# Patient Record
Sex: FEMALE | Race: BLACK OR AFRICAN AMERICAN | NOT HISPANIC OR LATINO | Employment: OTHER | URBAN - METROPOLITAN AREA
[De-identification: names, ages, dates, MRNs, and addresses within clinical notes are randomized per-mention and may not be internally consistent; named-entity substitution may affect disease eponyms.]

---

## 2017-01-06 ENCOUNTER — PATIENT MESSAGE (OUTPATIENT)
Dept: INTERNAL MEDICINE | Facility: CLINIC | Age: 67
End: 2017-01-06

## 2017-01-09 ENCOUNTER — TELEPHONE (OUTPATIENT)
Dept: INTERNAL MEDICINE | Facility: CLINIC | Age: 67
End: 2017-01-09

## 2017-01-10 ENCOUNTER — CLINICAL SUPPORT (OUTPATIENT)
Dept: AUDIOLOGY | Facility: CLINIC | Age: 67
End: 2017-01-10
Payer: MEDICARE

## 2017-01-10 ENCOUNTER — OFFICE VISIT (OUTPATIENT)
Dept: OTOLARYNGOLOGY | Facility: CLINIC | Age: 67
End: 2017-01-10
Payer: MEDICARE

## 2017-01-10 VITALS
TEMPERATURE: 98 F | BODY MASS INDEX: 27.67 KG/M2 | HEIGHT: 66 IN | DIASTOLIC BLOOD PRESSURE: 79 MMHG | WEIGHT: 172.19 LBS | HEART RATE: 61 BPM | SYSTOLIC BLOOD PRESSURE: 137 MMHG

## 2017-01-10 DIAGNOSIS — M26.4 MALOCCLUSION: ICD-10-CM

## 2017-01-10 DIAGNOSIS — H92.02 OTALGIA, LEFT: Primary | ICD-10-CM

## 2017-01-10 DIAGNOSIS — L29.9 ITCHING OF EAR: ICD-10-CM

## 2017-01-10 DIAGNOSIS — Z01.10 ENCOUNTER FOR HEARING EXAMINATION WITHOUT ABNORMAL FINDINGS: Primary | ICD-10-CM

## 2017-01-10 PROCEDURE — 99213 OFFICE O/P EST LOW 20 MIN: CPT | Mod: PBBFAC,27 | Performed by: OTOLARYNGOLOGY

## 2017-01-10 PROCEDURE — 99999 PR PBB SHADOW E&M-EST. PATIENT-LVL III: CPT | Mod: PBBFAC,,, | Performed by: OTOLARYNGOLOGY

## 2017-01-10 PROCEDURE — 99999 PR PBB SHADOW E&M-EST. PATIENT-LVL I: CPT | Mod: PBBFAC,,,

## 2017-01-10 PROCEDURE — 99203 OFFICE O/P NEW LOW 30 MIN: CPT | Mod: S$PBB,,, | Performed by: OTOLARYNGOLOGY

## 2017-01-10 NOTE — MR AVS SNAPSHOT
[facility] - Otorhinolaryngology  [address]  [facility] [address]  Phone: [phone]  Fax: [phone]                  Gely Green   1/10/2017 10:30 AM   Office Visit    Description:  Female : 1950   Provider:  Kendall Bolden III, MD   Department:  [facility] - Otorhinolaryngology           Diagnoses this Visit        Comments    Otalgia, left    -  Primary a little discomfort in left ear, behind/inferior to pinna    Itching of ear         Malocclusion                To Do List           Goals (5 Years of Data)     None      [facility]     [facility] On Call Nurse Care Line -  Assistance  Registered nurses in the [facility] On Call Center provide clinical advisement, health education, appointment booking, and other advisory services.  Call for this free service at [phone].             Medications           Message regarding Medications     Verify the changes and/or additions to your medication regime listed below are the same as discussed with your clinician today.  If any of these changes or additions are incorrect, please notify your healthcare provider.        STOP taking these medications     dextromethorphan 15 mg/5 mL syrup Take 10 mLs by mouth 4 (four) times daily as needed for Cough.    flavoxate (URISPAS) 100 mg Tab Take 1 tablet (100 mg total) by mouth 3 (three) times daily as needed (IC flare).           Verify that the below list of medications is an accurate representation of the medications you are currently taking.  If none reported, the list may be blank. If incorrect, please contact your healthcare provider. Carry this list with you in case of emergency.           Current Medications     ACCU-CHEK FASTCLIX Misc TEST twice a day    ACCU-CHEK SMARTVIEW TEST STRIP Strp ACCU-CHEK SMARTVIEW TEST STRIP Strp,    betamethasone dipropionate (DIPROLENE) 0.05 % cream Apply topically 2 (two) times daily.    blood sugar diagnostic Strp 1 strip by Misc.(Non-Drug;  "Combo Route) route 3 (three) times daily as needed. Lifescan test strips    camphor-eucalyptus oil-menthol (VICKS VAPORUB) 4.7-1.2-2.6 % Oint Apply topically as directed.    CRESTOR 10 mg tablet     diabetic supplies, RoboCV. Kit 1 application by Misc.(Non-Drug; Combo Route) route once daily. Lifescan glucose meter    gabapentin (NEURONTIN) 100 MG capsule Take 1 capsule (100 mg total) by mouth 3 (three) times daily.    ibuprofen (ADVIL,MOTRIN) 200 MG tablet Take 200 mg by mouth every 6 (six) hours as needed for Pain.    insulin detemir (LEVEMIR FLEXPEN) 100 unit/mL (3 mL) SubQ InPn pen Inject 33 Units into the skin every evening.    losartan (COZAAR) 50 MG tablet     meclizine (ANTIVERT) 12.5 mg tablet Take 1 tablet (12.5 mg total) by mouth 3 (three) times daily as needed for Dizziness.    metformin (GLUCOPHAGE-XR) 500 MG 24 hr tablet Take 1 tablet (500 mg total) by mouth 2 (two) times daily.    pen needle, diabetic (NOVOFINE 32) 32 gauge x 1/4" Ndle Inject 1 application into the skin every evening.    pen needle, diabetic (NOVOFINE PLUS) 32 gauge x 1/6" Ndle Inject 1 application into the skin every evening.    phenol (CHLORASEPTIC) 1.4 % SprA by Mucous Membrane route every 2 (two) hours.    phenylephrine-DM-acetaminophen (THERAFLU EXPRESSMAX COLD DAY) 5- mg/15 mL Liqd Take 30 mLs by mouth as directed.           Clinical Reference Information           Vital Signs - Last Recorded  Most recent update: 1/10/2017 11:02 AM by Jesus Maldonado MA    BP Pulse Temp    137/79 (BP Location: Left arm, Patient Position: Sitting, BP Method: Automatic) 61 97.7 °F (36.5 °C) (Tympanic)    Ht Wt BMI    5' 6" (1.676 m) 78.1 kg (172 lb 2.9 oz) 27.79 kg/m2      Blood Pressure          Most Recent Value    BP  137/79      Allergies as of 1/10/2017     No Known Allergies      Immunizations Administered on Date of Encounter - 1/10/2017     None      Instructions    Audiometry reviewed: all parameters WNL  Rx for Valisone " lotion; 3 drops to itchy ear canal BD prn  Discontinue other cream use ( Bacitracin?) for now   Re-establishment of occlusion with lower denture may help  TMJ otalgia literature provided  Consider cervical evalution re: radiculopathy pending course

## 2017-01-10 NOTE — PROGRESS NOTES
Subjective:       Patient ID: Gely Green is a 66 y.o. female.    Chief Complaint: No chief complaint on file.    HPI: Ms. Green is a 66-year-old bespectacled -American female with diabetes who complains of a problem with her left ear specifically.    She describes a little discomfort and pain there which is annoying.  She describes a sharp stabbing pain grade 2/10 in her ear but she also describes some discomfort behind the left ear rating down inferiorly into the neck.  She was given some cream to apply to the radha bowl area skin by another ENT physician.  She does not mention any dizziness symptoms to me today.    She was examined by Dr. Anderson 16 for dizziness as well as rhinorrhea and URI-head cold  symptoms.  She was prescribed meclizine 12.5 mg for lightheadedness and orthostatic hypotension.  Hydration was encouraged.    PMH: High blood pressure, high cholesterol, diabetes, fibromyalgia, mitral valve prolapse, osteopenia  PSH: 2  section procedures  Family history: Heart disease, high blood pressure, high cholesterol, TIA, alcoholism, glaucoma, diabetes, liver disease, brain tumor  Occupation: Retired  Review of Systems   Ears: Positive for ear pressure and dizziness.    Nose:  Positive for postnasal drip.    Constitutional: Positive for fever, chills and night sweats.    Gastrointestinal:  Positive for acid reflux.   Other:  Positive for kidney problem, bladder problem, arthritis, depression and anxiety. Negative for rash.         The patient has completed an audiometric study performed by the [facility] audiology service.  The studies duplicated below and the results reviewed with the patient.  Objective:           blood pressure 137/79 pulse 61 temperature 97.7 height 5 feet 6 inches weight 172 pounds  Physical Exam   Constitutional: She is oriented to person, place, and time. She appears well-developed and well-nourished.   HENT:   Head: Normocephalic.        Right Ear: Tympanic membrane and external ear normal. No drainage. No foreign bodies. No mastoid tenderness. Tympanic membrane is not perforated. No decreased hearing is noted.   Left Ear: Tympanic membrane and external ear normal. No drainage. No foreign bodies. No mastoid tenderness. Tympanic membrane is not perforated. No decreased hearing is noted.   Ears:    Nose: Nose normal. No nasal deformity, septal deviation or nasal septal hematoma. No epistaxis. Right sinus exhibits no maxillary sinus tenderness and no frontal sinus tenderness. Left sinus exhibits no maxillary sinus tenderness and no frontal sinus tenderness.   Mouth/Throat: Uvula is midline, oropharynx is clear and moist and mucous membranes are normal. No oral lesions. No trismus in the jaw. No uvula swelling. No oropharyngeal exudate or tonsillar abscesses.       Neck: Neck supple. No tracheal deviation present. No thyromegaly present.       Pulmonary/Chest: Effort normal. No stridor.   Lymphadenopathy:     She has no cervical adenopathy.   Neurological: She is alert and oriented to person, place, and time.   Skin: No rash noted.       Assessment:       1. Otalgia, left    2. Itching of ear    3. Malocclusion        Plan:     Audiometry reviewed: all parameters WNL  Rx for Valisone lotion; 3 drops to itchy ear canal BD prn  Discontinue other cream use ( Bacitracin?) for now   Re-establishment of occlusion with lower denture may help  TMJ otalgia literature provided  Consider cervical evalution re: radiculopathy pending course

## 2017-01-10 NOTE — PATIENT INSTRUCTIONS
Audiometry reviewed: all parameters WNL  Rx for Valisone lotion; 3 drops to itchy ear canal BD prn  Discontinue other cream use ( Bacitracin?) for now   Re-establishment of occlusion with lower denture may help  TMJ otalgia literature provided  Consider cervical evalution re: radiculopathy pending course

## 2017-01-12 RX ORDER — INSULIN GLARGINE 300 [IU]/ML
33 INJECTION, SOLUTION SUBCUTANEOUS NIGHTLY
Qty: 4.5 ML | Refills: 3 | Status: SHIPPED | OUTPATIENT
Start: 2017-01-12 | End: 2017-06-02 | Stop reason: SDUPTHER

## 2017-01-12 RX ORDER — METFORMIN HYDROCHLORIDE 500 MG/1
500 TABLET, EXTENDED RELEASE ORAL 2 TIMES DAILY
Qty: 180 TABLET | Refills: 3 | Status: SHIPPED | OUTPATIENT
Start: 2017-01-12 | End: 2017-06-02

## 2017-01-12 NOTE — TELEPHONE ENCOUNTER
----- Message from Kely Nascimento sent at 1/12/2017 10:51 AM CST -----  _  1st Request  _  2nd Request  _  3rd Request        Who: Gely Green     Why: please call pt concerning her insulin meds, she needs it filled today, she has been out since last night.     What Number to Call Back:[phone]    When to Expect a call back: (Before the end of the day)   -- if call after 3:00 call back will be tomorrow.

## 2017-01-23 ENCOUNTER — PATIENT MESSAGE (OUTPATIENT)
Dept: UROGYNECOLOGY | Facility: CLINIC | Age: 67
End: 2017-01-23

## 2017-04-17 ENCOUNTER — OFFICE VISIT (OUTPATIENT)
Dept: INTERNAL MEDICINE | Facility: CLINIC | Age: 67
End: 2017-04-17
Payer: MEDICARE

## 2017-04-17 VITALS
WEIGHT: 168.19 LBS | HEIGHT: 66 IN | SYSTOLIC BLOOD PRESSURE: 130 MMHG | OXYGEN SATURATION: 98 % | HEART RATE: 64 BPM | RESPIRATION RATE: 16 BRPM | BODY MASS INDEX: 27.03 KG/M2 | DIASTOLIC BLOOD PRESSURE: 78 MMHG

## 2017-04-17 DIAGNOSIS — S96.912A LEFT ANKLE STRAIN, INITIAL ENCOUNTER: Primary | ICD-10-CM

## 2017-04-17 PROCEDURE — 99999 PR PBB SHADOW E&M-EST. PATIENT-LVL IV: CPT | Mod: PBBFAC,,, | Performed by: NURSE PRACTITIONER

## 2017-04-17 PROCEDURE — 99213 OFFICE O/P EST LOW 20 MIN: CPT | Mod: S$PBB,,, | Performed by: NURSE PRACTITIONER

## 2017-04-17 PROCEDURE — 99214 OFFICE O/P EST MOD 30 MIN: CPT | Mod: PBBFAC | Performed by: NURSE PRACTITIONER

## 2017-04-17 RX ORDER — SULINDAC 150 MG/1
150 TABLET ORAL 2 TIMES DAILY
Qty: 14 TABLET | Refills: 0 | Status: SHIPPED | OUTPATIENT
Start: 2017-04-17 | End: 2017-06-02 | Stop reason: ALTCHOICE

## 2017-04-17 NOTE — MR AVS SNAPSHOT
[facility] - Internal Medicine  [address]  [facility] [address]  Phone: [phone]  Fax: [phone]                  Gely Green   2017 6:00 PM   Office Visit    Description:  Female : 1950   Provider:  Ene Foreman NP   Department:  [facility] - Internal Medicine           Reason for Visit     Ankle Pain           Diagnoses this Visit        Comments    Left ankle strain, initial encounter    -  Primary            To Do List           Goals (5 Years of Data)     None       These Medications        Disp Refills Start End    sulindac (CLINORIL) 150 MG tablet 14 tablet 0 2017     Take 1 tablet (150 mg total) by mouth 2 (two) times daily. - Oral    [facility] [address] [facility] [facility] [address] #: [phone]         [facility] On Call     [facility] On Call Nurse Care Line -  Assistance  Unless otherwise directed by your provider, please contact [facility], our nurse care line that is available for  assistance.     Registered nurses in the [facility] Center provide: appointment scheduling, clinical advisement, health education, and other advisory services.  Call: [phone] (toll free)               Medications           Message regarding Medications     Verify the changes and/or additions to your medication regime listed below are the same as discussed with your clinician today.  If any of these changes or additions are incorrect, please notify your healthcare provider.        START taking these NEW medications        Refills    sulindac (CLINORIL) 150 MG tablet 0    Sig: Take 1 tablet (150 mg total) by mouth 2 (two) times daily.    Class: Normal    Route: Oral           Verify that the below list of medications is an accurate representation of the medications you are currently taking.  If none reported, the list may be blank. If incorrect, please contact your healthcare provider. Carry this list with you in case of  "emergency.           Current Medications     ACCU-CHEK FASTCLIX Misc TEST twice a day    ACCU-CHEK SMARTVIEW TEST STRIP Strp ACCU-CHEK SMARTVIEW TEST STRIP Strp,    betamethasone dipropionate (DIPROLENE) 0.05 % cream Apply topically 2 (two) times daily.    blood sugar diagnostic Strp 1 strip by Misc.(Non-Drug; Combo Route) route 3 (three) times daily as needed. Lifescan test strips    CRESTOR 10 mg tablet     diabetic supplies, C3DNAcellan. Kit 1 application by Misc.(Non-Drug; Combo Route) route once daily. Lifescan glucose meter    gabapentin (NEURONTIN) 100 MG capsule Take 1 capsule (100 mg total) by mouth 3 (three) times daily.    ibuprofen (ADVIL,MOTRIN) 200 MG tablet Take 200 mg by mouth every 6 (six) hours as needed for Pain.    insulin glargine, TOUJEO, (TOUJEO SOLOSTAR) 300 unit/mL (1.5 mL) InPn pen Inject 33 Units into the skin every evening.    losartan (COZAAR) 50 MG tablet     meclizine (ANTIVERT) 12.5 mg tablet Take 1 tablet (12.5 mg total) by mouth 3 (three) times daily as needed for Dizziness.    metformin (GLUCOPHAGE-XR) 500 MG 24 hr tablet Take 1 tablet (500 mg total) by mouth 2 (two) times daily.    pen needle, diabetic (NOVOFINE 32) 32 gauge x 1/4" Ndle Inject 1 application into the skin every evening.    pen needle, diabetic (NOVOFINE PLUS) 32 gauge x 1/6" Ndle Inject 1 application into the skin every evening.    camphor-eucalyptus oil-menthol (VICKS VAPORUB) 4.7-1.2-2.6 % Oint Apply topically as directed.    phenol (CHLORASEPTIC) 1.4 % SprA by Mucous Membrane route every 2 (two) hours.    phenylephrine-DM-acetaminophen (THERAFLU EXPRESSMAX COLD DAY) 5- mg/15 mL Liqd Take 30 mLs by mouth as directed.    sulindac (CLINORIL) 150 MG tablet Take 1 tablet (150 mg total) by mouth 2 (two) times daily.           Clinical Reference Information           Your Vitals Were     BP Pulse Resp Height Weight SpO2    130/78 (BP Location: Left arm, Patient Position: Sitting) 64 16 5' 6" (1.676 m) 76.3 kg (168 " lb 3.4 oz) 98%    BMI                27.15 kg/m2          Blood Pressure          Most Recent Value    BP  130/78      Allergies as of 4/17/2017     No Known Allergies      Immunizations Administered on Date of Encounter - 4/17/2017     None      Language Assistance Services     ATTENTION: Language assistance services are available, free of charge. Please call [phone].      ATENCIÓN: Si habla español, tiene a florez disposición servicios gratuitos de asistencia lingüística. Llame al [phone].     CHÚ Ý: N?u b?n nói Ti?ng Vi?t, có các d?ch v? h? tr? ngôn ng? mi?n phí dành cho b?n. G?i s? [phone].         [facility] complies with applicable Federal civil rights laws and does not discriminate on the basis of race, color, national origin, age, disability, or sex.

## 2017-04-18 NOTE — PROGRESS NOTES
Subjective:       Patient ID: Gely Green is a 66 y.o. female.    Chief Complaint: Ankle Pain    Ankle Pain    The injury mechanism is unknown. The pain is present in the left ankle. The quality of the pain is described as aching. The pain is at a severity of 5/10. The pain is moderate. The pain has been fluctuating since onset. Pertinent negatives include no inability to bear weight, loss of motion, loss of sensation, muscle weakness, numbness or tingling. She reports no foreign bodies present. The symptoms are aggravated by weight bearing. She has tried nothing for the symptoms.     Review of Systems   Constitutional: Negative for fever.   HENT: Negative for facial swelling.    Eyes: Negative for visual disturbance.   Respiratory: Negative for shortness of breath.    Cardiovascular: Negative for chest pain.   Gastrointestinal: Negative for diarrhea and nausea.   Genitourinary: Negative for dysuria.   Musculoskeletal: Positive for gait problem and joint swelling.   Skin: Negative for rash.   Neurological: Negative for tingling and numbness.   Psychiatric/Behavioral: Negative for confusion.       Objective:      Physical Exam   Constitutional: She appears well-developed and well-nourished. No distress.   HENT:   Head: Atraumatic.   Eyes: No scleral icterus.   Neck: Normal range of motion. Neck supple.   Cardiovascular: Normal rate and regular rhythm.    Pulmonary/Chest: Effort normal. No respiratory distress. She has wheezes.   Musculoskeletal:        Left ankle: She exhibits decreased range of motion and swelling. Tenderness. CF ligament tenderness found.   Skin: She is not diaphoretic.   Nursing note and vitals reviewed.      Assessment:       1. Left ankle strain, initial encounter        Plan:   1. Left ankle strain, initial encounter  - sulindac (CLINORIL) 150 MG tablet; Take 1 tablet (150 mg total) by mouth 2 (two) times daily.  Dispense: 14 tablet; Refill: 0      Pt has been given instructions populated  from cloudswave database and has verbalized understanding of the after visit summary and information contained wherein.    Follow up with a primary care provider. May go to ER for acute shortness of breath, lightheadedness, fever, or any other emergent complaints or changes in condition.

## 2017-04-24 RX ORDER — ROSUVASTATIN CALCIUM 10 MG/1
TABLET, FILM COATED ORAL
Qty: 90 TABLET | Refills: 1 | Status: SHIPPED | OUTPATIENT
Start: 2017-04-24 | End: 2017-11-21 | Stop reason: ALTCHOICE

## 2017-05-03 ENCOUNTER — OFFICE VISIT (OUTPATIENT)
Dept: PSYCHIATRY | Facility: CLINIC | Age: 67
End: 2017-05-03
Payer: MEDICARE

## 2017-05-03 DIAGNOSIS — F43.23 ADJUSTMENT DISORDER WITH MIXED ANXIETY AND DEPRESSED MOOD: Primary | ICD-10-CM

## 2017-05-03 DIAGNOSIS — F43.21 GRIEF: ICD-10-CM

## 2017-05-03 PROCEDURE — 90791 PSYCH DIAGNOSTIC EVALUATION: CPT | Mod: S$PBB,,, | Performed by: SOCIAL WORKER

## 2017-05-03 PROCEDURE — 90791 PSYCH DIAGNOSTIC EVALUATION: CPT | Mod: PBBFAC | Performed by: SOCIAL WORKER

## 2017-05-03 NOTE — PROGRESS NOTES
"Psychiatry Initial Visit (PhD/LCSW)  Diagnostic Interview - CPT 22968    Date: 5/3/2017    Site: [facility]    Referral source: self referral     Clinical status of patient: Outpatient    Gely Green, a 66 y.o. female, for initial evaluation visit.  Met with patient.    Chief complaint/reason for encounter: adjustment, depression, and grief     History of present illness: Patient says she presents today due to multiple issues.  She begins by noting son's death in .  He  at the age of 33 years old after having surgery to remove brain tumor.  Tumor was not cancerous, but was impacting his vision and hearing.  Patient explains she also has marital issues and may be struggling with "empty nest."  She is retired now and states, "I don't know what to do with myself."  Patient does try to stay busy - has been involved in her Yarsanism, participates in grief support groups, and exercises.  She elaborates son was living with her and  - "It was a horrific process and we were his caregivers.  I can't get passed any of it."  Patient cites guilt issues - son was sick "all his life" - had first tumor at age 3 years old,  and sometimes she feels she "could have done more or provided better care."  Son had surgery in .  After that surgery he had a tracheostomy and had to be tube fed.  He ended up in LTAC, and later in hospice.  Prior to surgery he was fully functional - had worked as a  at [employer] for 10 years.  Patient made decision to put him hospice after he became nonresponsive - "Doctor tried to get me to do it much earlier than we did, but I fought it."  She explains son had been in and out of the hospital for multiple issues after the surgery, and was not improving - even after attempts at rehab.  She does not know what caused him to not have a successful outcome after the surgery.  Patient denies excessive worry and denies hx panic attacks.  Patient recalls first having depression in her 40s " "due to situational stressors - was mother's primary caregiver when mother struggled with Alzheimer's and patient was also dealing with 's infidelity at that time.  Patient says, "I don't like him."  She has thought about divorce, but is comfortable staying in the marriage and living as roommates.  They have never done marriage counseling - patient acknowledges there are underlying resentments.  Patient has interest to do activities - wants to eventually travel and cultivate new friendships.  She feels cousneling will be helpful to process grief and help her adjust to changes.  Provided patient with information about [facility] group during today's session.        Pain: 3    Symptoms:   · Mood: depressed mood, insomnia, fatigue, worthlessness/guilt, poor concentration and tearfulness; denies isolation and says she has interest in doing things   · Anxiety: denied  · Substance abuse: denied  · Cognitive functioning: denied  · Health behaviors: hypertension; hyperlipidemia; diabetes; fibromyalgia ; interstitial cystitis     Psychiatric history: denies hx individual counseling - participates currently in grief support group; has hx being on Paxil when son was sick in ; she denies current psych medications; denies hx psych hospitalizations; denies hx SA; acknowledges after son's death she thought about suicide, but denies having intent or plan - "It would be selfish.  I don't want to hurt my family;" inherited father's guns, but does not use them - she mentions the only reason she would ever use them would be for protection       Medical history: hypertension; hyperlipidemia; diabetes; fibromyalgia; interstitial cystitis       Family history of psychiatric illness: mother with Alzheimer's     Social history (marriage, employment, etc.): Patient lives  of 30 years. Patient has a 40 year old daughter, who lives locally.  Patient has one grandson from daughter.  Patient had a son, who  in  - " "son was 33 years old when he .  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  Patient is involved in Hinduism - she attends non-[employer] services.      Substance use:   Alcohol: denied   Drugs: denied   Tobacco: none   Caffeine: occasional soft drinks during the week - does not drink them daily     Current medications and drug reactions (include OTC, herbal): see medication list; denies psych medications currently and says she does not want to be on psych medications at this time     Strengths and liabilities: Strength: Patient accepts guidance/feedback, Strength: Patient is expressive/articulate., Strength: Patient is intelligent., Strength: Patient is motivated for change., Strength: Patient has positive support network., Liability: Patient lacks coping skills.    Current Evaluation:     Mental Status Exam:  General Appearance:  age appropriate, well nourished, casually dressed   Speech: normal tone, normal rate, normal pitch, normal volume      Level of Cooperation: cooperative      Thought Processes: normal and logical   Mood: sad      Thought Content: normal, no suicidality, no homicidality, delusions, or paranoia   Affect: congruent and appropriate   Orientation: Oriented x3   Memory: recent >  intact, remote >  intact   Attention Span & Concentration: intact   Fund of General Knowledge: intact and appropriate to age and level of education   Abstract Reasoning: not assessed   Judgment & Insight: fair     Language  intact     Diagnostic Impression - Plan:       ICD-10-CM ICD-9-CM   1. Adjustment disorder with mixed anxiety and depressed mood F43.23 309.28   2. Grief F43.20 309.0       Plan:individual psychotherapy    Return to Clinic: as scheduled    Length of Service (minutes): 45  "

## 2017-05-04 PROBLEM — F43.21 GRIEF: Status: ACTIVE | Noted: 2017-05-04

## 2017-05-04 PROBLEM — F43.23 ADJUSTMENT DISORDER WITH MIXED ANXIETY AND DEPRESSED MOOD: Status: ACTIVE | Noted: 2017-05-04

## 2017-06-02 ENCOUNTER — OFFICE VISIT (OUTPATIENT)
Dept: INTERNAL MEDICINE | Facility: CLINIC | Age: 67
End: 2017-06-02
Attending: FAMILY MEDICINE
Payer: MEDICARE

## 2017-06-02 ENCOUNTER — PATIENT OUTREACH (OUTPATIENT)
Dept: ADMINISTRATIVE | Facility: HOSPITAL | Age: 67
End: 2017-06-02
Payer: MEDICARE

## 2017-06-02 VITALS
HEIGHT: 66 IN | OXYGEN SATURATION: 98 % | WEIGHT: 170 LBS | HEART RATE: 56 BPM | BODY MASS INDEX: 27.32 KG/M2 | SYSTOLIC BLOOD PRESSURE: 118 MMHG | DIASTOLIC BLOOD PRESSURE: 74 MMHG

## 2017-06-02 DIAGNOSIS — K59.00 CONSTIPATION, UNSPECIFIED CONSTIPATION TYPE: ICD-10-CM

## 2017-06-02 DIAGNOSIS — E04.2 MULTIPLE THYROID NODULES: ICD-10-CM

## 2017-06-02 DIAGNOSIS — Z12.11 SCREENING FOR COLON CANCER: ICD-10-CM

## 2017-06-02 DIAGNOSIS — E78.5 HYPERLIPIDEMIA, UNSPECIFIED HYPERLIPIDEMIA TYPE: ICD-10-CM

## 2017-06-02 DIAGNOSIS — R20.2 TINGLING OF SKIN: ICD-10-CM

## 2017-06-02 LAB
CREAT UR-MCNC: 172.6 MG/DL
MICROALBUMIN UR DL<=1MG/L-MCNC: 6 UG/ML
MICROALBUMIN/CREATININE RATIO: 3.5 UG/MG

## 2017-06-02 PROCEDURE — 99999 PR PBB SHADOW E&M-EST. PATIENT-LVL IV: CPT | Mod: PBBFAC,,, | Performed by: FAMILY MEDICINE

## 2017-06-02 PROCEDURE — 82570 ASSAY OF URINE CREATININE: CPT

## 2017-06-02 PROCEDURE — 82043 UR ALBUMIN QUANTITATIVE: CPT

## 2017-06-02 PROCEDURE — 99214 OFFICE O/P EST MOD 30 MIN: CPT | Mod: S$PBB,,, | Performed by: FAMILY MEDICINE

## 2017-06-02 PROCEDURE — 99214 OFFICE O/P EST MOD 30 MIN: CPT | Mod: PBBFAC | Performed by: FAMILY MEDICINE

## 2017-06-02 RX ORDER — GABAPENTIN 100 MG/1
100 CAPSULE ORAL 3 TIMES DAILY
Qty: 90 CAPSULE | Refills: 1 | Status: SHIPPED | OUTPATIENT
Start: 2017-06-02 | End: 2020-08-04

## 2017-06-02 RX ORDER — METFORMIN HYDROCHLORIDE EXTENDED-RELEASE TABLETS 1000 MG/1
2000 TABLET, FILM COATED, EXTENDED RELEASE ORAL
Qty: 60 TABLET | Refills: 1 | Status: SHIPPED | OUTPATIENT
Start: 2017-06-02 | End: 2017-11-21

## 2017-06-02 RX ORDER — INSULIN GLARGINE 300 [IU]/ML
36 INJECTION, SOLUTION SUBCUTANEOUS NIGHTLY
Qty: 4.5 ML | Refills: 3
Start: 2017-06-02 | End: 2017-06-16 | Stop reason: SDUPTHER

## 2017-06-02 RX ORDER — FLAVOXATE HYDROCHLORIDE 100 MG/1
100 TABLET ORAL 3 TIMES DAILY PRN
COMMUNITY
End: 2017-09-07 | Stop reason: SDUPTHER

## 2017-06-02 NOTE — PATIENT INSTRUCTIONS
80 fl oz water daily     Take fiber supplements such as Metamucil, Citrucel, Konsyl, etc. (Benefiber is less effective so avoid)  The goal is 1-2 nonstraining nonloose bowel movements per day.  In general we recommend about 25-30 grams of fiber daily or reaching the above bowel movement goal.

## 2017-06-02 NOTE — PROGRESS NOTES
"Subjective:      Patient ID: Gely Green is a 66 y.o. female.    Chief Complaint: Annual Exam    She is here for DM follow up. She snacks at night time and she eats chips/ice cream/candy. Her sugars have been in the 150-180s. She reports her food intake is secondary to bad habits. She denies stress or anxiety lending itself to it. She has bowel movements once every 2-3 weeks. She gets tingling in her face once a day, lasts for a second, for the last 6 weeks. No headaches, loss of vision or hearing with it. No facial droops or slurred speech with it.       Review of Systems   Constitutional: Negative.    HENT: Negative.    Respiratory: Negative.    Cardiovascular: Negative.    Gastrointestinal: Positive for constipation. Negative for abdominal distention, abdominal pain, anal bleeding, blood in stool, diarrhea, nausea, rectal pain and vomiting.   Genitourinary: Negative.    Neurological: Negative.      I personally reviewed Past Medical History, Past Surgical history,  Past Social History and Family History    Objective:   /74   Pulse (!) 56   Ht 5' 6" (1.676 m)   Wt 77.1 kg (169 lb 15.6 oz)   SpO2 98%   BMI 27.43 kg/m²     Physical Exam   Constitutional: She is oriented to person, place, and time. She appears well-developed and well-nourished. No distress.   HENT:   Head: Normocephalic.   Right Ear: External ear normal.   Left Ear: External ear normal.   Mouth/Throat: Oropharynx is clear and moist.   Eyes: Conjunctivae and EOM are normal. Pupils are equal, round, and reactive to light. Right eye exhibits no discharge. Left eye exhibits no discharge. No scleral icterus.   Neck: Normal range of motion. No tracheal deviation present. No thyromegaly present.   Cardiovascular: Normal rate, regular rhythm, normal heart sounds and intact distal pulses.  Exam reveals no gallop.    No murmur heard.  Pulses:       Dorsalis pedis pulses are 2+ on the right side, and 2+ on the left side.        Posterior tibial " pulses are 2+ on the right side, and 2+ on the left side.   Pulmonary/Chest: Effort normal and breath sounds normal. No respiratory distress. She has no wheezes. She has no rales. She exhibits no tenderness.   Abdominal: Soft. Bowel sounds are normal. She exhibits no distension and no mass. There is no tenderness. There is no rebound and no guarding.   Musculoskeletal: Normal range of motion.        Right foot: There is normal range of motion and no deformity.        Left foot: There is normal range of motion and no deformity.   Feet:   Right Foot:   Protective Sensation: 6 sites tested. 6 sites sensed.   Skin Integrity: Negative for ulcer or blister.   Left Foot:   Protective Sensation: 6 sites tested. 6 sites sensed.   Skin Integrity: Negative for ulcer or blister.   Neurological: She is alert and oriented to person, place, and time.   Skin: Skin is warm and dry.   Psychiatric: She has a normal mood and affect. Her behavior is normal. Judgment and thought content normal.   Vitals reviewed.      Gely was seen today for annual exam.    Diagnoses and all orders for this visit:    Diabetes mellitus due to underlying condition, uncontrolled, with diabetic polyneuropathy, with long-term current use of insulin  -cont toujeo and metformin, will increase dose to 36 units and metformin to 2000mg daily   -return in 2 weeks with list of sugars  -     Amb Referral to [facility]  -     DIABETIC SHOES FOR HOME USE    Constipation, unspecified constipation type  Screening for colon cancer  -discussed water and fiber intake   -patient to follow up with  GI    Hyperlipidemia, unspecified hyperlipidemia type  -cont crestor   -     Lipid panel; Future    Tingling of skin  -  Patient to follow up with neurology     Multiple thyroid nodules  -     US Soft Tissue Head Neck Thyroid; Future    Other orders  -     insulin glargine, TOUJEO, (TOUJEO SOLOSTAR) 300 unit/mL (1.5 mL) InPn pen; Inject 36 Units into the skin every  evening.  -     Cancel: Amb Referral to [facility]  -     gabapentin (NEURONTIN) 100 MG capsule; Take 1 capsule (100 mg total) by mouth 3 (three) times daily.  -     metformin (FORTAMET) 1,000 mg 24 hr tablet; Take 2 tablets (2,000 mg total) by mouth daily with breakfast.

## 2017-06-06 ENCOUNTER — TELEPHONE (OUTPATIENT)
Dept: INTERNAL MEDICINE | Facility: CLINIC | Age: 67
End: 2017-06-06

## 2017-06-06 ENCOUNTER — PATIENT MESSAGE (OUTPATIENT)
Dept: INTERNAL MEDICINE | Facility: CLINIC | Age: 67
End: 2017-06-06

## 2017-06-06 DIAGNOSIS — Z12.31 VISIT FOR SCREENING MAMMOGRAM: Primary | ICD-10-CM

## 2017-06-06 DIAGNOSIS — Z79.4 TYPE 2 DIABETES MELLITUS WITH DIABETIC POLYNEUROPATHY, WITH LONG-TERM CURRENT USE OF INSULIN: ICD-10-CM

## 2017-06-06 DIAGNOSIS — Z12.39 BREAST CANCER SCREENING: Primary | ICD-10-CM

## 2017-06-06 DIAGNOSIS — E11.42 TYPE 2 DIABETES MELLITUS WITH DIABETIC POLYNEUROPATHY, WITH LONG-TERM CURRENT USE OF INSULIN: ICD-10-CM

## 2017-06-06 NOTE — TELEPHONE ENCOUNTER
Attempted to order annual mammo (external), but epic stated that I am unable to sign this order. Pt requesting her yearly mammo to have done a touro. Please advise and authorize.

## 2017-06-08 ENCOUNTER — OFFICE VISIT (OUTPATIENT)
Dept: GASTROENTEROLOGY | Facility: CLINIC | Age: 67
End: 2017-06-08
Payer: MEDICARE

## 2017-06-08 VITALS — BODY MASS INDEX: 26.89 KG/M2 | WEIGHT: 167.31 LBS | HEIGHT: 66 IN

## 2017-06-08 DIAGNOSIS — K59.04 CHRONIC IDIOPATHIC CONSTIPATION: Primary | ICD-10-CM

## 2017-06-08 PROCEDURE — 99999 PR PBB SHADOW E&M-EST. PATIENT-LVL IV: CPT | Mod: PBBFAC,,, | Performed by: PHYSICIAN ASSISTANT

## 2017-06-08 PROCEDURE — 99214 OFFICE O/P EST MOD 30 MIN: CPT | Mod: PBBFAC | Performed by: PHYSICIAN ASSISTANT

## 2017-06-08 PROCEDURE — 99204 OFFICE O/P NEW MOD 45 MIN: CPT | Mod: S$PBB,,, | Performed by: PHYSICIAN ASSISTANT

## 2017-06-08 RX ORDER — LUBIPROSTONE 24 UG/1
24 CAPSULE ORAL 2 TIMES DAILY WITH MEALS
Qty: 60 CAPSULE | Refills: 3 | Status: SHIPPED | OUTPATIENT
Start: 2017-06-08 | End: 2017-06-12

## 2017-06-08 NOTE — LETTER
June 8, 2017      Althea Anderson MD  [address]  [facility] [address]           [facility]astroenterology  [address]  [facility] [address]  Phone: [phone]  Fax: [phone]          Patient: Gely Green   MR Number: [id number]   YOB: 1950   Date of Visit: 6/8/2017       Dear Dr. Althea Anderson:    Thank you for referring Gely Green to me for evaluation. Attached you will find relevant portions of my assessment and plan of care.    If you have questions, please do not hesitate to call me. I look forward to following Gely Green along with you.    Sincerely,    Marcos Rosales PA-C    Enclosure  CC:  No Recipients    If you would like to receive this communication electronically, please contact [email or web address] or [phone] to request more information on [employer] Link access.    For providers and/or their staff who would like to refer a patient to [facility], please contact us through our one-stop-shop provider referral line, [facility], at [phone].    If you feel you have received this communication in error or would no longer like to receive these types of communications, please e-mail [email or web address]

## 2017-06-08 NOTE — PROGRESS NOTES
[facility] Consultation Note    Reason for Consult:  The encounter diagnosis was Chronic idiopathic constipation.    PCP: Althea Anderson   [address] / [place] [address]    HPI:  This is a 66 y.o. female here for evaluation of constipation.    Duration - since childhood  Bowel movement frequency - every 2-3 weeks  Stool consistency - bistol type 1  Blood in stools - no  Laxatives used - stimulant laxative, no relief with miralax, can't remember if senna worked. Milk of magnesia and MOM effective  Fiber - minimal relief with fiber  Water intake- 36oz  Bran cereal  S/p , hysterectomy  BS - some reading up to 190s  A1c - 7.3    Colonoscopy 2 yrs ago - no polyps    ROS:  Constitutional: No fevers, chills, No weight loss  ENT: No allergies  CV: No chest pain  Pulm: No cough, No shortness of breath  Ophtho: No vision changes  GI: see HPI  Derm: No rash  Heme: No lymphadenopathy, No bruising  MSK: No arthritis  : No dysuria, No hematuria  Endo: No hot or cold intolerance  Neuro: No syncope, No seizure  Psych: No anxiety, No depression    Medical History:  has a past medical history of Chronic constipation; Diabetes mellitus; Diabetes mellitus; History of thyroid cyst; Hypertension; and Osteopenia.    Surgical History:  has a past surgical history that includes  section; Hysterectomy; and Colonoscopy.    Family History: family history is not on file..     Social History:  reports that she has quit smoking. She has never used smokeless tobacco. She reports that she does not drink alcohol or use drugs.    Review of patient's allergies indicates:  No Known Allergies    Current Outpatient Prescriptions   Medication Sig    ACCU-CHEK FASTCLIX Misc TEST twice a day    ACCU-CHEK SMARTVIEW TEST STRIP Strp ACCU-CHEK SMARTVIEW TEST STRIP Strp,    betamethasone dipropionate (DIPROLENE) 0.05 % cream Apply topically 2 (two) times daily.    blood sugar diagnostic Strp 1 strip  "by Misc.(Non-Drug; Combo Route) route 3 (three) times daily as needed. Lifescan test strips    CRESTOR 10 mg tablet take 1 tablet by mouth once daily    diabetic supplies, miscellan. Kit 1 application by Misc.(Non-Drug; Combo Route) route once daily. Lifescan glucose meter    flavoxate (URISPAS) 100 mg Tab Take 100 mg by mouth 3 (three) times daily as needed.    gabapentin (NEURONTIN) 100 MG capsule Take 1 capsule (100 mg total) by mouth 3 (three) times daily.    ibuprofen (ADVIL,MOTRIN) 200 MG tablet Take 200 mg by mouth every 6 (six) hours as needed for Pain.    insulin glargine, TOUJEO, (TOUJEO SOLOSTAR) 300 unit/mL (1.5 mL) InPn pen Inject 36 Units into the skin every evening.    losartan (COZAAR) 50 MG tablet     metformin (FORTAMET) 1,000 mg 24 hr tablet Take 2 tablets (2,000 mg total) by mouth daily with breakfast.    pen needle, diabetic (NOVOFINE 32) 32 gauge x 1/4" Ndle Inject 1 application into the skin every evening.    pen needle, diabetic (NOVOFINE PLUS) 32 gauge x 1/6" Ndle Inject 1 application into the skin every evening.    phenol (CHLORASEPTIC) 1.4 % SprA by Mucous Membrane route every 2 (two) hours.    lubiprostone (AMITIZA) 24 MCG Cap Take 1 capsule (24 mcg total) by mouth 2 (two) times daily with meals.     No current facility-administered medications for this visit.          Objective Findings:    Vital Signs:  Ht 5' 6" (1.676 m)   Wt 75.9 kg (167 lb 5.3 oz)   BMI 27.01 kg/m²   Body mass index is 27.01 kg/m².    Physical Exam:  General Appearance: Well appearing in no acute distress  Head:   Normocephalic, without obvious abnormality  Eyes:    No scleral icterus  ENT: Neck supple, Lips, mucosa, and tongue normal  Lungs: CTA bilaterally in anterior and posterior fields, no wheezes, no crackles.  Heart:  Regular rate and rhythm, S1, S2 normal, no murmurs heard  Abdomen: Soft, non tender, non distended with positive bowel sounds in all four quadrants.   Extremities: no edema  Skin: " No rash  Neurologic: AAO x 3      Labs:  Lab Results   Component Value Date    WBC 4.75 06/01/2016    HGB 11.8 (L) 06/01/2016    HCT 36.5 (L) 06/01/2016     06/01/2016    CHOL 139 06/01/2016    TRIG 68 06/01/2016    HDL 48 06/01/2016    ALT 20 06/01/2016    AST 23 06/01/2016     06/01/2016    K 4.0 06/01/2016     06/01/2016    CREATININE 0.8 06/01/2016    BUN 13 06/01/2016    CO2 22 (L) 06/01/2016    TSH 1.047 06/01/2016    HGBA1C 7.3 (H) 11/30/2016       Imaging:    Endoscopy:    Colonoscopy 2 yrs ago - [facility]- no polyps per pt  Colonoscopy - 2010 per records  12/2012 Flex Sig - prominent vessel with blood / erosion in ileo-secal valve    Assessment:  1. Chronic idiopathic constipation      65yo F with chronic constipation since childhood. Requiring laxative to have a BM    Recommendations:  1. Trial of amitiza 24mcg BID     2. Request colonoscopy from [facility]     Return in about 3 months (around 9/8/2017).      Order summary:  Orders Placed This Encounter    lubiprostone (AMITIZA) 24 MCG Cap         Addendum Records Review: Added to note above. Scanned to media  Thank you so much for allowing me to participate in the care of Gely Rosales PA-C

## 2017-06-08 NOTE — PATIENT INSTRUCTIONS
Drink 64 oz of water    Start taking a probiotic pill daily - consider culturelle    Give your self and enema thenDrink a bottle of magnesium citrate. start the amitiza prescription the following day    Give yourself a tap water enema if no BM in 3 days    HIGH FIBER KEY POINTS:  1. Goal of 20-25 grams of fiber each day for women, 30-35 grams each day for men. Slowly build up to this goal as you may experience gas and bloating at first.  2. Take a fiber supplement to help reach this goal: Metamucil, Citrucel, Fibercon, Konsyl, or Psyllium  3. For Constipation: Finely ground psyllium husk (with or without flavoring or                                              Additives)   - To start: 1 teaspoon once a day in the morning with 8 oz of liquid    followed by an additional 8 ounce glass of water   - After a week: add a second teaspoon in the middle of the day   - After two weeks: add a third teaspoon at bedtime   - Follow each dose with another glass of water. Can add a splash of juice   or lemonade for taste.  3. Drink 6-8 glasses of water a day.  4. Try to do plant fiber (fruits and vegetables) over processed fibers (cereals and breads)     HIGH FIBER DIET  Fiber is present in all fruits, vegetables, cereals and grains. Fiber passes through the body undigested. A high fiber diet helps food move through the intestinal tract. The added bulk is helpful in preventing constipation. In persons with diverticulosis it serves to clean out the pouches along the colon wall while preventing new ones from forming. A high fiber diet may reduce the risk of colon cancer, decreases blood cholesterol and prevents high blood sugar in diabetics.    The foods listed below are high in fiber and should be included in your diet. If you are not used to high fiber foods, start with 1 or 2 foods from this list. Every 3-4 days add a new one to your diet until you are eating 4 high fiber foods per day. This should give you 20-35 Gm of fiber/day.  It is also important to drink a lot of water when you are on this diet (6-8 glasses a day). Water causes the fiber to swell and increases the benefit.  Add Fiber to Your Diet   Adding fiber to your diet can help relieve constipation by making stools softer and easier to pass. To increase your fiber intake, your doctor may recommend a bulking agent, such as psyllium. This is a high-fiber supplement available at most grocery and drugstores. Eating more fiber-rich foods will also help. There are two types of fiber:   Insoluble fiber is the main ingredient in bulking agents. Its also found in foods such as wheat bran, whole-grain breads, fresh fruits, and vegetables.   Soluble fiber is found in foods such as oat bran. Although soluble fiber is good for you, it may not ease constipation as much as foods high in insoluble fiber.    FOODS HIGH IN DIETARY FIBER:  BREADS: Made with 100% whole wheat flour; Leo, wheat or rye crackers; tortillas, bran muffins. Whole grains, such as wheat bran, corn bran, and brown rice.  CEREALS: Whole grain cereal with bran (Chex, Raisin Bran, Corn Bran), oatmeal, rolled oats, granola, wheat flakes, brown rice  NUTS: Any nuts  FRUITS: All fresh fruits along with edible skins, (bananas, citrus fruit, mangoes, pears, prunes, raisins, apples, pineapple, apricot, melon, jams and marmalades), fruit juices (especially prune juice)  VEGETABLES: All types, preferably raw or lightly cooked: especially, celery, eggplant, potatoes,spinach, broccoli, brussel sprouts, winter squash, carrots, cauliflower, soybeans, lentils, fresh and dried beans of all kinds  OTHER: Popcorn, any spices.   Nuts and legumes, especially peanuts, lentils, and kidney beans.  Easy Ways to Add Fiber   The tips below offer some simple ways to add more high-fiber foods to your meals.   Start your day with a high-fiber breakfast. Eat a wheat bran cereal along with a sliced banana. Or, try peanut butter on whole-wheat toast.   Eat  carrot sticks for snacks. Theyre easy to prepare, taste great, and are low in calories.   Use whole-grain breads instead of white bread for sandwiches.   Eat fruits for treats. Try an apple and some raisins instead of a candy bar.  Vegetables  Artichoke, cooked 10.3  Asparagus - 2.8  Beans - 2  Broccoli, boiled 1 cup - 5.1  [place] sprouts, cooked 1 cup - 4.1  Carrots - boiled 2.5, raw 4  Celery - 1  Corn - 4  Corn on the Cob - 5.9  Lettuce - 1  Peas (canned) - 4  Peas (dried) - 7.9  Green peas (cooked) - 8.8  Potato, with skin, baked - 3.0  Spinach - 4  Sweet potato - 3.4  Turnip greens, boiled 1 cup - 5.0  Sweet corn, cooked  1 cup  4.0  Tomato paste -1/4 cup -2.7  Yam - 2.7  Legumes, Nuts, and Seeds  Split peas, cooked  1 cup  16.3  Lentils, cooked 1 cup 15.6  Black beans, cooked 1 cup 15.0  Black eyed peas (1/2 cup) 4.2  Chick peas (1/2 cup) 5  Lima beans, cooked 1 cup  13.2  Baked beans, vegetarian, canned, cooked 1 cup 10.4  Sunflower seed kernels 1/4 cup 3.9  Almonds 1 ounce (23 nuts)  3.5  Pistachio nuts 1 ounce (49 nuts) 2.9  Pecans 1 ounce (19 halves) 2.7  Beans (lima,kidney,baked) - 10 (1/2 cup)  Refried beans -12 (1cup)  Lentils - 8  Soybeans (1/2 cup) 5  Fruit  Raspberries  1 cup  8.0  Pear, with skin - 5.5  Apple, with skin 4.4 (Juice = 0)  Banana - 3.1  Orange - 3.1  Strawberries (halves) 1 cup - 3.0  Figs, dried 2 medium - 1.6  Raisins 1 ounce (60 raisins) -1.0  Grapefruit - 1.4  Peach - 2  Kiwi - 5  Frantz - 3.7  Pineapple - 2  Cereal, Grains, and Pasta  Spaghetti, whole-wheat, cooked 1 cup 6.3  Barley, pearled, cooked 1 cup 6.0  Bran flakes 3/4 cup 5.3  Oat bran muffin 1 medium 5.2  Oatmeal, instant, cooked 1 cup 4.0  Popcorn, air-popped 3 cups 3.5  Brown rice, cooked  1 cup  3.5  Bread, rye 1 slice 1.9, pumpernickel - 2.1  Bagel - 1.6  Bread, whole-wheat or multigrain  1 slice 1.9  Fiber One - 14  100% Bran - 13  Raisin Bran - 3.5  All Bran Extra Fiber - 13

## 2017-06-12 ENCOUNTER — PATIENT MESSAGE (OUTPATIENT)
Dept: GASTROENTEROLOGY | Facility: CLINIC | Age: 67
End: 2017-06-12

## 2017-06-12 DIAGNOSIS — K59.04 CHRONIC IDIOPATHIC CONSTIPATION: Primary | ICD-10-CM

## 2017-06-13 ENCOUNTER — HOSPITAL ENCOUNTER (OUTPATIENT)
Dept: RADIOLOGY | Facility: HOSPITAL | Age: 67
Discharge: HOME | End: 2017-06-13
Attending: FAMILY MEDICINE
Payer: MEDICARE

## 2017-06-13 ENCOUNTER — OFFICE VISIT (OUTPATIENT)
Dept: PSYCHIATRY | Facility: CLINIC | Age: 67
End: 2017-06-13
Payer: MEDICARE

## 2017-06-13 DIAGNOSIS — Z12.11 SCREENING FOR COLON CANCER: ICD-10-CM

## 2017-06-13 DIAGNOSIS — E04.2 MULTIPLE THYROID NODULES: ICD-10-CM

## 2017-06-13 DIAGNOSIS — R20.2 TINGLING OF SKIN: ICD-10-CM

## 2017-06-13 DIAGNOSIS — F43.21 GRIEF: ICD-10-CM

## 2017-06-13 DIAGNOSIS — K59.00 CONSTIPATION, UNSPECIFIED CONSTIPATION TYPE: ICD-10-CM

## 2017-06-13 DIAGNOSIS — E78.5 HYPERLIPIDEMIA, UNSPECIFIED HYPERLIPIDEMIA TYPE: ICD-10-CM

## 2017-06-13 DIAGNOSIS — F43.23 ADJUSTMENT DISORDER WITH MIXED ANXIETY AND DEPRESSED MOOD: Primary | ICD-10-CM

## 2017-06-13 PROCEDURE — 76536 US EXAM OF HEAD AND NECK: CPT | Mod: TC

## 2017-06-13 PROCEDURE — 90834 PSYTX W PT 45 MINUTES: CPT | Mod: PBBFAC | Performed by: SOCIAL WORKER

## 2017-06-13 PROCEDURE — 76536 US EXAM OF HEAD AND NECK: CPT | Mod: 26,GC,, | Performed by: RADIOLOGY

## 2017-06-13 PROCEDURE — 90834 PSYTX W PT 45 MINUTES: CPT | Mod: S$PBB,,, | Performed by: SOCIAL WORKER

## 2017-06-14 ENCOUNTER — HOSPITAL ENCOUNTER (OUTPATIENT)
Dept: RADIOLOGY | Facility: HOSPITAL | Age: 67
Discharge: HOME | End: 2017-06-14
Attending: FAMILY MEDICINE
Payer: MEDICARE

## 2017-06-14 VITALS — BODY MASS INDEX: 26.84 KG/M2 | HEIGHT: 66 IN | WEIGHT: 167 LBS

## 2017-06-14 DIAGNOSIS — Z12.31 VISIT FOR SCREENING MAMMOGRAM: ICD-10-CM

## 2017-06-14 PROCEDURE — 77067 SCR MAMMO BI INCL CAD: CPT | Mod: 26,,, | Performed by: RADIOLOGY

## 2017-06-14 PROCEDURE — 77067 SCR MAMMO BI INCL CAD: CPT | Mod: TC

## 2017-06-14 PROCEDURE — 77063 BREAST TOMOSYNTHESIS BI: CPT | Mod: 26,,, | Performed by: RADIOLOGY

## 2017-06-16 ENCOUNTER — OFFICE VISIT (OUTPATIENT)
Dept: INTERNAL MEDICINE | Facility: CLINIC | Age: 67
End: 2017-06-16
Attending: FAMILY MEDICINE
Payer: MEDICARE

## 2017-06-16 VITALS
SYSTOLIC BLOOD PRESSURE: 132 MMHG | DIASTOLIC BLOOD PRESSURE: 82 MMHG | HEIGHT: 66 IN | HEART RATE: 50 BPM | WEIGHT: 169.56 LBS | OXYGEN SATURATION: 98 % | BODY MASS INDEX: 27.25 KG/M2

## 2017-06-16 DIAGNOSIS — E11.8 TYPE 2 DIABETES MELLITUS WITH COMPLICATION, UNSPECIFIED LONG TERM INSULIN USE STATUS: Primary | ICD-10-CM

## 2017-06-16 PROCEDURE — 4010F ACE/ARB THERAPY RXD/TAKEN: CPT | Mod: ,,, | Performed by: FAMILY MEDICINE

## 2017-06-16 PROCEDURE — 99213 OFFICE O/P EST LOW 20 MIN: CPT | Mod: S$PBB,,, | Performed by: FAMILY MEDICINE

## 2017-06-16 PROCEDURE — 3045F PR MOST RECENT HEMOGLOBIN A1C LEVEL 7.0-9.0%: CPT | Mod: ,,, | Performed by: FAMILY MEDICINE

## 2017-06-16 PROCEDURE — 99999 PR PBB SHADOW E&M-EST. PATIENT-LVL III: CPT | Mod: PBBFAC,,, | Performed by: FAMILY MEDICINE

## 2017-06-16 PROCEDURE — 99213 OFFICE O/P EST LOW 20 MIN: CPT | Mod: PBBFAC | Performed by: FAMILY MEDICINE

## 2017-06-16 RX ORDER — INSULIN GLARGINE 300 [IU]/ML
38 INJECTION, SOLUTION SUBCUTANEOUS NIGHTLY
Qty: 4.5 ML | Refills: 3
Start: 2017-06-16 | End: 2017-09-21

## 2017-06-16 NOTE — PROGRESS NOTES
"Subjective:      Patient ID: Gely Green is a 66 y.o. female.    Chief Complaint: Diabetes (f/u)    She is here for diabetes follow up. Her sugars are ranging 104-208. Her higher sugars she is eating more carbs.  She did start the probiotic and she is having a bowel movement for the first time this week which is once.       Review of Systems   Constitutional: Negative.    Respiratory: Negative.    Cardiovascular: Negative.    Gastrointestinal: Negative.    Genitourinary: Negative.      I personally reviewed Past Medical History, Past Surgical history,  Past Social History and Family History    Objective:   /82   Pulse (!) 50   Ht 5' 6" (1.676 m)   Wt 76.9 kg (169 lb 8.5 oz)   SpO2 98%   BMI 27.36 kg/m²     Physical Exam   Constitutional: She is oriented to person, place, and time. She appears well-developed and well-nourished. No distress.   HENT:   Head: Normocephalic and atraumatic.   Right Ear: External ear normal.   Left Ear: External ear normal.   Mouth/Throat: Oropharynx is clear and moist.   Eyes: Conjunctivae and EOM are normal. Pupils are equal, round, and reactive to light. Right eye exhibits no discharge. Left eye exhibits no discharge. No scleral icterus.   Neck: Normal range of motion. Neck supple.   Cardiovascular: Normal rate, regular rhythm, normal heart sounds and intact distal pulses.  Exam reveals no gallop.    No murmur heard.  Pulses:       Dorsalis pedis pulses are 2+ on the right side, and 2+ on the left side.        Posterior tibial pulses are 2+ on the right side, and 2+ on the left side.   Pulmonary/Chest: Effort normal and breath sounds normal. No respiratory distress. She has no wheezes. She has no rales. She exhibits no tenderness.   Abdominal: Soft. Bowel sounds are normal. She exhibits no distension and no mass. There is no tenderness. There is no rebound and no guarding.   Musculoskeletal: Normal range of motion.        Right foot: There is normal range of motion and " no deformity.        Left foot: There is normal range of motion and no deformity.   Feet:   Right Foot:   Protective Sensation: 6 sites tested. 6 sites sensed.   Skin Integrity: Negative for ulcer or blister.   Left Foot:   Protective Sensation: 6 sites tested. 6 sites sensed.   Skin Integrity: Negative for ulcer or blister.   Neurological: She is alert and oriented to person, place, and time.   Skin: Skin is warm and dry.   Psychiatric: She has a normal mood and affect. Her behavior is normal. Judgment and thought content normal.   Vitals reviewed.      Gely was seen today for diabetes.    Diagnoses and all orders for this visit:    Type 2 diabetes mellitus with complication, unspecified long term insulin use status  -patient to increase glargine to 38 units nightly, she will call next week with list of sugars     Other orders  -     Cancel: Lipid panel; Future  -     Cancel: Hemoglobin A1c; Future  -     insulin glargine, TOUJEO, (TOUJEO SOLOSTAR) 300 unit/mL (1.5 mL) InPn pen; Inject 38 Units into the skin every evening.

## 2017-06-19 ENCOUNTER — HOSPITAL ENCOUNTER (OUTPATIENT)
Dept: RADIOLOGY | Facility: OTHER | Age: 67
Discharge: HOME | End: 2017-06-19
Attending: PSYCHIATRY & NEUROLOGY
Payer: MEDICARE

## 2017-06-19 ENCOUNTER — OFFICE VISIT (OUTPATIENT)
Dept: NEUROLOGY | Facility: CLINIC | Age: 67
End: 2017-06-19
Attending: FAMILY MEDICINE
Payer: MEDICARE

## 2017-06-19 VITALS
HEIGHT: 66 IN | HEART RATE: 62 BPM | SYSTOLIC BLOOD PRESSURE: 129 MMHG | BODY MASS INDEX: 27.07 KG/M2 | DIASTOLIC BLOOD PRESSURE: 74 MMHG | WEIGHT: 168.44 LBS

## 2017-06-19 DIAGNOSIS — E11.42 DIABETIC PERIPHERAL NEUROPATHY: ICD-10-CM

## 2017-06-19 DIAGNOSIS — G50.8 TRIGEMINAL NEURITIS: ICD-10-CM

## 2017-06-19 PROCEDURE — 99204 OFFICE O/P NEW MOD 45 MIN: CPT | Mod: S$PBB,,, | Performed by: PSYCHIATRY & NEUROLOGY

## 2017-06-19 PROCEDURE — 3045F PR MOST RECENT HEMOGLOBIN A1C LEVEL 7.0-9.0%: CPT | Mod: ,,, | Performed by: PSYCHIATRY & NEUROLOGY

## 2017-06-19 PROCEDURE — 70450 CT HEAD/BRAIN W/O DYE: CPT | Mod: TC

## 2017-06-19 PROCEDURE — 70450 CT HEAD/BRAIN W/O DYE: CPT | Mod: 26,,, | Performed by: RADIOLOGY

## 2017-06-19 PROCEDURE — 99999 PR PBB SHADOW E&M-EST. PATIENT-LVL III: CPT | Mod: PBBFAC,,, | Performed by: PSYCHIATRY & NEUROLOGY

## 2017-06-19 PROCEDURE — 4010F ACE/ARB THERAPY RXD/TAKEN: CPT | Mod: ,,, | Performed by: PSYCHIATRY & NEUROLOGY

## 2017-06-19 NOTE — PROGRESS NOTES
Subjective:       Patient ID: Gely Green is a 66 y.o. female.    Chief Complaint:  Facial Pain      History of Present Illness  HPI   This is a 66-year-old -American female who was referred for evaluation of intermittent tingling in her left face lasting seconds that have been going on for since late April 2017.  No associated headaches, visual impairment or hearing loss.  She denies any speech or swallowing difficulty.  Symptoms are mild and primarily affect the medial cheek.  They have been decreasing in frequency and intensity.  She denies any recent head trauma.  The patient also describes tingling and numbness in her hands and feet, primarily in her feet that has been going on since early this year.  She does have a history of diabetes which she reports has been poorly controlled in the past.  A recent hemoglobin A1c was 7.3.  She had recent adjustment in her diabetes medications and is also on insulin.  She has a history of diabetes since 1990s.  She has had no history of strokes or blackouts.       Review of Systems  Review of Systems   Constitutional: Negative.    HENT: Negative.  Negative for hearing loss.    Eyes: Negative.  Negative for visual disturbance.   Respiratory: Negative.  Negative for shortness of breath.    Cardiovascular: Negative.  Negative for chest pain and palpitations.   Gastrointestinal: Negative.    Genitourinary: Negative.    Musculoskeletal: Negative.  Negative for back pain, gait problem and neck pain.   Skin: Negative.    Neurological: Positive for numbness. Negative for tremors, seizures, syncope, speech difficulty, weakness and headaches.   Psychiatric/Behavioral: Negative.  Negative for confusion and decreased concentration.       Objective:      Neurologic Exam     Mental Status   Oriented to person, place, and time.   Registration: recalls 3 of 3 objects. Follows 3 step commands.   Attention: normal. Concentration: normal.   Speech: speech is normal   Level of  consciousness: alert  Knowledge: good.   Able to name object. Able to read. Able to repeat. Normal comprehension.     Cranial Nerves   Cranial nerves II through XII intact.   No sensory deficits or hypersensitivity over the face     Motor Exam   Muscle bulk: normal  Overall muscle tone: normal    Strength   Strength 5/5 throughout.     Sensory Exam   Light touch normal.   Right arm vibration: normal  Left arm vibration: normal  Right leg vibration: decreased from toes (Vibration sense less than 10 seconds at the toes)  Left leg vibration: decreased from toes  Proprioception normal.   Pinprick normal.     Gait, Coordination, and Reflexes     Gait  Gait: normal    Coordination   Romberg: negative  Finger to nose coordination: normal    Tremor   Resting tremor: absent  Intention tremor: absent  Action tremor: absent    Reflexes   Right brachioradialis: 1+  Left brachioradialis: 1+  Right biceps: 1+  Left biceps: 1+  Right triceps: 1+  Left triceps: 1+  Right patellar: 1+  Left patellar: 1+  Right achilles: 1+  Left achilles: 1+  Right plantar: normal  Left plantar: normal      Physical Exam   Constitutional: She is oriented to person, place, and time. She appears well-developed and well-nourished.   HENT:   Head: Normocephalic and atraumatic.   Neck: Normal range of motion. Neck supple. Carotid bruit is not present.   Neurological: She is oriented to person, place, and time. She has normal strength. She has a normal Finger-Nose-Finger Test and a normal Romberg Test. Gait normal.   Reflex Scores:       Tricep reflexes are 1+ on the right side and 1+ on the left side.       Bicep reflexes are 1+ on the right side and 1+ on the left side.       Brachioradialis reflexes are 1+ on the right side and 1+ on the left side.       Patellar reflexes are 1+ on the right side and 1+ on the left side.       Achilles reflexes are 1+ on the right side and 1+ on the left side.  Psychiatric: Her speech is normal.   Vitals reviewed.         Assessment:        1. Diabetic peripheral neuropathy    2. Trigeminal neuritis            Plan:         Discussed with patient regarding her symptoms.  Her facial symptoms may represent a trigeminal neuritis that may be related to diabetes however the possibility of a CNS disorder needs to be ruled out.  Will get a CT scan of the brain, noncontrast, and EMG/NCS lower extremities.  Strict control of diabetes especially with diet control advised.  She is presently on aspirin 81 mg daily.  Labs were has been ordered by her primary care physician including a B12 level.  Patient will follow-up a couple of weeks after EMG is completed.

## 2017-06-19 NOTE — PATIENT INSTRUCTIONS
Discussed with patient regarding her symptoms.  Her facial symptoms may represent a trigeminal neuritis that may be related to diabetes however the possibility of a CNS disorder needs to be ruled out.  Will get a CT scan of the brain, noncontrast, and EMG/NCS lower extremities.  Strict control of diabetes especially with diet control advised.  She is presently on aspirin 81 mg daily.  Labs were has been ordered by her primary care physician including a B12 level.  Patient will follow-up a couple of weeks after EMG is completed.

## 2017-06-21 RX ORDER — LOSARTAN POTASSIUM 50 MG/1
TABLET ORAL
Qty: 90 TABLET | Refills: 1 | Status: SHIPPED | OUTPATIENT
Start: 2017-06-21 | End: 2017-11-21

## 2017-06-23 ENCOUNTER — PATIENT MESSAGE (OUTPATIENT)
Dept: INTERNAL MEDICINE | Facility: CLINIC | Age: 67
End: 2017-06-23

## 2017-06-26 ENCOUNTER — TELEPHONE (OUTPATIENT)
Dept: INTERNAL MEDICINE | Facility: CLINIC | Age: 67
End: 2017-06-26

## 2017-07-06 ENCOUNTER — LAB VISIT (OUTPATIENT)
Dept: LAB | Facility: OTHER | Age: 67
End: 2017-07-06
Attending: FAMILY MEDICINE
Payer: MEDICARE

## 2017-07-06 ENCOUNTER — TELEPHONE (OUTPATIENT)
Dept: INTERNAL MEDICINE | Facility: CLINIC | Age: 67
End: 2017-07-06

## 2017-07-06 DIAGNOSIS — E78.5 HYPERLIPIDEMIA, UNSPECIFIED HYPERLIPIDEMIA TYPE: ICD-10-CM

## 2017-07-06 DIAGNOSIS — Z12.11 SCREENING FOR COLON CANCER: ICD-10-CM

## 2017-07-06 DIAGNOSIS — R20.2 TINGLING OF SKIN: ICD-10-CM

## 2017-07-06 DIAGNOSIS — K59.00 CONSTIPATION, UNSPECIFIED CONSTIPATION TYPE: ICD-10-CM

## 2017-07-06 LAB
ALBUMIN SERPL BCP-MCNC: 3.7 G/DL
ALP SERPL-CCNC: 72 U/L
ALT SERPL W/O P-5'-P-CCNC: 18 U/L
ANION GAP SERPL CALC-SCNC: 9 MMOL/L
AST SERPL-CCNC: 21 U/L
BASOPHILS # BLD AUTO: 0.01 K/UL
BASOPHILS NFR BLD: 0.2 %
BILIRUB SERPL-MCNC: 0.5 MG/DL
BUN SERPL-MCNC: 17 MG/DL
CALCIUM SERPL-MCNC: 9.2 MG/DL
CHLORIDE SERPL-SCNC: 108 MMOL/L
CHOLEST/HDLC SERPL: 2.9 {RATIO}
CO2 SERPL-SCNC: 25 MMOL/L
CREAT SERPL-MCNC: 0.8 MG/DL
DIFFERENTIAL METHOD: NORMAL
EOSINOPHIL # BLD AUTO: 0.1 K/UL
EOSINOPHIL NFR BLD: 1.8 %
ERYTHROCYTE [DISTWIDTH] IN BLOOD BY AUTOMATED COUNT: 14.3 %
EST. GFR  (AFRICAN AMERICAN): >60 ML/MIN/1.73 M^2
EST. GFR  (NON AFRICAN AMERICAN): >60 ML/MIN/1.73 M^2
ESTIMATED AVG GLUCOSE: 143 MG/DL
FOLATE SERPL-MCNC: 12.4 NG/ML
GLUCOSE SERPL-MCNC: 108 MG/DL
HBA1C MFR BLD HPLC: 6.6 %
HCT VFR BLD AUTO: 37.8 %
HDL/CHOLESTEROL RATIO: 34.8 %
HDLC SERPL-MCNC: 161 MG/DL
HDLC SERPL-MCNC: 56 MG/DL
HGB BLD-MCNC: 12.2 G/DL
LDLC SERPL CALC-MCNC: 94 MG/DL
LYMPHOCYTES # BLD AUTO: 2.1 K/UL
LYMPHOCYTES NFR BLD: 45.9 %
MCH RBC QN AUTO: 27.7 PG
MCHC RBC AUTO-ENTMCNC: 32.3 %
MCV RBC AUTO: 86 FL
MONOCYTES # BLD AUTO: 0.4 K/UL
MONOCYTES NFR BLD: 8.3 %
NEUTROPHILS # BLD AUTO: 2 K/UL
NEUTROPHILS NFR BLD: 43.6 %
NONHDLC SERPL-MCNC: 105 MG/DL
PLATELET # BLD AUTO: 253 K/UL
PMV BLD AUTO: 10.4 FL
POTASSIUM SERPL-SCNC: 4.1 MMOL/L
PROT SERPL-MCNC: 7 G/DL
RBC # BLD AUTO: 4.4 M/UL
SODIUM SERPL-SCNC: 142 MMOL/L
T4 FREE SERPL-MCNC: 0.81 NG/DL
TRIGL SERPL-MCNC: 55 MG/DL
TSH SERPL DL<=0.005 MIU/L-ACNC: 0.85 UIU/ML
VIT B12 SERPL-MCNC: 356 PG/ML
WBC # BLD AUTO: 4.47 K/UL

## 2017-07-06 PROCEDURE — 36415 COLL VENOUS BLD VENIPUNCTURE: CPT

## 2017-07-06 PROCEDURE — 80061 LIPID PANEL: CPT

## 2017-07-06 PROCEDURE — 80053 COMPREHEN METABOLIC PANEL: CPT

## 2017-07-06 PROCEDURE — 82607 VITAMIN B-12: CPT

## 2017-07-06 PROCEDURE — 82746 ASSAY OF FOLIC ACID SERUM: CPT

## 2017-07-06 PROCEDURE — 83036 HEMOGLOBIN GLYCOSYLATED A1C: CPT

## 2017-07-06 PROCEDURE — 84439 ASSAY OF FREE THYROXINE: CPT

## 2017-07-06 PROCEDURE — 85025 COMPLETE CBC W/AUTO DIFF WBC: CPT

## 2017-07-06 PROCEDURE — 84443 ASSAY THYROID STIM HORMONE: CPT

## 2017-07-06 NOTE — TELEPHONE ENCOUNTER
----- Message from Arianarachel Graham sent at 7/6/2017  1:00 PM CDT -----  Contact: VAIBHAV JASMINE [[id number]]  x_  1st Request  _  2nd Request  _  3rd Request        Who: VAIBHAV JASMINE [[id number]]    Why: Patient states she would like to call to confirm the correct date for her diabetic education class    What Number to Call Back: [phone]     When to Expect a call back: (Before the end of the day)   -- if call after 3:00 call back will be tomorrow.  2:10 PM  Left vm confirm patient's diabetes education appointment.

## 2017-07-09 NOTE — PROGRESS NOTES
"Individual Psychotherapy (PhD/LCSW)    6/13/2017    Site:  [facility]         Therapeutic Intervention: Met with patient.  Outpatient - Insight oriented psychotherapy 45 min - CPT code 17811 and Outpatient - Supportive psychotherapy 45 min - CPT Code 94740    Chief complaint/reason for encounter: depression, adjustment, and grief      Interval history and content of current session: Patient presents for the first time since her initial evaluation last month.  She reports she is doing "ok" today, but cites sporadic mood decline.  She states, "I still miss my son, but I try to stay busy."  Processed feelings and discussed stages of grief.  Patient does attend some exercise classes and has been involved at her local [employer].  She reflects on caring for her son before he passed away, and also caring for her mother, prior to her death.  Patient is also mourning caregiving role.  She describes feeling a lack of purpose.  Discussed fulfilling own needs.  Patient expresses she may be interested in travel.      Treatment plan:  · Target symptoms: depression, adjustment, grief  · Why chosen therapy is appropriate versus another modality: relevant to diagnosis  · Outcome monitoring methods: self-report, observation  · Therapeutic intervention type: insight oriented psychotherapy, supportive psychotherapy    Risk parameters:  Patient reports no suicidal ideation  Patient reports no homicidal ideation  Patient reports no self-injurious behavior  Patient reports no violent behavior    Verbal deficits: None    Patient's response to intervention:  The patient's response to intervention is accepting.    Progress toward goals and other mental status changes:  The patient's progress toward goals is fair .    Diagnosis:     ICD-10-CM ICD-9-CM   1. Adjustment disorder with mixed anxiety and depressed mood F43.23 309.28   2. Grief F43.20 309.0       Plan:  individual psychotherapy    Return to clinic: as scheduled - 07/25 @ 3 " PM    Length of Service (minutes): 45

## 2017-07-25 ENCOUNTER — TELEPHONE (OUTPATIENT)
Dept: NEUROLOGY | Facility: CLINIC | Age: 67
End: 2017-07-25

## 2017-07-25 ENCOUNTER — TELEPHONE (OUTPATIENT)
Dept: INTERNAL MEDICINE | Facility: CLINIC | Age: 67
End: 2017-07-25

## 2017-07-25 ENCOUNTER — OFFICE VISIT (OUTPATIENT)
Dept: PSYCHIATRY | Facility: CLINIC | Age: 67
End: 2017-07-25
Payer: MEDICARE

## 2017-07-25 DIAGNOSIS — F43.21 GRIEF: ICD-10-CM

## 2017-07-25 DIAGNOSIS — F43.23 ADJUSTMENT DISORDER WITH MIXED ANXIETY AND DEPRESSED MOOD: Primary | ICD-10-CM

## 2017-07-25 PROCEDURE — 90834 PSYTX W PT 45 MINUTES: CPT | Mod: S$PBB,,, | Performed by: SOCIAL WORKER

## 2017-07-25 PROCEDURE — 90834 PSYTX W PT 45 MINUTES: CPT | Mod: PBBFAC | Performed by: SOCIAL WORKER

## 2017-07-25 NOTE — TELEPHONE ENCOUNTER
----- Message from Ene SAMMIE Ortiz sent at 7/25/2017  1:34 PM CDT -----  Contact: PT  PT is going to need her diabetes management appointment rescheduled.     PT callback: [phone]  3:23 PM  Left vm for patient to return phone call to reschedule diabetes class.

## 2017-07-28 NOTE — PROGRESS NOTES
"Individual Psychotherapy (PhD/LCSW)    7/25/2017    Site:  [facility]         Therapeutic Intervention: Met with patient.  Outpatient - Insight oriented psychotherapy 45 min - CPT code 83042 and Outpatient - Supportive psychotherapy 45 min - CPT Code 47587    Chief complaint/reason for encounter: depression, adjustment, and grief      Interval history and content of current session: Patient reports she is doing "ok" today, but cites sporadic mood decline.  She states, "I still miss my son.  There are good and bad days."  Processed feelings and discussed stages of grief.  She is tearful, at times, during the session.  Patient mentions anger she's felt towards  has subsided, and they are getting along better.  Discussed how anger most likely was related to grief process.  Patient still attends some exercise classes and has been involved at her local [employer].  Discussed using michelle/spirtuality to cope with grief.  On a positive note, patient attended family reunion this past weekend (father's side of the family).  She notes it was "bittersweet" - it was the first time she has attended a reunion without her son.  Family was very supportive of her.          Treatment plan:  · Target symptoms: depression, adjustment, grief  · Why chosen therapy is appropriate versus another modality: relevant to diagnosis  · Outcome monitoring methods: self-report, observation  · Therapeutic intervention type: insight oriented psychotherapy, supportive psychotherapy    Risk parameters:  Patient reports no suicidal ideation  Patient reports no homicidal ideation  Patient reports no self-injurious behavior  Patient reports no violent behavior    Verbal deficits: None    Patient's response to intervention:  The patient's response to intervention is accepting.    Progress toward goals and other mental status changes:  The patient's progress toward goals is fair .    Diagnosis:     ICD-10-CM ICD-9-CM   1. Adjustment disorder with " mixed anxiety and depressed mood F43.23 309.28   2. Grief F43.20 309.0       Plan:  individual psychotherapy    Return to clinic: as scheduled     Length of Service (minutes): 45

## 2017-07-31 ENCOUNTER — HOSPITAL ENCOUNTER (OUTPATIENT)
Dept: DIABETES | Facility: OTHER | Age: 67
Discharge: HOME | End: 2017-07-31
Attending: FAMILY MEDICINE
Payer: MEDICARE

## 2017-07-31 VITALS — WEIGHT: 167.75 LBS | BODY MASS INDEX: 27.08 KG/M2

## 2017-07-31 DIAGNOSIS — E11.42 TYPE 2 DIABETES MELLITUS WITH DIABETIC POLYNEUROPATHY, WITH LONG-TERM CURRENT USE OF INSULIN: Primary | ICD-10-CM

## 2017-07-31 DIAGNOSIS — Z79.4 TYPE 2 DIABETES MELLITUS WITH DIABETIC POLYNEUROPATHY, WITH LONG-TERM CURRENT USE OF INSULIN: Primary | ICD-10-CM

## 2017-07-31 PROCEDURE — G0109 DIAB MANAGE TRN IND/GROUP: HCPCS | Performed by: DIETITIAN, REGISTERED

## 2017-07-31 NOTE — PROGRESS NOTES
"Diabetes Education  Author: Shea Vo RD  Date: 7/31/2017    Diabetes Education Visit  Diabetes Education Record Assessment/Progress: Initial (group, patient came 15 minutes late and was unable to complete full assessment )    Diabetes Type  Diabetes Type : Type II    Diabetes History  Diabetes Diagnosis: >10 years (diagnosed in 1999)    Nutrition  Meal Planning: water, skipping meals, diet drinks, snacks between meal (drinks diet sodas, skips meals during the day and over eats in the evening. Admits to excessing snacking in bed on candy(smarites) and chips while she watches TV)  Meal Plan 24 Hour Recall - Breakfast: none  Meal Plan 24 Hour Recall - Lunch: usually skips or snacks on chips and diet soda  Meal Plan 24 Hour Recall - Dinner: excessive eating at dinner and snacks in bed on sweets and chips  Meal Plan 24 Hour Recall - Snack: chips and sweets, loves smarties    Monitoring   Monitoring: Accu-check Kate Smart View, Other (Reli-on brand)  Self Monitoring : SMBG [id number]"depending on what I eat the night before"  takes it during the day randomly-150-160-verbal recall, states 200 after eating a meal  Blood Glucose Logs: No    Exercise   Exercise Type: swimming (water and danceing aerobics, walking and )  Intensity: High  Frequency: Daily  Duration: > 1 hour    Current Diabetes Treatment   Current Treatment: Oral Medication, Insulin (metformin and Toujeo)    Social History  Preferred Learning Method: Reading Materials  Primary Support: Self  Smoking Status: Ex Smoker (quit in 1999)  Alcohol Use: Never                                  Readiness to Learn   Readiness to Learn : Eager    Cultural Influences  Cultural Influences: Yes  If yes, please list::     Diabetes Education Assessment/Progress  Acute Complications (preventing, detecting, and treating acute complications): Discussion, Demonstration, Class, Instructed, Written Materials Provided, Requires Assistance (felt an 85 BG 2 weeks " ago when exercising on an empty stomach)  Chronic Complications (preventing, detecting, and treating chronic complications): Discussion, Instructed, Class, Written Materials Provided, Requires Assistance (ed on ADA standards of care reviewed A1c with target goals)  Diabetes Disease Process (diabetes disease process and treatment options): Discussion, Instructed, Class, Written Materials Provided, No Knowledge (ed on chronic natures of DM, ed on beta cell burn out and IR)  Nutrition (Incorporating nutritional management into one's lifestyle): Discussion, Instructed, Class, Written Materials Provided, Requires Assistance (skipping meals which leads to excessive eating in pm and craving sweets, ed on carb counting, lable reading & meal and snacks planning, stressed eat 3 meals per day to prevent craving and overeating, do not eat in bed)  Physical Activity (incorporating physical activity into one's lifestyle): Discussion, Instructed, Class, Requires Assistance, Written Materials Provided (very physically active, participates in water aerobics, high intensity exercise classes and walking 5-7 days per week, ed on ADA recs and precautions when exercising, stressed do not exercise on an empty stomach)  Medications (states correct name, dose, onset, peak, duration, side effects & timing of meds): Discussion, Instructed, Class, Competent (verbalizes/demonstrates), Written Materials Provided (takes medications correctly and consistently with no reported SE)  Monitoring (monitoring blood glucose/other parameters & using results): Discussion, Instructed, Class, Requires Assistance, Written Materials Provided, Demonstration (using a reli-on brand and an Accucheck meter. Is SMBG fasting and randomly during the day, reviewed targert BG ranges, stressed take BG fasting and 2 hrs PP meals, bring log to all clinic apmnts)  Goal Setting and Problem Solving (verbalizes behavior change strategies & sets realistic goals): Discussion,  Instructed, Class, Requires Assistance, Written Materials Provided  Behavior Change (developing personal strategies to health & behavior change): Discussion, Instructed, Written Materials Provided, Requires Assistance, Class  Psychosocial Issues (developing personal srategies to address psychosocial concerns): Discussion, Instructed, Class, Written Materials Provided, Requires Assostance (states was more focused and commited to eating healthy in the past, attended today to get movitvated to eat healthier. )    Goals  Healthy Eating: Set (do not skip meals, do not eat in bed)  Start Date: 07/31/17  Monitoring: Set (SMBG fasting and 2 hours PP largest meal, bring bg log to all clinic apmnts)  Start Date: 07/31/17         Diabetes Care Plan/Intervention  Education Plan/Intervention: Individual Follow-Up DSMT (will call to make an individual f/u apmnt)    Diabetes Meal Plan  Restrictions: Restricted Carbohydrate, Low Sodium, Low Fat  Calories: 1600  Carbohydrate Per Meal: 30-45g  Carbohydrate Per Snack : 15-20g  Fat: 64-71 g  Protein: 64-72g    Education Units of Time   Time Spent: 150 min      Health Maintenance Topics with due status: Not Due       Topic Last Completion Date    Colonoscopy 05/06/2014    TETANUS VACCINE 05/31/2016    DEXA SCAN 06/01/2016    Influenza Vaccine 11/14/2016    Pneumococcal (65+) 11/30/2016    Eye Exam 02/09/2017    Mammogram 06/14/2017    Foot Exam 06/16/2017    Lipid Panel 07/06/2017    Hemoglobin A1c 07/06/2017     There are no preventive care reminders to display for this patient.

## 2017-08-06 RX ORDER — INSULIN GLARGINE 300 U/ML
INJECTION, SOLUTION SUBCUTANEOUS
Qty: 4.5 SYRINGE | Refills: 0 | Status: SHIPPED | OUTPATIENT
Start: 2017-08-06 | End: 2017-09-07

## 2017-08-10 ENCOUNTER — OFFICE VISIT (OUTPATIENT)
Dept: GASTROENTEROLOGY | Facility: CLINIC | Age: 67
End: 2017-08-10
Payer: MEDICARE

## 2017-08-10 VITALS
WEIGHT: 169.75 LBS | HEIGHT: 66 IN | SYSTOLIC BLOOD PRESSURE: 141 MMHG | BODY MASS INDEX: 27.28 KG/M2 | DIASTOLIC BLOOD PRESSURE: 72 MMHG | HEART RATE: 52 BPM

## 2017-08-10 DIAGNOSIS — K64.8 INTERNAL HEMORRHOIDS: ICD-10-CM

## 2017-08-10 DIAGNOSIS — K59.04 CHRONIC IDIOPATHIC CONSTIPATION: Primary | ICD-10-CM

## 2017-08-10 PROCEDURE — 99215 OFFICE O/P EST HI 40 MIN: CPT | Mod: PBBFAC | Performed by: PHYSICIAN ASSISTANT

## 2017-08-10 PROCEDURE — 99214 OFFICE O/P EST MOD 30 MIN: CPT | Mod: S$PBB,,, | Performed by: PHYSICIAN ASSISTANT

## 2017-08-10 PROCEDURE — 99999 PR PBB SHADOW E&M-EST. PATIENT-LVL V: CPT | Mod: PBBFAC,,, | Performed by: PHYSICIAN ASSISTANT

## 2017-08-10 PROCEDURE — 3078F DIAST BP <80 MM HG: CPT | Mod: ,,, | Performed by: PHYSICIAN ASSISTANT

## 2017-08-10 PROCEDURE — 3077F SYST BP >= 140 MM HG: CPT | Mod: ,,, | Performed by: PHYSICIAN ASSISTANT

## 2017-08-10 PROCEDURE — 3008F BODY MASS INDEX DOCD: CPT | Mod: ,,, | Performed by: PHYSICIAN ASSISTANT

## 2017-08-10 RX ORDER — ASPIRIN 325 MG/1
325 TABLET, FILM COATED ORAL DAILY
COMMUNITY
End: 2018-05-01

## 2017-08-10 RX ORDER — CALCIUM CARBONATE 600 MG
600 TABLET ORAL 2 TIMES DAILY WITH MEALS
COMMUNITY

## 2017-08-10 RX ORDER — CHOLECALCIFEROL (VITAMIN D3) 25 MCG
1000 TABLET ORAL DAILY
COMMUNITY

## 2017-08-10 RX ORDER — HYDROCORTISONE ACETATE 25 MG/1
25 SUPPOSITORY RECTAL 2 TIMES DAILY
Qty: 24 SUPPOSITORY | Refills: 1 | Status: SHIPPED | OUTPATIENT
Start: 2017-08-10 | End: 2017-09-07

## 2017-08-10 NOTE — PATIENT INSTRUCTIONS
Taking two 145mcg linzess pill daily    If that does not work, then add 17g (1capful) of miralax at night around 6pm    Drink 64 oz of water

## 2017-08-10 NOTE — PROGRESS NOTES
[facility] Consultation Note    Reason for Consult:  The primary encounter diagnosis was Chronic idiopathic constipation. A diagnosis of Internal hemorrhoids was also pertinent to this visit.    PCP: Althea Anderson       HPI:  This is a 66 y.o. female here to follow up on her constipation.    Start taking the linzess 145mcg every morning, about 3 weeks aog  Now having Bm twice a week, compared to every 2-3 weeks  Taking a probiotic  Intermittent rectal beeding with passing hard stool  This has also improved taking the linzess, stools are softer    The MOM I advised at the last visit promoted so many BMs that she developed rectal bleeding    Constipation Duration - since childhood  Fiber - minimal relief with fiber  Water intake- 36oz   Bran cereal  S/p , hysterectomy  BS - some reading up to 190s  A1c - 7.3    ROS:  Constitutional: No fevers, chills, No weight loss  ENT: No allergies  CV: No chest pain  Pulm: No cough, No shortness of breath  Ophtho: No vision changes  GI: see HPI  Derm: No rash  Heme: No lymphadenopathy, No bruising  MSK: No arthritis  : No dysuria, No hematuria  Endo: No hot or cold intolerance  Neuro: No syncope, No seizure  Psych: No anxiety, No depression    Medical History:  has a past medical history of Chronic constipation; Diabetes mellitus; Diabetes mellitus; History of thyroid cyst; Hypertension; and Osteopenia.    Surgical History:  has a past surgical history that includes  section; Hysterectomy; and Colonoscopy.    Family History: family history includes Breast cancer in her cousin..     Social History:  reports that she has quit smoking. She has never used smokeless tobacco. She reports that she does not drink alcohol or use drugs.    Review of patient's allergies indicates:  No Known Allergies    Current Outpatient Prescriptions   Medication Sig    ACCU-CHEK FASTCLIX Misc TEST twice a day    ACCU-CHEK SMARTVIEW TEST STRIP Strp  "ACCU-CHEK SMARTVIEW TEST STRIP Strp,    aspirin (BUFFERIN) 325 MG Tab Take 325 mg by mouth once daily.    betamethasone dipropionate (DIPROLENE) 0.05 % cream Apply topically 2 (two) times daily.    blood sugar diagnostic Strp 1 strip by Misc.(Non-Drug; Combo Route) route 3 (three) times daily as needed. Lifescan test strips    calcium carbonate (OS-REDD) 600 mg calcium (1,500 mg) Tab Take 600 mg by mouth 2 (two) times daily with meals.    CRESTOR 10 mg tablet take 1 tablet by mouth once daily    diabetic supplies, miscellan. Kit 1 application by Misc.(Non-Drug; Combo Route) route once daily. Lifescan glucose meter    flavoxate (URISPAS) 100 mg Tab Take 100 mg by mouth 3 (three) times daily as needed.    gabapentin (NEURONTIN) 100 MG capsule Take 1 capsule (100 mg total) by mouth 3 (three) times daily.    ibuprofen (ADVIL,MOTRIN) 200 MG tablet Take 200 mg by mouth every 6 (six) hours as needed for Pain.    insulin glargine, TOUJEO, (TOUJEO SOLOSTAR) 300 unit/mL (1.5 mL) InPn pen Inject 38 Units into the skin every evening.    losartan (COZAAR) 50 MG tablet take 1 tablet by mouth once daily    metformin (FORTAMET) 1,000 mg 24 hr tablet Take 2 tablets (2,000 mg total) by mouth daily with breakfast.    pen needle, diabetic (NOVOFINE 32) 32 gauge x 1/4" Ndle Inject 1 application into the skin every evening.    pen needle, diabetic (NOVOFINE PLUS) 32 gauge x 1/6" Ndle Inject 1 application into the skin every evening.    phenol (CHLORASEPTIC) 1.4 % SprA by Mucous Membrane route every 2 (two) hours.    TOUJEO SOLOSTAR 300 unit/mL (1.5 mL) InPn pen inject 33 units subcutaneously every evening    vitamin D 1000 units Tab Take 1,000 Units by mouth once daily.    hydrocortisone (ANUSOL-HC) 25 mg suppository Place 1 suppository (25 mg total) rectally 2 (two) times daily.    linaclotide (LINZESS) 290 mcg Cap Take 1 capsule (290 mcg total) by mouth once daily.     No current facility-administered medications for " "this visit.          Objective Findings:    Vital Signs:  BP (!) 141/72   Pulse (!) 52   Ht 5' 6" (1.676 m)   Wt 77 kg (169 lb 12.1 oz)   BMI 27.40 kg/m²   Body mass index is 27.4 kg/m².    Physical Exam:  General Appearance: Well appearing in no acute distress  Head:   Normocephalic, without obvious abnormality  Eyes:    No scleral icterus  ENT: Neck supple, Lips, mucosa, and tongue normal  Lungs: CTA bilaterally in anterior and posterior fields, no wheezes, no crackles.  Heart:  Regular rate and rhythm, S1, S2 normal, no murmurs heard  Abdomen: Soft, non tender, non distended with positive bowel sounds in all four quadrants.   Extremities: no edema  Skin: No rash  Neurologic: AAO x 3      Labs:  Lab Results   Component Value Date    WBC 4.47 07/06/2017    HGB 12.2 07/06/2017    HCT 37.8 07/06/2017     07/06/2017    CHOL 161 07/06/2017    TRIG 55 07/06/2017    HDL 56 07/06/2017    ALT 18 07/06/2017    AST 21 07/06/2017     07/06/2017    K 4.1 07/06/2017     07/06/2017    CREATININE 0.8 07/06/2017    BUN 17 07/06/2017    CO2 25 07/06/2017    TSH 0.855 07/06/2017    HGBA1C 6.6 (H) 07/06/2017       Imaging:    Endoscopy:    Colonoscopy 2 yrs ago - [facility]- no polyps per pt  Colonoscopy - 2010 per records  12/2012 Flex Sig - prominent vessel with blood / erosion in ileo-secal valve    Assessment:  1. Chronic idiopathic constipation    2. Internal hemorrhoids      65yo F with chronic constipation since childhood. Improvement taking linzess 145mcg but still having only twice a week    Recommendations:  1. Increase linzess to 290mg.    2. If no relief she was advised to add miralax at night    3.  anusol supp for hemorrhoids    No Follow-up on file.      Order summary:  Orders Placed This Encounter    linaclotide (LINZESS) 290 mcg Cap    hydrocortisone (ANUSOL-HC) 25 mg suppository         Addendum Records Review: Added to note above. Scanned to media  Thank you so much for allowing me to " participate in the care of Gely Rosales PA-C

## 2017-08-15 ENCOUNTER — PROCEDURE VISIT (OUTPATIENT)
Dept: NEUROLOGY | Facility: CLINIC | Age: 67
End: 2017-08-15
Payer: MEDICARE

## 2017-08-15 DIAGNOSIS — E11.42 DIABETIC PERIPHERAL NEUROPATHY: ICD-10-CM

## 2017-08-15 PROCEDURE — 95909 NRV CNDJ TST 5-6 STUDIES: CPT | Mod: 26,S$PBB,, | Performed by: PSYCHIATRY & NEUROLOGY

## 2017-08-15 PROCEDURE — 95885 MUSC TST DONE W/NERV TST LIM: CPT | Mod: 26,S$PBB,, | Performed by: PSYCHIATRY & NEUROLOGY

## 2017-08-15 PROCEDURE — 95909 NRV CNDJ TST 5-6 STUDIES: CPT | Mod: PBBFAC | Performed by: PSYCHIATRY & NEUROLOGY

## 2017-08-15 PROCEDURE — 95885 MUSC TST DONE W/NERV TST LIM: CPT | Mod: PBBFAC | Performed by: PSYCHIATRY & NEUROLOGY

## 2017-08-28 ENCOUNTER — OFFICE VISIT (OUTPATIENT)
Dept: NEUROLOGY | Facility: CLINIC | Age: 67
End: 2017-08-28
Payer: MEDICARE

## 2017-08-28 VITALS
DIASTOLIC BLOOD PRESSURE: 71 MMHG | BODY MASS INDEX: 27.74 KG/M2 | HEIGHT: 66 IN | WEIGHT: 172.63 LBS | SYSTOLIC BLOOD PRESSURE: 144 MMHG | HEART RATE: 52 BPM

## 2017-08-28 DIAGNOSIS — Z79.4 TYPE 2 DIABETES MELLITUS WITH DIABETIC POLYNEUROPATHY, WITH LONG-TERM CURRENT USE OF INSULIN: ICD-10-CM

## 2017-08-28 DIAGNOSIS — E11.42 TYPE 2 DIABETES MELLITUS WITH DIABETIC POLYNEUROPATHY, WITH LONG-TERM CURRENT USE OF INSULIN: ICD-10-CM

## 2017-08-28 DIAGNOSIS — E11.42 DIABETIC PERIPHERAL NEUROPATHY: Primary | ICD-10-CM

## 2017-08-28 DIAGNOSIS — G50.8 TRIGEMINAL NEURITIS: ICD-10-CM

## 2017-08-28 PROCEDURE — 3044F HG A1C LEVEL LT 7.0%: CPT | Mod: ,,, | Performed by: PSYCHIATRY & NEUROLOGY

## 2017-08-28 PROCEDURE — 99213 OFFICE O/P EST LOW 20 MIN: CPT | Mod: PBBFAC | Performed by: PSYCHIATRY & NEUROLOGY

## 2017-08-28 PROCEDURE — 99999 PR PBB SHADOW E&M-EST. PATIENT-LVL III: CPT | Mod: PBBFAC,,, | Performed by: PSYCHIATRY & NEUROLOGY

## 2017-08-28 PROCEDURE — 3078F DIAST BP <80 MM HG: CPT | Mod: ,,, | Performed by: PSYCHIATRY & NEUROLOGY

## 2017-08-28 PROCEDURE — 99214 OFFICE O/P EST MOD 30 MIN: CPT | Mod: S$PBB,,, | Performed by: PSYCHIATRY & NEUROLOGY

## 2017-08-28 PROCEDURE — 4010F ACE/ARB THERAPY RXD/TAKEN: CPT | Mod: ,,, | Performed by: PSYCHIATRY & NEUROLOGY

## 2017-08-28 PROCEDURE — 3077F SYST BP >= 140 MM HG: CPT | Mod: ,,, | Performed by: PSYCHIATRY & NEUROLOGY

## 2017-08-28 NOTE — PROGRESS NOTES
Subjective:       Patient ID: Gely Green is a 66 y.o. female.    Chief Complaint:  Peripheral Neuropathy      History of Present Illness  HPI   This is a 66-year-old -American female who had been seen by me with complaints of intermittent tingling in her left face lasting seconds that have been going on for since late April 2017.  No associated headaches, visual impairment or hearing loss.  She denies any speech or swallowing difficulty.  Symptoms are mild and primarily affect the medial cheek.  They have been decreasing in frequency and intensity.  She denies any recent head trauma.  The patient also describes tingling and numbness in her hands and feet, primarily in her feet that has been going on since early this year.  She does have a history of diabetes which she reports has been poorly controlled in the past.  She is presently on insulin injections and reports significant improvement in the diabetes control.  A recent hemoglobin A1c was 6.6.  She also reports that the symptoms of tingling have improved specially in the hands that she has some tingling in the lower extremities.  A CT scan of the brain done after her last visit was unremarkable.  An EMG/NCS done earlier this month was normal.       Review of Systems  Review of Systems   Constitutional: Negative.    HENT: Negative.  Negative for hearing loss.    Eyes: Negative.  Negative for visual disturbance.   Respiratory: Negative.  Negative for shortness of breath.    Cardiovascular: Negative.  Negative for chest pain and palpitations.   Gastrointestinal: Negative.    Genitourinary: Negative.    Musculoskeletal: Negative.  Negative for back pain, gait problem and neck pain.   Skin: Negative.    Neurological: Positive for numbness. Negative for tremors, seizures, syncope, speech difficulty, weakness and headaches.   Psychiatric/Behavioral: Negative.  Negative for confusion and decreased concentration.       Objective:      Neurologic Exam      Mental Status   Oriented to person, place, and time.   Registration: recalls 3 of 3 objects. Follows 3 step commands.   Attention: normal. Concentration: normal.   Speech: speech is normal   Level of consciousness: alert  Knowledge: good.   Able to name object. Able to read. Able to repeat. Normal comprehension.     Cranial Nerves   Cranial nerves II through XII intact.   No sensory deficits or hypersensitivity over the face     Motor Exam   Muscle bulk: normal  Overall muscle tone: normal    Strength   Strength 5/5 throughout.     Sensory Exam   Light touch normal.   Right arm vibration: normal  Left arm vibration: normal  Right leg vibration: decreased from toes (Vibration sense less than 10 seconds at the toes)  Left leg vibration: decreased from toes  Proprioception normal.   Pinprick normal.     Gait, Coordination, and Reflexes     Gait  Gait: normal    Coordination   Romberg: negative  Finger to nose coordination: normal    Tremor   Resting tremor: absent  Intention tremor: absent  Action tremor: absent    Reflexes   Right brachioradialis: 1+  Left brachioradialis: 1+  Right biceps: 1+  Left biceps: 1+  Right triceps: 1+  Left triceps: 1+  Right patellar: 1+  Left patellar: 1+  Right achilles: 1+  Left achilles: 1+  Right plantar: normal  Left plantar: normal      Physical Exam   Constitutional: She is oriented to person, place, and time. She appears well-developed and well-nourished.   HENT:   Head: Normocephalic and atraumatic.   Neck: Normal range of motion. Neck supple. Carotid bruit is not present.   Neurological: She is oriented to person, place, and time. She has normal strength. She has a normal Finger-Nose-Finger Test and a normal Romberg Test. Gait normal.   Reflex Scores:       Tricep reflexes are 1+ on the right side and 1+ on the left side.       Bicep reflexes are 1+ on the right side and 1+ on the left side.       Brachioradialis reflexes are 1+ on the right side and 1+ on the left side.        Patellar reflexes are 1+ on the right side and 1+ on the left side.       Achilles reflexes are 1+ on the right side and 1+ on the left side.  Psychiatric: Her speech is normal.   Vitals reviewed.        Assessment:        1. Diabetic peripheral neuropathy    2. Type 2 diabetes mellitus with diabetic polyneuropathy, with long-term current use of insulin    3. Trigeminal neuritis            Plan:         Discussed with patient regarding her symptoms.  Her facial symptoms have significantly improved.  Her sensory symptoms may represent an early peripheral neuropathy with nerve conductions being normal.  Strict control of diabetes especially with diet control advised.  She is presently on aspirin 81 mg daily.  She is advised walking for exercise.  Vitamin B12 OTC sublingual 2000 µg daily.  Follow-up in 6 months if stable.

## 2017-08-28 NOTE — PATIENT INSTRUCTIONS
Discussed with patient regarding her symptoms.  Her facial symptoms have significantly improved.  Her sensory symptoms may represent an early peripheral neuropathy with nerve conductions being normal.  Strict control of diabetes especially with diet control advised.  She is presently on aspirin 81 mg daily.  She is advised walking for exercise.  Vitamin B12 OTC sublingual 2000 µg daily.

## 2017-09-07 ENCOUNTER — OFFICE VISIT (OUTPATIENT)
Dept: UROGYNECOLOGY | Facility: CLINIC | Age: 67
End: 2017-09-07
Payer: MEDICARE

## 2017-09-07 VITALS
DIASTOLIC BLOOD PRESSURE: 70 MMHG | SYSTOLIC BLOOD PRESSURE: 140 MMHG | WEIGHT: 171.94 LBS | HEIGHT: 66 IN | BODY MASS INDEX: 27.63 KG/M2

## 2017-09-07 DIAGNOSIS — K59.00 CONSTIPATION, UNSPECIFIED CONSTIPATION TYPE: ICD-10-CM

## 2017-09-07 DIAGNOSIS — Z01.419 WELL WOMAN EXAM: Primary | ICD-10-CM

## 2017-09-07 DIAGNOSIS — N95.2 VAGINAL ATROPHY: ICD-10-CM

## 2017-09-07 DIAGNOSIS — N30.10 INTERSTITIAL CYSTITIS: ICD-10-CM

## 2017-09-07 PROCEDURE — G0101 CA SCREEN;PELVIC/BREAST EXAM: HCPCS | Mod: PBBFAC | Performed by: NURSE PRACTITIONER

## 2017-09-07 PROCEDURE — G0101 CA SCREEN;PELVIC/BREAST EXAM: HCPCS | Mod: S$PBB,,, | Performed by: NURSE PRACTITIONER

## 2017-09-07 PROCEDURE — 99999 PR PBB SHADOW E&M-EST. PATIENT-LVL IV: CPT | Mod: PBBFAC,,, | Performed by: NURSE PRACTITIONER

## 2017-09-07 PROCEDURE — 99214 OFFICE O/P EST MOD 30 MIN: CPT | Mod: PBBFAC | Performed by: NURSE PRACTITIONER

## 2017-09-07 RX ORDER — FLAVOXATE HYDROCHLORIDE 100 MG/1
100 TABLET ORAL 3 TIMES DAILY PRN
Qty: 90 TABLET | Refills: 11 | Status: SHIPPED | OUTPATIENT
Start: 2017-09-07 | End: 2018-09-06 | Stop reason: SDUPTHER

## 2017-09-07 NOTE — PROGRESS NOTES
SUBJECTIVE:   66 y.o. female for annual exam.    Past Medical History:   Diagnosis Date    Chronic constipation     Diabetes mellitus     Diabetes mellitus     History of thyroid cyst     Hypertension     Osteopenia      Past Surgical History:   Procedure Laterality Date     SECTION      2x    COLONOSCOPY      HYSTERECTOMY      full hyst          Current Outpatient Prescriptions   Medication Sig Dispense Refill    ACCU-CHEK FASTCLIX Misc TEST twice a day  0    ACCU-CHEK SMARTVIEW TEST STRIP Strp ACCU-CHEK SMARTVIEW TEST STRIP Strp, 120 strip 11    aspirin (BUFFERIN) 325 MG Tab Take 325 mg by mouth once daily.      betamethasone dipropionate (DIPROLENE) 0.05 % cream Apply topically 2 (two) times daily.      blood sugar diagnostic Strp 1 strip by Misc.(Non-Drug; Combo Route) route 3 (three) times daily as needed. Lifescan test strips 120 each 4    calcium carbonate (OS-REDD) 600 mg calcium (1,500 mg) Tab Take 600 mg by mouth 2 (two) times daily with meals.      CRESTOR 10 mg tablet take 1 tablet by mouth once daily 90 tablet 1    diabetic supplies, miscellan. Kit 1 application by Misc.(Non-Drug; Combo Route) route once daily. Lifescan glucose meter 1 kit 0    flavoxate (URISPAS) 100 mg Tab Take 1 tablet (100 mg total) by mouth 3 (three) times daily as needed. 90 tablet 11    gabapentin (NEURONTIN) 100 MG capsule Take 1 capsule (100 mg total) by mouth 3 (three) times daily. 90 capsule 1    ibuprofen (ADVIL,MOTRIN) 200 MG tablet Take 200 mg by mouth every 6 (six) hours as needed for Pain.      insulin glargine, TOUJEO, (TOUJEO SOLOSTAR) 300 unit/mL (1.5 mL) InPn pen Inject 38 Units into the skin every evening. 4.5 mL 3    linaclotide (LINZESS) 290 mcg Cap Take 1 capsule (290 mcg total) by mouth once daily. 30 capsule 3    losartan (COZAAR) 50 MG tablet take 1 tablet by mouth once daily 90 tablet 1    metformin (FORTAMET) 1,000 mg 24 hr tablet Take 2 tablets (2,000 mg total) by mouth  "daily with breakfast. 60 tablet 1    pen needle, diabetic (NOVOFINE 32) 32 gauge x 1/4" Ndle Inject 1 application into the skin every evening. 100 each 11    pen needle, diabetic (NOVOFINE PLUS) 32 gauge x 1/6" Ndle Inject 1 application into the skin every evening. 100 each 11    vitamin D 1000 units Tab Take 1,000 Units by mouth once daily.       No current facility-administered medications for this visit.      Allergies: Review of patient's allergies indicates no known allergies.   LMP 1999- patient has had a hysterectomy- total    Well Woman:  Last pap: denies h/o abn paps- post hysterectomy- no further screening  Last mammogram:06/2017--normal  Colonoscopy: ~4-5yrs ago (normal) per patient ([facility])  DEXA: 06/2016 Osteopenia of both femoral necks.  Normal bone mineral density of the lumbar spine.        ROS:  Feeling well.   No dyspnea or chest pain on exertion.    No abdominal pain, change in bowel habits, black or bloody stools. +constipation  Rare IC flare--last one one month ago.  Will use flavoxate if troublesome  GYN ROS: no breast pain or new or enlarging lumps on self exam, no vaginal bleeding or discharge.  No neurological complaints.    Reports last 06/2017  HgbA1c 6.6 Reports fasting blood glucose 's; 2 hr pp 200's.    OBJECTIVE:   The patient appears well, alert, oriented x 3, in no distress.  BP (!) 140/70 (BP Location: Right arm, Patient Position: Sitting, BP Method: Medium (Manual))   Ht 5' 6" (1.676 m)   Wt 78 kg (171 lb 15.3 oz)   BMI 27.75 kg/m²   ENT normal.    Neck supple. No adenopathy or thyromegaly.   MILIVA.   Lungs are clear, good air entry, no wheezes, rhonchi or rales.   S1 and S2 normal, no murmurs, regular rate and rhythm.   Abdomen soft without tenderness, guarding, mass or organomegaly.   Extremities show no edema, normal peripheral pulses.   Neurological is normal, no focal findings.    BREAST EXAM: breasts appear normal, no suspicious masses, no skin or nipple changes or " axillary nodes    PELVIC EXAM:  VULVA: normal appearing vulva with no masses, tenderness or lesions,  VAGINA: normal appearing vagina with normal color and discharge, no lesions, atrophic  CERVIX: surgically absent,   UTERUS: surgically absent, vaginal cuff well healed,  ADNEXA: no masses,   RECTAL: normal rectal, no masses    1. Well woman exam     2. Interstitial cystitis  flavoxate (URISPAS) 100 mg Tab   3. Vaginal atrophy     4. Constipation, unspecified constipation type             PLAN:   1) Hx Incomplete emptying: improved   --CONTROL DIABETES-  -- Continue to do timed voidings q 2-3 hrs and take time to void, leaning forward at end of stream and voiding again.  --follow up with PCP as planned    2) Interstitial cystitis:  --avoid dietary irritants (see list)  --flares: Continue flavoxate 1 pill every 8 hours as needed; consider anticholinergic if symptoms increase  --empty bladder every 3 hours; lean forward on toilet and help last bit out.     3) Anxiety:  --under control at this time.  No longer taking paxil.  --continue grief counselling  --important to control IC symptoms    4) Vaginal atrophy (dryness) Use REPLENS OTC: 1/2 applicator full in vagina twice a week.     5) Constipation: Continue to take metamucil - may add stool softeners.    6) RTC 2 years for annual; 1 year for for IC followup

## 2017-09-07 NOTE — PATIENT INSTRUCTIONS
1) Incomplete emptying: improved today  --CONTROL DIABETES-  -- Continue to do timed voidings q 2-3 hrs and take time to void, leaning forward at end of stream and voiding again.  --follow up with PCP as planned    2) Interstitial cystitis:  --avoid dietary irritants (see list)  --flares: Continue flavoxate 1 pill every 8 hours as needed; consider anticholinergic short/long acting once bladder emptying better  --empty bladder every 3 hours; lean forward on toilet and help last bit out.     3) Anxiety:  --under control at this time.  No longer taking paxil.  --continue grief counselling  --important to control IC symptoms    4) Vaginal atrophy (dryness) Use REPLENS OTC: 1/2 applicator full in vagina twice a week.     5) Constipation: Continue to take metamucil - may add stool softeners.    6) RTC 2 years for annual; 1 year for for IC followup

## 2017-09-21 RX ORDER — INSULIN GLARGINE 300 U/ML
INJECTION, SOLUTION SUBCUTANEOUS
Qty: 4.5 SYRINGE | Refills: 0 | Status: SHIPPED | OUTPATIENT
Start: 2017-09-21 | End: 2017-11-14 | Stop reason: SDUPTHER

## 2017-09-25 ENCOUNTER — OFFICE VISIT (OUTPATIENT)
Dept: OTOLARYNGOLOGY | Facility: CLINIC | Age: 67
End: 2017-09-25
Payer: MEDICARE

## 2017-09-25 VITALS — DIASTOLIC BLOOD PRESSURE: 80 MMHG | HEART RATE: 70 BPM | TEMPERATURE: 98 F | SYSTOLIC BLOOD PRESSURE: 145 MMHG

## 2017-09-25 DIAGNOSIS — M26.4 MALOCCLUSION OF TEETH: ICD-10-CM

## 2017-09-25 DIAGNOSIS — L29.9 ITCHING OF EAR: Primary | ICD-10-CM

## 2017-09-25 DIAGNOSIS — H92.02 OTALGIA OF LEFT EAR: ICD-10-CM

## 2017-09-25 DIAGNOSIS — K08.109 TEETH MISSING, UNSPECIFIED EDENTULISM: ICD-10-CM

## 2017-09-25 PROCEDURE — 3077F SYST BP >= 140 MM HG: CPT | Mod: ,,, | Performed by: OTOLARYNGOLOGY

## 2017-09-25 PROCEDURE — 99213 OFFICE O/P EST LOW 20 MIN: CPT | Mod: S$PBB,,, | Performed by: OTOLARYNGOLOGY

## 2017-09-25 PROCEDURE — 99999 PR PBB SHADOW E&M-EST. PATIENT-LVL III: CPT | Mod: PBBFAC,,, | Performed by: OTOLARYNGOLOGY

## 2017-09-25 PROCEDURE — 3079F DIAST BP 80-89 MM HG: CPT | Mod: ,,, | Performed by: OTOLARYNGOLOGY

## 2017-09-25 PROCEDURE — 99213 OFFICE O/P EST LOW 20 MIN: CPT | Mod: PBBFAC | Performed by: OTOLARYNGOLOGY

## 2017-09-25 RX ORDER — METFORMIN HYDROCHLORIDE 500 MG/1
TABLET, EXTENDED RELEASE ORAL
Refills: 0 | COMMUNITY
Start: 2017-09-07 | End: 2018-04-30 | Stop reason: SDUPTHER

## 2017-09-25 NOTE — PATIENT INSTRUCTIONS
Head CT reviewed  Dropper supplied for instillation of baby oil or mineral oil or white vinegar for dry itchy ear canals  Refer to previously provided TMJ literature re; otalgia   Re-establishment of dental/jaw occlusion may be helpful  RTC prn

## 2017-09-25 NOTE — PROGRESS NOTES
"CC: Left ear discomfort  HPI:Ms. Green is a 66-year-old bespectacled  female who is being fitted for dentures presently.  Her chief complaint is left ear pain and discomfort i.e. the same complaints she articulated during her initial visit with me 1/10/17.  She described annoying type of left ear pain.  He was initially described as a sharp stabbing pain grade 2 out of 10 but also described his discomfort behind the left ear rating down inferiorly into the neck.  She previously given a cream to insert into her left ear canal i.e. possibly betamethasone.  I prescribed Valisone lotion for her use.  I do have indicated reestablishment of her occlusion at that visit.  She was advised to  discontinue other ear cream or ointment use.   She indicates that the dry skin in bottom of her ear canals  bothers her.  She indicates itching sensation a feeling of "something in her years".  She occasionally inserts "ari pins" into her ear canals.  Audiometry performed at that visit was within normal limits per all parameters tested including pure-tone responses, SRT scores, discrimination scores and tympanometry.  She was diagnosed with left otalgia, itching of the ear and malocclusion and possible/probable TMJ otalgia; literature was provided regarding this subject.  She is followed by the psychiatry service for an adjustment disorder with  mixed anxiety and depressed mood.  She was examined by the neurologist 8/28/17 for diabetic peripheral neuropathy.    Her medical problem list includes hypertension, diabetes mellitus, osteopenia, interstitial cystitis, bladder pain, anxiety  PE: Blood pressure 145/80 pulse 70 temperature 98.0  Gen.: Alert and oriented lady in no acute distress  Both ears were examined under the microscope in the micro-procedure room.  Right ear canal is dry and right eardrum is intact and clear as visualized.  Right middle ear space is well aerated.  The more symptomatic left ear canal  is dry and " not inflamed.  The left frontal membrane is clear and the left middle ear space is well aerated.  Left pinna is not swollen or cellulitic in appearance.  The left TMJ is palpably normal.  Left parotid gland is palpably normal.  Nasal exam is unremarkable.  Oropharyngeal exam reveals abscence of left upper and lower molar teeth and reduced height of the prenatal is on the left side inferiorly.  The oropharyngeal exam is unremarkable for inflammation infection ulceration.  The neck is palpably normal.    DIAGNOSIS:     ICD-10-CM ICD-9-CM    1. Itching of ear L29.9 698.9    2. Otalgia of left ear H92.02 388.70    3. Malocclusion of teeth M26.4 524.4    4. Teeth missing, unspecified edentulism K08.109 525.10      PLAN: Head CT reviewed  Dropper supplied for instillation of baby oil or mineral oil or white vinegar for dry itchy ear canals  Refer to previously provided TMJ literature re: otalgia   Re-establishment of dental/jaw occlusion may be helpful  RTC prn

## 2017-09-29 RX ORDER — PEN NEEDLE, DIABETIC 32 GX 1/4"
NEEDLE, DISPOSABLE MISCELLANEOUS
Qty: 100 EACH | Refills: 11 | Status: SHIPPED | OUTPATIENT
Start: 2017-09-29 | End: 2021-05-24 | Stop reason: SDUPTHER

## 2017-10-02 ENCOUNTER — TELEPHONE (OUTPATIENT)
Dept: GASTROENTEROLOGY | Facility: CLINIC | Age: 67
End: 2017-10-02

## 2017-10-02 ENCOUNTER — PATIENT MESSAGE (OUTPATIENT)
Dept: INTERNAL MEDICINE | Facility: CLINIC | Age: 67
End: 2017-10-02

## 2017-10-02 NOTE — TELEPHONE ENCOUNTER
Spoke with patient.  Ok with changing her appointment time with Alicia Avila NP on 11/14 from 11:00 to 10:30.

## 2017-10-02 NOTE — TELEPHONE ENCOUNTER
Message left for patient to return my call regarding changing the time of her appointment on 11/14 with Alicia Avila NP from 11:00 to 10:30.

## 2017-10-03 RX ORDER — AMOXICILLIN 500 MG/1
2000 TABLET, FILM COATED ORAL ONCE
Qty: 4 TABLET | Refills: 0 | Status: SHIPPED | OUTPATIENT
Start: 2017-10-03 | End: 2017-10-03

## 2017-10-11 ENCOUNTER — TELEPHONE (OUTPATIENT)
Dept: INTERNAL MEDICINE | Facility: CLINIC | Age: 67
End: 2017-10-11

## 2017-10-11 NOTE — TELEPHONE ENCOUNTER
"----- Message from [address] sent at 10/11/2017  8:37 AM CDT -----  Contact: Sumaya from [facility]  X_  1st Request  _  2nd Request  _  3rd Request        Who: Sumaya from [facility]    Why: Replaced patient's NOVOFINE 32 32 gauge x 1/4" Ndle with Kate. Please call back to follow up.    What Number to Call Back: [phone]    When to Expect a call back: (Within 24 hours)    Please return the call at earliest convenience. Thanks!                          "

## 2017-10-11 NOTE — TELEPHONE ENCOUNTER
Spoke primo Shell and she states that we sent a script for Nolvofime needles but pt prefer Kate so rx change to [email or web address]

## 2017-10-23 RX ORDER — AMOXICILLIN 500 MG/1
CAPSULE ORAL
Refills: 0 | COMMUNITY
Start: 2017-10-03 | End: 2017-10-24

## 2017-10-24 ENCOUNTER — OFFICE VISIT (OUTPATIENT)
Dept: INTERNAL MEDICINE | Facility: CLINIC | Age: 67
End: 2017-10-24
Attending: FAMILY MEDICINE
Payer: MEDICARE

## 2017-10-24 VITALS
DIASTOLIC BLOOD PRESSURE: 70 MMHG | HEART RATE: 84 BPM | SYSTOLIC BLOOD PRESSURE: 122 MMHG | BODY MASS INDEX: 27.7 KG/M2 | WEIGHT: 172.38 LBS | HEIGHT: 66 IN

## 2017-10-24 DIAGNOSIS — I10 ESSENTIAL HYPERTENSION: Primary | ICD-10-CM

## 2017-10-24 DIAGNOSIS — R20.2 NUMBNESS AND TINGLING OF BOTH UPPER EXTREMITIES WHILE SLEEPING: ICD-10-CM

## 2017-10-24 DIAGNOSIS — R20.0 NUMBNESS AND TINGLING OF BOTH UPPER EXTREMITIES WHILE SLEEPING: ICD-10-CM

## 2017-10-24 PROCEDURE — 99213 OFFICE O/P EST LOW 20 MIN: CPT | Mod: PBBFAC | Performed by: FAMILY MEDICINE

## 2017-10-24 PROCEDURE — 99999 PR PBB SHADOW E&M-EST. PATIENT-LVL III: CPT | Mod: PBBFAC,,, | Performed by: FAMILY MEDICINE

## 2017-10-24 PROCEDURE — 99214 OFFICE O/P EST MOD 30 MIN: CPT | Mod: S$PBB,,, | Performed by: FAMILY MEDICINE

## 2017-10-24 RX ORDER — LINACLOTIDE 145 UG/1
145 CAPSULE, GELATIN COATED ORAL DAILY
Refills: 0 | COMMUNITY
Start: 2017-10-18 | End: 2019-11-25

## 2017-10-24 NOTE — PROGRESS NOTES
Subjective:       Patient ID: Gely Green is a 66 y.o. female.    Chief Complaint: Hypertension (elevated in sept @ Dentist has a log)    Pt presents today with HTN that presents today stating that her BP's have been in 160's at her dentist x 1. Pt denies any symptoms at that visit exc for an elevated reading. States that it IS NOT anxiety related  When reviewing log, majority of readings are within normal ranges(120's/80 or less) but on days that pt checks more than 3-4 times in short span, her readings are in 150's. Pt is asymptomatic with all elevated readings per pt      Pt also states that she has b/l arm and hand numbness, remberto on left. long standing h/o left neck pain and along left shoulder as well. Pt denies any tingling. Notes pain is worse after she has slept and gets up      Hypertension   Associated symptoms include neck pain. Pertinent negatives include no chest pain, headaches, palpitations or shortness of breath.     Review of Systems   Constitutional: Negative.    Eyes: Negative.    Respiratory: Negative for cough, chest tightness and shortness of breath.    Cardiovascular: Negative for chest pain, palpitations and leg swelling.   Gastrointestinal: Negative.    Musculoskeletal: Positive for arthralgias and neck pain. Negative for back pain, gait problem, joint swelling, myalgias and neck stiffness.   Skin: Negative.    Neurological: Positive for numbness. Negative for dizziness, weakness, light-headedness and headaches.   All other systems reviewed and are negative.      Objective:      Physical Exam   Constitutional: She is oriented to person, place, and time. She appears well-developed and well-nourished.   HENT:   Head: Normocephalic and atraumatic.   Eyes: Conjunctivae and EOM are normal. Pupils are equal, round, and reactive to light.   Neck: Normal range of motion. Neck supple. No thyromegaly present.   Cardiovascular: Normal rate, regular rhythm, normal heart sounds and intact distal  pulses.    No murmur heard.  Pulmonary/Chest: Effort normal and breath sounds normal. No respiratory distress. She has no wheezes. She has no rales. She exhibits no tenderness.   Musculoskeletal: Normal range of motion. She exhibits no edema or tenderness.   Lymphadenopathy:     She has no cervical adenopathy.   Neurological: She is alert and oriented to person, place, and time. She displays normal reflexes. No cranial nerve deficit or sensory deficit. She exhibits normal muscle tone. Coordination normal.   Skin: Skin is warm. No erythema.   Psychiatric: She has a normal mood and affect. Her behavior is normal. Judgment and thought content normal.       Assessment:       B/l UE numbness  HTN  Plan:       HTN: suspect that over checking BP with various cuffs is leading to anxiety and erroneous readings. Explained to pt that she needs to check when symptomatic or 3-4 times weekly with just ONE cuff.  Pt also advised that today's reading is great and this is WITHOUT her med being taken. I do not feel comfortable increasing her med based on one time reading at her dental office in [place]     B/l UE numbness: suspect that cause is her neck. Advised neck exercises, NSAIDS, heat and ice. Observe and RTC prn if pain persists for referral to PT.    RTC prn PCP

## 2017-11-15 RX ORDER — INSULIN GLARGINE 300 U/ML
INJECTION, SOLUTION SUBCUTANEOUS
Qty: 4.5 SYRINGE | Refills: 0 | Status: SHIPPED | OUTPATIENT
Start: 2017-11-15 | End: 2018-01-11 | Stop reason: SDUPTHER

## 2017-11-21 ENCOUNTER — OFFICE VISIT (OUTPATIENT)
Dept: INTERNAL MEDICINE | Facility: CLINIC | Age: 67
End: 2017-11-21
Attending: FAMILY MEDICINE
Payer: MEDICARE

## 2017-11-21 ENCOUNTER — HOSPITAL ENCOUNTER (OUTPATIENT)
Dept: RADIOLOGY | Facility: OTHER | Age: 67
Discharge: HOME | End: 2017-11-21
Attending: FAMILY MEDICINE
Payer: MEDICARE

## 2017-11-21 VITALS
DIASTOLIC BLOOD PRESSURE: 72 MMHG | SYSTOLIC BLOOD PRESSURE: 114 MMHG | BODY MASS INDEX: 27.56 KG/M2 | OXYGEN SATURATION: 99 % | HEART RATE: 58 BPM | WEIGHT: 171.5 LBS | HEIGHT: 66 IN

## 2017-11-21 DIAGNOSIS — M54.2 NECK PAIN: ICD-10-CM

## 2017-11-21 DIAGNOSIS — R20.0 NUMBNESS AND TINGLING IN BOTH HANDS: Primary | ICD-10-CM

## 2017-11-21 DIAGNOSIS — R09.89 LABILE BLOOD PRESSURE: ICD-10-CM

## 2017-11-21 DIAGNOSIS — M25.662 KNEE STIFFNESS, LEFT: ICD-10-CM

## 2017-11-21 DIAGNOSIS — R20.0 NUMBNESS AND TINGLING IN BOTH HANDS: ICD-10-CM

## 2017-11-21 DIAGNOSIS — R20.2 NUMBNESS AND TINGLING IN BOTH HANDS: Primary | ICD-10-CM

## 2017-11-21 DIAGNOSIS — Z79.4 TYPE 2 DIABETES MELLITUS WITH DIABETIC POLYNEUROPATHY, WITH LONG-TERM CURRENT USE OF INSULIN: ICD-10-CM

## 2017-11-21 DIAGNOSIS — R20.2 NUMBNESS AND TINGLING IN BOTH HANDS: ICD-10-CM

## 2017-11-21 DIAGNOSIS — E11.42 TYPE 2 DIABETES MELLITUS WITH DIABETIC POLYNEUROPATHY, WITH LONG-TERM CURRENT USE OF INSULIN: ICD-10-CM

## 2017-11-21 PROCEDURE — 99999 PR PBB SHADOW E&M-EST. PATIENT-LVL V: CPT | Mod: PBBFAC,,, | Performed by: FAMILY MEDICINE

## 2017-11-21 PROCEDURE — 72040 X-RAY EXAM NECK SPINE 2-3 VW: CPT | Mod: TC

## 2017-11-21 PROCEDURE — 99214 OFFICE O/P EST MOD 30 MIN: CPT | Mod: S$PBB,,, | Performed by: FAMILY MEDICINE

## 2017-11-21 PROCEDURE — 99215 OFFICE O/P EST HI 40 MIN: CPT | Mod: PBBFAC,25 | Performed by: FAMILY MEDICINE

## 2017-11-21 PROCEDURE — G0008 ADMIN INFLUENZA VIRUS VAC: HCPCS | Mod: PBBFAC

## 2017-11-21 PROCEDURE — 72040 X-RAY EXAM NECK SPINE 2-3 VW: CPT | Mod: 26,,, | Performed by: RADIOLOGY

## 2017-11-21 RX ORDER — AMLODIPINE BESYLATE 10 MG/1
10 TABLET ORAL DAILY
Qty: 30 TABLET | Refills: 1 | Status: SHIPPED | OUTPATIENT
Start: 2017-11-21 | End: 2018-02-19 | Stop reason: SDUPTHER

## 2017-11-21 RX ORDER — ROSUVASTATIN CALCIUM 10 MG/1
10 TABLET, COATED ORAL DAILY
COMMUNITY
End: 2018-06-11 | Stop reason: SDUPTHER

## 2017-11-21 NOTE — PROGRESS NOTES
Patient given Fluzone High-dose IM in the LD. Patient tolerated well and Band-Aid was applied. Lot#[id number] Exp:05/25/18. Patient advised to wait in the lobby for 15 min to make sure no adverse reactions occur. Patient states verbal understanding and has no further questions.  Pt has been given vaccine information sheet.

## 2017-11-21 NOTE — PROGRESS NOTES
"Subjective:      Patient ID: Gely Green is a 67 y.o. female.    Chief Complaint: Hypertension    She is here for DM and HTN follow up. She reports fluctuations with her blood pressure. She reports it is high 150-160s/80s. She has never had those cuffs calibrated. She did go to the dentist last week and her pressure was 162/80. She has some tingling in her hands every day. Her morning sugars have been in the 120-130s.       Review of Systems   Constitutional: Negative.    Respiratory: Negative.    Cardiovascular: Negative.    Gastrointestinal: Positive for constipation.   Genitourinary: Negative.    Neurological: Negative.      I personally reviewed Past Medical History, Past Surgical history,  Past Social History and Family History    Objective:   /72   Pulse (!) 58   Ht 5' 6" (1.676 m)   Wt 77.8 kg (171 lb 8.3 oz)   SpO2 99%   BMI 27.68 kg/m²     Physical Exam   Constitutional: She is oriented to person, place, and time. She appears well-developed and well-nourished. No distress.   HENT:   Head: Normocephalic.   Right Ear: External ear normal.   Left Ear: External ear normal.   Mouth/Throat: Oropharynx is clear and moist.   Eyes: Conjunctivae and EOM are normal. Pupils are equal, round, and reactive to light. Right eye exhibits no discharge. Left eye exhibits no discharge. No scleral icterus.   Neck: Normal range of motion. No tracheal deviation present. No thyromegaly present.   Cardiovascular: Normal rate, regular rhythm, normal heart sounds and intact distal pulses.  Exam reveals no gallop.    No murmur heard.  Pulmonary/Chest: Effort normal and breath sounds normal. No respiratory distress. She has no wheezes. She has no rales. She exhibits no tenderness.   Abdominal: Soft. Bowel sounds are normal. She exhibits no distension and no mass. There is no tenderness. There is no rebound and no guarding.   Musculoskeletal: Normal range of motion.   Neurological: She is alert and oriented to person, " place, and time.   Skin: Skin is warm and dry.   Psychiatric: She has a normal mood and affect. Her behavior is normal. Judgment and thought content normal.   Vitals reviewed.      Gely was seen today for hypertension.    Diagnoses and all orders for this visit:    Numbness and tingling in both hands  Neck pain  -cont gabapentin, call if no improvement   Ambulatory Referral to Physical/Occupational Therapy  -     Ambulatory consult to Orthopedics    Labile blood pressure  -return for bp check with both home machines   -stop losartan, start amlodipine  -     US Renal Artery Stenosis Hyperten (xpd); Future  -     Exercise stress echo; Future    Knee stiffness, left  -     Ambulatory Referral to Physical/Occupational Therapy  -     Ambulatory consult to Orthopedics    Type 2 diabetes mellitus with diabetic polyneuropathy, with long-term current use of insulin  -    Controlled, cont toujeo    Other orders  -     Cancel: Pneumococcal Polysaccharide Vaccine (23 Valent) (SQ/IM)  -     amLODIPine (NORVASC) 10 MG tablet; Take 1 tablet (10 mg total) by mouth once daily.

## 2017-11-27 ENCOUNTER — CLINICAL SUPPORT (OUTPATIENT)
Dept: INTERNAL MEDICINE | Facility: CLINIC | Age: 67
End: 2017-11-27
Payer: MEDICARE

## 2017-11-27 ENCOUNTER — HOSPITAL ENCOUNTER (OUTPATIENT)
Dept: CARDIOLOGY | Facility: OTHER | Age: 67
Discharge: HOME | End: 2017-11-27
Attending: FAMILY MEDICINE
Payer: MEDICARE

## 2017-11-27 VITALS — DIASTOLIC BLOOD PRESSURE: 82 MMHG | OXYGEN SATURATION: 97 % | SYSTOLIC BLOOD PRESSURE: 126 MMHG | HEART RATE: 66 BPM

## 2017-11-27 DIAGNOSIS — M25.662 KNEE STIFFNESS, LEFT: ICD-10-CM

## 2017-11-27 DIAGNOSIS — Z79.4 TYPE 2 DIABETES MELLITUS WITH DIABETIC POLYNEUROPATHY, WITH LONG-TERM CURRENT USE OF INSULIN: ICD-10-CM

## 2017-11-27 DIAGNOSIS — R20.2 NUMBNESS AND TINGLING IN BOTH HANDS: ICD-10-CM

## 2017-11-27 DIAGNOSIS — R20.0 NUMBNESS AND TINGLING IN BOTH HANDS: ICD-10-CM

## 2017-11-27 DIAGNOSIS — R09.89 LABILE BLOOD PRESSURE: ICD-10-CM

## 2017-11-27 DIAGNOSIS — E11.42 TYPE 2 DIABETES MELLITUS WITH DIABETIC POLYNEUROPATHY, WITH LONG-TERM CURRENT USE OF INSULIN: ICD-10-CM

## 2017-11-27 DIAGNOSIS — M54.2 NECK PAIN: ICD-10-CM

## 2017-11-27 LAB
DIASTOLIC DYSFUNCTION: NO
RETIRED EF AND QEF - SEE NOTES: 70 (ref 55–65)

## 2017-11-27 PROCEDURE — 93351 STRESS TTE COMPLETE: CPT

## 2017-11-27 PROCEDURE — 99999 PR PBB SHADOW E&M-EST. PATIENT-LVL III: CPT | Mod: PBBFAC,,,

## 2017-11-27 PROCEDURE — 99213 OFFICE O/P EST LOW 20 MIN: CPT | Mod: PBBFAC

## 2017-11-27 NOTE — PROGRESS NOTES
Gely Green 67 y.o. female is here today for Blood Pressure check.   History of HTN yes.    Review of patient's allergies indicates:  No Known Allergies  Creatinine   Date Value Ref Range Status   07/06/2017 0.8 0.5 - 1.4 mg/dL Final     Sodium   Date Value Ref Range Status   07/06/2017 142 136 - 145 mmol/L Final     Potassium   Date Value Ref Range Status   07/06/2017 4.1 3.5 - 5.1 mmol/L Final   ]  Patient verifies taking blood pressure medications on a regular bases at the same time of the day.     Current Outpatient Prescriptions:     ACCU-CHEK FASTCLIX Misc, TEST twice a day, Disp: , Rfl: 0    ACCU-CHEK SMARTVIEW TEST STRIP Strp, ACCU-CHEK SMARTVIEW TEST STRIP Strp,, Disp: 120 strip, Rfl: 11    amLODIPine (NORVASC) 10 MG tablet, Take 1 tablet (10 mg total) by mouth once daily., Disp: 30 tablet, Rfl: 1    aspirin (BUFFERIN) 325 MG Tab, Take 325 mg by mouth once daily., Disp: , Rfl:     betamethasone dipropionate (DIPROLENE) 0.05 % cream, Apply topically 2 (two) times daily., Disp: , Rfl:     blood sugar diagnostic Strp, 1 strip by Misc.(Non-Drug; Combo Route) route 3 (three) times daily as needed. Lifescan test strips, Disp: 120 each, Rfl: 4    calcium carbonate (OS-REDD) 600 mg calcium (1,500 mg) Tab, Take 600 mg by mouth 2 (two) times daily with meals., Disp: , Rfl:     diabetic supplies, miscellan. Kit, 1 application by Misc.(Non-Drug; Combo Route) route once daily. Lifescan glucose meter, Disp: 1 kit, Rfl: 0    flavoxate (URISPAS) 100 mg Tab, Take 1 tablet (100 mg total) by mouth 3 (three) times daily as needed., Disp: 90 tablet, Rfl: 11    gabapentin (NEURONTIN) 100 MG capsule, Take 1 capsule (100 mg total) by mouth 3 (three) times daily., Disp: 90 capsule, Rfl: 1    ibuprofen (ADVIL,MOTRIN) 200 MG tablet, Take 200 mg by mouth every 6 (six) hours as needed for Pain., Disp: , Rfl:     LINZESS 145 mcg Cap capsule, , Disp: , Rfl: 0    metformin (GLUCOPHAGE-XR) 500 MG 24 hr tablet, , Disp: ,  "Rfl: 0    NOVOFINE 32 32 gauge x 1/4" Ndle, use subcutaneously every evening, Disp: 100 each, Rfl: 11    pen needle, diabetic (NOVOFINE PLUS) 32 gauge x 1/6" Ndle, Inject 1 application into the skin every evening., Disp: 100 each, Rfl: 11    rosuvastatin (CRESTOR) 10 MG tablet, Take 10 mg by mouth once daily., Disp: , Rfl:     TOUJEO SOLOSTAR 300 unit/mL (1.5 mL) InPn pen, INJECT 33 UNITS UNDER THE SKIN EVERY EVENING, Disp: 4.5 Syringe, Rfl: 0    vitamin D 1000 units Tab, Take 1,000 Units by mouth once daily., Disp: , Rfl:   Does patient have record of home blood pressure readings yes. Readings have been averaging 163/87 124/82.   Last dose of blood pressure medication was taken at 7:20am.  Patient is asymptomatic.     BP: 126/82 , Pulse: 66.      abnotified.   "

## 2017-11-28 ENCOUNTER — HOSPITAL ENCOUNTER (OUTPATIENT)
Dept: RADIOLOGY | Facility: OTHER | Age: 67
Discharge: HOME | End: 2017-11-28
Attending: FAMILY MEDICINE
Payer: MEDICARE

## 2017-11-28 DIAGNOSIS — R09.89 LABILE BLOOD PRESSURE: ICD-10-CM

## 2017-11-28 PROCEDURE — 93975 VASCULAR STUDY: CPT | Mod: 26,,, | Performed by: RADIOLOGY

## 2017-11-28 PROCEDURE — 76770 US EXAM ABDO BACK WALL COMP: CPT | Mod: 26,59,, | Performed by: RADIOLOGY

## 2017-11-28 PROCEDURE — 76770 US EXAM ABDO BACK WALL COMP: CPT | Mod: TC

## 2017-11-29 DIAGNOSIS — M54.2 NECK PAIN: Primary | ICD-10-CM

## 2017-12-04 ENCOUNTER — CLINICAL SUPPORT (OUTPATIENT)
Dept: REHABILITATION | Facility: HOSPITAL | Age: 67
End: 2017-12-04
Attending: FAMILY MEDICINE
Payer: MEDICARE

## 2017-12-04 DIAGNOSIS — M54.2 NECK PAIN: ICD-10-CM

## 2017-12-04 DIAGNOSIS — R29.898 DECREASED RANGE OF MOTION OF NECK: ICD-10-CM

## 2017-12-04 DIAGNOSIS — M25.562 CHRONIC PAIN OF LEFT KNEE: ICD-10-CM

## 2017-12-04 DIAGNOSIS — R29.898 WEAKNESS OF BOTH LOWER EXTREMITIES: ICD-10-CM

## 2017-12-04 DIAGNOSIS — G89.29 CHRONIC PAIN OF LEFT KNEE: ICD-10-CM

## 2017-12-04 PROCEDURE — G8979 MOBILITY GOAL STATUS: HCPCS | Mod: CJ,PO

## 2017-12-04 PROCEDURE — 97110 THERAPEUTIC EXERCISES: CPT | Mod: PO

## 2017-12-04 PROCEDURE — 97161 PT EVAL LOW COMPLEX 20 MIN: CPT | Mod: PO

## 2017-12-04 PROCEDURE — G8978 MOBILITY CURRENT STATUS: HCPCS | Mod: CJ,PO

## 2017-12-05 PROBLEM — M54.2 NECK PAIN: Status: ACTIVE | Noted: 2017-12-05

## 2017-12-05 PROBLEM — R29.898 WEAKNESS OF BOTH LOWER EXTREMITIES: Status: ACTIVE | Noted: 2017-12-05

## 2017-12-05 PROBLEM — M25.562 CHRONIC PAIN OF LEFT KNEE: Status: ACTIVE | Noted: 2017-12-05

## 2017-12-05 PROBLEM — R29.898 DECREASED RANGE OF MOTION OF NECK: Status: ACTIVE | Noted: 2017-12-05

## 2017-12-05 PROBLEM — G89.29 CHRONIC PAIN OF LEFT KNEE: Status: ACTIVE | Noted: 2017-12-05

## 2017-12-05 NOTE — PLAN OF CARE
"OUTPATIENT PHYSICAL THERAPY  PHYSICAL THERAPY EVALUATION    Name: Gely LOTT Peter  Clinic Number: [id number]    Evaluation Date: 12/05/2017  Visit #: 1 / 1  Authorization period Expiration: 11/21/2018  Plan of Care Expiration: 03/4/2018  Precautions: Standard     Diagnosis:   Encounter Diagnoses   Name Primary?    Neck pain     Chronic pain of left knee     Weakness of both lower extremities     Decreased range of motion of neck      Physician: Althea Anderson MD  Treatment Orders: PT Eval and Treat  Past Medical History:   Diagnosis Date    Chronic constipation     Diabetes mellitus     Diabetes mellitus     History of thyroid cyst     Hypertension     Osteopenia      Current Outpatient Prescriptions   Medication Sig    ACCU-CHEK FASTCLIX Misc TEST twice a day    ACCU-CHEK SMARTVIEW TEST STRIP Strp ACCU-CHEK SMARTVIEW TEST STRIP Strp,    amLODIPine (NORVASC) 10 MG tablet Take 1 tablet (10 mg total) by mouth once daily.    aspirin (BUFFERIN) 325 MG Tab Take 325 mg by mouth once daily.    betamethasone dipropionate (DIPROLENE) 0.05 % cream Apply topically 2 (two) times daily.    blood sugar diagnostic Strp 1 strip by Misc.(Non-Drug; Combo Route) route 3 (three) times daily as needed. Lifescan test strips    calcium carbonate (OS-REDD) 600 mg calcium (1,500 mg) Tab Take 600 mg by mouth 2 (two) times daily with meals.    diabetic supplies, miscellan. Kit 1 application by Misc.(Non-Drug; Combo Route) route once daily. Lifescan glucose meter    flavoxate (URISPAS) 100 mg Tab Take 1 tablet (100 mg total) by mouth 3 (three) times daily as needed.    gabapentin (NEURONTIN) 100 MG capsule Take 1 capsule (100 mg total) by mouth 3 (three) times daily.    ibuprofen (ADVIL,MOTRIN) 200 MG tablet Take 200 mg by mouth every 6 (six) hours as needed for Pain.    LINZESS 145 mcg Cap capsule     metformin (GLUCOPHAGE-XR) 500 MG 24 hr tablet     NOVOFINE 32 32 gauge x 1/4" Ndle use subcutaneously every evening " "   pen needle, diabetic (NOVOFINE PLUS) 32 gauge x 1/6" Ndle Inject 1 application into the skin every evening.    rosuvastatin (CRESTOR) 10 MG tablet Take 10 mg by mouth once daily.    TOUJEO SOLOSTAR 300 unit/mL (1.5 mL) InPn pen INJECT 33 UNITS UNDER THE SKIN EVERY EVENING    vitamin D 1000 units Tab Take 1,000 Units by mouth once daily.     No current facility-administered medications for this visit.      Review of patient's allergies indicates:  No Known Allergies    History   Prior Therapy: Yes, many years ago for neck   Social History: Lives with , single story home, [address]  Previous functional status: Prior to incident, pt independent in all ADLs and physical activity   Current functional status: Pt remains independent in ADLs and physical activity   Work: Not currently working     Subjective   History of Present Illness: Pt presents to [facility] with complaints of L knee pain and chronic, left sided neck pain. Pt reports knee pain began in May after apparent bee sting to posterior knee while driving. Pt with posterior knee pain at onset, but is now progressively worsening and has spread to anterior knee. Pt denied any signs of inflammation or swelling since initial episode. Pt stated she is able to perform her usual walking and jogging, and only has pain while flexing the knee to end range. Pt unable to cross legs in sitting or squat. Pt reported cervical pain began over 15 years ago and has mostly affected the L cervical spine. Pt with no limitations in driving or ADLs due to cervical impairments. Pt reported difficulty sleeping because she is unable to maintain a comfortable position for her cervical spine. Pt. denies history of minor or major trauma, motor vehicle accident, fall; past or present cancer; fever, chills, night sweats; unexplained weight change in past 3 months; recent infection; persistent night pain; saddle anesthesia, or changes in bowel/bladder function.      DOI: Knee pain " "~ 7 mo ago, neck pain ~ 15 years ago   Imaging, bone scan films: 11/21/17 "Mild disc height loss noted at C3-C4 through C5-C6.  Facet joints are maintained."  Knee Pain: current 2/10, worst 6/10, best 2/10, Aching, constant   Neck Pain: current 5/10, worst 7/10, best 2/10, Aching and Dull, intermittent  Radicular symptoms: numbness in B hands   Aggravating factors: Knee - bending knee to end range, squatting Neck - sleeping positions   Easing factors: Deal with pain   Pts goals: Reduce symptoms and return to PLOF    Objective   Mental status: alert, interactive, oriented x3  Posture/ Alignment: In standing, pt with sway back posture consisting of posterior pelvic tilt, increased thoracic kyphosis, and forward head.        GAIT DEVIATIONS: [place] amb with decreased weight bearing to LLE. .    ROM:   Cervical Range of Motion:    Degrees Pain   Flexion 50 None      Extension 42 None      Right Rotation 55 None      Left Rotation 60 Discomfort      Right Side Bending 35 Discomfort on L cervical spine    Left Side Bending 32 None         PROM Right Left Comment   Knee Extension: 2-0 degrees 4 - 0 degrees    Knee Flexion: 128 degrees 135 degrees    *pain    Strength:      Right Left Comment   Shoulder flexion: 4+/5 4+/5    Shoulder Abduction: 5/5 5/5    Elbow Flexion: 5/5 5/5    Elbow Extension: 5/5 5/5     5/5 4+/5         Right Left Comment   Hip Flexion: 4+/5 5/5    Hip ABD: 4/5 4-/5    Hip Extension: 4+/5 4+/5    Knee Ext: 4+/5 4+/5    Knee Flex: 5/5 5/5        Special Tests:  - Anterior Drawer: left negative (increased anterior translation symmetrical with RLE)  - Posterior Drawer: left negative  - Varus: left negative  - Valgus: left negative for pain (+) for laxity   - Nats: left negative  - Zuniga: negative   - Clonus: Negative       Palpation:  No TTP along L tibiofemoral joint line. Pt with increased tone in cervical paraspinals and L upper trapezius.     Joint Play:  Hypomobile C4/5 - C6/7 downglide " assessment   Hypomobile OA in flexion/extension   Normal mobility throughout B tibiofemoral and patellofemoral joints in all planes     Movement Analysis: Pt demonstrated squat with poor mechanics and required UE assistance to complete task. Pt with good functional mobility in regards to transfers, bed mobility, and gait.       Pt/family was provided educational information, including: role of PT, goals for PT, scheduling - pt verbalized understanding. Discussed insurance plan with pt.     TREATMENT   Time In: 12:54 PM  Time Out: 1: 50 PM    Discussed Plan of Care with patient: Yes    Gely received 10 minutes of therapeutic exercises including:   Sidelying Clams x 10 each   UT stretch 3 x 20 seconds   Quad Sets 10 x 5 second hold       Written Home Exercises Provided: yes   Exercises were reviewed and Gely was able to demonstrate them prior to the end of the session. Pt received a written copy of exercises to perform at home. Gely demonstrated good  understanding of the education provided.     Assessment   Gely is a 67 y.o. female referred to outpatient physical therapy with a medical diagnosis of N/T in both hands, neck pain, and knee stiffness. Pt demonstrates joint restrictions in lower segments of L cervical spine and increased muscle tone of cervical paraspinals. Pt additionally demonstrates increased mobility in L knee compared to uninvolved side, medial laxity of tibiofemoral joint structures, and weakness to both lower extremities. Gely is a good candidate for skilled physical therapy services at this time to increase stability to L tibiofemoral joint via muscular support, and restore mobility to cervical spine so she may return to PLOF and prevent future injury.  Pt prognosis is Good. Positive prognostic factors include active lifestyle, willingness to seek therapy services. Negative prognostic factors include chronicity and severity of symptoms. Pt will benefit from skilled outpatient physical  therapy to address the above stated deficits, provide pt/family education, and to maximize pt's level of independence.     History  Co-morbidities and personal factors that may impact the plan of care Examination  Body Structures and Functions, activity limitations and participation restrictions that may impact the plan of care    Clinical Presentation   Co-morbidities:   depression and diabetes         Personal Factors:   no deficits Body Regions:   neck  lower extremities    Body Systems:   ROM  strength        Participation Restrictions:   Pt unable to perform positions requiring full knee flexion.      Activity limitations:   Learning and applying knowledge  no deficits    General Tasks and Commands  no deficits    Communication  no deficits    Mobility  no deficits    Self care  no deficits    Domestic Life  no deficits    Interactions/Relationships  no deficits    Life Areas  no deficits    Community and Social Life  no deficits         stable and uncomplicated                      low   moderate  moderate Decision Making/ Complexity Score:  low       G Codes:   CMS Impairment/Limitation/Restriction for FOTO Knee Survey    Status   Limitation   Intake   66%   34%   Predicted  68%   32%     G-Code   CMS Severity Modifier  Current Status  CJ - At least 20 percent but less than 40 percent  Goal Status   CJ - At least 20 percent but less than 40 percent    Pt's spiritual, cultural and educational needs considered and pt agreeable to plan of care and goals as stated below:     Anticipated Barriers for physical therapy: None     Short Term GOALS:  In 4 weeks, pt. will:  1. Report decreased B knee pain < / = 4 /10 at worst to increase tolerance for mild-moderate physical activity  2. Pt will be able to perform 10 mini squats without increase in knee pain to increase tolerance for yard work   3. Pt will be able to perform 10 upper cervical flexion nods in supine without fatigue in order to improve strength for proper  posture  4. Pt to tolerate HEP to improve independence with ADL's      Long Term GOALS:  In 6 weeks, pt. Will:  1. Pt will be able to perform 5 deep squats without increase in B knee pain to improve ability to lift objects without straining back  2. Pt will increase hip abductor and knee extensor strength by 1 MMT to increase dynamic stability to L knee  3. Report decreased neck pain pain < / = 5 /10 at worst to increase tolerance for ADLs  4. be independent with HEP and SX management     Plan   Outpatient physical therapy 1 - 2 times weekly to include: pt ed, HEP, therapeutic exercises, therapeutic activities, neuromuscular re-education/ balance exercises, manual therapy, and modalities prn. Cont PT for 4 - 6 weeks. Pt may be seen by PTA as part of the rehabilitation team.     I certify the need for these services furnished under this plan of treatment and while under my care.    Lydia Villatoro, PT

## 2017-12-15 ENCOUNTER — CLINICAL SUPPORT (OUTPATIENT)
Dept: REHABILITATION | Facility: HOSPITAL | Age: 67
End: 2017-12-15
Attending: FAMILY MEDICINE
Payer: MEDICARE

## 2017-12-15 DIAGNOSIS — M54.2 NECK PAIN: ICD-10-CM

## 2017-12-15 DIAGNOSIS — G89.29 CHRONIC PAIN OF LEFT KNEE: ICD-10-CM

## 2017-12-15 DIAGNOSIS — R29.898 WEAKNESS OF BOTH LOWER EXTREMITIES: ICD-10-CM

## 2017-12-15 DIAGNOSIS — M25.562 CHRONIC PAIN OF LEFT KNEE: ICD-10-CM

## 2017-12-15 DIAGNOSIS — R29.898 DECREASED RANGE OF MOTION OF NECK: ICD-10-CM

## 2017-12-15 PROCEDURE — 97140 MANUAL THERAPY 1/> REGIONS: CPT | Mod: PO

## 2017-12-15 PROCEDURE — 97110 THERAPEUTIC EXERCISES: CPT | Mod: PO

## 2017-12-15 NOTE — PROGRESS NOTES
Name: Gely LOTT [facility] Number: [id number]  Date of Treatment: 12/15/2017   Diagnosis:   Encounter Diagnoses   Name Primary?    Neck pain     Chronic pain of left knee     Weakness of both lower extremities     Decreased range of motion of neck        Physician: Althea Anderson MD    Time in: 3:00 PM  Time Out: 3:50  Total Treatment Time: 50 min   Total 1:1 Time: 50 min  Last G-code: CJ  Last PN: NA  Date of eval:12/04/2017  Visit #: 2/18  Auth expiration: 11/21/2018  POC expiration: 03/04/2018    Precautions: Standard     Subjective     Gormania reports neck pain is feeling a little better, but L knee continues to be bothersome. Pt reports noncompliance with HEP.  Patient reports their pain to be 4/10 on a 0-10 scale with 0 being no pain and 10 being the worst pain imaginable.    Objective     Gely received therapeutic exercises to develop strength, endurance and posture for 35 minutes including:   Chin tucks 10 x 5 second hold   UT stretch 3 x 20 seconds  Doorway Pec Stretch 3 x 20 seconds   Quad sets 20 x 5 second hold   Bridges x 10  Hooklying Hip Adduction x 20   Sidelying Clams x 15 each LE  Standing rows YTB x20   Standing Shld Ext OTB x 20   Shuttle B squats 2 bands x 20   Shuttle Uni squats 1 band x 20     (next visit: cat/camel, towel roll)      Gely received the following manual therapy techniques: Joint mobilizations and Soft tissue Mobilization were applied to the: cervical spine for 10 minutes.   Cervical downglides Gr I - II   Manual UT stretch 3 x 30 seconds   Manual cervical distraction x 3    MHP applied to cervical spine post treatment in attempt to reduce symptoms x 5 minutes. Pt skin intact following removal of MHP.     Written Home Exercises Provided: No, pt instructed to continue with current HEP.    Pt educated on new therex and purpose, importance of compliance with HEP. Pt demo good understanding of the education provided. Gely demonstrated good return demonstration of  activities.     Assessment   Gely participated in today's treatment session without symptom provocation or adverse effects. Pt demonstrated improve facet mobility throughout L cervical spine with manual therapy. Pt continues to demonstrate increased tone in B upper trapezius. Pt required cueing for activation of lower trap and decreased shoulder hiking. 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 Pt with good quad activation and tolerance for lower extremity exercises. Pt will continue to benefit from skilled PT intervention. Medical Necessity is demonstrated by:  Pain limits function of effected part for some activities, Unable to participate fully in daily activities and Weakness.    Patient is making good progress towards established goals.    New/Revised goals: none at this time      Plan   Continue with established Plan of Care towards PT goals.     Lydia Villatoro, PT  12/15/2017

## 2017-12-19 ENCOUNTER — OFFICE VISIT (OUTPATIENT)
Dept: ORTHOPEDICS | Facility: CLINIC | Age: 67
End: 2017-12-19
Payer: MEDICARE

## 2017-12-19 ENCOUNTER — HOSPITAL ENCOUNTER (OUTPATIENT)
Dept: RADIOLOGY | Facility: HOSPITAL | Age: 67
Discharge: HOME | End: 2017-12-19
Attending: PHYSICIAN ASSISTANT
Payer: MEDICARE

## 2017-12-19 VITALS
DIASTOLIC BLOOD PRESSURE: 76 MMHG | BODY MASS INDEX: 27.32 KG/M2 | HEART RATE: 67 BPM | HEIGHT: 66 IN | HEIGHT: 66 IN | SYSTOLIC BLOOD PRESSURE: 119 MMHG | BODY MASS INDEX: 27.32 KG/M2 | WEIGHT: 170 LBS | WEIGHT: 170 LBS

## 2017-12-19 DIAGNOSIS — M17.12 PRIMARY OSTEOARTHRITIS OF LEFT KNEE: ICD-10-CM

## 2017-12-19 DIAGNOSIS — M25.562 ACUTE PAIN OF LEFT KNEE: ICD-10-CM

## 2017-12-19 DIAGNOSIS — M54.12 CERVICAL RADICULOPATHY: Primary | ICD-10-CM

## 2017-12-19 DIAGNOSIS — M25.562 ACUTE PAIN OF LEFT KNEE: Primary | ICD-10-CM

## 2017-12-19 PROCEDURE — 73560 X-RAY EXAM OF KNEE 1 OR 2: CPT | Mod: 26,59,RT, | Performed by: RADIOLOGY

## 2017-12-19 PROCEDURE — 99214 OFFICE O/P EST MOD 30 MIN: CPT | Mod: PBBFAC,25,27 | Performed by: PHYSICIAN ASSISTANT

## 2017-12-19 PROCEDURE — 99999 PR PBB SHADOW E&M-EST. PATIENT-LVL III: CPT | Mod: PBBFAC,,, | Performed by: PHYSICIAN ASSISTANT

## 2017-12-19 PROCEDURE — 99999 PR PBB SHADOW E&M-EST. PATIENT-LVL IV: CPT | Mod: PBBFAC,,, | Performed by: PHYSICIAN ASSISTANT

## 2017-12-19 PROCEDURE — 73562 X-RAY EXAM OF KNEE 3: CPT | Mod: 26,LT,, | Performed by: RADIOLOGY

## 2017-12-19 PROCEDURE — 99214 OFFICE O/P EST MOD 30 MIN: CPT | Mod: S$PBB,,, | Performed by: PHYSICIAN ASSISTANT

## 2017-12-19 PROCEDURE — 99213 OFFICE O/P EST LOW 20 MIN: CPT | Mod: S$PBB,,, | Performed by: PHYSICIAN ASSISTANT

## 2017-12-19 PROCEDURE — 73560 X-RAY EXAM OF KNEE 1 OR 2: CPT | Mod: TC,RT

## 2017-12-19 PROCEDURE — 99213 OFFICE O/P EST LOW 20 MIN: CPT | Mod: PBBFAC,25 | Performed by: PHYSICIAN ASSISTANT

## 2017-12-19 NOTE — PROGRESS NOTES
SUBJECTIVE:     Chief Complaint & History of Present Illness:  Gely Green is a New patient 67 y.o. female who is seen here today with a complaint of    Chief Complaint   Patient presents with    Left Knee - Pain    .  Patient is developed problems with soreness and pain in the left knee over the past several months.  She is very active and walks a regular basis as well as participates in exercise programs.  But has noticed some intermittent problems with minor swelling and difficulty usually after her exercise routines.  She is taken no medications or had any treatment for her knees to this point  On a scale of 1-10, with 10 being worst pain imaginable, he rates this pain as 2 on good days and 5 on bad days.  she describes the pain as sore and aching.    Review of patient's allergies indicates:  No Known Allergies      Current Outpatient Prescriptions   Medication Sig Dispense Refill    ACCU-CHEK FASTCLIX Misc TEST twice a day  0    ACCU-CHEK SMARTVIEW TEST STRIP Strp ACCU-CHEK SMARTVIEW TEST STRIP Strp, 120 strip 11    amLODIPine (NORVASC) 10 MG tablet Take 1 tablet (10 mg total) by mouth once daily. 30 tablet 1    aspirin (BUFFERIN) 325 MG Tab Take 325 mg by mouth once daily.      betamethasone dipropionate (DIPROLENE) 0.05 % cream Apply topically 2 (two) times daily.      blood sugar diagnostic Strp 1 strip by Misc.(Non-Drug; Combo Route) route 3 (three) times daily as needed. Lifescan test strips 120 each 4    calcium carbonate (OS-REDD) 600 mg calcium (1,500 mg) Tab Take 600 mg by mouth 2 (two) times daily with meals.      diabetic supplies, miscellan. Kit 1 application by Misc.(Non-Drug; Combo Route) route once daily. Lifescan glucose meter 1 kit 0    flavoxate (URISPAS) 100 mg Tab Take 1 tablet (100 mg total) by mouth 3 (three) times daily as needed. 90 tablet 11    gabapentin (NEURONTIN) 100 MG capsule Take 1 capsule (100 mg total) by mouth 3 (three) times daily. 90 capsule 1    ibuprofen  "(ADVIL,MOTRIN) 200 MG tablet Take 200 mg by mouth every 6 (six) hours as needed for Pain.      LINZESS 145 mcg Cap capsule   0    metformin (GLUCOPHAGE-XR) 500 MG 24 hr tablet   0    NOVOFINE 32 32 gauge x 1/4" Ndle use subcutaneously every evening 100 each 11    pen needle, diabetic (NOVOFINE PLUS) 32 gauge x 1/6" Ndle Inject 1 application into the skin every evening. 100 each 11    rosuvastatin (CRESTOR) 10 MG tablet Take 10 mg by mouth once daily.      TOUJEO SOLOSTAR 300 unit/mL (1.5 mL) InPn pen INJECT 33 UNITS UNDER THE SKIN EVERY EVENING 4.5 Syringe 0    vitamin D 1000 units Tab Take 1,000 Units by mouth once daily.       No current facility-administered medications for this visit.        Past Medical History:   Diagnosis Date    Chronic constipation     Diabetes mellitus     Diabetes mellitus     History of thyroid cyst     Hypertension     Osteopenia        Past Surgical History:   Procedure Laterality Date     SECTION      2x    COLONOSCOPY      HYSTERECTOMY      full hyst        Vital Signs (Most Recent)  Vitals:    17 1356   BP: 119/76   Pulse: 67           Review of Systems:  ROS:  Constitutional: no fever or chills  Eyes: no visual changes  ENT: no nasal congestion or sore throat  Respiratory: no cough or shortness of breath  Cardiovascular: no chest pain or palpitations  Gastrointestinal: no nausea or vomiting, tolerating diet  Genitourinary: no hematuria or dysuria  Integument/Breast: no rash or pruritis  Hematologic/Lymphatic: no easy bruising or lymphadenopathy  Musculoskeletal: no arthralgias or myalgias  Neurological: no seizures or tremors, Positive for peripheral neuropathy  Behavioral/Psych: no auditory or visual hallucinations, positive for anxiety adjustment disorder  Endocrine: no heat or cold intolerance, Positive for type 2 diabetes                OBJECTIVE:     PHYSICAL EXAM:  Height: 5' 6" (167.6 cm) Weight: 77.1 kg (169 lb 15.6 oz), General Appearance: " Well nourished, well developed, in no acute distress.  Neurological: Mood & affect are normal.  left  Knee Exam:  Knee Range of Motion:0-125 degrees flexion   Effusion: Minimal  Condition of skin:intact  Location of tenderness:Medial joint line   Strength:5 of 5  Stability:  Lachman: stable, LCL: stable, MCL: stable, PCL: stable and posteromedial (dial): stable  Varus /Valgus stress:  normal  Nat:   negative/negative    right  Knee Exam:  Knee Range of Motion:0-125 degrees flexion   Effusion:none  Condition of skin:intact  Location of tenderness:None   Strength:5 of 5  Stability:  stable to testing  Varus /Valgus stress:  normal  Nat:   negative/negative      Hip Examination:  normal    RADIOGRAPHS:  X-rays taken today films reviewed by medium straight no evidence of fracture dislocation in her about the knee there is minor medial joint space narrowing with early sclerotic changes noted no osteophytic spurring    ASSESSMENT/PLAN:     Plan: We discussed with the patient at length all the different treatment options available for  the knee including anti-inflammatories, acetaminophen, rest, ice, knee strengthening exercise, occasional cortisone injections for temporary relief, Viscosupplimentation injections, arthroscopic debridement osteotomy, and finally knee arthroplasty.   Patient like to continue with the conservative treatments at this point primarily activity modification if at some point she decides she would like to monitor more aggressive treatment she will contact the clinic for either meloxicam and/or Visco supplementation injection therapy

## 2017-12-19 NOTE — PROGRESS NOTES
DATE: 2017  PATIENT: Gely Green    Supervising Physician: Hosea Stevens M.D.    CHIEF COMPLAINT: neck pain    HISTORY:  Gely Green is a 67 y.o. female here for initial evaluation of neck and left arm pain (Neck - 6, Arm - 4). The pain has been present for more than 2 months. The patient describes the pain as aching. The pain is worse in the morning and improved by nothing in particular. There is associated numbness and tingling in the bilateral upper extremities, R>L. There is subjective weakness in the bilateral upper extremities, R>l. Prior treatments have included physical therapy, but no ESIs or surgery.     The patient denies myelopathic symptoms such as handwriting changes or difficulty with coins/keys.  She does report difficulty with buttons.   Denies perineal paresthesias, bowel/bladder dysfunction.    PAST MEDICAL/SURGICAL HISTORY:  Past Medical History:   Diagnosis Date    Chronic constipation     Diabetes mellitus     Diabetes mellitus     History of thyroid cyst     Hypertension     Osteopenia      Past Surgical History:   Procedure Laterality Date     SECTION      2x    COLONOSCOPY      HYSTERECTOMY      full hyst        Medications:  Current Outpatient Prescriptions on File Prior to Visit   Medication Sig Dispense Refill    ACCU-CHEK FASTCLIX Misc TEST twice a day  0    ACCU-CHEK SMARTVIEW TEST STRIP Strp ACCU-CHEK SMARTVIEW TEST STRIP Strp, 120 strip 11    amLODIPine (NORVASC) 10 MG tablet Take 1 tablet (10 mg total) by mouth once daily. 30 tablet 1    aspirin (BUFFERIN) 325 MG Tab Take 325 mg by mouth once daily.      betamethasone dipropionate (DIPROLENE) 0.05 % cream Apply topically 2 (two) times daily.      blood sugar diagnostic Strp 1 strip by Misc.(Non-Drug; Combo Route) route 3 (three) times daily as needed. Lifescan test strips 120 each 4    calcium carbonate (OS-REDD) 600 mg calcium (1,500 mg) Tab Take 600 mg by mouth 2 (two) times daily  "with meals.      diabetic supplies, miscellan. Kit 1 application by Misc.(Non-Drug; Combo Route) route once daily. Lifescan glucose meter 1 kit 0    flavoxate (URISPAS) 100 mg Tab Take 1 tablet (100 mg total) by mouth 3 (three) times daily as needed. 90 tablet 11    gabapentin (NEURONTIN) 100 MG capsule Take 1 capsule (100 mg total) by mouth 3 (three) times daily. 90 capsule 1    ibuprofen (ADVIL,MOTRIN) 200 MG tablet Take 200 mg by mouth every 6 (six) hours as needed for Pain.      LINZESS 145 mcg Cap capsule   0    metformin (GLUCOPHAGE-XR) 500 MG 24 hr tablet   0    NOVOFINE 32 32 gauge x 1/4" Ndle use subcutaneously every evening 100 each 11    pen needle, diabetic (NOVOFINE PLUS) 32 gauge x 1/6" Ndle Inject 1 application into the skin every evening. 100 each 11    rosuvastatin (CRESTOR) 10 MG tablet Take 10 mg by mouth once daily.      TOUJEO SOLOSTAR 300 unit/mL (1.5 mL) InPn pen INJECT 33 UNITS UNDER THE SKIN EVERY EVENING 4.5 Syringe 0    vitamin D 1000 units Tab Take 1,000 Units by mouth once daily.       No current facility-administered medications on file prior to visit.        Social History:   Social History     Social History    Marital status:      Spouse name: N/A    Number of children: N/A    Years of education: N/A     Occupational History    Not on file.     Social History Main Topics    Smoking status: Former Smoker    Smokeless tobacco: Never Used    Alcohol use No    Drug use: No    Sexual activity: Not Currently     Birth control/ protection: None     Other Topics Concern    Not on file     Social History Narrative    No narrative on file       REVIEW OF SYSTEMS:  Constitution: Negative. Negative for chills, fever and night sweats.   Cardiovascular: Negative for chest pain and syncope.   Respiratory: Negative for cough and shortness of breath.   Gastrointestinal: See HPI. Negative for nausea/vomiting. Negative for abdominal pain.  Genitourinary: See HPI. Negative " "for discoloration or dysuria.  Skin: Negative for dry skin, itching and rash.   Hematologic/Lymphatic: Negative for bleeding problem. Does not bruise/bleed easily.   Musculoskeletal: Negative for falls and muscle weakness.   Neurological: See HPI. No seizures.   Endocrine: Negative for polydipsia, polyphagia and polyuria.   Allergic/Immunologic: Negative for hives and persistent infections.  Psychiatric/Behavioral: Negative for depression and insomnia.         EXAM:  Ht 5' 6" (1.676 m)   Wt 77.1 kg (169 lb 15.6 oz)   BMI 27.43 kg/m²     General: The patient is a very pleasant 67 y.o. female in no apparent distress, the patient is oriented to person, place and time.  Psych: Normal mood and affect  HEENT: Vision grossly intact, hearing intact to the spoken word.  Lungs: Respirations unlabored.  Gait: Normal station and gait, no difficulty with toe or heel walk.   Skin: Cervical skin negative for rashes, lesions, hairy patches and surgical scars.  Range of motion: Cervical range of motion is acceptable. There is mild tenderness to palpation.  Spinal Balance: Global saggital and coronal spinal balance acceptable, no significant for scoliosis and kyphosis.  Musculoskeletal: No pain with the range of motion of the bilateral shoulders and elbows. Normal bulk and contour of the bilateral hands.  Vascular: Bilateral hands warm and well perfused, radial pulses 2+ bilaterally.  Neurological: Normal strength and tone in all major motor groups in the bilateral upper and lower extremities. Normal sensation to light touch in the C5-T1 and L2-S1 dermatomes bilaterally.  Deep tendon reflexes symmetric 2+ in the bilateral upper and lower extremities.  Negative Inverted Radial Reflex and Zuniga's bilaterally. Negative Babinski bilaterally.     IMAGING:   Today I personally reviewed AP, Lat and Flex/Ex  upright C-spine films that demonstrate mild degenerative changes.     ASSESSMENT/PLAN:    Diagnoses and all orders for this " visit:    Cervical radiculopathy  -     MRI Cervical Spine Without Contrast; Future        MRI cervical spine for further evaluation.  Follow up after the MRI to discuss results and further treatment.       Return if symptoms worsen or fail to improve.

## 2018-01-04 ENCOUNTER — HOSPITAL ENCOUNTER (OUTPATIENT)
Dept: RADIOLOGY | Facility: HOSPITAL | Age: 68
Discharge: HOME | End: 2018-01-04
Attending: PHYSICIAN ASSISTANT
Payer: MEDICARE

## 2018-01-04 DIAGNOSIS — M54.12 CERVICAL RADICULOPATHY: ICD-10-CM

## 2018-01-04 DIAGNOSIS — M54.2 NECK PAIN: ICD-10-CM

## 2018-01-04 PROCEDURE — 72141 MRI NECK SPINE W/O DYE: CPT | Mod: 26,GC,, | Performed by: RADIOLOGY

## 2018-01-04 PROCEDURE — 72040 X-RAY EXAM NECK SPINE 2-3 VW: CPT | Mod: 26,,, | Performed by: RADIOLOGY

## 2018-01-04 PROCEDURE — 72040 X-RAY EXAM NECK SPINE 2-3 VW: CPT | Mod: TC

## 2018-01-04 PROCEDURE — 72141 MRI NECK SPINE W/O DYE: CPT | Mod: TC

## 2018-01-09 ENCOUNTER — OFFICE VISIT (OUTPATIENT)
Dept: ORTHOPEDICS | Facility: CLINIC | Age: 68
End: 2018-01-09
Payer: MEDICARE

## 2018-01-09 DIAGNOSIS — M54.12 CERVICAL RADICULOPATHY: Primary | ICD-10-CM

## 2018-01-09 PROCEDURE — 99213 OFFICE O/P EST LOW 20 MIN: CPT | Mod: PBBFAC | Performed by: PHYSICIAN ASSISTANT

## 2018-01-09 PROCEDURE — 99213 OFFICE O/P EST LOW 20 MIN: CPT | Mod: S$PBB,,, | Performed by: PHYSICIAN ASSISTANT

## 2018-01-09 PROCEDURE — 99999 PR PBB SHADOW E&M-EST. PATIENT-LVL III: CPT | Mod: PBBFAC,,, | Performed by: PHYSICIAN ASSISTANT

## 2018-01-09 RX ORDER — MELOXICAM 15 MG/1
15 TABLET ORAL DAILY
Qty: 30 TABLET | Refills: 0 | Status: SHIPPED | OUTPATIENT
Start: 2018-01-09 | End: 2018-02-08

## 2018-01-09 NOTE — PROGRESS NOTES
DATE: 1/9/2018  PATIENT: Gely Green    Attending Physician: Hosea Stevens M.D.    HISTORY:  Gely Green is a 67 y.o. female who returns to me today for MRI results.  She was last seen by me 12/19/2017.  Today she is doing well but notes she is doing well, she does not have any pain today.    The Patient denies myelopathic symptoms such as handwriting changes or difficulty with buttons/coins/keys. Denies perineal paresthesias, bowel/bladder dysfunction.    PMH/PSH/FamHx/SocHx:  Unchanged from prior visit    ROS:  REVIEW OF SYSTEMS:  Constitution: Negative. Negative for chills, fever and night sweats.   HENT: Negative for congestion and headaches.    Eyes: Negative for blurred vision, left vision loss and right vision loss.   Cardiovascular: Negative for chest pain and syncope.   Respiratory: Negative for cough and shortness of breath.    Endocrine: Negative for polydipsia, polyphagia and polyuria.   Hematologic/Lymphatic: Negative for bleeding problem. Does not bruise/bleed easily.   Skin: Negative for dry skin, itching and rash.   Musculoskeletal: Negative for falls and muscle weakness.   Gastrointestinal: Negative for abdominal pain and bowel incontinence.   Allergic/Immunologic: Negative for hives and persistent infections.  Genitourinary: Negative for urinary retention/incontinence and nocturia.   Neurological: Negative for disturbances in coordination, no myelopathic symptoms such as handwriting changes or difficulty with buttons, coins, keys or small objects. No loss of balance and seizures.   Psychiatric/Behavioral: Negative for depression. The patient does not have insomnia.   Denies myelopathic symptoms, perineal paresthesias, bowel or bladder incontinence    EXAM:  There were no vitals taken for this visit.    My physical examination was notable for the following findings:     Normal station and gait, no difficulty with toe or heel walk.   Cervical skin negative for rashes, lesions, hairy  patches and surgical scars.   Cervical range of motion is acceptable.  There is mild tenderness to palpation.  No pain with range of motion of the bilateral shoulders and elbows.  Normal bulk and contour of the bilateral hands.    Bilateral hands warm and well perfused, radial pulses 2+ bilaterally.   Normal strength and tone in all major motor groups in the bilateral upper and lower extremities. Normal sensation to light touch in the C5-T1 and L2-S1 dermatomes bilaterally.   Deep tendon reflexes symmetric 2+ in the bilateral upper and lower extremities.  Negative Inverted Radial Reflex and Zuniga's bilaterally. Negative Babinski bilaterally.     IMAGING:  No new imaging today.    Today I personally re-reviewed AP, Lat and Flex/Ex  upright C-spine films that demonstrate mild degenerative changes.    MRI cervical spine demonstrates moderate neural foraminal narrowing at C3/4.       ASSESSMENT/PLAN:    Diagnoses and all orders for this visit:    Cervical radiculopathy  -     Ambulatory Referral to Physical/Occupational Therapy    Other orders  -     meloxicam (MOBIC) 15 MG tablet; Take 1 tablet (15 mg total) by mouth once daily.        Referral for PT placed today.  Follow up after therapy if symptoms persist.       Return if symptoms worsen or fail to improve.

## 2018-01-11 RX ORDER — INSULIN GLARGINE 300 U/ML
INJECTION, SOLUTION SUBCUTANEOUS
Qty: 4.5 ML | Refills: 0 | Status: SHIPPED | OUTPATIENT
Start: 2018-01-11 | End: 2018-03-14 | Stop reason: SDUPTHER

## 2018-01-24 ENCOUNTER — CLINICAL SUPPORT (OUTPATIENT)
Dept: REHABILITATION | Facility: HOSPITAL | Age: 68
End: 2018-01-24
Attending: FAMILY MEDICINE
Payer: MEDICARE

## 2018-01-24 DIAGNOSIS — R29.898 WEAKNESS OF BOTH LOWER EXTREMITIES: ICD-10-CM

## 2018-01-24 DIAGNOSIS — M54.2 NECK PAIN: ICD-10-CM

## 2018-01-24 DIAGNOSIS — G89.29 CHRONIC PAIN OF LEFT KNEE: ICD-10-CM

## 2018-01-24 DIAGNOSIS — R29.898 DECREASED RANGE OF MOTION OF NECK: ICD-10-CM

## 2018-01-24 DIAGNOSIS — M25.562 CHRONIC PAIN OF LEFT KNEE: ICD-10-CM

## 2018-01-24 PROCEDURE — G8979 MOBILITY GOAL STATUS: HCPCS | Mod: CJ,PO

## 2018-01-24 PROCEDURE — 97110 THERAPEUTIC EXERCISES: CPT | Mod: PO

## 2018-01-24 PROCEDURE — G8978 MOBILITY CURRENT STATUS: HCPCS | Mod: CJ,PO

## 2018-01-24 NOTE — PLAN OF CARE
"OUTPATIENT PHYSICAL THERAPY  PHYSICAL THERAPY RE-EVALUATION    Name: Gely Green  Clinic Number: [id number]    Evaluation Date: 12/05/2017  Visit #: 1 / 1  Authorization period Expiration: 11/21/2018  Plan of Care Expiration: 03/24/2018  Precautions: Standard     Diagnosis:   Encounter Diagnoses   Name Primary?    Neck pain     Chronic pain of left knee     Weakness of both lower extremities     Decreased range of motion of neck      Physician: Althea Anderson MD  Treatment Orders: PT Eval and Treat  Past Medical History:   Diagnosis Date    Chronic constipation     Diabetes mellitus     Diabetes mellitus     History of thyroid cyst     Hypertension     Osteopenia      Current Outpatient Prescriptions   Medication Sig    ACCU-CHEK FASTCLIX Misc TEST twice a day    ACCU-CHEK SMARTVIEW TEST STRIP Strp ACCU-CHEK SMARTVIEW TEST STRIP Strp,    amLODIPine (NORVASC) 10 MG tablet Take 1 tablet (10 mg total) by mouth once daily.    aspirin (BUFFERIN) 325 MG Tab Take 325 mg by mouth once daily.    betamethasone dipropionate (DIPROLENE) 0.05 % cream Apply topically 2 (two) times daily.    blood sugar diagnostic Strp 1 strip by Misc.(Non-Drug; Combo Route) route 3 (three) times daily as needed. Lifescan test strips    calcium carbonate (OS-REDD) 600 mg calcium (1,500 mg) Tab Take 600 mg by mouth 2 (two) times daily with meals.    diabetic supplies, miscellan. Kit 1 application by Misc.(Non-Drug; Combo Route) route once daily. Lifescan glucose meter    flavoxate (URISPAS) 100 mg Tab Take 1 tablet (100 mg total) by mouth 3 (three) times daily as needed.    gabapentin (NEURONTIN) 100 MG capsule Take 1 capsule (100 mg total) by mouth 3 (three) times daily.    ibuprofen (ADVIL,MOTRIN) 200 MG tablet Take 200 mg by mouth every 6 (six) hours as needed for Pain.    LINZESS 145 mcg Cap capsule     metformin (GLUCOPHAGE-XR) 500 MG 24 hr tablet     NOVOFINE 32 32 gauge x 1/4" Ndle use subcutaneously every " "evening    pen needle, diabetic (NOVOFINE PLUS) 32 gauge x 1/6" Ndle Inject 1 application into the skin every evening.    rosuvastatin (CRESTOR) 10 MG tablet Take 10 mg by mouth once daily.    TOUJEO SOLOSTAR 300 unit/mL (1.5 mL) InPn pen INJECT 33 UNITS UNDER THE SKIN EVERY EVENING    vitamin D 1000 units Tab Take 1,000 Units by mouth once daily.     No current facility-administered medications for this visit.      Review of patient's allergies indicates:  No Known Allergies    History   Prior Therapy: Yes, many years ago for neck   Social History: Lives with , single story home, [address]  Previous functional status: Prior to incident, pt independent in all ADLs and physical activity   Current functional status: Pt remains independent in ADLs and physical activity   Work: Not currently working     Subjective   History of Present Illness: Pt presents to [facility] with complaints of numbness in B hands. Pt reported cervical pain began over 15 years ago and has mostly affected the L cervical spine. Pt with no limitations in driving or ADLs due to cervical impairments. Pt reported difficulty sleeping because she is unable to maintain a comfortable position for her cervical spine. Pt stated she will periodically wake up in the middle of the night with numbness in her hands. She additionally experiences symptoms while on the phone, and while resting head/chin on elbow. Pt. denies history of minor or major trauma, motor vehicle accident, fall; past or present cancer; fever, chills, night sweats; unexplained weight change in past 3 months; recent infection; persistent night pain; saddle anesthesia, or changes in bowel/bladder function.    DOI: neck pain ~ 15 years ago   Imaging, bone scan films: 11/21/17 "Mild disc height loss noted at C3-C4 through C5-C6.  Facet joints are maintained."  Neck Pain: current 4/10, worst 10/10, best 0/10, Aching and Dull, intermittent  Radicular symptoms: numbness in B hands "   Aggravating factors: talking on phone, resting head on hands with elbows bent  Easing factors: move hands   Pts goals: Reduce symptoms and return to PLOF    Objective   Mental status: alert, interactive, oriented x3  Posture/ Alignment: In standing, pt with sway back posture consisting of posterior pelvic tilt, increased thoracic kyphosis, and forward head.      GAIT DEVIATIONS: [place] amb with no obvious abnormalities.     ROM:   Cervical Range of Motion:    Degrees Pain   Flexion 50 None      Extension 50 None      Right Rotation 55 None      Left Rotation 70 Discomfort      Right Side Bending 38 None   Left Side Bending 36 With downward rotation        Strength:      Right Left Comment   Shoulder flexion: 4+/5 4+/5    Shoulder Abduction: 5/5 5/5    Elbow Flexion: 5/5 5/5    Elbow Extension: 5/5 5/5     5/5 4+/5        Special Tests:  -Phalen's: Negative  - Reverse Phalen's: Negative   - ULTT: negative in median, ulnar, and radial nerve bias   - Pinch : negative   - Tinel's at elbow: negative B  - Dangelo's Test: Negative   - Cervical distraction: negative   - Spurling's: negative     Palpation:  Pt with increased tone in cervical paraspinals and L upper trapezius.     Joint Play  Hypomobile C4/5 - C6/7 downglide assessment   Hypomobile OA in flexion/extension   Hypomobile 1st rib in inferior direction     Movement Analysis: Pt demonstrated squat with poor mechanics and required UE assistance to complete task. Pt with good functional mobility in regards to transfers, bed mobility, and gait.       Pt/family was provided educational information, including: role of PT, goals for PT, scheduling - pt verbalized understanding. Discussed insurance plan with pt.     TREATMENT   Time In: 1:00 PM  Time Out: 1: 50 PM    Discussed Plan of Care with patient: Yes    Gely received 10 minutes of therapeutic exercises including:   Doorway Pec Stretch 3 x 20 seconds  UT stretch 3 x 20 seconds   Anterior/Middle Scalene  Stretch 3 x 20 seconds  Nerve Glides x 10       Written Home Exercises Provided: yes   Exercises were reviewed and Gely was able to demonstrate them prior to the end of the session. Pt received a written copy of exercises to perform at home. Gely demonstrated good  understanding of the education provided.     Assessment   Gely is a 67 y.o. female referred to outpatient physical therapy with a medical diagnosis of N/T in both hands and neck pain. Pt demonstrates joint restrictions in lower segments of L cervical spine and increased muscle tone of cervical paraspinals.  Gely is a good candidate for skilled physical therapy services at this time to restore mobility to cervical spine and reduce peripheral neurovascular symptoms, so she may return to PLOF and prevent future injury.  Pt prognosis is Good. Positive prognostic factors include active lifestyle, willingness to seek therapy services. Negative prognostic factors include chronicity and severity of symptoms. Pt will benefit from skilled outpatient physical therapy to address the above stated deficits, provide pt/family education, and to maximize pt's level of independence.     History  Co-morbidities and personal factors that may impact the plan of care Examination  Body Structures and Functions, activity limitations and participation restrictions that may impact the plan of care    Clinical Presentation   Co-morbidities:   depression and diabetes         Personal Factors:   no deficits Body Regions:   neck  lower extremities    Body Systems:   ROM  strength        Participation Restrictions:   Pt unable to perform positions requiring full knee flexion.      Activity limitations:   Learning and applying knowledge  no deficits    General Tasks and Commands  no deficits    Communication  no deficits    Mobility  no deficits    Self care  no deficits    Domestic Life  no deficits    Interactions/Relationships  no deficits    Life Areas  no  deficits    Community and Social Life  no deficits         stable and uncomplicated                      low   moderate  moderate Decision Making/ Complexity Score:  low       G Codes:   CMS Impairment/Limitation/Restriction for FOTO Neck Survey    Status   Limitation   Intake   65%   35%   Predicted  66%   34%     G-Code   CMS Severity Modifier  Current Status  CJ - At least 20 percent but less than 40 percent  Goal Status+   CJ - At least 20 percent but less than 40 percent    Pt's spiritual, cultural and educational needs considered and pt agreeable to plan of care and goals as stated below:     Anticipated Barriers for physical therapy: None     Short Term GOALS:  In 4 weeks, pt. will:  1. Report decreased B knee pain < / = 4 /10 at worst to increase tolerance for mild-moderate physical activity  2. Pt will be able to perform 10 mini squats without increase in knee pain to increase tolerance for yard work   3. Pt will be able to perform 10 upper cervical flexion nods in supine without fatigue in order to improve strength for proper posture  4. Pt to tolerate HEP to improve independence with ADL's      Long Term GOALS:  In 6 weeks, pt. Will:  1. Pt will be able to perform 5 deep squats without increase in B knee pain to improve ability to lift objects without straining back  2. Pt will increase hip abductor and knee extensor strength by 1 MMT to increase dynamic stability to L knee  3. Report decreased neck pain pain < / = 5 /10 at worst to increase tolerance for ADLs  4. be independent with HEP and SX management     Plan   Outpatient physical therapy 1 - 2 times weekly to include: pt ed, HEP, therapeutic exercises, therapeutic activities, neuromuscular re-education/ balance exercises, manual therapy, and modalities prn. Cont PT for 4 - 6 weeks. Pt may be seen by PTA as part of the rehabilitation team.     I certify the need for these services furnished under this plan of treatment and while under my  care.    Lydia Villatoro, PT

## 2018-02-15 ENCOUNTER — CLINICAL SUPPORT (OUTPATIENT)
Dept: REHABILITATION | Facility: HOSPITAL | Age: 68
End: 2018-02-15
Attending: FAMILY MEDICINE
Payer: MEDICARE

## 2018-02-15 DIAGNOSIS — G89.29 CHRONIC PAIN OF LEFT KNEE: ICD-10-CM

## 2018-02-15 DIAGNOSIS — R29.898 WEAKNESS OF BOTH LOWER EXTREMITIES: ICD-10-CM

## 2018-02-15 DIAGNOSIS — R29.898 DECREASED RANGE OF MOTION OF NECK: ICD-10-CM

## 2018-02-15 DIAGNOSIS — M25.562 CHRONIC PAIN OF LEFT KNEE: ICD-10-CM

## 2018-02-15 DIAGNOSIS — M54.2 NECK PAIN: ICD-10-CM

## 2018-02-15 PROCEDURE — 97140 MANUAL THERAPY 1/> REGIONS: CPT | Mod: PO

## 2018-02-15 PROCEDURE — 97110 THERAPEUTIC EXERCISES: CPT | Mod: PO

## 2018-02-15 NOTE — PROGRESS NOTES
"  Name: Gely LOTT [facility] Number: [id number]  Date of Treatment: 02/15/2018   Diagnosis:   Encounter Diagnoses   Name Primary?    Neck pain     Chronic pain of left knee     Weakness of both lower extremities     Decreased range of motion of neck        Physician: Ene Irving PA-C    Time in: 1:00 PM  Time Out: 1:50 PM  Total Treatment Time: 50 min   Last G-code: CJ  Last PN: 1/31/2018  Date of eval:12/05/2017  Visit #: 2/20  Auth expiration: 12/31/2018  POC expiration: 03/24/2018    Precautions: Standard    Subjective     Gely reports she continues to have numbness and tingling in B hands (R>L) that wakes her up at night. Pt reports neck pain that travels proximal into parietal area of head.  Patient reports their pain to be 0/10 on a 0-10 scale with 0 being no pain and 10 being the worst pain imaginable.    Objective     Gely received therapeutic exercises to develop strength, endurance and posture for 40 minutes including:   Wrist extensor stretch 3 x 20" each   pec stretch on towel roll x 2 min   Shoulder extension GTB x 20   Shoulder Rows YTB x 20   Seated UT stretch 3 x 20"   Nerve Glides x 10       Gely received the following manual therapy techniques: Joint mobilizations and Soft tissue Mobilization were applied to the: cervical spine and BUE for 10 minutes.   1st rib mobilization Gr I - II  STM to L UT in sitting   R Ulnar Distraction Gr II   Ulnar nerve glides       Written Home Exercises Provided: Yes, pt provided with handout of rows and shoulder extension to add to HEP    Pt educated on sleeping positions to avoid excessive neural traction, new therex. Pt demo good understanding of the education provided. Gely demonstrated good return demonstration of activities.     Assessment   Gely participated in today's treatment session without symptom provocation or adverse effects. Pt demonstrated excessive accessory use of upper trapezius during rows and shoulder extension exercises. " Pt required multiple verbal and tactile cues to perform appropriate muscle activation. Pt may benefit from adjustment of sleeping positions to alleviate excessive stress and traction on peripheral nerves. Pt will continue to benefit from skilled PT intervention. Medical Necessity is demonstrated by:  Unable to participate fully in daily activities and Weakness.    Patient is making good progress towards established goals.    New/Revised goals: none at this time      Plan   Continue with established Plan of Care towards PT goals.     Lydia Villatoro, PT  2/15/2018

## 2018-02-15 NOTE — PATIENT INSTRUCTIONS
Strengthening: Resisted Row    Face anchor at waist height, medium to wide stance. Thumbs up, pull arms back, squeezing shoulder blades together. Do not shrug up. Slowly return to start.  Repeat 10 times per set. Do 2 sets per session. Do 5 sessions per week.      Strengthening: Resisted Extension    Hold Yellow elastic band in both hand, elbow straight and arm in front of body. Pull arm back, elbow straight, and squeeze shoulder blades back. Do not shrug.  Repeat 10 times per set. Do 2 sets per session. Do 1 sessions per day.

## 2018-02-19 RX ORDER — AMLODIPINE BESYLATE 10 MG/1
TABLET ORAL
Qty: 90 TABLET | Refills: 1 | Status: SHIPPED | OUTPATIENT
Start: 2018-02-19 | End: 2018-05-22

## 2018-02-28 ENCOUNTER — TELEPHONE (OUTPATIENT)
Dept: NEUROLOGY | Facility: CLINIC | Age: 68
End: 2018-02-28

## 2018-02-28 NOTE — TELEPHONE ENCOUNTER
----- Message from Karey Cotto sent at 2/28/2018  2:39 PM CST -----  Contact: pt  _x  1st Request  _  2nd Request  _  3rd Request      Who:pt    Why: returning call back     What Number to Call Back: [phone]    When to Expect a call back: (Before the end of the day)   -- if call after 3:00 call back will be tomorrow.

## 2018-03-15 RX ORDER — INSULIN GLARGINE 300 U/ML
INJECTION, SOLUTION SUBCUTANEOUS
Qty: 4.5 ML | Refills: 0 | Status: SHIPPED | OUTPATIENT
Start: 2018-03-15 | End: 2018-04-30 | Stop reason: SDUPTHER

## 2018-03-16 DIAGNOSIS — E11.9 TYPE 2 DIABETES MELLITUS WITHOUT COMPLICATION: ICD-10-CM

## 2018-03-23 ENCOUNTER — DOCUMENTATION ONLY (OUTPATIENT)
Dept: REHABILITATION | Facility: HOSPITAL | Age: 68
End: 2018-03-23

## 2018-03-23 NOTE — PROGRESS NOTES
PHYSICAL THERAPY DISCHARGE SUMMARY     Name: Gely LOTT Peter  Clinic Number: [id number]    Diagnosis:        Encounter Diagnoses   Name Primary?    Neck pain      Chronic pain of left knee      Weakness of both lower extremities      Decreased range of motion of neck        Physician: Althea Anderson MD  Treatment Orders: PT Eval and Treat    Past Medical History:   Diagnosis Date    Chronic constipation     Diabetes mellitus     Diabetes mellitus     History of thyroid cyst     Hypertension     Osteopenia        Initial visit: 12/05/2017  Date of Last visit: 02/15/2018  Date of Discharge Note:  03/23/2018  Total Visits Received: 4  Missed Visits: 0  ASSESSMENT   Status Towards Goals Met:  Pt did not return to clinic for re-assessment. Pt did not attend therapy for appropriate frequency to achieve goals.     Goals Not achieved and why:  Pt has not scheduled any additional visits and has not returned to therapy.     Discharge reason : Pt has not re-scheduled further follow-up sessions    G-CODE: Discharge g-code not filed due to discharge note being documentation only. Upon eval Pt was at [facility] (20<40%) with a  g-code goal set at CJ (20 < 40%).  Short Term GOALS:    In 4 weeks, pt. will:  1. Report decreased B knee pain < / = 4 /10 at worst to increase tolerance for mild-moderate physical activity  2. Pt will be able to perform 10 mini squats without increase in knee pain to increase tolerance for yard work   3. Pt will be able to perform 10 upper cervical flexion nods in supine without fatigue in order to improve strength for proper posture  4. Pt to tolerate HEP to improve independence with ADL's        Long Term GOALS:  In 6 weeks, pt. Will:  1. Pt will be able to perform 5 deep squats without increase in B knee pain to improve ability to lift objects without straining back  2. Pt will increase hip abductor and knee extensor strength by 1 MMT to increase dynamic stability to L knee  3. Report  decreased neck pain pain < / = 5 /10 at worst to increase tolerance for ADLs  4. be independent with HEP and SX management       PLAN   This patient is discharged from Physical Therapy Services.       Lydia Villatoro, PT  3/23/2018

## 2018-04-16 RX ORDER — BLOOD SUGAR DIAGNOSTIC
STRIP MISCELLANEOUS
Qty: 100 STRIP | Refills: 4 | Status: SHIPPED | OUTPATIENT
Start: 2018-04-16 | End: 2019-03-27 | Stop reason: SDUPTHER

## 2018-04-30 DIAGNOSIS — Z13.5 DIABETIC RETINOPATHY SCREENING: ICD-10-CM

## 2018-04-30 RX ORDER — INSULIN GLARGINE 300 U/ML
INJECTION, SOLUTION SUBCUTANEOUS
Qty: 13.5 ML | Refills: 3 | Status: SHIPPED | OUTPATIENT
Start: 2018-04-30 | End: 2019-06-11 | Stop reason: SDUPTHER

## 2018-04-30 RX ORDER — METFORMIN HYDROCHLORIDE 500 MG/1
TABLET, EXTENDED RELEASE ORAL
Qty: 180 TABLET | Refills: 3 | Status: SHIPPED | OUTPATIENT
Start: 2018-04-30 | End: 2019-08-19 | Stop reason: SDUPTHER

## 2018-05-01 ENCOUNTER — OFFICE VISIT (OUTPATIENT)
Dept: INTERNAL MEDICINE | Facility: CLINIC | Age: 68
End: 2018-05-01
Attending: FAMILY MEDICINE
Payer: MEDICARE

## 2018-05-01 ENCOUNTER — PATIENT OUTREACH (OUTPATIENT)
Dept: ADMINISTRATIVE | Facility: HOSPITAL | Age: 68
End: 2018-05-01

## 2018-05-01 ENCOUNTER — LAB VISIT (OUTPATIENT)
Dept: LAB | Facility: OTHER | Age: 68
End: 2018-05-01
Attending: FAMILY MEDICINE
Payer: MEDICARE

## 2018-05-01 VITALS
BODY MASS INDEX: 27.21 KG/M2 | WEIGHT: 169.31 LBS | HEIGHT: 66 IN | HEART RATE: 62 BPM | SYSTOLIC BLOOD PRESSURE: 102 MMHG | DIASTOLIC BLOOD PRESSURE: 66 MMHG | OXYGEN SATURATION: 98 %

## 2018-05-01 DIAGNOSIS — R20.2 NUMBNESS AND TINGLING IN BOTH HANDS: Primary | ICD-10-CM

## 2018-05-01 DIAGNOSIS — Z13.5 DIABETIC RETINOPATHY SCREENING: ICD-10-CM

## 2018-05-01 DIAGNOSIS — E11.40 TYPE 2 DIABETES MELLITUS WITH DIABETIC NEUROPATHY, WITH LONG-TERM CURRENT USE OF INSULIN: ICD-10-CM

## 2018-05-01 DIAGNOSIS — I10 ESSENTIAL HYPERTENSION: ICD-10-CM

## 2018-05-01 DIAGNOSIS — R20.0 NUMBNESS AND TINGLING IN BOTH HANDS: Primary | ICD-10-CM

## 2018-05-01 DIAGNOSIS — Z79.4 TYPE 2 DIABETES MELLITUS WITH DIABETIC NEUROPATHY, WITH LONG-TERM CURRENT USE OF INSULIN: ICD-10-CM

## 2018-05-01 DIAGNOSIS — R60.9 EDEMA, UNSPECIFIED TYPE: ICD-10-CM

## 2018-05-01 DIAGNOSIS — R41.3 MEMORY CHANGE: ICD-10-CM

## 2018-05-01 DIAGNOSIS — E11.9 TYPE 2 DIABETES MELLITUS WITHOUT COMPLICATION: ICD-10-CM

## 2018-05-01 DIAGNOSIS — E11.42 DIABETIC PERIPHERAL NEUROPATHY: ICD-10-CM

## 2018-05-01 LAB
CREAT UR-MCNC: 159 MG/DL
ESTIMATED AVG GLUCOSE: 134 MG/DL
HBA1C MFR BLD HPLC: 6.3 %
MICROALBUMIN UR DL<=1MG/L-MCNC: 7 UG/ML
MICROALBUMIN/CREATININE RATIO: 4.4 UG/MG

## 2018-05-01 PROCEDURE — 83036 HEMOGLOBIN GLYCOSYLATED A1C: CPT

## 2018-05-01 PROCEDURE — 99215 OFFICE O/P EST HI 40 MIN: CPT | Mod: PBBFAC,27 | Performed by: FAMILY MEDICINE

## 2018-05-01 PROCEDURE — 99999 PR PBB SHADOW E&M-EST. PATIENT-LVL II: CPT | Mod: PBBFAC,,,

## 2018-05-01 PROCEDURE — 99999 PR PBB SHADOW E&M-EST. PATIENT-LVL V: CPT | Mod: PBBFAC,,, | Performed by: FAMILY MEDICINE

## 2018-05-01 PROCEDURE — 36415 COLL VENOUS BLD VENIPUNCTURE: CPT

## 2018-05-01 PROCEDURE — 82043 UR ALBUMIN QUANTITATIVE: CPT

## 2018-05-01 PROCEDURE — 99214 OFFICE O/P EST MOD 30 MIN: CPT | Mod: S$PBB,,, | Performed by: FAMILY MEDICINE

## 2018-05-01 PROCEDURE — 99212 OFFICE O/P EST SF 10 MIN: CPT | Mod: PBBFAC

## 2018-05-01 RX ORDER — MELOXICAM 15 MG/1
15 TABLET ORAL DAILY
COMMUNITY
End: 2019-01-17

## 2018-05-01 RX ORDER — ASPIRIN 81 MG/1
81 TABLET ORAL DAILY
COMMUNITY
End: 2021-04-15

## 2018-05-01 NOTE — PROGRESS NOTES
[facility] is committed to your overall health.  To help you get the most out of each of your visits, we will review your information to make sure you are up to date on all of your recommended tests and/or procedures.       Your PCP  Althea Anderson MD   found that you may be due for:       Health Maintenance Due   Topic Date Due    Pneumococcal (65+) (2 of 2 - PPSV23) 11/30/2017    Eye Exam  02/09/2018    Hemoglobin A1c  02/28/2018    Urine Microalbumin  06/02/2018    Foot Exam  06/16/2018     You will be scheduled for a eye cam retina scan on the same day of your scheduled visit with your Althea Anderson MD.  NO eye drop dilation will take place. You can drive after the scan.  This will take no longer than ten minutes. This will take place in the same office area as your visit. This eye scan is NOT a visual exam. This exam is being used to scan for diseases of the eye such as Diabetic Retinopathy which is the leading cause for blindness in those that have Diabetes Mellitus.     If you feel you need a vision exam please contact the office to schedule an appointment with Opthalmology.     If you see an outside eye physician please be prepared to sign a release of information so that we may request your records to update your medical records. Thank you.     Please be prepared to sign release of information so that we obtain medical records for care that you received outside of [facility]. This is done to make sure your medical record is complete and that you receive the best of care. Thank you.           If you have had any of the above done at another facility, please bring the records or information with you so that your record at [facility] will be complete.  If you would like to schedule any of these, please contact me.     If you are currently taking medication, please bring it with you to your appointment for review.     Also, if you have any type of Advanced Directives, please bring them  with you to your office visit so we may scan them into your chart.       Thank you for Choosing [facility] for your healthcare needs.        Additional Information  If you have questions, you can email [email or web address] or call [phone]  to talk to our [facility] staff. Remember, [facility] is NOT to be used for urgent needs. For medical emergencies, dial 911.

## 2018-05-01 NOTE — NURSING
Per Dr Justin MCCLENDON, [facility], pt sees an outside Optometrist, office will be faxing over records.

## 2018-05-01 NOTE — PROGRESS NOTES
"Subjective:      Patient ID: Gely Green is a 67 y.o. female.    Chief Complaint: Numbness (in both arms) and Foot Swelling (bilateral)    She has noticed 2-3 months of BL ankle swelling, that occurs at the end of the day after she has been on her feet, it does resolve after she sleeps. She denies any worsening sob, chest pain, difficulty breathing. She did notice it after starting the amlodipine. She denies any elevations in her pressure. No pain or redness in the ankles. She continues to have numbness from right elbow to hands. It does wake her up in the middle of the night. She does have move her hand to alleviate it. She has completed PT for neck and hands and no improvement. Her morning sugars have been 120-130s.       Review of Systems   HENT: Negative.    Respiratory: Negative.    Cardiovascular: Negative.    Gastrointestinal: Negative.    Genitourinary: Negative.    Neurological: Negative.      I personally reviewed Past Medical History, Past Surgical history,  Past Social History and Family History    Objective:   /66   Pulse 62   Ht 5' 6" (1.676 m)   Wt 76.8 kg (169 lb 5 oz)   SpO2 98%   BMI 27.33 kg/m²     Physical Exam   Constitutional: She is oriented to person, place, and time. She appears well-developed and well-nourished. No distress.   HENT:   Head: Normocephalic and atraumatic.   Right Ear: External ear normal.   Left Ear: External ear normal.   Mouth/Throat: Oropharynx is clear and moist.   Eyes: Conjunctivae and EOM are normal. Pupils are equal, round, and reactive to light. Right eye exhibits no discharge. Left eye exhibits no discharge. No scleral icterus.   Neck: Normal range of motion. Neck supple.   Cardiovascular: Normal rate, regular rhythm, normal heart sounds and intact distal pulses.  Exam reveals no gallop.    No murmur heard.  Pulmonary/Chest: Effort normal and breath sounds normal. No respiratory distress. She has no wheezes. She has no rales. She exhibits no " tenderness.   Abdominal: Soft. Bowel sounds are normal. She exhibits no distension and no mass. There is no tenderness. There is no rebound and no guarding.   Musculoskeletal: Normal range of motion. She exhibits no edema.        Right wrist: Normal.        Left wrist: Normal.   Neurological: She is alert and oriented to person, place, and time.   Skin: Skin is warm and dry.   Psychiatric: She has a normal mood and affect. Her behavior is normal. Judgment and thought content normal.   Vitals reviewed.      Gely was seen today for numbness and foot swelling.    Diagnoses and all orders for this visit:    Numbness and tingling in both hands  -     Ambulatory consult to Orthopedics  -     WRIST BRACE FOR HOME USE    Type 2 diabetes mellitus with diabetic neuropathy, with long-term current use of insulin  -     MICROALBUMIN / CREATININE RATIO URINE    Memory change  -     Ambulatory consult to Neurology    HTN/edema  -discussed switching amlodipine and patient is ok with continuing it and just elevating feet with the swelling

## 2018-05-01 NOTE — PROGRESS NOTES
Per Dr Justin MCCLENDON, [facility], pt sees an outside Optometrist, office will be faxing over records.

## 2018-05-18 ENCOUNTER — PATIENT MESSAGE (OUTPATIENT)
Dept: ORTHOPEDICS | Facility: CLINIC | Age: 68
End: 2018-05-18

## 2018-05-18 ENCOUNTER — PATIENT MESSAGE (OUTPATIENT)
Dept: INTERNAL MEDICINE | Facility: CLINIC | Age: 68
End: 2018-05-18

## 2018-05-21 ENCOUNTER — TELEPHONE (OUTPATIENT)
Dept: ORTHOPEDICS | Facility: CLINIC | Age: 68
End: 2018-05-21

## 2018-05-21 DIAGNOSIS — M79.641 BILATERAL HAND PAIN: Primary | ICD-10-CM

## 2018-05-21 DIAGNOSIS — M79.642 BILATERAL HAND PAIN: Primary | ICD-10-CM

## 2018-05-21 NOTE — TELEPHONE ENCOUNTER
Pt would like to change her blood pressure med due to its making her swell. Which was discuss in last clinic visit

## 2018-05-22 RX ORDER — VALSARTAN 320 MG/1
320 TABLET ORAL DAILY
Qty: 30 TABLET | Refills: 3 | Status: SHIPPED | OUTPATIENT
Start: 2018-05-22 | End: 2018-05-23 | Stop reason: SDUPTHER

## 2018-05-23 DIAGNOSIS — I10 HYPERTENSION, UNSPECIFIED TYPE: Primary | ICD-10-CM

## 2018-05-23 RX ORDER — VALSARTAN 320 MG/1
320 TABLET ORAL DAILY
Qty: 30 TABLET | Refills: 3 | Status: SHIPPED | OUTPATIENT
Start: 2018-05-23 | End: 2018-07-09 | Stop reason: SDUPTHER

## 2018-05-23 NOTE — TELEPHONE ENCOUNTER
Pt informed rx was sent to [facility]. Pt states she doesn't use [facility]. Request pharmacy info be updated. rx pending. Please advise/authorize?

## 2018-05-23 NOTE — TELEPHONE ENCOUNTER
----- Message from Clary Borja sent at 5/23/2018 12:22 PM CDT -----  Contact: pt            Name of Who is Calling: pt      What is the request in detail: pt needs her medication for her hbp. Pt checked at [facility] [address] and pharmacy said they dont have it. Pt doesn't know the name of the medication. Please call pt      Can the clinic reply by [facility]: yes      What Number to Call Back if not in [facility]: [phone]

## 2018-05-28 ENCOUNTER — TELEPHONE (OUTPATIENT)
Dept: ORTHOPEDICS | Facility: CLINIC | Age: 68
End: 2018-05-28

## 2018-05-28 NOTE — TELEPHONE ENCOUNTER
Gely Green reminded of appointment on 5/29/18 with Dr. DEAN Lunsford w/time and location. Notified of need for xray before OV w/date, time, and location of appts.  Pt verbalized understanding.

## 2018-05-29 ENCOUNTER — OFFICE VISIT (OUTPATIENT)
Dept: ORTHOPEDICS | Facility: CLINIC | Age: 68
End: 2018-05-29
Attending: FAMILY MEDICINE
Payer: MEDICARE

## 2018-05-29 ENCOUNTER — HOSPITAL ENCOUNTER (OUTPATIENT)
Dept: RADIOLOGY | Facility: OTHER | Age: 68
Discharge: HOME | End: 2018-05-29
Attending: ORTHOPAEDIC SURGERY
Payer: MEDICARE

## 2018-05-29 VITALS
WEIGHT: 169.31 LBS | BODY MASS INDEX: 27.21 KG/M2 | SYSTOLIC BLOOD PRESSURE: 143 MMHG | HEIGHT: 66 IN | HEART RATE: 58 BPM | DIASTOLIC BLOOD PRESSURE: 77 MMHG

## 2018-05-29 DIAGNOSIS — G56.03 BILATERAL CARPAL TUNNEL SYNDROME: Primary | ICD-10-CM

## 2018-05-29 DIAGNOSIS — M79.642 BILATERAL HAND PAIN: ICD-10-CM

## 2018-05-29 DIAGNOSIS — G56.21 CUBITAL TUNNEL SYNDROME ON RIGHT: ICD-10-CM

## 2018-05-29 DIAGNOSIS — M79.641 BILATERAL HAND PAIN: ICD-10-CM

## 2018-05-29 PROCEDURE — 99213 OFFICE O/P EST LOW 20 MIN: CPT | Mod: PBBFAC,25 | Performed by: PHYSICIAN ASSISTANT

## 2018-05-29 PROCEDURE — 73130 X-RAY EXAM OF HAND: CPT | Mod: 50,TC,FY

## 2018-05-29 PROCEDURE — 99213 OFFICE O/P EST LOW 20 MIN: CPT | Mod: S$PBB,,, | Performed by: PHYSICIAN ASSISTANT

## 2018-05-29 PROCEDURE — 99999 PR PBB SHADOW E&M-EST. PATIENT-LVL III: CPT | Mod: PBBFAC,,, | Performed by: PHYSICIAN ASSISTANT

## 2018-05-29 PROCEDURE — 73130 X-RAY EXAM OF HAND: CPT | Mod: 26,50,, | Performed by: RADIOLOGY

## 2018-05-29 NOTE — LETTER
May 29, 2018      Althea Anderson MD  [address]  [facility] [address]           [facility]  [address], [address]  [facility] [address]  Phone: [phone]          Patient: Gely Green   MR Number: [id number]   YOB: 1950   Date of Visit: 5/29/2018       Dear Dr. Althea Anderson:    Thank you for referring Gely Green to me for evaluation. Attached you will find relevant portions of my assessment and plan of care.    If you have questions, please do not hesitate to call me. I look forward to following Gely Green along with you.    Sincerely,    Louisa Garcia PA-C    Enclosure  CC:  No Recipients    If you would like to receive this communication electronically, please contact [email or web address] or [phone] to request more information on [employer] access.    For providers and/or their staff who would like to refer a patient to [facility], please contact us through our one-stop-shop provider referral line, [facility] , at [phone].    If you feel you have received this communication in error or would no longer like to receive these types of communications, please e-mail [email or web address]

## 2018-05-29 NOTE — PROGRESS NOTES
Subjective:      Patient ID: Gely Green is a 67 y.o. female.    Chief Complaint: Numbness of the Left Hand and Numbness of the Right Hand      HPI  Gely Green is a 67 y.o. female presenting today for bilateral hand numbness and tingling. Symptoms began several months ago. Numbness/tingling mostly in the median distribution however she does occasionally have symptoms in the right elbow which radiate proximally. Symptoms are intermittent. Pt had EMG 8/2017 which was normal. She has not tried bracing or injections. She denies pain.         Review of patient's allergies indicates:  No Known Allergies      Current Outpatient Prescriptions   Medication Sig Dispense Refill    ACCU-CHEK FASTCLIX Misc TEST twice a day  0    ACCU-CHEK SMARTVIEW TEST STRIP Strp ACCU-CHEK SMARTVIEW TEST STRIP Strp, 120 strip 11    aspirin (ECOTRIN) 81 MG EC tablet Take 81 mg by mouth once daily.      blood sugar diagnostic Strp 1 strip by Misc.(Non-Drug; Combo Route) route 3 (three) times daily as needed. Lifescan test strips 120 each 4    calcium carbonate (OS-REDD) 600 mg calcium (1,500 mg) Tab Take 600 mg by mouth 2 (two) times daily with meals.      diabetic supplies, [email or web address].COMan. Kit 1 application by Misc.(Non-Drug; Combo Route) route once daily. Lifescan glucose meter 1 kit 0    flavoxate (URISPAS) 100 mg Tab Take 1 tablet (100 mg total) by mouth 3 (three) times daily as needed. 90 tablet 11    gabapentin (NEURONTIN) 100 MG capsule Take 1 capsule (100 mg total) by mouth 3 (three) times daily. 90 capsule 1    ibuprofen (ADVIL,MOTRIN) 200 MG tablet Take 200 mg by mouth every 6 (six) hours as needed for Pain.      Lactobacillus rhamnosus GG (CULTURELLE) 10 billion cell capsule Take 1 capsule by mouth once daily.      LINZESS 145 mcg Cap capsule   0    meloxicam (MOBIC) 15 MG tablet Take 15 mg by mouth once daily.      metFORMIN (GLUCOPHAGE-XR) 500 MG 24 hr tablet take 1 tablet by mouth twice a day 180 tablet 3     "NOVOFINE 32 32 gauge x 1/4" Ndle use subcutaneously every evening 100 each 11    ONETOUCH ULTRA TEST Strp TEST three times a day 100 strip 4    pen needle, diabetic (NOVOFINE PLUS) 32 gauge x 1/6" Ndle Inject 1 application into the skin every evening. 100 each 11    rosuvastatin (CRESTOR) 10 MG tablet Take 10 mg by mouth once daily.      TOUJEO SOLOSTAR U-300 INSULIN 300 unit/mL (1.5 mL) InPn pen INJECT 33 UNITS UNDER THE SKIN EVERY EVENING 13.5 mL 3    vitamin D 1000 units Tab Take 1,000 Units by mouth once daily.      valsartan (DIOVAN) 320 MG tablet Take 1 tablet (320 mg total) by mouth once daily. 30 tablet 3     No current facility-administered medications for this visit.        Past Medical History:   Diagnosis Date    Chronic constipation     Diabetes mellitus     Diabetes mellitus     History of thyroid cyst     Hypertension     Osteopenia        Past Surgical History:   Procedure Laterality Date     SECTION      2x    COLONOSCOPY      HYSTERECTOMY      full hyst        Review of Systems:  Constitutional: Negative for chills and fever.   Respiratory: Negative for cough and shortness of breath.    Gastrointestinal: Negative for nausea and vomiting.   Skin: Negative for rash.   Neurological: Negative for dizziness and headaches.   Psychiatric/Behavioral: Negative for depression.   MSK as in HPI       OBJECTIVE:     PHYSICAL EXAM:  BP (!) 143/77 Comment: per pt waiting on new medication  Pulse (!) 58   Ht 5' 6" (1.676 m)   Wt 76.8 kg (169 lb 5 oz)   BMI 27.33 kg/m²     GEN:  NAD, well-developed, well-groomed.  NEURO: Awake, alert, and oriented. Normal attention and concentration.    PSYCH: Normal mood and affect. Behavior is normal.  HEENT: No cervical lymphadenopathy noted.  CARDIOVASCULAR: Radial pulses 2+ bilaterally. No LE edema noted.  PULMONARY: Breath sounds normal. No respiratory distress.  SKIN: Intact, no rashes.      MSK:   RUE:  Good active ROM of the wrist and " fingers. AIN/PIN/Radial/Median/Ulnar Nerves assessed in isolation without deficit. Radial & Ulnar arteries palpated 2+. Capillary Refill <3s. Positive tinels and durkans at the wrist, positive tinels at the elbow.     LUE:  Good active ROM of the wrist and fingers. AIN/PIN/Radial/Median/Ulnar Nerves assessed in isolation without deficit. Radial & Ulnar arteries palpated 2+. Capillary Refill <3s. Positive tinels and durkans at the wrist.       RADIOGRAPHS:  XRAY bl hands 5/29/18  FINDINGS:  The skeletal structures are intact with satisfactory overall alignment.  Mild osteoarthritis is present at many joints with little narrowing and spurring.  This is mostly seen at the IP joints of the digits.  No erosive change or periarticular calcification is seen.  Left wrist shows mild soft tissue swelling at the dorsal aspect with a small soft tissue calcification.    Comments: I have personally reviewed the imaging and I agree with the above radiologist's report.      PROCEDURES  EMG 8/15/18  Impression   This study is normal. There is no electrophysiologic evidence for a peripheral neuropathy.       ASSESSMENT/PLAN:       ICD-10-CM ICD-9-CM   1. Bilateral carpal tunnel syndrome G56.03 354.0   2. Cubital tunnel syndrome on right G56.21 354.2       Past EMG negative but provocative exam for carpal tunnel and right cubital tunnel.      Plan:   -reviewed EMG and symptoms with pt. She declines injections today. We will proceed with bl CTS braces and right cubital tunnel brace for nightly use.   -RTC as needed, if symptoms worsen may consider repeat EMG        The patient indicates understanding of these issues and agrees to the plan.    Louisa Garcia PA-C  [facility]   [facility]  [place], [place]

## 2018-06-11 ENCOUNTER — OFFICE VISIT (OUTPATIENT)
Dept: NEUROLOGY | Facility: CLINIC | Age: 68
End: 2018-06-11
Attending: FAMILY MEDICINE
Payer: MEDICARE

## 2018-06-11 VITALS
DIASTOLIC BLOOD PRESSURE: 74 MMHG | BODY MASS INDEX: 27.42 KG/M2 | WEIGHT: 170.63 LBS | HEIGHT: 66 IN | SYSTOLIC BLOOD PRESSURE: 129 MMHG | HEART RATE: 56 BPM

## 2018-06-11 DIAGNOSIS — F43.23 ADJUSTMENT DISORDER WITH MIXED ANXIETY AND DEPRESSED MOOD: ICD-10-CM

## 2018-06-11 DIAGNOSIS — E11.42 DIABETIC PERIPHERAL NEUROPATHY: ICD-10-CM

## 2018-06-11 DIAGNOSIS — R41.3 MEMORY LOSS: Primary | ICD-10-CM

## 2018-06-11 DIAGNOSIS — I10 ESSENTIAL HYPERTENSION: ICD-10-CM

## 2018-06-11 PROCEDURE — 99215 OFFICE O/P EST HI 40 MIN: CPT | Mod: S$PBB,,, | Performed by: PSYCHIATRY & NEUROLOGY

## 2018-06-11 PROCEDURE — 99999 PR PBB SHADOW E&M-EST. PATIENT-LVL III: CPT | Mod: PBBFAC,,, | Performed by: PSYCHIATRY & NEUROLOGY

## 2018-06-11 PROCEDURE — 99213 OFFICE O/P EST LOW 20 MIN: CPT | Mod: PBBFAC | Performed by: PSYCHIATRY & NEUROLOGY

## 2018-06-11 RX ORDER — ROSUVASTATIN CALCIUM 10 MG/1
TABLET, COATED ORAL
Qty: 90 TABLET | Refills: 1 | Status: SHIPPED | OUTPATIENT
Start: 2018-06-11 | End: 2019-07-08 | Stop reason: SDUPTHER

## 2018-06-11 NOTE — PATIENT INSTRUCTIONS
Discussed with patient regarding her symptoms.   Her sensory symptoms may represent an early peripheral neuropathy with nerve conductions being normal.  Strict control of diabetes especially with diet control advised.  She is presently on aspirin 81 mg daily.  She is advised walking for exercise.  Her memory difficulties to suggest attention span and concentration difficulty with contributing factors including underlying depression that is persistent, following the that her son about 3 years ago.  She does have a family history of dementia and that her mother developed dementia in his 70s.   Will get neuropsychological testing scheduled.  She has had blood work done including a CT scan of brain done last year.  This will not be repeated.  Vitamin B12 OTC sublingual 2000 µg daily.  Follow-up after neuropsychological testing is completed.

## 2018-06-11 NOTE — PROGRESS NOTES
Subjective:       Patient ID: Gely Green is a 67 y.o. female.    Chief Complaint:  Peripheral Neuropathy and Memory Loss      History of Present Illness  HPI  This is a 67-year-old -American female who had been seen by me with complaints of intermittent tingling in her left face lasting seconds that have been going on for since late 2017.  No associated headaches, visual impairment or hearing loss.  She denies any speech or swallowing difficulty.  Symptoms are mild and primarily affect the medial cheek.  They have significantly improved with better control of her diabetes and she has had no symptoms for at least 6 months.  The patient also describes tingling and numbness in her hands and feet, primarily in her feet that has been going on since early .  She does have a history of diabetes which is better controlled.  A recent hemoglobin A1c was 6.3.  She is presently being followed by Orthopedics for possible early carpal tunnel syndrome. An EMG/NCS done in 2017 was normal.    The patient presents today as she has also been having intermittent memory difficulties.  She was last seen by me in 2017 and it is noted that she had a CT scan of the brain done earlier that here that was unremarkable.  Her present symptoms have been going on for 6 months.  She reports that she has become forgetful and has placed things in the house and cannot remember where.  She does have occasional delayed recall.  She also has difficulty recalling on occasions he may have had an increasing difficulty with remembering passwords such that she has to write them out.  She retired about 4 years ago when the son became ill and he subsequently .  She was quite depressed at that time and was on Paxil for a couple of years until 2017.  She does report that she continues to have problems with intermittent depression and ruminates about her son.  She does describe sleep difficulties related to this.  She is  otherwise quite functional and came to the clinic alone.  She has no difficulty with driving, doing the groceries of shopping, and taking care of her day-to-day needs at home including personal hygiene.  She lives with her  was not noted any significant problems with her memory.  She continues to exercise on a regular basis and is strict with her diet for diabetes.  She continues to socialize without any problems.       Review of Systems  Review of Systems   Constitutional: Negative.    HENT: Negative.  Negative for hearing loss.    Eyes: Negative.  Negative for visual disturbance.   Respiratory: Negative.  Negative for shortness of breath.    Cardiovascular: Negative.  Negative for chest pain and palpitations.   Gastrointestinal: Negative.    Genitourinary: Negative.    Musculoskeletal: Negative.  Negative for back pain, gait problem and neck pain.   Skin: Negative.    Neurological: Positive for numbness. Negative for tremors, seizures, syncope, speech difficulty, weakness and headaches.   Psychiatric/Behavioral: Negative.  Negative for confusion and decreased concentration.       Objective:      Neurologic Exam     Mental Status   Oriented to person, place, and time.   Registration: recalls 3 of 3 objects. Follows 3 step commands.   Attention: normal. Concentration: normal.   Speech: speech is normal   Level of consciousness: alert  Knowledge: good.   Able to name object. Able to read. Able to repeat. Normal comprehension.     Cranial Nerves   Cranial nerves II through XII intact.   No sensory deficits or hypersensitivity over the face     Motor Exam   Muscle bulk: normal  Overall muscle tone: normal    Strength   Strength 5/5 throughout.     Sensory Exam   Light touch normal.   Right arm vibration: normal  Left arm vibration: normal  Right leg vibration: decreased from toes (Vibration sense less than 10 seconds at the toes)  Left leg vibration: decreased from toes  Proprioception normal.   Pinprick normal.      Gait, Coordination, and Reflexes     Gait  Gait: normal    Coordination   Romberg: negative  Finger to nose coordination: normal    Tremor   Resting tremor: absent  Intention tremor: absent  Action tremor: absent    Reflexes   Right brachioradialis: 1+  Left brachioradialis: 1+  Right biceps: 1+  Left biceps: 1+  Right triceps: 1+  Left triceps: 1+  Right patellar: 1+  Left patellar: 1+  Right achilles: 1+  Left achilles: 1+  Right plantar: normal  Left plantar: normal      Physical Exam   Constitutional: She is oriented to person, place, and time. She appears well-developed and well-nourished.   HENT:   Head: Normocephalic and atraumatic.   Neck: Normal range of motion. Neck supple. Carotid bruit is not present.   Neurological: She is oriented to person, place, and time. She has normal strength. She has a normal Finger-Nose-Finger Test and a normal Romberg Test. Gait normal.   Reflex Scores:       Tricep reflexes are 1+ on the right side and 1+ on the left side.       Bicep reflexes are 1+ on the right side and 1+ on the left side.       Brachioradialis reflexes are 1+ on the right side and 1+ on the left side.       Patellar reflexes are 1+ on the right side and 1+ on the left side.       Achilles reflexes are 1+ on the right side and 1+ on the left side.  Psychiatric: Her speech is normal.   Vitals reviewed.        Assessment:        1. Memory loss    2. Diabetic peripheral neuropathy    3. Adjustment disorder with mixed anxiety and depressed mood    4. Essential hypertension            Plan:         Discussed with patient regarding her symptoms.   Her sensory symptoms may represent an early peripheral neuropathy with nerve conductions being normal.  Strict control of diabetes especially with diet control advised.  She is presently on aspirin 81 mg daily.  She is advised walking for exercise.  Her memory difficulties to suggest attention span and concentration difficulty with contributing factors including  underlying depression that is persistent, following the that her son about 3 years ago.  She does have a family history of dementia and that her mother developed dementia in his 70s.   Will get neuropsychological testing scheduled.  She has had blood work done including a CT scan of brain done last year.  This will not be repeated.  Vitamin B12 OTC sublingual 2000 µg daily.  Follow-up after neuropsychological testing is completed.

## 2018-06-25 ENCOUNTER — HOSPITAL ENCOUNTER (OUTPATIENT)
Dept: RADIOLOGY | Facility: HOSPITAL | Age: 68
Discharge: HOME | End: 2018-06-25
Attending: INTERNAL MEDICINE
Payer: MEDICARE

## 2018-06-25 ENCOUNTER — OFFICE VISIT (OUTPATIENT)
Dept: INTERNAL MEDICINE | Facility: CLINIC | Age: 68
End: 2018-06-25
Payer: MEDICARE

## 2018-06-25 VITALS
DIASTOLIC BLOOD PRESSURE: 70 MMHG | HEIGHT: 66 IN | BODY MASS INDEX: 26.68 KG/M2 | HEART RATE: 62 BPM | TEMPERATURE: 99 F | SYSTOLIC BLOOD PRESSURE: 120 MMHG | RESPIRATION RATE: 18 BRPM | WEIGHT: 166 LBS

## 2018-06-25 DIAGNOSIS — E11.40 TYPE 2 DIABETES MELLITUS WITH DIABETIC NEUROPATHY, WITH LONG-TERM CURRENT USE OF INSULIN: ICD-10-CM

## 2018-06-25 DIAGNOSIS — I10 ESSENTIAL HYPERTENSION: ICD-10-CM

## 2018-06-25 DIAGNOSIS — Z79.4 TYPE 2 DIABETES MELLITUS WITH DIABETIC NEUROPATHY, WITH LONG-TERM CURRENT USE OF INSULIN: ICD-10-CM

## 2018-06-25 DIAGNOSIS — R00.2 POUNDING HEARTBEAT: Primary | ICD-10-CM

## 2018-06-25 DIAGNOSIS — E01.0 THYROMEGALY: ICD-10-CM

## 2018-06-25 DIAGNOSIS — E78.5 HYPERLIPIDEMIA, UNSPECIFIED HYPERLIPIDEMIA TYPE: ICD-10-CM

## 2018-06-25 DIAGNOSIS — E11.42 TYPE 2 DIABETES MELLITUS WITH DIABETIC POLYNEUROPATHY, WITH LONG-TERM CURRENT USE OF INSULIN: ICD-10-CM

## 2018-06-25 DIAGNOSIS — Z79.4 TYPE 2 DIABETES MELLITUS WITH DIABETIC POLYNEUROPATHY, WITH LONG-TERM CURRENT USE OF INSULIN: ICD-10-CM

## 2018-06-25 DIAGNOSIS — Z13.5 DIABETIC RETINOPATHY SCREENING: ICD-10-CM

## 2018-06-25 DIAGNOSIS — R00.2 POUNDING HEARTBEAT: ICD-10-CM

## 2018-06-25 PROCEDURE — 71046 X-RAY EXAM CHEST 2 VIEWS: CPT | Mod: 26,,, | Performed by: RADIOLOGY

## 2018-06-25 PROCEDURE — 99214 OFFICE O/P EST MOD 30 MIN: CPT | Mod: S$PBB,,, | Performed by: INTERNAL MEDICINE

## 2018-06-25 PROCEDURE — 93010 ELECTROCARDIOGRAM REPORT: CPT | Mod: ,,, | Performed by: INTERNAL MEDICINE

## 2018-06-25 PROCEDURE — 93005 ELECTROCARDIOGRAM TRACING: CPT | Mod: PBBFAC,PO | Performed by: INTERNAL MEDICINE

## 2018-06-25 PROCEDURE — 71046 X-RAY EXAM CHEST 2 VIEWS: CPT | Mod: TC,PO

## 2018-06-25 PROCEDURE — 99213 OFFICE O/P EST LOW 20 MIN: CPT | Mod: PBBFAC,25,PO | Performed by: INTERNAL MEDICINE

## 2018-06-25 PROCEDURE — 99999 PR PBB SHADOW E&M-EST. PATIENT-LVL III: CPT | Mod: PBBFAC,,, | Performed by: INTERNAL MEDICINE

## 2018-06-25 NOTE — PROGRESS NOTES
Subjective:       Patient ID: Gely Green is a 67 y.o. female.    Chief Complaint: Tachycardia and Arm Pain (left)  HISTORY OF PRESENT ILLNESS:  A 67-year-old black female patient whose PCP is Dr. Anderson, comes in with a history of six weeks of a rapid heartbeat that   occurs periodically and is frequently there.  She said it was present at the   time of the visit.  She can exercise for up to four hours in a program she has   the gym without any problems with either shortness of breath, chest pain, or   palpitations.  She has family history of father with coronary artery disease.    No other heart disease in the family.  She, however, has hypertension and   diabetes.  She was placed on amlodipine and developed ankle swelling, which went   away when she was taken off the amlodipine.    PHYSICAL EXAMINATION:  VITAL SIGNS:  Normal.  SKIN:  Fair and healthy.  NECK:  She has no neck vein distention.  No thyroid enlargement.  CHEST:  Clear.  HEART:  Normal.  She does not have palpitations at the time of this exam.    IMAGING:  We did an EKG on her, which shows a sinus bradycardia and is otherwise   normal.  I am getting lab and chest x-ray.  In addition, the lab is back   showing normal thyroid function.  Troponin is negative.  Glucose nonfasting 125   and her chemistries otherwise normal.  CBC also normal.  Chest x-ray showed   clear chest, normal heart size.    IMPRESSION:  1.  Hypertension, controlled.  2.  Diabetes, controlled.  3.  Pounding heart rate, etiology uncertain.  She does not feel this is anxiety,   so I am referring her to Cardiology for that problem and likely will need some   additional testing.      CARA/MARTIN  dd: 06/28/2018 00:34:33 (CDT)  td: 06/28/2018 19:55:59 (CDT)  Doc ID   #[id number]  Job ID #[id number]    CC:     Dict [id number]  HPI  Review of Systems   Constitutional: Negative.    HENT: Negative for congestion, hearing loss, sinus pressure, sneezing, sore throat and voice change.    Eyes:  Negative for visual disturbance.   Respiratory: Positive for chest tightness. Negative for cough and shortness of breath.    Cardiovascular: Positive for palpitations. Negative for chest pain and leg swelling.   Gastrointestinal: Negative.    Genitourinary: Negative for difficulty urinating, dysuria, flank pain, frequency, hematuria, menstrual problem, pelvic pain, urgency, vaginal bleeding and vaginal discharge.   Musculoskeletal: Negative.  Negative for neck pain and neck stiffness.   Skin: Negative.    Neurological: Negative.  Negative for dizziness, seizures, light-headedness, numbness and headaches.   Psychiatric/Behavioral: Negative for agitation, behavioral problems, confusion and sleep disturbance. The patient is not nervous/anxious.        Objective:      Physical Exam   Constitutional: She is oriented to person, place, and time. She appears well-developed and well-nourished.   Eyes: EOM are normal. Pupils are equal, round, and reactive to light.   Neck: Normal range of motion. Neck supple. No JVD present. Thyromegaly present.   Cardiovascular: Normal rate, regular rhythm, normal heart sounds and intact distal pulses.  Exam reveals no gallop.    No murmur heard.  Pulmonary/Chest: Breath sounds normal. She has no wheezes. She has no rales.   Abdominal: Soft. Bowel sounds are normal. She exhibits no mass. There is no tenderness.   Musculoskeletal: Normal range of motion.   Lymphadenopathy:     She has no cervical adenopathy.   Neurological: She is alert and oriented to person, place, and time. She has normal reflexes. No cranial nerve deficit.   Skin: No rash noted. No erythema.   Psychiatric: She has a normal mood and affect. Judgment normal.       Assessment:       1. Pounding heartbeat    2. Essential hypertension    3. Type 2 diabetes mellitus with diabetic neuropathy, with long-term current use of insulin    4. Diabetic retinopathy screening    5. Type 2 diabetes mellitus with diabetic polyneuropathy,  with long-term current use of insulin    6. Hyperlipidemia, unspecified hyperlipidemia type    7. Uncontrolled type 2 diabetes mellitus without complication, with long-term current use of insulin    8. Thyromegaly        Plan:

## 2018-06-26 ENCOUNTER — TELEPHONE (OUTPATIENT)
Dept: INTERNAL MEDICINE | Facility: CLINIC | Age: 68
End: 2018-06-26

## 2018-06-26 NOTE — TELEPHONE ENCOUNTER
----- Message from Oli Francis MD sent at 6/26/2018  8:02 AM CDT -----  Lab is all normal except for slight high glucose and CXR is clear

## 2018-06-28 ENCOUNTER — TELEPHONE (OUTPATIENT)
Dept: INTERNAL MEDICINE | Facility: CLINIC | Age: 68
End: 2018-06-28

## 2018-06-28 DIAGNOSIS — I10 ESSENTIAL HYPERTENSION: ICD-10-CM

## 2018-06-28 DIAGNOSIS — E11.40 TYPE 2 DIABETES MELLITUS WITH DIABETIC NEUROPATHY, WITH LONG-TERM CURRENT USE OF INSULIN: ICD-10-CM

## 2018-06-28 DIAGNOSIS — R00.2 POUNDING HEARTBEAT: Primary | ICD-10-CM

## 2018-06-28 DIAGNOSIS — Z79.4 TYPE 2 DIABETES MELLITUS WITH DIABETIC NEUROPATHY, WITH LONG-TERM CURRENT USE OF INSULIN: ICD-10-CM

## 2018-07-02 ENCOUNTER — TELEPHONE (OUTPATIENT)
Dept: NEUROLOGY | Facility: CLINIC | Age: 68
End: 2018-07-02

## 2018-07-02 DIAGNOSIS — R41.3 MEMORY LOSS: Primary | ICD-10-CM

## 2018-07-02 DIAGNOSIS — E11.42 DIABETIC PERIPHERAL NEUROPATHY: ICD-10-CM

## 2018-07-02 DIAGNOSIS — I10 ESSENTIAL HYPERTENSION: ICD-10-CM

## 2018-07-02 DIAGNOSIS — F43.23 ADJUSTMENT DISORDER WITH MIXED ANXIETY AND DEPRESSED MOOD: ICD-10-CM

## 2018-07-02 NOTE — TELEPHONE ENCOUNTER
----- Message from Ariana Jauregui sent at 7/2/2018  1:18 PM CDT -----  Contact: VAIBHAV JASMINE [[id number]]            Name of Who is Calling:VAIBHAV JASMINE [[id number]]    What is the request in detail: pt would like a call back regarding getting scheduled for memory testing. Please call     Can the clinic reply by [facility]: no      What Number to Call Back if not in [facility]: [phone]

## 2018-07-09 DIAGNOSIS — I10 HYPERTENSION, UNSPECIFIED TYPE: ICD-10-CM

## 2018-07-09 RX ORDER — VALSARTAN 320 MG/1
320 TABLET ORAL DAILY
Qty: 30 TABLET | Refills: 3 | Status: SHIPPED | OUTPATIENT
Start: 2018-07-09 | End: 2018-07-23 | Stop reason: ALTCHOICE

## 2018-07-09 NOTE — TELEPHONE ENCOUNTER
----- Message from Jayda Valera sent at 7/9/2018  4:18 PM CDT -----  Contact: VAIBHAV JASMINE [[id number]]  Can the clinic reply in [facility]: No      Please refill the medication(s) listed below. The patient can be reached at this phone number [phone] once it is called into the pharmacy.  Please send to the local pharmacy.     Medication #1  valsartan (DIOVAN) 320 MG tablet 30 tablet              [facility]   [place]

## 2018-07-13 DIAGNOSIS — E11.9 TYPE 2 DIABETES MELLITUS WITHOUT COMPLICATION: ICD-10-CM

## 2018-07-23 ENCOUNTER — OFFICE VISIT (OUTPATIENT)
Dept: CARDIOLOGY | Facility: CLINIC | Age: 68
End: 2018-07-23
Payer: MEDICARE

## 2018-07-23 VITALS
HEART RATE: 72 BPM | DIASTOLIC BLOOD PRESSURE: 90 MMHG | WEIGHT: 165.38 LBS | BODY MASS INDEX: 26.58 KG/M2 | SYSTOLIC BLOOD PRESSURE: 144 MMHG | HEIGHT: 66 IN

## 2018-07-23 DIAGNOSIS — E78.5 HYPERLIPIDEMIA, UNSPECIFIED HYPERLIPIDEMIA TYPE: ICD-10-CM

## 2018-07-23 DIAGNOSIS — E11.42 DIABETIC PERIPHERAL NEUROPATHY: ICD-10-CM

## 2018-07-23 DIAGNOSIS — R41.3 MEMORY LOSS: ICD-10-CM

## 2018-07-23 DIAGNOSIS — F43.23 ADJUSTMENT DISORDER WITH MIXED ANXIETY AND DEPRESSED MOOD: ICD-10-CM

## 2018-07-23 DIAGNOSIS — F41.9 ANXIETY: ICD-10-CM

## 2018-07-23 DIAGNOSIS — I10 ESSENTIAL HYPERTENSION: ICD-10-CM

## 2018-07-23 DIAGNOSIS — R00.2 POUNDING HEARTBEAT: Primary | ICD-10-CM

## 2018-07-23 PROBLEM — M54.2 NECK PAIN: Status: RESOLVED | Noted: 2017-12-05 | Resolved: 2018-07-23

## 2018-07-23 PROCEDURE — 99213 OFFICE O/P EST LOW 20 MIN: CPT | Mod: PBBFAC,PO | Performed by: INTERNAL MEDICINE

## 2018-07-23 PROCEDURE — 99999 PR PBB SHADOW E&M-EST. PATIENT-LVL III: CPT | Mod: PBBFAC,,, | Performed by: INTERNAL MEDICINE

## 2018-07-23 PROCEDURE — 99204 OFFICE O/P NEW MOD 45 MIN: CPT | Mod: S$PBB,,, | Performed by: INTERNAL MEDICINE

## 2018-07-23 RX ORDER — IRBESARTAN 150 MG/1
150 TABLET ORAL DAILY
Qty: 30 TABLET | Refills: 6 | Status: SHIPPED | OUTPATIENT
Start: 2018-07-23 | End: 2019-03-06 | Stop reason: SDUPTHER

## 2018-07-23 RX ORDER — IRBESARTAN 150 MG/1
150 TABLET ORAL DAILY
COMMUNITY
End: 2018-07-23 | Stop reason: SDUPTHER

## 2018-07-23 NOTE — LETTER
July 23, 2018      Oli Francis MD  [address]  [place] [address]           [employer] [place] - Cardiology  [address]  [place] [address]  Phone: [phone]          Patient: Gely Green   MR Number: [id number]   YOB: 1950   Date of Visit: 7/23/2018       Dear Dr. Oli Francis:    Thank you for referring Gely Green to me for evaluation. Attached you will find relevant portions of my assessment and plan of care.    If you have questions, please do not hesitate to call me. I look forward to following Gely Green along with you.    Sincerely,    Jessie Sanz MD    Enclosure  CC:  No Recipients    If you would like to receive this communication electronically, please contact [email or web address] or [phone] to request more information on [facility] Link access.    For providers and/or their staff who would like to refer a patient to [facility], please contact us through our one-stop-shop provider referral line, [facility], at [phone].    If you feel you have received this communication in error or would no longer like to receive these types of communications, please e-mail [email or web address]

## 2018-07-23 NOTE — PROGRESS NOTES
Subjective:   Patient ID:  Gely Green is a 67 y.o. female who presents for evaulation of Rapid Heartbeat      HPI: 2 months history of Pounding heart, not associated with any other symptoms. Patient is active and walks 3-4 miles a week, She does not keep up with proper hydration.  Today BP is slightly elevated, but patient did not take her BP pills for 2 days.  Stress echo done 6 months ago was negative for ischemia. ECG shows NSR with occasional PVC's    Past Medical History:   Diagnosis Date    Chronic constipation     Diabetes mellitus     Diabetes mellitus     History of thyroid cyst     Hypertension     Osteopenia        Past Surgical History:   Procedure Laterality Date     SECTION      2x    COLONOSCOPY      HYSTERECTOMY      full hyst        Social History     Social History    Marital status:      Spouse name: N/A    Number of children: N/A    Years of education: N/A     Social History Main Topics    Smoking status: Former Smoker    Smokeless tobacco: Never Used    Alcohol use No    Drug use: No    Sexual activity: Not Currently     Birth control/ protection: None     Other Topics Concern    None     Social History Narrative    None       Review of patient's allergies indicates:  No Known Allergies    Lab Results   Component Value Date     2018    K 4.2 2018     2018    CO2 25 2018    BUN 13 2018    CREATININE 0.8 2018     (H) 2018    HGBA1C 6.3 (H) 2018    AST 37 2018    ALT 32 2018    ALBUMIN 3.9 2018    PROT 7.5 2018    BILITOT 0.6 2018    WBC 6.41 2018    HGB 12.5 2018    HCT 40.0 2018    MCV 88 2018     2018    TSH 0.719 2018    CHOL 161 2017    HDL 56 2017    LDLCALC 94.0 2017    TRIG 55 2017       Review of Systems   Constitution: Negative.   HENT: Negative.    Eyes: Negative.    Cardiovascular:  "Positive for irregular heartbeat and palpitations. Negative for chest pain, claudication, dyspnea on exertion, leg swelling, near-syncope and syncope.   Respiratory: Negative.  Negative for cough, shortness of breath, snoring and wheezing.    Endocrine: Negative.  Negative for cold intolerance, heat intolerance, polydipsia, polyphagia and polyuria.   Skin: Negative.    Musculoskeletal: Negative.    Gastrointestinal: Positive for heartburn.   Genitourinary: Negative.    Neurological: Positive for headaches, numbness and paresthesias.   Psychiatric/Behavioral: Negative.        Objective:   Physical Exam   Constitutional: She is oriented to person, place, and time. She appears well-developed and well-nourished.   BP (!) 144/90   Pulse 72   Ht 5' 6" (1.676 m)   Wt 75 kg (165 lb 5.5 oz)   BMI 26.69 kg/m²      HENT:   Head: Normocephalic.   Eyes: Pupils are equal, round, and reactive to light.   Neck: Normal range of motion. Neck supple. Carotid bruit is not present. No thyromegaly present.   Cardiovascular: Normal rate, regular rhythm, normal heart sounds and intact distal pulses.  Exam reveals no gallop and no friction rub.    No murmur heard.  Pulses:       Carotid pulses are 2+ on the right side, and 2+ on the left side.       Radial pulses are 2+ on the right side, and 2+ on the left side.        Femoral pulses are 2+ on the right side, and 2+ on the left side.       Popliteal pulses are 2+ on the right side, and 2+ on the left side.        Dorsalis pedis pulses are 2+ on the right side, and 2+ on the left side.        Posterior tibial pulses are 2+ on the right side, and 2+ on the left side.   Pulmonary/Chest: Effort normal and breath sounds normal. No respiratory distress. She has no wheezes. She has no rales. She exhibits no tenderness.   Abdominal: Soft. Bowel sounds are normal.   Musculoskeletal: Normal range of motion. She exhibits no edema.   Neurological: She is alert and oriented to person, place, and " "time.   Skin: Skin is warm and dry.   Psychiatric: She has a normal mood and affect.   Nursing note and vitals reviewed.      Current Outpatient Prescriptions   Medication Sig    ACCU-CHEK FASTCLIX Misc TEST twice a day    ACCU-CHEK SMARTVIEW TEST STRIP Strp ACCU-CHEK SMARTVIEW TEST STRIP Strp,    aspirin (ECOTRIN) 81 MG EC tablet Take 81 mg by mouth once daily.    blood sugar diagnostic Strp 1 strip by Misc.(Non-Drug; Combo Route) route 3 (three) times daily as needed. Lifescan test strips    calcium carbonate (OS-REDD) 600 mg calcium (1,500 mg) Tab Take 600 mg by mouth 2 (two) times daily with meals.    diabetic supplies, miscellan. Kit 1 application by Misc.(Non-Drug; Combo Route) route once daily. Lifescan glucose meter    flavoxate (URISPAS) 100 mg Tab Take 1 tablet (100 mg total) by mouth 3 (three) times daily as needed.    gabapentin (NEURONTIN) 100 MG capsule Take 1 capsule (100 mg total) by mouth 3 (three) times daily. (Patient taking differently: Take 100 mg by mouth 3 (three) times daily. As needed)    ibuprofen (ADVIL,MOTRIN) 200 MG tablet Take 200 mg by mouth every 6 (six) hours as needed for Pain.    irbesartan (AVAPRO) 150 MG tablet Take 1 tablet (150 mg total) by mouth once daily.    Lactobacillus rhamnosus GG (CULTURELLE) 10 billion cell capsule Take 1 capsule by mouth once daily.    LINZESS 145 mcg Cap capsule 145 mcg once daily. As needed    meloxicam (MOBIC) 15 MG tablet Take 15 mg by mouth once daily. As needed    metFORMIN (GLUCOPHAGE-XR) 500 MG 24 hr tablet take 1 tablet by mouth twice a day    NOVOFINE 32 32 gauge x 1/4" Ndle use subcutaneously every evening    ONETOUCH ULTRA TEST Strp TEST three times a day    pen needle, diabetic (NOVOFINE PLUS) 32 gauge x 1/6" Ndle Inject 1 application into the skin every evening.    rosuvastatin (CRESTOR) 10 MG tablet take 1 tablet by mouth once daily    TOUJEO SOLOSTAR U-300 INSULIN 300 unit/mL (1.5 mL) InPn pen INJECT 33 UNITS UNDER " THE SKIN EVERY EVENING    vitamin D 1000 units Tab Take 1,000 Units by mouth once daily.     No current facility-administered medications for this visit.        Assessment and Plan:     Problem List Items Addressed This Visit        Cardiology Problems    Essential hypertension    Relevant Medications    irbesartan (AVAPRO) 150 MG tablet    Other Relevant Orders    Holter monitor - 24 hour    Hyperlipidemia    Relevant Medications    irbesartan (AVAPRO) 150 MG tablet    Other Relevant Orders    Holter monitor - 24 hour       Other    Anxiety    Adjustment disorder with mixed anxiety and depressed mood    Diabetic peripheral neuropathy    Memory loss    Pounding heartbeat - Primary    Relevant Medications    irbesartan (AVAPRO) 150 MG tablet    Other Relevant Orders    Holter monitor - 24 hour          Patient's Medications   New Prescriptions    No medications on file   Previous Medications    ACCU-CHEK FASTCLIX MISC    TEST twice a day    ACCU-CHEK SMARTVIEW TEST STRIP STRP    ACCU-CHEK SMARTVIEW TEST STRIP Strp,    ASPIRIN (ECOTRIN) 81 MG EC TABLET    Take 81 mg by mouth once daily.    BLOOD SUGAR DIAGNOSTIC STRP    1 strip by Misc.(Non-Drug; Combo Route) route 3 (three) times daily as needed. Lifescan test strips    CALCIUM CARBONATE (OS-REDD) 600 MG CALCIUM (1,500 MG) TAB    Take 600 mg by mouth 2 (two) times daily with meals.    DIABETIC SUPPLIES, MISCELLAN. KIT    1 application by Misc.(Non-Drug; Combo Route) route once daily. Lifescan glucose meter    FLAVOXATE (URISPAS) 100 MG TAB    Take 1 tablet (100 mg total) by mouth 3 (three) times daily as needed.    GABAPENTIN (NEURONTIN) 100 MG CAPSULE    Take 1 capsule (100 mg total) by mouth 3 (three) times daily.    IBUPROFEN (ADVIL,MOTRIN) 200 MG TABLET    Take 200 mg by mouth every 6 (six) hours as needed for Pain.    LACTOBACILLUS RHAMNOSUS GG (CULTURELLE) 10 BILLION CELL CAPSULE    Take 1 capsule by mouth once daily.    LINZESS 145 MCG CAP CAPSULE    145 mcg  "once daily. As needed    MELOXICAM (MOBIC) 15 MG TABLET    Take 15 mg by mouth once daily. As needed    METFORMIN (GLUCOPHAGE-XR) 500 MG 24 HR TABLET    take 1 tablet by mouth twice a day    NOVOFINE 32 32 GAUGE X 1/4" NDLE    use subcutaneously every evening    ONETOUCH ULTRA TEST STRP    TEST three times a day    PEN NEEDLE, DIABETIC (NOVOFINE PLUS) 32 GAUGE X 1/6" NDLE    Inject 1 application into the skin every evening.    ROSUVASTATIN (CRESTOR) 10 MG TABLET    take 1 tablet by mouth once daily    TOUJEO SOLOSTAR U-300 INSULIN 300 UNIT/ML (1.5 ML) INPN PEN    INJECT 33 UNITS UNDER THE SKIN EVERY EVENING    VITAMIN D 1000 UNITS TAB    Take 1,000 Units by mouth once daily.   Modified Medications    Modified Medication Previous Medication    IRBESARTAN (AVAPRO) 150 MG TABLET irbesartan (AVAPRO) 150 MG tablet       Take 1 tablet (150 mg total) by mouth once daily.    Take 150 mg by mouth once daily.   Discontinued Medications    VALSARTAN (DIOVAN) 320 MG TABLET    Take 1 tablet (320 mg total) by mouth once daily.   Change Valsartan to Avapro 150 mg daily and monitor BP  Compliance with mediaitons and proper hydration encouraged.  Review Holter and advice  If OK follow up with PCP and cardiology as needed                "

## 2018-07-24 ENCOUNTER — CLINICAL SUPPORT (OUTPATIENT)
Dept: CARDIOLOGY | Facility: CLINIC | Age: 68
End: 2018-07-24
Attending: INTERNAL MEDICINE
Payer: MEDICARE

## 2018-07-24 PROCEDURE — 93227 XTRNL ECG REC<48 HR R&I: CPT | Mod: S$PBB,,, | Performed by: INTERNAL MEDICINE

## 2018-07-24 PROCEDURE — 93226 XTRNL ECG REC<48 HR SCAN A/R: CPT | Mod: PBBFAC,PO | Performed by: INTERNAL MEDICINE

## 2018-07-26 ENCOUNTER — TELEPHONE (OUTPATIENT)
Dept: CARDIOLOGY | Facility: CLINIC | Age: 68
End: 2018-07-26

## 2018-07-26 NOTE — TELEPHONE ENCOUNTER
Pt notified, verbalized understanding. Pt stated that she will let us know how she is feeling and if she wants to take a beta blocker.

## 2018-08-01 ENCOUNTER — HOSPITAL ENCOUNTER (OUTPATIENT)
Dept: RADIOLOGY | Facility: HOSPITAL | Age: 68
Discharge: HOME | End: 2018-08-01
Attending: FAMILY MEDICINE
Payer: MEDICARE

## 2018-08-01 DIAGNOSIS — Z12.31 VISIT FOR SCREENING MAMMOGRAM: ICD-10-CM

## 2018-08-01 PROCEDURE — 77067 SCR MAMMO BI INCL CAD: CPT | Mod: 26,,, | Performed by: RADIOLOGY

## 2018-08-01 PROCEDURE — 77063 BREAST TOMOSYNTHESIS BI: CPT | Mod: TC

## 2018-08-01 PROCEDURE — 77063 BREAST TOMOSYNTHESIS BI: CPT | Mod: 26,,, | Performed by: RADIOLOGY

## 2018-08-08 ENCOUNTER — OFFICE VISIT (OUTPATIENT)
Dept: INTERNAL MEDICINE | Facility: CLINIC | Age: 68
End: 2018-08-08
Payer: MEDICARE

## 2018-08-08 VITALS
TEMPERATURE: 99 F | BODY MASS INDEX: 26.22 KG/M2 | HEART RATE: 57 BPM | HEIGHT: 66 IN | SYSTOLIC BLOOD PRESSURE: 133 MMHG | DIASTOLIC BLOOD PRESSURE: 83 MMHG | WEIGHT: 163.13 LBS | OXYGEN SATURATION: 98 %

## 2018-08-08 DIAGNOSIS — J01.40 ACUTE PANSINUSITIS, RECURRENCE NOT SPECIFIED: Primary | ICD-10-CM

## 2018-08-08 PROCEDURE — 99214 OFFICE O/P EST MOD 30 MIN: CPT | Mod: S$PBB,,, | Performed by: FAMILY MEDICINE

## 2018-08-08 PROCEDURE — 99999 PR PBB SHADOW E&M-EST. PATIENT-LVL III: CPT | Mod: PBBFAC,,, | Performed by: FAMILY MEDICINE

## 2018-08-08 PROCEDURE — 99213 OFFICE O/P EST LOW 20 MIN: CPT | Mod: PBBFAC,PO | Performed by: FAMILY MEDICINE

## 2018-08-08 RX ORDER — BENZONATATE 200 MG/1
200 CAPSULE ORAL 3 TIMES DAILY PRN
Qty: 30 CAPSULE | Refills: 0 | Status: SHIPPED | OUTPATIENT
Start: 2018-08-08 | End: 2018-08-18

## 2018-08-08 RX ORDER — AMOXICILLIN AND CLAVULANATE POTASSIUM 875; 125 MG/1; MG/1
1 TABLET, FILM COATED ORAL EVERY 12 HOURS
Qty: 20 TABLET | Refills: 0 | Status: SHIPPED | OUTPATIENT
Start: 2018-08-08 | End: 2018-08-18

## 2018-08-08 RX ORDER — FLUTICASONE PROPIONATE 50 MCG
2 SPRAY, SUSPENSION (ML) NASAL DAILY
Qty: 16 G | Refills: 11 | Status: SHIPPED | OUTPATIENT
Start: 2018-08-08 | End: 2018-09-07

## 2018-08-08 NOTE — PROGRESS NOTES
Subjective:       Patient ID: Gely Green is a 67 y.o. female.    Chief Complaint: Cough; Sore Throat; Otalgia; and Nasal Congestion    HPI  67-year-old  female presents to clinic today secondary to a complaint of night sweats, nasal congestion, ear pain, postnasal drip, runny nose, sinus pressure, sore throat, chest congestion, and dry cough worsening for the past 8 days.  She has been using TheraFlu, NyQuil, Motrin, and Tylenol without relief.  Review of Systems   Constitutional: Positive for diaphoresis. Negative for appetite change, chills, fatigue and fever.   HENT: Positive for congestion, ear pain (right ear), postnasal drip, rhinorrhea, sinus pressure and sore throat. Negative for hearing loss and tinnitus.    Eyes: Negative for redness, itching and visual disturbance.   Respiratory: Positive for cough and chest tightness. Negative for shortness of breath.    Cardiovascular: Negative for chest pain and palpitations.   Gastrointestinal: Negative for abdominal pain, constipation, diarrhea, nausea and vomiting.   Genitourinary: Negative for decreased urine volume, difficulty urinating, dysuria, frequency, hematuria and urgency.   Musculoskeletal: Negative for back pain, myalgias, neck pain and neck stiffness.   Skin: Negative for rash.   Neurological: Negative for dizziness, light-headedness and headaches.   Psychiatric/Behavioral: Negative.        Objective:      Physical Exam   Constitutional: She is oriented to person, place, and time. She appears well-developed and well-nourished. No distress.   HENT:   Head: Normocephalic and atraumatic.   Right Ear: External ear normal.   Left Ear: External ear normal.   Nose: Nose normal.   Mouth/Throat: Oropharynx is clear and moist. No oropharyngeal exudate.   Eyes: Conjunctivae and EOM are normal. Pupils are equal, round, and reactive to light. Right eye exhibits no discharge. Left eye exhibits no discharge. No scleral icterus.   Neck: Normal  range of motion. Neck supple. No JVD present. No tracheal deviation present. No thyromegaly present.   Cardiovascular: Normal rate, regular rhythm, normal heart sounds and intact distal pulses.  Exam reveals no gallop and no friction rub.    No murmur heard.  Pulmonary/Chest: Effort normal and breath sounds normal. No stridor. No respiratory distress. She has no wheezes. She has no rales.   Abdominal: Soft. Bowel sounds are normal. She exhibits no distension and no mass. There is no tenderness. There is no rebound and no guarding.   Musculoskeletal: Normal range of motion. She exhibits no edema or tenderness.   Lymphadenopathy:     She has no cervical adenopathy.   Neurological: She is alert and oriented to person, place, and time.   Skin: Skin is warm and dry. No rash noted. She is not diaphoretic. No erythema. No pallor.   Psychiatric: She has a normal mood and affect. Her behavior is normal. Judgment and thought content normal.   Nursing note and vitals reviewed.      Assessment:       1. Acute pansinusitis, recurrence not specified        Plan:       Acute pansinusitis, recurrence not specified  -     amoxicillin-clavulanate 875-125mg (AUGMENTIN) 875-125 mg per tablet; Take 1 tablet by mouth every 12 (twelve) hours. for 10 days  Dispense: 20 tablet; Refill: 0  -     benzonatate (TESSALON) 200 MG capsule; Take 1 capsule (200 mg total) by mouth 3 (three) times daily as needed for Cough.  Dispense: 30 capsule; Refill: 0  -     fluticasone (FLONASE) 50 mcg/actuation nasal spray; 2 sprays (100 mcg total) by Each Nare route once daily.  Dispense: 16 g; Refill: 11      Tylenol and ibuprofen as needed for fever or pain.  Saltwater or Listerine gargle as needed for sore throat.  Over-the-counter Claritin nightly.  Return to clinic as needed if symptoms persist or worsen.

## 2018-09-04 ENCOUNTER — TELEPHONE (OUTPATIENT)
Dept: NEUROLOGY | Facility: CLINIC | Age: 68
End: 2018-09-04

## 2018-09-06 ENCOUNTER — OFFICE VISIT (OUTPATIENT)
Dept: UROGYNECOLOGY | Facility: CLINIC | Age: 68
End: 2018-09-06
Payer: MEDICARE

## 2018-09-06 VITALS
HEIGHT: 66 IN | WEIGHT: 169.56 LBS | SYSTOLIC BLOOD PRESSURE: 120 MMHG | DIASTOLIC BLOOD PRESSURE: 80 MMHG | BODY MASS INDEX: 27.25 KG/M2

## 2018-09-06 DIAGNOSIS — N95.1 MENOPAUSAL SYMPTOMS: ICD-10-CM

## 2018-09-06 DIAGNOSIS — Z87.19 HX OF CONSTIPATION: ICD-10-CM

## 2018-09-06 DIAGNOSIS — N30.10 INTERSTITIAL CYSTITIS: Primary | ICD-10-CM

## 2018-09-06 DIAGNOSIS — N95.2 VAGINAL ATROPHY: ICD-10-CM

## 2018-09-06 PROCEDURE — 99214 OFFICE O/P EST MOD 30 MIN: CPT | Mod: S$PBB,,, | Performed by: NURSE PRACTITIONER

## 2018-09-06 PROCEDURE — 99214 OFFICE O/P EST MOD 30 MIN: CPT | Mod: PBBFAC | Performed by: NURSE PRACTITIONER

## 2018-09-06 PROCEDURE — 99999 PR PBB SHADOW E&M-EST. PATIENT-LVL IV: CPT | Mod: PBBFAC,,, | Performed by: NURSE PRACTITIONER

## 2018-09-06 RX ORDER — VENLAFAXINE HYDROCHLORIDE 37.5 MG/1
37.5 CAPSULE, EXTENDED RELEASE ORAL DAILY
Qty: 30 CAPSULE | Refills: 11 | Status: SHIPPED | OUTPATIENT
Start: 2018-09-06 | End: 2019-01-17

## 2018-09-06 RX ORDER — FLAVOXATE HYDROCHLORIDE 100 MG/1
100 TABLET ORAL 3 TIMES DAILY PRN
Qty: 90 TABLET | Refills: 11 | Status: SHIPPED | OUTPATIENT
Start: 2018-09-06 | End: 2019-08-14 | Stop reason: SDUPTHER

## 2018-09-06 NOTE — PROGRESS NOTES
"SUBJECTIVE:   67 y.o. female for follow up of IC    Past Medical History:   Diagnosis Date    Chronic constipation     Diabetes mellitus     Diabetes mellitus     History of thyroid cyst     Hypertension     Osteopenia      Past Surgical History:   Procedure Laterality Date     SECTION      2x    COLONOSCOPY      HYSTERECTOMY      full hyst          Current Outpatient Medications   Medication Sig Dispense Refill    ACCU-CHEK FASTCLIX Misc TEST twice a day  0    ACCU-CHEK SMARTVIEW TEST STRIP Strp ACCU-CHEK SMARTVIEW TEST STRIP Strp, 120 strip 11    aspirin (ECOTRIN) 81 MG EC tablet Take 81 mg by mouth once daily.      blood sugar diagnostic Strp 1 strip by Misc.(Non-Drug; Combo Route) route 3 (three) times daily as needed. Lifescan test strips 120 each 4    calcium carbonate (OS-REDD) 600 mg calcium (1,500 mg) Tab Take 600 mg by mouth 2 (two) times daily with meals.      flavoxATE (URISPAS) 100 mg Tab Take 1 tablet (100 mg total) by mouth 3 (three) times daily as needed. 90 tablet 11    gabapentin (NEURONTIN) 100 MG capsule Take 1 capsule (100 mg total) by mouth 3 (three) times daily. (Patient taking differently: Take 100 mg by mouth 3 (three) times daily. As needed) 90 capsule 1    ibuprofen (ADVIL,MOTRIN) 200 MG tablet Take 200 mg by mouth every 6 (six) hours as needed for Pain.      irbesartan (AVAPRO) 150 MG tablet Take 1 tablet (150 mg total) by mouth once daily. 30 tablet 6    Lactobacillus rhamnosus GG (CULTURELLE) 10 billion cell capsule Take 1 capsule by mouth once daily.      LINZESS 145 mcg Cap capsule 145 mcg once daily. As needed  0    meloxicam (MOBIC) 15 MG tablet Take 15 mg by mouth once daily. As needed      metFORMIN (GLUCOPHAGE-XR) 500 MG 24 hr tablet take 1 tablet by mouth twice a day 180 tablet 3    NOVOFINE 32 32 gauge x 1/4" Ndle use subcutaneously every evening 100 each 11    ONETOUCH ULTRA TEST Strp TEST three times a day 100 strip 4    pen needle, " "diabetic (NOVOFINE PLUS) 32 gauge x 1/6" Ndle Inject 1 application into the skin every evening. 100 each 11    rosuvastatin (CRESTOR) 10 MG tablet take 1 tablet by mouth once daily 90 tablet 1    TOUJEO SOLOSTAR U-300 INSULIN 300 unit/mL (1.5 mL) InPn pen INJECT 33 UNITS UNDER THE SKIN EVERY EVENING 13.5 mL 3    vitamin D 1000 units Tab Take 1,000 Units by mouth once daily.      diabetic supplies, miscellan. Kit 1 application by Misc.(Non-Drug; Combo Route) route once daily. Lifescan glucose meter 1 kit 0    venlafaxine (EFFEXOR-XR) 37.5 MG 24 hr capsule Take 1 capsule (37.5 mg total) by mouth once daily. 30 capsule 11     No current facility-administered medications for this visit.      Allergies: Patient has no known allergies.   LMP 1999- patient has had a hysterectomy- total    Well Woman:  Last pap: denies h/o abn paps- post hysterectomy- no further screening  Last mammogram: 08/2018--normal  Colonoscopy: ~4-5yrs ago (normal) per patient ([facility])  DEXA: 06/2016 Osteopenia of both femoral necks.  Normal bone mineral density of the lumbar spine.        ROS:  Feeling well.   No dyspnea or chest pain on exertion.    No abdominal pain, change in bowel habits, black or bloody stools. +constipation  Rare IC flare--last one two months ago.  Approximately 5-6/year  Will use flavoxate if troublesome  GYN ROS: no breast pain or new or enlarging lumps on self exam, no vaginal bleeding or discharge.  No neurological complaints.        OBJECTIVE:   The patient appears well, alert, oriented x 3, in no distress.  /80 (BP Location: Left arm, Patient Position: Sitting)   Ht 5' 6" (1.676 m)   Wt 76.9 kg (169 lb 8.5 oz)   BMI 27.36 kg/m²   ENT normal.    Neck supple. No adenopathy or thyromegaly.   MILVIA.   Lungs are clear, good air entry, no wheezes, rhonchi or rales.   S1 and S2 normal, no murmurs, regular rate and rhythm.   Abdomen soft without tenderness, guarding, mass or organomegaly.   Extremities show no " edema, normal peripheral pulses.   Neurological is normal, no focal findings.      BREAST EXAM: breasts appear normal, no suspicious masses, no skin or nipple changes or axillary nodes, symmetric fibrous changes in both upper outer quadrants      PELVIC EXAM:  VULVA: normal appearing vulva with no masses, tenderness or lesions,  VAGINA: normal appearing vagina with normal color and discharge, no lesions, atrophic  CERVIX: surgically absent,   UTERUS: surgically absent, vaginal cuff well healed,  ADNEXA: no masses,   RECTAL: normal rectal, no masses    1. Interstitial cystitis  flavoxATE (URISPAS) 100 mg Tab   2. Menopausal symptoms  venlafaxine (EFFEXOR-XR) 37.5 MG 24 hr capsule   3. Vaginal atrophy     4. Hx of constipation             PLAN:   1) Hx Incomplete emptying: improved   --CONTROL DIABETES-  -- Continue to do timed voidings q 2-3 hrs and take time to void, leaning forward at end of stream and voiding again.  --follow up with PCP as planned    2) Interstitial cystitis:  --avoid dietary irritants (see list)  --flares: Continue flavoxate 1 pill every 8 hours as needed; consider anticholinergic if symptoms increase  --empty bladder every 3 hours; lean forward on toilet and help last bit out.     3) Anxiety:  --under control at this time.  No longer taking paxil.  --continue grief counselling  --important to control IC symptoms    4) Vaginal atrophy (dryness) Use REPLENS OTC: 1/2 applicator full in vagina twice a week.     5) Constipation: Continue to take metamucil - may add stool softeners.    6) menopausal symptoms  --seem worse over the past year  --trial of effexor 37.5 mg daily    7)RTC 1 year for annual

## 2018-09-06 NOTE — PATIENT INSTRUCTIONS
1) Hx Incomplete emptying: improved   --CONTROL DIABETES-  -- Continue to do timed voidings q 2-3 hrs and take time to void, leaning forward at end of stream and voiding again.  --follow up with PCP as planned    2) Interstitial cystitis:  --avoid dietary irritants (see list)  --flares: Continue flavoxate 1 pill every 8 hours as needed; consider anticholinergic if symptoms increase  --empty bladder every 3 hours; lean forward on toilet and help last bit out.     3) Anxiety:  --under control at this time.  No longer taking paxil.  --continue grief counselling  --important to control IC symptoms    4) Vaginal atrophy (dryness) Use REPLENS OTC: 1/2 applicator full in vagina twice a week.     5) Constipation: Continue to take metamucil - may add stool softeners.    6) menopausal symptoms  --seem worse over the past year  --trial of effexor 37.5 mg daily    7)RTC 1 year for annual

## 2018-09-10 ENCOUNTER — INITIAL CONSULT (OUTPATIENT)
Dept: NEUROLOGY | Facility: CLINIC | Age: 68
End: 2018-09-10
Payer: MEDICARE

## 2018-09-10 DIAGNOSIS — R41.3 MEMORY LOSS: ICD-10-CM

## 2018-09-10 DIAGNOSIS — F34.1 PERSISTENT DEPRESSIVE DISORDER WITH MELANCHOLIC FEATURES, CURRENTLY MODERATE: ICD-10-CM

## 2018-09-10 PROCEDURE — 99499 UNLISTED E&M SERVICE: CPT | Mod: S$PBB,,, | Performed by: PSYCHIATRY & NEUROLOGY

## 2018-09-10 PROCEDURE — 96118 PR NEUROPSYCH TESTING BY PSYCH/PHYS: CPT | Mod: S$PBB,,, | Performed by: PSYCHIATRY & NEUROLOGY

## 2018-09-10 PROCEDURE — 90791 PSYCH DIAGNOSTIC EVALUATION: CPT | Mod: S$PBB,,, | Performed by: PSYCHIATRY & NEUROLOGY

## 2018-09-10 PROCEDURE — 90791 PSYCH DIAGNOSTIC EVALUATION: CPT | Mod: PBBFAC | Performed by: PSYCHIATRY & NEUROLOGY

## 2018-09-10 PROCEDURE — 96119 PR NEUROPSYCH TESTING BY TECHNICIAN: CPT | Mod: PBBFAC | Performed by: PSYCHIATRY & NEUROLOGY

## 2018-09-10 PROCEDURE — 96119 PR NEUROPSYCH TESTING BY TECHNICIAN: CPT | Mod: 59,S$PBB,, | Performed by: PSYCHIATRY & NEUROLOGY

## 2018-09-10 PROCEDURE — 96118 PR NEUROPSYCH TESTING BY PSYCH/PHYS: CPT | Mod: PBBFAC | Performed by: PSYCHIATRY & NEUROLOGY

## 2018-09-13 ENCOUNTER — OFFICE VISIT (OUTPATIENT)
Dept: NEUROLOGY | Facility: CLINIC | Age: 68
End: 2018-09-13
Payer: MEDICARE

## 2018-09-13 DIAGNOSIS — F34.1 PERSISTENT DEPRESSIVE DISORDER WITH MELANCHOLIC FEATURES, CURRENTLY MODERATE: ICD-10-CM

## 2018-09-13 DIAGNOSIS — R41.3 MEMORY LOSS: Primary | ICD-10-CM

## 2018-09-13 PROCEDURE — 99499 UNLISTED E&M SERVICE: CPT | Mod: S$PBB,,, | Performed by: PSYCHIATRY & NEUROLOGY

## 2018-09-13 NOTE — PROGRESS NOTES
NEUROPSYCHOLOGICAL EVALUATION - CONFIDENTIAL  FEEDBACK NOTE    On 9/13/18, I provided Ms. Gely Green the results of her neuropsychological evaluation. Please see her full report for a comprehensive overview of the findings. She was provided a copy of the report and invited to call with additional questions.      Flavio Zuniga Psy.D., ABPP  Board Certified in Clinical Neuropsychology  [facility] - Department of Neurology

## 2018-09-13 NOTE — PROGRESS NOTES
NEUROPSYCHOLOGICAL EVALUATION - CONFIDENTIAL    REFERRAL SOURCE: Jason Garcia MD  MEDICAL NECESSITY:  Evaluate cognitive functioning in the setting of self-reported cognitive complaints.   DATE CONDUCTED: 9/10/18    SOURCES OF INFORMATION:  The following was gathered from a clinical interview with Ms. Gely Green and review of the available medical records. Ms. Green expressed an understanding of the purpose of the evaluation and consented to all procedures. Total licensed billing psychologists professional time including clinical interview, test administration and interpretation of tests administered by the billing psychologist, integration of test results and other clinical data, preparing the final report, and personally reporting results to the patient   37287 - 1 hour  46227 - 3 hours  [id number] - 2 hours    HISTORY OF PRESENT ILLNESS: Ms. Gely Green is a 67-year-old, right-handed, -American female with 16 years of education who was referred for a neuropsychological evaluation in the setting of self-reported memory complaints over the past 2 years. She is especially concerned about her cognition given her mothers history of Alzheimers disease, noting that she is praying that she does not have AD. She primarily described changes in memory which, at this point, are more frustrating than functional debilitating. She can misplace items, describing a recent instance when she couldnt find her debit card, found it, put it in another location in her wallet, and then again couldnt find it while at the store, forgetting that she had put it in another location. She will inevitably forget one or two tasks to accomplish on a given day. She is keeping more notes to herself as a result, but she can forget where she wrote the notes as she does not have a centralized location (such as one notepad) for note taking. Ms. Green recalled going to an appointment with her sister who was administered a  brief mental status examination with 3-word memory trial. While the test was administered to her sister, she realized that she could not recall all of the 3 words shortly after the appointment.     IADLS/DAILY FUNCTIONING: Resides with her . She is a very active individual, leaving the house in the morning and returning in the late afternoon/early evening. She stays physically and socially active, going to the gym, park, and participates in senior activities.   Medication Compliance: She reported mildly inconsistent medication compliance because she does not like taking medications. For instance, she is likely to skip one of two daily doses of metformin or miss an insulin injection. She keeps a pillbox on her person.   Appointment Management: She keeps a calendar on her person.   Financial Management: She manages and has an effective system, although motivation can sometimes be an issue.    Cooking: Rarely.   Driving: No problems or limitations.      MEDICAL HISTORY: HLD, DMII, arthritis in knee. No problems with sleep initiation, typically falling asleep around 10/1030am. Good maintenance for the most part, although she wakes up around 4am and is unable to fall back asleep once every few weeks. She typically awakens between 6-7am. She never naps (wasteful time).      NEUROIMAGIN2017 Head CT: No acute intracranial abnormality.    SUBSTANCE USE: Smoked cigarettes from 19 years old until she was diagnosed with DMII in her late 40s. She does not drink alcohol. No illicit drug us. No history of substance abuse.     CURRENT MEDICATIONS:    ACCU-CHEK FASTCLIX Misc    ACCU-CHEK SMARTVIEW TEST STRIP Strp    aspirin (ECOTRIN) 81 MG EC tablet    blood sugar diagnostic Strp    calcium carbonate (OS-REDD) 600 mg calcium (1,500 mg) Tab     flavoxATE (URISPAS) 100 mg Tab    gabapentin (NEURONTIN) 100 MG capsule    ibuprofen (ADVIL,MOTRIN) 200 MG tablet    irbesartan (AVAPRO) 150 MG tablet    Lactobacillus  "rhamnosus GG (CULTURELLE) 10 billion cell capsule    LINZESS 145 mcg Cap capsule    meloxicam (MOBIC) 15 MG tablet    metFORMIN (GLUCOPHAGE-XR) 500 MG 24 hr tablet    NOVOFINE 32 32 gauge x 1/4" Ndle    ONETOUCH ULTRA TEST Strp    pen needle, diabetic (NOVOFINE PLUS) 32 gauge x 1/6" Ndle    rosuvastatin (CRESTOR) 10 MG tablet    TOUJEO SOLOSTAR U-300 INSULIN 300 unit/mL (1.5 mL) InPn pen    venlafaxine (EFFEXOR-XR) 37.5 MG 24 hr capsule    vitamin D 1000 units Tab     PSYCHIATRIC HISTORY: Ms. Green described herself as an individual who always wants to be right and who is most comfortable when she is in control. 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 She struggles with her own medical diagnoses and does not like taking medication. She described herself as a vibrant, competent individual who is fearful of illness/disability as it would not only impact her quality of life, but significantly impact her mood.     Ms. Mendez son underwent surgery for a brain tumor in  and  on 2014. The present evaluation occurred only 4 days after this anniversary. While this time of year has been difficult, she noted that every day has been a struggle since his passing. He is typically the first thing she thinks about in the morning and the last thing she thinks about before falling asleep. She keeps herself busy during the day, almost to the point of exhaustion, in order to avoid downtime. She began seeing a psychiatrist when her son was first diagnosed and was taking Paxil for several years, discontinuing in 2017. She was recently prescribed venlafaxine for hot flashes, but she plans to take the medication to see if it helps with her mood. Her marriage has gradually improved since her sons passing as she and her  were distant for a period " of time.     Ms. Green recently became re-engaged with [facility], a group for parents who have lost a child. The group convenes once per month. She previously attended grief counseling, but feels that [facility] offers a more specific understanding of her grief. She returned to the group after experiencing a decline in her mood, noticing that she ruminates about her son and is frequently tearful. Ms. Green feels that her declining mood is due to a variety of factors, including the loss of her son, the aging process, and fears of cognitive decline. She has started periodically fantasizing about suicide, thinking of death as a relief. She has envisioned herself driving her car off a bridge, especially as of late. She denied any suicidal gestures. She endorsed her daughter, grandson, and  as protective factors and denied active suicidal ideation, plan, or intent, but admits that her recent thoughts have been worrisome.     FAMILY HISTORY: Mother diagnosed with Alzheimers disease,  at age 86. Son  from brain cancer.     PSYCHOSOCIAL HISTORY: Bachelors degree from [employer] in business administration. Described herself as a strong student. She worked for the [employer] after 35 years of service. 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 Retired in  after her son became ill.  for 40 years. 2 children.     BEHAVIORAL OBSERVATIONS: Ms. Green arrived on time for the evaluation and was unaccompanied. She was well dressed and groomed. No motor or sensory problems were noted that would impact the evaluation. Speech was of normal rate, volume, and prosody. Expressive and receptive language were grossly intact. She appeared to be a reliable historian, providing years/dates of specific events. She also provided specific details regarding her instances of memory loss. She freely recalled most of her medications from memory. Ms. Green  became tearful when discussing her son. She had no difficulty understanding or retaining test instructions. She had a disorganized approach to many tests.     TEST RESULTS: The test scores are also included in an appendix to this report.      Mental Status: Fully oriented to time and place. She scored 24/30 on the MoCA. She encoded 5/5 words after 2 trials, freely recalling 0/5 following a brief delay. She did not benefit from categorical cueing, but correctly identified 4/5 words with multiple choice cueing. She provided 4/5 correct responses on a serial 7 subtraction task. She accurately aidan a clock face and correctly set the clock hands at the designated time.     Pre-morbid/Baseline: Estimated to be in the average range.     Language: Letter verbal fluency and receptive language were average. Semantic verbal fluency was borderline impaired/low average. Performance on a test of confrontation naming was borderline impaired with improved performance with semantic cueing.     Visuospatial: She employed a disorganized and haphazard approach to her copy of a complex figure. She employed a piecemeal approach near the end of the task that appeared to contribute to the misplacement of several details. Her drawing demonstrated a largely intact appreciation for the gestalt of the figure, suggesting a primary issues with organization/planning rather than visuospatial dysfunction.     Learning/Memory: Ms. Mendez overall encoding of 2 short stories was average. Cognitive disorganization and source memory difficulties were noted as she integrated details from the first story into the second story during the learning trials. She demonstrated intact retention of the second story following a long delay, but she lost most details from the first story, suggesting a sensitivity to interference. Overall delay recall was low average. Responses to yes/no questions pertaining to the stories was within normal limits. Ms. Mendez  overall encoding of a supraspan word list was borderline impaired/low average. She demonstrated average initial encoding (6/16 words), but an inconsistent learning curve across the remaining trials (6, 7, 5, and 9 of 16 words). She heavily relied on recency, recalling most words at the end of the tests. Otherwise, she did not appear to employ a specific encoding strategy. She did not provide any additional word following queries from the psychometrist. Ms. Chavira then encoded 4/16 words from a second, distracter list (low average). Retention and recall were impaired following exposure to the distracter list as she freely recalled only 3/16 words. Surprisingly, her performance worsened with categorical cueing as she recalled only 1/16 words (impaired) with 4 intrusion errors. Retention and recall were below expectation following a long delay as she freely recalled 4/16 words (impaired). Similarly to the short delay trial, she again recalled fewer words with categorical cueing (2/16 words with 5 intrusion errors). Recognition was impaired as she heavily employed a positive response bias, correctly identifying 15/16 words, but with 20 false positive errors, meaning that she responded positively to 35/48 items.     Executive Functioning: Processing speed and working memory were average. Performance on a test of divided attention/set shifting was average.     Mood: Responses on a self-report inventory were suggestive of a moderate degree of clinically significant depression. She indicated that she is sad all the time and endorsed passive suicidal ideation. Responses on an additional self-report inventory were suggestive of a moderate degree of clinically significant anxiety.     SUMMARY AND IMPRESSION: Ms. Gely Green is a 67-year-old female who was referred for a neuropsychological evaluation in the setting of self-reported memory complaints over the past 2 years. She described her instances of memory loss as  frustrating rather than functionally debilitating, but she does worry about developing Alzheimers disease. She is functionally independent with few reported problems.     Ms. Mendez mood was the most noteworthy aspects of her history and presentation. She described considerable depression since her sons death in 2014, with the anniversary of his death occurring just 4 days before this evaluation. She continues to think of him on a daily basis, is constantly sad/tearful, has recently been fantasizing about suicide (with no intent), and constantly keeps herself busy to avoid downtime. It is encouraging that she recently reengaged with a grief support group, but this group meets only once per month. Depression and complicated bereavement are clearly impacting her daily functioning.      Regarding cognition, the neuropsychological evaluation was remarkable for inconsistent performance on tests of learning and memory with inefficiencies in cognitive organization/retrieval and source memory. However, a cognitive diagnosis will be deferred at the present time in the setting of marked depression. Furthermore, a neurodegenerative condition  is considered unlikely at the present time, especially when considering her high level of functioning with no difficulty completing complex activities of daily living (with the exception of periodic amotivation). She also does not present with the type of dima amnesia seen in Alzheimer's disease. In addition to depression, mild cerebrovascular disease (HLD, DMII) is another possible contributing factor to cognitive inefficencies. Interval assessment is warranted.     DIAGNOSIS:  1. Persistent Depressive Disorder with current depressive episode    RECOMMENDATIONS:  1. Establish Care with Mental Health Treatment Providers - In the setting of persistent, complicated bereavement with suicidal ideation (no plan, intent, or past attempts). Establish care with psychiatrist for medication  management and individual therapist for consistent counseling.   2. Compensatory Mechanisms - Prioritize organization/planning. For instance, she frequently keeps notes for herself, but she writes them down on different sheets of paper and cant always find them. She may benefit from writing notes in her day planner/calendar. She can also consider setting recurring alarms/timers on her phone for medication times, although missing medication doses appears related to amotivation and depression rather than forgetfulness.   3. Optimize Brain Health/Manage Vascular Risk Factors - Prioritize physical and social activity. Maintain a heart healthy diet and 100% treatment compliance (she is prone to skipping medication doses).   4. Neuropsychology Follow-up - 12-18 months to assess for interval change.     Ms. Green will be provided the results of the evaluation on 9/13/2018.     Thank you for allowing me to participate in Ms. Green s care.  If you have any questions, please contact me at [phone].    Flavio Zuniga Psy.D., ABPP  Board Certified in Clinical Neuropsychology  [facility] - Department of Neurology    APPENDIX  TESTS ADMINISTERED:  Clinical Interview and Review of Records, [place] Cognitive Assessment (MoCA), Test of Premorbid Functioning (TOPF), selected subtests from the Wechsler Adult Intelligence Scale - 4th Edition (WAIS-IV), Logical Memory subtest form the Wechsler Memory Scale - 4th Edition (WMS-IV), [place] Verbal Learning Test - Second edition (CVLT-II), Vincent Complex Figure (copy trial only), Trailmaking Test Part A and B, Controlled Oral Word Association Test (COWAT), Semantic Verbal Fluency (Animals), Auditory Comprehension and Naming subtests from the NAB, Norton Anxiety Inventory, and the Norton Depression Inventory - second edition (BDI-2).     TOPF    Standard Score  (+ simple demographics) 95 (predicted FSIQ = 95)     MoCA    24/30     WAIS-IV   Index  Working Memory  Index 97  Processing Speed Index 105    Individual subtests  Scaled Score   Digit Span   10   LDF   7(raw)   LDB   4(raw)   LDS   6(raw)  RDS   9  Arithmetic   9  Symbol Search  12   Coding    10    WMS-IV   Index Score  Auditory Memory Index 80  Auditory Immediate  89  Auditory Delayed  71      Scaled Score  Logical Memory I  10  Logical Memory II  6  CVLT II (t1-5)   6  CVLT II (LD)   4  Logical Memory II Recog. 19/23 (raw) 51-75% (base rate)    CVLT-II   Z/T scores  T1-5     37  T1     0  T2    -1.0  T3    -1.0  T4    -2.5  T5    -1.5  List B    -1.0  SDFR    -2.0  SDCR    -4.0  LDFR    -2.0  LDCR    -3.5  Total Recog. Disc  -2.5 (15/16 hits, 20 false positives)  FC     16/16    Vincent Complex Figure  Percentile  Copy    <1% (25/36)  Time    >16% (152 seconds)                             [facility] Norms   T-Score  Trails A   50  Trails B   50 (0 errors)  FAS    48  Animal fluency   37         NAB    T-Score  Auditory Comprehension 53  Naming   30 (27/31)    BDI-2    21  MEGHAN    27

## 2018-09-24 PROBLEM — Z13.5 DIABETIC RETINOPATHY SCREENING: Status: RESOLVED | Noted: 2018-06-25 | Resolved: 2018-09-24

## 2018-10-04 ENCOUNTER — PATIENT MESSAGE (OUTPATIENT)
Dept: INTERNAL MEDICINE | Facility: CLINIC | Age: 68
End: 2018-10-04

## 2018-10-04 ENCOUNTER — PATIENT MESSAGE (OUTPATIENT)
Dept: CARDIOLOGY | Facility: CLINIC | Age: 68
End: 2018-10-04

## 2018-10-05 RX ORDER — METOPROLOL SUCCINATE 25 MG/1
25 TABLET, EXTENDED RELEASE ORAL DAILY
Qty: 30 TABLET | Refills: 11 | Status: SHIPPED | OUTPATIENT
Start: 2018-10-05 | End: 2019-01-21

## 2018-10-05 RX ORDER — METOPROLOL SUCCINATE 25 MG/1
25 TABLET, EXTENDED RELEASE ORAL DAILY
COMMUNITY
End: 2018-10-05 | Stop reason: SDUPTHER

## 2018-10-18 RX ORDER — PEN NEEDLE, DIABETIC 31 GX5/16"
NEEDLE, DISPOSABLE MISCELLANEOUS
Qty: 100 EACH | Refills: 0 | Status: SHIPPED | OUTPATIENT
Start: 2018-10-18 | End: 2020-01-22

## 2018-10-25 ENCOUNTER — LAB VISIT (OUTPATIENT)
Dept: LAB | Facility: OTHER | Age: 68
End: 2018-10-25
Attending: FAMILY MEDICINE
Payer: MEDICARE

## 2018-10-25 DIAGNOSIS — E11.9 TYPE 2 DIABETES MELLITUS WITHOUT COMPLICATION: ICD-10-CM

## 2018-10-25 LAB
CHOLEST SERPL-MCNC: 183 MG/DL
CHOLEST/HDLC SERPL: 3.1 {RATIO}
HDLC SERPL-MCNC: 60 MG/DL
HDLC SERPL: 32.8 %
LDLC SERPL CALC-MCNC: 111 MG/DL
NONHDLC SERPL-MCNC: 123 MG/DL
TRIGL SERPL-MCNC: 60 MG/DL

## 2018-10-25 PROCEDURE — 36415 COLL VENOUS BLD VENIPUNCTURE: CPT

## 2018-10-25 PROCEDURE — 80061 LIPID PANEL: CPT

## 2018-11-21 ENCOUNTER — OFFICE VISIT (OUTPATIENT)
Dept: CARDIOLOGY | Facility: CLINIC | Age: 68
End: 2018-11-21
Payer: MEDICARE

## 2018-11-21 VITALS
WEIGHT: 169.19 LBS | DIASTOLIC BLOOD PRESSURE: 76 MMHG | HEIGHT: 66 IN | SYSTOLIC BLOOD PRESSURE: 134 MMHG | BODY MASS INDEX: 27.19 KG/M2 | HEART RATE: 68 BPM

## 2018-11-21 DIAGNOSIS — I10 ESSENTIAL HYPERTENSION: Primary | ICD-10-CM

## 2018-11-21 DIAGNOSIS — R41.3 MEMORY LOSS: ICD-10-CM

## 2018-11-21 DIAGNOSIS — E78.5 HYPERLIPIDEMIA, UNSPECIFIED HYPERLIPIDEMIA TYPE: ICD-10-CM

## 2018-11-21 DIAGNOSIS — F43.23 ADJUSTMENT DISORDER WITH MIXED ANXIETY AND DEPRESSED MOOD: ICD-10-CM

## 2018-11-21 DIAGNOSIS — R00.2 POUNDING HEARTBEAT: ICD-10-CM

## 2018-11-21 DIAGNOSIS — E11.40 TYPE 2 DIABETES MELLITUS WITH DIABETIC NEUROPATHY, WITH LONG-TERM CURRENT USE OF INSULIN: ICD-10-CM

## 2018-11-21 DIAGNOSIS — Z79.4 TYPE 2 DIABETES MELLITUS WITH DIABETIC NEUROPATHY, WITH LONG-TERM CURRENT USE OF INSULIN: ICD-10-CM

## 2018-11-21 DIAGNOSIS — F41.9 ANXIETY: ICD-10-CM

## 2018-11-21 DIAGNOSIS — I49.3 PVC'S (PREMATURE VENTRICULAR CONTRACTIONS): ICD-10-CM

## 2018-11-21 PROCEDURE — 99213 OFFICE O/P EST LOW 20 MIN: CPT | Mod: PBBFAC,PO | Performed by: INTERNAL MEDICINE

## 2018-11-21 PROCEDURE — 99213 OFFICE O/P EST LOW 20 MIN: CPT | Mod: S$PBB,,, | Performed by: INTERNAL MEDICINE

## 2018-11-21 PROCEDURE — 99999 PR PBB SHADOW E&M-EST. PATIENT-LVL III: CPT | Mod: PBBFAC,,, | Performed by: INTERNAL MEDICINE

## 2018-11-21 NOTE — PROGRESS NOTES
Subjective:   Patient ID:  Gely Green is a 68 y.o. female who presents for follow-up of Rapid Heartbeat      HPI: Patient is here for follow-up of elevated blood pressure. She is not exercising and is not adherent to a low-salt diet. Blood pressure is well controlled at home. Patient denies chest pain, dyspnea, lower extremity edema.  She still feels pounding and skipped beats. She was prescribed Metoprolol,but did not take it yet.  Patient is depressed, tearful and grieving after the lost of her son 3 years ago.        Lab Results   Component Value Date     06/25/2018    K 4.2 06/25/2018     06/25/2018    CO2 25 06/25/2018    BUN 13 06/25/2018    CREATININE 0.8 06/25/2018     (H) 06/25/2018    HGBA1C 6.3 (H) 05/01/2018    AST 37 06/25/2018    ALT 32 06/25/2018    ALBUMIN 3.9 06/25/2018    PROT 7.5 06/25/2018    BILITOT 0.6 06/25/2018    WBC 6.41 06/25/2018    HGB 12.5 06/25/2018    HCT 40.0 06/25/2018    MCV 88 06/25/2018     06/25/2018    TSH 0.719 06/25/2018    CHOL 183 10/25/2018    HDL 60 10/25/2018    LDLCALC 111.0 10/25/2018    TRIG 60 10/25/2018       Review of Systems   Constitution: Negative.   HENT: Negative.    Eyes: Negative.    Cardiovascular: Positive for irregular heartbeat and palpitations. Negative for chest pain, claudication, dyspnea on exertion, leg swelling, near-syncope and syncope.   Respiratory: Negative.  Negative for cough, shortness of breath, snoring and wheezing.    Endocrine: Negative.  Negative for cold intolerance, heat intolerance, polydipsia, polyphagia and polyuria.   Skin: Negative.    Musculoskeletal: Negative.    Gastrointestinal: Negative.    Genitourinary: Negative.    Neurological: Negative.    Psychiatric/Behavioral: Positive for depression and memory loss. The patient is nervous/anxious.        Objective:   Physical Exam   Constitutional: She is oriented to person, place, and time. She appears well-developed and well-nourished.   /76   " Pulse 68   Ht 5' 6" (1.676 m)   Wt 76.8 kg (169 lb 3.3 oz)   BMI 27.31 kg/m²      HENT:   Head: Normocephalic.   Eyes: Pupils are equal, round, and reactive to light.   Neck: Normal range of motion. Neck supple. Carotid bruit is not present. No thyromegaly present.   Cardiovascular: Normal rate, regular rhythm, normal heart sounds and intact distal pulses. Exam reveals no gallop and no friction rub.   No murmur heard.  Pulses:       Carotid pulses are 2+ on the right side, and 2+ on the left side.       Radial pulses are 2+ on the right side, and 2+ on the left side.        Femoral pulses are 2+ on the right side, and 2+ on the left side.       Popliteal pulses are 2+ on the right side, and 2+ on the left side.        Dorsalis pedis pulses are 2+ on the right side, and 2+ on the left side.        Posterior tibial pulses are 2+ on the right side, and 2+ on the left side.   Pulmonary/Chest: Effort normal and breath sounds normal. No respiratory distress. She has no wheezes. She has no rales. She exhibits no tenderness.   Abdominal: Soft. Bowel sounds are normal.   Musculoskeletal: Normal range of motion. She exhibits no edema.   Neurological: She is alert and oriented to person, place, and time.   Skin: Skin is warm and dry.   Psychiatric: She has a normal mood and affect.   Nursing note and vitals reviewed.        Assessment and Plan:     Problem List Items Addressed This Visit        Cardiology Problems    Essential hypertension - Primary    Hyperlipidemia    PVC's (premature ventricular contractions)       Other    Anxiety    Diabetes mellitus    Adjustment disorder with mixed anxiety and depressed mood    Memory loss    Pounding heartbeat             Medication List           Accurate as of 11/21/18  9:52 AM. If you have any questions, ask your nurse or doctor.               CHANGE how you take these medications    gabapentin 100 MG capsule  Commonly known as:  NEURONTIN  Take 1 capsule (100 mg total) by " "mouth 3 (three) times daily.  What changed:  additional instructions        CONTINUE taking these medications    ACCU-CHEK FASTCLIX LANCING DEV Misc  Generic drug:  lancets     * ACCU-CHEK SMARTVIEW TEST STRIP Strp  Generic drug:  blood sugar diagnostic  ACCU-CHEK SMARTVIEW TEST STRIP Strp,     * blood sugar diagnostic Strp  1 strip by Misc.(Non-Drug; Combo Route) route 3 (three) times daily as needed. Lifescan test strips     * ONETOUCH ULTRA TEST Strp  Generic drug:  blood sugar diagnostic  TEST three times a day     aspirin 81 MG EC tablet  Commonly known as:  ECOTRIN     calcium carbonate 600 mg calcium (1,500 mg) Tab  Commonly known as:  OS-REDD     diabetic supplies, miscellan. Kit  1 application by Misc.(Non-Drug; Combo Route) route once daily. Lifescan glucose meter     flavoxATE 100 mg Tab  Commonly known as:  URISPAS  Take 1 tablet (100 mg total) by mouth 3 (three) times daily as needed.     ibuprofen 200 MG tablet  Commonly known as:  ADVIL,MOTRIN     irbesartan 150 MG tablet  Commonly known as:  AVAPRO  Take 1 tablet (150 mg total) by mouth once daily.     Lactobacillus rhamnosus GG 10 billion cell capsule  Commonly known as:  CULTURELLE     LINZESS 145 mcg Cap capsule  Generic drug:  linaclotide     meloxicam 15 MG tablet  Commonly known as:  MOBIC     metFORMIN 500 MG 24 hr tablet  Commonly known as:  GLUCOPHAGE-XR  take 1 tablet by mouth twice a day     metoprolol succinate 25 MG 24 hr tablet  Commonly known as:  TOPROL-XL  Take 1 tablet (25 mg total) by mouth once daily.     * pen needle, diabetic 32 gauge x 1/6" Ndle  Commonly known as:  NOVOFINE PLUS  Inject 1 application into the skin every evening.     * NOVOFINE 32 32 gauge x 1/4" Ndle  Generic drug:  pen needle, diabetic  use subcutaneously every evening     * BD ULTRA-FINE SAMUEL PEN NEEDLE 32 gauge x 5/32" Ndle  Generic drug:  pen needle, diabetic  USE WITH INSULIN EVERY EVENING     rosuvastatin 10 MG tablet  Commonly known as:  CRESTOR  take 1 " tablet by mouth once daily     TOUJEO SOLOSTAR U-300 INSULIN 300 unit/mL (1.5 mL) Inpn pen  Generic drug:  insulin glargine (TOUJEO)  INJECT 33 UNITS UNDER THE SKIN EVERY EVENING     venlafaxine 37.5 MG 24 hr capsule  Commonly known as:  EFFEXOR-XR  Take 1 capsule (37.5 mg total) by mouth once daily.     vitamin D 1000 units Tab  Commonly known as:  VITAMIN D3         * This list has 6 medication(s) that are the same as other medications prescribed for you. Read the directions carefully, and ask your doctor or other care provider to review them with you.            STOP taking these medications    FLUZONE HIGH-DOSE 2018-19 (PF) 180 mcg/0.5 mL vaccine  Generic drug:  influenza  Stopped by:  Jessie Sanz MD          Trial of Metoprolol starting with half a pill.  Follow up with PCP and/or  Follow-up in about 1 year (around 11/21/2019).

## 2018-12-28 DIAGNOSIS — E11.9 TYPE 2 DIABETES MELLITUS WITHOUT COMPLICATION: ICD-10-CM

## 2018-12-31 ENCOUNTER — LAB VISIT (OUTPATIENT)
Dept: LAB | Facility: OTHER | Age: 68
End: 2018-12-31
Attending: FAMILY MEDICINE
Payer: MEDICARE

## 2018-12-31 DIAGNOSIS — E11.9 TYPE 2 DIABETES MELLITUS WITHOUT COMPLICATION: ICD-10-CM

## 2018-12-31 LAB
ESTIMATED AVG GLUCOSE: 148 MG/DL
HBA1C MFR BLD HPLC: 6.8 %

## 2018-12-31 PROCEDURE — 83036 HEMOGLOBIN GLYCOSYLATED A1C: CPT

## 2018-12-31 PROCEDURE — 36415 COLL VENOUS BLD VENIPUNCTURE: CPT

## 2019-01-17 ENCOUNTER — OFFICE VISIT (OUTPATIENT)
Dept: INTERNAL MEDICINE | Facility: CLINIC | Age: 69
End: 2019-01-17
Attending: FAMILY MEDICINE
Payer: MEDICARE

## 2019-01-17 VITALS
HEART RATE: 67 BPM | BODY MASS INDEX: 27.25 KG/M2 | OXYGEN SATURATION: 98 % | DIASTOLIC BLOOD PRESSURE: 68 MMHG | HEIGHT: 66 IN | SYSTOLIC BLOOD PRESSURE: 122 MMHG | WEIGHT: 169.56 LBS

## 2019-01-17 DIAGNOSIS — E78.5 HYPERLIPIDEMIA, UNSPECIFIED HYPERLIPIDEMIA TYPE: ICD-10-CM

## 2019-01-17 DIAGNOSIS — H93.A3 PULSATILE TINNITUS OF BOTH EARS: Primary | ICD-10-CM

## 2019-01-17 DIAGNOSIS — R00.2 POUNDING HEARTBEAT: ICD-10-CM

## 2019-01-17 DIAGNOSIS — I49.3 PVC'S (PREMATURE VENTRICULAR CONTRACTIONS): ICD-10-CM

## 2019-01-17 DIAGNOSIS — E11.40 TYPE 2 DIABETES MELLITUS WITH DIABETIC NEUROPATHY, WITH LONG-TERM CURRENT USE OF INSULIN: ICD-10-CM

## 2019-01-17 DIAGNOSIS — R09.89 OTHER SPECIFIED SYMPTOMS AND SIGNS INVOLVING THE CIRCULATORY AND RESPIRATORY SYSTEMS: ICD-10-CM

## 2019-01-17 DIAGNOSIS — Z79.4 TYPE 2 DIABETES MELLITUS WITH DIABETIC NEUROPATHY, WITH LONG-TERM CURRENT USE OF INSULIN: ICD-10-CM

## 2019-01-17 PROCEDURE — 99214 PR OFFICE/OUTPT VISIT, EST, LEVL IV, 30-39 MIN: ICD-10-PCS | Mod: S$PBB,,, | Performed by: FAMILY MEDICINE

## 2019-01-17 PROCEDURE — 99214 OFFICE O/P EST MOD 30 MIN: CPT | Mod: S$PBB,,, | Performed by: FAMILY MEDICINE

## 2019-01-17 PROCEDURE — 99999 PR PBB SHADOW E&M-EST. PATIENT-LVL V: ICD-10-PCS | Mod: PBBFAC,,, | Performed by: FAMILY MEDICINE

## 2019-01-17 PROCEDURE — 99999 PR PBB SHADOW E&M-EST. PATIENT-LVL V: CPT | Mod: PBBFAC,,, | Performed by: FAMILY MEDICINE

## 2019-01-17 PROCEDURE — 99215 OFFICE O/P EST HI 40 MIN: CPT | Mod: PBBFAC | Performed by: FAMILY MEDICINE

## 2019-01-17 NOTE — PROGRESS NOTES
"Subjective:      Patient ID: Gely Green is a 68 y.o. female.    Chief Complaint: Diabetes    HPI   Patient here today for follow up. She has been taking metoprolol for 2 months. She notices her heart pounding when she sits still and it is quiet. She hears it in both ears. She reports swelling in her legs has resolved along with tingling in hands. She takes 33 units toujeo nightly. She has her eye appointment tomorrow.     Review of Systems   Constitutional: Negative for activity change, appetite change, chills, diaphoresis, fatigue, fever and unexpected weight change.   HENT: Negative for congestion, ear discharge, ear pain, hearing loss, postnasal drip, rhinorrhea, sinus pressure and sore throat.    Respiratory: Negative for cough, shortness of breath and wheezing.    Cardiovascular: Negative for chest pain.   Gastrointestinal: Negative for abdominal pain, constipation, diarrhea, nausea and vomiting.   Genitourinary: Negative for dysuria and frequency.   Musculoskeletal: Negative.    Psychiatric/Behavioral: Negative for suicidal ideas.     I personally reviewed Past Medical History, Past Surgical history,  Past Social History and Family History      Objective:   /68   Pulse 67   Ht 5' 6" (1.676 m)   Wt 76.9 kg (169 lb 8.5 oz)   SpO2 98%   BMI 27.36 kg/m²     Physical Exam   Constitutional: She is oriented to person, place, and time. She appears well-developed and well-nourished. No distress.   HENT:   Head: Normocephalic and atraumatic.   Right Ear: Hearing, tympanic membrane, external ear and ear canal normal.   Left Ear: Hearing, tympanic membrane, external ear and ear canal normal.   Nose: Nose normal.   Mouth/Throat: Uvula is midline and oropharynx is clear and moist. No oropharyngeal exudate.   Eyes: Conjunctivae and EOM are normal. Pupils are equal, round, and reactive to light. Right eye exhibits no discharge. Left eye exhibits no discharge. No scleral icterus.   Neck: Normal range of " motion. Neck supple. Carotid bruit is not present.   Cardiovascular: Normal rate, regular rhythm, normal heart sounds and intact distal pulses. Exam reveals no gallop.   No murmur heard.  Pulmonary/Chest: Effort normal and breath sounds normal. No respiratory distress. She has no wheezes. She has no rales. She exhibits no tenderness.   Abdominal: Soft. Bowel sounds are normal. She exhibits no distension and no mass. There is no tenderness. There is no rebound and no guarding.   Neurological: She is alert and oriented to person, place, and time.   Skin: Skin is warm and dry.   Vitals reviewed.      1. Pulsatile tinnitus of both ears    2. Other specified symptoms and signs involving the circulatory and respiratory systems     3. PVC's (premature ventricular contractions)    4. Pounding heartbeat    5. Type 2 diabetes mellitus with diabetic neuropathy, with long-term current use of insulin    6. Hyperlipidemia, unspecified hyperlipidemia type        1-4. Will get carotid US and schedule ENT followup, patient wants second opinion for cards  5. stable, cont current regimen   6. Stable continue crestor     Orders Placed This Encounter   Procedures    US Carotid Bilateral    (In Office Administered) Pneumococcal Polysaccharide Vaccine (23 Valent) (SQ/IM)    Ambulatory consult to ENT    Ambulatory consult to Cardiology    Ambulatory consult to Podiatry

## 2019-01-18 NOTE — PROGRESS NOTES
"     Cardiology Clinic Note  Reason for Visit: pulsatile tinnitus    HPI:     Gely Green is a 68 y.o. F with HTN, DM, who was referred for pulsatile tinnitus. She is usually followed by Dr Sanz, last seen 18.    She reports having ongoing palpitations ("heart pounding") since 2018. She had a holter monitor placed at that time, which revealed 1% monomorphic PVC burden. During symptoms, she was in sinus rhythm without PVCs. She was started on metoprolol and dose increased to 25mg BID without improvement in symptoms.    She also reports hearing her heart beats in her ears, especially when it is quiet, such as at night before bed. She has timed these sounds with her carotid pulse, and they seem to match in timing with her pulse. She is unsure if she is hearing the beats in both ears or one.    Lipids 10/25/18 with , TG 60, HDL 60,   A1c 2018 was 6.8%    Medical: HTN, DM, thyroid cyst  Surgical: hysterectomy,   Family: cancers, HTN, DM, MI  Social: former smoker, no alcohol use    ROS:    Constitution: Negative for fever or chills.  HENT: Negative for  headaches.  Eyes: Negative for blurred vision.   Cardiovascular: See above  Pulmonary: Negative for SOB. Negative for cough.   Gastrointestinal: Negative for nausea/vomiting.   : Negative for dysuria.   Skin: Negative for rashes.  Neurological: Negative for focal weakness.  Psychological: Negative for depression.  PMH:     Past Medical History:   Diagnosis Date    Chronic constipation     Diabetes mellitus     Diabetes mellitus     History of thyroid cyst     Hypertension     Osteopenia      Past Surgical History:   Procedure Laterality Date     SECTION      2x    COLONOSCOPY      HYSTERECTOMY      full hyst      Allergies:   Review of patient's allergies indicates:  No Known Allergies  Medications:     Current Outpatient Medications on File Prior to Visit   Medication Sig Dispense Refill    ACCU-CHEK " "FASTCLIX Misc TEST twice a day  0    ACCU-CHEK SMARTVIEW TEST STRIP Strp ACCU-CHEK SMARTVIEW TEST STRIP Strp, 120 strip 11    aspirin (ECOTRIN) 81 MG EC tablet Take 81 mg by mouth once daily.      BD ULTRA-FINE SAMUEL PEN NEEDLE 32 gauge x 5/32" Ndle USE WITH INSULIN EVERY EVENING 100 each 0    blood sugar diagnostic Strp 1 strip by Misc.(Non-Drug; Combo Route) route 3 (three) times daily as needed. Lifescan test strips 120 each 4    calcium carbonate (OS-REDD) 600 mg calcium (1,500 mg) Tab Take 600 mg by mouth 2 (two) times daily with meals.      diabetic supplies, miscellan. Kit 1 application by Misc.(Non-Drug; Combo Route) route once daily. Lifescan glucose meter 1 kit 0    flavoxATE (URISPAS) 100 mg Tab Take 1 tablet (100 mg total) by mouth 3 (three) times daily as needed. 90 tablet 11    gabapentin (NEURONTIN) 100 MG capsule Take 1 capsule (100 mg total) by mouth 3 (three) times daily. (Patient taking differently: Take 100 mg by mouth 3 (three) times daily. As needed) 90 capsule 1    ibuprofen (ADVIL,MOTRIN) 200 MG tablet Take 200 mg by mouth every 6 (six) hours as needed for Pain.      irbesartan (AVAPRO) 150 MG tablet Take 1 tablet (150 mg total) by mouth once daily. 30 tablet 6    Lactobacillus rhamnosus GG (CULTURELLE) 10 billion cell capsule Take 1 capsule by mouth once daily.      LINZESS 145 mcg Cap capsule 145 mcg once daily. As needed  0    metFORMIN (GLUCOPHAGE-XR) 500 MG 24 hr tablet take 1 tablet by mouth twice a day 180 tablet 3    metoprolol succinate (TOPROL-XL) 25 MG 24 hr tablet Take 1 tablet (25 mg total) by mouth once daily. 30 tablet 11    NOVOFINE 32 32 gauge x 1/4" Ndle use subcutaneously every evening 100 each 11    ONETOUCH ULTRA TEST Strp TEST three times a day 100 strip 4    pen needle, diabetic (NOVOFINE PLUS) 32 gauge x 1/6" Ndle Inject 1 application into the skin every evening. 100 each 11    pneumococcal 23-carolin ps vaccine (PNEUMOVAX 23) 25 mcg/0.5 mL Inject 0.5 mLs " "into the muscle once. for 1 dose 0.5 mL 0    rosuvastatin (CRESTOR) 10 MG tablet take 1 tablet by mouth once daily 90 tablet 1    TOUJEO SOLOSTAR U-300 INSULIN 300 unit/mL (1.5 mL) InPn pen INJECT 33 UNITS UNDER THE SKIN EVERY EVENING 13.5 mL 3    vitamin D 1000 units Tab Take 1,000 Units by mouth once daily.       No current facility-administered medications on file prior to visit.      Social History:     Social History     Tobacco Use    Smoking status: Former Smoker    Smokeless tobacco: Never Used   Substance Use Topics    Alcohol use: No     Family History:     Family History   Problem Relation Age of Onset    Breast cancer Cousin     Heart attack Mother     Heart disease Mother     Heart attack Father     Heart disease Father     Heart failure Father     Hyperlipidemia Sister     Hypertension Sister     Ovarian cancer Neg Hx     Cervical cancer Neg Hx     Endometrial cancer Neg Hx     Vaginal cancer Neg Hx     Stroke Neg Hx      Physical Exam:   /79   Pulse 63   Ht 5' 6" (1.676 m)   Wt 76.7 kg (169 lb 1.5 oz)   SpO2 97%   BMI 27.29 kg/m²      Constitutional: No apparent distress, conversant  HEENT: Sclera anicteric, extraocular movements intact  Neck: No jugular venous distension, no carotid bruits  CV: Regular rate and rhythm, no murmurs rubs or gallops, normal S1/S2  Pulm: Clear to auscultation bilaterally  GI: Abdomen soft, no palpable masses  Extremities: No lower extremity edema, warm with palpable pulses  Skin: No ecchymosis, erythema, or ulcers  Psych: Alert and oriented to person place location, appropriate affect  Neuro: No focal deficits    Labs:     Blood Tests:  Lab Results   Component Value Date     06/25/2018    K 4.2 06/25/2018     06/25/2018    CO2 25 06/25/2018    BUN 13 06/25/2018    CREATININE 0.8 06/25/2018     (H) 06/25/2018    HGBA1C 6.8 (H) 12/31/2018    AST 37 06/25/2018    ALT 32 06/25/2018    ALBUMIN 3.9 06/25/2018    PROT 7.5 " 06/25/2018    BILITOT 0.6 06/25/2018    WBC 6.41 06/25/2018    HGB 12.5 06/25/2018    HCT 40.0 06/25/2018    MCV 88 06/25/2018     06/25/2018    TSH 0.719 06/25/2018       Lab Results   Component Value Date    CHOL 183 10/25/2018    HDL 60 10/25/2018    TRIG 60 10/25/2018       Lab Results   Component Value Date    LDLCALC 111.0 10/25/2018       Urine Tests:  Lab Results   Component Value Date    COLORU yellow 08/19/2015    PHUR 5 08/19/2015    SPECGRAV 1.100 08/19/2015    KETONESU neg 08/19/2015    NITRITE neg 08/19/2015    UROBILINOGEN neg 08/19/2015    CREATRANDUR 159.0 05/01/2018       Imaging:     Echocardiogram  None    Stress testing  GWEN 11/27/17  Date of Procedure: 11/27/2017    PRE-TEST DATA   EKG: Resting electrocardiogram reveals normal sinus rhythm at a rate of 58 bpm. Septal scar. Non specific T wave inversions.    TEST DESCRIPTION   The patient exercised for 12.02 minutes, corresponding to a functional capacity of 13 estimated METS, achieving a peak heart rate of 161 bpm, which is 109% of the age predicted maximum heart rate.     There were no significant electrocardiographic changes throughout the protocol suggesting ischemia. No Exercise induced EKG changes.    EKG Conclusions:  1. The EKG portion of this study is negative for ischemia at a high workload, and peak heart rate of 161 bpm (109% of predicted).   2. Blood pressure response to exercise was normal (Presenting BP: 107/60 Peak BP: 176/88).   3. No significant arrhythmias were present.   4. There were no symptoms of chest discomfort or significant dyspnea throughout the protocol.     CONCLUSIONS   No evidence of stress induced myocardial ischemia.     Concentric LVH.  Negative stress echocardiographic study.  Good physical capacity.    Cath Lab  None    Other  Holter 7/24/18  PRE-TEST DATA   The diary was properly completed.    TEST DESCRIPTION   PREDOMINANT RHYTHM  1. Sinus rhythm with heart rates varying between 48 and 136 bpm with  an average of 77 bpm.     VENTRICULAR ARRHYTHMIAS  1. There were frequent PVCs totalling 1446 and averaging 60 per hour.  There was 1 couplet.    2. There were no episodes of ventricular tachycardia.    SUPRA VENTRICULAR ARRHYTHMIAS  1. There were rare PACs totalling 96 and averaging 4 per hour.  There was 1 couplet.  2. There was an 8 beat run of nonsustained atrial tachycardia.  3. There were no episodes of sustained supraventricular tachycardia.    SINUS NODE FUNCTION  1. HRs averaging 80 bpm during waking hours and 70 bpm during sleep (10:50 PM - 6:10 AM).  2. There was no evidence of high grade SA yue block.     AV CONDUCTION  1. There was no evidence of high grade AV block.     DIARY  1. The diary was properly completed.   2. There were 2 episodes of     palpitations reported. The corresponding rhythm strips revealed the following:             During event 1 the rhythm was sinus rhythm at 95 bpm.             During event 2 the rhythm was sinus rhythm at 82 bpm.     MISCELLANEOUS  1. There were rare hookup related artifacts. Overall, the study was of adequate quality.   2. This was a tape of adequate length (24 hrs).      Renal artery US 17  No evidence of renal artery stenosis.    EK18  Sinus bradycardia with occasional Premature ventricular complexes  Otherwise normal ECG    Assessment:     1. Essential hypertension    2. Hyperlipidemia, unspecified hyperlipidemia type    3. Pounding heartbeat    4. PVC's (premature ventricular contractions)    5. Pulsatile tinnitus        Plan:     Palpitations  PVC's (premature ventricular contractions)  Previously evaluated with holter monitor, which revealed 1% PVC burden  Symptoms do not correspond with PVCs  Since no improvement with metoprolol, will stop, as this medication has been associated with tinnitus (although symptoms began prior to BB use).  No further evaluation at this time.    Pulsatile tinnitus  Awaiting carotid US, ordered by her PCP  If  unrevealing, can consider MRA head/neck to evaluate for arterial and venous pathology    Essential hypertension  Controlled on irbesartan 150mg daily    Hyperlipidemia, unspecified hyperlipidemia type  Continue rosuvastatin 10mg daily  On ASA 81mg daily    Signed:  Mauro Hylton MD  Cardiology     1/21/2019 10:18 AM    Follow-up:     Future Appointments   Date Time Provider Department Center   1/19/2019  3:30 PM [facility] USOP1 [facility]   1/21/2019  8:00 AM Nura Hylton III, MD [facility] CARDIO [place]   1/23/2019  3:45 PM Heide Gillis DPM [facility]OD[facility]ATR [place]   3/4/2019  8:30 AM BECKY Kee [facility]   3/4/2019  9:40 AM Aracely Fu MD [facility]

## 2019-01-21 ENCOUNTER — OFFICE VISIT (OUTPATIENT)
Dept: CARDIOLOGY | Facility: CLINIC | Age: 69
End: 2019-01-21
Payer: MEDICARE

## 2019-01-21 VITALS
SYSTOLIC BLOOD PRESSURE: 132 MMHG | HEART RATE: 63 BPM | WEIGHT: 169.06 LBS | DIASTOLIC BLOOD PRESSURE: 79 MMHG | BODY MASS INDEX: 27.17 KG/M2 | HEIGHT: 66 IN | OXYGEN SATURATION: 97 %

## 2019-01-21 DIAGNOSIS — I10 ESSENTIAL HYPERTENSION: Primary | ICD-10-CM

## 2019-01-21 DIAGNOSIS — E78.5 HYPERLIPIDEMIA, UNSPECIFIED HYPERLIPIDEMIA TYPE: ICD-10-CM

## 2019-01-21 DIAGNOSIS — I49.3 PVC'S (PREMATURE VENTRICULAR CONTRACTIONS): ICD-10-CM

## 2019-01-21 DIAGNOSIS — R00.2 POUNDING HEARTBEAT: ICD-10-CM

## 2019-01-21 DIAGNOSIS — H93.A9 PULSATILE TINNITUS: ICD-10-CM

## 2019-01-21 PROCEDURE — 99213 PR OFFICE/OUTPT VISIT, EST, LEVL III, 20-29 MIN: ICD-10-PCS | Mod: S$PBB,,, | Performed by: INTERNAL MEDICINE

## 2019-01-21 PROCEDURE — 99213 OFFICE O/P EST LOW 20 MIN: CPT | Mod: S$PBB,,, | Performed by: INTERNAL MEDICINE

## 2019-01-21 PROCEDURE — 99999 PR PBB SHADOW E&M-EST. PATIENT-LVL III: CPT | Mod: PBBFAC,,, | Performed by: INTERNAL MEDICINE

## 2019-01-21 PROCEDURE — 99213 OFFICE O/P EST LOW 20 MIN: CPT | Mod: PBBFAC | Performed by: INTERNAL MEDICINE

## 2019-01-21 PROCEDURE — 99999 PR PBB SHADOW E&M-EST. PATIENT-LVL III: ICD-10-PCS | Mod: PBBFAC,,, | Performed by: INTERNAL MEDICINE

## 2019-01-21 NOTE — LETTER
January 21, 2019      Althea Anderson MD  [address]  [facility] [address]           [facility] - Cardiology  [address]  [facility] [address]  Phone: [phone]          Patient: Gely Green   MR Number: [id number]   YOB: 1950   Date of Visit: 1/21/2019       Dear Dr. Althea Anderson:    Thank you for referring Gely Green to me for evaluation. Attached you will find relevant portions of my assessment and plan of care.    If you have questions, please do not hesitate to call me. I look forward to following Gely Green along with you.    Sincerely,    Nura Hylton III, MD    Enclosure  CC:  No Recipients    If you would like to receive this communication electronically, please contact [email or web address] or [phone] to request more information on Ver[employer] Link access.    For providers and/or their staff who would like to refer a patient to [facility], please contact us through our one-stop-shop provider referral line, [facility], at [phone].    If you feel you have received this communication in error or would no longer like to receive these types of communications, please e-mail [email or web address]

## 2019-01-22 ENCOUNTER — IMMUNIZATION (OUTPATIENT)
Dept: PHARMACY | Facility: CLINIC | Age: 69
End: 2019-01-22
Payer: MEDICARE

## 2019-01-23 ENCOUNTER — OFFICE VISIT (OUTPATIENT)
Dept: PODIATRY | Facility: CLINIC | Age: 69
End: 2019-01-23
Attending: FAMILY MEDICINE
Payer: MEDICARE

## 2019-01-23 ENCOUNTER — HOSPITAL ENCOUNTER (OUTPATIENT)
Dept: RADIOLOGY | Facility: OTHER | Age: 69
Discharge: HOME | End: 2019-01-23
Attending: FAMILY MEDICINE
Payer: MEDICARE

## 2019-01-23 VITALS — WEIGHT: 169 LBS | BODY MASS INDEX: 27.16 KG/M2 | HEIGHT: 66 IN

## 2019-01-23 DIAGNOSIS — R09.89 OTHER SPECIFIED SYMPTOMS AND SIGNS INVOLVING THE CIRCULATORY AND RESPIRATORY SYSTEMS: ICD-10-CM

## 2019-01-23 DIAGNOSIS — B35.1 DERMATOPHYTOSIS OF NAIL: ICD-10-CM

## 2019-01-23 DIAGNOSIS — E11.49 TYPE II DIABETES MELLITUS WITH NEUROLOGICAL MANIFESTATIONS: Primary | ICD-10-CM

## 2019-01-23 DIAGNOSIS — H93.A3 PULSATILE TINNITUS OF BOTH EARS: ICD-10-CM

## 2019-01-23 PROCEDURE — 99203 PR OFFICE/OUTPT VISIT, NEW, LEVL III, 30-44 MIN: ICD-10-PCS | Mod: S$PBB,25,, | Performed by: PODIATRIST

## 2019-01-23 PROCEDURE — 99212 OFFICE O/P EST SF 10 MIN: CPT | Mod: PBBFAC,25,PN | Performed by: PODIATRIST

## 2019-01-23 PROCEDURE — 99203 OFFICE O/P NEW LOW 30 MIN: CPT | Mod: S$PBB,25,, | Performed by: PODIATRIST

## 2019-01-23 PROCEDURE — 99999 PR PBB SHADOW E&M-EST. PATIENT-LVL II: CPT | Mod: PBBFAC,,, | Performed by: PODIATRIST

## 2019-01-23 PROCEDURE — 11721 ROUTINE FOOT CARE: ICD-10-PCS | Mod: Q9,S$PBB,, | Performed by: PODIATRIST

## 2019-01-23 PROCEDURE — 93880 EXTRACRANIAL BILAT STUDY: CPT | Mod: 26,,, | Performed by: RADIOLOGY

## 2019-01-23 PROCEDURE — 93880 US CAROTID BILATERAL: ICD-10-PCS | Mod: 26,,, | Performed by: RADIOLOGY

## 2019-01-23 PROCEDURE — 11721 DEBRIDE NAIL 6 OR MORE: CPT | Mod: Q9,PBBFAC,PN | Performed by: PODIATRIST

## 2019-01-23 PROCEDURE — 93880 EXTRACRANIAL BILAT STUDY: CPT | Mod: TC

## 2019-01-23 PROCEDURE — 99999 PR PBB SHADOW E&M-EST. PATIENT-LVL II: ICD-10-PCS | Mod: PBBFAC,,, | Performed by: PODIATRIST

## 2019-01-23 NOTE — LETTER
January 25, 2019      Althea Anderson MD  [address]  [facility] [address]           [facility] Podiatry  [address] [address]  Phone: [phone]  Fax: [phone]          Patient: Gely Green   MR Number: [id number]   YOB: 1950   Date of Visit: 1/23/2019       Dear Dr. Althea Anderson:    Thank you for referring Gely Green to me for evaluation. Attached you will find relevant portions of my assessment and plan of care.    If you have questions, please do not hesitate to call me. I look forward to following Gely Green along with you.    Sincerely,    Heide Gillis DPM    Enclosure  CC:  No Recipients    If you would like to receive this communication electronically, please contact [email or web address] or [phone] to request more information on You[employer] Link access.    For providers and/or their staff who would like to refer a patient to [facility], please contact us through our one-stop-shop provider referral line, [facility], at [phone].    If you feel you have received this communication in error or would no longer like to receive these types of communications, please e-mail [email or web address]

## 2019-01-23 NOTE — PROGRESS NOTES
"Chief Complaint   Patient presents with    Diabetes Mellitus     PCP: Althea Anderson 01/17/2019  A1C: 12/31/2018 / 6.8    Routine Foot Care              HPI:   The patient is a 68 y.o.  female  who presents for a diabetic foot exam.     Patient reports no presence of abnormal sensation to the feet .    History of diabetic foot ulcers: none   History of foot surgery: none.     Shoes worn today:  Slip on        Primary care doctor is: Althea Anderson MD  Chief Complaint   Patient presents with    Diabetes Mellitus     PCP: Althea Anderson 01/17/2019  A1C: 12/31/2018 / 6.8    Routine Foot Care          Past Medical History:   Diagnosis Date    Chronic constipation     Diabetes mellitus     Diabetes mellitus     History of thyroid cyst     Hypertension     Osteopenia            Current Outpatient Medications on File Prior to Visit   Medication Sig Dispense Refill    ACCU-CHEK FASTCLIX Misc TEST twice a day  0    ACCU-CHEK SMARTVIEW TEST STRIP Strp ACCU-CHEK SMARTVIEW TEST STRIP Strp, 120 strip 11    aspirin (ECOTRIN) 81 MG EC tablet Take 81 mg by mouth once daily.      BD ULTRA-FINE SAMUEL PEN NEEDLE 32 gauge x 5/32" Ndle USE WITH INSULIN EVERY EVENING 100 each 0    blood sugar diagnostic Strp 1 strip by Misc.(Non-Drug; Combo Route) route 3 (three) times daily as needed. [employer] test strips 120 each 4    calcium carbonate (OS-REDD) 600 mg calcium (1,500 mg) Tab Take 600 mg by mouth 2 (two) times daily with meals.      flavoxATE (URISPAS) 100 mg Tab Take 1 tablet (100 mg total) by mouth 3 (three) times daily as needed. 90 tablet 11    gabapentin (NEURONTIN) 100 MG capsule Take 1 capsule (100 mg total) by mouth 3 (three) times daily. (Patient taking differently: Take 100 mg by mouth 3 (three) times daily. As needed) 90 capsule 1    ibuprofen (ADVIL,MOTRIN) 200 MG tablet Take 200 mg by mouth every 6 (six) hours as needed for Pain.      irbesartan (AVAPRO) 150 MG tablet Take 1 tablet (150 mg " "total) by mouth once daily. 30 tablet 6    Lactobacillus rhamnosus GG (CULTURELLE) 10 billion cell capsule Take 1 capsule by mouth once daily.      LINZESS 145 mcg Cap capsule 145 mcg once daily. As needed  0    metFORMIN (GLUCOPHAGE-XR) 500 MG 24 hr tablet take 1 tablet by mouth twice a day 180 tablet 3    NOVOFINE 32 32 gauge x 1/4" Ndle use subcutaneously every evening 100 each 11    ONETOUCH ULTRA BLUE TEST STRIP Strp TEST THREE TIMES DAILY 100 strip 11    ONETOUCH ULTRA TEST Strp TEST three times a day 100 strip 4    pen needle, diabetic (NOVOFINE PLUS) 32 gauge x 1/6" Ndle Inject 1 application into the skin every evening. 100 each 11    rosuvastatin (CRESTOR) 10 MG tablet take 1 tablet by mouth once daily 90 tablet 1    TOUJEO SOLOSTAR U-300 INSULIN 300 unit/mL (1.5 mL) InPn pen INJECT 33 UNITS UNDER THE SKIN EVERY EVENING 13.5 mL 3    vitamin D 1000 units Tab Take 1,000 Units by mouth once daily.      diabetic supplies, miscellan. Kit 1 application by Misc.(Non-Drug; Combo Route) route once daily. Lifescan glucose meter 1 kit 0     No current facility-administered medications on file prior to visit.            Review of patient's allergies indicates:  No Known Allergies        Social History     Socioeconomic History    Marital status:      Spouse name: Not on file    Number of children: Not on file    Years of education: Not on file    Highest education level: Not on file   Social Needs    Financial resource strain: Not on file    Food insecurity - worry: Not on file    Food insecurity - inability: Not on file    Transportation needs - medical: Not on file    Transportation needs - non-medical: Not on file   Occupational History    Not on file   Tobacco Use    Smoking status: Former Smoker    Smokeless tobacco: Never Used   Substance and Sexual Activity    Alcohol use: No    Drug use: No    Sexual activity: Not Currently     Birth control/protection: None   Other Topics " Concern    Not on file   Social History Narrative    Not on file           ROS:  General ROS: negative  Respiratory ROS: no cough, shortness of breath, or wheezing  Cardiovascular ROS: no chest pain or dyspnea on exertion  Musculoskeletal ROS: negative  Neurological ROS:   negative for - impaired coordination/balance or numbness/tingling  Dermatological ROS: negative      LAST HbA1c:   Hemoglobin A1C   Date Value Ref Range Status   12/31/2018 6.8 (H) 4.0 - 5.6 % Final     Comment:     ADA Screening Guidelines:  5.7-6.4%  Consistent with prediabetes  >or=6.5%  Consistent with diabetes  High levels of fetal hemoglobin interfere with the HbA1C  assay. Heterozygous hemoglobin variants (HbS, HgC, etc)do  not significantly interfere with this assay.   However, presence of multiple variants may affect accuracy.     05/01/2018 6.3 (H) 4.0 - 5.6 % Final     Comment:     According to ADA guidelines, hemoglobin A1c <7.0% represents  optimal control in non-pregnant diabetic patients. Different  metrics may apply to specific patient populations.   Standards of Medical Care in Diabetes-2016.  For the purpose of screening for the presence of diabetes:  <5.7%     Consistent with the absence of diabetes  5.7-6.4%  Consistent with increasing risk for diabetes   (prediabetes)  >or=6.5%  Consistent with diabetes  Currently, no consensus exists for use of hemoglobin A1c  for diagnosis of diabetes for children.  This Hemoglobin A1c assay has significant interference with fetal   hemoglobin   (HbF). The results are invalid for patients with abnormal amounts of   HbF,   including those with known Hereditary Persistence   of Fetal Hemoglobin. Heterozygous hemoglobin variants (HbAS, HbAC,   HbAD, HbAE, HbA2) do not significantly interfere with this assay;   however, presence of multiple variants in a sample may impact the %   interference.     07/06/2017 6.6 (H) 4.0 - 5.6 % Final     Comment:     According to ADA guidelines, hemoglobin A1c  "<7.0% represents  optimal control in non-pregnant diabetic patients. Different  metrics may apply to specific patient populations.   Standards of Medical Care in Diabetes-2016.  For the purpose of screening for the presence of diabetes:  <5.7%     Consistent with the absence of diabetes  5.7-6.4%  Consistent with increasing risk for diabetes   (prediabetes)  >or=6.5%  Consistent with diabetes  Currently, no consensus exists for use of hemoglobin A1c  for diagnosis of diabetes for children.  This Hemoglobin A1c assay has significant interference with fetal   hemoglobin   (HbF). The results are invalid for patients with abnormal amounts of   HbF,   including those with known Hereditary Persistence   of Fetal Hemoglobin. Heterozygous hemoglobin variants (HbAS, HbAC,   HbAD, HbAE, HbA2) do not significantly interfere with this assay;   however, presence of multiple variants in a sample may impact the %   interference.             EXAM:   Vitals:    01/23/19 1600   Weight: 76.7 kg (169 lb)   Height: 5' 6" (1.676 m)       General: alert, no distress, cooperative    Vascular:   Dorsalis Pedis:  present   Posterior Tibial:  present  Capillary refill time:  3 seconds  Temperature of toes cool to touch  Edema:  Trace and non-pitting       Neurological:     Sharp touch:  normal  Light touch: normal  Tinels Sign:  Absent  Mulders Click:   Absent  SWMF:  diminished      Dermatological:   Skin: Normal tone and turgor  Wounds/Ulcers:  Absent  Bruising:  Absent  Erythema:  Absent  Toenails x 10 are elongated by 1-4mm, thickened, without paronychia or discoloration      Musculoskeletal:   Metatarsophalangeal range of motion:   full range of motion  Subtalar joint range of motion: full range of motion  Ankle joint range of motion:  full range of motion  Bunions:  Absent  Hammertoes: Absent               ASSESSMENT/PLAN:          Problem List Items Addressed This Visit     None      Visit Diagnoses     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     Shoe inspection. Diabetic Foot Education. Patient reminded of the importance of good nutrition and blood sugar control to help prevent podiatric complications of diabetes. Patient instructed on proper foot hygiene. We discussed wearing proper shoe gear, daily foot inspections, never walking without protective shoe gear, never putting sharp instruments to feet.    Routine Foot Care  Date/Time: 1/23/2019 2:01 PM  Performed by: Heide Gillis DPM  Authorized by: Heide Gillis DPM     Consent Done?:  Yes (Verbal)    Nail Care Type:  Debride  Location(s): All  (Left 1st Toe, Left 3rd Toe, Left 2nd Toe, Left 4th Toe, Left 5th Toe, Right 1st Toe, Right 2nd Toe, Right 3rd Toe, Right 4th Toe and Right 5th Toe)  Patient tolerance:  Patient tolerated the procedure well with no immediate complications     With patient's permission, the toenails mentioned above were aggressively reduced and debrided using a nail nipper, removing all offending nail and debris. The patient will continue to monitor the areas daily, inspect the feet, wear protective shoe gear when ambulatory, and moisturizer to maintain skin integrity.                           Heide Gillis DPM  NPI: [id number]       [facility] - PODIATRY  [address] [place] [id number]  Dept: [phone]  Dept Fax: [phone]

## 2019-01-25 ENCOUNTER — PATIENT MESSAGE (OUTPATIENT)
Dept: CARDIOLOGY | Facility: CLINIC | Age: 69
End: 2019-01-25

## 2019-01-25 NOTE — PATIENT INSTRUCTIONS
How to Check Your Feet    Below are tips to help you look for foot problems. Try to check your feet at the same time each day, such as when you get out of bed in the morning.    · Check the top of each foot. The tops of toes, back of the heel, and outer edge of the foot can get a lot of rubbing from poor-fitting shoes.    · Check the bottom of each foot. Daily wear and tear often leads to problems at pressure spots.    · Check the toes and nails. Fungal infections often occur between toes. Toenail problems can also be a sign of fungal infections or lead to breaks in the skin.    · Check your shoes, too. Loose objects inside a shoe can injure the foot. Use your hand to feel inside your shoes for things like lorenzo, loose stitching, or rough areas that could irritate your skin.        Diabetic Foot Care    Diabetes can lead to a number of different foot complications. Fortunately, most of these complications can be prevented with a little extra foot care. If diabetes is not well controlled, the high blood sugar can cause damage to blood vessels and result in poor circulation to the foot. When the skin does not get enough blood flow, it becomes prone to pressure sores and ulcers, which heal slowly.  High blood sugar can also damage nerves, interfering with the ability to feel pain and pressure. When you cant feel your foot normally, it is easy to injure your skin, bones and joints without knowing it. For these reasons diabetes increases the risk of fungal infections, bunions and ulcers. Deep ulcers can lead to bone infection. Gangrene is the most serious foot complication of diabetes. It usually occurs on the tips of the toes as blacked areas of skin. The black area is dead tissue. In severe cases, gangrene spreads to involve the entire toe, other toes and the entire foot. Foot or toe amputation may be required. Good foot care and blood sugar control can prevent this.    Home Care  1. Wear comfortable, proper fitting  shoes.  2. Wash your feet daily with warm water and mild soap.  3. After drying, apply a moisturizing cream or lotion.  4. Check your feet daily for skin breaks, blisters, swelling, or redness. Look between your toes also.  5. Wear cotton socks and change them every day.  6. Trim toe nails carefully and do not cut your cuticles.  7. Strive to keep your blood sugar under control with a combination of medicines, diet and activity.  8. If you smoke and have diabetes, it is very important that you stop. Smoking reduces blood flow to your foot.  9. Avoid activities that increase your risk of foot injury:  · Do not walk barefoot.  · Do not use heating pads or hot water bottles on your feet.  · Do not put your foot in a hot tub without first checking the temperature with your hand.  10) Schedule yearly foot exams.    Follow Up  with your doctor or as advised by our staff. Report any cut, puncture, scrape, other injury, blister, ingrown toenail or ulcer on your foot.    Get Prompt Medical Attention  if any of the following occur:  -- Open ulcer with pus draining from the wound  -- Increasing foot or leg pain  -- New areas of redness or swelling or tender areas of the foot    © 1087-8936 [employer]. [address] [place] [address], [place] [address]. All rights reserved. This information is not intended as a substitute for professional medical care. Always follow your healthcare professional's instructions.

## 2019-02-14 ENCOUNTER — PATIENT MESSAGE (OUTPATIENT)
Dept: CARDIOLOGY | Facility: CLINIC | Age: 69
End: 2019-02-14

## 2019-02-14 DIAGNOSIS — H93.A9 PULSATILE TINNITUS: Primary | ICD-10-CM

## 2019-02-14 DIAGNOSIS — Q27.30 ARTERIOVENOUS MALFORMATION (AVM): ICD-10-CM

## 2019-02-20 ENCOUNTER — HOSPITAL ENCOUNTER (OUTPATIENT)
Dept: RADIOLOGY | Facility: OTHER | Age: 69
Discharge: HOME | End: 2019-02-20
Attending: INTERNAL MEDICINE
Payer: MEDICARE

## 2019-02-20 ENCOUNTER — TELEPHONE (OUTPATIENT)
Dept: CARDIOLOGY | Facility: CLINIC | Age: 69
End: 2019-02-20

## 2019-02-20 DIAGNOSIS — Q27.30 ARTERIOVENOUS MALFORMATION (AVM): ICD-10-CM

## 2019-02-20 PROCEDURE — 70549 MR ANGIOGRAPH NECK W/O&W/DYE: CPT | Mod: 26,,, | Performed by: RADIOLOGY

## 2019-02-20 PROCEDURE — 70549 MR ANGIOGRAPH NECK W/O&W/DYE: CPT | Mod: TC

## 2019-02-20 PROCEDURE — 25500020 PHARM REV CODE 255: Performed by: INTERNAL MEDICINE

## 2019-02-20 PROCEDURE — 70549 MRA NECK WITH AND WITHOUT: ICD-10-PCS | Mod: 26,,, | Performed by: RADIOLOGY

## 2019-02-20 PROCEDURE — A9585 GADOBUTROL INJECTION: HCPCS | Performed by: INTERNAL MEDICINE

## 2019-02-20 RX ORDER — GADOBUTROL 604.72 MG/ML
10 INJECTION INTRAVENOUS
Status: COMPLETED | OUTPATIENT
Start: 2019-02-20 | End: 2019-02-20

## 2019-02-20 RX ADMIN — GADOBUTROL 10 ML: 604.72 INJECTION INTRAVENOUS at 01:02

## 2019-02-20 NOTE — TELEPHONE ENCOUNTER
MRA neck reviewed with Ms Green. Normal study. No vascular etiology to her pulsatile tinnitus.    Instructed her to review her other medications with her physicians to determine if this is a medication side effect.

## 2019-02-27 ENCOUNTER — OFFICE VISIT (OUTPATIENT)
Dept: GASTROENTEROLOGY | Facility: CLINIC | Age: 69
End: 2019-02-27
Payer: MEDICARE

## 2019-02-27 ENCOUNTER — TELEPHONE (OUTPATIENT)
Dept: INTERNAL MEDICINE | Facility: CLINIC | Age: 69
End: 2019-02-27

## 2019-02-27 VITALS
WEIGHT: 169 LBS | HEART RATE: 58 BPM | DIASTOLIC BLOOD PRESSURE: 79 MMHG | SYSTOLIC BLOOD PRESSURE: 135 MMHG | BODY MASS INDEX: 27.16 KG/M2 | HEIGHT: 66 IN

## 2019-02-27 DIAGNOSIS — R19.8 BORBORYGMUS: ICD-10-CM

## 2019-02-27 DIAGNOSIS — Z12.11 COLON CANCER SCREENING: Primary | ICD-10-CM

## 2019-02-27 PROCEDURE — 99999 PR PBB SHADOW E&M-EST. PATIENT-LVL V: CPT | Mod: PBBFAC,,, | Performed by: PHYSICIAN ASSISTANT

## 2019-02-27 PROCEDURE — 99999 PR PBB SHADOW E&M-EST. PATIENT-LVL V: ICD-10-PCS | Mod: PBBFAC,,, | Performed by: PHYSICIAN ASSISTANT

## 2019-02-27 PROCEDURE — 99213 OFFICE O/P EST LOW 20 MIN: CPT | Mod: S$PBB,,, | Performed by: PHYSICIAN ASSISTANT

## 2019-02-27 PROCEDURE — 99213 PR OFFICE/OUTPT VISIT, EST, LEVL III, 20-29 MIN: ICD-10-PCS | Mod: S$PBB,,, | Performed by: PHYSICIAN ASSISTANT

## 2019-02-27 PROCEDURE — 99215 OFFICE O/P EST HI 40 MIN: CPT | Mod: PBBFAC | Performed by: PHYSICIAN ASSISTANT

## 2019-02-27 NOTE — TELEPHONE ENCOUNTER
The patient was phoned about a recent eye exam. There was no answer. I was unable to leave a voice message.     Tracy CORONEL LPN  Clinical Care Coordinator  Internal Medicine  [facility]

## 2019-02-27 NOTE — PATIENT INSTRUCTIONS
Start taking a lactose-free probiotic twice daily for 2 weeks then once daily therafter    Take miralax 17g at night for 1 week to clean out your bowels

## 2019-02-27 NOTE — PROGRESS NOTES
"    [facility] Consultation Note    Reason for Consult:  The primary encounter diagnosis was Colon cancer screening. A diagnosis of Borborygmus was also pertinent to this visit.    PCP: Althea Anderson       HPI:  This is a 68 y.o. female here for evaluation  Stomach has been making noises x 2 weeks  Feels it when she is sitting quitely  Denies associated abdominal pain  She has a Hx of constipation  Bowels moving well lately eating flaxseed    ROS:  Constitutional: No fevers, chills, No weight loss  ENT: No allergies  CV: No chest pain  Pulm: No cough, No shortness of breath  Ophtho: No vision changes  GI: see HPI  Derm: No rash  Heme: No lymphadenopathy, No bruising  MSK: No arthritis  : No dysuria, No hematuria  Endo: No hot or cold intolerance  Neuro: No syncope, No seizure  Psych: No anxiety, No depression    Medical History:  has a past medical history of Chronic constipation, Diabetes mellitus, Diabetes mellitus, History of thyroid cyst, Hypertension, and Osteopenia.    Surgical History:  has a past surgical history that includes  section; Hysterectomy; and Colonoscopy.    Family History: family history includes Breast cancer in her cousin; Heart attack in her father and mother; Heart disease in her father and mother; Heart failure in her father; Hyperlipidemia in her sister; Hypertension in her sister..     Social History:  reports that she has quit smoking. she has never used smokeless tobacco. She reports that she does not drink alcohol or use drugs.    Review of patient's allergies indicates:  No Known Allergies    Current Outpatient Medications   Medication Sig    ACCU-CHEK FASTCLIX Misc TEST twice a day    ACCU-CHEK SMARTVIEW TEST STRIP Strp ACCU-CHEK SMARTVIEW TEST STRIP Strp,    aspirin (ECOTRIN) 81 MG EC tablet Take 81 mg by mouth once daily.    BD ULTRA-FINE SAMUEL PEN NEEDLE 32 gauge x 5/32" Ndle USE WITH INSULIN EVERY EVENING    blood sugar diagnostic Strp 1 " "strip by Misc.(Non-Drug; Combo Route) route 3 (three) times daily as needed. Lifescan test strips    calcium carbonate (OS-REDD) 600 mg calcium (1,500 mg) Tab Take 600 mg by mouth 2 (two) times daily with meals.    flavoxATE (URISPAS) 100 mg Tab Take 1 tablet (100 mg total) by mouth 3 (three) times daily as needed.    gabapentin (NEURONTIN) 100 MG capsule Take 1 capsule (100 mg total) by mouth 3 (three) times daily. (Patient taking differently: Take 100 mg by mouth 3 (three) times daily. As needed)    ibuprofen (ADVIL,MOTRIN) 200 MG tablet Take 200 mg by mouth every 6 (six) hours as needed for Pain.    irbesartan (AVAPRO) 150 MG tablet Take 1 tablet (150 mg total) by mouth once daily.    metFORMIN (GLUCOPHAGE-XR) 500 MG 24 hr tablet take 1 tablet by mouth twice a day    NOVOFINE 32 32 gauge x 1/4" Ndle use subcutaneously every evening    ONETOUCH ULTRA BLUE TEST STRIP Strp TEST THREE TIMES DAILY    ONETOUCH ULTRA TEST Strp TEST three times a day    pen needle, diabetic (NOVOFINE PLUS) 32 gauge x 1/6" Ndle Inject 1 application into the skin every evening.    rosuvastatin (CRESTOR) 10 MG tablet take 1 tablet by mouth once daily    TOUJEO SOLOSTAR U-300 INSULIN 300 unit/mL (1.5 mL) InPn pen INJECT 33 UNITS UNDER THE SKIN EVERY EVENING    vitamin D 1000 units Tab Take 1,000 Units by mouth once daily.    diabetic supplies, [employer]. Kit 1 application by Misc.(Non-Drug; Combo Route) route once daily. Lifescan glucose meter    Lactobacillus rhamnosus GG (CULTURELLE) 10 billion cell capsule Take 1 capsule by mouth once daily.    LINZESS 145 mcg Cap capsule 145 mcg once daily. As needed     No current facility-administered medications for this visit.          Objective Findings:    Vital Signs:  /79   Pulse (!) 58   Ht 5' 6" (1.676 m)   Wt 76.7 kg (169 lb)   BMI 27.28 kg/m²   Body mass index is 27.28 kg/m².    Physical Exam:  General Appearance: Well appearing in no acute distress  Head:   " Normocephalic, without obvious abnormality  Eyes:    No scleral icterus  ENT: Neck supple, Lips, mucosa, and tongue normal  Lungs: CTA bilaterally in anterior and posterior fields, no wheezes, no crackles.  Heart:  Regular rate and rhythm, S1, S2 normal, no murmurs heard  Abdomen: Soft, non tender, non distended with positive bowel sounds in all four quadrants.   Extremities: no edema  Skin: No rash  Neurologic: AAO x 3      Labs:  Lab Results   Component Value Date    WBC 6.41 06/25/2018    HGB 12.5 06/25/2018    HCT 40.0 06/25/2018     06/25/2018    CHOL 183 10/25/2018    TRIG 60 10/25/2018    HDL 60 10/25/2018    ALT 32 06/25/2018    AST 37 06/25/2018     06/25/2018    K 4.2 06/25/2018     06/25/2018    CREATININE 0.9 02/20/2019    BUN 13 06/25/2018    CO2 25 06/25/2018    TSH 0.719 06/25/2018    HGBA1C 6.8 (H) 12/31/2018       Imaging:    Endoscopy:    Colonoscopy 2 yrs ago - [facility]- no polyps per pt  Colonoscopy - 2010 per records  12/2012 Flex Sig - prominent vessel with blood / erosion in ileo-secal valve    Assessment:  1. Colon cancer screening    2. Borborygmus      65yo F presents with intestinal growling x 2 weeks.   Recommendations:  Probiotics  iFOBT    Follow-up if symptoms worsen or fail to improve.      Order summary:  Orders Placed This Encounter    Fecal Immunochemical Test (iFOBT)         Addendum Records Review: Added to note above. Scanned to media  Thank you so much for allowing me to participate in the care of Gely Rosales PA-C

## 2019-03-02 PROCEDURE — 82274 ASSAY TEST FOR BLOOD FECAL: CPT

## 2019-03-04 ENCOUNTER — LAB VISIT (OUTPATIENT)
Dept: LAB | Facility: OTHER | Age: 69
End: 2019-03-04
Payer: MEDICARE

## 2019-03-04 ENCOUNTER — OFFICE VISIT (OUTPATIENT)
Dept: OTOLARYNGOLOGY | Facility: CLINIC | Age: 69
End: 2019-03-04
Attending: FAMILY MEDICINE
Payer: MEDICARE

## 2019-03-04 VITALS
WEIGHT: 171.81 LBS | HEART RATE: 53 BPM | SYSTOLIC BLOOD PRESSURE: 153 MMHG | TEMPERATURE: 98 F | BODY MASS INDEX: 27.61 KG/M2 | HEIGHT: 66 IN | DIASTOLIC BLOOD PRESSURE: 81 MMHG

## 2019-03-04 DIAGNOSIS — H93.A3 PULSATILE TINNITUS OF BOTH EARS: ICD-10-CM

## 2019-03-04 DIAGNOSIS — H90.42 SENSORINEURAL HEARING LOSS (SNHL) OF LEFT EAR WITH UNRESTRICTED HEARING OF RIGHT EAR: Primary | ICD-10-CM

## 2019-03-04 DIAGNOSIS — H93.A3 PULSATILE TINNITUS, BILATERAL: ICD-10-CM

## 2019-03-04 LAB
CREAT SERPL-MCNC: 0.8 MG/DL
EST. GFR  (AFRICAN AMERICAN): >60 ML/MIN/1.73 M^2
EST. GFR  (NON AFRICAN AMERICAN): >60 ML/MIN/1.73 M^2

## 2019-03-04 PROCEDURE — 36415 COLL VENOUS BLD VENIPUNCTURE: CPT

## 2019-03-04 PROCEDURE — 82565 ASSAY OF CREATININE: CPT

## 2019-03-04 PROCEDURE — 99214 OFFICE O/P EST MOD 30 MIN: CPT | Mod: S$GLB,,, | Performed by: OTOLARYNGOLOGY

## 2019-03-04 PROCEDURE — 99214 PR OFFICE/OUTPT VISIT, EST, LEVL IV, 30-39 MIN: ICD-10-PCS | Mod: S$GLB,,, | Performed by: OTOLARYNGOLOGY

## 2019-03-04 PROCEDURE — 92557 PR COMPREHENSIVE HEARING TEST: ICD-10-PCS | Mod: S$GLB,,, | Performed by: AUDIOLOGIST-HEARING AID FITTER

## 2019-03-04 PROCEDURE — 92550 PR TYMPANOMETRY AND REFLEX THRESHOLD MEASUREMENTS: ICD-10-PCS | Mod: S$GLB,,, | Performed by: AUDIOLOGIST-HEARING AID FITTER

## 2019-03-04 PROCEDURE — 92550 TYMPANOMETRY & REFLEX THRESH: CPT | Mod: S$GLB,,, | Performed by: AUDIOLOGIST-HEARING AID FITTER

## 2019-03-04 PROCEDURE — 92557 COMPREHENSIVE HEARING TEST: CPT | Mod: S$GLB,,, | Performed by: AUDIOLOGIST-HEARING AID FITTER

## 2019-03-04 RX ORDER — AZELASTINE 1 MG/ML
2 SPRAY, METERED NASAL 2 TIMES DAILY
Qty: 30 ML | Refills: 1 | Status: SHIPPED | OUTPATIENT
Start: 2019-03-04 | End: 2019-11-25

## 2019-03-04 NOTE — PROGRESS NOTES
Subjective:       Patient ID: Gely Green is a 68 y.o. female.    Chief Complaint: Tinnitus (Hears heart beating/go over Audio)    She is a new patient for me here today complaining of hearing her heartbeat in her ears since April of 2018.  She denies antecedent URI or air travel or other event.  She reports traveling by air however soon after the onset of symptoms but without any change in these complaints.  She states symptoms are present all the time to varying degrees.  She notices it more when she is sitting upright quietly and not so much when lying down.  She states her blood pressure has been under good control.  Denies anemia.  Has used power tools in the past but not ongoing or recent.  Some childhood earaches and ear infections which cleared.  No nasal or throat or other otolaryngologic complaints.  Has seen other specialists with these complaints including Cardiology and Neurology and primary care per chart review.  Record reveals had carotid ultrasound in January 2019 as well as MRA of the neck in February 2019 neither of which showed any significant abnormality.  Also had Holter with some PVCs which did not correlate with symptoms though has also complained of pounding heartbeat and palpitations as well.  Medical history includes MVP, hypertension, diabetes mellitus.            Review of Systems   Ears: Positive for ear pain and ringing in ear.  Negative for hearing loss, ear pressure, ear discharge, ear infections, head trauma, taken gentramycin/streptomycin and family history of hearing loss.    Nose:  Positive for postnasal drip. Negative for nosebleeds, nasal obstruction, nasal or sinus surgery and loss of smell.    Mouth/Throat: Negative for pain swallowing, impaired swallowing, hoarse voice, throat mass and neck mass.   Constitutional: Negative for recent unexplained weight loss and fever.    Eyes:  Negative for history of glaucoma and visual change.   Cardiovascular:  Positive for chest  pain, palpitations and history of high blood pressure.   Respiratory:  Negative for asthma, emphysema, history of tuberculosis, recent cough and shortness of breath.    Gastrointestinal:  Positive for acid reflux. Negative for history of stomach ulcers or pain and blood in stool.   Other:  Positive for kidney problem, bladder problem, arthritis, depression and anxiety. Negative for weakness, disturbances in coordination, slurred, swollen glands, anemia and persistent infections.           Objective:        Vitals:    03/04/19 1001   BP: (!) 153/81   Pulse: (!) 53   Temp: 97.6 °F (36.4 °C)     Body mass index is 27.73 kg/m².  Physical Exam   Constitutional: She is oriented to person, place, and time. She appears well-developed and well-nourished. No distress.   HENT:   Head: Normocephalic and atraumatic.   Right Ear: Tympanic membrane, external ear and ear canal normal.   Left Ear: Tympanic membrane, external ear and ear canal normal.   Nose: No mucosal edema, rhinorrhea or nasal deformity. No epistaxis.   Mouth/Throat: Oropharynx is clear and moist and mucous membranes are normal. No oral lesions. No trismus in the jaw. No uvula swelling. No oropharyngeal exudate, posterior oropharyngeal edema or posterior oropharyngeal erythema. Tonsils are 0 on the left.   Flaky skin at meatus bilat.  Sclerotic TM's and unable to insufflate.     Neck: Phonation normal. Neck supple. No tracheal deviation present.   Pulmonary/Chest: Effort normal. No respiratory distress.   Lymphadenopathy:     She has no cervical adenopathy.   Neurological: She is alert and oriented to person, place, and time. She displays no weakness.   Skin: Skin is warm and dry.   Psychiatric: She has a normal mood and affect. Her behavior is normal. Her speech is not slurred.       Tests / Results:                    Assessment:       1. Pulsatile tinnitus of both ears        Plan:       Reviewed all above and considerations and recommendations and answered  questions.  CT temporal bone with contrast pending creatinine level as discussed.  Azelastine nasal spray 2 sprays in each nostril twice daily.  Insufflation exercises regularly as demonstrated.  Follow-up results and recheck in 2 weeks.

## 2019-03-04 NOTE — LETTER
March 4, 2019      Althea Anderson MD  [address]  [place] [address]           [facility] [address]  [address]  [place] [address]  Phone: [phone]  Fax: [phone]          Patient: Gely Green   MR Number: [id number]   YOB: 1950   Date of Visit: 3/4/2019       Dear Dr. Althea Anderson:    Thank you for referring Gely Green to me for evaluation. Attached you will find relevant portions of my assessment and plan of care.    If you have questions, please do not hesitate to call me. I look forward to following Gely Green along with you.    Sincerely,    Aracely Fu MD    Enclosure  CC:  No Recipients    If you would like to receive this communication electronically, please contact [email or web address] or [phone] to request more information on [facility] Link access.    For providers and/or their staff who would like to refer a patient to [facility], please contact us through our one-stop-shop provider referral line, [facility], at [phone].    If you feel you have received this communication in error or would no longer like to receive these types of communications, please e-mail [email or web address]

## 2019-03-04 NOTE — PROGRESS NOTES
Charisma Kee, CCC-A  Audiologist - [facility] [address] [place] [address]  [email or web address]  [phone]    Patient: Gely Green   MRN: [id number]  : 1950  FINNEGAN: 3/4/2019      AUDIOLOGICAL EVALUATION      RECOMMENDATIONS:   It is recommended that she:  Follow up medically with a physician to assess tinnitus & asymmetrical hearing loss.  Continue to receive audiological monitoring annually.  Use precaution and/or hearing protection in noisy environments.    If you should have any questions or concerns regarding the above information, please do not hesitate to contact me at [phone].      _______________________________  Charisma Kee, VALERIY-A  Audiologist

## 2019-03-04 NOTE — PATIENT INSTRUCTIONS
Creatinine blood test.  Then CT scan.  Take azelastine nasal spray 2 sprays in each nostril twice a day.  Insufflation exercises several times a day.  Follow up results and recheck in 2 - 3 weeks.

## 2019-03-06 ENCOUNTER — LAB VISIT (OUTPATIENT)
Dept: LAB | Facility: HOSPITAL | Age: 69
End: 2019-03-06
Attending: FAMILY MEDICINE
Payer: MEDICARE

## 2019-03-06 DIAGNOSIS — R00.2 POUNDING HEARTBEAT: ICD-10-CM

## 2019-03-06 DIAGNOSIS — Z12.11 COLON CANCER SCREENING: ICD-10-CM

## 2019-03-06 DIAGNOSIS — E78.5 HYPERLIPIDEMIA, UNSPECIFIED HYPERLIPIDEMIA TYPE: ICD-10-CM

## 2019-03-06 DIAGNOSIS — I10 ESSENTIAL HYPERTENSION: ICD-10-CM

## 2019-03-06 LAB — HEMOCCULT STL QL IA: POSITIVE

## 2019-03-06 RX ORDER — IRBESARTAN 150 MG/1
TABLET ORAL
Qty: 30 TABLET | Refills: 0 | Status: SHIPPED | OUTPATIENT
Start: 2019-03-06 | End: 2019-04-01 | Stop reason: SDUPTHER

## 2019-03-10 ENCOUNTER — TELEPHONE (OUTPATIENT)
Dept: GASTROENTEROLOGY | Facility: CLINIC | Age: 69
End: 2019-03-10

## 2019-03-10 DIAGNOSIS — R19.5 HEME POSITIVE STOOL: ICD-10-CM

## 2019-03-10 DIAGNOSIS — R19.8 BORBORYGMUS: Primary | ICD-10-CM

## 2019-03-11 NOTE — TELEPHONE ENCOUNTER
Attempted to call pt regarding stool results and scheduling pt didn't answer left vm instructing pt to call back.

## 2019-03-12 ENCOUNTER — HOSPITAL ENCOUNTER (OUTPATIENT)
Dept: RADIOLOGY | Facility: OTHER | Age: 69
Discharge: HOME | End: 2019-03-12
Attending: OTOLARYNGOLOGY
Payer: MEDICARE

## 2019-03-12 DIAGNOSIS — H93.A3 PULSATILE TINNITUS OF BOTH EARS: ICD-10-CM

## 2019-03-12 PROCEDURE — 70481 CT ORBIT/EAR/FOSSA W/DYE: CPT | Mod: 26,,, | Performed by: RADIOLOGY

## 2019-03-12 PROCEDURE — 70481 CT TEMPORAL BONE WITH CONTRAST: ICD-10-PCS | Mod: 26,,, | Performed by: RADIOLOGY

## 2019-03-12 PROCEDURE — 70481 CT ORBIT/EAR/FOSSA W/DYE: CPT | Mod: TC

## 2019-03-12 PROCEDURE — 25500020 PHARM REV CODE 255: Performed by: OTOLARYNGOLOGY

## 2019-03-12 RX ADMIN — IOHEXOL 75 ML: 350 INJECTION, SOLUTION INTRAVENOUS at 12:03

## 2019-03-18 ENCOUNTER — TELEPHONE (OUTPATIENT)
Dept: GASTROENTEROLOGY | Facility: CLINIC | Age: 69
End: 2019-03-18

## 2019-03-18 ENCOUNTER — LAB VISIT (OUTPATIENT)
Dept: LAB | Facility: OTHER | Age: 69
End: 2019-03-18
Payer: MEDICARE

## 2019-03-18 DIAGNOSIS — Z12.11 SPECIAL SCREENING FOR MALIGNANT NEOPLASMS, COLON: Primary | ICD-10-CM

## 2019-03-18 DIAGNOSIS — R19.8 BORBORYGMUS: ICD-10-CM

## 2019-03-18 PROCEDURE — 36415 COLL VENOUS BLD VENIPUNCTURE: CPT

## 2019-03-18 PROCEDURE — 86677 HELICOBACTER PYLORI ANTIBODY: CPT

## 2019-03-18 RX ORDER — POLYETHYLENE GLYCOL 3350, SODIUM SULFATE ANHYDROUS, SODIUM BICARBONATE, SODIUM CHLORIDE, POTASSIUM CHLORIDE 236; 22.74; 6.74; 5.86; 2.97 G/4L; G/4L; G/4L; G/4L; G/4L
4 POWDER, FOR SOLUTION ORAL ONCE
Qty: 4000 ML | Refills: 0 | Status: SHIPPED | OUTPATIENT
Start: 2019-03-18 | End: 2019-03-18

## 2019-03-18 NOTE — TELEPHONE ENCOUNTER
Pt aware and understanding of stool results, number to schedulers was given for pt to schedule colonoscopy. Pt lab appt scheduled for today 3/18/19 at 11:45AM          ----- Message from Sariah Love sent at 3/18/2019  8:18 AM CDT -----  Contact: self   Bobby    Needs Advice    Reason for call: returning your call         Communication Preference: [phone]    Additional Information:

## 2019-03-18 NOTE — TELEPHONE ENCOUNTER
Pt aware and understanding of why lab work was needed          ----- Message from Amy Wellington sent at 3/18/2019 12:01 PM CDT -----  Contact: pt: [phone]  Needs Advice    Reason for call: pt called to get clarification on lab work that has to be done would like to speak with MA         Communication Preference: pt: [phone]

## 2019-03-19 LAB — H PYLORI IGG SERPL QL IA: NEGATIVE

## 2019-03-21 ENCOUNTER — ANESTHESIA EVENT (OUTPATIENT)
Dept: ENDOSCOPY | Facility: HOSPITAL | Age: 69
End: 2019-03-21
Payer: MEDICARE

## 2019-03-21 ENCOUNTER — ANESTHESIA (OUTPATIENT)
Dept: ENDOSCOPY | Facility: HOSPITAL | Age: 69
End: 2019-03-21
Payer: MEDICARE

## 2019-03-21 ENCOUNTER — HOSPITAL ENCOUNTER (OUTPATIENT)
Facility: HOSPITAL | Age: 69
Discharge: HOME | End: 2019-03-21
Attending: COLON & RECTAL SURGERY | Admitting: COLON & RECTAL SURGERY
Payer: MEDICARE

## 2019-03-21 VITALS
SYSTOLIC BLOOD PRESSURE: 140 MMHG | RESPIRATION RATE: 16 BRPM | HEART RATE: 58 BPM | BODY MASS INDEX: 27.16 KG/M2 | TEMPERATURE: 97 F | OXYGEN SATURATION: 100 % | DIASTOLIC BLOOD PRESSURE: 75 MMHG | WEIGHT: 169 LBS | HEIGHT: 66 IN

## 2019-03-21 DIAGNOSIS — R19.5 POSITIVE FIT (FECAL IMMUNOCHEMICAL TEST): Primary | ICD-10-CM

## 2019-03-21 LAB
GLUCOSE SERPL-MCNC: 89 MG/DL (ref 70–110)
POCT GLUCOSE: 89 MG/DL (ref 70–110)

## 2019-03-21 PROCEDURE — E9220 PRA ENDO ANESTHESIA: HCPCS | Mod: ,,, | Performed by: NURSE ANESTHETIST, CERTIFIED REGISTERED

## 2019-03-21 PROCEDURE — 82962 GLUCOSE BLOOD TEST: CPT | Performed by: COLON & RECTAL SURGERY

## 2019-03-21 PROCEDURE — 45378 DIAGNOSTIC COLONOSCOPY: CPT | Mod: ,,, | Performed by: COLON & RECTAL SURGERY

## 2019-03-21 PROCEDURE — 45378 DIAGNOSTIC COLONOSCOPY: CPT | Performed by: COLON & RECTAL SURGERY

## 2019-03-21 PROCEDURE — 63600175 PHARM REV CODE 636 W HCPCS: Performed by: NURSE ANESTHETIST, CERTIFIED REGISTERED

## 2019-03-21 PROCEDURE — 25000003 PHARM REV CODE 250: Performed by: COLON & RECTAL SURGERY

## 2019-03-21 PROCEDURE — 37000009 HC ANESTHESIA EA ADD 15 MINS: Performed by: COLON & RECTAL SURGERY

## 2019-03-21 PROCEDURE — 37000008 HC ANESTHESIA 1ST 15 MINUTES: Performed by: COLON & RECTAL SURGERY

## 2019-03-21 PROCEDURE — E9220 PRA ENDO ANESTHESIA: ICD-10-PCS | Mod: ,,, | Performed by: NURSE ANESTHETIST, CERTIFIED REGISTERED

## 2019-03-21 PROCEDURE — 45378 PR COLONOSCOPY,DIAGNOSTIC: ICD-10-PCS | Mod: ,,, | Performed by: COLON & RECTAL SURGERY

## 2019-03-21 PROCEDURE — 25000003 PHARM REV CODE 250: Performed by: NURSE ANESTHETIST, CERTIFIED REGISTERED

## 2019-03-21 RX ORDER — SODIUM CHLORIDE 9 MG/ML
INJECTION, SOLUTION INTRAVENOUS CONTINUOUS
Status: DISCONTINUED | OUTPATIENT
Start: 2019-03-21 | End: 2019-03-21 | Stop reason: HOSPADM

## 2019-03-21 RX ORDER — SODIUM CHLORIDE 9 MG/ML
INJECTION, SOLUTION INTRAVENOUS CONTINUOUS PRN
Status: DISCONTINUED | OUTPATIENT
Start: 2019-03-21 | End: 2019-03-21

## 2019-03-21 RX ORDER — LIDOCAINE HCL/PF 100 MG/5ML
SYRINGE (ML) INTRAVENOUS
Status: DISCONTINUED | OUTPATIENT
Start: 2019-03-21 | End: 2019-03-21

## 2019-03-21 RX ORDER — PROPOFOL 10 MG/ML
VIAL (ML) INTRAVENOUS
Status: DISCONTINUED | OUTPATIENT
Start: 2019-03-21 | End: 2019-03-21

## 2019-03-21 RX ORDER — PROPOFOL 10 MG/ML
VIAL (ML) INTRAVENOUS CONTINUOUS PRN
Status: DISCONTINUED | OUTPATIENT
Start: 2019-03-21 | End: 2019-03-21

## 2019-03-21 RX ADMIN — SODIUM CHLORIDE: 0.9 INJECTION, SOLUTION INTRAVENOUS at 08:03

## 2019-03-21 RX ADMIN — LIDOCAINE HYDROCHLORIDE 20 MG: 20 INJECTION, SOLUTION INTRAVENOUS at 09:03

## 2019-03-21 RX ADMIN — PROPOFOL 150 MCG/KG/MIN: 10 INJECTION, EMULSION INTRAVENOUS at 09:03

## 2019-03-21 RX ADMIN — PROPOFOL 50 MG: 10 INJECTION, EMULSION INTRAVENOUS at 09:03

## 2019-03-21 RX ADMIN — SODIUM CHLORIDE: 0.9 INJECTION, SOLUTION INTRAVENOUS at 09:03

## 2019-03-21 RX ADMIN — PROPOFOL 10 MG: 10 INJECTION, EMULSION INTRAVENOUS at 09:03

## 2019-03-21 NOTE — ANESTHESIA RELEASE NOTE
"Anesthesia Release from PACU Note    Patient: Gely Green    Procedure(s) Performed: Procedure(s) (LRB):  COLONOSCOPY (N/A)    Anesthesia type: general    Post pain: Adequate analgesia    Post assessment: no apparent anesthetic complications and tolerated procedure well    Last Vitals:   Visit Vitals  BP (!) 140/75 (BP Location: Left arm, Patient Position: Lying)   Pulse (!) 58   Temp 36.3 °C (97.3 °F) (Tympanic)   Resp 16   Ht 5' 6" (1.676 m)   Wt 76.7 kg (169 lb)   SpO2 100%   Breastfeeding? No   BMI 27.28 kg/m²       Post vital signs: stable    Level of consciousness: awake and alert     Nausea/Vomiting: no nausea/no vomiting    Complications: none    Airway Patency: patent    Respiratory: unassisted, spontaneous ventilation, room air    Cardiovascular: stable and blood pressure at baseline    Hydration: euvolemic  "

## 2019-03-21 NOTE — ANESTHESIA PREPROCEDURE EVALUATION
2019  Gely Green is a 68 y.o., female.  Patient Active Problem List   Diagnosis    Bladder pain    Atrophic vaginitis    Vaginal discharge    Dyspareunia    Anxiety    Interstitial cystitis    Diabetes mellitus    Essential hypertension    Osteopenia    Adjustment disorder with mixed anxiety and depressed mood    Grief    Diabetic peripheral neuropathy    Trigeminal neuritis    Chronic pain of left knee    Weakness of both lower extremities    Decreased range of motion of neck    Memory loss    Pounding heartbeat    Hyperlipidemia    Thyromegaly    PVC's (premature ventricular contractions)    Pulsatile tinnitus     Past Surgical History:   Procedure Laterality Date     SECTION      2x    COLONOSCOPY      HYSTERECTOMY      full hyst      Past Medical History:   Diagnosis Date    Chronic constipation     Diabetes mellitus     Diabetes mellitus     History of thyroid cyst     Hypertension     Osteopenia      Anesthesia Evaluation    I have reviewed the Patient Summary Reports.     I have reviewed the Medications.     Review of Systems  Anesthesia Hx:  No problems with previous Anesthesia    Hematology/Oncology:  Hematology Normal   Oncology Normal     EENT/Dental:EENT/Dental Normal   Cardiovascular:   Hypertension, well controlled Dysrhythmias Pt reports having an irregular heart beat   Pulmonary:  Pulmonary Normal    Renal/:  Renal/ Normal     Hepatic/GI:  Hepatic/GI Normal    Musculoskeletal:  Musculoskeletal Normal    Neurological:   Neuromuscular Disease,    Endocrine:   Diabetes, well controlled    Dermatological:  Skin Normal    Psych:   Psychiatric History          Physical Exam  General:  Well nourished    Airway/Jaw/Neck:  Airway Findings: Mouth Opening: Normal Tongue: Normal  General Airway Assessment: Adult  Mallampati: II  Improves to I  with phonation.        Eyes/Ears/Nose:  EYES/EARS/NOSE FINDINGS: Normal   Dental:  Dental Findings: Upper Dentures   Chest/Lungs:  Chest/Lungs Findings: Clear to auscultation, Normal Respiratory Rate     Heart/Vascular:  Heart Findings: Rate: Normal  Rhythm: Regular Rhythm  Sounds: Normal  Heart murmur: negative Vascular Findings: Normal    Abdomen:  Abdomen Findings: Normal    Musculoskeletal:  Musculoskeletal Findings: Normal   Skin:  Skin Findings: Normal    Mental Status:  Mental Status Findings: Normal        Anesthesia Plan  Type of Anesthesia, risks & benefits discussed:  Anesthesia Type:  general  Patient's Preference: General  Intra-op Monitoring Plan: standard ASA monitors  Intra-op Monitoring Plan Comments:   Post Op Pain Control Plan:   Post Op Pain Control Plan Comments:   Induction:   IV  Beta Blocker:  Patient is not currently on a Beta-Blocker (No further documentation required).       Informed Consent: Patient understands risks and agrees with Anesthesia plan.  Questions answered. Anesthesia consent signed with patient.  ASA Score: 2     Day of Surgery Review of History & Physical: I have interviewed and examined the patient. I have reviewed the patient's H&P dated:  There are no significant changes.  H&P update referred to the surgeon.         Ready For Surgery From Anesthesia Perspective.

## 2019-03-21 NOTE — H&P
"COLONOSCOPY HISTORY AND PE    Procedure : Colonoscopy      INDICATIONS: Pt recently seen by GI for "stomach rumbling", FOBT was ordered and was positive.  Per pt, she hasn't had a colonoscopy since 2007, so was due anyway.  Denies any FH.      Past Medical History:   Diagnosis Date    Chronic constipation     Diabetes mellitus     Diabetes mellitus     History of thyroid cyst     Hypertension     Osteopenia      Sedation Problems: NO  Family History   Problem Relation Age of Onset    Breast cancer Cousin     Heart attack Mother     Heart disease Mother     Heart attack Father     Heart disease Father     Heart failure Father     Hyperlipidemia Sister     Hypertension Sister     Ovarian cancer Neg Hx     Cervical cancer Neg Hx     Endometrial cancer Neg Hx     Vaginal cancer Neg Hx     Stroke Neg Hx     Colon cancer Neg Hx     Esophageal cancer Neg Hx      Fam Hx of Sedation Problems: NO  Social History     Socioeconomic History    Marital status:      Spouse name: Not on file    Number of children: Not on file    Years of education: Not on file    Highest education level: Not on file   Social Needs    Financial resource strain: Not on file    Food insecurity - worry: Not on file    Food insecurity - inability: Not on file    Transportation needs - medical: Not on file    Transportation needs - non-medical: Not on file   Occupational History    Not on file   Tobacco Use    Smoking status: Former Smoker    Smokeless tobacco: Never Used   Substance and Sexual Activity    Alcohol use: No    Drug use: No    Sexual activity: Not Currently     Birth control/protection: None   Other Topics Concern    Not on file   Social History Narrative    Not on file       Physical Exam:  General: no distress  Head: normocephalic  Mallampati Score   Neck: supple, symmetrical, trachea midline  Lungs:  clear to auscultation bilaterally and normal respiratory effort  Heart: regular rate and " rhythm, S1, S2 normal, no murmur, rub or gallop  Abdomen: soft, non-tender non-distented; bowel sounds normal; no masses,  no organomegaly  Extremities: no cyanosis or edema, or clubbing    ASA:  II    PLAN  COLONOSCOPY.  The details of the procedure, the possible need for biopsy or polypectomy and the potential risks including bleeding, perforation, missed polyps were discussed in detail.

## 2019-03-21 NOTE — DISCHARGE INSTRUCTIONS
Colonoscopy     A camera attached to a flexible tube with a viewing lens is used to take video pictures.     Colonoscopy is a test to view the inside of your lower digestive tract (colon and rectum). Sometimes it can show the last part of the small intestine (ileum). During the test, small pieces of tissue may be removed for testing. This is called a biopsy. Small growths, such as polyps, may also be removed.   Why is colonoscopy done?  The test is done to help look for colon cancer. And it can help find the source of abdominal pain, bleeding, and changes in bowel habits. It may be needed once a year, depending on factors such as your:  · Age  · Health history  · Family health history  · Symptoms  · Results from any prior colonoscopy  Risks and possible complications  These include:  · Bleeding               · A puncture or tear in the colon   · Risks of anesthesia  · A cancer lesion not being seen  Getting ready   To prepare for the test:  · Talk with your healthcare provider about the risks of the test (see below). Also ask your healthcare provider about alternatives to the test.  · Tell your healthcare provider about any medicines you take. Also tell him or her about any health conditions you may have.  · Make sure your rectum and colon are empty for the test. Follow the diet and bowel prep instructions exactly. If you dont, the test may need to be rescheduled.  · Plan for a friend or family member to drive you home after the test.     Colonoscopy provides an inside view of the entire colon.     You may discuss the results with your doctor right away or at a future visit.  During the test   The test is usually done in the hospital on an outpatient basis. This means you go home the same day. The procedure takes about 30 minutes. During that time:  · You are given relaxing (sedating) medicine through an IV line. You may be drowsy, or fully asleep.  · The healthcare provider will first give you a physical exam to  check for anal and rectal problems.  · Then the anus is lubricated and the scope inserted.  · If you are awake, you may have a feeling similar to needing to have a bowel movement. You may also feel pressure as air is pumped into the colon. Its OK to pass gas during the procedure.  · Biopsy, polyp removal, or other treatments may be done during the test.  After the test   You may have gas right after the test. It can help to try to pass it to help prevent later bloating. Your healthcare provider may discuss the results with you right away. Or you may need to schedule a follow-up visit to talk about the results. After the test, you can go back to your normal eating and other activities. You may be tired from the sedation and need to rest for a few hours.  Date Last Reviewed: 11/1/2016 © 2000-2017 [employer]. [address] [place] [address], [place] [address]. All rights reserved. This information is not intended as a substitute for professional medical care. Always follow your healthcare professional's instructions.

## 2019-03-21 NOTE — PLAN OF CARE
Plan of care discussed with patient. All questions and concerns addressed and answered. Free of pain or distress. PIV removed without complications. VS stable. Procedure report and discharge instructions gone over and patient aware of restrictions and when to resume medications. Patient in agreement.    [id number] Dr. Contreras at bedside

## 2019-03-21 NOTE — [employer] PATIENT INSTRUCTIONS
Discharge Summary/Instructions after an Endoscopic Procedure  Patient Name: Gely Green  Patient MRN: [id number]  Patient YOB: 1950 Thursday, March 21, 2019  Marshall Contreras MD  RESTRICTIONS:  During your procedure today, you received medications for sedation.  These   medications may affect your judgment, balance and coordination.  Therefore,   for 24 hours, you have the following restrictions:   - DO NOT drive a car, operate machinery, make legal/financial decisions,   sign important papers or drink alcohol.    ACTIVITY:  Today: no heavy lifting, straining or running due to procedural   sedation/anesthesia.  The following day: return to full activity including work.  DIET:  Eat and drink normally unless instructed otherwise.     TREATMENT FOR COMMON SIDE EFFECTS:  - Mild abdominal pain, nausea, belching, bloating or excessive gas:  rest,   eat lightly and use a heating pad.  - Sore Throat: treat with throat lozenges and/or gargle with warm salt   water.  - Because air was used during the procedure, expelling large amounts of air   from your rectum or belching is normal.  - If a bowel prep was taken, you may not have a bowel movement for 1-3 days.    This is normal.  SYMPTOMS TO WATCH FOR AND REPORT TO YOUR PHYSICIAN:  1. Abdominal pain or bloating, other than gas cramps.  2. Chest pain.  3. Back pain.  4. Signs of infection such as: chills or fever occurring within 24 hours   after the procedure.  5. Rectal bleeding, which would show as bright red, maroon, or black stools.   (A tablespoon of blood from the rectum is not serious, especially if   hemorrhoids are present.)  6. Vomiting.  7. Weakness or dizziness.  GO DIRECTLY TO THE NEAREST EMERGENCY ROOM IF YOU HAVE ANY OF THE FOLLOWING:      Difficulty breathing              Chills and/or fever over 101 F   Persistent vomiting and/or vomiting blood   Severe abdominal pain   Severe chest pain   Black, tarry stools   Bleeding- more than one  tablespoon   Any other symptom or condition that you feel may need urgent attention  Your doctor recommends these additional instructions:  If any biopsies were taken, your doctors clinic will contact you in 1 to 2   weeks with any results.  - Discharge patient to home (ambulatory).   - Resume previous diet.   - Continue present medications.   - Repeat colonoscopy in 10 years for screening purposes.  For questions, problems or results please call your physician - Marshall Contreras MD at Work:  [phone].  [facility], EMERGENCY ROOM PHONE NUMBER: [phone]  IF A COMPLICATION OR EMERGENCY SITUATION ARISES AND YOU ARE UNABLE TO REACH   YOUR PHYSICIAN - GO DIRECTLY TO THE EMERGENCY ROOM.  Marshall Contreras MD  3/21/2019 10:04:38 AM  This report has been verified and signed electronically.  [employer]

## 2019-03-21 NOTE — ANESTHESIA POSTPROCEDURE EVALUATION
"Anesthesia Post Evaluation    Patient: Gely Green    Procedure(s) Performed: Procedure(s) (LRB):  COLONOSCOPY (N/A)    Final Anesthesia Type: general  Patient location during evaluation: GI PACU  Patient participation: Yes- Able to Participate  Level of consciousness: awake and alert  Post-procedure vital signs: reviewed and stable  Pain management: adequate  Airway patency: patent  PONV status at discharge: No PONV  Anesthetic complications: no      Cardiovascular status: hemodynamically stable  Respiratory status: unassisted, spontaneous ventilation and room air  Hydration status: euvolemic  Follow-up not needed.        Visit Vitals  BP (!) 140/75 (BP Location: Left arm, Patient Position: Lying)   Pulse (!) 58   Temp 36.3 °C (97.3 °F) (Tympanic)   Resp 16   Ht 5' 6" (1.676 m)   Wt 76.7 kg (169 lb)   SpO2 100%   Breastfeeding? No   BMI 27.28 kg/m²       Pain/Missael Score: Missael Score: 10 (3/21/2019 10:33 AM)        "

## 2019-03-27 ENCOUNTER — LAB VISIT (OUTPATIENT)
Dept: LAB | Facility: OTHER | Age: 69
End: 2019-03-27
Payer: MEDICARE

## 2019-03-27 ENCOUNTER — OFFICE VISIT (OUTPATIENT)
Dept: OTOLARYNGOLOGY | Facility: CLINIC | Age: 69
End: 2019-03-27
Payer: MEDICARE

## 2019-03-27 VITALS
BODY MASS INDEX: 27.56 KG/M2 | WEIGHT: 171.5 LBS | SYSTOLIC BLOOD PRESSURE: 105 MMHG | HEART RATE: 64 BPM | TEMPERATURE: 99 F | HEIGHT: 66 IN | DIASTOLIC BLOOD PRESSURE: 77 MMHG

## 2019-03-27 DIAGNOSIS — R19.5 HEME POSITIVE STOOL: ICD-10-CM

## 2019-03-27 DIAGNOSIS — H93.A9 PULSATILE TINNITUS, UNSPECIFIED EAR: Primary | ICD-10-CM

## 2019-03-27 DIAGNOSIS — H93.A9 PULSATILE TINNITUS, UNSPECIFIED EAR: ICD-10-CM

## 2019-03-27 LAB
BASOPHILS # BLD AUTO: 0.02 K/UL (ref 0–0.2)
BASOPHILS NFR BLD: 0.5 % (ref 0–1.9)
DIFFERENTIAL METHOD: ABNORMAL
EOSINOPHIL # BLD AUTO: 0 K/UL (ref 0–0.5)
EOSINOPHIL NFR BLD: 1 % (ref 0–8)
ERYTHROCYTE [DISTWIDTH] IN BLOOD BY AUTOMATED COUNT: 14.5 % (ref 11.5–14.5)
HCT VFR BLD AUTO: 37.5 % (ref 37–48.5)
HGB BLD-MCNC: 11.8 G/DL (ref 12–16)
LYMPHOCYTES # BLD AUTO: 1.7 K/UL (ref 1–4.8)
LYMPHOCYTES NFR BLD: 42.5 % (ref 18–48)
MCH RBC QN AUTO: 27.6 PG (ref 27–31)
MCHC RBC AUTO-ENTMCNC: 31.5 G/DL (ref 32–36)
MCV RBC AUTO: 88 FL (ref 82–98)
MONOCYTES # BLD AUTO: 0.3 K/UL (ref 0.3–1)
MONOCYTES NFR BLD: 8.4 % (ref 4–15)
NEUTROPHILS # BLD AUTO: 1.9 K/UL (ref 1.8–7.7)
NEUTROPHILS NFR BLD: 47.6 % (ref 38–73)
PLATELET # BLD AUTO: 302 K/UL (ref 150–350)
PMV BLD AUTO: 11.1 FL (ref 9.2–12.9)
RBC # BLD AUTO: 4.28 M/UL (ref 4–5.4)
WBC # BLD AUTO: 3.91 K/UL (ref 3.9–12.7)

## 2019-03-27 PROCEDURE — 99214 OFFICE O/P EST MOD 30 MIN: CPT | Mod: S$GLB,,, | Performed by: OTOLARYNGOLOGY

## 2019-03-27 PROCEDURE — 99214 PR OFFICE/OUTPT VISIT, EST, LEVL IV, 30-39 MIN: ICD-10-PCS | Mod: S$GLB,,, | Performed by: OTOLARYNGOLOGY

## 2019-03-27 PROCEDURE — 36415 COLL VENOUS BLD VENIPUNCTURE: CPT

## 2019-03-27 PROCEDURE — 85025 COMPLETE CBC W/AUTO DIFF WBC: CPT

## 2019-03-27 NOTE — PATIENT INSTRUCTIONS
Proceed with blood test as ordered.  Tinnitus print out.    Okay to continue azelastine nasal spray and insufflation exercises.  Hearing protection.

## 2019-03-27 NOTE — PROGRESS NOTES
Subjective:       Patient ID: Gely Green is a 68 y.o. female.    Chief Complaint: Follow-up (with CT results)    She was a new patient for me at her last visit on 03/04/2019 with history as follows:   She is a new patient for me here today complaining of hearing her heartbeat in her ears since around April of 2018.  She denies antecedent URI or air travel or other event.  She reports traveling by air however soon after the onset of symptoms but without any change in these complaints.  She states symptoms are present all the time to varying degrees.  She notices it more when she is sitting upright quietly and not so much when lying down.  She states her blood pressure has been under good control.  Denies anemia.  Has used power tools in the past but not ongoing or recent.  Some childhood earaches and ear infections which cleared.  No nasal or throat or other otolaryngologic complaints.  Has seen other specialists with these complaints including Cardiology and Neurology and primary care per chart review.  Record reveals had carotid ultrasound in January 2019 as well as MRA of the neck in February 2019 neither of which showed any significant abnormality.  Also had Holter with some PVCs which did not correlate with symptoms though has also complained of pounding heartbeat and palpitations as well.  Medical history includes MVP, hypertension, diabetes mellitus.      She returns for results and follow-up.  She has been using the azelastine nasal spray as prescribed as well as insufflation exercises without any change in her symptoms.  She continues with her same complaints as described above reporting non localized pulsatile tinnitus.  This is more noticeable whenever quiet and now states whether sitting upright or lying down.  Has noted if she presses on her neck then the sound may decrease.  Recently had colonoscopy after heme positive stool on fit test and states results all negative.  Does not recall new meds  around the time of onset of symptoms.  There are no new complaints.                Review of Systems   Ears: Positive for ringing in ear.  Negative for hearing loss, ear pressure, ear discharge, ear infections, head trauma, taken gentramycin/streptomycin and family history of hearing loss.    Nose:  Positive for postnasal drip. Negative for nosebleeds, nasal obstruction, nasal or sinus surgery and loss of smell.    Mouth/Throat: Negative for pain swallowing, impaired swallowing, hoarse voice, throat mass and neck mass.   Constitutional: Negative for recent unexplained weight loss and fever.    Eyes:  Negative for history of glaucoma and visual change.   Cardiovascular:  Positive for chest pain, palpitations and history of high blood pressure.   Respiratory:  Negative for asthma, emphysema, history of tuberculosis, recent cough and shortness of breath.    Gastrointestinal:  Positive for acid reflux. Negative for history of stomach ulcers or pain and blood in stool.   Other:  Positive for kidney problem, bladder problem, arthritis, depression and anxiety. Negative for weakness, disturbances in coordination, slurred, swollen glands, anemia and persistent infections.           Objective:        Vitals:    03/27/19 0941   BP: 105/77   Pulse: 64   Temp: 98.5 °F (36.9 °C)     Body mass index is 27.68 kg/m².  Physical Exam   Constitutional: She is oriented to person, place, and time. She appears well-developed and well-nourished. No distress.   HENT:   Head: Normocephalic and atraumatic.   Right Ear: Tympanic membrane, external ear and ear canal normal.   Left Ear: Tympanic membrane, external ear and ear canal normal.   Nose: No mucosal edema, rhinorrhea or nasal deformity. No epistaxis.   Mouth/Throat: Oropharynx is clear and moist and mucous membranes are normal. No oral lesions. No trismus in the jaw. No uvula swelling. No oropharyngeal exudate, posterior oropharyngeal edema or posterior oropharyngeal erythema.   Flaky  skin at meatus bilat.  Sclerotic TM's and unable to insufflate.     Neck: Phonation normal. Neck supple. No tracheal deviation present.   Pulmonary/Chest: Effort normal. No respiratory distress.   Lymphadenopathy:     She has no cervical adenopathy.   Neurological: She is alert and oriented to person, place, and time. She displays no weakness.   Skin: Skin is warm and dry.   Psychiatric: She has a normal mood and affect. Her behavior is normal. Her speech is not slurred.       Tests / Results:    CT temp bones with contrast neg.                Assessment:       1. Pulsatile tinnitus, unspecified ear    2. Heme positive stool        Plan:        CBC  Tinnitus print out     Hearing protection with power tools  Continue azelastine  Talk after above next few weeks

## 2019-03-28 ENCOUNTER — TELEPHONE (OUTPATIENT)
Dept: ENDOSCOPY | Facility: HOSPITAL | Age: 69
End: 2019-03-28

## 2019-04-01 DIAGNOSIS — E78.5 HYPERLIPIDEMIA, UNSPECIFIED HYPERLIPIDEMIA TYPE: ICD-10-CM

## 2019-04-01 DIAGNOSIS — R00.2 POUNDING HEARTBEAT: ICD-10-CM

## 2019-04-01 DIAGNOSIS — I10 ESSENTIAL HYPERTENSION: ICD-10-CM

## 2019-04-01 RX ORDER — IRBESARTAN 150 MG/1
TABLET ORAL
Qty: 30 TABLET | Refills: 0 | Status: SHIPPED | OUTPATIENT
Start: 2019-04-01 | End: 2019-05-07 | Stop reason: SDUPTHER

## 2019-04-10 ENCOUNTER — PATIENT MESSAGE (OUTPATIENT)
Dept: INTERNAL MEDICINE | Facility: CLINIC | Age: 69
End: 2019-04-10

## 2019-04-10 DIAGNOSIS — Z79.4 TYPE 2 DIABETES MELLITUS WITH DIABETIC POLYNEUROPATHY, WITH LONG-TERM CURRENT USE OF INSULIN: Primary | ICD-10-CM

## 2019-04-10 DIAGNOSIS — E11.42 TYPE 2 DIABETES MELLITUS WITH DIABETIC POLYNEUROPATHY, WITH LONG-TERM CURRENT USE OF INSULIN: Primary | ICD-10-CM

## 2019-04-12 ENCOUNTER — TELEPHONE (OUTPATIENT)
Dept: INTERNAL MEDICINE | Facility: CLINIC | Age: 69
End: 2019-04-12

## 2019-04-12 ENCOUNTER — PATIENT MESSAGE (OUTPATIENT)
Dept: INTERNAL MEDICINE | Facility: CLINIC | Age: 69
End: 2019-04-12

## 2019-04-25 ENCOUNTER — PES CALL (OUTPATIENT)
Dept: ADMINISTRATIVE | Facility: CLINIC | Age: 69
End: 2019-04-25

## 2019-05-07 DIAGNOSIS — I10 ESSENTIAL HYPERTENSION: ICD-10-CM

## 2019-05-07 DIAGNOSIS — R00.2 POUNDING HEARTBEAT: ICD-10-CM

## 2019-05-07 DIAGNOSIS — E78.5 HYPERLIPIDEMIA, UNSPECIFIED HYPERLIPIDEMIA TYPE: ICD-10-CM

## 2019-05-07 RX ORDER — IRBESARTAN 150 MG/1
TABLET ORAL
Qty: 30 TABLET | Refills: 0 | Status: SHIPPED | OUTPATIENT
Start: 2019-05-07 | End: 2019-06-07 | Stop reason: SDUPTHER

## 2019-06-04 ENCOUNTER — PATIENT MESSAGE (OUTPATIENT)
Dept: INTERNAL MEDICINE | Facility: CLINIC | Age: 69
End: 2019-06-04

## 2019-06-07 DIAGNOSIS — I10 ESSENTIAL HYPERTENSION: ICD-10-CM

## 2019-06-07 DIAGNOSIS — R00.2 POUNDING HEARTBEAT: ICD-10-CM

## 2019-06-07 DIAGNOSIS — E78.5 HYPERLIPIDEMIA, UNSPECIFIED HYPERLIPIDEMIA TYPE: ICD-10-CM

## 2019-06-10 ENCOUNTER — OFFICE VISIT (OUTPATIENT)
Dept: PSYCHIATRY | Facility: CLINIC | Age: 69
End: 2019-06-10
Payer: MEDICARE

## 2019-06-10 DIAGNOSIS — F32.A DEPRESSION, UNSPECIFIED DEPRESSION TYPE: Primary | ICD-10-CM

## 2019-06-10 PROCEDURE — 90832 PR PSYCHOTHERAPY W/PATIENT, 30 MIN: ICD-10-PCS | Mod: ,,, | Performed by: SOCIAL WORKER

## 2019-06-10 PROCEDURE — 99999 PR PBB SHADOW E&M-EST. PATIENT-LVL I: ICD-10-PCS | Mod: PBBFAC,,, | Performed by: SOCIAL WORKER

## 2019-06-10 PROCEDURE — 99211 OFF/OP EST MAY X REQ PHY/QHP: CPT | Mod: PBBFAC | Performed by: SOCIAL WORKER

## 2019-06-10 PROCEDURE — 99999 PR PBB SHADOW E&M-EST. PATIENT-LVL I: CPT | Mod: PBBFAC,,, | Performed by: SOCIAL WORKER

## 2019-06-10 PROCEDURE — 90832 PSYTX W PT 30 MINUTES: CPT | Mod: ,,, | Performed by: SOCIAL WORKER

## 2019-06-10 RX ORDER — AMLODIPINE BESYLATE 5 MG/1
5 TABLET ORAL DAILY
Qty: 30 TABLET | Refills: 2 | Status: SHIPPED | OUTPATIENT
Start: 2019-06-10 | End: 2019-09-01 | Stop reason: SDUPTHER

## 2019-06-10 RX ORDER — IRBESARTAN 150 MG/1
TABLET ORAL
Qty: 30 TABLET | Refills: 0 | Status: SHIPPED | OUTPATIENT
Start: 2019-06-10 | End: 2019-06-19

## 2019-06-10 NOTE — PROGRESS NOTES
Individual Psychotherapy (PhD/LCSW)    6/10/2019    Site:  [facility]         Therapeutic Intervention: Met with patient.  Outpatient - Insight oriented psychotherapy 30 min - CPT code 74107    Chief complaint/reason for encounter: depression, anxiety, sleep and behavior     Interval history and content of current session: used to come here a few years ago, returning due to grief and loss of son from death a few years ago, and also marriage is not good, she is retired and having depression and feelings and wants to get support and work on the issues and also have someone to talk with, and grief and loss, finding things to do, and ways to improve and take care of herself all focused on, the larger note will be done next time. Coping skills and feelings emphasized.    Treatment plan:  · Target symptoms: depression, anxiety , adjustment  · Why chosen therapy is appropriate versus another modality: relevant to diagnosis, patient responds to this modality, evidence based practice  · Outcome monitoring methods: self-report, observation  · Therapeutic intervention type: insight oriented psychotherapy, behavior modifying psychotherapy, supportive psychotherapy    Risk parameters:  Patient reports no suicidal ideation  Patient reports no homicidal ideation  Patient reports no self-injurious behavior  Patient reports no violent behavior    Verbal deficits: None    Patient's response to intervention:  The patient's response to intervention is accepting.    Progress toward goals and other mental status changes:  The patient's progress toward goals is limited.    Diagnosis:     ICD-10-CM ICD-9-CM   1. Depression, unspecified depression type F32.9 311       Plan:  individual psychotherapy    Return to clinic: 2 weeks    Length of Service (minutes): 30

## 2019-06-11 RX ORDER — INSULIN GLARGINE 300 U/ML
INJECTION, SOLUTION SUBCUTANEOUS
Qty: 18 ML | Refills: 3 | Status: SHIPPED | OUTPATIENT
Start: 2019-06-11 | End: 2020-06-02

## 2019-06-17 ENCOUNTER — OFFICE VISIT (OUTPATIENT)
Dept: INTERNAL MEDICINE | Facility: CLINIC | Age: 69
End: 2019-06-17
Payer: MEDICARE

## 2019-06-17 VITALS
WEIGHT: 164 LBS | HEIGHT: 67 IN | OXYGEN SATURATION: 99 % | SYSTOLIC BLOOD PRESSURE: 116 MMHG | BODY MASS INDEX: 25.74 KG/M2 | HEART RATE: 66 BPM | DIASTOLIC BLOOD PRESSURE: 58 MMHG

## 2019-06-17 DIAGNOSIS — M85.80 OSTEOPENIA, UNSPECIFIED LOCATION: ICD-10-CM

## 2019-06-17 DIAGNOSIS — I10 ESSENTIAL HYPERTENSION: ICD-10-CM

## 2019-06-17 DIAGNOSIS — I49.3 PVC'S (PREMATURE VENTRICULAR CONTRACTIONS): ICD-10-CM

## 2019-06-17 DIAGNOSIS — E11.42 DIABETIC PERIPHERAL NEUROPATHY: ICD-10-CM

## 2019-06-17 DIAGNOSIS — N89.8 VAGINAL DISCHARGE: ICD-10-CM

## 2019-06-17 DIAGNOSIS — R29.898 DECREASED RANGE OF MOTION OF NECK: ICD-10-CM

## 2019-06-17 DIAGNOSIS — Z00.00 ENCOUNTER FOR PREVENTIVE HEALTH EXAMINATION: Primary | ICD-10-CM

## 2019-06-17 DIAGNOSIS — E01.0 THYROMEGALY: ICD-10-CM

## 2019-06-17 DIAGNOSIS — R00.2 POUNDING HEARTBEAT: ICD-10-CM

## 2019-06-17 DIAGNOSIS — N30.10 INTERSTITIAL CYSTITIS: ICD-10-CM

## 2019-06-17 DIAGNOSIS — E11.40 TYPE 2 DIABETES MELLITUS WITH DIABETIC NEUROPATHY, WITH LONG-TERM CURRENT USE OF INSULIN: ICD-10-CM

## 2019-06-17 DIAGNOSIS — H93.A9 PULSATILE TINNITUS: ICD-10-CM

## 2019-06-17 DIAGNOSIS — R39.89 BLADDER PAIN: ICD-10-CM

## 2019-06-17 DIAGNOSIS — F41.9 ANXIETY: ICD-10-CM

## 2019-06-17 DIAGNOSIS — Z79.4 TYPE 2 DIABETES MELLITUS WITH DIABETIC NEUROPATHY, WITH LONG-TERM CURRENT USE OF INSULIN: ICD-10-CM

## 2019-06-17 DIAGNOSIS — N95.2 ATROPHIC VAGINITIS: ICD-10-CM

## 2019-06-17 DIAGNOSIS — F43.23 ADJUSTMENT DISORDER WITH MIXED ANXIETY AND DEPRESSED MOOD: ICD-10-CM

## 2019-06-17 DIAGNOSIS — G89.29 CHRONIC PAIN OF LEFT KNEE: ICD-10-CM

## 2019-06-17 DIAGNOSIS — R29.898 WEAKNESS OF BOTH LOWER EXTREMITIES: ICD-10-CM

## 2019-06-17 DIAGNOSIS — R19.5 POSITIVE FIT (FECAL IMMUNOCHEMICAL TEST): ICD-10-CM

## 2019-06-17 DIAGNOSIS — F43.21 GRIEF: ICD-10-CM

## 2019-06-17 DIAGNOSIS — Z78.0 POSTMENOPAUSAL STATE: ICD-10-CM

## 2019-06-17 DIAGNOSIS — R41.3 MEMORY LOSS: ICD-10-CM

## 2019-06-17 DIAGNOSIS — G50.8 TRIGEMINAL NEURITIS: ICD-10-CM

## 2019-06-17 DIAGNOSIS — E78.5 HYPERLIPIDEMIA, UNSPECIFIED HYPERLIPIDEMIA TYPE: ICD-10-CM

## 2019-06-17 DIAGNOSIS — M25.562 CHRONIC PAIN OF LEFT KNEE: ICD-10-CM

## 2019-06-17 PROCEDURE — G0439 PPPS, SUBSEQ VISIT: HCPCS | Mod: S$GLB,,, | Performed by: NURSE PRACTITIONER

## 2019-06-17 PROCEDURE — 99214 OFFICE O/P EST MOD 30 MIN: CPT | Mod: PBBFAC | Performed by: NURSE PRACTITIONER

## 2019-06-17 PROCEDURE — G0439 PR MEDICARE ANNUAL WELLNESS SUBSEQUENT VISIT: ICD-10-PCS | Mod: S$GLB,,, | Performed by: NURSE PRACTITIONER

## 2019-06-17 PROCEDURE — 99999 PR PBB SHADOW E&M-EST. PATIENT-LVL IV: CPT | Mod: PBBFAC,,, | Performed by: NURSE PRACTITIONER

## 2019-06-17 PROCEDURE — 99999 PR PBB SHADOW E&M-EST. PATIENT-LVL IV: ICD-10-PCS | Mod: PBBFAC,,, | Performed by: NURSE PRACTITIONER

## 2019-06-17 NOTE — PATIENT INSTRUCTIONS
Counseling and Referral of Other Preventative  (Italic type indicates deductible and co-insurance are waived)    Patient Name: Gely Green  Today's Date: 6/17/2019    Health Maintenance       Date Due Completion Date    Shingles Vaccine (2 of 3) 07/26/2016 5/31/2016    Eye Exam 08/15/2017 8/15/2016 (Done)    Override on 8/15/2016: Done    DEXA SCAN 06/01/2019 6/1/2016    Hemoglobin A1c 06/30/2019 12/31/2018    Influenza Vaccine 08/01/2019 10/11/2018    Lipid Panel 10/25/2019 10/25/2018    Foot Exam 01/23/2020 1/23/2019 (Done)    Override on 1/23/2019: Done    Override on 6/2/2017: Done    Low Dose Statin 03/27/2020 3/27/2019    Mammogram 08/01/2020 8/1/2018    Override on 5/1/2016: Done    TETANUS VACCINE 05/31/2026 5/31/2016    Colonoscopy 03/21/2029 3/21/2019    Override on 5/6/2014: Done    Override on 4/1/2007: Done        No orders of the defined types were placed in this encounter.    The following information is provided to all patients.  This information is to help you find resources for any of the problems found today that may be affecting your health:                Living healthy guide: www.[facility].[place].gov      Understanding Diabetes: www.diabetes.org      Eating healthy: www.cdc.gov/healthyweight      CDC home safety checklist: www.cdc.gov/steadi/patient.html      Agency on Aging: www.goea.[place].[facility]      Alcoholics anonymous (AA): www.aa.org      Physical Activity: www.kiko.nih.gov/jt4vlou      Tobacco use: www.quitwithusla.org

## 2019-06-17 NOTE — PROGRESS NOTES
"Gely Green presented for a  Medicare AWV and comprehensive Health Risk Assessment today. The following components were reviewed and updated:    · Medical history  · Family History  · Social history  · Allergies and Current Medications  · Health Risk Assessment  · Health Maintenance  · Care Team     ** See Completed Assessments for Annual Wellness Visit within the encounter summary.**       The following assessments were completed:  · Living Situation  · CAGE  · Depression Screening  · Timed Get Up and Go  · Whisper Test  · Cognitive Function Screening  · Nutrition Screening  · ADL Screening  · PAQ Screening          Vitals:    06/17/19 1328   BP: (!) 116/58   BP Location: Left arm   Patient Position: Sitting   Pulse: 66   SpO2: 99%   Weight: 74.4 kg (164 lb 0.4 oz)   Height: 5' 7" (1.702 m)     Body mass index is 25.69 kg/m².     Physical Exam   Constitutional: She is oriented to person, place, and time. She appears well-developed and well-nourished.   HENT:   Head: Normocephalic and atraumatic. Not macrocephalic and not microcephalic. Head is without raccoon's eyes, without Corbin's sign, without abrasion, without contusion, without laceration, without right periorbital erythema and without left periorbital erythema. Hair is normal.   Right Ear: No lacerations. No drainage, swelling or tenderness. No foreign bodies. No mastoid tenderness. Tympanic membrane is not injected, not scarred, not perforated, not erythematous, not retracted and not bulging. Tympanic membrane mobility is normal. No middle ear effusion. No hemotympanum. No decreased hearing is noted.   Left Ear: No lacerations. No drainage, swelling or tenderness. No foreign bodies. No mastoid tenderness. Tympanic membrane is not injected, not scarred, not perforated, not erythematous, not retracted and not bulging. Tympanic membrane mobility is normal.  No middle ear effusion. No hemotympanum. No decreased hearing is noted.   Nose: Nose normal. No " mucosal edema, rhinorrhea, nose lacerations, sinus tenderness or nasal deformity.   Mouth/Throat: Uvula is midline.   Eyes: Conjunctivae and lids are normal. No scleral icterus.   Neck: Trachea normal. Neck supple. No spinous process tenderness and no muscular tenderness present. No neck rigidity. No edema, no erythema and normal range of motion present. No thyroid mass and no thyromegaly present.   Cardiovascular: Normal rate, regular rhythm, normal heart sounds and intact distal pulses. Exam reveals no gallop and no friction rub.   No murmur heard.  Pulmonary/Chest: Effort normal and breath sounds normal. No stridor. No respiratory distress. She has no wheezes. She has no rales. She exhibits no tenderness.   Abdominal: Soft. Bowel sounds are normal. She exhibits no distension.   Musculoskeletal: Normal range of motion.   Lymphadenopathy:        Head (right side): No submental, no submandibular, no tonsillar, no preauricular and no posterior auricular adenopathy present.        Head (left side): No submental, no submandibular, no tonsillar, no preauricular, no posterior auricular and no occipital adenopathy present.   Neurological: She is alert and oriented to person, place, and time.   Skin: Skin is warm and dry.   Psychiatric: She has a normal mood and affect. Her behavior is normal. Judgment and thought content normal.   Vitals reviewed.      Diagnoses and health risks identified today and associated recommendations/orders:    1. Encounter for preventive health examination  Annual Health Risk Assessment (HRA) visit today.  Counseling and referral of health maintenance and preventative health measures performed.  Patient given annual wellness paperwork to take home.  Encouraged to return in 1 year for subsequent HRA visit.     2. Adjustment disorder with mixed anxiety and depressed mood  Chronic. Stable. Continue current treatment plan as previously prescribed by PCP.    3. Diabetic peripheral  neuropathy  Chronic. Stable. Continue current treatment plan as previously prescribed by PCP.    4. Essential hypertension  Chronic. Stable. Uncontrolled. Encouraged to increase exercise as tolerated (moderate-intensity aerobic activity and muscle-strengthening activities) improve diet to heart healthy, low sodium diet. Continue current treatment plan as previously prescribed by PCP.    5. Pulsatile tinnitus  Chronic. Stable. Continue current treatment plan as previously prescribed by PCP.    6. Osteopenia, unspecified location  Chronic. Stable. Continue current treatment plan as previously prescribed by PCP.    7. Type 2 diabetes mellitus with diabetic neuropathy, with long-term current use of insulin  Chronic. Stable. Controlled. Last Hgb A1c=6.8 from 12/31/18. Continue current treatment plan as previously prescribed by PCP.    8. Postmenopausal state  - DXA Bone Density Spine And Hip; Future    9. Thyromegaly  Chronic. Stable. Continue current treatment plan as previously prescribed by PCP.    10. Interstitial cystitis  Chronic. Stable. Continue current treatment plan as previously prescribed by PCP.    11. Vaginal discharge  Chronic. Stable. Continue current treatment plan as previously prescribed by PCP.    12. Atrophic vaginitis  Chronic. Stable. Continue current treatment plan as previously prescribed by PCP.    13. Bladder pain  Chronic. Stable. Continue current treatment plan as previously prescribed by PCP.    14. PVC's (premature ventricular contractions)  Chronic. Stable. Continue current treatment plan as previously prescribed by PCP.    15. Hyperlipidemia, unspecified hyperlipidemia type  Chronic. Stable. Continue current treatment plan as previously prescribed by PCP.    16. Pounding heartbeat  Chronic. Stable. Continue current treatment plan as previously prescribed by PCP.    17. Memory loss  Chronic. Stable. Continue current treatment plan as previously prescribed by PCP.    18. Trigeminal  neuritis  Chronic. Stable. Continue current treatment plan as previously prescribed by PCP.    19. Anxiety  Chronic. Stable. Continue current treatment plan as previously prescribed by PCP.    20. Grief  Chronic. Stable. Continue current treatment plan as previously prescribed by PCP.    21. Decreased range of motion of neck  Chronic. Stable. Continue current treatment plan as previously prescribed by PCP.    22. Weakness of both lower extremities  Chronic. Stable. Continue current treatment plan as previously prescribed by PCP.    23. Chronic pain of left knee  Chronic. Stable. Continue current treatment plan as previously prescribed by PCP.    24. Positive FIT (fecal immunochemical test)  Chronic. Stable. Continue current treatment plan as previously prescribed by PCP.        Provided Gely with a 5-10 year written screening schedule and personal prevention plan. Recommendations were developed using the USPSTF age appropriate recommendations. Education, counseling, and referrals were provided as needed. After Visit Summary printed and given to patient which includes a list of additional screenings\tests needed.    Follow up in about 1 year (around 6/17/2020).    Isaiah Jain NP  I offered to discuss end of life issues, including information on how to make advance directives that the patient could use to name someone who would make medical decisions on their behalf if they became too ill to make themselves.    ___Patient declined  _X_Patient is interested, I provided paper work and offered to discuss.

## 2019-06-19 ENCOUNTER — OFFICE VISIT (OUTPATIENT)
Dept: INTERNAL MEDICINE | Facility: CLINIC | Age: 69
End: 2019-06-19
Attending: FAMILY MEDICINE
Payer: MEDICARE

## 2019-06-19 ENCOUNTER — HOSPITAL ENCOUNTER (OUTPATIENT)
Dept: RADIOLOGY | Facility: OTHER | Age: 69
Discharge: HOME | End: 2019-06-19
Attending: NURSE PRACTITIONER
Payer: MEDICARE

## 2019-06-19 VITALS
SYSTOLIC BLOOD PRESSURE: 126 MMHG | HEIGHT: 67 IN | HEART RATE: 63 BPM | OXYGEN SATURATION: 98 % | WEIGHT: 164 LBS | BODY MASS INDEX: 25.74 KG/M2 | DIASTOLIC BLOOD PRESSURE: 68 MMHG

## 2019-06-19 DIAGNOSIS — E11.42 DIABETIC PERIPHERAL NEUROPATHY: Primary | ICD-10-CM

## 2019-06-19 DIAGNOSIS — I10 ESSENTIAL HYPERTENSION: ICD-10-CM

## 2019-06-19 DIAGNOSIS — F43.23 ADJUSTMENT DISORDER WITH MIXED ANXIETY AND DEPRESSED MOOD: ICD-10-CM

## 2019-06-19 DIAGNOSIS — Z78.0 POSTMENOPAUSAL STATE: ICD-10-CM

## 2019-06-19 DIAGNOSIS — E78.5 HYPERLIPIDEMIA, UNSPECIFIED HYPERLIPIDEMIA TYPE: ICD-10-CM

## 2019-06-19 PROCEDURE — 99213 OFFICE O/P EST LOW 20 MIN: CPT | Mod: PBBFAC,25 | Performed by: FAMILY MEDICINE

## 2019-06-19 PROCEDURE — 99999 PR PBB SHADOW E&M-EST. PATIENT-LVL III: ICD-10-PCS | Mod: PBBFAC,,, | Performed by: FAMILY MEDICINE

## 2019-06-19 PROCEDURE — 77080 DXA BONE DENSITY AXIAL: CPT | Mod: TC

## 2019-06-19 PROCEDURE — 99214 OFFICE O/P EST MOD 30 MIN: CPT | Mod: S$PBB,,, | Performed by: FAMILY MEDICINE

## 2019-06-19 PROCEDURE — 99214 PR OFFICE/OUTPT VISIT, EST, LEVL IV, 30-39 MIN: ICD-10-PCS | Mod: S$PBB,,, | Performed by: FAMILY MEDICINE

## 2019-06-19 PROCEDURE — 77080 DEXA BONE DENSITY SPINE HIP: ICD-10-PCS | Mod: 26,,, | Performed by: RADIOLOGY

## 2019-06-19 PROCEDURE — 99999 PR PBB SHADOW E&M-EST. PATIENT-LVL III: CPT | Mod: PBBFAC,,, | Performed by: FAMILY MEDICINE

## 2019-06-19 PROCEDURE — 77080 DXA BONE DENSITY AXIAL: CPT | Mod: 26,,, | Performed by: RADIOLOGY

## 2019-06-19 NOTE — PROGRESS NOTES
"Subjective:      Patient ID: Gely Green is a 68 y.o. female.    Chief Complaint: Follow-up (HTN)    HPI   Patient here today for follow up. She notices with only sitting quietly in a room she will have the tinnitus otherwise she does not notice it.   Her home machine is 140/83. She stopped avapro about 10 days ago. She is taking amlodpine and her pressure is better controlled.       Review of Systems   Constitutional: Negative for activity change, appetite change, chills, diaphoresis, fatigue, fever and unexpected weight change.   HENT: Negative for congestion, ear discharge, ear pain, hearing loss, postnasal drip, rhinorrhea, sinus pressure and sore throat.    Respiratory: Negative for cough, shortness of breath and wheezing.    Cardiovascular: Negative for chest pain.   Gastrointestinal: Negative for abdominal pain, constipation, diarrhea, nausea and vomiting.   Genitourinary: Negative for dysuria and frequency.   Musculoskeletal: Negative.    Psychiatric/Behavioral: Negative for suicidal ideas.     I personally reviewed Past Medical History, Past Surgical history,  Past Social History and Family History      Objective:   /68 (BP Location: Left arm, Patient Position: Sitting)   Pulse 63   Ht 5' 6.5" (1.689 m)   Wt 74.4 kg (164 lb 0.4 oz)   SpO2 98%   BMI 26.08 kg/m²     Physical Exam   Constitutional: She is oriented to person, place, and time. She appears well-developed and well-nourished. No distress.   HENT:   Head: Normocephalic and atraumatic.   Right Ear: Hearing, tympanic membrane, external ear and ear canal normal.   Left Ear: Hearing, tympanic membrane, external ear and ear canal normal.   Nose: Nose normal.   Mouth/Throat: Uvula is midline and oropharynx is clear and moist. No oropharyngeal exudate.   Eyes: Pupils are equal, round, and reactive to light. Conjunctivae and EOM are normal. Right eye exhibits no discharge. Left eye exhibits no discharge. No scleral icterus.   Neck: Normal " range of motion. Neck supple.   Cardiovascular: Normal rate, regular rhythm, normal heart sounds and intact distal pulses. Exam reveals no gallop.   No murmur heard.  Pulmonary/Chest: Effort normal and breath sounds normal. No respiratory distress. She has no wheezes. She has no rales. She exhibits no tenderness.   Abdominal: Soft. Bowel sounds are normal. She exhibits no distension and no mass. There is no tenderness. There is no rebound and no guarding.   Neurological: She is alert and oriented to person, place, and time.   Skin: Skin is warm and dry.   Vitals reviewed.      1. Diabetic peripheral neuropathy    2. Essential hypertension    3. Hyperlipidemia, unspecified hyperlipidemia type    4. Adjustment disorder with mixed anxiety and depressed mood        1. stable, cont current regimen  -check labs    2. stable, cont amlodipine, return with home machine in 4 weeks  3. stable, cont current regimen   4. Improving as she works with her psychiatrist     Orders Placed This Encounter   Procedures    CBC auto differential    Comprehensive metabolic panel    Hemoglobin A1c    Lipid panel    Microalbumin/creatinine urine ratio    TSH    T4, free

## 2019-06-25 ENCOUNTER — OFFICE VISIT (OUTPATIENT)
Dept: PSYCHIATRY | Facility: CLINIC | Age: 69
End: 2019-06-25
Payer: MEDICARE

## 2019-06-25 DIAGNOSIS — F32.A DEPRESSION, UNSPECIFIED DEPRESSION TYPE: Primary | ICD-10-CM

## 2019-06-25 PROCEDURE — 90832 PSYTX W PT 30 MINUTES: CPT | Mod: ,,, | Performed by: SOCIAL WORKER

## 2019-06-25 PROCEDURE — 90832 PR PSYCHOTHERAPY W/PATIENT, 30 MIN: ICD-10-PCS | Mod: ,,, | Performed by: SOCIAL WORKER

## 2019-06-25 PROCEDURE — 99999 PR PBB SHADOW E&M-EST. PATIENT-LVL I: ICD-10-PCS | Mod: PBBFAC,,, | Performed by: SOCIAL WORKER

## 2019-06-25 PROCEDURE — 99999 PR PBB SHADOW E&M-EST. PATIENT-LVL I: CPT | Mod: PBBFAC,,, | Performed by: SOCIAL WORKER

## 2019-06-25 PROCEDURE — 99211 OFF/OP EST MAY X REQ PHY/QHP: CPT | Mod: PBBFAC | Performed by: SOCIAL WORKER

## 2019-06-26 NOTE — PROGRESS NOTES
Individual Psychotherapy (PhD/LCSW)    6/25/2019    Site:  [facility]         Therapeutic Intervention: Met with patient.  Outpatient - Insight oriented psychotherapy 30 min - CPT code 56336    Chief complaint/reason for encounter: depression, anxiety, behavior and interpersonal     Interval history and content of current session: discussed health and medical health, sleep, coping skills, how to connect with others, self-esteem, issues about ageing, need to take better care of herself, family issues, feelings and how to be more positive in behavior and in health and thinking, all addressed.    Treatment plan:  · Target symptoms: depression, recurrent depression, anxiety , mood swings, mood disorder, adjustment, grief  · Why chosen therapy is appropriate versus another modality: relevant to diagnosis, patient responds to this modality, evidence based practice  · Outcome monitoring methods: self-report, observation  · Therapeutic intervention type: insight oriented psychotherapy, behavior modifying psychotherapy, supportive psychotherapy    Risk parameters:  Patient reports no suicidal ideation  Patient reports no homicidal ideation  Patient reports no self-injurious behavior  Patient reports no violent behavior    Verbal deficits: None    Patient's response to intervention:  The patient's response to intervention is accepting.    Progress toward goals and other mental status changes:  The patient's progress toward goals is limited.    Diagnosis:     ICD-10-CM ICD-9-CM   1. Depression, unspecified depression type F32.9 311       Plan:  individual psychotherapy, consult psychiatrist for medication evaluation and medication management by physician    Return to clinic: 2 weeks    Length of Service (minutes): 30

## 2019-07-08 RX ORDER — ROSUVASTATIN CALCIUM 10 MG/1
TABLET, COATED ORAL
Qty: 90 TABLET | Refills: 0 | Status: SHIPPED | OUTPATIENT
Start: 2019-07-08 | End: 2019-11-20 | Stop reason: SDUPTHER

## 2019-07-12 ENCOUNTER — PATIENT MESSAGE (OUTPATIENT)
Dept: INTERNAL MEDICINE | Facility: CLINIC | Age: 69
End: 2019-07-12

## 2019-07-23 ENCOUNTER — HOSPITAL ENCOUNTER (OUTPATIENT)
Dept: RADIOLOGY | Facility: HOSPITAL | Age: 69
Discharge: HOME | End: 2019-07-23
Attending: PHYSICIAN ASSISTANT
Payer: MEDICARE

## 2019-07-23 ENCOUNTER — OFFICE VISIT (OUTPATIENT)
Dept: ORTHOPEDICS | Facility: CLINIC | Age: 69
End: 2019-07-23
Payer: MEDICARE

## 2019-07-23 VITALS
BODY MASS INDEX: 26.65 KG/M2 | DIASTOLIC BLOOD PRESSURE: 75 MMHG | HEART RATE: 59 BPM | HEIGHT: 66 IN | WEIGHT: 165.81 LBS | SYSTOLIC BLOOD PRESSURE: 121 MMHG

## 2019-07-23 DIAGNOSIS — M25.552 PAIN OF LEFT HIP JOINT: Primary | ICD-10-CM

## 2019-07-23 DIAGNOSIS — M25.572 LEFT ANKLE PAIN, UNSPECIFIED CHRONICITY: ICD-10-CM

## 2019-07-23 DIAGNOSIS — M25.562 LEFT KNEE PAIN, UNSPECIFIED CHRONICITY: ICD-10-CM

## 2019-07-23 DIAGNOSIS — M25.552 PAIN OF LEFT HIP JOINT: ICD-10-CM

## 2019-07-23 PROCEDURE — 73562 X-RAY EXAM OF KNEE 3: CPT | Mod: 59,TC,RT

## 2019-07-23 PROCEDURE — 99999 PR PBB SHADOW E&M-EST. PATIENT-LVL V: ICD-10-PCS | Mod: PBBFAC,,, | Performed by: PHYSICIAN ASSISTANT

## 2019-07-23 PROCEDURE — 73564 XR KNEE ORTHO LEFT WITH FLEXION: ICD-10-PCS | Mod: 26,LT,, | Performed by: RADIOLOGY

## 2019-07-23 PROCEDURE — 73502 X-RAY EXAM HIP UNI 2-3 VIEWS: CPT | Mod: 26,RT,, | Performed by: RADIOLOGY

## 2019-07-23 PROCEDURE — 73502 X-RAY EXAM HIP UNI 2-3 VIEWS: CPT | Mod: TC,RT

## 2019-07-23 PROCEDURE — 73610 XR ANKLE COMPLETE 3 VIEW LEFT: ICD-10-PCS | Mod: 26,LT,, | Performed by: RADIOLOGY

## 2019-07-23 PROCEDURE — 73562 X-RAY EXAM OF KNEE 3: CPT | Mod: 26,59,RT, | Performed by: RADIOLOGY

## 2019-07-23 PROCEDURE — 73502 XR HIP 2 VIEW RIGHT: ICD-10-PCS | Mod: 26,RT,, | Performed by: RADIOLOGY

## 2019-07-23 PROCEDURE — 73564 X-RAY EXAM KNEE 4 OR MORE: CPT | Mod: 26,LT,, | Performed by: RADIOLOGY

## 2019-07-23 PROCEDURE — 99999 PR PBB SHADOW E&M-EST. PATIENT-LVL V: CPT | Mod: PBBFAC,,, | Performed by: PHYSICIAN ASSISTANT

## 2019-07-23 PROCEDURE — 73610 X-RAY EXAM OF ANKLE: CPT | Mod: TC,LT

## 2019-07-23 PROCEDURE — 99214 PR OFFICE/OUTPT VISIT, EST, LEVL IV, 30-39 MIN: ICD-10-PCS | Mod: S$PBB,,, | Performed by: PHYSICIAN ASSISTANT

## 2019-07-23 PROCEDURE — 73562 XR KNEE ORTHO LEFT WITH FLEXION: ICD-10-PCS | Mod: 26,59,RT, | Performed by: RADIOLOGY

## 2019-07-23 PROCEDURE — 73610 X-RAY EXAM OF ANKLE: CPT | Mod: 26,LT,, | Performed by: RADIOLOGY

## 2019-07-23 PROCEDURE — 99215 OFFICE O/P EST HI 40 MIN: CPT | Mod: PBBFAC,25 | Performed by: PHYSICIAN ASSISTANT

## 2019-07-23 PROCEDURE — 99214 OFFICE O/P EST MOD 30 MIN: CPT | Mod: S$PBB,,, | Performed by: PHYSICIAN ASSISTANT

## 2019-07-23 RX ORDER — MELOXICAM 15 MG/1
15 TABLET ORAL DAILY
Qty: 30 TABLET | Refills: 1 | Status: SHIPPED | OUTPATIENT
Start: 2019-07-23 | End: 2019-08-22

## 2019-07-23 NOTE — PROGRESS NOTES
"  SUBJECTIVE:     Chief Complaint & History of Present Illness:  Gely Green is a Established patient 68 y.o. female who is seen here today with a complaint of  bilateral knee pain right hip pain and left ankle pain .  She is a patient well-known to me was last seen treated the clinic 12/19/2017 for her arthritic right knee pain. She was treated conservatively and has recovered very well. Her concerns today revolved around intermittent problems of soreness and pain in bilateral knees associated with her increase in activity participates in organized exercise multiple times a week to include aerobics as well as water exercises.  She also has some occasional soreness in the lateral aspect of the hip that is relieved with rest.  He is also concerned about some new minor swelling in the lateral aspect of her left ankle associated with increases in activity.  She reports she did have a fracture of the distal fibula over 10 years ago is concerned she may be developing some posttraumatic arthritis in this area  On a scale of 1-10, with 10 being worst pain imaginable, he rates this pain as 3 on good days and 5 on bad days.  she describes the pain as sore and achy.    Review of patient's allergies indicates:  No Known Allergies      Current Outpatient Medications   Medication Sig Dispense Refill    ACCU-CHEK FASTCLIX Misc TEST twice a day  0    ACCU-CHEK SMARTVIEW TEST STRIP Strp ACCU-CHEK SMARTVIEW TEST STRIP Strp, 120 strip 11    amLODIPine (NORVASC) 5 MG tablet Take 1 tablet (5 mg total) by mouth once daily. 30 tablet 2    aspirin (ECOTRIN) 81 MG EC tablet Take 81 mg by mouth once daily.      azelastine (ASTELIN) 137 mcg (0.1 %) nasal spray 2 sprays (274 mcg total) by Nasal route 2 (two) times daily. Use 2 sprays in each nostril twice a day. 30 mL 1    BD ULTRA-FINE SAMUEL PEN NEEDLE 32 gauge x 5/32" Ndle USE WITH INSULIN EVERY EVENING 100 each 0    blood sugar diagnostic Strp 1 strip by Misc.(Non-Drug; Combo " "Route) route 3 (three) times daily as needed. Lifescan test strips 120 each 4    calcium carbonate (OS-REDD) 600 mg calcium (1,500 mg) Tab Take 600 mg by mouth 2 (two) times daily with meals.      empagliflozin (JARDIANCE) 25 mg Tab Take 25 mg by mouth once daily. 30 tablet 3    flavoxATE (URISPAS) 100 mg Tab Take 1 tablet (100 mg total) by mouth 3 (three) times daily as needed. 90 tablet 11    gabapentin (NEURONTIN) 100 MG capsule Take 1 capsule (100 mg total) by mouth 3 (three) times daily. (Patient taking differently: Take 100 mg by mouth 3 (three) times daily. As needed) 90 capsule 1    ibuprofen (ADVIL,MOTRIN) 200 MG tablet Take 200 mg by mouth every 6 (six) hours as needed for Pain.      Lactobacillus rhamnosus GG (CULTURELLE) 10 billion cell capsule Take 1 capsule by mouth once daily.      LINZESS 145 mcg Cap capsule 145 mcg once daily. As needed  0    metFORMIN (GLUCOPHAGE-XR) 500 MG 24 hr tablet take 1 tablet by mouth twice a day 180 tablet 3    NOVOFINE 32 32 gauge x 1/4" Ndle use subcutaneously every evening 100 each 11    ONETOUCH ULTRA BLUE TEST STRIP Strp TEST THREE TIMES DAILY 100 strip 11    pen needle, diabetic (NOVOFINE PLUS) 32 gauge x 1/6" Ndle Inject 1 application into the skin every evening. 100 each 11    rosuvastatin (CRESTOR) 10 MG tablet TAKE 1 TABLET BY MOUTH ONCE DAILY 90 tablet 0    TOUJEO SOLOSTAR U-300 INSULIN 300 unit/mL (1.5 mL) InPn pen INJECT 33 UNITS UNDER THE SKIN EVERY EVENING 18 mL 3    vitamin D 1000 units Tab Take 1,000 Units by mouth once daily.      diabetic supplies, N3TWORKcellan. Kit 1 application by Misc.(Non-Drug; Combo Route) route once daily. Lifescan glucose meter 1 kit 0    meloxicam (MOBIC) 15 MG tablet Take 1 tablet (15 mg total) by mouth once daily. 30 tablet 1     No current facility-administered medications for this visit.        Past Medical History:   Diagnosis Date    Arthritis     Chronic constipation     Depression     Diabetes mellitus  " "   Diabetes mellitus     Hypertension     Osteopenia        Past Surgical History:   Procedure Laterality Date     SECTION      2x    COLONOSCOPY      COLONOSCOPY N/A 3/21/2019    Performed by Marshall Contre[facility] at [facility] ([facility] F[facility])    HYSTERECTOMY      full hyst        Vital Signs (Most Recent)  Vitals:    19 1430   BP: 121/75   Pulse: (!) 59           Review of Systems:  ROS:  Constitutional: no fever or chills  Eyes: no visual changes  ENT: no nasal congestion or sore throat  Respiratory: no cough or shortness of breath  Cardiovascular: no chest pain or palpitations  Gastrointestinal: no nausea or vomiting, tolerating diet  Genitourinary: no hematuria or dysuria  Integument/Breast: no rash or pruritis  Hematologic/Lymphatic: no easy bruising or lymphadenopathy  Musculoskeletal: no arthralgias or myalgias  Neurological: no seizures or tremors, Positive for peripheral neuropathy  Behavioral/Psych: no auditory or visual hallucinations, positive for anxiety adjustment disorder  Endocrine: no heat or cold intolerance, Positive for type 2 diabetes              OBJECTIVE:     PHYSICAL EXAM:  Height: 5' 6" (167.6 cm) Weight: 75.2 kg (165 lb 12.6 oz), General Appearance: Well nourished, well developed, in no acute distress.  Neurological: Mood & affect are normal.  left  Knee Exam:  Knee Range of Motion:0-120 degrees flexion   Effusion:none  Condition of skin:intact  Location of tenderness:Medial joint line   Strength:5 of 5  Stability:  Lachman: stable, LCL: stable, MCL: stable, PCL: stable and posteromedial (dial): stable  Varus /Valgus stress:  normal  Nat:   negative/negative    right  Knee Exam:  Knee Range of Motion:0-125 degrees flexion   Effusion:none  Condition of skin:intact  Location of tenderness:None   Strength:5 of 5  Stability:  Lachman: stable, LCL: stable, MCL: stable, PCL: stable and posteromedial (dial): stable  Varus /Valgus stress:  normal  Nat:   " negative/negative    Hip Examination:  full painless range of motion, without tenderness      Shoulder exam: left  Tenderness: AC joint, biceps tendon  ROM: forward flexion 180/180, extension 45/45, full abduction 180/180, abduction-glenohumeral 90/90, external rotation 50/50  Shoulder Strength: biceps 5/5, triceps 5/5, abduction 5/5, adduction 5/5, external rotation 5/5 with shoulder at side, flexion 5/5, and extension 5/5  positive for tenderness about the glenohumeral joint, negative for tenderness over the acromioclavicular joint and negative for impingement sign  Stability tests: anterior apprehension test negative and posterior apprehension test negative  Special Tests:Cross-chest abduction: negative                 RADIOGRAPHS:  X-rays of the knees taken today films reviewed by me demonstrate well-preserved joint spaces throughout both knees with very minor early arthritic changes no evidence of fracture dislocation.  X-rays of the hip demonstrate well-preserved joint spaces throughout both hips with no evidence of fracture dislocation or advanced degenerative joint disease.  X-rays ankle show evidence of old healed fracture at the distal fibula mortise is well maintained without evidence of posttraumatic arthritis fracture dislocation or other bony abnormalities    ASSESSMENT/PLAN:       ICD-10-CM ICD-9-CM   1. Pain of left hip joint M25.552 719.45   2. Left ankle pain, unspecified chronicity M25.572 719.47   3. Left knee pain, unspecified chronicity M25.562 719.46       Plan: We discussed with the patient at length all the different treatment options available for  the knee including anti-inflammatories, acetaminophen, rest, ice, knee strengthening exercise, occasional cortisone injections for temporary relief, Viscosupplimentation injections, arthroscopic debridement osteotomy, and finally knee arthroplasty.   Patient like to begin with conservative treatment meloxicam 15 mg q.d. with food we had lengthy  discussion regarding alterations to her exercise routine to a decreased weight and increase repetitions follow-up shannon.

## 2019-07-31 ENCOUNTER — OFFICE VISIT (OUTPATIENT)
Dept: INTERNAL MEDICINE | Facility: CLINIC | Age: 69
End: 2019-07-31
Attending: FAMILY MEDICINE
Payer: MEDICARE

## 2019-07-31 VITALS
OXYGEN SATURATION: 99 % | BODY MASS INDEX: 26.37 KG/M2 | HEART RATE: 64 BPM | WEIGHT: 168 LBS | HEIGHT: 67 IN | DIASTOLIC BLOOD PRESSURE: 84 MMHG | SYSTOLIC BLOOD PRESSURE: 150 MMHG

## 2019-07-31 DIAGNOSIS — I10 HYPERTENSION, UNSPECIFIED TYPE: Primary | ICD-10-CM

## 2019-07-31 DIAGNOSIS — E11.40 TYPE 2 DIABETES MELLITUS WITH DIABETIC NEUROPATHY, WITH LONG-TERM CURRENT USE OF INSULIN: ICD-10-CM

## 2019-07-31 DIAGNOSIS — Z79.4 TYPE 2 DIABETES MELLITUS WITH DIABETIC NEUROPATHY, WITH LONG-TERM CURRENT USE OF INSULIN: ICD-10-CM

## 2019-07-31 PROCEDURE — 99213 OFFICE O/P EST LOW 20 MIN: CPT | Mod: S$PBB,,, | Performed by: FAMILY MEDICINE

## 2019-07-31 PROCEDURE — 99213 OFFICE O/P EST LOW 20 MIN: CPT | Mod: PBBFAC | Performed by: FAMILY MEDICINE

## 2019-07-31 PROCEDURE — 99999 PR PBB SHADOW E&M-EST. PATIENT-LVL III: CPT | Mod: PBBFAC,,, | Performed by: FAMILY MEDICINE

## 2019-07-31 PROCEDURE — 99999 PR PBB SHADOW E&M-EST. PATIENT-LVL III: ICD-10-PCS | Mod: PBBFAC,,, | Performed by: FAMILY MEDICINE

## 2019-07-31 PROCEDURE — 99213 PR OFFICE/OUTPT VISIT, EST, LEVL III, 20-29 MIN: ICD-10-PCS | Mod: S$PBB,,, | Performed by: FAMILY MEDICINE

## 2019-07-31 NOTE — PROGRESS NOTES
"Subjective:      Patient ID: Gely Green is a 68 y.o. female.    Chief Complaint: Follow-up    HPI   Patient here today for HTN follow up and forgot to take her medication. She reports overall her mood fluctuates, more good days than bad days, no panic attacks, no SI or HI. She is walking, water aerobics and swimming a few times a week.  Her left knee is getting better. She reports her sugars are in the 130-170 depending her eating habits.         Review of Systems   Constitutional: Negative for activity change, appetite change, chills, diaphoresis, fatigue, fever and unexpected weight change.   HENT: Negative for congestion, ear discharge, ear pain, hearing loss, postnasal drip, rhinorrhea, sinus pressure and sore throat.    Respiratory: Negative for cough, shortness of breath and wheezing.    Cardiovascular: Negative for chest pain.   Gastrointestinal: Negative for abdominal pain, constipation, diarrhea, nausea and vomiting.   Genitourinary: Negative for dysuria and frequency.   Musculoskeletal: Negative.    Psychiatric/Behavioral: Negative for suicidal ideas.     I personally reviewed Past Medical History, Past Surgical history,  Past Social History and Family History      Objective:   BP (!) 150/84 (BP Location: Left arm, Patient Position: Sitting)   Pulse 64   Ht 5' 6.5" (1.689 m)   Wt 76.2 kg (167 lb 15.9 oz)   SpO2 99%   BMI 26.71 kg/m²     Physical Exam   Constitutional: She is oriented to person, place, and time. She appears well-developed and well-nourished. No distress.   HENT:   Head: Normocephalic and atraumatic.   Right Ear: Hearing, tympanic membrane, external ear and ear canal normal.   Left Ear: Hearing, tympanic membrane, external ear and ear canal normal.   Nose: Nose normal.   Mouth/Throat: Uvula is midline and oropharynx is clear and moist. No oropharyngeal exudate.   Eyes: Conjunctivae are normal. Right eye exhibits no discharge. Left eye exhibits no discharge. No scleral icterus. "   Neck: Normal range of motion. Neck supple.   Cardiovascular: Normal rate, regular rhythm, normal heart sounds and intact distal pulses. Exam reveals no gallop.   No murmur heard.  Pulmonary/Chest: Effort normal and breath sounds normal. No respiratory distress. She has no wheezes. She has no rales. She exhibits no tenderness.   Neurological: She is alert and oriented to person, place, and time.   Vitals reviewed.      1. Hypertension, unspecified type    2. Type 2 diabetes mellitus with diabetic neuropathy, with long-term current use of insulin        1.uncontrolled, forgot to take medicine this morning, recheck in 2 weeks   2. Add symlin and return in 2 weeks with list of sugars      No orders of the defined types were placed in this encounter.    Medications Ordered This Encounter   Medications    pramlintide (SYMLINPEN 60) 1,500 mcg/1.5 mL injection     Sig: Inject 0.06 mLs (60 mcg total) into the skin 3 (three) times daily before meals.     Dispense:  3 mL     Refill:  1

## 2019-07-31 NOTE — PATIENT INSTRUCTIONS
Pramlintide injection  What is this medicine?  PRAMLINTIDE (PRAM monserrat tide) is a man-made form of a hormone normally found in the body. It is used to treat type 1 and type 2 diabetes in adults. This medicine works with insulin to control blood sugar.  How should I use this medicine?  This medicine is for injection under the skin. You will be taught how to prepare and give this medicine. Use exactly as directed. Do not mix this medicine with insulin in the same syringe. Take this medicine immediately before meals. Take your medicine at regular intervals. Do not take your medicine more often than directed.  Always check the appearance of this medicine before using it. Do not use it if it is cloudy or has solid particles in it.  It is important that you put your used needles and syringes in a special sharps container. Do not put them in a trash can. If you do not have a sharps container, call your pharmacist or healthcare provider to get one.  A special MedGuide will be given to you by the pharmacist with each prescription and refill. Be sure to read this information carefully each time.  Talk to your pediatrician regarding the use of this medicine in children. Special care may be needed.  What side effects may I notice from receiving this medicine?  Side effects that you should report to your doctor or health care professional as soon as possible:  · allergic reactions like skin rash, itching or hives, swelling of the face, lips, or tongue  · breathing problems  · fever, chills  · loss of appetite  · signs and symptoms of high blood sugar such as dizziness, dry mouth, dry skin, fruity breath, nausea, stomach pain, increased hunger or thirst, increased urination  · signs and symptoms of low blood sugar such as feeling anxious, confusion, dizziness, increased hunger, unusually weak or tired, sweating, shakiness, cold, irritable, headache, blurred vision, fast heartbeat, loss of consciousness  · unusual stomach pain or  upset  · vomiting  Side effects that usually do not require medical attention (report to your doctor or health care professional if they continue or are bothersome):  · headache  · decreased appetite  · dizziness  · increase or decrease in fatty tissue under the skin due to overuse of a particular injection site  · irritation at site where injected  · nausea  · stomach upset  What may interact with this medicine?  · atropine  · cisapride  · erythromycin  · medicines for depression, anxiety, or psychotic disturbances  · medicines used to treat stomach problems  · narcotic medicines for pain  · other medicines for diabetes like acarbose, miglitol  · tegaserod  Many medications may cause changes in blood sugar, these include:  · alcohol containing beverages  · aspirin and aspirin-like drugs  · chloramphenicol  · chromium  · female hormones, such as estrogens or progestins, birth control pills  · heart medicines  · isoniazid  · male hormones or anabolic steroids  · medications for weight loss  · medicines for allergies, asthma, cold, or cough  · medicines for mental problems  · medicines called MAO inhibitors - Nardil, Parnate, Marplan, Eldepryl  · niacin  · NSAIDS, such as ibuprofen  · pentamidine  · phenytoin  · probenecid  · quinolone antibiotics such as ciprofloxacin, levofloxacin, ofloxacin  · some herbal dietary supplements  · steroid medicines such as prednisone or cortisone  · thyroid hormones  · diuretics  Some medications can hide the warning symptoms of low blood sugar (hypoglycemia). You may need to monitor your blood sugar more closely if you are taking one of these medications. These include:  · beta-blockers, often used for high blood pressure or heart problems (examples include atenolol, metoprolol, propranolol)  · clonidine  · guanethidine  · reserpine  What if I miss a dose?  It is important not to miss a dose. Your health care professional or doctor should discuss a plan for missed doses with you. If  you do miss a dose, follow their plan. Do not take double doses.  Where should I keep my medicine?  Keep out of the reach of children.  Store unopened vials in the refrigerator between 2 to 8 degrees C (36 to 46 degrees F). Do not freeze. Throw away any unused medicine after the expiration date.  Store opened vials (vials currently in use) in the refrigerator or at room temperature at or below 25 degrees C (77 degrees F). Do not freeze. Keeping this medicine at room temperature decreases the amount of pain during injection. Throw away any opened vials of this medicine 28 days after opening.  What should I tell my health care provider before I take this medicine?  They need to know if you have any of these conditions:  · HbA1c above 9  · low blood sugar episodes  · problems checking blood sugar  · problems taking diabetes medicine  · stomach problems like gastroparesis  · trouble being able to tell when blood sugar is low  · an unusual or allergic reaction to pramlintide, metacresol, other medicines, foods, dyes, or preservatives  · pregnant or trying to get pregnant  · breast-feeding  What should I watch for while using this medicine?  Visit your doctor or health care professional for regular checks on your progress.  A test called the HbA1C (A1C) will be monitored. This is a simple blood test. It measures your blood sugar control over the last 2 to 3 months. You will receive this test every 3 to 6 months.  Learn how to check your blood sugar. Learn the symptoms of low and high blood sugar and how to manage them.  Always carry a quick-source of sugar with you in case you have symptoms of low blood sugar. Examples include hard sugar candy or glucose tablets. Make sure others know that you can choke if you eat or drink when you develop serious symptoms of low blood sugar, such as seizures or unconsciousness. They must get medical help at once.  Tell your doctor or health care professional if you have high blood sugar.  You might need to change the dose of your medicine. If you are sick or exercising more than usual, you might need to change the dose of your medicine.  Do not skip meals. Ask your doctor or health care professional if you should avoid alcohol. Many nonprescription cough and cold products contain sugar or alcohol. These can affect blood sugar.  Pramlintide pens and cartridges should never be shared. Even if the needle is changed, sharing may result in passing of viruses like hepatitis or HIV.  Wear a medical ID bracelet or chain, and carry a card that describes your disease and details of your medicine and dosage times.  NOTE:This sheet is a summary. It may not cover all possible information. If you have questions about this medicine, talk to your doctor, pharmacist, or health care provider. Copyright© 2017 [employer]

## 2019-08-06 ENCOUNTER — HOSPITAL ENCOUNTER (OUTPATIENT)
Dept: RADIOLOGY | Facility: OTHER | Age: 69
Discharge: HOME | End: 2019-08-06
Attending: INTERNAL MEDICINE
Payer: MEDICARE

## 2019-08-06 DIAGNOSIS — R14.0 ABDOMINAL DISTENTION: Primary | ICD-10-CM

## 2019-08-06 DIAGNOSIS — R14.0 ABDOMINAL DISTENTION: ICD-10-CM

## 2019-08-06 PROCEDURE — 74019 XR ABDOMEN FLAT AND ERECT: ICD-10-PCS | Mod: 26,,, | Performed by: RADIOLOGY

## 2019-08-06 PROCEDURE — 74019 RADEX ABDOMEN 2 VIEWS: CPT | Mod: 26,,, | Performed by: RADIOLOGY

## 2019-08-06 PROCEDURE — 74019 RADEX ABDOMEN 2 VIEWS: CPT | Mod: TC,FY

## 2019-08-07 ENCOUNTER — TELEPHONE (OUTPATIENT)
Dept: INTERNAL MEDICINE | Facility: CLINIC | Age: 69
End: 2019-08-07

## 2019-08-07 NOTE — TELEPHONE ENCOUNTER
Left message on [employer]    ----- Message from Christi Baird sent at 8/7/2019  9:48 AM CDT -----  Contact: Self   Name of Who is Calling: VAIBHAV JASMINE [[id number]]      What is the request in detail: pt states that she went to  the Rx for the pramlintide (SYMLINPEN 60) 1,500 mcg/1.5 mL injection, pt would like a hard copy of the Rx so that she canget a bigger discount on the drug. Please contact to further discuss and advise.      Can the clinic reply by [facility]: n      [facility] [phone]

## 2019-08-09 ENCOUNTER — TELEPHONE (OUTPATIENT)
Dept: CARDIOLOGY | Facility: CLINIC | Age: 69
End: 2019-08-09

## 2019-08-09 DIAGNOSIS — E11.40 TYPE 2 DIABETES MELLITUS WITH DIABETIC NEUROPATHY, WITH LONG-TERM CURRENT USE OF INSULIN: ICD-10-CM

## 2019-08-09 DIAGNOSIS — Z79.4 TYPE 2 DIABETES MELLITUS WITH DIABETIC NEUROPATHY, WITH LONG-TERM CURRENT USE OF INSULIN: ICD-10-CM

## 2019-08-09 DIAGNOSIS — I10 ESSENTIAL HYPERTENSION: Primary | ICD-10-CM

## 2019-08-09 DIAGNOSIS — E78.5 HYPERLIPIDEMIA, UNSPECIFIED HYPERLIPIDEMIA TYPE: ICD-10-CM

## 2019-08-09 DIAGNOSIS — I49.3 PVC'S (PREMATURE VENTRICULAR CONTRACTIONS): ICD-10-CM

## 2019-08-14 ENCOUNTER — HOSPITAL ENCOUNTER (OUTPATIENT)
Dept: RADIOLOGY | Facility: OTHER | Age: 69
Discharge: HOME | End: 2019-08-14
Attending: NURSE PRACTITIONER
Payer: MEDICARE

## 2019-08-14 ENCOUNTER — OFFICE VISIT (OUTPATIENT)
Dept: UROGYNECOLOGY | Facility: CLINIC | Age: 69
End: 2019-08-14
Payer: MEDICARE

## 2019-08-14 VITALS
SYSTOLIC BLOOD PRESSURE: 134 MMHG | DIASTOLIC BLOOD PRESSURE: 70 MMHG | HEIGHT: 66 IN | BODY MASS INDEX: 27.14 KG/M2 | WEIGHT: 168.88 LBS

## 2019-08-14 DIAGNOSIS — Z01.419 WELL WOMAN EXAM: Primary | ICD-10-CM

## 2019-08-14 DIAGNOSIS — N95.2 VAGINAL ATROPHY: ICD-10-CM

## 2019-08-14 DIAGNOSIS — Z12.31 ENCOUNTER FOR SCREENING MAMMOGRAM FOR BREAST CANCER: ICD-10-CM

## 2019-08-14 DIAGNOSIS — N30.10 INTERSTITIAL CYSTITIS: ICD-10-CM

## 2019-08-14 DIAGNOSIS — N95.1 MENOPAUSAL SYMPTOMS: ICD-10-CM

## 2019-08-14 DIAGNOSIS — K59.00 CONSTIPATION, UNSPECIFIED CONSTIPATION TYPE: ICD-10-CM

## 2019-08-14 PROCEDURE — G0101 CA SCREEN;PELVIC/BREAST EXAM: HCPCS | Mod: PBBFAC | Performed by: NURSE PRACTITIONER

## 2019-08-14 PROCEDURE — 77063 MAMMO DIGITAL SCREENING BILAT WITH TOMOSYNTHESIS_CAD: ICD-10-PCS | Mod: 26,,, | Performed by: INTERNAL MEDICINE

## 2019-08-14 PROCEDURE — 99999 PR PBB SHADOW E&M-EST. PATIENT-LVL V: CPT | Mod: PBBFAC,,, | Performed by: NURSE PRACTITIONER

## 2019-08-14 PROCEDURE — 77063 BREAST TOMOSYNTHESIS BI: CPT | Mod: 26,,, | Performed by: INTERNAL MEDICINE

## 2019-08-14 PROCEDURE — 77067 SCR MAMMO BI INCL CAD: CPT | Mod: 26,,, | Performed by: INTERNAL MEDICINE

## 2019-08-14 PROCEDURE — G0101 CA SCREEN;PELVIC/BREAST EXAM: HCPCS | Mod: S$PBB,,, | Performed by: NURSE PRACTITIONER

## 2019-08-14 PROCEDURE — 77067 MAMMO DIGITAL SCREENING BILAT WITH TOMOSYNTHESIS_CAD: ICD-10-PCS | Mod: 26,,, | Performed by: INTERNAL MEDICINE

## 2019-08-14 PROCEDURE — G0101 PR CA SCREEN;PELVIC/BREAST EXAM: ICD-10-PCS | Mod: S$PBB,,, | Performed by: NURSE PRACTITIONER

## 2019-08-14 PROCEDURE — 77067 SCR MAMMO BI INCL CAD: CPT | Mod: TC

## 2019-08-14 PROCEDURE — 99215 OFFICE O/P EST HI 40 MIN: CPT | Mod: PBBFAC,25 | Performed by: NURSE PRACTITIONER

## 2019-08-14 PROCEDURE — 99999 PR PBB SHADOW E&M-EST. PATIENT-LVL V: ICD-10-PCS | Mod: PBBFAC,,, | Performed by: NURSE PRACTITIONER

## 2019-08-14 RX ORDER — FLAVOXATE HYDROCHLORIDE 100 MG/1
100 TABLET ORAL 3 TIMES DAILY PRN
Qty: 30 TABLET | Refills: 11 | Status: SHIPPED | OUTPATIENT
Start: 2019-08-14 | End: 2021-09-20 | Stop reason: SDUPTHER

## 2019-08-14 RX ORDER — VENLAFAXINE HYDROCHLORIDE 37.5 MG/1
37.5 CAPSULE, EXTENDED RELEASE ORAL DAILY
Qty: 30 CAPSULE | Refills: 11 | Status: SHIPPED | OUTPATIENT
Start: 2019-08-14 | End: 2019-11-25

## 2019-08-14 NOTE — PROGRESS NOTES
"SUBJECTIVE:   68 y.o. female for well woman exam.     Past Medical History:   Diagnosis Date    Arthritis     Chronic constipation     Depression     Diabetes mellitus     Diabetes mellitus     Hypertension     Osteopenia      Past Surgical History:   Procedure Laterality Date     SECTION      2x    COLONOSCOPY      COLONOSCOPY N/A 3/21/2019    Performed by Marshall Contreras MD at [facility] (4[facility])    HYSTERECTOMY      full hyst          Current Outpatient Medications   Medication Sig Dispense Refill    ACCU-CHEK FASTCLIX Misc TEST twice a day  0    ACCU-CHEK SMARTVIEW TEST STRIP Strp ACCU-CHEK SMARTVIEW TEST STRIP Strp, 120 strip 11    amLODIPine (NORVASC) 5 MG tablet Take 1 tablet (5 mg total) by mouth once daily. 30 tablet 2    aspirin (ECOTRIN) 81 MG EC tablet Take 81 mg by mouth once daily.      BD ULTRA-FINE SAMUEL PEN NEEDLE 32 gauge x 5/32" Ndle USE WITH INSULIN EVERY EVENING 100 each 0    blood sugar diagnostic Strp 1 strip by Misc.(Non-Drug; Combo Route) route 3 (three) times daily as needed. Satiety test strips 120 each 4    calcium carbonate (OS-REDD) 600 mg calcium (1,500 mg) Tab Take 600 mg by mouth 2 (two) times daily with meals.      flavoxATE (URISPAS) 100 mg Tab Take 1 tablet (100 mg total) by mouth 3 (three) times daily as needed. 30 tablet 11    gabapentin (NEURONTIN) 100 MG capsule Take 1 capsule (100 mg total) by mouth 3 (three) times daily. (Patient taking differently: Take 100 mg by mouth 3 (three) times daily. As needed) 90 capsule 1    ibuprofen (ADVIL,MOTRIN) 200 MG tablet Take 200 mg by mouth every 6 (six) hours as needed for Pain.      Lactobacillus rhamnosus GG (CULTURELLE) 10 billion cell capsule Take 1 capsule by mouth once daily.      meloxicam (MOBIC) 15 MG tablet Take 1 tablet (15 mg total) by mouth once daily. 30 tablet 1    metFORMIN (GLUCOPHAGE-XR) 500 MG 24 hr tablet take 1 tablet by mouth twice a day 180 tablet 3    NOVOFINE 32 32 gauge " "x 1/4" Ndle use subcutaneously every evening 100 each 11    ONETOUCH ULTRA BLUE TEST STRIP Strp TEST THREE TIMES DAILY 100 strip 11    pen needle, diabetic (NOVOFINE PLUS) 32 gauge x 1/6" Ndle Inject 1 application into the skin every evening. 100 each 11    rosuvastatin (CRESTOR) 10 MG tablet TAKE 1 TABLET BY MOUTH ONCE DAILY 90 tablet 0    TOUJEO SOLOSTAR U-300 INSULIN 300 unit/mL (1.5 mL) InPn pen INJECT 33 UNITS UNDER THE SKIN EVERY EVENING 18 mL 3    vitamin D 1000 units Tab Take 1,000 Units by mouth once daily.      azelastine (ASTELIN) 137 mcg (0.1 %) nasal spray 2 sprays (274 mcg total) by Nasal route 2 (two) times daily. Use 2 sprays in each nostril twice a day. 30 mL 1    diabetic supplies, miscellan. Kit 1 application by Misc.(Non-Drug; Combo Route) route once daily. [employer] glucose meter 1 kit 0    LINZESS 145 mcg Cap capsule 145 mcg once daily. As needed  0    pramlintide (SYMLINPEN 60) 1,500 mcg/1.5 mL injection Inject 0.06 mLs (60 mcg total) into the skin 3 (three) times daily before meals. 3 mL 1    venlafaxine (EFFEXOR-XR) 37.5 MG 24 hr capsule Take 1 capsule (37.5 mg total) by mouth once daily. 30 capsule 11     No current facility-administered medications for this visit.      Allergies: Patient has no known allergies.   LMP 1999- patient has had a hysterectomy- total    Well Woman:  Last pap: denies h/o abn paps- post hysterectomy- no further screening  Last mammogram: 08/2018--normal  Colonoscopy: ~4-5yrs ago (normal) per patient ([facility])  DEXA: 06/2019 Osteopenia of both femoral necks.  Normal bone mineral density of the lumbar spine.        ROS:  Feeling well.   No dyspnea or chest pain on exertion.    No abdominal pain, change in bowel habits, black or bloody stools. +constipation  Rare IC flare--last one 3 weeks ago.  Approximately 5-6/year  Will use flavoxate if troublesome  GYN ROS: no breast pain or new or enlarging lumps on self exam, no vaginal bleeding or discharge.Having hot " "flashes, did not start effexor  No neurological complaints.        OBJECTIVE:   The patient appears well, alert, oriented x 3, in no distress.  /70 (BP Location: Right arm, Patient Position: Sitting, BP Method: Large (Manual))   Ht 5' 6" (1.676 m)   Wt 76.6 kg (168 lb 14 oz)   BMI 27.26 kg/m²   ENT normal.    Neck supple. No adenopathy or thyromegaly.   MILVIA.   Lungs are clear, good air entry, no wheezes, rhonchi or rales.   S1 and S2 normal, no murmurs, regular rate and rhythm.   Abdomen soft without tenderness, guarding, mass or organomegaly.   Extremities show no edema, normal peripheral pulses. Having hot flases  Neurological is normal, no focal findings.      BREAST EXAM: breasts appear normal, no suspicious masses, no skin or nipple changes or axillary nodes, symmetric fibrous changes in both upper outer quadrants      PELVIC EXAM:  VULVA: normal appearing vulva with no masses, tenderness or lesions,  VAGINA: normal appearing vagina with normal color and discharge, no lesions, atrophic  CERVIX: surgically absent,   UTERUS: surgically absent, vaginal cuff well healed,  ADNEXA: no masses,   RECTAL: normal rectal, no masses    1. Well woman exam     2. Encounter for screening mammogram for breast cancer  CANCELED: Mammo Digital Screening Bilat With CAD   3. Menopausal symptoms  venlafaxine (EFFEXOR-XR) 37.5 MG 24 hr capsule   4. Interstitial cystitis  flavoxATE (URISPAS) 100 mg Tab   5. Vaginal atrophy     6. Constipation, unspecified constipation type             PLAN:   1) Hx Incomplete emptying: improved   --CONTROL DIABETES-  -- Continue to do timed voidings q 2-3 hrs and take time to void, leaning forward at end of stream and voiding again.  --follow up with PCP as planned    2) Interstitial cystitis:  --avoid dietary irritants (see list)  --flares: Continue flavoxate 1 pill every 8 hours as needed; consider anticholinergic if symptoms increase  --empty bladder every 3 hours; lean forward on toilet " and help last bit out.     3) Anxiety:  --under control at this time.  No longer taking paxil.  --continue grief counselling  --important to control IC symptoms    4) Vaginal atrophy (dryness) Use REPLENS OTC: 1/2 applicator full in vagina twice a week.     5) Constipation:  Controlling constipation may help bladder urgency/leakage and fiber may better control cholesterol and blood glucose.  Start daily fiber.  Take 1 tsp of fiber powder (psyllium or other sugar-free powder).  Mix in 8 oz of water.  Take x 3-5 days.  Then, increase fiber by 1 tsp every 3-5 days until stool is easy to pass.  Stop and continue at that dose.   Do not exceed 6 tsps/day.  May also use over the counter stool softener 1-2 x/day.  AVOID laxatives.  --continue flax seed    6) menopausal symptoms  --trial of effexor 37.5 mg daily    7)RTC 1 year for follow up

## 2019-08-14 NOTE — PATIENT INSTRUCTIONS
1) Hx Incomplete emptying: improved   --CONTROL DIABETES-  -- Continue to do timed voidings q 2-3 hrs and take time to void, leaning forward at end of stream and voiding again.  --follow up with PCP as planned    2) Interstitial cystitis:  --avoid dietary irritants (see list)  --flares: Continue flavoxate 1 pill every 8 hours as needed; consider anticholinergic if symptoms increase  --empty bladder every 3 hours; lean forward on toilet and help last bit out.     3) Anxiety:  --under control at this time.  No longer taking paxil.  --continue grief counselling  --important to control IC symptoms    4) Vaginal atrophy (dryness) Use REPLENS OTC: 1/2 applicator full in vagina twice a week.     5) Constipation:  Controlling constipation may help bladder urgency/leakage and fiber may better control cholesterol and blood glucose.  Start daily fiber.  Take 1 tsp of fiber powder (psyllium or other sugar-free powder).  Mix in 8 oz of water.  Take x 3-5 days.  Then, increase fiber by 1 tsp every 3-5 days until stool is easy to pass.  Stop and continue at that dose.   Do not exceed 6 tsps/day.  May also use over the counter stool softener 1-2 x/day.  AVOID laxatives.  --continue flax seed    6) menopausal symptoms  --trial of effexor 37.5 mg daily    7)RTC 1 year for follow up

## 2019-08-17 ENCOUNTER — PATIENT MESSAGE (OUTPATIENT)
Dept: UROGYNECOLOGY | Facility: CLINIC | Age: 69
End: 2019-08-17

## 2019-08-19 RX ORDER — METFORMIN HYDROCHLORIDE 500 MG/1
TABLET, EXTENDED RELEASE ORAL
Qty: 180 TABLET | Refills: 0 | Status: SHIPPED | OUTPATIENT
Start: 2019-08-19 | End: 2019-12-18 | Stop reason: SDUPTHER

## 2019-08-20 ENCOUNTER — OFFICE VISIT (OUTPATIENT)
Dept: PSYCHIATRY | Facility: CLINIC | Age: 69
End: 2019-08-20
Payer: MEDICARE

## 2019-08-20 DIAGNOSIS — F32.A DEPRESSION, UNSPECIFIED DEPRESSION TYPE: Primary | ICD-10-CM

## 2019-08-20 PROCEDURE — 90832 PSYTX W PT 30 MINUTES: CPT | Mod: ,,, | Performed by: SOCIAL WORKER

## 2019-08-20 PROCEDURE — 90832 PR PSYCHOTHERAPY W/PATIENT, 30 MIN: ICD-10-PCS | Mod: ,,, | Performed by: SOCIAL WORKER

## 2019-08-20 PROCEDURE — 99211 OFF/OP EST MAY X REQ PHY/QHP: CPT | Mod: PBBFAC | Performed by: SOCIAL WORKER

## 2019-08-20 PROCEDURE — 99999 PR PBB SHADOW E&M-EST. PATIENT-LVL I: ICD-10-PCS | Mod: PBBFAC,,, | Performed by: SOCIAL WORKER

## 2019-08-20 PROCEDURE — 99999 PR PBB SHADOW E&M-EST. PATIENT-LVL I: CPT | Mod: PBBFAC,,, | Performed by: SOCIAL WORKER

## 2019-08-20 NOTE — PROGRESS NOTES
Individual Psychotherapy (PhD/LCSW)    8/20/2019    Site:  [facility]         Therapeutic Intervention: Met with patient.  Outpatient - Insight oriented psychotherapy 30 min - CPT code 63705    Chief complaint/reason for encounter: depression, anxiety, behavior and interpersonal     Interval history and content of current session: discussed grief and loss, family, careing for her sister and helping out and how to set limits and not burn out, and get some additional resources and people to help sister age 74 who has diabetes and also dementia and the client is doing better than the last session and also taking some trips and doing what she can at this time, her and  are doing better so the situation has improved some, and coping skills, grief and loss, and stress management skills, taking care of herself all addressed and over all is improving and working on being more positive at this time.    Treatment plan:  · Target symptoms: depression, anxiety , adjustment, grief  · Why chosen therapy is appropriate versus another modality: relevant to diagnosis, patient responds to this modality, evidence based practice  · Outcome monitoring methods: self-report, observation  · Therapeutic intervention type: insight oriented psychotherapy, behavior modifying psychotherapy, supportive psychotherapy    Risk parameters:  Patient reports no suicidal ideation  Patient reports no homicidal ideation  Patient reports no self-injurious behavior  Patient reports no violent behavior    Verbal deficits: None    Patient's response to intervention:  The patient's response to intervention is accepting, motivated.    Progress toward goals and other mental status changes:  The patient's progress toward goals is fair .    Diagnosis:     ICD-10-CM ICD-9-CM   1. Depression, unspecified depression type F32.9 311       Plan:  individual psychotherapy and consult psychiatrist for medication evaluation    Return to clinic: 3  weeks    Length of Service (minutes): 30

## 2019-08-21 ENCOUNTER — OFFICE VISIT (OUTPATIENT)
Dept: PRIMARY CARE CLINIC | Facility: CLINIC | Age: 69
End: 2019-08-21
Attending: FAMILY MEDICINE
Payer: MEDICARE

## 2019-08-21 VITALS
HEART RATE: 64 BPM | BODY MASS INDEX: 26.25 KG/M2 | SYSTOLIC BLOOD PRESSURE: 118 MMHG | OXYGEN SATURATION: 99 % | HEIGHT: 67 IN | WEIGHT: 167.25 LBS | DIASTOLIC BLOOD PRESSURE: 76 MMHG

## 2019-08-21 DIAGNOSIS — E11.65 UNCONTROLLED TYPE 2 DIABETES MELLITUS WITH HYPERGLYCEMIA: Primary | ICD-10-CM

## 2019-08-21 PROCEDURE — 99213 OFFICE O/P EST LOW 20 MIN: CPT | Mod: PBBFAC,PN | Performed by: FAMILY MEDICINE

## 2019-08-21 PROCEDURE — 99213 PR OFFICE/OUTPT VISIT, EST, LEVL III, 20-29 MIN: ICD-10-PCS | Mod: S$PBB,,, | Performed by: FAMILY MEDICINE

## 2019-08-21 PROCEDURE — 99213 OFFICE O/P EST LOW 20 MIN: CPT | Mod: S$PBB,,, | Performed by: FAMILY MEDICINE

## 2019-08-21 PROCEDURE — 99999 PR PBB SHADOW E&M-EST. PATIENT-LVL III: ICD-10-PCS | Mod: PBBFAC,,, | Performed by: FAMILY MEDICINE

## 2019-08-21 PROCEDURE — 99999 PR PBB SHADOW E&M-EST. PATIENT-LVL III: CPT | Mod: PBBFAC,,, | Performed by: FAMILY MEDICINE

## 2019-08-21 RX ORDER — GLIMEPIRIDE 1 MG/1
1 TABLET ORAL
Qty: 30 TABLET | Refills: 3 | Status: SHIPPED | OUTPATIENT
Start: 2019-08-21 | End: 2020-02-24

## 2019-08-21 NOTE — PROGRESS NOTES
"Subjective:      Patient ID: Gely Green is a 68 y.o. female.    Chief Complaint: Follow-up    HPI   Patient here today for follow up. Her morning sugars are 120-130 range. She has noticed with lunch/dinner she has her 2 hours after eating sugars in the 200-300 range. She would like to start mealtime insulin.         Review of Systems   Constitutional: Negative for activity change, appetite change, chills, diaphoresis, fatigue, fever and unexpected weight change.   HENT: Negative for congestion, ear discharge, ear pain, hearing loss, postnasal drip, rhinorrhea, sinus pressure and sore throat.    Respiratory: Negative for cough, shortness of breath and wheezing.    Cardiovascular: Negative for chest pain.   Gastrointestinal: Negative for abdominal pain, constipation, diarrhea, nausea and vomiting.   Genitourinary: Negative for dysuria and frequency.   Musculoskeletal: Negative.    Psychiatric/Behavioral: Negative for suicidal ideas.     I personally reviewed Past Medical History, Past Surgical history,  Past Social History and Family History      Objective:   /76 (BP Location: Left arm, Patient Position: Sitting)   Pulse 64   Ht 5' 6.5" (1.689 m)   Wt 75.8 kg (167 lb 3.5 oz)   SpO2 99%   BMI 26.59 kg/m²     Physical Exam   Constitutional: She is oriented to person, place, and time. She appears well-developed and well-nourished. No distress.   HENT:   Head: Normocephalic and atraumatic.   Right Ear: Hearing, tympanic membrane, external ear and ear canal normal.   Left Ear: Hearing, tympanic membrane, external ear and ear canal normal.   Nose: Nose normal.   Mouth/Throat: Uvula is midline and oropharynx is clear and moist. No oropharyngeal exudate.   Eyes: Pupils are equal, round, and reactive to light. Conjunctivae and EOM are normal. Right eye exhibits no discharge. Left eye exhibits no discharge. No scleral icterus.   Neck: Normal range of motion. Neck supple.   Cardiovascular: Normal rate, regular " rhythm, normal heart sounds and intact distal pulses. Exam reveals no gallop.   No murmur heard.  Pulmonary/Chest: Effort normal and breath sounds normal. No respiratory distress. She has no wheezes. She has no rales. She exhibits no tenderness.   Abdominal: Soft. Bowel sounds are normal. She exhibits no distension and no mass. There is no tenderness. There is no rebound and no guarding.   Neurological: She is alert and oriented to person, place, and time.   Skin: Skin is warm and dry.   Vitals reviewed.      1. Uncontrolled type 2 diabetes mellitus with hyperglycemia        1. She will meet with diabetes education, trial amaryl 1 mg before her largest meal, she will call if post prandial sugars are remaining elevated   -symlin pen was expensive    -email her sugars weekly     Orders Placed This Encounter   Procedures    Ambulatory consult to Diabetic Education     Medications Ordered This Encounter   Medications    glimepiride (AMARYL) 1 MG tablet     Sig: Take 1 tablet (1 mg total) by mouth before dinner.     Dispense:  30 tablet     Refill:  3

## 2019-09-02 RX ORDER — AMLODIPINE BESYLATE 5 MG/1
TABLET ORAL
Qty: 90 TABLET | Refills: 3 | Status: SHIPPED | OUTPATIENT
Start: 2019-09-02 | End: 2020-08-31

## 2019-09-16 ENCOUNTER — TELEPHONE (OUTPATIENT)
Dept: INTERNAL MEDICINE | Facility: CLINIC | Age: 69
End: 2019-09-16

## 2019-09-25 ENCOUNTER — TELEPHONE (OUTPATIENT)
Dept: INTERNAL MEDICINE | Facility: CLINIC | Age: 69
End: 2019-09-25

## 2019-09-25 ENCOUNTER — HOSPITAL ENCOUNTER (OUTPATIENT)
Dept: DIABETES | Facility: OTHER | Age: 69
Discharge: HOME | End: 2019-09-25
Attending: FAMILY MEDICINE
Payer: MEDICARE

## 2019-09-25 VITALS — HEIGHT: 67 IN | BODY MASS INDEX: 26.47 KG/M2 | WEIGHT: 168.63 LBS

## 2019-09-25 DIAGNOSIS — Z79.4 TYPE 2 DIABETES MELLITUS WITH DIABETIC NEUROPATHY, WITH LONG-TERM CURRENT USE OF INSULIN: Primary | ICD-10-CM

## 2019-09-25 DIAGNOSIS — E11.40 TYPE 2 DIABETES MELLITUS WITH DIABETIC NEUROPATHY, WITH LONG-TERM CURRENT USE OF INSULIN: Primary | ICD-10-CM

## 2019-09-25 PROCEDURE — G0108 DIAB MANAGE TRN  PER INDIV: HCPCS

## 2019-09-25 NOTE — TELEPHONE ENCOUNTER
----- Message from Marquis Razo sent at 9/25/2019  4:23 PM CDT -----  Contact: pt   Name of Who is Calling: VAIBHAV JASMINE [[id number]]    What is the request in detail: VAIBHAV JASMINE [[id number]]  Is requesting a call back regards to paperwork ....Please contact to further discuss and advise      Can the clinic reply by [facility]: Yes     [facility]:  [phone]

## 2019-09-25 NOTE — TELEPHONE ENCOUNTER
Ms. Green would like paperwork mailed back to her.  Patient states that she will call the office on tomorrow.  09/26/19

## 2019-09-27 NOTE — PROGRESS NOTES
Diabetes Education  Author: Dagmar Langford RN  Date: 2019    Diabetes Care Management Summary  Diabetes Education Record Assessment/Progress: Initial         Diabetes Type  Diabetes Type : Type II    Diabetes History  Current Treatment: Oral Medication, Insulin  Reviewed Problem List with Patient: Yes    Health Maintenance was reviewed today with patient. Discussed with patient importance of routine eye exams, foot exams/foot care, blood work (i.e.: A1c, microalbumin, and lipid), dental visits, yearly flu vaccine, and pneumonia vaccine as indicated by PCP. Patient verbalized understanding.     Health Maintenance Topics with due status: Not Due       Topic Last Completion Date    TETANUS VACCINE 2016    Eye Exam 2019    Foot Exam 2019    Colonoscopy 2019    Lipid Panel 2019    Hemoglobin A1c 2019    DEXA SCAN 2019    Urine Microalbumin 2019    Mammogram 2019    Low Dose Statin 2019     There are no preventive care reminders to display for this patient.    Nutrition  Meal Plan 24 Hour Recall - Breakfast: skipped   Meal Plan 24 Hour Recall - Lunch: crackers, peanut butter, fruit   Meal Plan 24 Hour Recall - Dinner: turkey necks/wings, over rice (a few tablespoons) - watermelon, diet barq's   Meal Plan 24 Hour Recall - Snack: crackers, peanut butter, fruit (apple + orange slices, grapes)     Monitoring   Self Monitoring : depends on what dinner is: 130-140's, evening before eatin-140 after eating dinner: 160's   Blood Glucose Logs: No  Do you use a personal continuous glucose monitor?: No  In the last month, how often have you had a low blood sugar reaction?: more than once a week  What are your symptoms of low blood sugar?: jittery   How do you treat low blood sugar?: uses glucose tablets     Exercise   Exercise Type: walking, use exercise equipment, exercise class(3.5 miles)  Intensity: Low  Frequency: Daily  Duration: 1 hour(aerobics classes 1  hour)    Current Diabetes Treatment   Current Treatment: Oral Medication, Insulin    Social History  Preferred Learning Method: Face to Face, Reading Materials  Primary Support: Self  Educational Level: College Graduate  Occupation: retired   Smoking Status: Ex Smoker  Alcohol Use: Never                                Barriers to Change  Barriers to Change: None  Learning Challenges : None    Readiness to Learn   Readiness to Learn : Eager    Cultural Influences  Cultural Influences: No    Diabetes Education Assessment/Progress  Diabetes Disease Process (diabetes disease process and treatment options): Discussion, Individual Session, Requires Assistance Family/SO(discussed insulin resistance, beta cell burnout and role of lifestyle changes + appropriate meds to help keep BG controlled)  Nutrition (Incorporating nutritional management into one's lifestyle): Discussion, Individual Session, Requires Assistance Family/SO(pt has been haivng difficult relationship w/ food and DM, will starve self all day and eat junk in bed at night; encouraged to work on eating 3 balanced meals per day and reviewed portion sizes and working to cut back on junk in bed)  Physical Activity (incorporating physical activity into one's lifestyle): Discussion, Individual Session, Comprehends Key Points(pt very active, attends multiple exercise classes and enjoys gardening and walking )  Medications (states correct name, dose, onset, peak, duration, side effects & timing of meds): Discussion, Individual Session, Requires Assistance Family/SO, Written Materials Provided(pt skipped toujeo last night, ed on how metformin, toujeo + amaryl before dinner all work to keep BG controlled )  Monitoring (monitoring blood glucose/other parameters & using results): Discussion, Individual Session, Requires Assistance Family/SO, Written Materials Provided(reviewed SMBG schedule, logs provided and reviewed BG goals )  Acute Complications (preventing, detecting,  and treating acute complications): Discussion, Requires Assistance Family/SO, Individual Session(pt having afternoon lows d/t skipping lunch after exercise - encouraged to eat nutritious meals/snacks throughout the day to prevent low BG)  Chronic Complications (preventing, detecting, and treating chronic complications): Discussion, Individual Session, Requires Assistance Family/SO(reviewed current A1C, goal A1C and importance of keeping BG well controlled to prevent complications r/t DM)  Clinical (diabetes, other pertinent medical history, and relevant comorbidities reviewed during visit): Discussion, Individual Session, Requires Assistance Family/SO  Cognitive (knowledge of self-management skills, functional health literacy): Discussion, Individual Session  Psychosocial (emotional response to diabetes): Discussion, Individual Session, Requires Assistance Family/SO  Diabetes Distress and Support Systems: Discussion, Individual Session, Requires Assistance Family/SO  Behavioral (readiness for change, lifestyle practices, self-care behaviors): Discussion, Individual Session, Requires Assistance Family/SO(pt wants to work on relationship w/ food and view it more as fuel and nutrition, pt changed mind and wants to work on lifestyle changes before using mealtime insulin)    Goals  Patient has selected/evaluated goals during today's session: Yes, selected  Healthy Eating: Set(pt will work on eating 3+ times during the day of portioned, nutrient dense meals/snacks)  Start Date: 09/25/19  Monitoring: Set(pt will SMBG BID and bring logs to all future appointments)  Start Date: 09/25/19         Diabetes Care Plan/Intervention  Education Plan/Intervention: Individual Follow-Up DSMT    Diabetes Meal Plan  Restrictions: Restricted Carbohydrate  Carbohydrate Per Meal: 30-45g    Today's Self-Management Care Plan was developed with the patient's input and is based on barriers identified during today's assessment.    The long and  short-term goals in the care plan were written with the patient/caregiver's input. The patient has agreed to work toward these goals to improve her overall diabetes control.      The patient received a copy of today's self-management plan and verbalized understanding of the care plan, goals, and all of today's instructions.      The patient was encouraged to communicate with her physician and care team regarding her condition(s) and treatment.  I provided the patient with my contact information today and encouraged her to contact me via phone or patient portal as needed.     Education Units of Time   Time Spent: 60 min

## 2019-09-30 ENCOUNTER — PATIENT OUTREACH (OUTPATIENT)
Dept: ADMINISTRATIVE | Facility: OTHER | Age: 69
End: 2019-09-30

## 2019-10-02 ENCOUNTER — HOSPITAL ENCOUNTER (OUTPATIENT)
Dept: RADIOLOGY | Facility: HOSPITAL | Age: 69
Discharge: HOME | End: 2019-10-02
Attending: PODIATRIST
Payer: MEDICARE

## 2019-10-02 ENCOUNTER — OFFICE VISIT (OUTPATIENT)
Dept: PODIATRY | Facility: CLINIC | Age: 69
End: 2019-10-02
Payer: MEDICARE

## 2019-10-02 VITALS
HEIGHT: 66 IN | DIASTOLIC BLOOD PRESSURE: 79 MMHG | WEIGHT: 168 LBS | BODY MASS INDEX: 27 KG/M2 | SYSTOLIC BLOOD PRESSURE: 144 MMHG | HEART RATE: 58 BPM

## 2019-10-02 DIAGNOSIS — M21.40 PES PLANUS, UNSPECIFIED LATERALITY: ICD-10-CM

## 2019-10-02 DIAGNOSIS — M21.40 PES PLANUS, UNSPECIFIED LATERALITY: Primary | ICD-10-CM

## 2019-10-02 PROCEDURE — 73630 X-RAY EXAM OF FOOT: CPT | Mod: 26,LT,, | Performed by: RADIOLOGY

## 2019-10-02 PROCEDURE — 73650 XR CALCANEUS 2 VIEW LEFT: ICD-10-PCS | Mod: 26,59,LT, | Performed by: RADIOLOGY

## 2019-10-02 PROCEDURE — 99213 PR OFFICE/OUTPT VISIT, EST, LEVL III, 20-29 MIN: ICD-10-PCS | Mod: S$PBB,,, | Performed by: PODIATRIST

## 2019-10-02 PROCEDURE — 73650 X-RAY EXAM OF HEEL: CPT | Mod: TC,FY,LT

## 2019-10-02 PROCEDURE — 73630 XR FOOT COMPLETE 3 VIEW LEFT: ICD-10-PCS | Mod: 26,LT,, | Performed by: RADIOLOGY

## 2019-10-02 PROCEDURE — 99213 OFFICE O/P EST LOW 20 MIN: CPT | Mod: PBBFAC,25,PN | Performed by: PODIATRIST

## 2019-10-02 PROCEDURE — 73630 X-RAY EXAM OF FOOT: CPT | Mod: TC,FY,LT

## 2019-10-02 PROCEDURE — 73650 X-RAY EXAM OF HEEL: CPT | Mod: 26,59,LT, | Performed by: RADIOLOGY

## 2019-10-02 PROCEDURE — 99999 PR PBB SHADOW E&M-EST. PATIENT-LVL III: CPT | Mod: PBBFAC,,, | Performed by: PODIATRIST

## 2019-10-02 PROCEDURE — 99999 PR PBB SHADOW E&M-EST. PATIENT-LVL III: ICD-10-PCS | Mod: PBBFAC,,, | Performed by: PODIATRIST

## 2019-10-02 PROCEDURE — 99213 OFFICE O/P EST LOW 20 MIN: CPT | Mod: S$PBB,,, | Performed by: PODIATRIST

## 2019-10-02 NOTE — PROGRESS NOTES
Subjective:      Patient ID: Gely Green is a 68 y.o. female.    Chief Complaint: Diabetes Mellitus (Dr. Anderson 8/21/19) and Foot Pain (toe pain left ankle and in step pain )    68 y.o. female presenting with left foot and ankle pain.  Patient is known to Dr. Gillis for diabetic routine care.  Patient's main complaint today is left foot and ankle pain.  She points arch area and lateral aspect of the heel for pain.  Left foot is way worse than the right foot. Patient tells me left foot is painful all the time.  Patient is ambulating in normal shoe today.  Left foot pain got worse in last couple months.  No previous treatment. Describes pain as aching.  Patient is also diabetic.      Hemoglobin A1C   Date Value Ref Range Status   06/19/2019 7.1 (H) 4.0 - 5.6 % Final     Comment:     ADA Screening Guidelines:  5.7-6.4%  Consistent with prediabetes  >or=6.5%  Consistent with diabetes  High levels of fetal hemoglobin interfere with the HbA1C  assay. Heterozygous hemoglobin variants (HbS, HgC, etc)do  not significantly interfere with this assay.   However, presence of multiple variants may affect accuracy.     12/31/2018 6.8 (H) 4.0 - 5.6 % Final     Comment:     ADA Screening Guidelines:  5.7-6.4%  Consistent with prediabetes  >or=6.5%  Consistent with diabetes  High levels of fetal hemoglobin interfere with the HbA1C  assay. Heterozygous hemoglobin variants (HbS, HgC, etc)do  not significantly interfere with this assay.   However, presence of multiple variants may affect accuracy.     05/01/2018 6.3 (H) 4.0 - 5.6 % Final     Comment:     According to ADA guidelines, hemoglobin A1c <7.0% represents  optimal control in non-pregnant diabetic patients. Different  metrics may apply to specific patient populations.   Standards of Medical Care in Diabetes-2016.  For the purpose of screening for the presence of diabetes:  <5.7%     Consistent with the absence of diabetes  5.7-6.4%  Consistent with increasing risk for  diabetes   (prediabetes)  >or=6.5%  Consistent with diabetes  Currently, no consensus exists for use of hemoglobin A1c  for diagnosis of diabetes for children.  This Hemoglobin A1c assay has significant interference with fetal   hemoglobin   (HbF). The results are invalid for patients with abnormal amounts of   HbF,   including those with known Hereditary Persistence   of Fetal Hemoglobin. Heterozygous hemoglobin variants (HbAS, HbAC,   HbAD, HbAE, HbA2) do not significantly interfere with this assay;   however, presence of multiple variants in a sample may impact the %   interference.         Review of Systems   Constitution: Negative for chills, decreased appetite, fever and malaise/fatigue.   HENT: Negative for congestion, ear discharge and sore throat.    Eyes: Negative for discharge and pain.   Cardiovascular: Negative for chest pain, claudication and leg swelling.   Respiratory: Negative for cough and shortness of breath.    Skin: Negative for color change, nail changes and rash.   Musculoskeletal: Positive for arthritis, joint pain and joint swelling. Negative for muscle weakness.        Left ankle pain  Left foot pain   Gastrointestinal: Negative for bloating, abdominal pain, diarrhea, nausea and vomiting.   Genitourinary: Negative for flank pain and hematuria.   Neurological: Negative for headaches, numbness and weakness.   Psychiatric/Behavioral: Negative for altered mental status.             Past Medical History:   Diagnosis Date    Arthritis     Chronic constipation     Depression     Diabetes mellitus     Diabetes mellitus     Hypertension     Osteopenia        Past Surgical History:   Procedure Laterality Date     SECTION      2x    COLONOSCOPY      COLONOSCOPY N/A 3/21/2019    Procedure: COLONOSCOPY;  Surgeon: Marshall Contreras MD;  Location: [address];  Service: Endoscopy;  Laterality: N/A;  pt states that she had to be resuscitated after receiving an epidural during  "childbirth "30 something" years ago.  Ok for 4th floor per Dr. Hernandez-MS    HYSTERECTOMY      full hyst        Family History   Problem Relation Age of Onset    Breast cancer Cousin     Heart attack Mother     Heart disease Mother     Alzheimer's disease Mother     Heart attack Father     Heart disease Father     Heart failure Father     Hypertension Father     Hyperlipidemia Sister     Hypertension Sister     Diabetes Sister     Abnormal EKG Sister     Alcohol abuse Brother     No Known Problems Daughter     Ovarian cancer Neg Hx     Cervical cancer Neg Hx     Endometrial cancer Neg Hx     Vaginal cancer Neg Hx     Stroke Neg Hx     Colon cancer Neg Hx     Esophageal cancer Neg Hx     Vision loss Neg Hx        Social History     Socioeconomic History    Marital status:      Spouse name: Not on file    Number of children: Not on file    Years of education: Not on file    Highest education level: Not on file   Occupational History    Not on file   Social Needs    Financial resource strain: Not on file    Food insecurity:     Worry: Not on file     Inability: Not on file    Transportation needs:     Medical: Not on file     Non-medical: Not on file   Tobacco Use    Smoking status: Former Smoker     Packs/day: 1.50     Years: 25.00     Pack years: 37.50     Types: Cigarettes     Last attempt to quit:      Years since quittin.7    Smokeless tobacco: Never Used   Substance and Sexual Activity    Alcohol use: No    Drug use: No    Sexual activity: Not Currently     Partners: Male     Birth control/protection: None   Lifestyle    Physical activity:     Days per week: Not on file     Minutes per session: Not on file    Stress: Not on file   Relationships    Social connections:     Talks on phone: Not on file     Gets together: Not on file     Attends [employer] service: Not on file     Active member of club or organization: Not on file     Attends meetings of clubs or " "organizations: Not on file     Relationship status: Not on file   Other Topics Concern    Not on file   Social History Narrative    Not on file       Current Outpatient Medications   Medication Sig Dispense Refill    ACCU-CHEK FASTCLIX Misc TEST twice a day  0    ACCU-CHEK SMARTVIEW TEST STRIP Strp ACCU-CHEK SMARTVIEW TEST STRIP Strp, 120 strip 11    amLODIPine (NORVASC) 5 MG tablet TAKE 1 TABLET(5 MG) BY MOUTH EVERY DAY 90 tablet 3    aspirin (ECOTRIN) 81 MG EC tablet Take 81 mg by mouth once daily.      azelastine (ASTELIN) 137 mcg (0.1 %) nasal spray 2 sprays (274 mcg total) by Nasal route 2 (two) times daily. Use 2 sprays in each nostril twice a day. 30 mL 1    BD ULTRA-FINE SAMUEL PEN NEEDLE 32 gauge x 5/32" Ndle USE WITH INSULIN EVERY EVENING 100 each 0    blood sugar diagnostic Strp 1 strip by Misc.(Non-Drug; Combo Route) route 3 (three) times daily as needed. [employer] test strips 120 each 4    calcium carbonate (OS-REDD) 600 mg calcium (1,500 mg) Tab Take 600 mg by mouth 2 (two) times daily with meals.      flavoxATE (URISPAS) 100 mg Tab Take 1 tablet (100 mg total) by mouth 3 (three) times daily as needed. 30 tablet 11    gabapentin (NEURONTIN) 100 MG capsule Take 1 capsule (100 mg total) by mouth 3 (three) times daily. (Patient taking differently: Take 100 mg by mouth 3 (three) times daily. As needed) 90 capsule 1    glimepiride (AMARYL) 1 MG tablet Take 1 tablet (1 mg total) by mouth before dinner. 30 tablet 3    ibuprofen (ADVIL,MOTRIN) 200 MG tablet Take 200 mg by mouth every 6 (six) hours as needed for Pain.      Lactobacillus rhamnosus GG (CULTURELLE) 10 billion cell capsule Take 1 capsule by mouth once daily.      LINZESS 145 mcg Cap capsule 145 mcg once daily. As needed  0    metFORMIN (GLUCOPHAGE-XR) 500 MG 24 hr tablet TAKE 1 TABLET BY MOUTH TWICE A  tablet 0    NOVOFINE 32 32 gauge x 1/4" Ndle use subcutaneously every evening 100 each 11    ONETOUCH ULTRA BLUE TEST STRIP " "Strp TEST THREE TIMES DAILY 100 strip 11    pen needle, diabetic (NOVOFINE PLUS) 32 gauge x 1/6" Ndle Inject 1 application into the skin every evening. 100 each 11    rosuvastatin (CRESTOR) 10 MG tablet TAKE 1 TABLET BY MOUTH ONCE DAILY 90 tablet 0    TOUJEO SOLOSTAR U-300 INSULIN 300 unit/mL (1.5 mL) InPn pen INJECT 33 UNITS UNDER THE SKIN EVERY EVENING 18 mL 3    venlafaxine (EFFEXOR-XR) 37.5 MG 24 hr capsule Take 1 capsule (37.5 mg total) by mouth once daily. 30 capsule 11    vitamin D 1000 units Tab Take 1,000 Units by mouth once daily.      diabetic supplies, [employer]. Kit 1 application by Misc.(Non-Drug; Combo Route) route once daily. [employer] glucose meter 1 kit 0     No current facility-administered medications for this visit.        Review of patient's allergies indicates:  No Known Allergies    Vitals:    10/02/19 1059   BP: (!) 144/79   Pulse: (!) 58   Weight: 76.2 kg (168 lb)   Height: 5' 6" (1.676 m)   PainSc:   5       Objective:      Physical Exam    Vascular: Distal DP/PT pulses palpable 2/4. CRT < 3 sec to tips of toes. No vericosities noted to LEs. Hair growth present LE, warm to touch LE, No edema noted to LE.    Dermatologic: No open lesions, lacerations or wounds. Interdigital spaces clean, dry and intact. No erythema, rubor, calor noted LE    Musculoskeletal: . No calf tenderness LE, Compartments soft/compressible. ROM of ankles with < 10 deg DF is noted bilateral  Left lower extremity:  Pes planus with fallen arch, diffuse pain over the plantar arch, no pain at posterior tibial tendon, no pain over the deltoid ankle ligament, tender to palpation distal tip of the lateral malleolus and lateral aspect of the heel (sub fibular pain), pain over the plantar arch during heel raise.     Neurological: Light touch, proprioception, and sharp/dull sensation are all intact. Protective threshold with the Cheswick-Wienstein monofilament is intact. Vibratory sensation intact.           Assessment:     "   Encounter Diagnosis   Name Primary?    Pes planus, unspecified laterality - Left Foot Yes         Plan:       Gely was seen today for diabetes mellitus and foot pain.    Diagnoses and all orders for this visit:    Pes planus, unspecified laterality - Left Foot  -     X-Ray Foot Complete Left; Future  -     X-Ray Calcaneus 2 View Left; Future  -     ORTHOTIC DEVICE (DME)      I counseled the patient on her conditions, their implications and medical management.    68 y.o. female with left foot painful pes planus.    -Rx.  Left foot and held x-ray:  Severe fallen arch with decreased joint space of the subtalar joint and TN.  Arthritis of the midfoot/subtalar joint.   -both conservative and surgical options were discussed with the patient. I recommend custom-made orthotic for arch support.  If insurance is not covering custom-made orthotic I recommend over-the-counter insole.   -Recommended Sof Sole Fit Series Arch Supports with neutral arch found on [employer] or [employer] total arch support from [employer].   -The nature of the condition, options for management, as well as potential risks and complications were discussed in detail with patient. Patient was amenable to my recommendations and left my office fully informed and will follow up as instructed or sooner if necessary.    -f/u prn      Note dictated with voice recognition software, please excuse any grammatical errors.

## 2019-11-01 ENCOUNTER — IMMUNIZATION (OUTPATIENT)
Dept: PHARMACY | Facility: CLINIC | Age: 69
End: 2019-11-01
Payer: MEDICARE

## 2019-11-06 ENCOUNTER — TELEPHONE (OUTPATIENT)
Dept: PODIATRY | Facility: CLINIC | Age: 69
End: 2019-11-06

## 2019-11-06 NOTE — TELEPHONE ENCOUNTER
----- Message from Mario Alberto Ordaz sent at 11/6/2019  1:42 PM CST -----  Contact: [facility]   Pt is trying to obtain inserts and shoes. Clinical notes need to be faxed back Please.     [phone] ex [phone]    [phone] fax

## 2019-11-13 ENCOUNTER — TELEPHONE (OUTPATIENT)
Dept: PODIATRY | Facility: CLINIC | Age: 69
End: 2019-11-13

## 2019-11-13 NOTE — TELEPHONE ENCOUNTER
----- Message from Nicole Pierson sent at 11/13/2019 11:33 AM CST -----  Contact: Yahir from [employer] can be reached [phone]  Yahir is requesting the last pt clinical notes.      Thank you!

## 2019-11-18 ENCOUNTER — TELEPHONE (OUTPATIENT)
Dept: CARDIOLOGY | Facility: CLINIC | Age: 69
End: 2019-11-18

## 2019-11-18 ENCOUNTER — LAB VISIT (OUTPATIENT)
Dept: LAB | Facility: OTHER | Age: 69
End: 2019-11-18
Payer: MEDICARE

## 2019-11-18 DIAGNOSIS — E11.40 TYPE 2 DIABETES MELLITUS WITH DIABETIC NEUROPATHY, WITH LONG-TERM CURRENT USE OF INSULIN: ICD-10-CM

## 2019-11-18 DIAGNOSIS — E78.5 HYPERLIPIDEMIA, UNSPECIFIED HYPERLIPIDEMIA TYPE: ICD-10-CM

## 2019-11-18 DIAGNOSIS — I10 ESSENTIAL HYPERTENSION: ICD-10-CM

## 2019-11-18 DIAGNOSIS — I49.3 PVC'S (PREMATURE VENTRICULAR CONTRACTIONS): ICD-10-CM

## 2019-11-18 DIAGNOSIS — Z79.4 TYPE 2 DIABETES MELLITUS WITH DIABETIC NEUROPATHY, WITH LONG-TERM CURRENT USE OF INSULIN: ICD-10-CM

## 2019-11-18 LAB
ALBUMIN SERPL BCP-MCNC: 4.1 G/DL (ref 3.5–5.2)
ALP SERPL-CCNC: 71 U/L (ref 55–135)
ALT SERPL W/O P-5'-P-CCNC: 20 U/L (ref 10–44)
ANION GAP SERPL CALC-SCNC: 9 MMOL/L (ref 8–16)
AST SERPL-CCNC: 23 U/L (ref 10–40)
BILIRUB SERPL-MCNC: 0.3 MG/DL (ref 0.1–1)
BUN SERPL-MCNC: 12 MG/DL (ref 8–23)
CALCIUM SERPL-MCNC: 9.9 MG/DL (ref 8.7–10.5)
CHLORIDE SERPL-SCNC: 108 MMOL/L (ref 95–110)
CHOLEST SERPL-MCNC: 149 MG/DL (ref 120–199)
CHOLEST/HDLC SERPL: 2.8 {RATIO} (ref 2–5)
CO2 SERPL-SCNC: 28 MMOL/L (ref 23–29)
CREAT SERPL-MCNC: 0.8 MG/DL (ref 0.5–1.4)
EST. GFR  (AFRICAN AMERICAN): >60 ML/MIN/1.73 M^2
EST. GFR  (NON AFRICAN AMERICAN): >60 ML/MIN/1.73 M^2
ESTIMATED AVG GLUCOSE: 137 MG/DL (ref 68–131)
GLUCOSE SERPL-MCNC: 112 MG/DL (ref 70–110)
HBA1C MFR BLD HPLC: 6.4 % (ref 4–5.6)
HDLC SERPL-MCNC: 54 MG/DL (ref 40–75)
HDLC SERPL: 36.2 % (ref 20–50)
LDLC SERPL CALC-MCNC: 85.2 MG/DL (ref 63–159)
NONHDLC SERPL-MCNC: 95 MG/DL
POTASSIUM SERPL-SCNC: 4.6 MMOL/L (ref 3.5–5.1)
PROT SERPL-MCNC: 7.4 G/DL (ref 6–8.4)
SODIUM SERPL-SCNC: 145 MMOL/L (ref 136–145)
TRIGL SERPL-MCNC: 49 MG/DL (ref 30–150)
TSH SERPL DL<=0.005 MIU/L-ACNC: 0.81 UIU/ML (ref 0.4–4)

## 2019-11-18 PROCEDURE — 80061 LIPID PANEL: CPT

## 2019-11-18 PROCEDURE — 36415 COLL VENOUS BLD VENIPUNCTURE: CPT

## 2019-11-18 PROCEDURE — 83036 HEMOGLOBIN GLYCOSYLATED A1C: CPT

## 2019-11-18 PROCEDURE — 80053 COMPREHEN METABOLIC PANEL: CPT

## 2019-11-18 PROCEDURE — 84443 ASSAY THYROID STIM HORMONE: CPT

## 2019-11-18 NOTE — TELEPHONE ENCOUNTER
----- Message from Jessie Sanz MD sent at 11/18/2019  4:35 PM CST -----  Please review.  We will discuss the results during your upcoming visit with me. It looks good.

## 2019-11-18 NOTE — TELEPHONE ENCOUNTER
----- Message from Jessie Sanz MD sent at 11/18/2019 12:11 PM CST -----  Please review.  We will discuss the results during your upcoming visit with me. It looks good.

## 2019-11-21 RX ORDER — ROSUVASTATIN CALCIUM 10 MG/1
TABLET, COATED ORAL
Qty: 90 TABLET | Refills: 0 | Status: SHIPPED | OUTPATIENT
Start: 2019-11-21 | End: 2020-05-15

## 2019-11-25 ENCOUNTER — OFFICE VISIT (OUTPATIENT)
Dept: CARDIOLOGY | Facility: CLINIC | Age: 69
End: 2019-11-25
Payer: MEDICARE

## 2019-11-25 VITALS
BODY MASS INDEX: 27.11 KG/M2 | DIASTOLIC BLOOD PRESSURE: 70 MMHG | SYSTOLIC BLOOD PRESSURE: 137 MMHG | HEART RATE: 59 BPM | HEIGHT: 67 IN | WEIGHT: 172.75 LBS

## 2019-11-25 DIAGNOSIS — R41.3 MEMORY LOSS: ICD-10-CM

## 2019-11-25 DIAGNOSIS — E78.2 MIXED HYPERLIPIDEMIA: ICD-10-CM

## 2019-11-25 DIAGNOSIS — F41.9 ANXIETY: ICD-10-CM

## 2019-11-25 DIAGNOSIS — F43.23 ADJUSTMENT DISORDER WITH MIXED ANXIETY AND DEPRESSED MOOD: ICD-10-CM

## 2019-11-25 DIAGNOSIS — R00.2 POUNDING HEARTBEAT: ICD-10-CM

## 2019-11-25 DIAGNOSIS — I49.3 PVC'S (PREMATURE VENTRICULAR CONTRACTIONS): ICD-10-CM

## 2019-11-25 DIAGNOSIS — E08.00 DIABETES MELLITUS DUE TO UNDERLYING CONDITION WITH HYPEROSMOLARITY WITHOUT COMA, WITHOUT LONG-TERM CURRENT USE OF INSULIN: ICD-10-CM

## 2019-11-25 DIAGNOSIS — I10 ESSENTIAL HYPERTENSION: Primary | ICD-10-CM

## 2019-11-25 PROBLEM — Q24.5 ANOMALOUS ORIGIN OF RIGHT CORONARY ARTERY: Status: ACTIVE | Noted: 2019-11-25

## 2019-11-25 PROCEDURE — 99213 OFFICE O/P EST LOW 20 MIN: CPT | Mod: PBBFAC,PO | Performed by: INTERNAL MEDICINE

## 2019-11-25 PROCEDURE — 1159F MED LIST DOCD IN RCRD: CPT | Mod: ,,, | Performed by: INTERNAL MEDICINE

## 2019-11-25 PROCEDURE — 1126F AMNT PAIN NOTED NONE PRSNT: CPT | Mod: ,,, | Performed by: INTERNAL MEDICINE

## 2019-11-25 PROCEDURE — 99213 PR OFFICE/OUTPT VISIT, EST, LEVL III, 20-29 MIN: ICD-10-PCS | Mod: S$PBB,,, | Performed by: INTERNAL MEDICINE

## 2019-11-25 PROCEDURE — 99999 PR PBB SHADOW E&M-EST. PATIENT-LVL III: CPT | Mod: PBBFAC,,, | Performed by: INTERNAL MEDICINE

## 2019-11-25 PROCEDURE — 99999 PR PBB SHADOW E&M-EST. PATIENT-LVL III: ICD-10-PCS | Mod: PBBFAC,,, | Performed by: INTERNAL MEDICINE

## 2019-11-25 PROCEDURE — 1159F PR MEDICATION LIST DOCUMENTED IN MEDICAL RECORD: ICD-10-PCS | Mod: ,,, | Performed by: INTERNAL MEDICINE

## 2019-11-25 PROCEDURE — 1126F PR PAIN SEVERITY QUANTIFIED, NO PAIN PRESENT: ICD-10-PCS | Mod: ,,, | Performed by: INTERNAL MEDICINE

## 2019-11-25 PROCEDURE — 99213 OFFICE O/P EST LOW 20 MIN: CPT | Mod: S$PBB,,, | Performed by: INTERNAL MEDICINE

## 2019-11-25 NOTE — PROGRESS NOTES
"Subjective:   Patient ID:  Gely Green is a 69 y.o. female who presents for follow-up of Hypertension      HPI: Patient hears her heart beating especially when it is quiet and at evening when she lies down.  She has no more palpitations.  Last time seen by Dr. Hylton who discontinued beta blocker.    Lab Results   Component Value Date     11/18/2019    K 4.6 11/18/2019     11/18/2019    CO2 28 11/18/2019    BUN 12 11/18/2019    CREATININE 0.8 11/18/2019     (H) 11/18/2019    HGBA1C 6.4 (H) 11/18/2019    AST 23 11/18/2019    ALT 20 11/18/2019    ALBUMIN 4.1 11/18/2019    PROT 7.4 11/18/2019    BILITOT 0.3 11/18/2019    WBC 5.66 06/19/2019    HGB 12.5 06/19/2019    HCT 39.5 06/19/2019    MCV 88 06/19/2019     06/19/2019    TSH 0.813 11/18/2019    CHOL 149 11/18/2019    HDL 54 11/18/2019    LDLCALC 85.2 11/18/2019    TRIG 49 11/18/2019       Review of Systems   Constitution: Negative.   HENT: Negative.    Eyes: Negative.    Cardiovascular: Negative.  Negative for chest pain, claudication, dyspnea on exertion, irregular heartbeat, leg swelling, near-syncope, palpitations and syncope.   Respiratory: Negative.  Negative for cough, shortness of breath, snoring and wheezing.    Endocrine: Negative.  Negative for cold intolerance, heat intolerance, polydipsia, polyphagia and polyuria.   Skin: Negative.    Musculoskeletal: Positive for arthritis.   Gastrointestinal: Positive for constipation.   Genitourinary: Negative.    Neurological: Negative.    Psychiatric/Behavioral: Positive for depression and memory loss. The patient is nervous/anxious.        Objective:   Physical Exam   Constitutional: She is oriented to person, place, and time. She appears well-developed and well-nourished.   /70   Pulse (!) 59   Ht 5' 6.5" (1.689 m)   Wt 78.4 kg (172 lb 11.7 oz)   BMI 27.46 kg/m²      HENT:   Head: Normocephalic.   Eyes: Pupils are equal, round, and reactive to light.   Neck: Normal range " "of motion. Neck supple. Carotid bruit is not present. No thyromegaly present.   Cardiovascular: Normal rate, regular rhythm, normal heart sounds and intact distal pulses. Exam reveals no gallop and no friction rub.   No murmur heard.  Pulses:       Carotid pulses are 2+ on the right side, and 2+ on the left side.       Radial pulses are 2+ on the right side, and 2+ on the left side.        Femoral pulses are 2+ on the right side, and 2+ on the left side.       Popliteal pulses are 2+ on the right side, and 2+ on the left side.        Dorsalis pedis pulses are 2+ on the right side, and 2+ on the left side.        Posterior tibial pulses are 2+ on the right side, and 2+ on the left side.   Pulmonary/Chest: Effort normal and breath sounds normal. No respiratory distress. She has no wheezes. She has no rales. She exhibits no tenderness.   Abdominal: Soft. Bowel sounds are normal.   Musculoskeletal: Normal range of motion. She exhibits no edema.   Neurological: She is alert and oriented to person, place, and time.   Skin: Skin is warm and dry.   Psychiatric: She has a normal mood and affect.   Nursing note and vitals reviewed.        Assessment and Plan:     Problem List Items Addressed This Visit        Cardiology Problems    Essential hypertension - Primary    Hyperlipidemia    Anomalous origin of right coronary artery       Other    Anxiety    Diabetes mellitus    Adjustment disorder with mixed anxiety and depressed mood    Memory loss          Patient's Medications   New Prescriptions    No medications on file   Previous Medications    ACCU-CHEK FASTCLIX MISC    TEST twice a day    ACCU-CHEK SMARTVIEW TEST STRIP STRP    ACCU-CHEK SMARTVIEW TEST STRIP Strp,    AMLODIPINE (NORVASC) 5 MG TABLET    TAKE 1 TABLET(5 MG) BY MOUTH EVERY DAY    ASPIRIN (ECOTRIN) 81 MG EC TABLET    Take 81 mg by mouth once daily.    BD ULTRA-FINE SAMUEL PEN NEEDLE 32 GAUGE X 5/32" NDLE    USE WITH INSULIN EVERY EVENING    BLOOD SUGAR DIAGNOSTIC " "STRP    1 strip by Misc.(Non-Drug; Combo Route) route 3 (three) times daily as needed. Lifescan test strips    CALCIUM CARBONATE (OS-RDED) 600 MG CALCIUM (1,500 MG) TAB    Take 600 mg by mouth 2 (two) times daily with meals.    DIABETIC SUPPLIES, MISCELLAN. KIT    1 application by Misc.(Non-Drug; Combo Route) route once daily. Lifescan glucose meter    FLAVOXATE (URISPAS) 100 MG TAB    Take 1 tablet (100 mg total) by mouth 3 (three) times daily as needed.    GABAPENTIN (NEURONTIN) 100 MG CAPSULE    Take 1 capsule (100 mg total) by mouth 3 (three) times daily.    GLIMEPIRIDE (AMARYL) 1 MG TABLET    Take 1 tablet (1 mg total) by mouth before dinner.    IBUPROFEN (ADVIL,MOTRIN) 200 MG TABLET    Take 200 mg by mouth every 6 (six) hours as needed for Pain.    LACTOBACILLUS RHAMNOSUS GG (CULTURELLE) 10 BILLION CELL CAPSULE    Take 1 capsule by mouth once daily.    METFORMIN (GLUCOPHAGE-XR) 500 MG 24 HR TABLET    TAKE 1 TABLET BY MOUTH TWICE A DAY    NOVOFINE 32 32 GAUGE X 1/4" NDLE    use subcutaneously every evening    ONETOUCH ULTRA BLUE TEST STRIP STRP    TEST THREE TIMES DAILY    PEN NEEDLE, DIABETIC (NOVOFINE PLUS) 32 GAUGE X 1/6" NDLE    Inject 1 application into the skin every evening.    ROSUVASTATIN (CRESTOR) 10 MG TABLET    TAKE 1 TABLET BY MOUTH EVERY DAY    TOUJEO SOLOSTAR U-300 INSULIN 300 UNIT/ML (1.5 ML) INPN PEN    INJECT 33 UNITS UNDER THE SKIN EVERY EVENING    VITAMIN D 1000 UNITS TAB    Take 1,000 Units by mouth once daily.   Modified Medications    No medications on file   Discontinued Medications    AZELASTINE (ASTELIN) 137 MCG (0.1 %) NASAL SPRAY    2 sprays (274 mcg total) by Nasal route 2 (two) times daily. Use 2 sprays in each nostril twice a day.    LINZESS 145 MCG CAP CAPSULE    145 mcg once daily. As needed    VENLAFAXINE (EFFEXOR-XR) 37.5 MG 24 HR CAPSULE    Take 1 capsule (37.5 mg total) by mouth once daily.     Reassurance given.  Patient will be discharged from cardiology clinic.  Thank you " for allowing me to participate in the care of this patient. Please do not hesitate to contact me with any questions or concerns.

## 2019-12-18 RX ORDER — METFORMIN HYDROCHLORIDE 500 MG/1
TABLET, EXTENDED RELEASE ORAL
Qty: 180 TABLET | Refills: 0 | Status: SHIPPED | OUTPATIENT
Start: 2019-12-18 | End: 2020-03-18 | Stop reason: SDUPTHER

## 2020-01-03 ENCOUNTER — OFFICE VISIT (OUTPATIENT)
Dept: ENDOCRINOLOGY | Facility: CLINIC | Age: 70
End: 2020-01-03
Payer: MEDICARE

## 2020-01-03 VITALS
SYSTOLIC BLOOD PRESSURE: 138 MMHG | DIASTOLIC BLOOD PRESSURE: 80 MMHG | HEIGHT: 66 IN | WEIGHT: 173.31 LBS | BODY MASS INDEX: 27.85 KG/M2 | HEART RATE: 78 BPM

## 2020-01-03 DIAGNOSIS — I10 ESSENTIAL HYPERTENSION: Primary | ICD-10-CM

## 2020-01-03 DIAGNOSIS — Z79.4 TYPE 2 DIABETES MELLITUS WITHOUT COMPLICATION, WITH LONG-TERM CURRENT USE OF INSULIN: ICD-10-CM

## 2020-01-03 DIAGNOSIS — H93.A9 PULSATILE TINNITUS: ICD-10-CM

## 2020-01-03 DIAGNOSIS — E11.9 TYPE 2 DIABETES MELLITUS WITHOUT COMPLICATION, WITH LONG-TERM CURRENT USE OF INSULIN: ICD-10-CM

## 2020-01-03 PROCEDURE — 1126F AMNT PAIN NOTED NONE PRSNT: CPT | Mod: ,,, | Performed by: INTERNAL MEDICINE

## 2020-01-03 PROCEDURE — 1126F PR PAIN SEVERITY QUANTIFIED, NO PAIN PRESENT: ICD-10-PCS | Mod: ,,, | Performed by: INTERNAL MEDICINE

## 2020-01-03 PROCEDURE — 99999 PR PBB SHADOW E&M-EST. PATIENT-LVL III: ICD-10-PCS | Mod: PBBFAC,,, | Performed by: INTERNAL MEDICINE

## 2020-01-03 PROCEDURE — 99214 PR OFFICE/OUTPT VISIT, EST, LEVL IV, 30-39 MIN: ICD-10-PCS | Mod: S$PBB,,, | Performed by: INTERNAL MEDICINE

## 2020-01-03 PROCEDURE — 99999 PR PBB SHADOW E&M-EST. PATIENT-LVL III: CPT | Mod: PBBFAC,,, | Performed by: INTERNAL MEDICINE

## 2020-01-03 PROCEDURE — 99213 OFFICE O/P EST LOW 20 MIN: CPT | Mod: PBBFAC | Performed by: INTERNAL MEDICINE

## 2020-01-03 PROCEDURE — 1159F PR MEDICATION LIST DOCUMENTED IN MEDICAL RECORD: ICD-10-PCS | Mod: ,,, | Performed by: INTERNAL MEDICINE

## 2020-01-03 PROCEDURE — 1159F MED LIST DOCD IN RCRD: CPT | Mod: ,,, | Performed by: INTERNAL MEDICINE

## 2020-01-03 PROCEDURE — 99214 OFFICE O/P EST MOD 30 MIN: CPT | Mod: S$PBB,,, | Performed by: INTERNAL MEDICINE

## 2020-01-03 RX ORDER — VENLAFAXINE 25 MG/1
25 TABLET ORAL 2 TIMES DAILY
COMMUNITY
End: 2020-04-07

## 2020-01-03 RX ORDER — DICYCLOMINE HYDROCHLORIDE 10 MG/1
10 CAPSULE ORAL
COMMUNITY
End: 2021-07-23

## 2020-01-03 NOTE — PATIENT INSTRUCTIONS
Please check blood pressure a few time a week at home and let your cardiologist know if it is higher than 130s/90s consistently.    Please check blood sugar 2 hours after taking glimepiride and at bedtime if having the palpitations.  Do this for a week to see if you are having any low blood sugars.      Overall you are doing great and you should continue to follow up with your primary care doctor.

## 2020-01-03 NOTE — ASSESSMENT & PLAN NOTE
BP slightly above goal today.  Will have her check home BP and notify cardiology if elevated as she may benefit from increase in amlodipine

## 2020-01-03 NOTE — ASSESSMENT & PLAN NOTE
Well controled T2DM with HbA1c <7 w/o known hypoglycemia.  No changes recommended.  Advised patient to check BG after taking AGRAWAL to ensure no hypoglycemia.      If HbA1c >7 in the future can increase metformin to 1000 mg BID.      Follow up w/ pcp

## 2020-01-03 NOTE — PROGRESS NOTES
DATE OF SERVICE:  01/16/2019    REFERRING PHYSICIAN:  Gee Warren MD    PROCEDURES:  1.  Left heart catheterization.  2.  Coronary angiography.  3.  Left ventriculogram.  4.  Monitor conscious sedation.    PREPROCEDURE DIAGNOSIS:  Posterior myocardial infarction.    POSTPROCEDURE DIAGNOSES:  1.  Coronary artery disease including chronic total occlusion of the mid left   anterior descending artery with faint collaterals from the right,   nonobstructive ostial circumflex artery stenosis.  2.  Reduced left ventricular systolic function with ejection fraction of 30%.  3.  Elevated left ventricular end-diastolic pressure.    INDICATION:  The patient is a 78-year-old male, who was admitted to [facility] with chest pain.  His EKG was consistent with posterior   myocardial infarction.  His laboratory examination showed elevated troponin   levels as well.  He was scheduled for cardiac catheterization.    DESCRIPTION OF PROCEDURE:  After informed consent was signed by the patient,   the patient was brought to the cardiac catheterization laboratory.  He was   prepped and draped in the usual sterile manner.  The right wrist area was   anesthetized with 2% Xylocaine.  A 6-[place] sheath was inserted into the right   radial artery using modified Seldinger technique.  Intra-arterial verapamil   and nitroglycerin were given.  IV heparin was given.  A 4-[place] pigtail   catheter was positioned into the left ventricle.  Left ventriculography was   performed.  This was exchanged for a JL 3.5 catheter, which was positioned   into the left main coronary artery.  Coronary angiography was performed.  This   was exchanged for a JR4 guide catheter, which was positioned into the right   coronary artery.  Coronary angiography was performed.  This catheter was   exchanged for a JL 3.5 guide catheter, which was positioned into the left main   coronary artery and coronary angiography was repeated.  The case was   discussed  ENDOCRINOLOGY CLINIC NEW PATIENT NOTE  01/03/2020       Subjective:      Reason for referal: referred by Self, Aaareferral for management of well controled T2DM    HPI:   Gely Green is a 69 y.o. female with T2DM, HTN, depression who presents for evaluation of T2DM.  She has been followed by her PCP and at last visit noted postprandial hyperglycemia thus was started on glimepiride 1 mg w/ largest meal (dinner).  Has not been checking BG after taking glimepiride.      Has been having palpitations (hears  Heart racing/beating) when trying to go to sleep.  Is seeing cardiology for this and is worried that it could be related to insulin. Denies HA, CP, SOB, diaphoresis (aside from w/ hot flashes).      Not checking home BP     Diabetes Hx:  Diagnosed w/ DM: T2DM since 1999   Complications: none  Current meds: compliant with    Toujeo 33 units qHS   Metformin  mg BID   Previous meds:   symlin pen - expensive  Hypoglycemia: denies  Home glucose checks: checks BG 1-2 times a day  Fasting: <120s  Later in day 130s, less than 150s  Diet/Exercise:   Eats healthy, some snacking in the evening   Exercises (weights/arobics/swimming/walking) daily 4-5 hours  Last A1c:   Lab Results   Component Value Date    HGBA1C 6.4 (H) 11/18/2019     microalbumin: not on ACE/ARB. On amlodipine 5 mg daily    Chemistry        Component Value Date/Time     11/18/2019 0845    K 4.6 11/18/2019 0845     11/18/2019 0845    CO2 28 11/18/2019 0845    BUN 12 11/18/2019 0845    CREATININE 0.8 11/18/2019 0845     (H) 11/18/2019 0845        Component Value Date/Time    CALCIUM 9.9 11/18/2019 0845    ALKPHOS 71 11/18/2019 0845    AST 23 11/18/2019 0845    ALT 20 11/18/2019 0845    BILITOT 0.3 11/18/2019 0845    ESTGFRAFRICA >60 11/18/2019 0845    EGFRNONAA >60 11/18/2019 0845        Lab Results   Component Value Date    LABMICR 7.0 06/19/2019    CREATRANDUR 133.5 06/19/2019    MICALBCREAT 5.2 06/19/2019     Lipids: on crestor 10  "mg daily  Lab Results   Component Value Date    CHOL 149 2019    TRIG 49 2019    HDL 54 2019    LDLCALC 85.2 2019    CHOLHDL 36.2 2019     TSH:  Lab Results   Component Value Date    TSH 0.813 2019     Eye: no DR   Last eye exam: : 2019)  Foot: no neuropathy   Last foot exam: : 2019)  FHx of DM: sister w/ T2DM, cousins w/ T2DM, multiple family members      Past Medical History:   Diagnosis Date    Arthritis     Chronic constipation     Depression     Diabetes mellitus     Diabetes mellitus     Hypertension     Osteopenia        Past Surgical History:   Procedure Laterality Date     SECTION      2x    COLONOSCOPY      COLONOSCOPY N/A 3/21/2019    Procedure: COLONOSCOPY;  Surgeon: Marshall Contreras MD;  Location: [facility] ([address] [place] [address]);  Service: Endoscopy;  Laterality: N/A;  pt states that she had to be resuscitated after receiving an epidural during childbirth "30 something" years ago.  Ok for 4th floor per Dr. Hernandez-MS    HYSTERECTOMY      full hyst        Review of patient's allergies indicates:  No Known Allergies      Current Outpatient Medications:     ACCU-CHEK FASTCLIX Misc, TEST twice a day, Disp: , Rfl: 0    ACCU-CHEK SMARTVIEW TEST STRIP Strp, ACCU-CHEK SMARTVIEW TEST STRIP Strp,, Disp: 120 strip, Rfl: 11    amLODIPine (NORVASC) 5 MG tablet, TAKE 1 TABLET(5 MG) BY MOUTH EVERY DAY, Disp: 90 tablet, Rfl: 3    aspirin (ECOTRIN) 81 MG EC tablet, Take 81 mg by mouth once daily., Disp: , Rfl:     BD ULTRA-FINE SAMUEL PEN NEEDLE 32 gauge x 5/32" Ndle, USE WITH INSULIN EVERY EVENING, Disp: 100 each, Rfl: 0    blood sugar diagnostic Strp, 1 strip by Misc.(Non-Drug; Combo Route) route 3 (three) times daily as needed. Lifescan test strips, Disp: 120 each, Rfl: 4    calcium carbonate (OS-REDD) 600 mg calcium (1,500 mg) Tab, Take 600 mg by mouth 2 (two) times daily with meals., Disp: , Rfl:     dicyclomine (BENTYL) 10 MG capsule, Take 10 mg by " with Dr. Warren.  During throughout the procedure, the patient   became very combative and refused further treatment.  The procedure was   terminated.  All catheters and sheaths were removed.  Hemoband was placed on   the right wrist.  He was transferred back in stable condition.    HEMODYNAMIC DATA:  Hemodynamic data shows aortic pressures of 120/70 with mean   of 90 mmHg and /0 with LVEDP of 20 mmHg.    AORTIC VALVE:  There is no significant gradient noted.    LEFT VENTRICULOGRAM:  A 10 mL of contrast was delivered for 2 seconds.    Ejection fraction was estimated to be 30%.  There was global hypokinesis   noted.    ANGIOGRAM:  Left main coronary artery:  Left main coronary artery is a short moderate   length vessel without significant stenosis.  Left anterior descending artery:  Left anterior descending artery is a long   moderate-caliber vessel, which wraps around the apex.  It is occluded at the   proximal portion after a small caliber diagonal branch noted with inferior   branch containing high-grade stenosis.  Ramus intermedius:  Ramus intermedius is a long moderate-caliber vessel free   of disease.  Left circumflex artery:  Left circumflex artery is a nondominant   moderate-caliber vessel with ostial proximal eccentric 50% stenosis.  There is   second eccentric 60% stenosis at the mid portion.  There are 2 long,   small-to-moderate caliber obtuse marginal branches noted free of disease.    There is a small bifurcating distal posterolateral branches noted free of   disease.    RIGHT CORONARY ARTERY:  Right coronary artery is a dominant large-caliber   vessel with diffuse luminal irregularities of 30-40%.  Distally, there is a   large posterior descending artery and moderate posterolateral branches noted   free of disease.    IMPRESSION:  1.  Coronary artery disease including chronic total occlusion of the mid left   anterior descending artery with faint collaterals from the right,   nonobstructive  "mouth 4 (four) times daily before meals and nightly., Disp: , Rfl:     flavoxATE (URISPAS) 100 mg Tab, Take 1 tablet (100 mg total) by mouth 3 (three) times daily as needed., Disp: 30 tablet, Rfl: 11    gabapentin (NEURONTIN) 100 MG capsule, Take 1 capsule (100 mg total) by mouth 3 (three) times daily. (Patient taking differently: Take 100 mg by mouth 3 (three) times daily. As needed), Disp: 90 capsule, Rfl: 1    glimepiride (AMARYL) 1 MG tablet, Take 1 tablet (1 mg total) by mouth before dinner., Disp: 30 tablet, Rfl: 3    ibuprofen (ADVIL,MOTRIN) 200 MG tablet, Take 200 mg by mouth every 6 (six) hours as needed for Pain., Disp: , Rfl:     Lactobacillus rhamnosus GG (CULTURELLE) 10 billion cell capsule, Take 1 capsule by mouth once daily., Disp: , Rfl:     metFORMIN (GLUCOPHAGE-XR) 500 MG 24 hr tablet, TAKE 1 TABLET BY MOUTH TWICE A DAY, Disp: 180 tablet, Rfl: 0    NOVOFINE 32 32 gauge x 1/4" Ndle, use subcutaneously every evening, Disp: 100 each, Rfl: 11    ONETOUCH ULTRA BLUE TEST STRIP Strp, TEST THREE TIMES DAILY, Disp: 100 strip, Rfl: 11    pen needle, diabetic (NOVOFINE PLUS) 32 gauge x 1/6" Ndle, Inject 1 application into the skin every evening., Disp: 100 each, Rfl: 11    rosuvastatin (CRESTOR) 10 MG tablet, TAKE 1 TABLET BY MOUTH EVERY DAY, Disp: 90 tablet, Rfl: 0    TOUJEO SOLOSTAR U-300 INSULIN 300 unit/mL (1.5 mL) InPn pen, INJECT 33 UNITS UNDER THE SKIN EVERY EVENING, Disp: 18 mL, Rfl: 3    venlafaxine (EFFEXOR) 25 MG Tab, Take 25 mg by mouth 2 (two) times daily., Disp: , Rfl:     vitamin D 1000 units Tab, Take 1,000 Units by mouth once daily., Disp: , Rfl:     diabetic supplies, miscellan. Kit, 1 application by Misc.(Non-Drug; Combo Route) route once daily. Lifescan glucose meter, Disp: 1 kit, Rfl: 0    Social History     Socioeconomic History    Marital status:      Spouse name: Not on file    Number of children: Not on file    Years of education: Not on file    Highest " ostial circumflex artery stenosis.  2.  Reduced left ventricular systolic function with ejection fraction of 30%.  3.  Elevated left ventricular end-diastolic pressure.    RECOMMENDATIONS:  Recommend medical therapy only.    SEDATION: The patient's sedation was managed by myself with continuous   face to face time with the patient for 15 minutes from 02:08 to 02:23.       ____________________________________     MD DHARMESH HERNANDEZ / KADEEM    DD:  01/16/2019 03:20:55  DT:  01/16/2019 03:37:00    D#:  [id number]  Job#:  [id number]     education level: Not on file   Occupational History    Not on file   Social Needs    Financial resource strain: Not on file    Food insecurity:     Worry: Not on file     Inability: Not on file    Transportation needs:     Medical: Not on file     Non-medical: Not on file   Tobacco Use    Smoking status: Former Smoker     Packs/day: 1.50     Years: 25.00     Pack years: 37.50     Types: Cigarettes     Last attempt to quit:      Years since quittin.0    Smokeless tobacco: Never Used   Substance and Sexual Activity    Alcohol use: No    Drug use: No    Sexual activity: Not Currently     Partners: Male     Birth control/protection: None   Lifestyle    Physical activity:     Days per week: Not on file     Minutes per session: Not on file    Stress: Not on file   Relationships    Social connections:     Talks on phone: Not on file     Gets together: Not on file     Attends Hinduism service: Not on file     Active member of club or organization: Not on file     Attends meetings of clubs or organizations: Not on file     Relationship status: Not on file   Other Topics Concern    Not on file   Social History Narrative    Not on file       Family History   Problem Relation Age of Onset    Breast cancer Cousin     Heart attack Mother     Heart disease Mother     Alzheimer's disease Mother     Heart attack Father     Heart disease Father     Heart failure Father     Hypertension Father     Hyperlipidemia Sister     Hypertension Sister     Diabetes Sister     Abnormal EKG Sister     Alcohol abuse Brother     No Known Problems Daughter     Ovarian cancer Neg Hx     Cervical cancer Neg Hx     Endometrial cancer Neg Hx     Vaginal cancer Neg Hx     Stroke Neg Hx     Colon cancer Neg Hx     Esophageal cancer Neg Hx     Vision loss Neg Hx        ROS:  14 point review of systems reviewed.  Pertinent positives and negatives per HPI as well as positive for hot flashes.  All others  "negative.    Objective:   Physical Exam   /80   Pulse 78   Ht 5' 6" (1.676 m)   Wt 78.6 kg (173 lb 4.5 oz)   BMI 27.97 kg/m²   Wt Readings from Last 3 Encounters:   11/25/19 78.4 kg (172 lb 11.7 oz)   10/02/19 76.2 kg (168 lb)   09/25/19 76.5 kg (168 lb 10.4 oz)   ]    Constitutional:  Pleasant,  in no acute distress.   HENT:     Head:    Normocephalic and atraumatic.   Mouth/Throat:   Oropharynx is clear and moist. No oropharyngeal exudate.   Eyes:    EOMI. No scleral icterus.   Neck:    No thyromegaly or palpable thyroid nodules, no cervical LAD  Cardiovascular:  Normal rate, regular rhythm, no murmurs.  No carotid bruits.  Respiratory:   Effort normal and breath sounds normal.   Gastrointestinal: Soft, nontender  Neurological:  No tremor, normal speech  Skin:    Skin is warm, dry  Psych:   Normal mood and affect.   Extremity:  No edema or wounds, no deformity    LABORATORY REVIEW:    Chemistry        Component Value Date/Time     11/18/2019 0845    K 4.6 11/18/2019 0845     11/18/2019 0845    CO2 28 11/18/2019 0845    BUN 12 11/18/2019 0845    CREATININE 0.8 11/18/2019 0845     (H) 11/18/2019 0845        Component Value Date/Time    CALCIUM 9.9 11/18/2019 0845    ALKPHOS 71 11/18/2019 0845    AST 23 11/18/2019 0845    ALT 20 11/18/2019 0845    BILITOT 0.3 11/18/2019 0845    ESTGFRAFRICA >60 11/18/2019 0845    EGFRNONAA >60 11/18/2019 0845          Lab Results   Component Value Date    HGBA1C 6.4 (H) 11/18/2019    HGBA1C 7.1 (H) 06/19/2019    HGBA1C 6.8 (H) 12/31/2018     Other labs reviewed today in HPI    Assessment/Plan:     Problem List Items Addressed This Visit        ENT    Pulsatile tinnitus     Reassured that symptoms are not related to insulin unless she is having hypoglycemia (very unlikely).  She will check BG  More frequently to monitor for low BG            Cardiac/Vascular    Essential hypertension - Primary     BP slightly above goal today.  Will have her check home BP " and notify cardiology if elevated as she may benefit from increase in amlodipine            Endocrine    Diabetes mellitus     Well controled T2DM with HbA1c <7 w/o known hypoglycemia.  No changes recommended.  Advised patient to check BG after taking AGRAWAL to ensure no hypoglycemia.      If HbA1c >7 in the future can increase metformin to 1000 mg BID.      Follow up w/ pcp             Return to clinic prn, follow up with PCP    Tu Krueger MD

## 2020-01-03 NOTE — ASSESSMENT & PLAN NOTE
Reassured that symptoms are not related to insulin unless she is having hypoglycemia (very unlikely).  She will check BG  More frequently to monitor for low BG

## 2020-01-22 RX ORDER — PEN NEEDLE, DIABETIC 31 GX5/16"
NEEDLE, DISPOSABLE MISCELLANEOUS
Qty: 100 EACH | Refills: 11 | Status: ON HOLD | OUTPATIENT
Start: 2020-01-22 | End: 2020-12-28 | Stop reason: HOSPADM

## 2020-02-16 ENCOUNTER — PATIENT MESSAGE (OUTPATIENT)
Dept: PRIMARY CARE CLINIC | Facility: CLINIC | Age: 70
End: 2020-02-16

## 2020-02-24 RX ORDER — GLIMEPIRIDE 1 MG/1
TABLET ORAL
Qty: 90 TABLET | Refills: 3 | Status: SHIPPED | OUTPATIENT
Start: 2020-02-24 | End: 2020-06-02

## 2020-03-18 DIAGNOSIS — E11.9 TYPE 2 DIABETES MELLITUS WITHOUT COMPLICATION, WITH LONG-TERM CURRENT USE OF INSULIN: Primary | ICD-10-CM

## 2020-03-18 DIAGNOSIS — Z79.4 TYPE 2 DIABETES MELLITUS WITHOUT COMPLICATION, WITH LONG-TERM CURRENT USE OF INSULIN: Primary | ICD-10-CM

## 2020-03-18 RX ORDER — METFORMIN HYDROCHLORIDE 500 MG/1
500 TABLET, EXTENDED RELEASE ORAL 2 TIMES DAILY
Qty: 180 TABLET | Refills: 4 | Status: SHIPPED | OUTPATIENT
Start: 2020-03-18 | End: 2021-06-09

## 2020-03-18 NOTE — TELEPHONE ENCOUNTER
----- Message from Shrelyn Martino sent at 3/18/2020  3:42 PM CDT -----  Contact: VAIBHAV JASMINE [[id number]]  Who Called: VAIBHAV JASMINE [[id number]]    RX Name and Strength: metFORMIN (GLUCOPHAGE-XR) 500 MG 24 hr tablet    Preferred Pharmacy with phone number: [id number] [address] [facility]    Best Call Back Number: [phone]    Additional Information: N/A

## 2020-03-31 ENCOUNTER — PATIENT MESSAGE (OUTPATIENT)
Dept: ADMINISTRATIVE | Facility: OTHER | Age: 70
End: 2020-03-31

## 2020-04-02 ENCOUNTER — PATIENT MESSAGE (OUTPATIENT)
Dept: INTERNAL MEDICINE | Facility: CLINIC | Age: 70
End: 2020-04-02

## 2020-04-07 ENCOUNTER — OFFICE VISIT (OUTPATIENT)
Dept: PRIMARY CARE CLINIC | Facility: CLINIC | Age: 70
End: 2020-04-07
Attending: FAMILY MEDICINE
Payer: MEDICARE

## 2020-04-07 DIAGNOSIS — F32.A DEPRESSION, UNSPECIFIED DEPRESSION TYPE: ICD-10-CM

## 2020-04-07 DIAGNOSIS — Z91.09 ENVIRONMENTAL ALLERGIES: Primary | ICD-10-CM

## 2020-04-07 DIAGNOSIS — H93.A9 PULSATILE TINNITUS: ICD-10-CM

## 2020-04-07 PROCEDURE — 99213 OFFICE O/P EST LOW 20 MIN: CPT | Mod: 95,,, | Performed by: FAMILY MEDICINE

## 2020-04-07 PROCEDURE — 99213 PR OFFICE/OUTPT VISIT, EST, LEVL III, 20-29 MIN: ICD-10-PCS | Mod: 95,,, | Performed by: FAMILY MEDICINE

## 2020-04-07 RX ORDER — FLUTICASONE PROPIONATE 50 MCG
1 SPRAY, SUSPENSION (ML) NASAL DAILY
Qty: 48 G | Refills: 3 | Status: SHIPPED | OUTPATIENT
Start: 2020-04-07 | End: 2020-06-24 | Stop reason: SDUPTHER

## 2020-04-07 RX ORDER — MINERAL OIL
180 ENEMA (ML) RECTAL DAILY
Qty: 30 TABLET | Refills: 0 | Status: SHIPPED | OUTPATIENT
Start: 2020-04-07 | End: 2020-06-24

## 2020-04-07 RX ORDER — AZELASTINE 1 MG/ML
1 SPRAY, METERED NASAL 2 TIMES DAILY
Qty: 90 ML | Refills: 3 | Status: SHIPPED | OUTPATIENT
Start: 2020-04-07 | End: 2021-01-26

## 2020-04-07 NOTE — PROGRESS NOTES
Subjective:      Patient ID: Gely Green is a 69 y.o. female.    Chief Complaint: Follow-up    HPI   Patient here today for follow up. She drinks coffee once a week. She wants to discuss the pulsation in her ear. She denies any worsening and only if she sits still she can hear it otherwise if she is distracted she does not notice it. She does not want to take medication for her low mood and would prefer to start speaking with a therapist. She does take miralax and this does help. She denies fevers, sore throat, runny nose.     The patient location is:  Patient Home   The chief complaint leading to consultation is: environmental allergies   Visit type: Virtual visit with synchronous audio and video  Total time spent with patient: 15  Each patient to whom he or she provides medical services by telemedicine is:  (1) informed of the relationship between the physician and patient and the respective role of any other health care provider with respect to management of the patient; and (2) notified that he or she may decline to receive medical services by telemedicine and may withdraw from such care at any time.    Review of Systems   Constitutional: Negative for activity change and unexpected weight change.   HENT: Negative for hearing loss, rhinorrhea and trouble swallowing.    Eyes: Negative for discharge and visual disturbance.   Respiratory: Negative for chest tightness and wheezing.    Cardiovascular: Negative for chest pain.   Gastrointestinal: Negative for blood in stool, diarrhea and vomiting.   Endocrine: Negative for polydipsia and polyuria.   Genitourinary: Negative for difficulty urinating, dysuria, hematuria and menstrual problem.   Musculoskeletal: Negative for joint swelling and neck pain.   Neurological: Negative for weakness and headaches.     I personally reviewed Past Medical History, Past Surgical history,  Past Social History and Family History    Physical Exam   Constitutional: She appears  well-developed and well-nourished. No distress.   HENT:   Nose: Right sinus exhibits no maxillary sinus tenderness and no frontal sinus tenderness. Left sinus exhibits no maxillary sinus tenderness and no frontal sinus tenderness.   Skin: She is not diaphoretic.       1. Environmental allergies    2. Depression, unspecified depression type    3. Pulsatile tinnitus        1. Will start flonase/astelin/allegra for two weeks to see if this helps and she will message if no improvement    2.declined medication and will start therapy   3. Reviewed MRA neck, hearing test. CT temporal bone  -will try ENT suggestion with astelin and schedule follow up if no improvement with ENT for second opinion        Orders Placed This Encounter   Procedures    Ambulatory referral/consult to ENT     Medications Ordered This Encounter   Medications    azelastine (ASTELIN) 137 mcg (0.1 %) nasal spray     Si spray (137 mcg total) by Nasal route 2 (two) times daily.     Dispense:  90 mL     Refill:  3    fexofenadine (ALLEGRA) 180 MG tablet     Sig: Take 1 tablet (180 mg total) by mouth once daily.     Dispense:  30 tablet     Refill:  0    fluticasone propionate (FLONASE) 50 mcg/actuation nasal spray     Si spray (50 mcg total) by Each Nostril route once daily.     Dispense:  48 g     Refill:  3

## 2020-04-08 ENCOUNTER — TELEPHONE (OUTPATIENT)
Dept: PRIMARY CARE CLINIC | Facility: CLINIC | Age: 70
End: 2020-04-08

## 2020-05-15 RX ORDER — ROSUVASTATIN CALCIUM 10 MG/1
TABLET, COATED ORAL
Qty: 90 TABLET | Refills: 0 | Status: SHIPPED | OUTPATIENT
Start: 2020-05-15 | End: 2020-10-13

## 2020-06-02 ENCOUNTER — LAB VISIT (OUTPATIENT)
Dept: LAB | Facility: OTHER | Age: 70
End: 2020-06-02
Attending: INTERNAL MEDICINE
Payer: MEDICARE

## 2020-06-02 ENCOUNTER — OFFICE VISIT (OUTPATIENT)
Dept: INTERNAL MEDICINE | Facility: CLINIC | Age: 70
End: 2020-06-02
Attending: INTERNAL MEDICINE
Payer: MEDICARE

## 2020-06-02 VITALS
WEIGHT: 159.63 LBS | OXYGEN SATURATION: 98 % | BODY MASS INDEX: 25.06 KG/M2 | DIASTOLIC BLOOD PRESSURE: 78 MMHG | HEART RATE: 56 BPM | HEIGHT: 67 IN | SYSTOLIC BLOOD PRESSURE: 132 MMHG

## 2020-06-02 DIAGNOSIS — Z79.4 TYPE 2 DIABETES MELLITUS WITH DIABETIC NEUROPATHY, WITH LONG-TERM CURRENT USE OF INSULIN: Primary | ICD-10-CM

## 2020-06-02 DIAGNOSIS — M85.80 OSTEOPENIA, UNSPECIFIED LOCATION: ICD-10-CM

## 2020-06-02 DIAGNOSIS — E11.40 TYPE 2 DIABETES MELLITUS WITH DIABETIC NEUROPATHY, WITH LONG-TERM CURRENT USE OF INSULIN: ICD-10-CM

## 2020-06-02 DIAGNOSIS — I10 ESSENTIAL HYPERTENSION: ICD-10-CM

## 2020-06-02 DIAGNOSIS — E78.2 MIXED HYPERLIPIDEMIA: ICD-10-CM

## 2020-06-02 DIAGNOSIS — E11.42 DIABETIC PERIPHERAL NEUROPATHY: ICD-10-CM

## 2020-06-02 DIAGNOSIS — E11.40 TYPE 2 DIABETES MELLITUS WITH DIABETIC NEUROPATHY, WITH LONG-TERM CURRENT USE OF INSULIN: Primary | ICD-10-CM

## 2020-06-02 DIAGNOSIS — F43.23 ADJUSTMENT DISORDER WITH MIXED ANXIETY AND DEPRESSED MOOD: ICD-10-CM

## 2020-06-02 DIAGNOSIS — Z79.4 TYPE 2 DIABETES MELLITUS WITH DIABETIC NEUROPATHY, WITH LONG-TERM CURRENT USE OF INSULIN: ICD-10-CM

## 2020-06-02 PROBLEM — R19.5 POSITIVE FIT (FECAL IMMUNOCHEMICAL TEST): Status: RESOLVED | Noted: 2019-03-21 | Resolved: 2020-06-02

## 2020-06-02 LAB
25(OH)D3+25(OH)D2 SERPL-MCNC: 52 NG/ML (ref 30–96)
ALBUMIN SERPL BCP-MCNC: 4.1 G/DL (ref 3.5–5.2)
ALP SERPL-CCNC: 80 U/L (ref 55–135)
ALT SERPL W/O P-5'-P-CCNC: 18 U/L (ref 10–44)
ANION GAP SERPL CALC-SCNC: 10 MMOL/L (ref 8–16)
AST SERPL-CCNC: 28 U/L (ref 10–40)
BILIRUB SERPL-MCNC: 0.4 MG/DL (ref 0.1–1)
BUN SERPL-MCNC: 16 MG/DL (ref 8–23)
CALCIUM SERPL-MCNC: 10.1 MG/DL (ref 8.7–10.5)
CHLORIDE SERPL-SCNC: 107 MMOL/L (ref 95–110)
CO2 SERPL-SCNC: 24 MMOL/L (ref 23–29)
CREAT SERPL-MCNC: 0.8 MG/DL (ref 0.5–1.4)
EST. GFR  (AFRICAN AMERICAN): >60 ML/MIN/1.73 M^2
EST. GFR  (NON AFRICAN AMERICAN): >60 ML/MIN/1.73 M^2
ESTIMATED AVG GLUCOSE: 128 MG/DL (ref 68–131)
GLUCOSE SERPL-MCNC: 89 MG/DL (ref 70–110)
HBA1C MFR BLD HPLC: 6.1 % (ref 4–5.6)
POTASSIUM SERPL-SCNC: 4.8 MMOL/L (ref 3.5–5.1)
PROT SERPL-MCNC: 7.5 G/DL (ref 6–8.4)
SODIUM SERPL-SCNC: 141 MMOL/L (ref 136–145)

## 2020-06-02 PROCEDURE — 83036 HEMOGLOBIN GLYCOSYLATED A1C: CPT

## 2020-06-02 PROCEDURE — 99999 PR PBB SHADOW E&M-EST. PATIENT-LVL III: ICD-10-PCS | Mod: PBBFAC,,, | Performed by: INTERNAL MEDICINE

## 2020-06-02 PROCEDURE — 82306 VITAMIN D 25 HYDROXY: CPT

## 2020-06-02 PROCEDURE — 80053 COMPREHEN METABOLIC PANEL: CPT

## 2020-06-02 PROCEDURE — 99999 PR PBB SHADOW E&M-EST. PATIENT-LVL III: CPT | Mod: PBBFAC,,, | Performed by: INTERNAL MEDICINE

## 2020-06-02 PROCEDURE — 99213 OFFICE O/P EST LOW 20 MIN: CPT | Mod: PBBFAC | Performed by: INTERNAL MEDICINE

## 2020-06-02 PROCEDURE — 99214 PR OFFICE/OUTPT VISIT, EST, LEVL IV, 30-39 MIN: ICD-10-PCS | Mod: S$PBB,,, | Performed by: INTERNAL MEDICINE

## 2020-06-02 PROCEDURE — 99214 OFFICE O/P EST MOD 30 MIN: CPT | Mod: S$PBB,,, | Performed by: INTERNAL MEDICINE

## 2020-06-02 PROCEDURE — 36415 COLL VENOUS BLD VENIPUNCTURE: CPT

## 2020-06-02 RX ORDER — INSULIN GLARGINE 300 [IU]/ML
30 INJECTION, SOLUTION SUBCUTANEOUS NIGHTLY
Qty: 18 ML | Refills: 3
Start: 2020-06-02 | End: 2020-06-15

## 2020-06-02 NOTE — PROGRESS NOTES
Subjective:       Patient ID: Gely Green is a 69 y.o. female.    Chief Complaint: Memory Loss; Diabetes; Hyperglycemia; and Palpitations    Here to establish care    DM  Dx 1999. She has lost weight (12lbs in 6 months per chart review)  and is getting low sugars for the past month. 51, 64, 39. She will feel a little sluggish or feel like she will pass out, shaky. Symptoms tend to occur while outside being busy in garden in the afternoons around 3-6pm. She met up with exercise friends and they noticed she had lost weight. She does not check her weight. She takes glimepiride 1mg prior to heavy carb meal She does this once a month. She is taking tujeo 33 units nightly. She will adjust this based on her sugars at times. She is taking metformin 500mg BID and has not taken more. UTD on eye exam. Denies polyuria, polydipsia. Numbness and tingling of right hand and forearm and finger 3-5 occurs, once a month and correlates with increased daytime activity. Has a wrist guard but does not wear it. Mother had Alzheimer's and patient was her primary caregiver for approximately 13 years.  She suffers from chronic constipation her entire life.  No acute issues.  She had a positive fit kit with subsequent  Colonoscopy 03/21/2019 that was completely normal with rectum repeat 10 years.        Review of Systems   Constitutional: Negative for chills, fatigue, fever and unexpected weight change.   HENT: Negative for ear pain, hearing loss, postnasal drip, tinnitus, trouble swallowing and voice change.    Respiratory: Negative for cough, chest tightness, shortness of breath and wheezing.    Cardiovascular: Negative for chest pain, palpitations and leg swelling.   Gastrointestinal: Negative for abdominal pain, blood in stool, diarrhea, nausea and vomiting.   Endocrine: Negative for polydipsia, polyphagia and polyuria.   Genitourinary: Negative for difficulty urinating, dysuria, hematuria and vaginal bleeding.   Skin: Negative for  "rash.   Allergic/Immunologic: Negative for food allergies.   Neurological: Negative for dizziness, syncope, numbness and headaches.   Hematological: Does not bruise/bleed easily.   Psychiatric/Behavioral: The patient is not nervous/anxious.        Objective:      Vitals:    06/02/20 0835   BP: 132/78   Pulse: (!) 56   SpO2: 98%   Weight: 72.4 kg (159 lb 9.8 oz)   Height: 5' 6.5" (1.689 m)      Physical Exam   Constitutional: She is oriented to person, place, and time. She appears well-developed and well-nourished. No distress.   HENT:   Head: Normocephalic and atraumatic.   Mouth/Throat: Oropharynx is clear and moist. No oropharyngeal exudate.   Eyes: Pupils are equal, round, and reactive to light. Conjunctivae and EOM are normal. No scleral icterus.   Neck: No thyromegaly present.   Cardiovascular: Normal rate, regular rhythm and normal heart sounds.   No murmur heard.  Pulmonary/Chest: Effort normal and breath sounds normal. She has no wheezes. She has no rales.   Abdominal: Soft. She exhibits no distension. There is no tenderness.   Musculoskeletal: She exhibits no edema or tenderness.   Lymphadenopathy:     She has no cervical adenopathy.   Neurological: She is alert and oriented to person, place, and time.   Skin: Skin is warm and dry.   Psychiatric: She has a normal mood and affect. Her behavior is normal.       Assessment:       1. Type 2 diabetes mellitus with diabetic neuropathy, with long-term current use of insulin    2. Adjustment disorder with mixed anxiety and depressed mood    3. Diabetic peripheral neuropathy    4. Essential hypertension    5. Mixed hyperlipidemia    6. Osteopenia, unspecified location        Plan:       Gely was seen today for memory loss, diabetes, hyperglycemia and palpitations.    Diagnoses and all orders for this visit:    Type 2 diabetes mellitus with diabetic neuropathy, with long-term current use of insulin   STOP glimepiride all together, reduce tujeo to 30 units, " discussed not going long periods of time without eating. Discussed low carb/low glycemic index/high protein snacks to avoid hypoglycemia if fasting due to activity. If hypoglycemia resolves but A1c climbs then next we increase her metformin.  Will review BG log via virtual visit in 4 weeks  -     Ambulatory referral/consult to Diabetes Education; Future  -     Hemoglobin A1C; Future  -     Microalbumin/creatinine urine ratio; Future  -     Comprehensive metabolic panel; Future    Adjustment disorder with mixed anxiety and depressed mood   controlled. Continue current regimen will monitor. Reassurance of pounding heart at rest when quiet discussed. Office and Emergency Department prompts discussed.        Diabetic peripheral neuropathy   Sounds more like CTS. Nightly wrist guards. Will revisit as symptoms dictate    Essential hypertension   controlled. Continue current regimen    Mixed hyperlipidemia   Tolerating statin. Continue this.     Osteopenia, unspecified location  -     Vitamin D; Future      -     insulin glargine, TOUJEO, (TOUJEO SOLOSTAR U-300 INSULIN) 300 unit/mL (1.5 mL) InPn pen; Inject 30 Units into the skin every evening.           Yoseph Mercado MD  Internal Medicine-[facility]        Side effects of medication(s) were discussed in detail and patient voiced understanding.  Patient will call back for any issues or complications.

## 2020-06-02 NOTE — PATIENT INSTRUCTIONS
Blood glucose should be < 180 at least 2 hours acfter eating and before meals and fasting ideally is , 100-120 is okay.  For long acting insulin, check blood sugar every morning when you wake up. Take a 3 day average and if average is:  >300 add 8 units  >250 add 6 units   >200 add 4 units  >150 add 2 units   If you have more than one unexplainable low, below 80, then please decrease by 2 units until you are no longer suffering from lows.

## 2020-06-08 ENCOUNTER — IMMUNIZATION (OUTPATIENT)
Dept: PHARMACY | Facility: CLINIC | Age: 70
End: 2020-06-08
Payer: MEDICARE

## 2020-06-22 ENCOUNTER — TELEPHONE (OUTPATIENT)
Dept: OTOLARYNGOLOGY | Facility: CLINIC | Age: 70
End: 2020-06-22

## 2020-06-22 NOTE — TELEPHONE ENCOUNTER
Spoke with patient to discuss her symptoms for up coming appointment on 6/24 with Dr Mckeon. Patient states gets nasal congestion 1-2 times per year denies any facial pressure. She does have ringing in the ears. Notified patient scheduled her with audio for 9:30 on 6/24 and will see Dr Mckeon at 10:00. Patient notified of the date and time of appointment

## 2020-06-24 ENCOUNTER — CLINICAL SUPPORT (OUTPATIENT)
Dept: OTOLARYNGOLOGY | Facility: CLINIC | Age: 70
End: 2020-06-24
Payer: MEDICARE

## 2020-06-24 ENCOUNTER — OFFICE VISIT (OUTPATIENT)
Dept: OTOLARYNGOLOGY | Facility: CLINIC | Age: 70
End: 2020-06-24
Payer: MEDICARE

## 2020-06-24 VITALS
SYSTOLIC BLOOD PRESSURE: 139 MMHG | WEIGHT: 160.38 LBS | HEIGHT: 67 IN | TEMPERATURE: 98 F | DIASTOLIC BLOOD PRESSURE: 87 MMHG | BODY MASS INDEX: 25.17 KG/M2 | HEART RATE: 58 BPM

## 2020-06-24 DIAGNOSIS — B96.89 ACUTE BACTERIAL SINUSITIS: Primary | ICD-10-CM

## 2020-06-24 DIAGNOSIS — H93.A9 PULSATILE TINNITUS: ICD-10-CM

## 2020-06-24 DIAGNOSIS — J01.90 ACUTE BACTERIAL SINUSITIS: Primary | ICD-10-CM

## 2020-06-24 DIAGNOSIS — H90.42 SENSORINEURAL HEARING LOSS (SNHL) OF LEFT EAR WITH UNRESTRICTED HEARING OF RIGHT EAR: ICD-10-CM

## 2020-06-24 DIAGNOSIS — H93.13 TINNITUS AURIUM, BILATERAL: Primary | ICD-10-CM

## 2020-06-24 DIAGNOSIS — H93.A9 PULSATILE TINNITUS, UNSPECIFIED EAR: ICD-10-CM

## 2020-06-24 DIAGNOSIS — Z91.09 ENVIRONMENTAL ALLERGIES: ICD-10-CM

## 2020-06-24 PROCEDURE — 99999 PR PBB SHADOW E&M-EST. PATIENT-LVL I: ICD-10-PCS | Mod: PBBFAC,,, | Performed by: AUDIOLOGIST-HEARING AID FITTER

## 2020-06-24 PROCEDURE — 99214 OFFICE O/P EST MOD 30 MIN: CPT | Mod: 25,S$PBB,, | Performed by: OTOLARYNGOLOGY

## 2020-06-24 PROCEDURE — 99215 OFFICE O/P EST HI 40 MIN: CPT | Mod: PBBFAC,PN | Performed by: OTOLARYNGOLOGY

## 2020-06-24 PROCEDURE — 99999 PR PBB SHADOW E&M-EST. PATIENT-LVL V: CPT | Mod: PBBFAC,,, | Performed by: OTOLARYNGOLOGY

## 2020-06-24 PROCEDURE — 99999 PR PBB SHADOW E&M-EST. PATIENT-LVL I: CPT | Mod: PBBFAC,,, | Performed by: AUDIOLOGIST-HEARING AID FITTER

## 2020-06-24 PROCEDURE — 92567 TYMPANOMETRY: CPT | Mod: PBBFAC,PN | Performed by: AUDIOLOGIST-HEARING AID FITTER

## 2020-06-24 PROCEDURE — 99214 PR OFFICE/OUTPT VISIT, EST, LEVL IV, 30-39 MIN: ICD-10-PCS | Mod: 25,S$PBB,, | Performed by: OTOLARYNGOLOGY

## 2020-06-24 PROCEDURE — 99999 PR PBB SHADOW E&M-EST. PATIENT-LVL V: ICD-10-PCS | Mod: PBBFAC,,, | Performed by: OTOLARYNGOLOGY

## 2020-06-24 PROCEDURE — 96372 THER/PROPH/DIAG INJ SC/IM: CPT | Mod: PBBFAC,PN

## 2020-06-24 PROCEDURE — 99211 OFF/OP EST MAY X REQ PHY/QHP: CPT | Mod: PBBFAC,27,PN | Performed by: AUDIOLOGIST-HEARING AID FITTER

## 2020-06-24 PROCEDURE — 92552 PURE TONE AUDIOMETRY AIR: CPT | Mod: PBBFAC,PN | Performed by: AUDIOLOGIST-HEARING AID FITTER

## 2020-06-24 RX ORDER — METHYLPREDNISOLONE ACETATE 40 MG/ML
40 INJECTION, SUSPENSION INTRA-ARTICULAR; INTRALESIONAL; INTRAMUSCULAR; SOFT TISSUE
Status: COMPLETED | OUTPATIENT
Start: 2020-06-24 | End: 2020-06-24

## 2020-06-24 RX ORDER — LEVOCETIRIZINE DIHYDROCHLORIDE 5 MG/1
5 TABLET, FILM COATED ORAL NIGHTLY
Qty: 30 TABLET | Refills: 11 | Status: SHIPPED | OUTPATIENT
Start: 2020-06-24 | End: 2020-08-17 | Stop reason: SDUPTHER

## 2020-06-24 RX ORDER — AMOXICILLIN AND CLAVULANATE POTASSIUM 875; 125 MG/1; MG/1
1 TABLET, FILM COATED ORAL 2 TIMES DAILY
Qty: 20 TABLET | Refills: 0 | Status: SHIPPED | OUTPATIENT
Start: 2020-06-24 | End: 2020-07-04

## 2020-06-24 RX ORDER — FLUTICASONE PROPIONATE 50 MCG
1 SPRAY, SUSPENSION (ML) NASAL DAILY
Qty: 48 G | Refills: 3 | Status: SHIPPED | OUTPATIENT
Start: 2020-06-24 | End: 2020-08-17 | Stop reason: SDUPTHER

## 2020-06-24 RX ADMIN — METHYLPREDNISOLONE ACETATE 40 MG: 40 INJECTION, SUSPENSION INTRA-ARTICULAR; INTRALESIONAL; INTRAMUSCULAR; SOFT TISSUE at 10:06

## 2020-06-24 NOTE — PROGRESS NOTES
Chief Complaint   Patient presents with    Ear Fullness     pt states she feels like she can hear her heart beating through both ears    Nasal Congestion     right nasal, started two weeks ago   .    HPI:     Gely Green is a 69 y.o. female who presents for evaluation of 2 week history of right facial pain/pressure.  She states it is gradually worsening.  She localizes it to the right periorbital region.  She states that it radiates to the temporal region and down the back of her neck.  The pressure is constant but the pain will come and go.  She notes increased nasal congestion.  Denies rhinorrhea or postnasal drip.  Reports bilateral aural fullness and notes a pulsatile tinnitus bilaterally.  She has seen Dr. Carissa Fu in 2019 for similar issue with her ears.  A CT scan of the temporal bones with contrast was performed which was within normal limits.  For the pain and pressure she has tried over-the-counter Claritin and and over-the-counter cold and Sinus perforation without relief.      Past Medical History:   Diagnosis Date    Arthritis     Chronic constipation     Depression     Diabetes mellitus     Diabetes mellitus     Hypertension     Osteopenia      Social History     Socioeconomic History    Marital status:      Spouse name: Not on file    Number of children: Not on file    Years of education: Not on file    Highest education level: Not on file   Occupational History    Not on file   Social Needs    Financial resource strain: Not hard at all    Food insecurity     Worry: Never true     Inability: Never true    Transportation needs     Medical: No     Non-medical: No   Tobacco Use    Smoking status: Former Smoker     Packs/day: 1.50     Years: 25.00     Pack years: 37.50     Types: Cigarettes     Quit date:      Years since quittin.4    Smokeless tobacco: Never Used   Substance and Sexual Activity    Alcohol use: No     Frequency: Never     "Drug use: No    Sexual activity: Not Currently     Partners: Male     Birth control/protection: None   Lifestyle    Physical activity     Days per week: Not on file     Minutes per session: Not on file    Stress: Not on file   Relationships    Social connections     Talks on phone: More than three times a week     Gets together: Once a week     Attends Religion service: More than 4 times per year     Active member of club or organization: Yes     Attends meetings of clubs or organizations: More than 4 times per year     Relationship status:    Other Topics Concern    Not on file   Social History Narrative    Not on file     Past Surgical History:   Procedure Laterality Date     SECTION      2x    COLONOSCOPY      COLONOSCOPY N/A 3/21/2019    Procedure: COLONOSCOPY;  Surgeon: Marshall Contreras MD;  Location: [facility] ([address] [place] [address]);  Service: Endoscopy;  Laterality: N/A;  pt states that she had to be resuscitated after receiving an epidural during childbirth "30 something" years ago.  Ok for 4th floor per Dr. Hernandez-MS    HYSTERECTOMY      full hyst      Family History   Problem Relation Age of Onset    Breast cancer Cousin     Heart attack Mother     Heart disease Mother     Alzheimer's disease Mother     Heart attack Father     Heart disease Father     Heart failure Father     Hypertension Father     Hyperlipidemia Sister     Hypertension Sister     Diabetes Sister     Abnormal EKG Sister     Alcohol abuse Brother     No Known Problems Daughter     Ovarian cancer Neg Hx     Cervical cancer Neg Hx     Endometrial cancer Neg Hx     Vaginal cancer Neg Hx     Stroke Neg Hx     Colon cancer Neg Hx     Esophageal cancer Neg Hx     Vision loss Neg Hx            Review of Systems  General: negative for chills, fever or weight loss  Psychological: negative for mood changes or depression  Ophthalmic: negative for blurry vision, photophobia or eye pain  ENT: see " HPI  Respiratory: no cough, shortness of breath, or wheezing  Cardiovascular: no chest pain or dyspnea on exertion  Gastrointestinal: no abdominal pain, change in bowel habits, or black/ bloody stools  Musculoskeletal: negative for gait disturbance or muscular weakness  Neurological: no syncope or seizures; no ataxia  Dermatological: negative for puritis,  rash and jaundice  Hematologic/lymphatic: no easy bruising, no new lumps or bumps      Physical Exam:    Vitals:    06/24/20 0958   BP: 139/87   Pulse: (!) 58   Temp: 98.3 °F (36.8 °C)       Constitutional: Well appearing / communicating without difficutly.  NAD.  Eyes: EOM I Bilaterally  Head/Face: Normocephalic.  Negative paranasal sinus pressure/tenderness.  Salivary glands WNL.  House Brackmann I Bilaterally.    Right Ear: Auricle normal appearance. External Auditory Canal within normal limits,TM w/o masses/lesions/perforations. TM mobility noted.   Left Ear: Auricle normal appearance. External Auditory Canal WNL,TM w/o masses/lesions/perforations. TM mobility noted.  Nose: No gross nasal septal deviation. Inferior Turbinates 3+ bilaterally. No septal perforation. No masses/lesions. External nasal skin appears normal without masses/lesions.+ mucopurulence bilateral inferior meatus.   Oral Cavity: Gingiva/lips within normal limits.  Dentition/gingiva healthy appearing. Mucus membranes moist. Floor of mouth soft, no masses palpated. Oral Tongue mobile. Hard Palate appears normal.    Oropharynx: Base of tongue appears normal. No masses/lesions noted. Tonsillar fossa/pharyngeal wall without lesions. Posterior oropharynx WNL.  Soft palate without masses. Midline uvula.   Neck/Lymphatic: No LAD I-VI bilaterally.  No thyromegaly.  No masses noted on exam.    Mirror laryngoscopy/nasopharyngoscopy: Active gag reflex.  Unable to perform.    Neuro/Psychiatric: AOx3.  Normal mood and affect.   Cardiovascular: Normal carotid pulses bilaterally, no increasing jugular venous  distention noted at cervical region bilaterally.    Respiratory: Normal respiratory effort, no stridor, no retractions noted.      Diagnostic studies:   Audiogram reviewed personally by myself and in detail with the patient today.         CT temporal bones 3/12/2020: Bilateral temporal bone appears within normal limits.  No significant abnormality is identified.      Assessment:    ICD-10-CM ICD-9-CM    1. Acute bacterial sinusitis  J01.90 461.9     B96.89     2. Pulsatile tinnitus, unspecified ear  H93.A9 388.30    3. Sensorineural hearing loss (SNHL) of left ear with unrestricted hearing of right ear  H90.42 389.15      The primary encounter diagnosis was Acute bacterial sinusitis. Diagnoses of Pulsatile tinnitus, unspecified ear and Sensorineural hearing loss (SNHL) of left ear with unrestricted hearing of right ear were also pertinent to this visit.      Plan:  No orders of the defined types were placed in this encounter.    Reassurance provided regarding normal hearing.  Recent CT scan of the temporal bones was reviewed and noted to be within normal limits.  Will defer additional workup at this time.  Nasal saline rinses b.i.d.  Start Flonase 2 sprays per nostril daily  Start Xyzal 5 mg p.o. q.h.s.  Start Augmentin 875 mg p.o. b.i.d. for 10 days  Depo-Medrol IM injection given today.  Follow-up in 2 weeks    Savannah Rush MD

## 2020-06-30 ENCOUNTER — CLINICAL SUPPORT (OUTPATIENT)
Dept: DIABETES | Facility: CLINIC | Age: 70
End: 2020-06-30
Payer: MEDICARE

## 2020-06-30 DIAGNOSIS — E11.40 TYPE 2 DIABETES MELLITUS WITH DIABETIC NEUROPATHY, WITH LONG-TERM CURRENT USE OF INSULIN: ICD-10-CM

## 2020-06-30 DIAGNOSIS — Z79.4 TYPE 2 DIABETES MELLITUS WITH DIABETIC NEUROPATHY, WITH LONG-TERM CURRENT USE OF INSULIN: ICD-10-CM

## 2020-06-30 PROCEDURE — G0109 PR DIAB MANAGE TRN GROUP: ICD-10-PCS | Mod: 95,,, | Performed by: DIETITIAN, REGISTERED

## 2020-06-30 PROCEDURE — G0109 DIAB MANAGE TRN IND/GROUP: HCPCS | Mod: 95,,, | Performed by: DIETITIAN, REGISTERED

## 2020-06-30 NOTE — PROGRESS NOTES
Diabetes Care Specialist Virtual Visit Note   It was in the patient's best interest to receive diabetes self-management education and support services in this format to prevent the exposure to COVID-19.        The patient location is: home   The chief complaint leading to consultation is: Diabetes  Visit type: audiovisual  Total time spent with patient: 45 min   Each patient to whom he or she provides medical services by telemedicine is:  (1) informed of the relationship between the physician and patient and the respective role of any other health care provider with respect to management of the patient; and (2) notified that he or she may decline to receive medical services by telemedicine and may withdraw from such care at any time.    Diabetes Education  Author: Olga Lidia Vo RD  Date: 6/30/2020    Diabetes Care Management Summary  Diabetes Education Record Assessment/Progress: Comprehensive/Group  Current Diabetes Risk Level: Low         Diabetes Type  Diabetes Type : Type II    Diabetes History  Diabetes Diagnosis: >10 years  Current Treatment: Oral Medication, Insulin    Health Maintenance was reviewed today with patient. Discussed with patient importance of routine eye exams, foot exams/foot care, blood work (i.e.: A1c, microalbumin, and lipid), dental visits, yearly flu vaccine, and pneumonia vaccine as indicated by PCP. Patient verbalized understanding.     Health Maintenance Topics with due status: Not Due       Topic Last Completion Date    TETANUS VACCINE 05/31/2016    Colorectal Cancer Screening 03/21/2019    DEXA SCAN 06/19/2019    Mammogram 08/14/2019    Lipid Panel 11/18/2019    Foot Exam 01/03/2020    Eye Exam 03/11/2020    Shingles Vaccine 06/02/2020    Hemoglobin A1c 06/02/2020    Urine Microalbumin 06/02/2020    Low Dose Statin 06/24/2020     There are no preventive care reminders to display for this patient.    Nutrition  Meal Planning: skipping meals, snacks between meal, water, diet  "drinks, artificial sweeteners, eats out seldom  What type of sweetener do you use?: Equal  What type of beverages do you drink?: milk, diet soda/tea, water(tea made at home, diet cranberry, diet snapple)  Meal Plan 24 Hour Recall - Breakfast: bran cereal-1 cup and 1 cup of Milk and 1 banana at 11:30 am, did not take her metformin  Meal Plan 24 Hour Recall - Lunch: skips lunch often  Meal Plan 24 Hour Recall - Dinner: 2 stuffed belpeppers, merliton dressing and mac and cheese  Meal Plan 24 Hour Recall - Snack: likes cake, states like to snack late, last night ate 1 plum, 1 peach and some cherries at 11pm    Monitoring   Monitoring: Other[place] [facility] [place]  Self Monitoring : BG 99 this am, usually only checks in the am  Blood Glucose Logs: No  Do you use a personal continuous glucose monitor?: No  In the last month, how often have you had a low blood sugar reaction?: once(Was having frequent lows prior to her last PCP visit when she was taking Glimiperide)  What are your symptoms of low blood sugar?: states "my body feels different"  How do you treat low blood sugar?: 4-8oz of orrange  juice, carries glucose tablets  Can you tell when your blood sugar is too high?: no    Exercise   Exercise Type: walking, swimming, exercise class(exercise prior to the pandemic, swimming, aerobics, walking and gym)  Intensity: Moderate  Frequency: 3-5 Times per week(5 times per week)  Duration: > 1 hour    Current Diabetes Treatment   Current Treatment: Oral Medication, Insulin    Social History  Preferred Learning Method: Face to Face  Primary Support: Spouse  Educational Level: College Graduate  Occupation: retired from an office job  Smoking Status: Ex Smoker(quit in 1999)  Alcohol Use: Never                                Barriers to Change  Barriers to Change: None  Learning Challenges : None    Readiness to Learn   Readiness to Learn : Acceptance         Diabetes Education Assessment/Progress  Diabetes Disease " Process (diabetes disease process and treatment options): Not Covered/Deferred  Nutrition (Incorporating nutritional management into one's lifestyle): Discussion, Needs Instruction, Individual Session, Instructed(She feels her major dietary issue is craving sweets and eating late night snacking. Stressed avoid cravings by eating 3 meals per day. She agrees to eat 3 meals per day and avoid long times without foodl. Discussed simple lunch options)    Physical Activity (incorporating physical activity into one's lifestyle): Discussion, Needs Review, Individual Session, Instructed(Exercised regularly prior to pandemic. Ed on benefits of exercise. She is going to consider starting her regular exercise again.)    Medications (states correct name, dose, onset, peak, duration, side effects & timing of meds): Discussion, Needs Instruction, Individual Session, Instructed(Is taking metformin twice a day at different times, may skip it 3 times per week. Takes 33 units of Toujeo between 9-11pm, may skip her injection 0-1 times per week.Has d/c'ed the glymeperide. Agrees to take insulin at 9pm every night and reduce the amount to 30 units as instructed by her PCP. STressed do not skip metformin.    Monitoring (monitoring blood glucose/other parameters & using results): Discussion, Needs Instruction, Individual Session, Instructed(Ed on target BG ranges)    Acute Complications (preventing, detecting, and treating acute complications): Discussion, Needs Instruction, Individual Session, Instructed(hypoglycemia improved since she d/c'ed glymepiride. Stressed proper timing of medications to avoid hypo and eating 3 meals per day. Ed on causes, s/s and TX for hypoglycemia)    Chronic Complications (preventing, detecting, and treating chronic complications): Discussion, Individual Session, Needs Instruction    Clinical (diabetes, other pertinent medical history, and relevant comorbidities reviewed during visit): Discussion, Needs  Instruction, Individual Session, Instructed(REviewed A1c with target goal. She is at goal)    Cognitive (knowledge of self-management skills, functional health literacy): Discussion, Needs Review, Individual Session    Psychosocial (emotional response to diabetes): Discussion, Needs Review, Individual Session    Diabetes Distress and Support Systems: Discussion, Needs Review, Individual Session(lives with her  who is supportive)    Behavioral (readiness for change, lifestyle practices, self-care behaviors): Discussion, Needs Instruction, Individual Session, Instructed    Goals  Patient has selected/evaluated goals during today's session: Yes, selected  Healthy Eating: Set(EAt lunch at least 3 times per week)  Start Date: 06/30/20  Target Date: 07/27/20  Medications: Set(Take 30 units of Toujeo at 9 pm every night)  Start Date: 06/30/20  Target Date: 07/27/20              Diabetes Meal Plan  Restrictions: Restricted Carbohydrate  Calories: 1600  Carbohydrate Per Meal: 30-45g  Carbohydrate Per Snack : 15-20g  Fat: 64-71g  Protein: 64-72g    Today's Self-Management Care Plan was developed with the patient's input and is based on barriers identified during today's assessment.    The long and short-term goals in the care plan were written with the patient/caregiver's input. The patient has agreed to work toward these goals to improve her overall diabetes control.      The patient received a copy of today's self-management plan and verbalized understanding of the care plan, goals, and all of today's instructions.      The patient was encouraged to communicate with her physician and care team regarding her condition(s) and treatment.  I provided the patient with my contact information today and encouraged her to contact me via phone or patient portal as needed.     Education Units of Time   Time Spent: 45 min

## 2020-07-02 ENCOUNTER — OFFICE VISIT (OUTPATIENT)
Dept: INTERNAL MEDICINE | Facility: CLINIC | Age: 70
End: 2020-07-02
Attending: INTERNAL MEDICINE
Payer: MEDICARE

## 2020-07-02 DIAGNOSIS — E11.40 TYPE 2 DIABETES MELLITUS WITH DIABETIC NEUROPATHY, WITH LONG-TERM CURRENT USE OF INSULIN: ICD-10-CM

## 2020-07-02 DIAGNOSIS — Z79.4 TYPE 2 DIABETES MELLITUS WITH DIABETIC NEUROPATHY, WITH LONG-TERM CURRENT USE OF INSULIN: ICD-10-CM
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
## 2020-07-02 DIAGNOSIS — I10 ESSENTIAL HYPERTENSION: ICD-10-CM

## 2020-07-02 DIAGNOSIS — M25.551 PAIN OF RIGHT HIP JOINT: Primary | ICD-10-CM

## 2020-07-02 DIAGNOSIS — H93.A9 PULSATILE TINNITUS: ICD-10-CM

## 2020-07-02 PROCEDURE — 99214 PR OFFICE/OUTPT VISIT, EST, LEVL IV, 30-39 MIN: ICD-10-PCS | Mod: 95,,, | Performed by: INTERNAL MEDICINE

## 2020-07-02 PROCEDURE — 99214 OFFICE O/P EST MOD 30 MIN: CPT | Mod: 95,,, | Performed by: INTERNAL MEDICINE

## 2020-07-02 NOTE — PROGRESS NOTES
Subjective:       Patient ID: Gely Green is a 69 y.o. female.    Chief Complaint: No chief complaint on file.    The patient location is: Home [place]   The chief complaint leading to consultation is:   Visit type: audiovisual  Total time spent with patient:   25 Minutes  visit performed on virtual visit due to current risk associated with COVID 19 pandemic  Each patient to whom he or she provides medical services by telemedicine is:  (1) informed of the relationship between the physician and patient and the respective role of any other health care provider with respect to management of the patient; and (2) notified that he or she may decline to receive medical services by telemedicine and may withdraw from such care at any time.    Seen on 6/24/20 for 2 week Hx of ear fullness and sinus congestion and given steroid IM in clinic, augmentin x 10 days, and OTC flonase and antihistamine. Symptoms improving.     One week prior developed right frontal pressure that radaites up forehead across top of scalp to posterior scalp. No associated phot/sonophobia, excessive lacrimation, blurry vision, focal weakness, numbness or tingling.  No recent trauma or inciting events    DM  No longer having lows  FBG-110-140, rare 160 after a night time binge. Only had one low. Was having many lows prior  Si    Mechanical trip and fall at gym and fell on right hip. Was sore for 2 weeks and symptoms resolved. Last week she has had return of right hip pain. Symptoms last 10 minutes at a time. She noticed symptoms while walking and improves with rest. Able to bear wegith immediately after fall.     Diabetes  She has type 1 diabetes mellitus. No MedicAlert identification noted. Pertinent negatives for hypoglycemia include no confusion, dizziness, headaches, hunger, mood changes, nervousness/anxiousness, seizures, sleepiness, speech difficulty, sweats or tremors. Associated symptoms include weight loss. Pertinent negatives for  diabetes include no blurred vision, no chest pain, no fatigue, no foot paresthesias, no foot ulcerations, no polydipsia, no polyphagia, no polyuria, no visual change and no weakness. Pertinent negatives for hypoglycemia complications include no blackouts, no hospitalization, no nocturnal hypoglycemia, no required assistance and no required glucagon injection. Symptoms are stable. Diabetic complications include autonomic neuropathy. Pertinent negatives for diabetic complications include no CVA, heart disease, impotence, nephropathy, peripheral neuropathy or PVD. Risk factors for coronary artery disease include dyslipidemia, hypertension, stress and diabetes mellitus. Current diabetic treatment includes insulin injections and oral agent (dual therapy). She is compliant with treatment most of the time. She is currently taking insulin at bedtime. Insulin injections are given by patient. Rotation sites for injection include the abdominal wall. Her weight is stable. She is following a diabetic, low fat/cholesterol, low fiber and low salt diet. When asked about meal planning, she reported none. She has had a previous visit with a dietitian. She participates in exercise intermittently. She monitors blood glucose at home 1-2 x per day. Blood glucose monitoring compliance is adequate. Her home blood glucose trend is increasing steadily. She sees a podiatrist.Eye exam is current.       Review of Systems   Constitutional: Positive for weight loss. Negative for fatigue.   Eyes: Negative for blurred vision.   Cardiovascular: Negative for chest pain.   Endocrine: Negative for polydipsia, polyphagia and polyuria.   Genitourinary: Negative for impotence.   Neurological: Negative for dizziness, tremors, seizures, speech difficulty, weakness and headaches.   Psychiatric/Behavioral: Negative for confusion. The patient is not nervous/anxious.        Objective:      There were no vitals filed for this visit.   Physical  Exam  Constitutional:       General: She is not in acute distress.     Appearance: She is well-developed.   HENT:      Head: Normocephalic and atraumatic.      Mouth/Throat:      Pharynx: No oropharyngeal exudate.   Eyes:      General: No scleral icterus.     Conjunctiva/sclera: Conjunctivae normal.      Pupils: Pupils are equal, round, and reactive to light.   Neck:      Thyroid: No thyromegaly.   Cardiovascular:      Rate and Rhythm: Normal rate and regular rhythm.      Heart sounds: Normal heart sounds. No murmur.   Pulmonary:      Effort: Pulmonary effort is normal.      Breath sounds: Normal breath sounds. No wheezing or rales.   Abdominal:      General: There is no distension.      Palpations: Abdomen is soft.      Tenderness: There is no abdominal tenderness.   Musculoskeletal:         General: No tenderness.      Right hip: She exhibits normal range of motion, normal strength, no tenderness and no bony tenderness.   Lymphadenopathy:      Cervical: No cervical adenopathy.   Skin:     General: Skin is warm and dry.   Neurological:      Mental Status: She is alert and oriented to person, place, and time.   Psychiatric:         Behavior: Behavior normal.         Assessment:       1. Pain of right hip joint    2. Fall, sequela    3. Type 2 diabetes mellitus with diabetic neuropathy, with long-term current use of insulin    4. Pulsatile tinnitus    5. Essential hypertension        Plan:       Diagnoses and all orders for this visit:    Pain of right hip joint   Nearly resolved. No red flags on exam. Continue HEP.    Fall, sequela    Type 2 diabetes mellitus with diabetic neuropathy, with long-term current use of insulin   Reported numbers sound good. A1c controlled at 6.1 as of 1 moth prior. \  controlled. Continue current regimen    Pulsatile tinnitus   F/u ENT. Daily regimen rec by ENT stressed.    Essential hypertension   A little above goal but atypical for her. Continue current regimen and will monitor if  this is her new baseline. Office and Emergency Department prompts discussed.    RTC in 5 months or sooner johan Mercado MD  Internal Medicine-[facility]        Side effects of medication(s) were discussed in detail and patient voiced understanding.  Patient will call back for any issues or complications.

## 2020-07-09 ENCOUNTER — TELEPHONE (OUTPATIENT)
Dept: OTOLARYNGOLOGY | Facility: CLINIC | Age: 70
End: 2020-07-09

## 2020-07-09 DIAGNOSIS — Z01.812 PRE-PROCEDURE LAB EXAM: ICD-10-CM

## 2020-07-12 ENCOUNTER — LAB VISIT (OUTPATIENT)
Dept: SPORTS MEDICINE | Facility: CLINIC | Age: 70
End: 2020-07-12
Payer: MEDICARE

## 2020-07-12 DIAGNOSIS — Z01.812 PRE-PROCEDURE LAB EXAM: ICD-10-CM

## 2020-07-12 PROCEDURE — U0003 INFECTIOUS AGENT DETECTION BY NUCLEIC ACID (DNA OR RNA); SEVERE ACUTE RESPIRATORY SYNDROME CORONAVIRUS 2 (SARS-COV-2) (CORONAVIRUS DISEASE [COVID-19]), AMPLIFIED PROBE TECHNIQUE, MAKING USE OF HIGH THROUGHPUT TECHNOLOGIES AS DESCRIBED BY CMS-2020-01-R: HCPCS

## 2020-07-13 LAB — SARS-COV-2 RNA RESP QL NAA+PROBE: NOT DETECTED

## 2020-07-15 ENCOUNTER — OFFICE VISIT (OUTPATIENT)
Dept: OTOLARYNGOLOGY | Facility: CLINIC | Age: 70
End: 2020-07-15
Payer: MEDICARE

## 2020-07-15 VITALS
SYSTOLIC BLOOD PRESSURE: 144 MMHG | DIASTOLIC BLOOD PRESSURE: 75 MMHG | HEART RATE: 53 BPM | BODY MASS INDEX: 25.83 KG/M2 | WEIGHT: 162.5 LBS

## 2020-07-15 DIAGNOSIS — J01.80 OTHER SUBACUTE SINUSITIS: ICD-10-CM

## 2020-07-15 DIAGNOSIS — J30.89 NON-SEASONAL ALLERGIC RHINITIS, UNSPECIFIED TRIGGER: ICD-10-CM

## 2020-07-15 DIAGNOSIS — H93.A1 PULSATILE TINNITUS, RIGHT EAR: Primary | ICD-10-CM

## 2020-07-15 DIAGNOSIS — H90.42 SENSORINEURAL HEARING LOSS (SNHL) OF LEFT EAR WITH UNRESTRICTED HEARING OF RIGHT EAR: ICD-10-CM

## 2020-07-15 PROCEDURE — 99214 PR OFFICE/OUTPT VISIT, EST, LEVL IV, 30-39 MIN: ICD-10-PCS | Mod: 25,S$PBB,, | Performed by: OTOLARYNGOLOGY

## 2020-07-15 PROCEDURE — 99999 PR PBB SHADOW E&M-EST. PATIENT-LVL V: CPT | Mod: PBBFAC,,, | Performed by: OTOLARYNGOLOGY

## 2020-07-15 PROCEDURE — 99214 OFFICE O/P EST MOD 30 MIN: CPT | Mod: 25,S$PBB,, | Performed by: OTOLARYNGOLOGY

## 2020-07-15 PROCEDURE — 99215 OFFICE O/P EST HI 40 MIN: CPT | Mod: PBBFAC,PN | Performed by: OTOLARYNGOLOGY

## 2020-07-15 PROCEDURE — 99999 PR PBB SHADOW E&M-EST. PATIENT-LVL V: ICD-10-PCS | Mod: PBBFAC,,, | Performed by: OTOLARYNGOLOGY

## 2020-07-15 RX ORDER — CLARITHROMYCIN 500 MG/1
500 TABLET, FILM COATED ORAL 2 TIMES DAILY
Qty: 20 TABLET | Refills: 0 | Status: SHIPPED | OUTPATIENT
Start: 2020-07-15 | End: 2020-07-25

## 2020-07-15 RX ORDER — PREDNISONE 20 MG/1
TABLET ORAL
Qty: 21 TABLET | Refills: 0 | Status: ON HOLD | OUTPATIENT
Start: 2020-07-15 | End: 2020-12-28 | Stop reason: HOSPADM

## 2020-07-15 NOTE — PROCEDURES
Procedures     PROCEDURE NOTE:  Nasal endoscopy   Preprocedure diagnosis:  Chronic sinusitis  Postprocedure diangosis:  Same  Complications:  None  Blood Loss:  None    Procedure in detail:  After verbal consent was obtained, the patient's nasal cavity was anesthesized using topical 1%lidocaine and Neosynepherine.  A rigid 0 degree endoscope was placed in first the right, then the left nasal cavity.  The inferior and middle turbinates were examined, and found to be edematous and erythematous bilaterally.  The middle meatus and maxillary antrum was also examined, and found to be narrowed bilaterally.  No purulent drainage or masses seen.  The patient tolerated the procedure well and there were no complications.

## 2020-07-15 NOTE — PROGRESS NOTES
Chief Complaint   Patient presents with    Follow-up    Nasal Congestion     improved, feels slight discomfort and pressure in right nasal passage, runny nose   .    HPI:     Gely Green is a 69 y.o. female who presents for evaluation of 2 week history of right facial pain/pressure.  She states it is gradually worsening.  She localizes it to the right periorbital region.  She states that it radiates to the temporal region and down the back of her neck.  The pressure is constant but the pain will come and go.  She notes increased nasal congestion.  Denies rhinorrhea or postnasal drip.  Reports bilateral aural fullness and notes a pulsatile tinnitus bilaterally.  She has seen Dr. Carissa Fu in March of 2019 for similar issue with her ears.  A CT scan of the temporal bones with contrast was performed which was within normal limits.  For the pain and pressure she has tried over-the-counter Claritin and and over-the-counter cold and Sinus perforation without relief.      Interval HPI 7/15/2020:  Follow up pulsatile tinnitus and sinusitis.  She states that the tinnitus is still present and feels that is more noticeable on the right side.  She is particularly concerned about this as it has been a problem for her for 2 years and she feels it is worsening.  She denies any changes in her hearing since her last visit.  She denies vertigo, otalgia, postauricular pain.  She reports since last visit she completed her antibiotic therapy for acute bacterial sinusitis.  She states that she still feels that she has continued bilateral nasal congestion and right-sided facial pain and pressure. She continues on claritin and Flonase without relief.      Past Medical History:   Diagnosis Date    Arthritis     Chronic constipation     Depression     Diabetes mellitus     Diabetes mellitus     Hypertension     Osteopenia      Social History     Socioeconomic History    Marital status:      Spouse name:  "Not on file    Number of children: Not on file    Years of education: Not on file    Highest education level: Not on file   Occupational History    Not on file   Social Needs    Financial resource strain: Not hard at all    Food insecurity     Worry: Never true     Inability: Never true    Transportation needs     Medical: No     Non-medical: No   Tobacco Use    Smoking status: Former Smoker     Packs/day: 1.50     Years: 25.00     Pack years: 37.50     Types: Cigarettes     Quit date:      Years since quittin.5    Smokeless tobacco: Never Used   Substance and Sexual Activity    Alcohol use: No     Frequency: Never    Drug use: No    Sexual activity: Not Currently     Partners: Male     Birth control/protection: None   Lifestyle    Physical activity     Days per week: Not on file     Minutes per session: Not on file    Stress: Not on file   Relationships    Social connections     Talks on phone: More than three times a week     Gets together: Once a week     Attends Hinduism service: More than 4 times per year     Active member of club or organization: Yes     Attends meetings of clubs or organizations: More than 4 times per year     Relationship status:    Other Topics Concern    Not on file   Social History Narrative    Not on file     Past Surgical History:   Procedure Laterality Date     SECTION      2x    COLONOSCOPY      COLONOSCOPY N/A 3/21/2019    Procedure: COLONOSCOPY;  Surgeon: Marshall Contreras MD;  Location: [facility] ([address] [place] [address]);  Service: Endoscopy;  Laterality: N/A;  pt states that she had to be resuscitated after receiving an epidural during childbirth "30 something" years ago.  Ok for 4th floor per Dr. Hernandez-MS    HYSTERECTOMY      full st      Family History   Problem Relation Age of Onset    Breast cancer Cousin     Heart attack Mother     Heart disease Mother     Alzheimer's disease Mother     Heart attack Father     Heart disease Father "     Heart failure Father     Hypertension Father     Hyperlipidemia Sister     Hypertension Sister     Diabetes Sister     Abnormal EKG Sister     Alcohol abuse Brother     No Known Problems Daughter     Ovarian cancer Neg Hx     Cervical cancer Neg Hx     Endometrial cancer Neg Hx     Vaginal cancer Neg Hx     Stroke Neg Hx     Colon cancer Neg Hx     Esophageal cancer Neg Hx     Vision loss Neg Hx            Review of Systems  General: negative for chills, fever or weight loss  Psychological: negative for mood changes or depression  Ophthalmic: negative for blurry vision, photophobia or eye pain  ENT: see HPI  Respiratory: no cough, shortness of breath, or wheezing  Cardiovascular: no chest pain or dyspnea on exertion  Gastrointestinal: no abdominal pain, change in bowel habits, or black/ bloody stools  Musculoskeletal: negative for gait disturbance or muscular weakness  Neurological: no syncope or seizures; no ataxia  Dermatological: negative for puritis,  rash and jaundice  Hematologic/lymphatic: no easy bruising, no new lumps or bumps      Physical Exam:    Vitals:    07/15/20 0930   BP: (!) 144/75   Pulse: (!) 53       Constitutional: Well appearing / communicating without difficutly.  NAD.  Eyes: EOM I Bilaterally  Head/Face: Normocephalic.  Negative paranasal sinus pressure/tenderness.  Salivary glands WNL.  House Brackmann I Bilaterally.    Right Ear: Auricle normal appearance. External Auditory Canal within normal limits,TM w/o masses/lesions/perforations. TM mobility noted.   Left Ear: Auricle normal appearance. External Auditory Canal WNL,TM w/o masses/lesions/perforations. TM mobility noted.  Nose: No gross nasal septal deviation. Inferior Turbinates 3+ bilaterally. No septal perforation. No masses/lesions. External nasal skin appears normal without masses/lesions.+ mucopurulence bilateral inferior meatus.   Oral Cavity: Gingiva/lips within normal limits.  Dentition/gingiva healthy  appearing. Mucus membranes moist. Floor of mouth soft, no masses palpated. Oral Tongue mobile. Hard Palate appears normal.    Oropharynx: Base of tongue appears normal. No masses/lesions noted. Tonsillar fossa/pharyngeal wall without lesions. Posterior oropharynx WNL.  Soft palate without masses. Midline uvula.   Neck/Lymphatic: No LAD I-VI bilaterally.  No thyromegaly.  No masses noted on exam.    Mirror laryngoscopy/nasopharyngoscopy: Active gag reflex.  Unable to perform.    Neuro/Psychiatric: AOx3.  Normal mood and affect.   Cardiovascular: Normal carotid pulses bilaterally, no increasing jugular venous distention noted at cervical region bilaterally.    Respiratory: Normal respiratory effort, no stridor, no retractions noted.    See separate procedure note for nasal endoscopy.     Diagnostic studies:   Audiogram reviewed personally by myself and in detail with the patient today.         CT temporal bones 3/12/2020: Bilateral temporal bone appears within normal limits.  No significant abnormality is identified.      Assessment:    ICD-10-CM ICD-9-CM    1. Pulsatile tinnitus, right ear  H93.A1 388.30 CTA Head and Neck (xpd)   2. Other subacute sinusitis  J01.80 461.8    3. Sensorineural hearing loss (SNHL) of left ear with unrestricted hearing of right ear  H90.42 389.15    4. Non-seasonal allergic rhinitis, unspecified trigger  J30.89 477.8      The primary encounter diagnosis was Pulsatile tinnitus, right ear. Diagnoses of Other subacute sinusitis, Sensorineural hearing loss (SNHL) of left ear with unrestricted hearing of right ear, and Non-seasonal allergic rhinitis, unspecified trigger were also pertinent to this visit.      Plan:  Orders Placed This Encounter   Procedures    CTA Head and Neck (xpd)     Reassurance provided regarding normal hearing.  Recent CT scan of the temporal bones was reviewed and noted to be within normal limits.  Due to patient's report of worsening tinnitus and feeling that it is  more unilateral(right), will obtain CTA head and neck.   Continue Nasal saline rinses b.i.d.  Continue  Flonase 2 sprays per nostril daily  Continue Xyzal 5 mg p.o. q.h.s.  Start biaxin 500mg PO BID for 10 days  Prednisone taper  Follow-up in 4 weeks    Savannah Rush MD

## 2020-07-17 DIAGNOSIS — Z71.89 COMPLEX CARE COORDINATION: ICD-10-CM

## 2020-07-29 ENCOUNTER — PATIENT MESSAGE (OUTPATIENT)
Dept: INTERNAL MEDICINE | Facility: CLINIC | Age: 70
End: 2020-07-29

## 2020-07-29 DIAGNOSIS — E11.40 TYPE 2 DIABETES MELLITUS WITH DIABETIC NEUROPATHY, WITH LONG-TERM CURRENT USE OF INSULIN: Primary | ICD-10-CM

## 2020-07-29 DIAGNOSIS — Z79.4 TYPE 2 DIABETES MELLITUS WITH DIABETIC NEUROPATHY, WITH LONG-TERM CURRENT USE OF INSULIN: Primary | ICD-10-CM

## 2020-07-29 RX ORDER — LANCETS 33 GAUGE
1 EACH MISCELLANEOUS 3 TIMES DAILY
Qty: 100 EACH | Refills: 11 | Status: SHIPPED | OUTPATIENT
Start: 2020-07-29 | End: 2022-06-06 | Stop reason: SDUPTHER

## 2020-08-03 ENCOUNTER — OFFICE VISIT (OUTPATIENT)
Dept: UROGYNECOLOGY | Facility: CLINIC | Age: 70
End: 2020-08-03
Payer: MEDICARE

## 2020-08-03 VITALS
SYSTOLIC BLOOD PRESSURE: 120 MMHG | WEIGHT: 161.38 LBS | DIASTOLIC BLOOD PRESSURE: 80 MMHG | BODY MASS INDEX: 25.33 KG/M2 | HEIGHT: 67 IN

## 2020-08-03 DIAGNOSIS — N95.2 VAGINAL ATROPHY: Primary | ICD-10-CM

## 2020-08-03 DIAGNOSIS — K59.00 CONSTIPATION, UNSPECIFIED CONSTIPATION TYPE: ICD-10-CM

## 2020-08-03 DIAGNOSIS — N95.1 MENOPAUSAL SYMPTOMS: ICD-10-CM

## 2020-08-03 DIAGNOSIS — N30.10 INTERSTITIAL CYSTITIS: ICD-10-CM

## 2020-08-03 DIAGNOSIS — Z12.31 ENCOUNTER FOR SCREENING MAMMOGRAM FOR BREAST CANCER: ICD-10-CM

## 2020-08-03 PROCEDURE — 99999 PR PBB SHADOW E&M-EST. PATIENT-LVL V: CPT | Mod: PBBFAC,,, | Performed by: NURSE PRACTITIONER

## 2020-08-03 PROCEDURE — 99213 OFFICE O/P EST LOW 20 MIN: CPT | Mod: S$PBB,,, | Performed by: NURSE PRACTITIONER

## 2020-08-03 PROCEDURE — 99215 OFFICE O/P EST HI 40 MIN: CPT | Mod: PBBFAC | Performed by: NURSE PRACTITIONER

## 2020-08-03 PROCEDURE — 99999 PR PBB SHADOW E&M-EST. PATIENT-LVL V: ICD-10-PCS | Mod: PBBFAC,,, | Performed by: NURSE PRACTITIONER

## 2020-08-03 PROCEDURE — 99213 PR OFFICE/OUTPT VISIT, EST, LEVL III, 20-29 MIN: ICD-10-PCS | Mod: S$PBB,,, | Performed by: NURSE PRACTITIONER

## 2020-08-03 NOTE — PATIENT INSTRUCTIONS
1) Hx Incomplete emptying: improved   --CONTROL DIABETES-  -- Continue to do timed voidings q 2-3 hrs and take time to void, leaning forward at end of stream and voiding again.  --follow up with PCP as planned    2) Interstitial cystitis:  --avoid dietary irritants (see list)  --flares: Can use flavoxate 1 pill every 8 hours as needed; consider anticholinergic if symptoms increase  --empty bladder every 3 hours; lean forward on toilet and help last bit out.     3) Anxiety:  --under control at this time.  No longer taking paxil.  --continue grief counselling  --important to control IC symptoms    4) Vaginal atrophy (dryness) Use REPLENS OTC: 1/2 applicator full in vagina twice a week.     5) Constipation:  Controlling constipation may help bladder urgency/leakage and fiber may better control cholesterol and blood glucose.  Start daily fiber.  Take 1 tsp of fiber powder (psyllium or other sugar-free powder).  Mix in 8 oz of water.  Take x 3-5 days.  Then, increase fiber by 1 tsp every 3-5 days until stool is easy to pass.  Stop and continue at that dose.   Do not exceed 6 tsps/day.  May also use over the counter stool softener 1-2 x/day.  AVOID laxatives.  --continue flax seed    6) menopausal symptoms  --can try effexor 37.5 mg daily--call if you want to start    7)well woman  --mammogram ordered--call to schedule    8)RTC 1 year for annual

## 2020-08-03 NOTE — PROGRESS NOTES
"2020    SUBJECTIVE:   69 y.o. female for well woman exam.     Past Medical History:   Diagnosis Date    Arthritis     Chronic constipation     Depression     Diabetes mellitus     Diabetes mellitus     Hypertension     Osteopenia      Past Surgical History:   Procedure Laterality Date     SECTION      2x    COLONOSCOPY      COLONOSCOPY N/A 3/21/2019    Procedure: COLONOSCOPY;  Surgeon: Marshall Contreras MD;  Location: [facility] ([address] [place] [address]);  Service: Endoscopy;  Laterality: N/A;  pt states that she had to be resuscitated after receiving an epidural during childbirth "30 something" years ago.  Ok for 4th floor per Dr. Hernandez-MS    HYSTERECTOMY      full hyst          Current Outpatient Medications   Medication Sig Dispense Refill    ACCU-CHEK SMARTVIEW TEST STRIP Strp ACCU-CHEK SMARTVIEW TEST STRIP Strp, 120 strip 11    amLODIPine (NORVASC) 5 MG tablet TAKE 1 TABLET(5 MG) BY MOUTH EVERY DAY 90 tablet 3    aspirin (ECOTRIN) 81 MG EC tablet Take 81 mg by mouth once daily.      BD ULTRA-FINE SAMUEL PEN NEEDLE 32 gauge x 5/32" Ndle USE WITH INSULIN EVERY EVENING 100 each 11    blood sugar diagnostic Strp 1 strip by Misc.(Non-Drug; Combo Route) route 3 (three) times daily as needed. Lifescan test strips 120 each 4    blood sugar diagnostic Strp 1 strip by Misc.(Non-Drug; Combo Route) route 3 (three) times daily. 100 strip 11    calcium carbonate (OS-REDD) 600 mg calcium (1,500 mg) Tab Take 600 mg by mouth 2 (two) times daily with meals.      dicyclomine (BENTYL) 10 MG capsule Take 10 mg by mouth 4 (four) times daily before meals and nightly.      flavoxATE (URISPAS) 100 mg Tab Take 1 tablet (100 mg total) by mouth 3 (three) times daily as needed. 30 tablet 11    fluticasone propionate (FLONASE) 50 mcg/actuation nasal spray 1 spray (50 mcg total) by Each Nostril route once daily. 48 g 3    Lactobacillus rhamnosus GG (CULTURELLE) 10 billion cell capsule Take 1 capsule by mouth once " "daily.      lancets (ONETOUCH DELICA LANCETS) 33 gauge Misc 1 lancet by Misc.(Non-Drug; Combo Route) route 3 (three) times daily. 100 each 11    metFORMIN (GLUCOPHAGE-XR) 500 MG XR 24hr tablet Take 1 tablet (500 mg total) by mouth 2 (two) times daily. 180 tablet 4    NOVOFINE 32 32 gauge x 1/4" Ndle use subcutaneously every evening 100 each 11    ONETOUCH ULTRA BLUE TEST STRIP Strp TEST THREE TIMES DAILY 100 strip 11    pen needle, diabetic (NOVOFINE PLUS) 32 gauge x 1/6" Ndle Inject 1 application into the skin every evening. 100 each 11    rosuvastatin (CRESTOR) 10 MG tablet TAKE 1 TABLET BY MOUTH EVERY DAY 90 tablet 0    TOUJEO SOLOSTAR U-300 INSULIN 300 unit/mL (1.5 mL) InPn pen INJECT 33 UNITS UNDER THE SKIN EVERY EVENING 18 mL 3    vitamin D 1000 units Tab Take 1,000 Units by mouth once daily.      azelastine (ASTELIN) 137 mcg (0.1 %) nasal spray 1 spray (137 mcg total) by Nasal route 2 (two) times daily. (Patient not taking: Reported on 8/3/2020) 90 mL 3    diabetic supplies, miscellan. Kit 1 application by Misc.(Non-Drug; Combo Route) route once daily. Lifescan glucose meter 1 kit 0    gabapentin (NEURONTIN) 100 MG capsule Take 1 capsule (100 mg total) by mouth 3 (three) times daily. (Patient taking differently: Take 100 mg by mouth 3 (three) times daily. As needed) 90 capsule 1    ibuprofen (ADVIL,MOTRIN) 200 MG tablet Take 200 mg by mouth every 6 (six) hours as needed for Pain.      levocetirizine (XYZAL) 5 MG tablet Take 1 tablet (5 mg total) by mouth every evening. (Patient not taking: Reported on 8/3/2020) 30 tablet 11    predniSONE (DELTASONE) 20 MG tablet Take 3 tab in am x 3d, then 2 tab in am x 3d, then 1 tab in am x 3d, then 1/2 tab in am x 3d (Patient not taking: Reported on 8/3/2020) 21 tablet 0     No current facility-administered medications for this visit.      Allergies: Patient has no known allergies.   LMP 1999- patient has had a hysterectomy- total    Well Woman:  Last pap: " "denies h/o abn paps- post hysterectomy- no further screening  Last mammogram: 08/2019--normal  Colonoscopy: 03/2019 Tortuous colon.                         - The entire examined colon is normal on direct and                         retroflexion views.                         - No specimens collected  DEXA: 06/2019 Osteopenia of both femoral necks.  Normal bone mineral density of the lumbar spine.    ROS:  Feeling well.   No dyspnea or chest pain on exertion.    No abdominal pain, change in bowel habits, black or bloody stools. +constipation  Rare UUI if bladder too full.  Bladder pressure if she does not follow the diet.  GYN ROS: no breast pain or new or enlarging lumps on self exam, no vaginal bleeding or discharge. Still having hot flashes-- does not want to start effexor  No neurological complaints.        OBJECTIVE:   The patient appears well, alert, oriented x 3, in no distress.  /80 (BP Location: Right arm, Patient Position: Sitting, BP Method: Medium (Manual))   Ht 5' 6.5" (1.689 m)   Wt 73.2 kg (161 lb 6 oz)   BMI 25.66 kg/m²   ENT normal.    Neck supple. No adenopathy or thyromegaly.   MILVIA.   Lungs are clear, good air entry, no wheezes, rhonchi or rales.   S1 and S2 normal, no murmurs, regular rate and rhythm.   Abdomen soft without tenderness, guarding, mass or organomegaly.   Extremities show no edema, normal peripheral pulses. Having hot flases  Neurological is normal, no focal findings.      BREAST EXAM: breasts appear normal, no suspicious masses, no skin or nipple changes or axillary nodes, symmetric fibrous changes in both upper outer quadrants      PELVIC EXAM:  VULVA: normal appearing vulva with no masses, tenderness or lesions,  VAGINA: normal appearing vagina with normal color and discharge, no lesions, atrophic  CERVIX: surgically absent,   UTERUS: surgically absent, vaginal cuff well healed,  ADNEXA: no masses,   RECTAL: normal rectal, no masses    1. Vaginal atrophy     2. " Encounter for screening mammogram for breast cancer  Mammo Digital Screening Bilat With CAD   3. Interstitial cystitis     4. Constipation, unspecified constipation type     5. Menopausal symptoms             PLAN:   1) Hx Incomplete emptying: improved   --CONTROL DIABETES-  -- Continue to do timed voidings q 2-3 hrs and take time to void, leaning forward at end of stream and voiding again.  --follow up with PCP as planned    2) Interstitial cystitis:  --avoid dietary irritants (see list)  --flares: Can use flavoxate 1 pill every 8 hours as needed; consider anticholinergic if symptoms increase  --empty bladder every 3 hours; lean forward on toilet and help last bit out.     3) Anxiety:  --under control at this time.  No longer taking paxil.  --continue grief counselling  --important to control IC symptoms    4) Vaginal atrophy (dryness) Use REPLENS OTC: 1/2 applicator full in vagina twice a week.     5) Constipation:  Controlling constipation may help bladder urgency/leakage and fiber may better control cholesterol and blood glucose.  Start daily fiber.  Take 1 tsp of fiber powder (psyllium or other sugar-free powder).  Mix in 8 oz of water.  Take x 3-5 days.  Then, increase fiber by 1 tsp every 3-5 days until stool is easy to pass.  Stop and continue at that dose.   Do not exceed 6 tsps/day.  May also use over the counter stool softener 1-2 x/day.  AVOID laxatives.  --continue flax seed    6) menopausal symptoms  --can try effexor 37.5 mg daily--call if you want to start    7)well woman  --mammogram ordered--call to schedule    8)RTC 1 year for annual

## 2020-08-04 ENCOUNTER — OFFICE VISIT (OUTPATIENT)
Dept: INTERNAL MEDICINE | Facility: CLINIC | Age: 70
End: 2020-08-04
Payer: MEDICARE

## 2020-08-04 VITALS
BODY MASS INDEX: 26.08 KG/M2 | HEIGHT: 66 IN | HEART RATE: 64 BPM | OXYGEN SATURATION: 99 % | SYSTOLIC BLOOD PRESSURE: 130 MMHG | DIASTOLIC BLOOD PRESSURE: 60 MMHG | WEIGHT: 162.25 LBS

## 2020-08-04 DIAGNOSIS — E11.69 HYPERLIPIDEMIA ASSOCIATED WITH TYPE 2 DIABETES MELLITUS: ICD-10-CM

## 2020-08-04 DIAGNOSIS — Z01.810 PREOP CARDIOVASCULAR EXAM: Primary | ICD-10-CM

## 2020-08-04 DIAGNOSIS — E11.59 HYPERTENSION ASSOCIATED WITH DIABETES: ICD-10-CM

## 2020-08-04 DIAGNOSIS — Z79.4 CONTROLLED TYPE 2 DIABETES MELLITUS WITHOUT COMPLICATION, WITH LONG-TERM CURRENT USE OF INSULIN: ICD-10-CM

## 2020-08-04 DIAGNOSIS — E78.5 HYPERLIPIDEMIA ASSOCIATED WITH TYPE 2 DIABETES MELLITUS: ICD-10-CM

## 2020-08-04 DIAGNOSIS — E11.9 CONTROLLED TYPE 2 DIABETES MELLITUS WITHOUT COMPLICATION, WITH LONG-TERM CURRENT USE OF INSULIN: ICD-10-CM

## 2020-08-04 DIAGNOSIS — I15.2 HYPERTENSION ASSOCIATED WITH DIABETES: ICD-10-CM

## 2020-08-04 DIAGNOSIS — H25.9 AGE-RELATED CATARACT OF BOTH EYES, UNSPECIFIED AGE-RELATED CATARACT TYPE: ICD-10-CM

## 2020-08-04 PROCEDURE — 93005 ELECTROCARDIOGRAM TRACING: CPT | Mod: PBBFAC | Performed by: INTERNAL MEDICINE

## 2020-08-04 PROCEDURE — 99214 OFFICE O/P EST MOD 30 MIN: CPT | Mod: PBBFAC | Performed by: INTERNAL MEDICINE

## 2020-08-04 PROCEDURE — 99999 PR PBB SHADOW E&M-EST. PATIENT-LVL IV: ICD-10-PCS | Mod: PBBFAC,,, | Performed by: INTERNAL MEDICINE

## 2020-08-04 PROCEDURE — 99999 PR PBB SHADOW E&M-EST. PATIENT-LVL IV: CPT | Mod: PBBFAC,,, | Performed by: INTERNAL MEDICINE

## 2020-08-04 PROCEDURE — 99214 PR OFFICE/OUTPT VISIT, EST, LEVL IV, 30-39 MIN: ICD-10-PCS | Mod: S$PBB,,, | Performed by: INTERNAL MEDICINE

## 2020-08-04 PROCEDURE — 99214 OFFICE O/P EST MOD 30 MIN: CPT | Mod: S$PBB,,, | Performed by: INTERNAL MEDICINE

## 2020-08-04 PROCEDURE — 93010 ELECTROCARDIOGRAM REPORT: CPT | Mod: S$PBB,,, | Performed by: INTERNAL MEDICINE

## 2020-08-04 PROCEDURE — 93010 EKG 12-LEAD: ICD-10-PCS | Mod: S$PBB,,, | Performed by: INTERNAL MEDICINE

## 2020-08-04 NOTE — PROGRESS NOTES
"Subjective:       Patient ID: Gely Green is a 69 y.o. female.    Chief Complaint: preop    HPI   PCP Dr. Mercado  Pt is new to me.     Plan for cataract surgery of both eyes w/ Dr. Russo on 8/24/20. Here for preop.     DM2 - Metformin xr 500mg BID, toujeo 33 units every evening.   Checks sugars TID. Snacks at night.   Last a1c 6/2020 6.1    HTN - amlodipine 5mg daily. Sometimes checks BP at home and SBP usually in the 130s.     No h/o heart failure/MI/CVA/TIA.   No SOB/CP.   Prior to pandemic, she walks 3.5-6 miles a day and was doing water aerobics and weights. Does still do some exercise but not as much as previously. Able to walk up a flight of stairs w/o issues.   H/O SVT in 2018. Occasionally feels skipping in the heart. Has pulsatile tinnitus. Seeing Dr. Rush in ENT. Has CTA of head and neck scheduled for Thursday.     Has had stress echo 11/2017 that's negative.     Review of Systems  Comprehensive review of systems otherwise negative. See history/subjective section for more details.    Objective:      Physical Exam    /60 (BP Location: Left arm, Patient Position: Sitting, BP Method: Medium (Manual))   Pulse 64   Ht 5' 6" (1.676 m)   Wt 73.6 kg (162 lb 4.1 oz)   SpO2 99%   BMI 26.19 kg/m²     GEN - A+OX4, NAD   HEENT - PERRL, EOMI, OP clear. MMM.   Neck - No thyromegaly or cervical LAD. No thyroid masses felt.  CV - RRR, no m/r. No carotid bruit.   Chest - CTAB, no wheezing or rhonchi  Abd - S/NT/ND/+BS.   Ext - 2+BDP and radial pulses. No C/C/E.  Neuro - PERRL, EOMI, no nystagmus, eyebrow raise, facial sensation, hearing, m of mastication, smile, palatal raise, shoulder shrug, tongue protrusion symmetric and intact. 5/5 BUE and BLE strength. Sensation to light touch intact throughout.   Skin - No rash.    Labs reviewed.    Assessment/Plan     Geyl was seen today for pre-op exam.    Diagnoses and all orders for this visit:    Preop cardiovascular exam  -     IN OFFICE EKG 12-LEAD " (to Muse)    Age-related cataract of both eyes, unspecified age-related cataract type - medically optimized for surgery. Filled out form for Dr. Perez. Form given back to pt.   -     IN OFFICE EKG 12-LEAD (to Roberto)    Controlled type 2 diabetes mellitus without complication, with long-term current use of insulin - controlled on current regimen. Dec toujeo to 15 units night before surgery and hold metformin day of surgery.     Hypertension associated with diabetes - Stable and controlled. Continue current medications.    Hyperlipidemia associated with type 2 diabetes mellitus - cont statin.       Follow up if symptoms worsen or fail to improve.      Sariah Evans MD  Department of Internal Medicine - [facility]  12:22 PM

## 2020-08-06 ENCOUNTER — TELEPHONE (OUTPATIENT)
Dept: OTOLARYNGOLOGY | Facility: CLINIC | Age: 70
End: 2020-08-06

## 2020-08-06 ENCOUNTER — HOSPITAL ENCOUNTER (OUTPATIENT)
Dept: RADIOLOGY | Facility: HOSPITAL | Age: 70
Discharge: HOME | End: 2020-08-06
Attending: OTOLARYNGOLOGY
Payer: MEDICARE

## 2020-08-06 DIAGNOSIS — H93.A1 PULSATILE TINNITUS, RIGHT EAR: ICD-10-CM

## 2020-08-06 LAB
CREAT SERPL-MCNC: 0.7 MG/DL (ref 0.5–1.4)
SAMPLE: NORMAL

## 2020-08-06 PROCEDURE — 70496 CT ANGIOGRAPHY HEAD: CPT | Mod: 26,,, | Performed by: RADIOLOGY

## 2020-08-06 PROCEDURE — 70496 CT ANGIOGRAPHY HEAD: CPT | Mod: TC

## 2020-08-06 PROCEDURE — 25500020 PHARM REV CODE 255: Performed by: OTOLARYNGOLOGY

## 2020-08-06 PROCEDURE — 70498 CTA HEAD AND NECK (XPD): ICD-10-PCS | Mod: 26,,, | Performed by: RADIOLOGY

## 2020-08-06 PROCEDURE — 70496 CTA HEAD AND NECK (XPD): ICD-10-PCS | Mod: 26,,, | Performed by: RADIOLOGY

## 2020-08-06 PROCEDURE — 70498 CT ANGIOGRAPHY NECK: CPT | Mod: 26,,, | Performed by: RADIOLOGY

## 2020-08-06 RX ADMIN — IOHEXOL 100 ML: 350 INJECTION, SOLUTION INTRAVENOUS at 11:08

## 2020-08-07 ENCOUNTER — TELEPHONE (OUTPATIENT)
Dept: OTOLARYNGOLOGY | Facility: CLINIC | Age: 70
End: 2020-08-07

## 2020-08-07 NOTE — TELEPHONE ENCOUNTER
----- Message from Savannah Rush MD sent at 8/7/2020  2:23 PM CDT -----  Please call patient and notify of normal results of CTA of head and neck.

## 2020-08-10 ENCOUNTER — TELEPHONE (OUTPATIENT)
Dept: OTOLARYNGOLOGY | Facility: CLINIC | Age: 70
End: 2020-08-10

## 2020-08-10 NOTE — TELEPHONE ENCOUNTER
Spoke with patient she was notified as per Dr Mckeon CT of head and neck was normal. She verbalized understanding and thanked me kindly for calling

## 2020-08-12 ENCOUNTER — TELEPHONE (OUTPATIENT)
Dept: OTOLARYNGOLOGY | Facility: CLINIC | Age: 70
End: 2020-08-12

## 2020-08-12 DIAGNOSIS — Z01.812 PRE-PROCEDURE LAB EXAM: ICD-10-CM

## 2020-08-12 NOTE — TELEPHONE ENCOUNTER
Spoke with patient he/she was notified will need covid test 72hrs prior to their appointment with Dr Mckeon. patient states will go to [facility] on 8/14 . Notified patient if failure to perform this test will need to reschedule appointment with Dr Mckeon. Patient verbalized understanding

## 2020-08-17 ENCOUNTER — HOSPITAL ENCOUNTER (OUTPATIENT)
Dept: RADIOLOGY | Facility: HOSPITAL | Age: 70
Discharge: HOME | End: 2020-08-17
Attending: NURSE PRACTITIONER
Payer: MEDICARE

## 2020-08-17 ENCOUNTER — OFFICE VISIT (OUTPATIENT)
Dept: OTOLARYNGOLOGY | Facility: CLINIC | Age: 70
End: 2020-08-17
Payer: MEDICARE

## 2020-08-17 VITALS
HEIGHT: 66 IN | HEART RATE: 63 BPM | WEIGHT: 160.38 LBS | BODY MASS INDEX: 25.78 KG/M2 | SYSTOLIC BLOOD PRESSURE: 133 MMHG | DIASTOLIC BLOOD PRESSURE: 76 MMHG

## 2020-08-17 DIAGNOSIS — Z12.31 ENCOUNTER FOR SCREENING MAMMOGRAM FOR BREAST CANCER: ICD-10-CM

## 2020-08-17 DIAGNOSIS — J01.80 OTHER SUBACUTE SINUSITIS: ICD-10-CM

## 2020-08-17 DIAGNOSIS — H93.A1 PULSATILE TINNITUS, RIGHT EAR: Primary | ICD-10-CM

## 2020-08-17 DIAGNOSIS — H90.42 SENSORINEURAL HEARING LOSS (SNHL) OF LEFT EAR WITH UNRESTRICTED HEARING OF RIGHT EAR: ICD-10-CM

## 2020-08-17 PROCEDURE — 31231 NASAL ENDOSCOPY DX: CPT | Mod: PBBFAC,PN | Performed by: OTOLARYNGOLOGY

## 2020-08-17 PROCEDURE — 77063 BREAST TOMOSYNTHESIS BI: CPT | Mod: 26,,, | Performed by: RADIOLOGY

## 2020-08-17 PROCEDURE — 99213 PR OFFICE/OUTPT VISIT, EST, LEVL III, 20-29 MIN: ICD-10-PCS | Mod: 25,S$PBB,, | Performed by: OTOLARYNGOLOGY

## 2020-08-17 PROCEDURE — 77067 MAMMO DIGITAL SCREENING BILAT WITH TOMOSYNTHESIS_CAD: ICD-10-PCS | Mod: 26,,, | Performed by: RADIOLOGY

## 2020-08-17 PROCEDURE — 99999 PR PBB SHADOW E&M-EST. PATIENT-LVL V: CPT | Mod: PBBFAC,,, | Performed by: OTOLARYNGOLOGY

## 2020-08-17 PROCEDURE — 99215 OFFICE O/P EST HI 40 MIN: CPT | Mod: PBBFAC,PN,25 | Performed by: OTOLARYNGOLOGY

## 2020-08-17 PROCEDURE — 99213 OFFICE O/P EST LOW 20 MIN: CPT | Mod: 25,S$PBB,, | Performed by: OTOLARYNGOLOGY

## 2020-08-17 PROCEDURE — 77063 MAMMO DIGITAL SCREENING BILAT WITH TOMOSYNTHESIS_CAD: ICD-10-PCS | Mod: 26,,, | Performed by: RADIOLOGY

## 2020-08-17 PROCEDURE — 77067 SCR MAMMO BI INCL CAD: CPT | Mod: 26,,, | Performed by: RADIOLOGY

## 2020-08-17 PROCEDURE — 77067 SCR MAMMO BI INCL CAD: CPT | Mod: TC

## 2020-08-17 PROCEDURE — 31231 PR NASAL ENDOSCOPY, DX: ICD-10-PCS | Mod: S$PBB,,, | Performed by: OTOLARYNGOLOGY

## 2020-08-17 PROCEDURE — 31231 NASAL ENDOSCOPY DX: CPT | Mod: S$PBB,,, | Performed by: OTOLARYNGOLOGY

## 2020-08-17 PROCEDURE — 99999 PR PBB SHADOW E&M-EST. PATIENT-LVL V: ICD-10-PCS | Mod: PBBFAC,,, | Performed by: OTOLARYNGOLOGY

## 2020-08-17 RX ORDER — FLUTICASONE PROPIONATE 50 MCG
2 SPRAY, SUSPENSION (ML) NASAL DAILY
Qty: 48 G | Refills: 3 | Status: SHIPPED | OUTPATIENT
Start: 2020-08-17 | End: 2022-11-29

## 2020-08-17 RX ORDER — OFLOXACIN 3 MG/ML
SOLUTION/ DROPS OPHTHALMIC
Status: ON HOLD | COMMUNITY
Start: 2020-08-11 | End: 2020-12-28 | Stop reason: HOSPADM

## 2020-08-17 RX ORDER — LEVOCETIRIZINE DIHYDROCHLORIDE 5 MG/1
5 TABLET, FILM COATED ORAL NIGHTLY
Qty: 30 TABLET | Refills: 11 | Status: SHIPPED | OUTPATIENT
Start: 2020-08-17 | End: 2021-03-19

## 2020-08-17 NOTE — PROCEDURES
Procedures     PROCEDURE NOTE:  Nasal endoscopy   Preprocedure diagnosis:  Chronic sinusitis  Postprocedure diangosis:  Same  Complications:  None  Blood Loss:  None    Procedure in detail:  After verbal consent was obtained, the patient's nasal cavity was anesthesized using topical 1%lidocaine and Neosynepherine.  A rigid 0 degree endoscope was placed in first the right, then the left nasal cavity.  The inferior and middle turbinates were examined, and found to be normal bilaterally.  The middle meatus and maxillary antrum was also examined, and found to be normal bilaterally.  No purulent drainage or masses seen.  The patient tolerated the procedure well and there were no complications. Overall improved.

## 2020-08-17 NOTE — PROGRESS NOTES
Chief Complaint   Patient presents with    Follow-up     some improvement since last visit   .    HPI:     Gely Green is a 69 y.o. female who presents for evaluation of 2 week history of right facial pain/pressure.  She states it is gradually worsening.  She localizes it to the right periorbital region.  She states that it radiates to the temporal region and down the back of her neck.  The pressure is constant but the pain will come and go.  She notes increased nasal congestion.  Denies rhinorrhea or postnasal drip.  Reports bilateral aural fullness and notes a pulsatile tinnitus bilaterally.  She has seen Dr. Carissa Fu in March of 2019 for similar issue with her ears.  A CT scan of the temporal bones with contrast was performed which was within normal limits.  For the pain and pressure she has tried over-the-counter Claritin and and over-the-counter cold and Sinus perforation without relief.      Interval HPI 8/172020:  Follow up pulsatile tinnitus and sinusitis.  She states that the tinnitus is still present and feels that is more noticeable on the right side.  She notes it is unchanged since last visit.   She denies any changes in her hearing since her last visit.  She denies vertigo, otalgia, postauricular pain.    She reports since last visit she completed doxycyline course and prednisone taper for acute bacterial sinusitis.  She states that she feels nasal congestion and right-sided facial pain and pressure are somewhat improved. She continues on claritin and Flonase. She feels this is somewhat helpful.       Past Medical History:   Diagnosis Date    Arthritis     Chronic constipation     Depression     Diabetes mellitus     Diabetes mellitus     Hypertension     Osteopenia      Social History     Socioeconomic History    Marital status:      Spouse name: Not on file    Number of children: Not on file    Years of education: Not on file    Highest education level: Not on  "file   Occupational History    Not on file   Social Needs    Financial resource strain: Not hard at all    Food insecurity     Worry: Never true     Inability: Never true    Transportation needs     Medical: No     Non-medical: No   Tobacco Use    Smoking status: Former Smoker     Packs/day: 1.50     Years: 25.00     Pack years: 37.50     Types: Cigarettes     Quit date:      Years since quittin.6    Smokeless tobacco: Never Used   Substance and Sexual Activity    Alcohol use: No     Frequency: Never    Drug use: No    Sexual activity: Not Currently     Partners: Male     Birth control/protection: None   Lifestyle    Physical activity     Days per week: Not on file     Minutes per session: Not on file    Stress: Not on file   Relationships    Social connections     Talks on phone: More than three times a week     Gets together: Once a week     Attends [employer] service: More than 4 times per year     Active member of club or organization: Yes     Attends meetings of clubs or organizations: More than 4 times per year     Relationship status:    Other Topics Concern    Not on file   Social History Narrative    Not on file     Past Surgical History:   Procedure Laterality Date     SECTION      2x    COLONOSCOPY      COLONOSCOPY N/A 3/21/2019    Procedure: COLONOSCOPY;  Surgeon: Marshall Contreras MD;  Location: [facility] ([address] [place] [address]);  Service: Endoscopy;  Laterality: N/A;  pt states that she had to be resuscitated after receiving an epidural during childbirth "30 something" years ago.  Ok for 4th floor per Dr. Hernandez-MS    HYSTERECTOMY      full hyst      Family History   Problem Relation Age of Onset    Breast cancer Cousin     Heart attack Mother     Heart disease Mother     Alzheimer's disease Mother     Heart attack Father     Heart disease Father     Heart failure Father     Hypertension Father     Hyperlipidemia Sister     Hypertension Sister     Diabetes " Sister     Abnormal EKG Sister     Alcohol abuse Brother     No Known Problems Daughter     Ovarian cancer Neg Hx     Cervical cancer Neg Hx     Endometrial cancer Neg Hx     Vaginal cancer Neg Hx     Stroke Neg Hx     Colon cancer Neg Hx     Esophageal cancer Neg Hx     Vision loss Neg Hx            Review of Systems  General: negative for chills, fever or weight loss  Psychological: negative for mood changes or depression  Ophthalmic: negative for blurry vision, photophobia or eye pain  ENT: see HPI  Respiratory: no cough, shortness of breath, or wheezing  Cardiovascular: no chest pain or dyspnea on exertion  Gastrointestinal: no abdominal pain, change in bowel habits, or black/ bloody stools  Musculoskeletal: negative for gait disturbance or muscular weakness  Neurological: no syncope or seizures; no ataxia  Dermatological: negative for puritis,  rash and jaundice  Hematologic/lymphatic: no easy bruising, no new lumps or bumps      Physical Exam:    Vitals:    08/17/20 1017   BP: 133/76   Pulse: 63       Constitutional: Well appearing / communicating without difficutly.  NAD.  Eyes: EOM I Bilaterally  Head/Face: Normocephalic.  Negative paranasal sinus pressure/tenderness.  Salivary glands WNL.  House Brackmann I Bilaterally.    Right Ear: Auricle normal appearance. External Auditory Canal within normal limits,TM w/o masses/lesions/perforations. TM mobility noted.   Left Ear: Auricle normal appearance. External Auditory Canal WNL,TM w/o masses/lesions/perforations. TM mobility noted.  Nose: No gross nasal septal deviation. Inferior Turbinates 3+ bilaterally. No septal perforation. No masses/lesions. External nasal skin appears normal without masses/lesions.+ mucopurulence bilateral inferior meatus.   Oral Cavity: Gingiva/lips within normal limits.  Dentition/gingiva healthy appearing. Mucus membranes moist. Floor of mouth soft, no masses palpated. Oral Tongue mobile. Hard Palate appears normal.     Oropharynx: Base of tongue appears normal. No masses/lesions noted. Tonsillar fossa/pharyngeal wall without lesions. Posterior oropharynx WNL.  Soft palate without masses. Midline uvula.   Neck/Lymphatic: No LAD I-VI bilaterally.  No thyromegaly.  No masses noted on exam.    Mirror laryngoscopy/nasopharyngoscopy: Active gag reflex.  Unable to perform.    Neuro/Psychiatric: AOx3.  Normal mood and affect.   Cardiovascular: Normal carotid pulses bilaterally, no increasing jugular venous distention noted at cervical region bilaterally.    Respiratory: Normal respiratory effort, no stridor, no retractions noted.    See separate procedure note for nasal endoscopy.     Diagnostic studies:   CT temporal bones 3/12/2020: Bilateral temporal bone appears within normal limits.  No significant abnormality is identified.    CTA neck 8/6/2020:  CTA of the head neck shows no significant abnormality to correlate with patient's symptoms of tinnitus.  There is no evidence of high-grade stenosis, occlusion, aneurysm or vascular malformation.  Noted: mild mucosal thickening of the left maxillary sinus and ethmoid sinuses.     Assessment:    ICD-10-CM ICD-9-CM    1. Pulsatile tinnitus, right ear  H93.A1 388.30    2. Other subacute sinusitis  J01.80 461.8    3. Sensorineural hearing loss (SNHL) of left ear with unrestricted hearing of right ear  H90.42 389.15      The primary encounter diagnosis was Pulsatile tinnitus, right ear. Diagnoses of Other subacute sinusitis and Sensorineural hearing loss (SNHL) of left ear with unrestricted hearing of right ear were also pertinent to this visit.      Plan:  No orders of the defined types were placed in this encounter.    Reassurance provided regarding normal hearing and normal CTA. Tinnitus management strategies discussed.   Continue Nasal saline rinses b.i.d.  Continue  Flonase 2 sprays per nostril daily  Continue Xyzal 5 mg p.o. q.h.s.    Follow-up in 1 year with audiogram    Savannah Mckeon  MD Vicot[facility] Manuel

## 2020-08-17 NOTE — PATIENT INSTRUCTIONS
WHAT IS TINNITUS?  The perception of sound heard in one or both sides of the head. Some refer to it as a ringing, noise, buzzing, roaring, clicking, wooshing, crickets, heartbeat, music, or other noises. There are varying levels of severity. The tinnitus may be constant or periodic. Common complaints include difficulty sleeping, struggling to understand other's speech, depression, and problems focusing.  Tinnitus is a symptom, not a disease. It affects 10-15% of adults in the [place].    WHAT CAUSES TINNITUS?  There are 2 types of tinnitus: Primary and Secondary. Primary tinnitus has an unknown cause. There may be a relationship to primary tinnitus and hearing loss. Secondary tinnitus has a cause which could be impacted cerumen (wax), or diseases or pressure behind the eardrum. It could also be related to Meniere's disease.   There are several likely sources, all of which are known to trigger or worsen tinnitus such as noise exposure, head/neck trauma, ototoxic drugs ([email or web address]), tumors, blood vessel problems, cardiovascular disease or jaw misalignment.     WHAT CAN YOU DO?  You should seek medical care if you suspect you have tinnitus and tell your physician if your symptoms are affecting your daily life. Tinnitus may cause anxiety, depression, insomnia, negative emotions, and withdrawal. It's okay to seek help as your symptoms may be managed, and common.    HOW IS TINNITUS DIAGNOSED?  A doctor can diagnose tinnitus after a physical examination and interview. The examination may rule out conditions causing the tinnitus such as wax impaction or fluid behind the eardrum. Tinnitus may also be related to hearing loss, especially hearing loss from frequent noise exposure. A hearing test may be performed if you are experiencing tinnitus.    TREATMENT FOR TINNITUS?  Treatment may improve on its own but if it doesn't there are several ways to manage your symptoms although there is no cure. Possible treatment  options include amplification (hearing aids), sound therapy (maskers,white noise,etc.), TMJ treatment, cognitive behavioral therapy, relaxation exercises, and managing your medications.  There is not enough evidence to suggest that over the counter products help patients with tinnitus. Acupuncture can neither be recommended or discouraged due to lack of evidence as well.    Source: American Academy of Otolaryngology-Head And Neck Surgery

## 2020-08-19 ENCOUNTER — PATIENT MESSAGE (OUTPATIENT)
Dept: UROGYNECOLOGY | Facility: CLINIC | Age: 70
End: 2020-08-19

## 2020-08-25 DIAGNOSIS — E11.9 CONTROLLED TYPE 2 DIABETES MELLITUS WITHOUT COMPLICATION, WITH LONG-TERM CURRENT USE OF INSULIN: Primary | ICD-10-CM

## 2020-08-25 DIAGNOSIS — Z79.4 CONTROLLED TYPE 2 DIABETES MELLITUS WITHOUT COMPLICATION, WITH LONG-TERM CURRENT USE OF INSULIN: Primary | ICD-10-CM

## 2020-09-01 ENCOUNTER — PES CALL (OUTPATIENT)
Dept: ADMINISTRATIVE | Facility: CLINIC | Age: 70
End: 2020-09-01

## 2020-09-10 ENCOUNTER — OFFICE VISIT (OUTPATIENT)
Dept: PSYCHIATRY | Facility: CLINIC | Age: 70
End: 2020-09-10
Payer: MEDICARE

## 2020-09-10 DIAGNOSIS — F32.A DEPRESSION, UNSPECIFIED DEPRESSION TYPE: Primary | ICD-10-CM

## 2020-09-10 PROCEDURE — 90832 PR PSYCHOTHERAPY W/PATIENT, 30 MIN: ICD-10-PCS | Mod: ,,, | Performed by: SOCIAL WORKER

## 2020-09-10 PROCEDURE — 90832 PSYTX W PT 30 MINUTES: CPT | Mod: ,,, | Performed by: SOCIAL WORKER

## 2020-09-10 PROCEDURE — 99999 PR PBB SHADOW E&M-EST. PATIENT-LVL I: ICD-10-PCS | Mod: PBBFAC,,, | Performed by: SOCIAL WORKER

## 2020-09-10 PROCEDURE — 99999 PR PBB SHADOW E&M-EST. PATIENT-LVL I: CPT | Mod: PBBFAC,,, | Performed by: SOCIAL WORKER

## 2020-09-10 PROCEDURE — 99211 OFF/OP EST MAY X REQ PHY/QHP: CPT | Mod: PBBFAC | Performed by: SOCIAL WORKER

## 2020-09-10 NOTE — PROGRESS NOTES
Individual Psychotherapy (PhD/LCSW)    9/10/2020    Site:  [facility]         Therapeutic Intervention: Met with patient.  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 The client will take care of herself and have balance, marriage is not very good and the client will deal with that later she says.    Treatment plan:  · Target symptoms: depression, anxiety , adjustment, grief  · Why chosen therapy is appropriate versus another modality: relevant to diagnosis, patient responds to this modality, evidence based practice  · Outcome monitoring methods: self-report, observation  · Therapeutic intervention type: insight oriented psychotherapy, behavior modifying psychotherapy, supportive psychotherapy    Risk parameters:  Patient reports no suicidal ideation  Patient reports no homicidal ideation  Patient reports no self-injurious behavior  Patient reports no violent behavior    Verbal deficits: None    Patient's response to intervention:  The patient's response to intervention is accepting, motivated.    Progress toward goals and other mental status changes:  The patient's progress toward goals is fair .    Diagnosis:     ICD-10-CM ICD-9-CM   1. Depression, unspecified depression type  F32.9 311        Plan:  individual psychotherapy    Return to clinic: 3 weeks    Length of Service (minutes): 30

## 2020-09-17 ENCOUNTER — IMMUNIZATION (OUTPATIENT)
Dept: PHARMACY | Facility: CLINIC | Age: 70
End: 2020-09-17
Payer: MEDICARE

## 2020-09-21 ENCOUNTER — PES CALL (OUTPATIENT)
Dept: ADMINISTRATIVE | Facility: CLINIC | Age: 70
End: 2020-09-21

## 2020-09-22 DIAGNOSIS — Z79.4 TYPE 2 DIABETES MELLITUS WITHOUT COMPLICATION, WITH LONG-TERM CURRENT USE OF INSULIN: Primary | ICD-10-CM

## 2020-09-22 DIAGNOSIS — E11.9 TYPE 2 DIABETES MELLITUS WITHOUT COMPLICATION, WITH LONG-TERM CURRENT USE OF INSULIN: Primary | ICD-10-CM

## 2020-09-24 ENCOUNTER — OFFICE VISIT (OUTPATIENT)
Dept: INTERNAL MEDICINE | Facility: CLINIC | Age: 70
End: 2020-09-24
Payer: MEDICARE

## 2020-09-24 DIAGNOSIS — F43.23 ADJUSTMENT DISORDER WITH MIXED ANXIETY AND DEPRESSED MOOD: ICD-10-CM

## 2020-09-24 DIAGNOSIS — G89.29 CHRONIC PAIN OF LEFT KNEE: ICD-10-CM

## 2020-09-24 DIAGNOSIS — R39.89 BLADDER PAIN: ICD-10-CM

## 2020-09-24 DIAGNOSIS — N89.8 VAGINAL DISCHARGE: ICD-10-CM

## 2020-09-24 DIAGNOSIS — E78.2 MIXED HYPERLIPIDEMIA: ICD-10-CM

## 2020-09-24 DIAGNOSIS — Z00.00 ENCOUNTER FOR PREVENTIVE HEALTH EXAMINATION: Primary | ICD-10-CM

## 2020-09-24 DIAGNOSIS — E11.42 DIABETIC PERIPHERAL NEUROPATHY: ICD-10-CM

## 2020-09-24 DIAGNOSIS — M85.80 OSTEOPENIA, UNSPECIFIED LOCATION: ICD-10-CM

## 2020-09-24 DIAGNOSIS — F33.0 MILD EPISODE OF RECURRENT MAJOR DEPRESSIVE DISORDER: ICD-10-CM

## 2020-09-24 DIAGNOSIS — R29.898 WEAKNESS OF BOTH LOWER EXTREMITIES: ICD-10-CM

## 2020-09-24 DIAGNOSIS — Z79.4 TYPE 2 DIABETES MELLITUS WITHOUT COMPLICATION, WITH LONG-TERM CURRENT USE OF INSULIN: ICD-10-CM

## 2020-09-24 DIAGNOSIS — I49.3 PVC'S (PREMATURE VENTRICULAR CONTRACTIONS): ICD-10-CM

## 2020-09-24 DIAGNOSIS — R41.3 MEMORY LOSS: ICD-10-CM

## 2020-09-24 DIAGNOSIS — F41.9 ANXIETY: ICD-10-CM

## 2020-09-24 DIAGNOSIS — M25.562 CHRONIC PAIN OF LEFT KNEE: ICD-10-CM

## 2020-09-24 DIAGNOSIS — N30.10 INTERSTITIAL CYSTITIS: ICD-10-CM

## 2020-09-24 DIAGNOSIS — G50.8 TRIGEMINAL NEURITIS: ICD-10-CM

## 2020-09-24 DIAGNOSIS — F43.21 GRIEF: ICD-10-CM

## 2020-09-24 DIAGNOSIS — R29.898 DECREASED RANGE OF MOTION OF NECK: ICD-10-CM

## 2020-09-24 DIAGNOSIS — E01.0 THYROMEGALY: ICD-10-CM

## 2020-09-24 DIAGNOSIS — I10 ESSENTIAL HYPERTENSION: ICD-10-CM

## 2020-09-24 DIAGNOSIS — E11.9 TYPE 2 DIABETES MELLITUS WITHOUT COMPLICATION, WITH LONG-TERM CURRENT USE OF INSULIN: ICD-10-CM

## 2020-09-24 DIAGNOSIS — R00.2 POUNDING HEARTBEAT: ICD-10-CM

## 2020-09-24 DIAGNOSIS — N95.2 ATROPHIC VAGINITIS: ICD-10-CM

## 2020-09-24 PROCEDURE — 99999 PR PBB SHADOW E&M-EST. PATIENT-LVL II: ICD-10-PCS | Mod: PBBFAC,,, | Performed by: NURSE PRACTITIONER

## 2020-09-24 PROCEDURE — 99212 OFFICE O/P EST SF 10 MIN: CPT | Mod: PBBFAC | Performed by: NURSE PRACTITIONER

## 2020-09-24 PROCEDURE — G0439 PPPS, SUBSEQ VISIT: HCPCS | Mod: 95,,, | Performed by: NURSE PRACTITIONER

## 2020-09-24 PROCEDURE — G0439 PR MEDICARE ANNUAL WELLNESS SUBSEQUENT VISIT: ICD-10-PCS | Mod: 95,,, | Performed by: NURSE PRACTITIONER

## 2020-09-24 PROCEDURE — 99999 PR PBB SHADOW E&M-EST. PATIENT-LVL II: CPT | Mod: PBBFAC,,, | Performed by: NURSE PRACTITIONER

## 2020-09-25 PROBLEM — F33.9 RECURRENT MAJOR DEPRESSIVE DISORDER: Status: ACTIVE | Noted: 2019-08-20

## 2020-09-25 NOTE — PROGRESS NOTES
The patient location is: Home  The chief complaint leading to consultation is: AWV    Visit type: audiovisual    Face to Face time with patient: Yes  35 minutes of total time spent on the encounter, which includes face to face time and non-face to face time preparing to see the patient (eg, review of tests), Obtaining and/or reviewing separately obtained history, Documenting clinical information in the electronic or other health record, Independently interpreting results (not separately reported) and communicating results to the patient/family/caregiver, or Care coordination (not separately reported).         Each patient to whom he or she provides medical services by telemedicine is:  (1) informed of the relationship between the physician and patient and the respective role of any other health care provider with respect to management of the patient; and (2) notified that he or she may decline to receive medical services by telemedicine and may withdraw from such care at any time.    Notes: At home BP=146/80      Gely Green presented for a  Medicare AWV and comprehensive Health Risk Assessment today. The following components were reviewed and updated:    · Medical history  · Family History  · Social history  · Allergies and Current Medications  · Health Risk Assessment  · Health Maintenance  · Care Team     ** See Completed Assessments for Annual Wellness Visit within the encounter summary.**         The following assessments were completed:  · Living Situation  · CAGE  · Depression Screening  · Fall Risk Assessment (MACH 10)  · Hearing Assessment(HHI)  · Cognitive Function Screening  · Nutrition Screening  · ADL Screening  · PAQ Screening      Physical Exam  Constitutional: She is oriented to person, place, and time and well-developed, well-nourished, and in no distress. No distress.   HENT:   Head: Normocephalic and atraumatic.   Eyes: No scleral icterus.   Pulmonary/Chest: Effort normal. No respiratory  distress.   Neurological: She is alert and oriented to person, place, and time.   Skin: She is not diaphoretic.   Psychiatric: Mood and affect normal.           Diagnoses and health risks identified today and associated recommendations/orders:    1. Encounter for preventive health examination  Annual Health Risk Assessment (HRA) visit today.  Counseling and referral of health maintenance and preventative health measures performed.  Patient given annual wellness paperwork to take home.  Encouraged to return in 1 year for subsequent HRA visit.     2. Mild episode of recurrent major depressive disorder  Chronic. Stable. She denies suicidal or homicidal ideation. Continue current treatment plan as previously prescribed by PCP.    3. Adjustment disorder with mixed anxiety and depressed mood  Chronic. Stable. Continue current treatment plan as previously prescribed by PCP.    4. Diabetic peripheral neuropathy  Chronic. Stable. Continue current treatment plan as previously prescribed by PCP.    5. Anxiety  Chronic. Stable. Continue current treatment plan as previously prescribed by PCP.    6. Grief  Chronic. Stable. Continue current treatment plan as previously prescribed by PCP.    7. Trigeminal neuritis  Chronic. Stable. Continue current treatment plan as previously prescribed by PCP.    8. Memory loss  Chronic. Stable. Continue current treatment plan as previously prescribed by PCP.    9. Essential hypertension  Chronic. Stable. Uncontrolled. Encouraged to increase exercise as tolerated (moderate-intensity aerobic activity and muscle-strengthening activities) improve diet to heart healthy, low sodium diet. Continue current treatment plan as previously prescribed by PCP.    10. Mixed hyperlipidemia  Chronic. Stable. Continue current treatment plan as previously prescribed by PCP.    11. Pounding heartbeat  Chronic. Stable. Continue current treatment plan as previously prescribed by PCP.    12. PVC's (premature  ventricular contractions)  Chronic. Stable. Continue current treatment plan as previously prescribed by PCP.    13. Atrophic vaginitis  Chronic. Stable. Continue current treatment plan as previously prescribed by PCP.    14. Bladder pain  Chronic. Stable. Continue current treatment plan as previously prescribed by PCP.    15. Interstitial cystitis  Chronic. Stable. Continue current treatment plan as previously prescribed by PCP.    16. Vaginal discharge  Chronic. Stable. Continue current treatment plan as previously prescribed by PCP.    17. Type 2 diabetes mellitus without complication, with long-term current use of insulin  Chronic. Stable. Controlled. Last Hgb A1c=6.1 from 6/19/19. Continue current treatment plan as previously prescribed by PCP.    18. Thyromegaly  Chronic. Stable. Continue current treatment plan as previously prescribed by PCP.    19. Chronic pain of left knee  Chronic. Stable. Continue current treatment plan as previously prescribed by PCP.    20. Decreased range of motion of neck  Chronic. Stable. Continue current treatment plan as previously prescribed by PCP.    21. Osteopenia, unspecified location  Chronic. Stable. Continue current treatment plan as previously prescribed by PCP.    22. Weakness of both lower extremities  Chronic. Stable. Continue current treatment plan as previously prescribed by PCP.        Provided Gely with a 5-10 year written screening schedule and personal prevention plan. Recommendations were developed using the USPSTF age appropriate recommendations. Education, counseling, and referrals were provided as needed. After Visit Summary printed and given to patient which includes a list of additional screenings\tests needed.    Follow up in about 1 year (around 9/24/2021).    Isaiah Jain NP    I offered to discuss end of life issues, including information on how to make advance directives that the patient could use to name someone who would make medical decisions  on their behalf if they became too ill to make themselves.    ___Patient declined  _X_Patient is interested, I provided paper work and offered to discuss.

## 2020-09-25 NOTE — PATIENT INSTRUCTIONS
Counseling and Referral of Other Preventative  (Italic type indicates deductible and co-insurance are waived)    Patient Name: Gely Green  Today's Date: 9/25/2020    Health Maintenance       Date Due Completion Date    Shingles Vaccine (3 of 3) 07/28/2020 6/2/2020    Lipid Panel 11/18/2020 11/18/2019    Hemoglobin A1c 12/02/2020 6/2/2020    Foot Exam 01/03/2021 1/3/2020    Override on 1/23/2019: Done    Override on 6/2/2017: Done    Eye Exam 03/11/2021 3/11/2020    Override on 8/15/2016: Done    Urine Microalbumin 06/02/2021 6/2/2020    Low Dose Statin 09/24/2021 9/24/2020    DEXA SCAN 06/19/2022 6/19/2019    Mammogram 08/17/2022 8/17/2020    Override on 5/1/2016: Done    TETANUS VACCINE 05/31/2026 5/31/2016    Colorectal Cancer Screening 03/21/2029 3/21/2019    Override on 5/6/2014: Done    Override on 4/1/2007: Done        No orders of the defined types were placed in this encounter.    The following information is provided to all patients.  This information is to help you find resources for any of the problems found today that may be affecting your health:                Living healthy guide: www.[facility].[place].gov      Understanding Diabetes: www.diabetes.org      Eating healthy: www.cdc.gov/healthyweight      CDC home safety checklist: www.cdc.gov/steadi/patient.html      Agency on Aging: www.goea.[place].[facility]      Alcoholics anonymous (AA): www.aa.org      Physical Activity: www.kiko.nih.gov/na6vpjr      Tobacco use: www.quitwithusla.org

## 2020-09-29 ENCOUNTER — PATIENT MESSAGE (OUTPATIENT)
Dept: OTHER | Facility: OTHER | Age: 70
End: 2020-09-29

## 2020-09-30 ENCOUNTER — OFFICE VISIT (OUTPATIENT)
Dept: PSYCHIATRY | Facility: CLINIC | Age: 70
End: 2020-09-30
Payer: MEDICARE

## 2020-09-30 ENCOUNTER — CLINICAL SUPPORT (OUTPATIENT)
Dept: DIABETES | Facility: CLINIC | Age: 70
End: 2020-09-30
Payer: MEDICARE

## 2020-09-30 DIAGNOSIS — Z79.4 TYPE 2 DIABETES MELLITUS WITHOUT COMPLICATION, WITH LONG-TERM CURRENT USE OF INSULIN: ICD-10-CM

## 2020-09-30 DIAGNOSIS — E11.9 TYPE 2 DIABETES MELLITUS WITHOUT COMPLICATION, WITH LONG-TERM CURRENT USE OF INSULIN: ICD-10-CM

## 2020-09-30 DIAGNOSIS — F32.A DEPRESSION, UNSPECIFIED DEPRESSION TYPE: Primary | ICD-10-CM

## 2020-09-30 PROCEDURE — 90832 PR PSYCHOTHERAPY W/PATIENT, 30 MIN: ICD-10-PCS | Mod: ,,, | Performed by: SOCIAL WORKER

## 2020-09-30 PROCEDURE — 90832 PSYTX W PT 30 MINUTES: CPT | Mod: ,,, | Performed by: SOCIAL WORKER

## 2020-09-30 PROCEDURE — 99999 PR PBB SHADOW E&M-EST. PATIENT-LVL II: ICD-10-PCS | Mod: PBBFAC,,,

## 2020-09-30 PROCEDURE — 99999 PR PBB SHADOW E&M-EST. PATIENT-LVL II: CPT | Mod: PBBFAC,,, | Performed by: SOCIAL WORKER

## 2020-09-30 PROCEDURE — 99212 OFFICE O/P EST SF 10 MIN: CPT | Mod: PBBFAC,27 | Performed by: DIETITIAN, REGISTERED

## 2020-09-30 PROCEDURE — G0108 DIAB MANAGE TRN  PER INDIV: HCPCS | Mod: PBBFAC | Performed by: DIETITIAN, REGISTERED

## 2020-09-30 PROCEDURE — 99212 OFFICE O/P EST SF 10 MIN: CPT | Mod: PBBFAC | Performed by: SOCIAL WORKER

## 2020-09-30 PROCEDURE — 99999 PR PBB SHADOW E&M-EST. PATIENT-LVL II: CPT | Mod: PBBFAC,,,

## 2020-09-30 PROCEDURE — 99999 PR PBB SHADOW E&M-EST. PATIENT-LVL II: ICD-10-PCS | Mod: PBBFAC,,, | Performed by: SOCIAL WORKER

## 2020-09-30 NOTE — PROGRESS NOTES
Individual Psychotherapy (PhD/LCSW)    9/30/2020    Site:  [facility]         Therapeutic Intervention: Met with patient.  Outpatient - Insight oriented psychotherapy 30 min - CPT code 70279    Chief complaint/reason for encounter: depression, anxiety and interpersonal     Interval history and content of current session: discussed how to accept aging, marriage issue, family issue, memory, coping skills and how to do better, less stressed out compared to previous session and is having a little more time for herself, and how to improve, and take care of herself and do some things to feel better encouraged like activities, and things she enjoys, how to cope and be more assertive with  also addressed. Coping skills and acceptance issues, also focused on.    Treatment plan:  · Target symptoms: depression, anxiety , adjustment, grief  · Why chosen therapy is appropriate versus another modality: relevant to diagnosis, patient responds to this modality, evidence based practice  · Outcome monitoring methods: self-report, observation  · Therapeutic intervention type: insight oriented psychotherapy, behavior modifying psychotherapy, supportive psychotherapy    Risk parameters:  Patient reports no suicidal ideation  Patient reports no homicidal ideation  Patient reports no self-injurious behavior  Patient reports no violent behavior    Verbal deficits: None    Patient's response to intervention:  The patient's response to intervention is accepting, motivated.    Progress toward goals and other mental status changes:  The patient's progress toward goals is limited.    Diagnosis:     ICD-10-CM ICD-9-CM   1. Depression, unspecified depression type  F32.9 311       Plan:  individual psychotherapy, group psychotherapy, consult psychiatrist for medication evaluation and medication management by physician    Return to clinic: 2 weeks    Length of Service (minutes): 30

## 2020-09-30 NOTE — Clinical Note
Lesly Dallas,  I saw Ms. Green for diabetes education today - still having a good bit of hypoglycemia - I think her toujeo dose is still dosed for her higher weight (she's lost about 10-15 # over the last few months). One of the endo NPs recommended she decrease to 25 units nightly (from 32) and up her metformin to 1000 mg BID. Just wanted to keep you updated!    Thanks,  Ene, JASMINE

## 2020-09-30 NOTE — PROGRESS NOTES
"Diabetes Education  Author: Ene Shanks RD, CDE  Date: 9/30/2020    Diabetes Care Management Summary  Diabetes Education Record Progress: Comprehensive    Current Diabetes Risk Level: Low     Last A1c:   Lab Results   Component Value Date    HGBA1C 6.1 (H) 06/02/2020     Last Visit with Diabetes Educator:  6//30/2020    Diabetes Type : Type II  Diabetes Diagnosis: >10 years      Current Treatment: Insulin, Oral Medication  Toujeo 32 units nightly;   metformin 500 mg BID            Reviewed Problem List with Patient: Yes    Health Maintenance was reviewed today with patient. Discussed with patient importance of routine eye exams, foot exams/foot care, blood work (i.e.: A1c, microalbumin, and lipid), dental visits, yearly flu vaccine, and pneumonia vaccine as indicated by PCP. Patient verbalized understanding.     Health Maintenance Topics with due status: Not Due       Topic Last Completion Date    TETANUS VACCINE 05/31/2016    Colorectal Cancer Screening 03/21/2019    DEXA SCAN 06/19/2019    Lipid Panel 11/18/2019    Foot Exam 01/03/2020    Eye Exam 03/11/2020    Hemoglobin A1c 06/02/2020    Urine Microalbumin 06/02/2020    Mammogram 08/17/2020    Low Dose Statin 09/24/2020     Health Maintenance Due   Topic Date Due    Shingles Vaccine (3 of 3) 07/28/2020       Nutrition  Meal planning?: 2-3 meals daily, + snacks        Monitoring   Self Monitoring : SMBG 3-5 times daily  Blood Glucose Logs: (oral report; range ; occasional >200 if she "eats bad;" also having a good number of hypos)    Do you use a personal continuous glucose monitor?: No    In the last month, how often have you had a low blood sugar reaction?: more than once a week  (hypos 50s - 70s several times weekly)  What are your symptoms of low blood sugar?: weakness  How do you treat low blood sugar?: food, juice        Exercise   Exercise Type: walking  Frequency: Daily        Social History  Preferred Learning Method: Face to Face, " Demonstration, Hands On, Reading Materials  Primary Support: Self, Family  Smoking Status: Ex Smoker  Barriers to Change: None  Learning Challenges : None  Readiness to Learn : Acceptance  Cultural Influences: No        Diabetes Education Assessment/Progress  Diabetes Disease Process (diabetes disease process and treatment options): Discussion, Instructed, Individual Session, Written Materials Provided, Comprehends Key Points       Reviewed disease process and general progression. Having problems recently with hypoglycemia - discussed likely 2/2 to >10# weight loss over the last few months.     Nutrition (Incorporating nutritional management into one's lifestyle): Discussion, Instructed, Individual Session, Written Materials Provided, Comprehends Key Points       Reviewed ADA dietary recommendations. Discussed need to limit snacking as able. Discussed multiple meal plans and snack ideas amenable to pt.     Physical Activity (incorporating physical activity into one's lifestyle): Discussion, Instructed, Individual Session, Written Materials Provided, Comprehends Key Points       Discussed goals and benefits of regular PA.    Medications (states correct name, dose, onset, peak, duration, side effects & timing of meds): Discussion, Instructed, Individual Session, Written Materials Provided, Comprehends Key Points       Reviewed need for rotation of injection sites, appropriate insulin storage, and insulin pen use. Emphasized need to take Toujeo at same time daily. Discussed BG history with endo NP Rochelle Morris - based on wt loss, labs, and recent BGs, NP recommended to decrease Toujeo to 25 units daily and increase metformin to 1000 mg BID. Can slowly titrate metformin to prevent GI s/e. Instructed to add 1 tab to evening dose (500 mg AM, 1000 mg PM) for a few weeks and if BGs remain elevated can add 2nd tab in AM.     Monitoring (monitoring blood glucose/other parameters & using results): Discussion, Instructed,  Individual Session, Written Materials Provided, Comprehends Key Points       Discussed goal BGs for different times of day and in relation to meals.     Acute Complications (preventing, detecting, and treating acute complications): Discussion, Instructed, Individual Session, Written Materials Provided, Comprehends Key Points       Discussed hypoglycemia vs hyperglycemia symptoms and discussed appropriate treatments for each. Reviewed that pt is at risk of hypo with current medication regimen. Discussed general vs severe hyperglycemia and risk of DKA.    Diabetes Distress and Support Systems: Individual Session       No distress noted.         Goals  Patient has selected/evaluated goals during today's session: Yes, selected    Healthy Eating: In Progress  (Eat lunch at least 3 times per week)  Start Date: 06/30/20  Target Date: 12/31/20    Medications: In Progress  (Take 30 units of Toujeo at 9 pm every night) - dose modified today  Start Date: 06/30/20  Target Date: 12/31/20         Diabetes Care Plan/Intervention  Education Plan/Intervention: Individual Follow-Up DSMT  (annual f/u or sooner as needed)        Diabetes Meal Plan  Restrictions: Restricted Carbohydrate          Today's Self-Management Care Plan was developed with the patient's input and is based on barriers identified during today's assessment.    The long and short-term goals in the care plan were written with the patient/caregiver's input. The patient has agreed to work toward these goals to improve her overall diabetes control.      The patient received a copy of today's self-management plan and verbalized understanding of the care plan, goals, and all of today's instructions.      The patient was encouraged to communicate with her physician and care team regarding her condition(s) and treatment.  I provided the patient with my contact information today and encouraged her to contact me via phone or patient portal as needed.           Education Units  of Time   Time Spent: 45 min

## 2020-10-01 ENCOUNTER — OFFICE VISIT (OUTPATIENT)
Dept: PODIATRY | Facility: CLINIC | Age: 70
End: 2020-10-01
Payer: MEDICARE

## 2020-10-01 VITALS
WEIGHT: 160.25 LBS | DIASTOLIC BLOOD PRESSURE: 71 MMHG | SYSTOLIC BLOOD PRESSURE: 140 MMHG | BODY MASS INDEX: 25.75 KG/M2 | HEART RATE: 63 BPM | HEIGHT: 66 IN

## 2020-10-01 DIAGNOSIS — B35.1 DERMATOPHYTOSIS OF NAIL: ICD-10-CM

## 2020-10-01 DIAGNOSIS — E11.49 TYPE II DIABETES MELLITUS WITH NEUROLOGICAL MANIFESTATIONS: ICD-10-CM

## 2020-10-01 DIAGNOSIS — B35.3 TINEA PEDIS OF BOTH FEET: ICD-10-CM

## 2020-10-01 DIAGNOSIS — E11.9 ENCOUNTER FOR DIABETIC FOOT EXAM: Primary | ICD-10-CM

## 2020-10-01 PROCEDURE — 99214 PR OFFICE/OUTPT VISIT, EST, LEVL IV, 30-39 MIN: ICD-10-PCS | Mod: S$PBB,,, | Performed by: PODIATRIST

## 2020-10-01 PROCEDURE — 99999 PR PBB SHADOW E&M-EST. PATIENT-LVL V: ICD-10-PCS | Mod: PBBFAC,,, | Performed by: PODIATRIST

## 2020-10-01 PROCEDURE — 99214 OFFICE O/P EST MOD 30 MIN: CPT | Mod: S$PBB,,, | Performed by: PODIATRIST

## 2020-10-01 PROCEDURE — 99999 PR PBB SHADOW E&M-EST. PATIENT-LVL V: CPT | Mod: PBBFAC,,, | Performed by: PODIATRIST

## 2020-10-01 PROCEDURE — 99215 OFFICE O/P EST HI 40 MIN: CPT | Mod: PBBFAC,PN | Performed by: PODIATRIST

## 2020-10-01 NOTE — PROGRESS NOTES
"  Chief Complaint   Patient presents with    Diabetic Foot Exam     annual              HPI:   The patient is a 69 y.o.  female  who presents for a diabetic foot exam.     Patient reports no presence of abnormal sensation to the feet .    History of diabetic foot ulcers: none   History of foot surgery: none.     Shoes worn today:  Slip on  She does complain of fungal toenails.  Also dry skin on the bottom her feet.      Primary care doctor is: Ysoeph Dallas MD  Chief Complaint   Patient presents with    Diabetic Foot Exam     annual          Past Medical History:   Diagnosis Date    Arthritis     Chronic constipation     Depression     Diabetes mellitus     Diabetes mellitus     Hypertension     Osteopenia            Current Outpatient Medications on File Prior to Visit   Medication Sig Dispense Refill    ACCU-CHEK SMARTVIEW TEST STRIP Strp ACCU-CHEK SMARTVIEW TEST STRIP Strp, 120 strip 11    amLODIPine (NORVASC) 5 MG tablet TAKE 1 TABLET(5 MG) BY MOUTH EVERY DAY 90 tablet 3    aspirin (ECOTRIN) 81 MG EC tablet Take 81 mg by mouth once daily.      azelastine (ASTELIN) 137 mcg (0.1 %) nasal spray 1 spray (137 mcg total) by Nasal route 2 (two) times daily. 90 mL 3    BD ULTRA-FINE SAMUEL PEN NEEDLE 32 gauge x 5/32" Ndle USE WITH INSULIN EVERY EVENING 100 each 11    blood sugar diagnostic Strp 1 strip by Misc.(Non-Drug; Combo Route) route 3 (three) times daily as needed. Lifescan test strips 120 each 4    blood sugar diagnostic Strp 1 strip by Misc.(Non-Drug; Combo Route) route 3 (three) times daily. 100 strip 11    blood sugar diagnostic Strp Pt to check up to 6 times daily 200 strip 11    blood sugar diagnostic Strp 200 each by Misc.(Non-Drug; Combo Route) route 3 (three) times daily. 200 each 3    calcium carbonate (OS-REDD) 600 mg calcium (1,500 mg) Tab Take 600 mg by mouth 2 (two) times daily with meals.      diabetic supplies, [employer]. Kit 1 application by Misc.(Non-Drug; Combo Route) " "route once daily. MECLUBcan glucose meter 1 kit 0    dicyclomine (BENTYL) 10 MG capsule Take 10 mg by mouth 4 (four) times daily before meals and nightly.      flavoxATE (URISPAS) 100 mg Tab Take 1 tablet (100 mg total) by mouth 3 (three) times daily as needed. 30 tablet 11    fluticasone propionate (FLONASE) 50 mcg/actuation nasal spray 2 sprays (100 mcg total) by Each Nostril route once daily. 48 g 3    Lactobacillus rhamnosus GG (CULTURELLE) 10 billion cell capsule Take 1 capsule by mouth once daily.      lancets (ONETOUCH DELICA LANCETS) 33 gauge Misc 1 lancet by Misc.(Non-Drug; Combo Route) route 3 (three) times daily. 100 each 11    levocetirizine (XYZAL) 5 MG tablet Take 1 tablet (5 mg total) by mouth every evening. 30 tablet 11    metFORMIN (GLUCOPHAGE-XR) 500 MG XR 24hr tablet Take 1 tablet (500 mg total) by mouth 2 (two) times daily. 180 tablet 4    NOVOFINE 32 32 gauge x 1/4" Ndle use subcutaneously every evening 100 each 11    ofloxacin (OCUFLOX) 0.3 % ophthalmic solution       ONETOUCH ULTRA BLUE TEST STRIP Strp TEST THREE TIMES DAILY 100 strip 11    pen needle, diabetic (NOVOFINE PLUS) 32 gauge x 1/6" Ndle Inject 1 application into the skin every evening. 100 each 11    predniSONE (DELTASONE) 20 MG tablet Take 3 tab in am x 3d, then 2 tab in am x 3d, then 1 tab in am x 3d, then 1/2 tab in am x 3d 21 tablet 0    rosuvastatin (CRESTOR) 10 MG tablet TAKE 1 TABLET BY MOUTH EVERY DAY 90 tablet 0    TOUJEO SOLOSTAR U-300 INSULIN 300 unit/mL (1.5 mL) InPn pen INJECT 33 UNITS UNDER THE SKIN EVERY EVENING 18 mL 3    vitamin D 1000 units Tab Take 1,000 Units by mouth once daily.       No current facility-administered medications on file prior to visit.            Review of patient's allergies indicates:  No Known Allergies        Social History     Socioeconomic History    Marital status:      Spouse name: Not on file    Number of children: Not on file    Years of education: Not on file "    Highest education level: Not on file   Occupational History    Not on file   Social Needs    Financial resource strain: Not hard at all    Food insecurity     Worry: Never true     Inability: Never true    Transportation needs     Medical: No     Non-medical: No   Tobacco Use    Smoking status: Former Smoker     Packs/day: 1.50     Years: 25.00     Pack years: 37.50     Types: Cigarettes     Quit date:      Years since quittin.7    Smokeless tobacco: Never Used   Substance and Sexual Activity    Alcohol use: No     Frequency: Never    Drug use: No    Sexual activity: Not Currently     Partners: Male     Birth control/protection: None   Lifestyle    Physical activity     Days per week: 3 days     Minutes per session: 120 min    Stress: To some extent   Relationships    Social connections     Talks on phone: More than three times a week     Gets together: Once a week     Attends Scientologist service: More than 4 times per year     Active member of club or organization: Yes     Attends meetings of clubs or organizations: More than 4 times per year     Relationship status:    Other Topics Concern    Not on file   Social History Narrative    Not on file           ROS:  General ROS: negative  Respiratory ROS: no cough, shortness of breath, or wheezing  Cardiovascular ROS: no chest pain or dyspnea on exertion  Musculoskeletal ROS: negative  Neurological ROS:   negative for - impaired coordination/balance or numbness/tingling  Dermatological ROS: negative      LAST HbA1c:   Hemoglobin A1C   Date Value Ref Range Status   2020 6.1 (H) 4.0 - 5.6 % Final     Comment:     ADA Screening Guidelines:  5.7-6.4%  Consistent with prediabetes  >or=6.5%  Consistent with diabetes  High levels of fetal hemoglobin interfere with the HbA1C  assay. Heterozygous hemoglobin variants (HbS, HgC, etc)do  not significantly interfere with this assay.   However, presence of multiple variants may affect  "accuracy.     11/18/2019 6.4 (H) 4.0 - 5.6 % Final     Comment:     ADA Screening Guidelines:  5.7-6.4%  Consistent with prediabetes  >or=6.5%  Consistent with diabetes  High levels of fetal hemoglobin interfere with the HbA1C  assay. Heterozygous hemoglobin variants (HbS, HgC, etc)do  not significantly interfere with this assay.   However, presence of multiple variants may affect accuracy.     06/19/2019 7.1 (H) 4.0 - 5.6 % Final     Comment:     ADA Screening Guidelines:  5.7-6.4%  Consistent with prediabetes  >or=6.5%  Consistent with diabetes  High levels of fetal hemoglobin interfere with the HbA1C  assay. Heterozygous hemoglobin variants (HbS, HgC, etc)do  not significantly interfere with this assay.   However, presence of multiple variants may affect accuracy.             EXAM:   Vitals:    10/01/20 1200   BP: (!) 140/71   Pulse: 63   Weight: 72.7 kg (160 lb 4.4 oz)   Height: 5' 6" (1.676 m)       General: alert, no distress, cooperative    Vascular:   Dorsalis Pedis:  present   Posterior Tibial:  present  Capillary refill time:  3 seconds  Temperature of toes cool to touch  Edema:  Trace and non-pitting       Neurological:     Sharp touch:  normal  Light touch: normal  Tinels Sign:  Absent  Mulders Click:   Absent  SWMF:  diminished      Dermatological:   Skin: Normal tone and turgor  Wounds/Ulcers:  Absent  Bruising:  Absent  Erythema:  Absent  Toenails x 10 are elongated by 1-4mm, thickened, without paronychia or discoloration      Musculoskeletal:   Metatarsophalangeal range of motion:   full range of motion  Subtalar joint range of motion: full range of motion  Ankle joint range of motion:  full range of motion  Bunions:  Absent  Hammertoes: Absent               ASSESSMENT/PLAN:          Problem List Items Addressed This Visit     None      Visit Diagnoses     Encounter for diabetic foot exam    -  Primary    Type II diabetes mellitus with neurological manifestations        Dermatophytosis of nail        " Tinea pedis of both feet                I counseled the patient on the patient's conditions, their implications and medical management.     Shoe inspection. Diabetic Foot Education. Patient reminded of the importance of good nutrition and blood sugar control to help prevent podiatric complications of diabetes. Patient instructed on proper foot hygiene. We discussed wearing proper shoe gear, daily foot inspections, never walking without protective shoe gear, never putting sharp instruments to feet.    Prescription for topical anti infective combo therapy.  Hydrocortisone, Iodoquinol, aloe gel.    Annual diabetes foot exam.                    Heide Gillis DPM  NPI: [id number]       [facility] - PODIATRY  [address] [place] [id number]  Dept: [phone]  Dept Fax: [phone]

## 2020-10-19 ENCOUNTER — PATIENT MESSAGE (OUTPATIENT)
Dept: INTERNAL MEDICINE | Facility: CLINIC | Age: 70
End: 2020-10-19

## 2020-10-19 DIAGNOSIS — M79.605 PAIN IN BOTH LOWER EXTREMITIES: Primary | ICD-10-CM

## 2020-10-19 DIAGNOSIS — M79.604 PAIN IN BOTH LOWER EXTREMITIES: Primary | ICD-10-CM

## 2020-10-27 ENCOUNTER — OFFICE VISIT (OUTPATIENT)
Dept: INTERNAL MEDICINE | Facility: CLINIC | Age: 70
End: 2020-10-27
Attending: INTERNAL MEDICINE
Payer: MEDICARE

## 2020-10-27 ENCOUNTER — PATIENT MESSAGE (OUTPATIENT)
Dept: ADMINISTRATIVE | Facility: OTHER | Age: 70
End: 2020-10-27

## 2020-10-27 DIAGNOSIS — M79.604 PAIN IN BOTH LOWER EXTREMITIES: Primary | ICD-10-CM

## 2020-10-27 DIAGNOSIS — Z79.4 TYPE 2 DIABETES MELLITUS WITH DIABETIC NEUROPATHY, WITH LONG-TERM CURRENT USE OF INSULIN: ICD-10-CM

## 2020-10-27 DIAGNOSIS — M79.605 PAIN IN BOTH LOWER EXTREMITIES: Primary | ICD-10-CM

## 2020-10-27 DIAGNOSIS — E78.2 MIXED HYPERLIPIDEMIA: ICD-10-CM

## 2020-10-27 DIAGNOSIS — E11.42 DIABETIC PERIPHERAL NEUROPATHY: ICD-10-CM

## 2020-10-27 DIAGNOSIS — E11.40 TYPE 2 DIABETES MELLITUS WITH DIABETIC NEUROPATHY, WITH LONG-TERM CURRENT USE OF INSULIN: ICD-10-CM

## 2020-10-27 DIAGNOSIS — I10 ESSENTIAL HYPERTENSION: ICD-10-CM

## 2020-10-27 PROCEDURE — 99213 PR OFFICE/OUTPT VISIT, EST, LEVL III, 20-29 MIN: ICD-10-PCS | Mod: 95,,, | Performed by: INTERNAL MEDICINE

## 2020-10-27 PROCEDURE — 99213 OFFICE O/P EST LOW 20 MIN: CPT | Mod: 95,,, | Performed by: INTERNAL MEDICINE

## 2020-10-27 NOTE — PROGRESS NOTES
Subjective:       Patient ID: Gely Green is a 69 y.o. female.    Chief Complaint: No chief complaint on file.    The patient location is: [place]   The chief complaint leading to consultation is: leg pain  Visit type: audiovisual  Total time spent with patient:  12 Minutes  visit performed on virtual visit due to current risk associated with COVID 19 pandemic  Each patient to whom he or she provides medical services by telemedicine is:  (1) informed of the relationship between the physician and patient and the respective role of any other health care provider with respect to management of the patient; and (2) notified that he or she may decline to receive medical services by telemedicine and may withdraw from such care at any time.    1 month hx of intermittent leg pain that occurs while in bed. Starts behinds posterior knee, superior to knee, non radiating, bilateral. When she wakes her legs will aches, constant. Taking tylenol prior to bed kept pain away. She has been placing a pillow under her legs and has not had symptoms in 1 week. Patient denies radiation of pain, numbness/tingling of lower ext, weakness of LE, saddle anesthesia, bowel/bladder incontinence, F/C, unexplained weight loss, or consistent,sheet drenching night sweats.         Review of Systems   Constitutional: Positive for unexpected weight change. Negative for activity change.   HENT: Negative for hearing loss, rhinorrhea and trouble swallowing.    Eyes: Negative for discharge and visual disturbance.   Respiratory: Negative for chest tightness and wheezing.    Cardiovascular: Negative for chest pain and palpitations.   Gastrointestinal: Negative for blood in stool, constipation, diarrhea and vomiting.   Endocrine: Negative for polydipsia and polyuria.   Genitourinary: Negative for difficulty urinating, dysuria, hematuria and menstrual problem.   Musculoskeletal: Negative for arthralgias, joint swelling and neck pain.    Neurological: Negative for weakness and headaches.   Psychiatric/Behavioral: Positive for confusion and dysphoric mood.       Objective:      There were no vitals filed for this visit.   Physical Exam  Constitutional:       General: She is not in acute distress.     Appearance: Normal appearance. She is well-developed. She is not diaphoretic.   HENT:      Head: Normocephalic and atraumatic.   Eyes:      General: No scleral icterus.        Right eye: No discharge.         Left eye: No discharge.      Conjunctiva/sclera: Conjunctivae normal.   Pulmonary:      Effort: Pulmonary effort is normal. No respiratory distress.   Abdominal:      General: There is no distension.   Skin:     General: Skin is warm and dry.   Neurological:      Mental Status: She is alert and oriented to person, place, and time.   Psychiatric:         Speech: Speech normal.         Assessment:       1. Pain in both lower extremities    2. Diabetic peripheral neuropathy    3. Mixed hyperlipidemia    4. Essential hypertension    5. Type 2 diabetes mellitus with diabetic neuropathy, with long-term current use of insulin        Plan:       Diagnoses and all orders for this visit:    Pain in both lower extremities   Sounds like MSK hamstring tightness. HEP. No red flags.     Diabetic peripheral neuropathy    Mixed hyperlipidemia    Essential hypertension    Type 2 diabetes mellitus with diabetic neuropathy, with long-term current use of insulin           Yoseph Mercado MD  Internal Medicine-[facility]        Side effects of medication(s) were discussed in detail and patient voiced understanding.  Patient will call back for any issues or complications.

## 2020-11-27 DIAGNOSIS — E11.9 TYPE 2 DIABETES MELLITUS WITHOUT COMPLICATION: ICD-10-CM

## 2020-12-02 ENCOUNTER — PATIENT MESSAGE (OUTPATIENT)
Dept: INTERNAL MEDICINE | Facility: CLINIC | Age: 70
End: 2020-12-02

## 2020-12-02 ENCOUNTER — IMMUNIZATION (OUTPATIENT)
Dept: PHARMACY | Facility: CLINIC | Age: 70
End: 2020-12-02
Payer: MEDICARE

## 2020-12-02 ENCOUNTER — TELEPHONE (OUTPATIENT)
Dept: INTERNAL MEDICINE | Facility: CLINIC | Age: 70
End: 2020-12-02

## 2020-12-02 DIAGNOSIS — E11.40 TYPE 2 DIABETES MELLITUS WITH DIABETIC NEUROPATHY, WITH LONG-TERM CURRENT USE OF INSULIN: Primary | ICD-10-CM

## 2020-12-02 DIAGNOSIS — R00.2 POUNDING HEARTBEAT: Primary | ICD-10-CM

## 2020-12-02 DIAGNOSIS — Z79.4 TYPE 2 DIABETES MELLITUS WITH DIABETIC NEUROPATHY, WITH LONG-TERM CURRENT USE OF INSULIN: Primary | ICD-10-CM

## 2020-12-02 NOTE — TELEPHONE ENCOUNTER
----- Message from Ana Boyce sent at 12/2/2020  3:43 PM CST -----  Regarding: lab  Name of Who is Calling: VAIBHAV JASMINE [[id number]] What is the request in detail: Patient is requesting to have hemoglobin A1C orders put in. Can the clinic reply by [facility]: Yes What Number to Call Back if not in [facility]: [phone]

## 2020-12-21 ENCOUNTER — LAB VISIT (OUTPATIENT)
Dept: LAB | Facility: OTHER | Age: 70
End: 2020-12-21
Attending: INTERNAL MEDICINE
Payer: MEDICARE

## 2020-12-21 DIAGNOSIS — E11.9 TYPE 2 DIABETES MELLITUS WITHOUT COMPLICATION: ICD-10-CM

## 2020-12-21 DIAGNOSIS — Z79.4 TYPE 2 DIABETES MELLITUS WITH DIABETIC NEUROPATHY, WITH LONG-TERM CURRENT USE OF INSULIN: ICD-10-CM

## 2020-12-21 DIAGNOSIS — E11.40 TYPE 2 DIABETES MELLITUS WITH DIABETIC NEUROPATHY, WITH LONG-TERM CURRENT USE OF INSULIN: ICD-10-CM

## 2020-12-21 PROCEDURE — 36415 COLL VENOUS BLD VENIPUNCTURE: CPT

## 2020-12-21 PROCEDURE — 83036 HEMOGLOBIN GLYCOSYLATED A1C: CPT

## 2020-12-21 PROCEDURE — 80061 LIPID PANEL: CPT

## 2020-12-22 LAB
CHOLEST SERPL-MCNC: 163 MG/DL (ref 120–199)
CHOLEST/HDLC SERPL: 2.5 {RATIO} (ref 2–5)
ESTIMATED AVG GLUCOSE: 134 MG/DL (ref 68–131)
HBA1C MFR BLD HPLC: 6.3 % (ref 4–5.6)
HDLC SERPL-MCNC: 66 MG/DL (ref 40–75)
HDLC SERPL: 40.5 % (ref 20–50)
LDLC SERPL CALC-MCNC: 89.2 MG/DL (ref 63–159)
NONHDLC SERPL-MCNC: 97 MG/DL
TRIGL SERPL-MCNC: 39 MG/DL (ref 30–150)

## 2020-12-27 ENCOUNTER — HOSPITAL ENCOUNTER (OUTPATIENT)
Facility: OTHER | Age: 70
Discharge: HOME | End: 2020-12-28
Attending: EMERGENCY MEDICINE | Admitting: EMERGENCY MEDICINE
Payer: MEDICARE

## 2020-12-27 DIAGNOSIS — I49.9 IRREGULAR CARDIAC RHYTHM: ICD-10-CM

## 2020-12-27 DIAGNOSIS — G45.9 TIA (TRANSIENT ISCHEMIC ATTACK): Primary | ICD-10-CM

## 2020-12-27 PROBLEM — E11.69 HYPERLIPIDEMIA ASSOCIATED WITH TYPE 2 DIABETES MELLITUS: Status: ACTIVE | Noted: 2018-06-25

## 2020-12-27 LAB
ALBUMIN SERPL BCP-MCNC: 4.2 G/DL (ref 3.5–5.2)
ALP SERPL-CCNC: 75 U/L (ref 55–135)
ALT SERPL W/O P-5'-P-CCNC: 18 U/L (ref 10–44)
ANION GAP SERPL CALC-SCNC: 12 MMOL/L (ref 8–16)
AST SERPL-CCNC: 26 U/L (ref 10–40)
BASOPHILS # BLD AUTO: 0.03 K/UL (ref 0–0.2)
BASOPHILS NFR BLD: 0.6 % (ref 0–1.9)
BILIRUB SERPL-MCNC: 0.4 MG/DL (ref 0.1–1)
BUN SERPL-MCNC: 9 MG/DL (ref 8–23)
CALCIUM SERPL-MCNC: 9.7 MG/DL (ref 8.7–10.5)
CHLORIDE SERPL-SCNC: 108 MMOL/L (ref 95–110)
CO2 SERPL-SCNC: 24 MMOL/L (ref 23–29)
CREAT SERPL-MCNC: 0.7 MG/DL (ref 0.5–1.4)
CTP QC/QA: YES
DIFFERENTIAL METHOD: ABNORMAL
EOSINOPHIL # BLD AUTO: 0 K/UL (ref 0–0.5)
EOSINOPHIL NFR BLD: 0.9 % (ref 0–8)
ERYTHROCYTE [DISTWIDTH] IN BLOOD BY AUTOMATED COUNT: 13.7 % (ref 11.5–14.5)
EST. GFR  (AFRICAN AMERICAN): >60 ML/MIN/1.73 M^2
EST. GFR  (NON AFRICAN AMERICAN): >60 ML/MIN/1.73 M^2
GLUCOSE SERPL-MCNC: 124 MG/DL (ref 70–110)
GLUCOSE SERPL-MCNC: 134 MG/DL (ref 70–110)
HCT VFR BLD AUTO: 37.4 % (ref 37–48.5)
HCV AB SERPL QL IA: NEGATIVE
HGB BLD-MCNC: 11.7 G/DL (ref 12–16)
IMM GRANULOCYTES # BLD AUTO: 0.01 K/UL (ref 0–0.04)
IMM GRANULOCYTES NFR BLD AUTO: 0.2 % (ref 0–0.5)
INR PPP: 1 (ref 0.8–1.2)
LYMPHOCYTES # BLD AUTO: 2.2 K/UL (ref 1–4.8)
LYMPHOCYTES NFR BLD: 46.8 % (ref 18–48)
MCH RBC QN AUTO: 27.1 PG (ref 27–31)
MCHC RBC AUTO-ENTMCNC: 31.3 G/DL (ref 32–36)
MCV RBC AUTO: 87 FL (ref 82–98)
MONOCYTES # BLD AUTO: 0.5 K/UL (ref 0.3–1)
MONOCYTES NFR BLD: 10.6 % (ref 4–15)
NEUTROPHILS # BLD AUTO: 1.9 K/UL (ref 1.8–7.7)
NEUTROPHILS NFR BLD: 40.9 % (ref 38–73)
NRBC BLD-RTO: 0 /100 WBC
PLATELET # BLD AUTO: 259 K/UL (ref 150–350)
PMV BLD AUTO: 10.4 FL (ref 9.2–12.9)
POCT GLUCOSE: 134 MG/DL (ref 70–110)
POCT GLUCOSE: 205 MG/DL (ref 70–110)
POCT GLUCOSE: 67 MG/DL (ref 70–110)
POTASSIUM SERPL-SCNC: 3.9 MMOL/L (ref 3.5–5.1)
PROT SERPL-MCNC: 7.3 G/DL (ref 6–8.4)
PROTHROMBIN TIME: 10.8 SEC (ref 9–12.5)
RBC # BLD AUTO: 4.31 M/UL (ref 4–5.4)
SARS-COV-2 RDRP RESP QL NAA+PROBE: NEGATIVE
SODIUM SERPL-SCNC: 144 MMOL/L (ref 136–145)
TSH SERPL DL<=0.005 MIU/L-ACNC: 0.57 UIU/ML (ref 0.4–4)
WBC # BLD AUTO: 4.7 K/UL (ref 3.9–12.7)

## 2020-12-27 PROCEDURE — 93010 ELECTROCARDIOGRAM REPORT: CPT | Mod: ,,, | Performed by: INTERNAL MEDICINE

## 2020-12-27 PROCEDURE — 99220 PR INITIAL OBSERVATION CARE,LEVL III: CPT | Mod: ,,, | Performed by: PHYSICIAN ASSISTANT

## 2020-12-27 PROCEDURE — 80053 COMPREHEN METABOLIC PANEL: CPT

## 2020-12-27 PROCEDURE — 85610 PROTHROMBIN TIME: CPT

## 2020-12-27 PROCEDURE — 93010 EKG 12-LEAD: ICD-10-PCS | Mod: ,,, | Performed by: INTERNAL MEDICINE

## 2020-12-27 PROCEDURE — 99285 EMERGENCY DEPT VISIT HI MDM: CPT | Mod: 25

## 2020-12-27 PROCEDURE — 84443 ASSAY THYROID STIM HORMONE: CPT

## 2020-12-27 PROCEDURE — G0378 HOSPITAL OBSERVATION PER HR: HCPCS

## 2020-12-27 PROCEDURE — 81003 URINALYSIS AUTO W/O SCOPE: CPT

## 2020-12-27 PROCEDURE — 93005 ELECTROCARDIOGRAM TRACING: CPT

## 2020-12-27 PROCEDURE — 85025 COMPLETE CBC W/AUTO DIFF WBC: CPT

## 2020-12-27 PROCEDURE — 80061 LIPID PANEL: CPT

## 2020-12-27 PROCEDURE — 86803 HEPATITIS C AB TEST: CPT

## 2020-12-27 PROCEDURE — U0002 COVID-19 LAB TEST NON-CDC: HCPCS | Performed by: EMERGENCY MEDICINE

## 2020-12-27 PROCEDURE — 82962 GLUCOSE BLOOD TEST: CPT | Mod: 91

## 2020-12-27 PROCEDURE — 99220 PR INITIAL OBSERVATION CARE,LEVL III: ICD-10-PCS | Mod: ,,, | Performed by: PHYSICIAN ASSISTANT

## 2020-12-27 RX ORDER — IBUPROFEN 200 MG
24 TABLET ORAL
Status: DISCONTINUED | OUTPATIENT
Start: 2020-12-28 | End: 2020-12-28 | Stop reason: HOSPADM

## 2020-12-27 RX ORDER — SODIUM CHLORIDE 0.9 % (FLUSH) 0.9 %
10 SYRINGE (ML) INJECTION
Status: DISCONTINUED | OUTPATIENT
Start: 2020-12-27 | End: 2020-12-28 | Stop reason: HOSPADM

## 2020-12-27 RX ORDER — IBUPROFEN 200 MG
16 TABLET ORAL
Status: DISCONTINUED | OUTPATIENT
Start: 2020-12-28 | End: 2020-12-28 | Stop reason: HOSPADM

## 2020-12-27 RX ORDER — LABETALOL HYDROCHLORIDE 5 MG/ML
10 INJECTION, SOLUTION INTRAVENOUS
Status: DISCONTINUED | OUTPATIENT
Start: 2020-12-27 | End: 2020-12-28 | Stop reason: HOSPADM

## 2020-12-27 RX ORDER — ONDANSETRON 2 MG/ML
4 INJECTION INTRAMUSCULAR; INTRAVENOUS EVERY 8 HOURS PRN
Status: DISCONTINUED | OUTPATIENT
Start: 2020-12-27 | End: 2020-12-28 | Stop reason: HOSPADM

## 2020-12-27 RX ORDER — INSULIN ASPART 100 [IU]/ML
0-5 INJECTION, SOLUTION INTRAVENOUS; SUBCUTANEOUS
Status: DISCONTINUED | OUTPATIENT
Start: 2020-12-28 | End: 2020-12-28 | Stop reason: HOSPADM

## 2020-12-27 RX ORDER — TALC
6 POWDER (GRAM) TOPICAL NIGHTLY PRN
Status: DISCONTINUED | OUTPATIENT
Start: 2020-12-27 | End: 2020-12-28 | Stop reason: HOSPADM

## 2020-12-27 RX ORDER — SODIUM CHLORIDE 0.9 % (FLUSH) 0.9 %
10 SYRINGE (ML) INJECTION
Status: DISCONTINUED | OUTPATIENT
Start: 2020-12-27 | End: 2020-12-27

## 2020-12-27 RX ORDER — GLUCAGON 1 MG
1 KIT INJECTION
Status: DISCONTINUED | OUTPATIENT
Start: 2020-12-28 | End: 2020-12-28 | Stop reason: HOSPADM

## 2020-12-28 VITALS
HEART RATE: 65 BPM | SYSTOLIC BLOOD PRESSURE: 112 MMHG | RESPIRATION RATE: 20 BRPM | TEMPERATURE: 98 F | DIASTOLIC BLOOD PRESSURE: 69 MMHG | WEIGHT: 159 LBS | OXYGEN SATURATION: 98 % | BODY MASS INDEX: 25.55 KG/M2 | HEIGHT: 66 IN

## 2020-12-28 LAB
ALBUMIN SERPL BCP-MCNC: 3.8 G/DL (ref 3.5–5.2)
ALP SERPL-CCNC: 62 U/L (ref 55–135)
ALT SERPL W/O P-5'-P-CCNC: 15 U/L (ref 10–44)
ANION GAP SERPL CALC-SCNC: 10 MMOL/L (ref 8–16)
APTT BLDCRRT: 29 SEC (ref 21–32)
ASCENDING AORTA: 2.71 CM
AST SERPL-CCNC: 22 U/L (ref 10–40)
AV INDEX (PROSTH): 1.12
AV MEAN GRADIENT: 2 MMHG
AV PEAK GRADIENT: 5 MMHG
AV VALVE AREA: 4.16 CM2
AV VELOCITY RATIO: 0.85
BILIRUB SERPL-MCNC: 0.4 MG/DL (ref 0.1–1)
BILIRUB UR QL STRIP: NEGATIVE
BSA FOR ECHO PROCEDURE: 1.83 M2
BUN SERPL-MCNC: 10 MG/DL (ref 8–23)
CALCIUM SERPL-MCNC: 9.3 MG/DL (ref 8.7–10.5)
CHLORIDE SERPL-SCNC: 108 MMOL/L (ref 95–110)
CHOLEST SERPL-MCNC: 167 MG/DL (ref 120–199)
CHOLEST/HDLC SERPL: 2.3 {RATIO} (ref 2–5)
CK MB SERPL-MCNC: 2.3 NG/ML (ref 0.1–6.5)
CK MB SERPL-RTO: 1 % (ref 0–5)
CK SERPL-CCNC: 236 U/L (ref 20–180)
CLARITY UR: CLEAR
CO2 SERPL-SCNC: 26 MMOL/L (ref 23–29)
COLOR UR: YELLOW
CREAT SERPL-MCNC: 0.7 MG/DL (ref 0.5–1.4)
CV ECHO LV RWT: 0.32 CM
DOP CALC AO PEAK VEL: 1.1 M/S
DOP CALC AO VTI: 20.56 CM
DOP CALC LVOT AREA: 3.7 CM2
DOP CALC LVOT DIAMETER: 2.18 CM
DOP CALC LVOT PEAK VEL: 0.94 M/S
DOP CALC LVOT STROKE VOLUME: 85.54 CM3
DOP CALCLVOT PEAK VEL VTI: 22.93 CM
E WAVE DECELERATION TIME: 162.67 MSEC
E/A RATIO: 0.94
E/E' RATIO: 6.94 M/S
ECHO LV POSTERIOR WALL: 0.72 CM (ref 0.6–1.1)
EST. GFR  (AFRICAN AMERICAN): >60 ML/MIN/1.73 M^2
EST. GFR  (NON AFRICAN AMERICAN): >60 ML/MIN/1.73 M^2
FRACTIONAL SHORTENING: 43 % (ref 28–44)
GLUCOSE SERPL-MCNC: 79 MG/DL (ref 70–110)
GLUCOSE UR QL STRIP: NEGATIVE
HDLC SERPL-MCNC: 73 MG/DL (ref 40–75)
HDLC SERPL: 43.7 % (ref 20–50)
HGB UR QL STRIP: NEGATIVE
INR PPP: 1 (ref 0.8–1.2)
INTERVENTRICULAR SEPTUM: 0.72 CM (ref 0.6–1.1)
IVRT: 87.66 MSEC
KETONES UR QL STRIP: NEGATIVE
LA MAJOR: 4.34 CM
LA MINOR: 4 CM
LA WIDTH: 3.4 CM
LDLC SERPL CALC-MCNC: 81 MG/DL (ref 63–159)
LEFT ATRIUM SIZE: 2.74 CM
LEFT ATRIUM VOLUME INDEX MOD: 16.6 ML/M2
LEFT ATRIUM VOLUME INDEX: 18.2 ML/M2
LEFT ATRIUM VOLUME MOD: 30.07 CM3
LEFT ATRIUM VOLUME: 32.97 CM3
LEFT INTERNAL DIMENSION IN SYSTOLE: 2.58 CM (ref 2.1–4)
LEFT VENTRICLE DIASTOLIC VOLUME INDEX: 51.85 ML/M2
LEFT VENTRICLE DIASTOLIC VOLUME: 94.08 ML
LEFT VENTRICLE MASS INDEX: 55 G/M2
LEFT VENTRICLE SYSTOLIC VOLUME INDEX: 13.3 ML/M2
LEFT VENTRICLE SYSTOLIC VOLUME: 24.18 ML
LEFT VENTRICULAR INTERNAL DIMENSION IN DIASTOLE: 4.53 CM (ref 3.5–6)
LEFT VENTRICULAR MASS: 100.29 G
LEUKOCYTE ESTERASE UR QL STRIP: NEGATIVE
LV LATERAL E/E' RATIO: 5.9 M/S
LV SEPTAL E/E' RATIO: 8.43 M/S
MAGNESIUM SERPL-MCNC: 1.7 MG/DL (ref 1.6–2.6)
MV PEAK A VEL: 0.63 M/S
MV PEAK E VEL: 0.59 M/S
NITRITE UR QL STRIP: NEGATIVE
NONHDLC SERPL-MCNC: 94 MG/DL
PH UR STRIP: 8 [PH] (ref 5–8)
PHOSPHATE SERPL-MCNC: 4.1 MG/DL (ref 2.7–4.5)
PISA MRMAX VEL: 0.04 M/S
PISA TR MAX VEL: 2.46 M/S
POCT GLUCOSE: 96 MG/DL (ref 70–110)
POTASSIUM SERPL-SCNC: 3.9 MMOL/L (ref 3.5–5.1)
PROT SERPL-MCNC: 6.7 G/DL (ref 6–8.4)
PROT UR QL STRIP: NEGATIVE
PROTHROMBIN TIME: 11.1 SEC (ref 9–12.5)
PV PEAK S VEL: 0.21 M/S
PV PEAK VELOCITY: 0.76 CM/S
RA MAJOR: 4.21 CM
RA WIDTH: 3.18 CM
SINUS: 3.18 CM
SODIUM SERPL-SCNC: 144 MMOL/L (ref 136–145)
SP GR UR STRIP: 1.01 (ref 1–1.03)
STJ: 2.71 CM
TDI LATERAL: 0.1 M/S
TDI SEPTAL: 0.07 M/S
TDI: 0.09 M/S
TR MAX PG: 24 MMHG
TRICUSPID ANNULAR PLANE SYSTOLIC EXCURSION: 1.84 CM
TRIGL SERPL-MCNC: 65 MG/DL (ref 30–150)
TROPONIN I SERPL DL<=0.01 NG/ML-MCNC: 0.01 NG/ML (ref 0–0.03)
URN SPEC COLLECT METH UR: NORMAL
UROBILINOGEN UR STRIP-ACNC: NEGATIVE EU/DL

## 2020-12-28 PROCEDURE — 97165 OT EVAL LOW COMPLEX 30 MIN: CPT

## 2020-12-28 PROCEDURE — 97161 PT EVAL LOW COMPLEX 20 MIN: CPT

## 2020-12-28 PROCEDURE — 25000003 PHARM REV CODE 250: Performed by: PHYSICIAN ASSISTANT

## 2020-12-28 PROCEDURE — 85610 PROTHROMBIN TIME: CPT

## 2020-12-28 PROCEDURE — 82550 ASSAY OF CK (CPK): CPT

## 2020-12-28 PROCEDURE — 36415 COLL VENOUS BLD VENIPUNCTURE: CPT

## 2020-12-28 PROCEDURE — A9585 GADOBUTROL INJECTION: HCPCS | Performed by: INTERNAL MEDICINE

## 2020-12-28 PROCEDURE — 84100 ASSAY OF PHOSPHORUS: CPT

## 2020-12-28 PROCEDURE — 99217 PR OBSERVATION CARE DISCHARGE: CPT | Mod: ,,, | Performed by: INTERNAL MEDICINE

## 2020-12-28 PROCEDURE — 97802 MEDICAL NUTRITION INDIV IN: CPT

## 2020-12-28 PROCEDURE — 84484 ASSAY OF TROPONIN QUANT: CPT

## 2020-12-28 PROCEDURE — G0378 HOSPITAL OBSERVATION PER HR: HCPCS

## 2020-12-28 PROCEDURE — 25500020 PHARM REV CODE 255: Performed by: INTERNAL MEDICINE

## 2020-12-28 PROCEDURE — 94761 N-INVAS EAR/PLS OXIMETRY MLT: CPT

## 2020-12-28 PROCEDURE — 83735 ASSAY OF MAGNESIUM: CPT

## 2020-12-28 PROCEDURE — 99217 PR OBSERVATION CARE DISCHARGE: ICD-10-PCS | Mod: ,,, | Performed by: INTERNAL MEDICINE

## 2020-12-28 PROCEDURE — 80053 COMPREHEN METABOLIC PANEL: CPT

## 2020-12-28 PROCEDURE — 85730 THROMBOPLASTIN TIME PARTIAL: CPT

## 2020-12-28 PROCEDURE — 82553 CREATINE MB FRACTION: CPT

## 2020-12-28 PROCEDURE — 92610 EVALUATE SWALLOWING FUNCTION: CPT

## 2020-12-28 RX ORDER — ROSUVASTATIN CALCIUM 10 MG/1
10 TABLET, COATED ORAL DAILY
Status: DISCONTINUED | OUTPATIENT
Start: 2020-12-28 | End: 2020-12-28 | Stop reason: HOSPADM

## 2020-12-28 RX ORDER — ASPIRIN 81 MG/1
81 TABLET ORAL DAILY
Status: DISCONTINUED | OUTPATIENT
Start: 2020-12-28 | End: 2020-12-28 | Stop reason: HOSPADM

## 2020-12-28 RX ORDER — GADOBUTROL 604.72 MG/ML
10 INJECTION INTRAVENOUS
Status: COMPLETED | OUTPATIENT
Start: 2020-12-28 | End: 2020-12-28

## 2020-12-28 RX ORDER — ATORVASTATIN CALCIUM 20 MG/1
40 TABLET, FILM COATED ORAL DAILY
Status: DISCONTINUED | OUTPATIENT
Start: 2020-12-28 | End: 2020-12-28

## 2020-12-28 RX ADMIN — GADOBUTROL 10 ML: 604.72 INJECTION INTRAVENOUS at 05:12

## 2020-12-28 RX ADMIN — ROSUVASTATIN CALCIUM 10 MG: 10 TABLET, FILM COATED ORAL at 09:12

## 2020-12-28 RX ADMIN — ASPIRIN 81 MG: 81 TABLET, FILM COATED ORAL at 09:12

## 2020-12-29 ENCOUNTER — TELEPHONE (OUTPATIENT)
Dept: INTERNAL MEDICINE | Facility: CLINIC | Age: 70
End: 2020-12-29

## 2020-12-31 ENCOUNTER — TELEPHONE (OUTPATIENT)
Dept: NEUROLOGY | Facility: CLINIC | Age: 70
End: 2020-12-31

## 2020-12-31 ENCOUNTER — TELEPHONE (OUTPATIENT)
Dept: NEUROLOGY | Facility: CLINIC | Age: 70
End: 2020-12-31
Payer: MEDICARE

## 2020-12-31 NOTE — TELEPHONE ENCOUNTER
----- Message from Juanjose Ordnoez sent at 12/30/2020  1:59 PM CST -----  Regarding: Patient advice  Contact: Pt  Pt called in regards to scheduling a new pt appointment       Pt was referred by the ER to be seen for  TIA (transient ischemic attack) , pt stated that she was told to schedule an appointment by 01/04/20       Please advise       Pt can be reached at [phone]

## 2020-12-31 NOTE — TELEPHONE ENCOUNTER
Called and spoke with pt. Informed appt on 01/19/21 is appropriate and acceptable. Pt verbalized understanding.

## 2020-12-31 NOTE — TELEPHONE ENCOUNTER
----- Message from Saran Peng sent at 12/31/2020 12:06 PM CST -----  Contact: @[phone]  Patient feels she needs to be seen before 1-19th, the hospital f/u appt is on the waitlist, pls call

## 2021-01-12 ENCOUNTER — OFFICE VISIT (OUTPATIENT)
Dept: INTERNAL MEDICINE | Facility: CLINIC | Age: 71
End: 2021-01-12
Attending: INTERNAL MEDICINE
Payer: MEDICARE

## 2021-01-12 ENCOUNTER — HOSPITAL ENCOUNTER (OUTPATIENT)
Dept: RADIOLOGY | Facility: HOSPITAL | Age: 71
Discharge: HOME | End: 2021-01-12
Attending: STUDENT IN AN ORGANIZED HEALTH CARE EDUCATION/TRAINING PROGRAM
Payer: MEDICARE

## 2021-01-12 VITALS
SYSTOLIC BLOOD PRESSURE: 125 MMHG | WEIGHT: 162.69 LBS | HEART RATE: 68 BPM | OXYGEN SATURATION: 98 % | DIASTOLIC BLOOD PRESSURE: 70 MMHG | BODY MASS INDEX: 26.26 KG/M2

## 2021-01-12 DIAGNOSIS — Z09 HOSPITAL DISCHARGE FOLLOW-UP: Primary | ICD-10-CM

## 2021-01-12 DIAGNOSIS — G45.9 TIA (TRANSIENT ISCHEMIC ATTACK): ICD-10-CM

## 2021-01-12 DIAGNOSIS — E11.42 DIABETIC PERIPHERAL NEUROPATHY: ICD-10-CM

## 2021-01-12 PROCEDURE — 99999 PR PBB SHADOW E&M-EST. PATIENT-LVL III: ICD-10-PCS | Mod: PBBFAC,GC,, | Performed by: STUDENT IN AN ORGANIZED HEALTH CARE EDUCATION/TRAINING PROGRAM

## 2021-01-12 PROCEDURE — 93880 EXTRACRANIAL BILAT STUDY: CPT | Mod: TC

## 2021-01-12 PROCEDURE — 99213 OFFICE O/P EST LOW 20 MIN: CPT | Mod: PBBFAC,25 | Performed by: STUDENT IN AN ORGANIZED HEALTH CARE EDUCATION/TRAINING PROGRAM

## 2021-01-12 PROCEDURE — 99214 PR OFFICE/OUTPT VISIT, EST, LEVL IV, 30-39 MIN: ICD-10-PCS | Mod: S$PBB,GC,, | Performed by: STUDENT IN AN ORGANIZED HEALTH CARE EDUCATION/TRAINING PROGRAM

## 2021-01-12 PROCEDURE — 93880 EXTRACRANIAL BILAT STUDY: CPT | Mod: 26,,, | Performed by: RADIOLOGY

## 2021-01-12 PROCEDURE — 99999 PR PBB SHADOW E&M-EST. PATIENT-LVL III: CPT | Mod: PBBFAC,GC,, | Performed by: STUDENT IN AN ORGANIZED HEALTH CARE EDUCATION/TRAINING PROGRAM

## 2021-01-12 PROCEDURE — 93880 US CAROTID BILATERAL: ICD-10-PCS | Mod: 26,,, | Performed by: RADIOLOGY

## 2021-01-12 PROCEDURE — 99214 OFFICE O/P EST MOD 30 MIN: CPT | Mod: S$PBB,GC,, | Performed by: STUDENT IN AN ORGANIZED HEALTH CARE EDUCATION/TRAINING PROGRAM

## 2021-01-15 ENCOUNTER — TELEPHONE (OUTPATIENT)
Dept: NEUROLOGY | Facility: CLINIC | Age: 71
End: 2021-01-15

## 2021-01-26 ENCOUNTER — OFFICE VISIT (OUTPATIENT)
Dept: INTERNAL MEDICINE | Facility: CLINIC | Age: 71
End: 2021-01-26
Attending: INTERNAL MEDICINE
Payer: MEDICARE

## 2021-01-26 DIAGNOSIS — R20.0 LEFT FACIAL NUMBNESS: Primary | ICD-10-CM

## 2021-01-26 DIAGNOSIS — E11.9 TYPE 2 DIABETES MELLITUS WITHOUT COMPLICATION, WITH LONG-TERM CURRENT USE OF INSULIN: ICD-10-CM

## 2021-01-26 DIAGNOSIS — E11.69 HYPERLIPIDEMIA ASSOCIATED WITH TYPE 2 DIABETES MELLITUS: ICD-10-CM

## 2021-01-26 DIAGNOSIS — Z79.4 TYPE 2 DIABETES MELLITUS WITHOUT COMPLICATION, WITH LONG-TERM CURRENT USE OF INSULIN: ICD-10-CM

## 2021-01-26 DIAGNOSIS — E78.5 HYPERLIPIDEMIA ASSOCIATED WITH TYPE 2 DIABETES MELLITUS: ICD-10-CM

## 2021-01-26 DIAGNOSIS — I10 BENIGN ESSENTIAL HYPERTENSION: ICD-10-CM

## 2021-01-26 PROCEDURE — 99214 OFFICE O/P EST MOD 30 MIN: CPT | Mod: 95,,, | Performed by: INTERNAL MEDICINE

## 2021-01-26 PROCEDURE — 99214 PR OFFICE/OUTPT VISIT, EST, LEVL IV, 30-39 MIN: ICD-10-PCS | Mod: 95,,, | Performed by: INTERNAL MEDICINE

## 2021-01-28 ENCOUNTER — PATIENT MESSAGE (OUTPATIENT)
Dept: INTERNAL MEDICINE | Facility: CLINIC | Age: 71
End: 2021-01-28

## 2021-02-11 ENCOUNTER — PATIENT MESSAGE (OUTPATIENT)
Dept: ENDOCRINOLOGY | Facility: CLINIC | Age: 71
End: 2021-02-11

## 2021-03-18 ENCOUNTER — PATIENT MESSAGE (OUTPATIENT)
Dept: UROGYNECOLOGY | Facility: CLINIC | Age: 71
End: 2021-03-18

## 2021-03-19 ENCOUNTER — OFFICE VISIT (OUTPATIENT)
Dept: UROGYNECOLOGY | Facility: CLINIC | Age: 71
End: 2021-03-19
Payer: MEDICARE

## 2021-03-19 VITALS
HEIGHT: 66 IN | BODY MASS INDEX: 25.26 KG/M2 | SYSTOLIC BLOOD PRESSURE: 136 MMHG | WEIGHT: 157.19 LBS | DIASTOLIC BLOOD PRESSURE: 72 MMHG

## 2021-03-19 DIAGNOSIS — T14.8XXA MUSCLE STRAIN: ICD-10-CM

## 2021-03-19 DIAGNOSIS — N64.4 BREAST PAIN: Primary | ICD-10-CM

## 2021-03-19 PROCEDURE — 99999 PR PBB SHADOW E&M-EST. PATIENT-LVL III: CPT | Mod: PBBFAC,,, | Performed by: NURSE PRACTITIONER

## 2021-03-19 PROCEDURE — 99213 OFFICE O/P EST LOW 20 MIN: CPT | Mod: PBBFAC | Performed by: NURSE PRACTITIONER

## 2021-03-19 PROCEDURE — 99213 PR OFFICE/OUTPT VISIT, EST, LEVL III, 20-29 MIN: ICD-10-PCS | Mod: S$PBB,,, | Performed by: NURSE PRACTITIONER

## 2021-03-19 PROCEDURE — 99213 OFFICE O/P EST LOW 20 MIN: CPT | Mod: S$PBB,,, | Performed by: NURSE PRACTITIONER

## 2021-03-19 PROCEDURE — 99999 PR PBB SHADOW E&M-EST. PATIENT-LVL III: ICD-10-PCS | Mod: PBBFAC,,, | Performed by: NURSE PRACTITIONER

## 2021-03-23 ENCOUNTER — OFFICE VISIT (OUTPATIENT)
Dept: INTERNAL MEDICINE | Facility: CLINIC | Age: 71
End: 2021-03-23
Attending: INTERNAL MEDICINE
Payer: MEDICARE

## 2021-03-23 ENCOUNTER — LAB VISIT (OUTPATIENT)
Dept: LAB | Facility: OTHER | Age: 71
End: 2021-03-23
Attending: INTERNAL MEDICINE
Payer: MEDICARE

## 2021-03-23 VITALS
WEIGHT: 156.31 LBS | DIASTOLIC BLOOD PRESSURE: 74 MMHG | BODY MASS INDEX: 25.12 KG/M2 | SYSTOLIC BLOOD PRESSURE: 136 MMHG | HEIGHT: 66 IN | HEART RATE: 64 BPM | OXYGEN SATURATION: 97 %

## 2021-03-23 DIAGNOSIS — Z79.4 TYPE 2 DIABETES MELLITUS WITHOUT COMPLICATION, WITH LONG-TERM CURRENT USE OF INSULIN: Primary | ICD-10-CM

## 2021-03-23 DIAGNOSIS — Z12.2 ENCOUNTER FOR SCREENING FOR MALIGNANT NEOPLASM OF RESPIRATORY ORGANS: ICD-10-CM

## 2021-03-23 DIAGNOSIS — E11.9 TYPE 2 DIABETES MELLITUS WITHOUT COMPLICATION, WITH LONG-TERM CURRENT USE OF INSULIN: ICD-10-CM

## 2021-03-23 DIAGNOSIS — E11.9 TYPE 2 DIABETES MELLITUS WITHOUT COMPLICATION, WITH LONG-TERM CURRENT USE OF INSULIN: Primary | ICD-10-CM

## 2021-03-23 DIAGNOSIS — R14.0 ABDOMINAL BLOATING: Primary | ICD-10-CM

## 2021-03-23 DIAGNOSIS — R63.4 WEIGHT LOSS: ICD-10-CM

## 2021-03-23 DIAGNOSIS — K59.00 CN (CONSTIPATION): ICD-10-CM

## 2021-03-23 DIAGNOSIS — Z79.4 TYPE 2 DIABETES MELLITUS WITHOUT COMPLICATION, WITH LONG-TERM CURRENT USE OF INSULIN: ICD-10-CM

## 2021-03-23 DIAGNOSIS — R20.0 FACIAL NUMBNESS: ICD-10-CM

## 2021-03-23 DIAGNOSIS — K80.20 GALLSTONE: ICD-10-CM

## 2021-03-23 LAB
ALBUMIN SERPL BCP-MCNC: 4 G/DL (ref 3.5–5.2)
ALBUMIN SERPL BCP-MCNC: 4.2 G/DL (ref 3.5–5.2)
ALP SERPL-CCNC: 59 U/L (ref 55–135)
ALP SERPL-CCNC: 72 U/L (ref 55–135)
ALT SERPL W/O P-5'-P-CCNC: 15 U/L (ref 10–44)
ALT SERPL W/O P-5'-P-CCNC: 15 U/L (ref 10–44)
AMYLASE SERPL-CCNC: 92 U/L (ref 20–110)
ANION GAP SERPL CALC-SCNC: 11 MMOL/L (ref 8–16)
AST SERPL-CCNC: 20 U/L (ref 10–40)
AST SERPL-CCNC: 21 U/L (ref 10–40)
BASOPHILS # BLD AUTO: 0.02 K/UL (ref 0–0.2)
BASOPHILS # BLD AUTO: 0.05 K/UL (ref 0–0.2)
BASOPHILS NFR BLD: 0.4 % (ref 0–1.9)
BASOPHILS NFR BLD: 1.1 % (ref 0–1.9)
BILIRUB DIRECT SERPL-MCNC: 0.2 MG/DL (ref 0.1–0.3)
BILIRUB SERPL-MCNC: 0.4 MG/DL (ref 0.1–1)
BILIRUB SERPL-MCNC: 0.5 MG/DL (ref 0.1–1)
BUN SERPL-MCNC: 12 MG/DL (ref 8–23)
CALCIUM SERPL-MCNC: 10.1 MG/DL (ref 8.7–10.5)
CHLORIDE SERPL-SCNC: 105 MMOL/L (ref 95–110)
CO2 SERPL-SCNC: 27 MMOL/L (ref 23–29)
CREAT SERPL-MCNC: 0.8 MG/DL (ref 0.5–1.4)
DIFFERENTIAL METHOD: ABNORMAL
DIFFERENTIAL METHOD: ABNORMAL
EOSINOPHIL # BLD AUTO: 0.1 K/UL (ref 0–0.5)
EOSINOPHIL # BLD AUTO: 0.1 K/UL (ref 0–0.5)
EOSINOPHIL NFR BLD: 0.9 % (ref 0–8)
EOSINOPHIL NFR BLD: 1.5 % (ref 0–8)
ERYTHROCYTE [DISTWIDTH] IN BLOOD BY AUTOMATED COUNT: 13.9 % (ref 11.5–14.5)
ERYTHROCYTE [DISTWIDTH] IN BLOOD BY AUTOMATED COUNT: 14 % (ref 11.5–14.5)
EST. GFR  (AFRICAN AMERICAN): >60 ML/MIN/1.73 M^2
EST. GFR  (NON AFRICAN AMERICAN): >60 ML/MIN/1.73 M^2
ESTIMATED AVG GLUCOSE: 131 MG/DL (ref 68–131)
ESTIMATED AVG GLUCOSE: 131 MG/DL (ref 68–131)
GLUCOSE SERPL-MCNC: 110 MG/DL (ref 70–110)
HBA1C MFR BLD: 6.2 % (ref 4–5.6)
HBA1C MFR BLD: 6.2 % (ref 4–5.6)
HCT VFR BLD AUTO: 38.8 % (ref 37–48.5)
HCT VFR BLD AUTO: 38.9 % (ref 37–48.5)
HGB BLD-MCNC: 12.1 G/DL (ref 12–16)
HGB BLD-MCNC: 12.2 G/DL (ref 12–16)
IMM GRANULOCYTES # BLD AUTO: 0.01 K/UL (ref 0–0.04)
IMM GRANULOCYTES # BLD AUTO: 0.01 K/UL (ref 0–0.04)
IMM GRANULOCYTES NFR BLD AUTO: 0.2 % (ref 0–0.5)
IMM GRANULOCYTES NFR BLD AUTO: 0.2 % (ref 0–0.5)
LYMPHOCYTES # BLD AUTO: 1.8 K/UL (ref 1–4.8)
LYMPHOCYTES # BLD AUTO: 2.2 K/UL (ref 1–4.8)
LYMPHOCYTES NFR BLD: 38.2 % (ref 18–48)
LYMPHOCYTES NFR BLD: 39.5 % (ref 18–48)
MCH RBC QN AUTO: 27.4 PG (ref 27–31)
MCH RBC QN AUTO: 27.5 PG (ref 27–31)
MCHC RBC AUTO-ENTMCNC: 31.1 G/DL (ref 32–36)
MCHC RBC AUTO-ENTMCNC: 31.4 G/DL (ref 32–36)
MCV RBC AUTO: 88 FL (ref 82–98)
MCV RBC AUTO: 88 FL (ref 82–98)
MONOCYTES # BLD AUTO: 0.5 K/UL (ref 0.3–1)
MONOCYTES # BLD AUTO: 0.6 K/UL (ref 0.3–1)
MONOCYTES NFR BLD: 11.7 % (ref 4–15)
MONOCYTES NFR BLD: 9.9 % (ref 4–15)
NEUTROPHILS # BLD AUTO: 2.1 K/UL (ref 1.8–7.7)
NEUTROPHILS # BLD AUTO: 2.8 K/UL (ref 1.8–7.7)
NEUTROPHILS NFR BLD: 46 % (ref 38–73)
NEUTROPHILS NFR BLD: 50.4 % (ref 38–73)
NRBC BLD-RTO: 0 /100 WBC
NRBC BLD-RTO: 0 /100 WBC
PLATELET # BLD AUTO: 274 K/UL (ref 150–350)
PLATELET # BLD AUTO: 275 K/UL (ref 150–350)
PMV BLD AUTO: 10.5 FL (ref 9.2–12.9)
PMV BLD AUTO: 10.5 FL (ref 9.2–12.9)
POTASSIUM SERPL-SCNC: 4 MMOL/L (ref 3.5–5.1)
PROT SERPL-MCNC: 7.4 G/DL (ref 6–8.4)
PROT SERPL-MCNC: 7.7 G/DL (ref 6–8.4)
RBC # BLD AUTO: 4.42 M/UL (ref 4–5.4)
RBC # BLD AUTO: 4.43 M/UL (ref 4–5.4)
SODIUM SERPL-SCNC: 143 MMOL/L (ref 136–145)
TSH SERPL DL<=0.005 MIU/L-ACNC: 0.93 UIU/ML (ref 0.4–4)
WBC # BLD AUTO: 4.61 K/UL (ref 3.9–12.7)
WBC # BLD AUTO: 5.63 K/UL (ref 3.9–12.7)

## 2021-03-23 PROCEDURE — 99214 OFFICE O/P EST MOD 30 MIN: CPT | Mod: S$PBB,,, | Performed by: INTERNAL MEDICINE

## 2021-03-23 PROCEDURE — 36415 COLL VENOUS BLD VENIPUNCTURE: CPT | Performed by: INTERNAL MEDICINE

## 2021-03-23 PROCEDURE — 99999 PR PBB SHADOW E&M-EST. PATIENT-LVL IV: CPT | Mod: PBBFAC,,, | Performed by: INTERNAL MEDICINE

## 2021-03-23 PROCEDURE — 99214 OFFICE O/P EST MOD 30 MIN: CPT | Mod: PBBFAC | Performed by: INTERNAL MEDICINE

## 2021-03-23 PROCEDURE — 82150 ASSAY OF AMYLASE: CPT | Performed by: INTERNAL MEDICINE

## 2021-03-23 PROCEDURE — 99999 PR PBB SHADOW E&M-EST. PATIENT-LVL IV: ICD-10-PCS | Mod: PBBFAC,,, | Performed by: INTERNAL MEDICINE

## 2021-03-23 PROCEDURE — 99214 PR OFFICE/OUTPT VISIT, EST, LEVL IV, 30-39 MIN: ICD-10-PCS | Mod: S$PBB,,, | Performed by: INTERNAL MEDICINE

## 2021-03-23 PROCEDURE — 83036 HEMOGLOBIN GLYCOSYLATED A1C: CPT | Performed by: INTERNAL MEDICINE

## 2021-03-23 PROCEDURE — 80053 COMPREHEN METABOLIC PANEL: CPT | Performed by: INTERNAL MEDICINE

## 2021-03-23 PROCEDURE — 80076 HEPATIC FUNCTION PANEL: CPT | Performed by: INTERNAL MEDICINE

## 2021-03-23 PROCEDURE — 85025 COMPLETE CBC W/AUTO DIFF WBC: CPT | Performed by: INTERNAL MEDICINE

## 2021-03-23 PROCEDURE — 85025 COMPLETE CBC W/AUTO DIFF WBC: CPT | Mod: 91 | Performed by: INTERNAL MEDICINE

## 2021-03-23 PROCEDURE — 84443 ASSAY THYROID STIM HORMONE: CPT | Performed by: INTERNAL MEDICINE

## 2021-03-24 ENCOUNTER — HOSPITAL ENCOUNTER (OUTPATIENT)
Dept: RADIOLOGY | Facility: OTHER | Age: 71
Discharge: HOME | End: 2021-03-24
Attending: INTERNAL MEDICINE
Payer: MEDICARE

## 2021-03-24 DIAGNOSIS — Z12.2 ENCOUNTER FOR SCREENING FOR MALIGNANT NEOPLASM OF RESPIRATORY ORGANS: ICD-10-CM

## 2021-03-24 PROCEDURE — 71271 CT THORAX LUNG CANCER SCR C-: CPT | Mod: 26,,, | Performed by: RADIOLOGY

## 2021-03-24 PROCEDURE — 71271 CT THORAX LUNG CANCER SCR C-: CPT | Mod: TC

## 2021-03-24 PROCEDURE — 71271 CT CHEST LUNG SCREENING LOW DOSE: ICD-10-PCS | Mod: 26,,, | Performed by: RADIOLOGY

## 2021-03-26 ENCOUNTER — OFFICE VISIT (OUTPATIENT)
Dept: OTOLARYNGOLOGY | Facility: CLINIC | Age: 71
End: 2021-03-26
Payer: MEDICARE

## 2021-03-26 ENCOUNTER — OFFICE VISIT (OUTPATIENT)
Dept: CARDIOLOGY | Facility: CLINIC | Age: 71
End: 2021-03-26
Payer: MEDICARE

## 2021-03-26 VITALS
HEART RATE: 62 BPM | WEIGHT: 157.44 LBS | SYSTOLIC BLOOD PRESSURE: 161 MMHG | HEIGHT: 66 IN | DIASTOLIC BLOOD PRESSURE: 78 MMHG | OXYGEN SATURATION: 98 % | BODY MASS INDEX: 25.3 KG/M2

## 2021-03-26 VITALS
HEART RATE: 64 BPM | WEIGHT: 155.31 LBS | DIASTOLIC BLOOD PRESSURE: 82 MMHG | BODY MASS INDEX: 25.07 KG/M2 | SYSTOLIC BLOOD PRESSURE: 142 MMHG

## 2021-03-26 DIAGNOSIS — J32.8 OTHER CHRONIC SINUSITIS: Chronic | ICD-10-CM

## 2021-03-26 DIAGNOSIS — Z71.82 EXERCISE COUNSELING: ICD-10-CM

## 2021-03-26 DIAGNOSIS — R51.9 RIGHT FACIAL PAIN: Primary | ICD-10-CM

## 2021-03-26 DIAGNOSIS — I47.10 SVT (SUPRAVENTRICULAR TACHYCARDIA): ICD-10-CM

## 2021-03-26 DIAGNOSIS — Z71.3 DIETARY COUNSELING: ICD-10-CM

## 2021-03-26 DIAGNOSIS — J30.89 NON-SEASONAL ALLERGIC RHINITIS, UNSPECIFIED TRIGGER: Chronic | ICD-10-CM

## 2021-03-26 DIAGNOSIS — R00.2 PALPITATIONS: Primary | ICD-10-CM

## 2021-03-26 PROCEDURE — 31231 NASAL/SINUS ENDOSCOPY: ICD-10-PCS | Mod: S$PBB,,, | Performed by: OTOLARYNGOLOGY

## 2021-03-26 PROCEDURE — 99213 OFFICE O/P EST LOW 20 MIN: CPT | Mod: PBBFAC,27,PN,25 | Performed by: OTOLARYNGOLOGY

## 2021-03-26 PROCEDURE — 99999 PR PBB SHADOW E&M-EST. PATIENT-LVL III: ICD-10-PCS | Mod: PBBFAC,,, | Performed by: OTOLARYNGOLOGY

## 2021-03-26 PROCEDURE — 99999 PR PBB SHADOW E&M-EST. PATIENT-LVL III: CPT | Mod: PBBFAC,,, | Performed by: OTOLARYNGOLOGY

## 2021-03-26 PROCEDURE — 99999 PR PBB SHADOW E&M-EST. PATIENT-LVL IV: CPT | Mod: PBBFAC,,, | Performed by: INTERNAL MEDICINE

## 2021-03-26 PROCEDURE — 99214 OFFICE O/P EST MOD 30 MIN: CPT | Mod: PBBFAC,PN,25 | Performed by: INTERNAL MEDICINE

## 2021-03-26 PROCEDURE — 99214 OFFICE O/P EST MOD 30 MIN: CPT | Mod: S$PBB,,, | Performed by: INTERNAL MEDICINE

## 2021-03-26 PROCEDURE — 99214 OFFICE O/P EST MOD 30 MIN: CPT | Mod: 25,S$PBB,, | Performed by: OTOLARYNGOLOGY

## 2021-03-26 PROCEDURE — 99214 PR OFFICE/OUTPT VISIT, EST, LEVL IV, 30-39 MIN: ICD-10-PCS | Mod: 25,S$PBB,, | Performed by: OTOLARYNGOLOGY

## 2021-03-26 PROCEDURE — 99999 PR PBB SHADOW E&M-EST. PATIENT-LVL IV: ICD-10-PCS | Mod: PBBFAC,,, | Performed by: INTERNAL MEDICINE

## 2021-03-26 PROCEDURE — 31231 NASAL ENDOSCOPY DX: CPT | Mod: PBBFAC,PN | Performed by: OTOLARYNGOLOGY

## 2021-03-26 PROCEDURE — 99214 PR OFFICE/OUTPT VISIT, EST, LEVL IV, 30-39 MIN: ICD-10-PCS | Mod: S$PBB,,, | Performed by: INTERNAL MEDICINE

## 2021-03-26 RX ORDER — AMOXICILLIN AND CLAVULANATE POTASSIUM 875; 125 MG/1; MG/1
1 TABLET, FILM COATED ORAL 2 TIMES DAILY
Qty: 42 TABLET | Refills: 0 | Status: SHIPPED | OUTPATIENT
Start: 2021-03-26 | End: 2021-04-16

## 2021-03-26 RX ORDER — LEVOCETIRIZINE DIHYDROCHLORIDE 5 MG/1
5 TABLET, FILM COATED ORAL NIGHTLY
Qty: 30 TABLET | Refills: 11 | Status: SHIPPED | OUTPATIENT
Start: 2021-03-26 | End: 2021-03-29 | Stop reason: SDUPTHER

## 2021-03-30 RX ORDER — LEVOCETIRIZINE DIHYDROCHLORIDE 5 MG/1
5 TABLET, FILM COATED ORAL NIGHTLY
Qty: 30 TABLET | Refills: 11 | Status: SHIPPED | OUTPATIENT
Start: 2021-03-30 | End: 2022-11-29

## 2021-04-01 ENCOUNTER — OFFICE VISIT (OUTPATIENT)
Dept: PSYCHIATRY | Facility: CLINIC | Age: 71
End: 2021-04-01
Payer: MEDICARE

## 2021-04-01 DIAGNOSIS — F32.A DEPRESSION, UNSPECIFIED DEPRESSION TYPE: Primary | ICD-10-CM

## 2021-04-01 PROCEDURE — 90832 PR PSYCHOTHERAPY W/PATIENT, 30 MIN: ICD-10-PCS | Mod: ,,, | Performed by: SOCIAL WORKER

## 2021-04-01 PROCEDURE — 99213 OFFICE O/P EST LOW 20 MIN: CPT | Mod: PBBFAC | Performed by: SOCIAL WORKER

## 2021-04-01 PROCEDURE — 90832 PSYTX W PT 30 MINUTES: CPT | Mod: ,,, | Performed by: SOCIAL WORKER

## 2021-04-01 PROCEDURE — 99999 PR PBB SHADOW E&M-EST. PATIENT-LVL III: ICD-10-PCS | Mod: PBBFAC,,, | Performed by: SOCIAL WORKER

## 2021-04-01 PROCEDURE — 99999 PR PBB SHADOW E&M-EST. PATIENT-LVL III: CPT | Mod: PBBFAC,,, | Performed by: SOCIAL WORKER

## 2021-04-03 ENCOUNTER — HOSPITAL ENCOUNTER (OUTPATIENT)
Dept: RADIOLOGY | Facility: OTHER | Age: 71
Discharge: HOME | End: 2021-04-03
Attending: OTOLARYNGOLOGY
Payer: MEDICARE

## 2021-04-03 DIAGNOSIS — J32.8 OTHER CHRONIC SINUSITIS: ICD-10-CM

## 2021-04-03 PROCEDURE — 70486 CT MEDTRONIC SINUSES WITHOUT: ICD-10-PCS | Mod: 26,,, | Performed by: RADIOLOGY

## 2021-04-03 PROCEDURE — 70486 CT MAXILLOFACIAL W/O DYE: CPT | Mod: TC

## 2021-04-03 PROCEDURE — 70486 CT MAXILLOFACIAL W/O DYE: CPT | Mod: 26,,, | Performed by: RADIOLOGY

## 2021-04-05 ENCOUNTER — PATIENT OUTREACH (OUTPATIENT)
Dept: ADMINISTRATIVE | Facility: HOSPITAL | Age: 71
End: 2021-04-05

## 2021-04-06 ENCOUNTER — CLINICAL SUPPORT (OUTPATIENT)
Dept: CARDIOLOGY | Facility: HOSPITAL | Age: 71
End: 2021-04-06
Attending: INTERNAL MEDICINE
Payer: MEDICARE

## 2021-04-06 DIAGNOSIS — R00.2 PALPITATIONS: ICD-10-CM

## 2021-04-06 DIAGNOSIS — I47.10 SVT (SUPRAVENTRICULAR TACHYCARDIA): ICD-10-CM

## 2021-04-06 PROCEDURE — 93271 ECG/MONITORING AND ANALYSIS: CPT

## 2021-04-06 PROCEDURE — 93272 CARDIAC EVENT MONITOR (CUPID ONLY): ICD-10-PCS | Mod: ,,, | Performed by: INTERNAL MEDICINE

## 2021-04-06 PROCEDURE — 93272 ECG/REVIEW INTERPRET ONLY: CPT | Mod: ,,, | Performed by: INTERNAL MEDICINE

## 2021-04-14 ENCOUNTER — OFFICE VISIT (OUTPATIENT)
Dept: PSYCHIATRY | Facility: CLINIC | Age: 71
End: 2021-04-14
Payer: MEDICARE

## 2021-04-14 DIAGNOSIS — F41.9 ANXIETY: Primary | ICD-10-CM

## 2021-04-14 PROCEDURE — 99999 PR PBB SHADOW E&M-EST. PATIENT-LVL III: ICD-10-PCS | Mod: PBBFAC,,, | Performed by: SOCIAL WORKER

## 2021-04-14 PROCEDURE — 99213 OFFICE O/P EST LOW 20 MIN: CPT | Mod: PBBFAC | Performed by: SOCIAL WORKER

## 2021-04-14 PROCEDURE — 99999 PR PBB SHADOW E&M-EST. PATIENT-LVL III: CPT | Mod: PBBFAC,,, | Performed by: SOCIAL WORKER

## 2021-04-14 PROCEDURE — 90853 GROUP PSYCHOTHERAPY: CPT | Mod: ,,, | Performed by: SOCIAL WORKER

## 2021-04-14 PROCEDURE — 90853 PR GROUP PSYCHOTHERAPY: ICD-10-PCS | Mod: ,,, | Performed by: SOCIAL WORKER

## 2021-04-15 ENCOUNTER — OFFICE VISIT (OUTPATIENT)
Dept: NEUROLOGY | Facility: CLINIC | Age: 71
End: 2021-04-15
Payer: MEDICARE

## 2021-04-15 VITALS
DIASTOLIC BLOOD PRESSURE: 76 MMHG | HEIGHT: 66 IN | WEIGHT: 163.38 LBS | HEART RATE: 65 BPM | SYSTOLIC BLOOD PRESSURE: 160 MMHG | BODY MASS INDEX: 26.26 KG/M2

## 2021-04-15 DIAGNOSIS — G45.9 TIA (TRANSIENT ISCHEMIC ATTACK): Primary | ICD-10-CM

## 2021-04-15 DIAGNOSIS — E11.42 DIABETIC PERIPHERAL NEUROPATHY: ICD-10-CM

## 2021-04-15 PROCEDURE — 99999 PR PBB SHADOW E&M-EST. PATIENT-LVL III: ICD-10-PCS | Mod: PBBFAC,,, | Performed by: NEUROLOGICAL SURGERY

## 2021-04-15 PROCEDURE — 99999 PR PBB SHADOW E&M-EST. PATIENT-LVL III: CPT | Mod: PBBFAC,,, | Performed by: NEUROLOGICAL SURGERY

## 2021-04-15 PROCEDURE — 99213 OFFICE O/P EST LOW 20 MIN: CPT | Mod: PBBFAC | Performed by: NEUROLOGICAL SURGERY

## 2021-04-15 PROCEDURE — 99214 OFFICE O/P EST MOD 30 MIN: CPT | Mod: S$PBB,,, | Performed by: NEUROLOGICAL SURGERY

## 2021-04-15 PROCEDURE — 99214 PR OFFICE/OUTPT VISIT, EST, LEVL IV, 30-39 MIN: ICD-10-PCS | Mod: S$PBB,,, | Performed by: NEUROLOGICAL SURGERY

## 2021-04-15 RX ORDER — ASPIRIN 325 MG
325 TABLET, DELAYED RELEASE (ENTERIC COATED) ORAL DAILY
Qty: 30 TABLET | Refills: 11 | Status: SHIPPED | OUTPATIENT
Start: 2021-04-15 | End: 2023-04-24

## 2021-04-15 RX ORDER — PANTOPRAZOLE SODIUM 40 MG/1
40 TABLET, DELAYED RELEASE ORAL DAILY
COMMUNITY
End: 2021-11-12

## 2021-04-21 ENCOUNTER — OFFICE VISIT (OUTPATIENT)
Dept: PSYCHIATRY | Facility: CLINIC | Age: 71
End: 2021-04-21
Payer: MEDICARE

## 2021-04-21 DIAGNOSIS — F43.23 ADJUSTMENT DISORDER WITH MIXED ANXIETY AND DEPRESSED MOOD: ICD-10-CM

## 2021-04-21 DIAGNOSIS — F41.9 ANXIETY: Primary | ICD-10-CM

## 2021-04-21 PROCEDURE — 90853 PR GROUP PSYCHOTHERAPY: ICD-10-PCS | Mod: ,,, | Performed by: SOCIAL WORKER

## 2021-04-21 PROCEDURE — 90853 GROUP PSYCHOTHERAPY: CPT | Mod: ,,, | Performed by: SOCIAL WORKER

## 2021-04-21 PROCEDURE — 99213 OFFICE O/P EST LOW 20 MIN: CPT | Mod: PBBFAC | Performed by: SOCIAL WORKER

## 2021-04-21 PROCEDURE — 99999 PR PBB SHADOW E&M-EST. PATIENT-LVL III: ICD-10-PCS | Mod: PBBFAC,,, | Performed by: SOCIAL WORKER

## 2021-04-21 PROCEDURE — 99999 PR PBB SHADOW E&M-EST. PATIENT-LVL III: CPT | Mod: PBBFAC,,, | Performed by: SOCIAL WORKER

## 2021-04-22 ENCOUNTER — PATIENT OUTREACH (OUTPATIENT)
Dept: ADMINISTRATIVE | Facility: HOSPITAL | Age: 71
End: 2021-04-22

## 2021-04-23 ENCOUNTER — OFFICE VISIT (OUTPATIENT)
Dept: INTERNAL MEDICINE | Facility: CLINIC | Age: 71
End: 2021-04-23
Attending: INTERNAL MEDICINE
Payer: MEDICARE

## 2021-04-23 VITALS
HEART RATE: 58 BPM | DIASTOLIC BLOOD PRESSURE: 80 MMHG | WEIGHT: 158.5 LBS | BODY MASS INDEX: 25.47 KG/M2 | OXYGEN SATURATION: 98 % | SYSTOLIC BLOOD PRESSURE: 142 MMHG | HEIGHT: 66 IN

## 2021-04-23 DIAGNOSIS — I10 BENIGN ESSENTIAL HYPERTENSION: Primary | ICD-10-CM

## 2021-04-23 DIAGNOSIS — R20.0 FACIAL NUMBNESS: ICD-10-CM

## 2021-04-23 DIAGNOSIS — Z79.4 TYPE 2 DIABETES MELLITUS WITHOUT COMPLICATION, WITH LONG-TERM CURRENT USE OF INSULIN: ICD-10-CM

## 2021-04-23 DIAGNOSIS — E11.9 TYPE 2 DIABETES MELLITUS WITHOUT COMPLICATION, WITH LONG-TERM CURRENT USE OF INSULIN: ICD-10-CM

## 2021-04-23 PROCEDURE — 99214 PR OFFICE/OUTPT VISIT, EST, LEVL IV, 30-39 MIN: ICD-10-PCS | Mod: S$PBB,,, | Performed by: INTERNAL MEDICINE

## 2021-04-23 PROCEDURE — 99213 OFFICE O/P EST LOW 20 MIN: CPT | Mod: PBBFAC | Performed by: INTERNAL MEDICINE

## 2021-04-23 PROCEDURE — 99999 PR PBB SHADOW E&M-EST. PATIENT-LVL III: CPT | Mod: PBBFAC,,, | Performed by: INTERNAL MEDICINE

## 2021-04-23 PROCEDURE — 99214 OFFICE O/P EST MOD 30 MIN: CPT | Mod: S$PBB,,, | Performed by: INTERNAL MEDICINE

## 2021-04-23 PROCEDURE — 99999 PR PBB SHADOW E&M-EST. PATIENT-LVL III: ICD-10-PCS | Mod: PBBFAC,,, | Performed by: INTERNAL MEDICINE

## 2021-04-23 RX ORDER — IRBESARTAN 75 MG/1
75 TABLET ORAL NIGHTLY
Qty: 90 TABLET | Refills: 3 | Status: SHIPPED | OUTPATIENT
Start: 2021-04-23 | End: 2021-10-29 | Stop reason: SDUPTHER

## 2021-04-30 ENCOUNTER — OFFICE VISIT (OUTPATIENT)
Dept: OTOLARYNGOLOGY | Facility: CLINIC | Age: 71
End: 2021-04-30
Payer: MEDICARE

## 2021-04-30 VITALS
WEIGHT: 157.44 LBS | SYSTOLIC BLOOD PRESSURE: 126 MMHG | HEIGHT: 66 IN | DIASTOLIC BLOOD PRESSURE: 67 MMHG | HEART RATE: 59 BPM | BODY MASS INDEX: 25.3 KG/M2

## 2021-04-30 DIAGNOSIS — J30.89 NON-SEASONAL ALLERGIC RHINITIS, UNSPECIFIED TRIGGER: Chronic | ICD-10-CM

## 2021-04-30 DIAGNOSIS — R51.9 RIGHT FACIAL PAIN: Primary | Chronic | ICD-10-CM

## 2021-04-30 DIAGNOSIS — H90.42 SENSORINEURAL HEARING LOSS (SNHL) OF LEFT EAR WITH UNRESTRICTED HEARING OF RIGHT EAR: Chronic | ICD-10-CM

## 2021-04-30 PROCEDURE — 31231 NASAL ENDOSCOPY DX: CPT | Mod: PBBFAC,PN | Performed by: OTOLARYNGOLOGY

## 2021-04-30 PROCEDURE — 99999 PR PBB SHADOW E&M-EST. PATIENT-LVL III: ICD-10-PCS | Mod: PBBFAC,,, | Performed by: OTOLARYNGOLOGY

## 2021-04-30 PROCEDURE — 99214 PR OFFICE/OUTPT VISIT, EST, LEVL IV, 30-39 MIN: ICD-10-PCS | Mod: 25,S$PBB,, | Performed by: OTOLARYNGOLOGY

## 2021-04-30 PROCEDURE — 31231 NASAL/SINUS ENDOSCOPY: ICD-10-PCS | Mod: S$PBB,,, | Performed by: OTOLARYNGOLOGY

## 2021-04-30 PROCEDURE — 99999 PR PBB SHADOW E&M-EST. PATIENT-LVL III: CPT | Mod: PBBFAC,,, | Performed by: OTOLARYNGOLOGY

## 2021-04-30 PROCEDURE — 99214 OFFICE O/P EST MOD 30 MIN: CPT | Mod: 25,S$PBB,, | Performed by: OTOLARYNGOLOGY

## 2021-04-30 PROCEDURE — 99213 OFFICE O/P EST LOW 20 MIN: CPT | Mod: PBBFAC,PN | Performed by: OTOLARYNGOLOGY

## 2021-05-05 ENCOUNTER — OFFICE VISIT (OUTPATIENT)
Dept: PSYCHIATRY | Facility: CLINIC | Age: 71
End: 2021-05-05
Payer: MEDICARE

## 2021-05-05 DIAGNOSIS — F41.9 ANXIETY: Primary | ICD-10-CM

## 2021-05-05 PROCEDURE — 99213 OFFICE O/P EST LOW 20 MIN: CPT | Mod: PBBFAC | Performed by: SOCIAL WORKER

## 2021-05-05 PROCEDURE — 90853 PR GROUP PSYCHOTHERAPY: ICD-10-PCS | Mod: ,,, | Performed by: SOCIAL WORKER

## 2021-05-05 PROCEDURE — 99999 PR PBB SHADOW E&M-EST. PATIENT-LVL III: CPT | Mod: PBBFAC,,, | Performed by: SOCIAL WORKER

## 2021-05-05 PROCEDURE — 99999 PR PBB SHADOW E&M-EST. PATIENT-LVL III: ICD-10-PCS | Mod: PBBFAC,,, | Performed by: SOCIAL WORKER

## 2021-05-05 PROCEDURE — 90853 GROUP PSYCHOTHERAPY: CPT | Mod: ,,, | Performed by: SOCIAL WORKER

## 2021-05-12 ENCOUNTER — OFFICE VISIT (OUTPATIENT)
Dept: PSYCHIATRY | Facility: CLINIC | Age: 71
End: 2021-05-12
Payer: MEDICARE

## 2021-05-12 DIAGNOSIS — F43.23 ADJUSTMENT DISORDER WITH MIXED ANXIETY AND DEPRESSED MOOD: ICD-10-CM

## 2021-05-12 DIAGNOSIS — F41.9 ANXIETY: Primary | ICD-10-CM

## 2021-05-12 PROCEDURE — 99213 OFFICE O/P EST LOW 20 MIN: CPT | Mod: PBBFAC | Performed by: SOCIAL WORKER

## 2021-05-12 PROCEDURE — 99999 PR PBB SHADOW E&M-EST. PATIENT-LVL III: ICD-10-PCS | Mod: PBBFAC,,, | Performed by: SOCIAL WORKER

## 2021-05-12 PROCEDURE — 90853 GROUP PSYCHOTHERAPY: CPT | Mod: ,,, | Performed by: SOCIAL WORKER

## 2021-05-12 PROCEDURE — 99999 PR PBB SHADOW E&M-EST. PATIENT-LVL III: CPT | Mod: PBBFAC,,, | Performed by: SOCIAL WORKER

## 2021-05-12 PROCEDURE — 90853 PR GROUP PSYCHOTHERAPY: ICD-10-PCS | Mod: ,,, | Performed by: SOCIAL WORKER

## 2021-05-17 ENCOUNTER — PATIENT OUTREACH (OUTPATIENT)
Dept: ADMINISTRATIVE | Facility: HOSPITAL | Age: 71
End: 2021-05-17

## 2021-05-17 ENCOUNTER — TELEPHONE (OUTPATIENT)
Dept: INTERNAL MEDICINE | Facility: CLINIC | Age: 71
End: 2021-05-17

## 2021-05-17 DIAGNOSIS — E11.9 DIABETES MELLITUS WITHOUT COMPLICATION: Primary | ICD-10-CM

## 2021-05-18 ENCOUNTER — PATIENT MESSAGE (OUTPATIENT)
Dept: UROGYNECOLOGY | Facility: CLINIC | Age: 71
End: 2021-05-18

## 2021-05-19 ENCOUNTER — OFFICE VISIT (OUTPATIENT)
Dept: PSYCHIATRY | Facility: CLINIC | Age: 71
End: 2021-05-19
Payer: MEDICARE

## 2021-05-19 DIAGNOSIS — F41.9 ANXIETY: ICD-10-CM

## 2021-05-19 DIAGNOSIS — F43.23 ADJUSTMENT DISORDER WITH MIXED ANXIETY AND DEPRESSED MOOD: Primary | ICD-10-CM

## 2021-05-19 PROCEDURE — 99999 PR PBB SHADOW E&M-EST. PATIENT-LVL III: ICD-10-PCS | Mod: PBBFAC,,, | Performed by: SOCIAL WORKER

## 2021-05-19 PROCEDURE — 99999 PR PBB SHADOW E&M-EST. PATIENT-LVL III: CPT | Mod: PBBFAC,,, | Performed by: SOCIAL WORKER

## 2021-05-19 PROCEDURE — 90853 GROUP PSYCHOTHERAPY: CPT | Mod: ,,, | Performed by: SOCIAL WORKER

## 2021-05-19 PROCEDURE — 90853 PR GROUP PSYCHOTHERAPY: ICD-10-PCS | Mod: ,,, | Performed by: SOCIAL WORKER

## 2021-05-19 PROCEDURE — 99213 OFFICE O/P EST LOW 20 MIN: CPT | Mod: PBBFAC | Performed by: SOCIAL WORKER

## 2021-05-21 ENCOUNTER — OFFICE VISIT (OUTPATIENT)
Dept: CARDIOLOGY | Facility: CLINIC | Age: 71
End: 2021-05-21
Payer: MEDICARE

## 2021-05-21 ENCOUNTER — LAB VISIT (OUTPATIENT)
Dept: LAB | Facility: OTHER | Age: 71
End: 2021-05-21
Attending: INTERNAL MEDICINE
Payer: MEDICARE

## 2021-05-21 ENCOUNTER — OFFICE VISIT (OUTPATIENT)
Dept: UROGYNECOLOGY | Facility: CLINIC | Age: 71
End: 2021-05-21
Payer: MEDICARE

## 2021-05-21 VITALS
WEIGHT: 155.88 LBS | BODY MASS INDEX: 25.05 KG/M2 | SYSTOLIC BLOOD PRESSURE: 160 MMHG | HEIGHT: 66 IN | HEART RATE: 62 BPM | DIASTOLIC BLOOD PRESSURE: 85 MMHG

## 2021-05-21 VITALS
WEIGHT: 156.31 LBS | HEIGHT: 66 IN | BODY MASS INDEX: 25.12 KG/M2 | SYSTOLIC BLOOD PRESSURE: 120 MMHG | DIASTOLIC BLOOD PRESSURE: 60 MMHG

## 2021-05-21 DIAGNOSIS — R00.2 PALPITATIONS: Primary | ICD-10-CM

## 2021-05-21 DIAGNOSIS — R63.4 WEIGHT LOSS: ICD-10-CM

## 2021-05-21 DIAGNOSIS — E11.9 DIABETES MELLITUS WITHOUT COMPLICATION: ICD-10-CM

## 2021-05-21 DIAGNOSIS — I10 HYPERTENSION, UNSPECIFIED TYPE: ICD-10-CM

## 2021-05-21 DIAGNOSIS — N89.8 VAGINAL DISCHARGE: Primary | ICD-10-CM

## 2021-05-21 DIAGNOSIS — I47.10 SVT (SUPRAVENTRICULAR TACHYCARDIA): ICD-10-CM

## 2021-05-21 PROCEDURE — 99213 OFFICE O/P EST LOW 20 MIN: CPT | Mod: S$PBB,,, | Performed by: NURSE PRACTITIONER

## 2021-05-21 PROCEDURE — 99214 OFFICE O/P EST MOD 30 MIN: CPT | Mod: PBBFAC,27 | Performed by: NURSE PRACTITIONER

## 2021-05-21 PROCEDURE — 99999 PR PBB SHADOW E&M-EST. PATIENT-LVL III: ICD-10-PCS | Mod: PBBFAC,,, | Performed by: INTERNAL MEDICINE

## 2021-05-21 PROCEDURE — 99999 PR PBB SHADOW E&M-EST. PATIENT-LVL III: CPT | Mod: PBBFAC,,, | Performed by: INTERNAL MEDICINE

## 2021-05-21 PROCEDURE — 99999 PR PBB SHADOW E&M-EST. PATIENT-LVL IV: CPT | Mod: PBBFAC,,, | Performed by: NURSE PRACTITIONER

## 2021-05-21 PROCEDURE — 99213 OFFICE O/P EST LOW 20 MIN: CPT | Mod: S$PBB,,, | Performed by: INTERNAL MEDICINE

## 2021-05-21 PROCEDURE — 99213 PR OFFICE/OUTPT VISIT, EST, LEVL III, 20-29 MIN: ICD-10-PCS | Mod: S$PBB,,, | Performed by: INTERNAL MEDICINE

## 2021-05-21 PROCEDURE — 99213 OFFICE O/P EST LOW 20 MIN: CPT | Mod: PBBFAC,PN | Performed by: INTERNAL MEDICINE

## 2021-05-21 PROCEDURE — 82570 ASSAY OF URINE CREATININE: CPT | Performed by: INTERNAL MEDICINE

## 2021-05-21 PROCEDURE — 99999 PR PBB SHADOW E&M-EST. PATIENT-LVL IV: ICD-10-PCS | Mod: PBBFAC,,, | Performed by: NURSE PRACTITIONER

## 2021-05-21 PROCEDURE — 99213 PR OFFICE/OUTPT VISIT, EST, LEVL III, 20-29 MIN: ICD-10-PCS | Mod: S$PBB,,, | Performed by: NURSE PRACTITIONER

## 2021-05-21 PROCEDURE — 87481 CANDIDA DNA AMP PROBE: CPT | Mod: 59

## 2021-05-21 PROCEDURE — 82043 UR ALBUMIN QUANTITATIVE: CPT | Performed by: INTERNAL MEDICINE

## 2021-05-21 PROCEDURE — 87481 CANDIDA DNA AMP PROBE: CPT | Mod: 59 | Performed by: NURSE PRACTITIONER

## 2021-05-22 LAB
ALBUMIN/CREAT UR: NORMAL UG/MG (ref 0–30)
CREAT UR-MCNC: 51.7 MG/DL (ref 15–325)
MICROALBUMIN UR DL<=1MG/L-MCNC: <2.5 UG/ML

## 2021-05-24 ENCOUNTER — PATIENT MESSAGE (OUTPATIENT)
Dept: UROGYNECOLOGY | Facility: CLINIC | Age: 71
End: 2021-05-24

## 2021-05-24 LAB
BACTERIAL VAGINOSIS DNA: NEGATIVE
CANDIDA GLABRATA DNA: NEGATIVE
CANDIDA KRUSEI DNA: NEGATIVE
CANDIDA RRNA VAG QL PROBE: NEGATIVE
T VAGINALIS RRNA GENITAL QL PROBE: NEGATIVE

## 2021-05-24 RX ORDER — BLOOD SUGAR DIAGNOSTIC
STRIP MISCELLANEOUS
Qty: 100 EACH | Refills: 11 | Status: SHIPPED | OUTPATIENT
Start: 2021-05-24 | End: 2021-09-15 | Stop reason: SDUPTHER

## 2021-05-26 ENCOUNTER — OFFICE VISIT (OUTPATIENT)
Dept: PSYCHIATRY | Facility: CLINIC | Age: 71
End: 2021-05-26
Payer: MEDICARE

## 2021-05-26 DIAGNOSIS — F43.23 ADJUSTMENT DISORDER WITH MIXED ANXIETY AND DEPRESSED MOOD: Primary | ICD-10-CM

## 2021-05-26 DIAGNOSIS — F41.9 ANXIETY: ICD-10-CM

## 2021-05-26 PROCEDURE — 90853 GROUP PSYCHOTHERAPY: CPT | Mod: ,,, | Performed by: SOCIAL WORKER

## 2021-05-26 PROCEDURE — 99999 PR PBB SHADOW E&M-EST. PATIENT-LVL III: ICD-10-PCS | Mod: PBBFAC,,, | Performed by: SOCIAL WORKER

## 2021-05-26 PROCEDURE — 99999 PR PBB SHADOW E&M-EST. PATIENT-LVL III: CPT | Mod: PBBFAC,,, | Performed by: SOCIAL WORKER

## 2021-05-26 PROCEDURE — 99213 OFFICE O/P EST LOW 20 MIN: CPT | Mod: PBBFAC | Performed by: SOCIAL WORKER

## 2021-05-26 PROCEDURE — 90853 PR GROUP PSYCHOTHERAPY: ICD-10-PCS | Mod: ,,, | Performed by: SOCIAL WORKER

## 2021-05-27 ENCOUNTER — TELEPHONE (OUTPATIENT)
Dept: NEUROLOGY | Facility: CLINIC | Age: 71
End: 2021-05-27

## 2021-06-10 ENCOUNTER — PES CALL (OUTPATIENT)
Dept: ADMINISTRATIVE | Facility: CLINIC | Age: 71
End: 2021-06-10

## 2021-07-03 ENCOUNTER — PATIENT MESSAGE (OUTPATIENT)
Dept: ADMINISTRATIVE | Facility: OTHER | Age: 71
End: 2021-07-03

## 2021-07-07 ENCOUNTER — OFFICE VISIT (OUTPATIENT)
Dept: PSYCHIATRY | Facility: CLINIC | Age: 71
End: 2021-07-07
Payer: MEDICARE

## 2021-07-07 DIAGNOSIS — F43.23 ADJUSTMENT DISORDER WITH MIXED ANXIETY AND DEPRESSED MOOD: Primary | ICD-10-CM

## 2021-07-07 PROCEDURE — 99999 PR PBB SHADOW E&M-EST. PATIENT-LVL III: CPT | Mod: PBBFAC,,, | Performed by: SOCIAL WORKER

## 2021-07-07 PROCEDURE — 90853 GROUP PSYCHOTHERAPY: CPT | Mod: ,,, | Performed by: SOCIAL WORKER

## 2021-07-07 PROCEDURE — 99213 OFFICE O/P EST LOW 20 MIN: CPT | Mod: PBBFAC | Performed by: SOCIAL WORKER

## 2021-07-07 PROCEDURE — 90853 PR GROUP PSYCHOTHERAPY: ICD-10-PCS | Mod: ,,, | Performed by: SOCIAL WORKER

## 2021-07-07 PROCEDURE — 99999 PR PBB SHADOW E&M-EST. PATIENT-LVL III: ICD-10-PCS | Mod: PBBFAC,,, | Performed by: SOCIAL WORKER

## 2021-07-21 ENCOUNTER — OFFICE VISIT (OUTPATIENT)
Dept: CARDIOLOGY | Facility: CLINIC | Age: 71
End: 2021-07-21
Payer: MEDICARE

## 2021-07-21 VITALS
HEIGHT: 66 IN | WEIGHT: 158.5 LBS | SYSTOLIC BLOOD PRESSURE: 149 MMHG | DIASTOLIC BLOOD PRESSURE: 81 MMHG | HEART RATE: 78 BPM | BODY MASS INDEX: 25.47 KG/M2

## 2021-07-21 DIAGNOSIS — E11.9 DIABETES MELLITUS WITHOUT COMPLICATION: ICD-10-CM

## 2021-07-21 DIAGNOSIS — R00.2 PALPITATIONS: Primary | ICD-10-CM

## 2021-07-21 DIAGNOSIS — I49.3 PVC'S (PREMATURE VENTRICULAR CONTRACTIONS): ICD-10-CM

## 2021-07-21 DIAGNOSIS — I10 ESSENTIAL HYPERTENSION: ICD-10-CM

## 2021-07-21 PROCEDURE — 99214 PR OFFICE/OUTPT VISIT, EST, LEVL IV, 30-39 MIN: ICD-10-PCS | Mod: S$PBB,,, | Performed by: INTERNAL MEDICINE

## 2021-07-21 PROCEDURE — 99214 OFFICE O/P EST MOD 30 MIN: CPT | Mod: S$PBB,,, | Performed by: INTERNAL MEDICINE

## 2021-07-21 PROCEDURE — 99214 OFFICE O/P EST MOD 30 MIN: CPT | Mod: PBBFAC,PN | Performed by: INTERNAL MEDICINE

## 2021-07-21 PROCEDURE — 99999 PR PBB SHADOW E&M-EST. PATIENT-LVL IV: ICD-10-PCS | Mod: PBBFAC,,, | Performed by: INTERNAL MEDICINE

## 2021-07-21 PROCEDURE — 99999 PR PBB SHADOW E&M-EST. PATIENT-LVL IV: CPT | Mod: PBBFAC,,, | Performed by: INTERNAL MEDICINE

## 2021-07-21 RX ORDER — FLASH GLUCOSE SENSOR
KIT MISCELLANEOUS
COMMUNITY
Start: 2021-06-22 | End: 2023-06-13 | Stop reason: SDUPTHER

## 2021-07-21 RX ORDER — GLUCAGON INJECTION, SOLUTION 1 MG/.2ML
INJECTION, SOLUTION SUBCUTANEOUS
COMMUNITY
Start: 2021-06-08 | End: 2022-12-30

## 2021-07-22 ENCOUNTER — TELEPHONE (OUTPATIENT)
Dept: CARDIOLOGY | Facility: CLINIC | Age: 71
End: 2021-07-22

## 2021-07-28 ENCOUNTER — OFFICE VISIT (OUTPATIENT)
Dept: PSYCHIATRY | Facility: CLINIC | Age: 71
End: 2021-07-28
Payer: MEDICARE

## 2021-07-28 DIAGNOSIS — F43.23 ADJUSTMENT DISORDER WITH MIXED ANXIETY AND DEPRESSED MOOD: Primary | ICD-10-CM

## 2021-07-28 PROCEDURE — 99999 PR PBB SHADOW E&M-EST. PATIENT-LVL III: ICD-10-PCS | Mod: PBBFAC,,, | Performed by: SOCIAL WORKER

## 2021-07-28 PROCEDURE — 90853 PR GROUP PSYCHOTHERAPY: ICD-10-PCS | Mod: ,,, | Performed by: SOCIAL WORKER

## 2021-07-28 PROCEDURE — 99213 OFFICE O/P EST LOW 20 MIN: CPT | Mod: PBBFAC | Performed by: SOCIAL WORKER

## 2021-07-28 PROCEDURE — 99999 PR PBB SHADOW E&M-EST. PATIENT-LVL III: CPT | Mod: PBBFAC,,, | Performed by: SOCIAL WORKER

## 2021-07-28 PROCEDURE — 90853 GROUP PSYCHOTHERAPY: CPT | Mod: ,,, | Performed by: SOCIAL WORKER

## 2021-08-12 ENCOUNTER — PES CALL (OUTPATIENT)
Dept: ADMINISTRATIVE | Facility: CLINIC | Age: 71
End: 2021-08-12

## 2021-08-16 ENCOUNTER — TELEPHONE (OUTPATIENT)
Dept: UROGYNECOLOGY | Facility: CLINIC | Age: 71
End: 2021-08-16

## 2021-08-16 DIAGNOSIS — Z12.31 ENCOUNTER FOR SCREENING MAMMOGRAM FOR BREAST CANCER: Primary | ICD-10-CM

## 2021-08-19 ENCOUNTER — HOSPITAL ENCOUNTER (OUTPATIENT)
Dept: RADIOLOGY | Facility: HOSPITAL | Age: 71
Discharge: HOME | End: 2021-08-19
Attending: PHYSICIAN ASSISTANT
Payer: MEDICARE

## 2021-08-19 VITALS — BODY MASS INDEX: 25.07 KG/M2 | WEIGHT: 156 LBS | HEIGHT: 66 IN

## 2021-08-19 DIAGNOSIS — Z12.31 ENCOUNTER FOR SCREENING MAMMOGRAM FOR BREAST CANCER: ICD-10-CM

## 2021-08-19 PROCEDURE — 77067 SCR MAMMO BI INCL CAD: CPT | Mod: 26,,, | Performed by: RADIOLOGY

## 2021-08-19 PROCEDURE — 77067 MAMMO DIGITAL SCREENING BILAT WITH TOMO: ICD-10-PCS | Mod: 26,,, | Performed by: RADIOLOGY

## 2021-08-19 PROCEDURE — 77067 SCR MAMMO BI INCL CAD: CPT | Mod: TC

## 2021-08-19 PROCEDURE — 77063 BREAST TOMOSYNTHESIS BI: CPT | Mod: 26,,, | Performed by: RADIOLOGY

## 2021-08-19 PROCEDURE — 77063 MAMMO DIGITAL SCREENING BILAT WITH TOMO: ICD-10-PCS | Mod: 26,,, | Performed by: RADIOLOGY

## 2021-08-20 ENCOUNTER — TELEPHONE (OUTPATIENT)
Dept: UROGYNECOLOGY | Facility: CLINIC | Age: 71
End: 2021-08-20

## 2021-09-15 DIAGNOSIS — E11.40 TYPE 2 DIABETES MELLITUS WITH DIABETIC NEUROPATHY, WITH LONG-TERM CURRENT USE OF INSULIN: Primary | ICD-10-CM

## 2021-09-15 DIAGNOSIS — Z79.4 TYPE 2 DIABETES MELLITUS WITH DIABETIC NEUROPATHY, WITH LONG-TERM CURRENT USE OF INSULIN: Primary | ICD-10-CM

## 2021-09-15 RX ORDER — BLOOD SUGAR DIAGNOSTIC
STRIP MISCELLANEOUS
Qty: 100 EACH | Refills: 11 | Status: SHIPPED | OUTPATIENT
Start: 2021-09-15 | End: 2023-10-04 | Stop reason: SDUPTHER

## 2021-09-20 ENCOUNTER — TELEPHONE (OUTPATIENT)
Dept: INTERNAL MEDICINE | Facility: CLINIC | Age: 71
End: 2021-09-20

## 2021-09-20 ENCOUNTER — PATIENT MESSAGE (OUTPATIENT)
Dept: UROGYNECOLOGY | Facility: CLINIC | Age: 71
End: 2021-09-20

## 2021-09-20 DIAGNOSIS — N30.10 INTERSTITIAL CYSTITIS: ICD-10-CM

## 2021-09-20 RX ORDER — FLAVOXATE HYDROCHLORIDE 100 MG/1
100 TABLET ORAL 3 TIMES DAILY PRN
Qty: 30 TABLET | Refills: 11 | Status: SHIPPED | OUTPATIENT
Start: 2021-09-20 | End: 2022-03-25

## 2021-10-05 ENCOUNTER — PATIENT OUTREACH (OUTPATIENT)
Dept: ADMINISTRATIVE | Facility: OTHER | Age: 71
End: 2021-10-05

## 2021-10-05 DIAGNOSIS — E78.5 HYPERLIPIDEMIA ASSOCIATED WITH TYPE 2 DIABETES MELLITUS: Primary | ICD-10-CM

## 2021-10-05 DIAGNOSIS — E11.69 HYPERLIPIDEMIA ASSOCIATED WITH TYPE 2 DIABETES MELLITUS: Primary | ICD-10-CM

## 2021-10-06 ENCOUNTER — OFFICE VISIT (OUTPATIENT)
Dept: UROGYNECOLOGY | Facility: CLINIC | Age: 71
End: 2021-10-06
Payer: MEDICARE

## 2021-10-06 VITALS
HEIGHT: 66 IN | SYSTOLIC BLOOD PRESSURE: 188 MMHG | HEART RATE: 58 BPM | WEIGHT: 158.75 LBS | BODY MASS INDEX: 25.51 KG/M2 | DIASTOLIC BLOOD PRESSURE: 89 MMHG

## 2021-10-06 DIAGNOSIS — N30.10 INTERSTITIAL CYSTITIS: Primary | ICD-10-CM

## 2021-10-06 PROCEDURE — 99213 OFFICE O/P EST LOW 20 MIN: CPT | Mod: PBBFAC | Performed by: PHYSICIAN ASSISTANT

## 2021-10-06 PROCEDURE — 51700 PR IRRIGATION, BLADDER: ICD-10-PCS | Mod: S$PBB,,, | Performed by: PHYSICIAN ASSISTANT

## 2021-10-06 PROCEDURE — 99999 PR PBB SHADOW E&M-EST. PATIENT-LVL III: CPT | Mod: PBBFAC,,, | Performed by: PHYSICIAN ASSISTANT

## 2021-10-06 PROCEDURE — 99999 PR PBB SHADOW E&M-EST. PATIENT-LVL III: ICD-10-PCS | Mod: PBBFAC,,, | Performed by: PHYSICIAN ASSISTANT

## 2021-10-06 PROCEDURE — 87086 URINE CULTURE/COLONY COUNT: CPT | Performed by: PHYSICIAN ASSISTANT

## 2021-10-06 PROCEDURE — 99214 OFFICE O/P EST MOD 30 MIN: CPT | Mod: S$PBB,25,, | Performed by: PHYSICIAN ASSISTANT

## 2021-10-06 PROCEDURE — 51700 IRRIGATION OF BLADDER: CPT | Mod: S$PBB,,, | Performed by: PHYSICIAN ASSISTANT

## 2021-10-06 PROCEDURE — 99214 PR OFFICE/OUTPT VISIT, EST, LEVL IV, 30-39 MIN: ICD-10-PCS | Mod: S$PBB,25,, | Performed by: PHYSICIAN ASSISTANT

## 2021-10-06 RX ADMIN — SODIUM BICARBONATE 2.5 MEQ: 42 INJECTION, SOLUTION INTRAVENOUS at 04:10

## 2021-10-06 RX ADMIN — HEPARIN SODIUM 10000 UNITS: 1000 INJECTION INTRAVENOUS; SUBCUTANEOUS at 04:10

## 2021-10-08 ENCOUNTER — TELEPHONE (OUTPATIENT)
Dept: UROGYNECOLOGY | Facility: CLINIC | Age: 71
End: 2021-10-08

## 2021-10-08 ENCOUNTER — PATIENT MESSAGE (OUTPATIENT)
Dept: INTERNAL MEDICINE | Facility: CLINIC | Age: 71
End: 2021-10-08

## 2021-10-08 DIAGNOSIS — N30.10 INTERSTITIAL CYSTITIS: ICD-10-CM

## 2021-10-09 LAB — BACTERIA UR CULT: NO GROWTH

## 2021-10-12 PROCEDURE — 51700 PR IRRIGATION, BLADDER: ICD-10-PCS | Mod: ,,, | Performed by: PHYSICIAN ASSISTANT

## 2021-10-12 PROCEDURE — 51700 IRRIGATION OF BLADDER: CPT | Mod: ,,, | Performed by: PHYSICIAN ASSISTANT

## 2021-10-12 RX ORDER — TRIAMCINOLONE ACETONIDE 40 MG/ML
40 INJECTION, SUSPENSION INTRA-ARTICULAR; INTRAMUSCULAR
Status: COMPLETED | OUTPATIENT
Start: 2021-10-12 | End: 2021-10-12

## 2021-10-12 RX ORDER — BUPIVACAINE HYDROCHLORIDE 5 MG/ML
20 INJECTION, SOLUTION PERINEURAL
Status: COMPLETED | OUTPATIENT
Start: 2021-10-12 | End: 2021-10-12

## 2021-10-12 RX ADMIN — BUPIVACAINE HYDROCHLORIDE 100 MG: 5 INJECTION, SOLUTION PERINEURAL at 04:10

## 2021-10-12 RX ADMIN — TRIAMCINOLONE ACETONIDE 40 MG: 40 INJECTION, SUSPENSION INTRA-ARTICULAR; INTRAMUSCULAR at 04:10

## 2021-10-13 ENCOUNTER — PROCEDURE VISIT (OUTPATIENT)
Dept: UROGYNECOLOGY | Facility: CLINIC | Age: 71
End: 2021-10-13
Payer: MEDICARE

## 2021-10-13 VITALS
HEIGHT: 66 IN | DIASTOLIC BLOOD PRESSURE: 62 MMHG | WEIGHT: 158.31 LBS | SYSTOLIC BLOOD PRESSURE: 140 MMHG | BODY MASS INDEX: 25.44 KG/M2

## 2021-10-13 DIAGNOSIS — N30.10 INTERSTITIAL CYSTITIS: Primary | ICD-10-CM

## 2021-10-13 PROCEDURE — 51700 IRRIGATION OF BLADDER: CPT

## 2021-10-13 PROCEDURE — 51700 PR IRRIGATION, BLADDER: ICD-10-PCS | Mod: ,,, | Performed by: PHYSICIAN ASSISTANT

## 2021-10-13 PROCEDURE — 51700 IRRIGATION OF BLADDER: CPT | Mod: ,,, | Performed by: PHYSICIAN ASSISTANT

## 2021-10-13 RX ADMIN — HEPARIN SODIUM 10000 UNITS: 1000 INJECTION INTRAVENOUS; SUBCUTANEOUS at 01:10

## 2021-10-13 RX ADMIN — TRIAMCINOLONE ACETONIDE 40 MG: 40 INJECTION, SUSPENSION INTRA-ARTICULAR; INTRAMUSCULAR at 01:10

## 2021-10-13 RX ADMIN — BUPIVACAINE HYDROCHLORIDE 100 MG: 5 INJECTION, SOLUTION EPIDURAL; INTRACAUDAL at 01:10

## 2021-10-13 RX ADMIN — SODIUM BICARBONATE 2.5 MEQ: 42 INJECTION, SOLUTION INTRAVENOUS at 01:10

## 2021-10-15 ENCOUNTER — IMMUNIZATION (OUTPATIENT)
Dept: PHARMACY | Facility: CLINIC | Age: 71
End: 2021-10-15
Payer: MEDICARE

## 2021-10-18 RX ORDER — TRIAMCINOLONE ACETONIDE 40 MG/ML
40 INJECTION, SUSPENSION INTRA-ARTICULAR; INTRAMUSCULAR
Status: COMPLETED | OUTPATIENT
Start: 2021-10-18 | End: 2021-10-13

## 2021-10-18 RX ORDER — BUPIVACAINE HYDROCHLORIDE 5 MG/ML
20 INJECTION, SOLUTION EPIDURAL; INTRACAUDAL
Status: COMPLETED | OUTPATIENT
Start: 2021-10-18 | End: 2021-10-13

## 2021-10-22 ENCOUNTER — OFFICE VISIT (OUTPATIENT)
Dept: INTERNAL MEDICINE | Facility: CLINIC | Age: 71
End: 2021-10-22
Payer: MEDICARE

## 2021-10-22 ENCOUNTER — LAB VISIT (OUTPATIENT)
Dept: LAB | Facility: OTHER | Age: 71
End: 2021-10-22
Attending: INTERNAL MEDICINE
Payer: MEDICARE

## 2021-10-22 VITALS
WEIGHT: 158.5 LBS | BODY MASS INDEX: 24.88 KG/M2 | SYSTOLIC BLOOD PRESSURE: 135 MMHG | HEART RATE: 69 BPM | HEIGHT: 67 IN | OXYGEN SATURATION: 98 % | DIASTOLIC BLOOD PRESSURE: 65 MMHG

## 2021-10-22 DIAGNOSIS — G50.8 TRIGEMINAL NEURITIS: ICD-10-CM

## 2021-10-22 DIAGNOSIS — F41.9 ANXIETY: ICD-10-CM

## 2021-10-22 DIAGNOSIS — Z00.00 ENCOUNTER FOR PREVENTIVE HEALTH EXAMINATION: Primary | ICD-10-CM

## 2021-10-22 DIAGNOSIS — E11.42 DIABETIC PERIPHERAL NEUROPATHY: ICD-10-CM

## 2021-10-22 DIAGNOSIS — N30.10 INTERSTITIAL CYSTITIS: ICD-10-CM

## 2021-10-22 DIAGNOSIS — E11.59 HYPERTENSION ASSOCIATED WITH TYPE 2 DIABETES MELLITUS: ICD-10-CM

## 2021-10-22 DIAGNOSIS — R39.89 BLADDER PAIN: ICD-10-CM

## 2021-10-22 DIAGNOSIS — E11.9 TYPE 2 DIABETES MELLITUS WITHOUT COMPLICATION, WITH LONG-TERM CURRENT USE OF INSULIN: ICD-10-CM

## 2021-10-22 DIAGNOSIS — E11.69 HYPERLIPIDEMIA ASSOCIATED WITH TYPE 2 DIABETES MELLITUS: ICD-10-CM

## 2021-10-22 DIAGNOSIS — F43.21 GRIEF: ICD-10-CM

## 2021-10-22 DIAGNOSIS — E78.5 HYPERLIPIDEMIA ASSOCIATED WITH TYPE 2 DIABETES MELLITUS: ICD-10-CM

## 2021-10-22 DIAGNOSIS — I49.3 PVC'S (PREMATURE VENTRICULAR CONTRACTIONS): ICD-10-CM

## 2021-10-22 DIAGNOSIS — G45.9 TIA (TRANSIENT ISCHEMIC ATTACK): ICD-10-CM

## 2021-10-22 DIAGNOSIS — R41.3 MEMORY LOSS: ICD-10-CM

## 2021-10-22 DIAGNOSIS — R29.898 WEAKNESS OF BOTH LOWER EXTREMITIES: ICD-10-CM

## 2021-10-22 DIAGNOSIS — N89.8 VAGINAL DISCHARGE: ICD-10-CM

## 2021-10-22 DIAGNOSIS — Z79.4 TYPE 2 DIABETES MELLITUS WITHOUT COMPLICATION, WITH LONG-TERM CURRENT USE OF INSULIN: ICD-10-CM

## 2021-10-22 DIAGNOSIS — E01.0 THYROMEGALY: ICD-10-CM

## 2021-10-22 DIAGNOSIS — F43.23 ADJUSTMENT DISORDER WITH MIXED ANXIETY AND DEPRESSED MOOD: ICD-10-CM

## 2021-10-22 DIAGNOSIS — F33.0 MILD EPISODE OF RECURRENT MAJOR DEPRESSIVE DISORDER: ICD-10-CM

## 2021-10-22 DIAGNOSIS — R29.898 DECREASED RANGE OF MOTION OF NECK: ICD-10-CM

## 2021-10-22 DIAGNOSIS — H93.A9 PULSATILE TINNITUS: ICD-10-CM

## 2021-10-22 DIAGNOSIS — R00.2 POUNDING HEARTBEAT: ICD-10-CM

## 2021-10-22 DIAGNOSIS — M25.562 CHRONIC PAIN OF LEFT KNEE: ICD-10-CM

## 2021-10-22 DIAGNOSIS — N95.2 ATROPHIC VAGINITIS: ICD-10-CM

## 2021-10-22 DIAGNOSIS — G89.29 CHRONIC PAIN OF LEFT KNEE: ICD-10-CM

## 2021-10-22 DIAGNOSIS — I15.2 HYPERTENSION ASSOCIATED WITH TYPE 2 DIABETES MELLITUS: ICD-10-CM

## 2021-10-22 DIAGNOSIS — M85.80 OSTEOPENIA, UNSPECIFIED LOCATION: ICD-10-CM

## 2021-10-22 LAB
ESTIMATED AVG GLUCOSE: 151 MG/DL (ref 68–131)
HBA1C MFR BLD: 6.9 % (ref 4–5.6)

## 2021-10-22 PROCEDURE — 36415 COLL VENOUS BLD VENIPUNCTURE: CPT | Performed by: INTERNAL MEDICINE

## 2021-10-22 PROCEDURE — 99214 OFFICE O/P EST MOD 30 MIN: CPT | Mod: PBBFAC | Performed by: NURSE PRACTITIONER

## 2021-10-22 PROCEDURE — 99999 PR PBB SHADOW E&M-EST. PATIENT-LVL IV: CPT | Mod: PBBFAC,,, | Performed by: NURSE PRACTITIONER

## 2021-10-22 PROCEDURE — G0439 PR MEDICARE ANNUAL WELLNESS SUBSEQUENT VISIT: ICD-10-PCS | Mod: ,,, | Performed by: NURSE PRACTITIONER

## 2021-10-22 PROCEDURE — 83036 HEMOGLOBIN GLYCOSYLATED A1C: CPT | Performed by: INTERNAL MEDICINE

## 2021-10-22 PROCEDURE — 99999 PR PBB SHADOW E&M-EST. PATIENT-LVL IV: ICD-10-PCS | Mod: PBBFAC,,, | Performed by: NURSE PRACTITIONER

## 2021-10-22 PROCEDURE — G0439 PPPS, SUBSEQ VISIT: HCPCS | Mod: ,,, | Performed by: NURSE PRACTITIONER

## 2021-10-29 DIAGNOSIS — I15.2 HYPERTENSION ASSOCIATED WITH TYPE 2 DIABETES MELLITUS: Primary | ICD-10-CM

## 2021-10-29 DIAGNOSIS — E11.59 HYPERTENSION ASSOCIATED WITH TYPE 2 DIABETES MELLITUS: Primary | ICD-10-CM

## 2021-10-30 RX ORDER — IRBESARTAN 75 MG/1
75 TABLET ORAL DAILY
Qty: 90 TABLET | Refills: 3 | Status: SHIPPED | OUTPATIENT
Start: 2021-10-30 | End: 2021-11-01 | Stop reason: DRUGHIGH

## 2021-10-31 PROCEDURE — 99457 RPM TX MGMT 1ST 20 MIN: CPT | Mod: S$PBB,,, | Performed by: INTERNAL MEDICINE

## 2021-10-31 PROCEDURE — 99457 PR MONITORING, PHYSIOL PARAM, REMOTE, 1ST 20 MINS, PER MONTH: ICD-10-PCS | Mod: S$PBB,,, | Performed by: INTERNAL MEDICINE

## 2021-11-03 RX ORDER — IRBESARTAN 150 MG/1
150 TABLET ORAL NIGHTLY
Qty: 90 TABLET | Refills: 3 | Status: SHIPPED | OUTPATIENT
Start: 2021-11-03 | End: 2022-04-20 | Stop reason: SDUPTHER

## 2021-11-12 ENCOUNTER — OFFICE VISIT (OUTPATIENT)
Dept: INTERNAL MEDICINE | Facility: CLINIC | Age: 71
End: 2021-11-12
Attending: INTERNAL MEDICINE
Payer: MEDICARE

## 2021-11-12 VITALS
WEIGHT: 159.19 LBS | HEART RATE: 64 BPM | OXYGEN SATURATION: 99 % | SYSTOLIC BLOOD PRESSURE: 136 MMHG | HEIGHT: 66 IN | BODY MASS INDEX: 25.58 KG/M2 | DIASTOLIC BLOOD PRESSURE: 74 MMHG

## 2021-11-12 DIAGNOSIS — I15.2 HYPERTENSION ASSOCIATED WITH TYPE 2 DIABETES MELLITUS: ICD-10-CM

## 2021-11-12 DIAGNOSIS — Z79.4 TYPE 2 DIABETES MELLITUS WITHOUT COMPLICATION, WITH LONG-TERM CURRENT USE OF INSULIN: ICD-10-CM

## 2021-11-12 DIAGNOSIS — E11.9 TYPE 2 DIABETES MELLITUS WITHOUT COMPLICATION, WITH LONG-TERM CURRENT USE OF INSULIN: ICD-10-CM

## 2021-11-12 DIAGNOSIS — R15.1 FECAL SMEARING: ICD-10-CM

## 2021-11-12 DIAGNOSIS — E11.59 HYPERTENSION ASSOCIATED WITH TYPE 2 DIABETES MELLITUS: ICD-10-CM

## 2021-11-12 DIAGNOSIS — G45.9 TIA (TRANSIENT ISCHEMIC ATTACK): ICD-10-CM

## 2021-11-12 DIAGNOSIS — Z82.0 FAMILY HISTORY OF ALZHEIMER'S DISEASE: Primary | ICD-10-CM

## 2021-11-12 PROCEDURE — 99214 OFFICE O/P EST MOD 30 MIN: CPT | Mod: S$PBB,,, | Performed by: INTERNAL MEDICINE

## 2021-11-12 PROCEDURE — 99999 PR PBB SHADOW E&M-EST. PATIENT-LVL V: ICD-10-PCS | Mod: PBBFAC,,, | Performed by: INTERNAL MEDICINE

## 2021-11-12 PROCEDURE — 99999 PR PBB SHADOW E&M-EST. PATIENT-LVL V: CPT | Mod: PBBFAC,,, | Performed by: INTERNAL MEDICINE

## 2021-11-12 PROCEDURE — 99215 OFFICE O/P EST HI 40 MIN: CPT | Mod: PBBFAC | Performed by: INTERNAL MEDICINE

## 2021-11-12 PROCEDURE — 99214 PR OFFICE/OUTPT VISIT, EST, LEVL IV, 30-39 MIN: ICD-10-PCS | Mod: S$PBB,,, | Performed by: INTERNAL MEDICINE

## 2021-11-17 ENCOUNTER — OFFICE VISIT (OUTPATIENT)
Dept: SURGERY | Facility: CLINIC | Age: 71
End: 2021-11-17
Payer: MEDICARE

## 2021-11-17 VITALS
OXYGEN SATURATION: 98 % | BODY MASS INDEX: 25.98 KG/M2 | HEART RATE: 59 BPM | WEIGHT: 160.94 LBS | SYSTOLIC BLOOD PRESSURE: 153 MMHG | DIASTOLIC BLOOD PRESSURE: 78 MMHG

## 2021-11-17 DIAGNOSIS — K59.00 CONSTIPATION, UNSPECIFIED CONSTIPATION TYPE: Primary | ICD-10-CM

## 2021-11-17 DIAGNOSIS — R15.1 FECAL SMEARING: ICD-10-CM

## 2021-11-17 DIAGNOSIS — R15.9 INCONTINENCE OF FECES, UNSPECIFIED FECAL INCONTINENCE TYPE: ICD-10-CM

## 2021-11-17 PROCEDURE — 99214 OFFICE O/P EST MOD 30 MIN: CPT | Mod: S$PBB,25,, | Performed by: NURSE PRACTITIONER

## 2021-11-17 PROCEDURE — 46600 DIAGNOSTIC ANOSCOPY SPX: CPT | Mod: S$PBB,,, | Performed by: NURSE PRACTITIONER

## 2021-11-17 PROCEDURE — 99214 PR OFFICE/OUTPT VISIT, EST, LEVL IV, 30-39 MIN: ICD-10-PCS | Mod: S$PBB,25,, | Performed by: NURSE PRACTITIONER

## 2021-11-17 PROCEDURE — 99999 PR PBB SHADOW E&M-EST. PATIENT-LVL IV: CPT | Mod: PBBFAC,,, | Performed by: NURSE PRACTITIONER

## 2021-11-17 PROCEDURE — 46600 DIAGNOSTIC ANOSCOPY SPX: CPT | Mod: PBBFAC | Performed by: NURSE PRACTITIONER

## 2021-11-17 PROCEDURE — 46600 PR DIAG2STIC A2SCOPY: ICD-10-PCS | Mod: S$PBB,,, | Performed by: NURSE PRACTITIONER

## 2021-11-17 PROCEDURE — 99214 OFFICE O/P EST MOD 30 MIN: CPT | Mod: PBBFAC | Performed by: NURSE PRACTITIONER

## 2021-11-17 PROCEDURE — 99999 PR PBB SHADOW E&M-EST. PATIENT-LVL IV: ICD-10-PCS | Mod: PBBFAC,,, | Performed by: NURSE PRACTITIONER

## 2021-11-17 RX ORDER — POLYETHYLENE GLYCOL 3350 17 G/17G
17 POWDER, FOR SOLUTION ORAL DAILY
Qty: 510 G | Refills: 2 | Status: SHIPPED | OUTPATIENT
Start: 2021-11-17 | End: 2022-02-15

## 2021-12-03 ENCOUNTER — OFFICE VISIT (OUTPATIENT)
Dept: ORTHOPEDICS | Facility: CLINIC | Age: 71
End: 2021-12-03
Payer: MEDICARE

## 2021-12-03 ENCOUNTER — HOSPITAL ENCOUNTER (OUTPATIENT)
Dept: RADIOLOGY | Facility: HOSPITAL | Age: 71
Discharge: HOME | End: 2021-12-03
Attending: PHYSICIAN ASSISTANT
Payer: MEDICARE

## 2021-12-03 VITALS
DIASTOLIC BLOOD PRESSURE: 84 MMHG | HEART RATE: 60 BPM | HEIGHT: 66 IN | SYSTOLIC BLOOD PRESSURE: 156 MMHG | WEIGHT: 162.94 LBS | BODY MASS INDEX: 26.19 KG/M2

## 2021-12-03 DIAGNOSIS — M25.561 RIGHT KNEE PAIN, UNSPECIFIED CHRONICITY: ICD-10-CM

## 2021-12-03 DIAGNOSIS — M25.561 RIGHT KNEE PAIN, UNSPECIFIED CHRONICITY: Primary | ICD-10-CM

## 2021-12-03 DIAGNOSIS — M17.11 PRIMARY OSTEOARTHRITIS OF RIGHT KNEE: ICD-10-CM

## 2021-12-03 DIAGNOSIS — G89.29 HIP PAIN, CHRONIC, RIGHT: ICD-10-CM

## 2021-12-03 DIAGNOSIS — M25.551 HIP PAIN, CHRONIC, RIGHT: ICD-10-CM

## 2021-12-03 PROCEDURE — 73560 X-RAY EXAM OF KNEE 1 OR 2: CPT | Mod: 59,TC,LT

## 2021-12-03 PROCEDURE — 73502 X-RAY EXAM HIP UNI 2-3 VIEWS: CPT | Mod: TC,RT

## 2021-12-03 PROCEDURE — 73560 XR KNEE ORTHO RIGHT: ICD-10-PCS | Mod: 26,LT,, | Performed by: RADIOLOGY

## 2021-12-03 PROCEDURE — 73562 X-RAY EXAM OF KNEE 3: CPT | Mod: 26,RT,, | Performed by: RADIOLOGY

## 2021-12-03 PROCEDURE — 99999 PR PBB SHADOW E&M-EST. PATIENT-LVL IV: ICD-10-PCS | Mod: PBBFAC,,, | Performed by: PHYSICIAN ASSISTANT

## 2021-12-03 PROCEDURE — 73502 XR HIP WITH PELVIS WHEN PERFORMED, 2 OR 3  VIEWS RIGHT: ICD-10-PCS | Mod: 26,RT,, | Performed by: RADIOLOGY

## 2021-12-03 PROCEDURE — 99999 PR PBB SHADOW E&M-EST. PATIENT-LVL IV: CPT | Mod: PBBFAC,,, | Performed by: PHYSICIAN ASSISTANT

## 2021-12-03 PROCEDURE — 99214 OFFICE O/P EST MOD 30 MIN: CPT | Mod: PBBFAC | Performed by: PHYSICIAN ASSISTANT

## 2021-12-03 PROCEDURE — 99213 PR OFFICE/OUTPT VISIT, EST, LEVL III, 20-29 MIN: ICD-10-PCS | Mod: S$PBB,,, | Performed by: PHYSICIAN ASSISTANT

## 2021-12-03 PROCEDURE — 99213 OFFICE O/P EST LOW 20 MIN: CPT | Mod: S$PBB,,, | Performed by: PHYSICIAN ASSISTANT

## 2021-12-03 PROCEDURE — 73502 X-RAY EXAM HIP UNI 2-3 VIEWS: CPT | Mod: 26,RT,, | Performed by: RADIOLOGY

## 2021-12-03 PROCEDURE — 73560 X-RAY EXAM OF KNEE 1 OR 2: CPT | Mod: 26,LT,, | Performed by: RADIOLOGY

## 2021-12-03 PROCEDURE — 73562 XR KNEE ORTHO RIGHT: ICD-10-PCS | Mod: 26,RT,, | Performed by: RADIOLOGY

## 2021-12-03 RX ORDER — MELOXICAM 15 MG/1
15 TABLET ORAL DAILY
Qty: 30 TABLET | Refills: 1 | Status: SHIPPED | OUTPATIENT
Start: 2021-12-03 | End: 2022-01-02

## 2021-12-16 ENCOUNTER — CLINICAL SUPPORT (OUTPATIENT)
Dept: REHABILITATION | Facility: OTHER | Age: 71
End: 2021-12-16
Payer: MEDICARE

## 2021-12-16 DIAGNOSIS — R15.9 INCONTINENCE OF FECES, UNSPECIFIED FECAL INCONTINENCE TYPE: ICD-10-CM

## 2021-12-16 DIAGNOSIS — R15.1 FECAL SMEARING: ICD-10-CM

## 2021-12-16 DIAGNOSIS — K59.00 CONSTIPATION, UNSPECIFIED CONSTIPATION TYPE: ICD-10-CM

## 2021-12-16 DIAGNOSIS — N81.89 PELVIC FLOOR WEAKNESS IN FEMALE: ICD-10-CM

## 2021-12-16 PROCEDURE — 97162 PT EVAL MOD COMPLEX 30 MIN: CPT

## 2021-12-16 PROCEDURE — 97112 NEUROMUSCULAR REEDUCATION: CPT

## 2021-12-17 ENCOUNTER — OFFICE VISIT (OUTPATIENT)
Dept: ORTHOPEDICS | Facility: CLINIC | Age: 71
End: 2021-12-17
Payer: MEDICARE

## 2021-12-17 VITALS — DIASTOLIC BLOOD PRESSURE: 78 MMHG | HEART RATE: 54 BPM | SYSTOLIC BLOOD PRESSURE: 150 MMHG

## 2021-12-17 DIAGNOSIS — G89.29 HIP PAIN, CHRONIC, RIGHT: ICD-10-CM

## 2021-12-17 DIAGNOSIS — M17.11 PRIMARY OSTEOARTHRITIS OF RIGHT KNEE: Primary | ICD-10-CM

## 2021-12-17 DIAGNOSIS — M25.551 HIP PAIN, CHRONIC, RIGHT: ICD-10-CM

## 2021-12-17 PROBLEM — N81.89 PELVIC FLOOR WEAKNESS IN FEMALE: Status: ACTIVE | Noted: 2021-12-17

## 2021-12-17 PROCEDURE — 99213 OFFICE O/P EST LOW 20 MIN: CPT | Mod: S$PBB,,, | Performed by: PHYSICIAN ASSISTANT

## 2021-12-17 PROCEDURE — 99999 PR PBB SHADOW E&M-EST. PATIENT-LVL III: ICD-10-PCS | Mod: PBBFAC,,, | Performed by: PHYSICIAN ASSISTANT

## 2021-12-17 PROCEDURE — 99213 OFFICE O/P EST LOW 20 MIN: CPT | Mod: PBBFAC | Performed by: PHYSICIAN ASSISTANT

## 2021-12-17 PROCEDURE — 99213 PR OFFICE/OUTPT VISIT, EST, LEVL III, 20-29 MIN: ICD-10-PCS | Mod: S$PBB,,, | Performed by: PHYSICIAN ASSISTANT

## 2021-12-17 PROCEDURE — 99999 PR PBB SHADOW E&M-EST. PATIENT-LVL III: CPT | Mod: PBBFAC,,, | Performed by: PHYSICIAN ASSISTANT

## 2022-01-04 ENCOUNTER — HOSPITAL ENCOUNTER (EMERGENCY)
Facility: OTHER | Age: 72
Discharge: HOME | End: 2022-01-04
Attending: EMERGENCY MEDICINE
Payer: MEDICARE

## 2022-01-04 VITALS
DIASTOLIC BLOOD PRESSURE: 76 MMHG | SYSTOLIC BLOOD PRESSURE: 150 MMHG | HEIGHT: 66 IN | BODY MASS INDEX: 25.39 KG/M2 | WEIGHT: 158 LBS | HEART RATE: 65 BPM | TEMPERATURE: 99 F | OXYGEN SATURATION: 100 % | RESPIRATION RATE: 16 BRPM

## 2022-01-04 DIAGNOSIS — S09.90XA CLOSED HEAD INJURY, INITIAL ENCOUNTER: Primary | ICD-10-CM

## 2022-01-04 PROCEDURE — 99284 EMERGENCY DEPT VISIT MOD MDM: CPT | Mod: 25

## 2022-01-04 RX ORDER — BUTALBITAL, ACETAMINOPHEN AND CAFFEINE 50; 325; 40 MG/1; MG/1; MG/1
1 TABLET ORAL EVERY 4 HOURS PRN
Qty: 13 TABLET | Refills: 0 | Status: SHIPPED | OUTPATIENT
Start: 2022-01-04 | End: 2022-02-03

## 2022-01-04 RX ORDER — ONDANSETRON 4 MG/1
4 TABLET, ORALLY DISINTEGRATING ORAL EVERY 6 HOURS PRN
Qty: 20 TABLET | Refills: 0 | Status: SHIPPED | OUTPATIENT
Start: 2022-01-04 | End: 2022-12-30

## 2022-01-04 NOTE — ED TRIAGE NOTES
"Chief Complaint   Patient presents with    Head Injury     Pt reports getting hit in head by football on Sunday, denies LOC. States she was seen by EMS and told if she starts having headaches to come to ED. Pt states she is now having headaches and feels "woozy." A&OX4 and ambulatory without difficulty.      Pt presents to the ED for evaluation of dizziness x yesterday and mild headaches x several days.  States that she was hit in left side of the head/face by a football on Sunday night.  Denies LOC or blood thinner use.  Takes ASA daily.  Pt queried; pt denies further complaints at this time.      "

## 2022-01-04 NOTE — ED PROVIDER NOTES
"Encounter Date: 2022    SCRIBE #1 NOTE: I, Kimberlyn Evans, am scribing for, and in the presence of, Zeeshan Reese MD.       History     Chief Complaint   Patient presents with    Head Injury     Pt reports getting hit in head by football on , denies LOC. States she was seen by EMS and told if she starts having headaches to come to ED. Pt states she is now having headaches and feels "woozy." A&OX4 and ambulatory without difficulty.      This is a 71 y.o. female who presents with complaint of right-sided head injury two days ago. She [place] the [employer] [place] and reports being hit by a football during warm ups at the end of halftime. She was in the [place] end zone and had her back turned when the incident occurred. She did not lose consciousness, but felt a little "dazed". The patient reports headaches, stiffness, and feels "woozy". She took two Tylenol after the incident. Medications include a daily aspirin.     The history is provided by the patient.     Review of patient's allergies indicates:  No Known Allergies  Past Medical History:   Diagnosis Date    Arthritis     Chronic constipation     Depression     Diabetes mellitus     Diabetes mellitus     Hypertension     Osteopenia      Past Surgical History:   Procedure Laterality Date     SECTION      2x    COLONOSCOPY      COLONOSCOPY N/A 3/21/2019    Procedure: COLONOSCOPY;  Surgeon: Marshall Contreras MD;  Location: [facility] ([address] [place] [address]);  Service: Endoscopy;  Laterality: N/A;  pt states that she had to be resuscitated after receiving an epidural during childbirth "30 something" years ago.  Ok for 4th floor per Dr. Hernandez-MS    EYE SURGERY Bilateral     cataract removal    HYSTERECTOMY      full hyst     OOPHORECTOMY      TONSILLECTOMY       Family History   Problem Relation Age of Onset    Breast cancer Maternal Cousin     Heart attack Mother     Heart disease Mother     Alzheimer's disease Mother     Heart " attack Father     Heart disease Father     Heart failure Father     Hypertension Father     Hyperlipidemia Sister     Hypertension Sister     Diabetes Sister     Abnormal EKG Sister     Dementia Sister     Alcohol abuse Brother     Fibroids Daughter     Brain cancer Son     Early death Son 33    Ovarian cancer Neg Hx     Cervical cancer Neg Hx     Endometrial cancer Neg Hx     Vaginal cancer Neg Hx     Stroke Neg Hx     Colon cancer Neg Hx     Esophageal cancer Neg Hx     Vision loss Neg Hx      Social History     Tobacco Use    Smoking status: Former Smoker     Packs/day: 1.50     Years: 25.00     Pack years: 37.50     Types: Cigarettes     Quit date:      Years since quittin.0    Smokeless tobacco: Never Used   Substance Use Topics    Alcohol use: No    Drug use: No     Review of Systems   Constitutional: Negative for fever.   HENT: Negative for sore throat.    Respiratory: Negative for shortness of breath.    Cardiovascular: Negative for chest pain.   Gastrointestinal: Negative for nausea.   Genitourinary: Negative for dysuria.   Musculoskeletal: Negative for back pain.   Skin: Negative for rash.   Neurological: Positive for light-headedness and headaches. Negative for syncope and weakness.   Hematological: Does not bruise/bleed easily.       Physical Exam     Initial Vitals [22 0754]   BP Pulse Resp Temp SpO2   (!) 158/78 66 16 98.7 °F (37.1 °C) 100 %      MAP       --         Physical Exam    Nursing note and vitals reviewed.  Constitutional: She appears well-developed and well-nourished. She is not diaphoretic. No distress.   HENT:   Head: Normocephalic and atraumatic.   Right Ear: External ear normal.   Left Ear: External ear normal.   Eyes: Conjunctivae and EOM are normal. Pupils are equal, round, and reactive to light.   Neck: Neck supple. No tracheal deviation present.   Normal range of motion.  Cardiovascular: Normal rate, regular rhythm, normal heart sounds and  intact distal pulses. Exam reveals no gallop and no friction rub.    No murmur heard.  Pulmonary/Chest: Breath sounds normal. No respiratory distress. She has no wheezes. She has no rhonchi. She has no rales. She exhibits no tenderness.   Musculoskeletal:         General: No tenderness or edema. Normal range of motion.      Cervical back: Normal range of motion and neck supple.     Neurological: She is alert and oriented to person, place, and time. She has normal strength and normal reflexes. She displays normal reflexes. No cranial nerve deficit or sensory deficit. GCS score is 15. GCS eye subscore is 4. GCS verbal subscore is 5. GCS motor subscore is 6.   Skin: Skin is warm and dry. Capillary refill takes less than 2 seconds.   Psychiatric: She has a normal mood and affect. Thought content normal.         ED Course   Procedures  Labs Reviewed - No data to display       Imaging Results          CT Head Without Contrast (Final result)  Result time 01/04/22 10:18:57    Final result by Benjamin Dewitt MD (01/04/22 10:18:57)                 Impression:      No acute intracranial process.    Mild sequelae of microvascular ischemic change better evaluated on prior MRI.      Electronically signed by: Benjamin Dewitt MD  Date:    01/04/2022  Time:    10:18             Narrative:    EXAMINATION:  CT HEAD WITHOUT CONTRAST    CLINICAL HISTORY:  Head trauma, minor (Age >= 65y);    TECHNIQUE:  Low dose axial images were obtained through the head.  Coronal and sagittal reformations were also performed. Contrast was not administered.    COMPARISON:  MRI dated 12/28/2020    FINDINGS:  No intracranial blood products identified.  No extra-axial masses or for collections.    Mild sequelae of microvascular ischemic changes seen on prior MRI.  Ventricles are symmetric.  No mass or mass effect.    Osseous structures show no acute process.  Mastoid air cells and paranasal sinuses are grossly clear.                                  Medications - No data to display  Medical Decision Making:   History:   Old Medical Records: I decided to obtain old medical records.  Differential Diagnosis:   Intracranial hemorrhage, traumatic brain injury, intracerebral edema, skull fracture  Clinical Tests:   Radiological Study: Ordered and Reviewed  ED Management:  Patient with head injury but persistent headaches greater than 72 hours after the injury, is on an aspirin and is greater than 65 years old so we will check head CT.   CT head without any acute process.  Discussed this with patient.  Will prescribe her medications for her headache. I feel it is safe and appropriate at this time for the patient to be discharged for follow up and re-evaluation as detailed in the discharge instructions. No further workup indicated based on their complaints or examination today. Discussed results with the patient. I educated the patient/guardian on the warning signs and symptoms for which they must seek immediate medical attention. All questions addressed and patient/guardian were given discharge instructions and followup information.     Management decisions for this encounter made during a severe acute wave of the COVID-19 public health emergency. Available resources, standards for appropriate emergency department evaluation, and admission vs. discharge standards have necessarily shifted and remain dynamic.     Note was created using voice recognition software. It may have occasional typographical errors not identified and edited despite initial review prior to signing.            Scribe Attestation:   Scribe #1: I performed the above scribed service and the documentation accurately describes the services I performed. I attest to the accuracy of the note.               Physician Attestation for Scribe: I, MAA, reviewed documentation as scribed in my presence, which is both accurate and complete.  Clinical Impression:   Final diagnoses:  [S09.90XA] Closed head  injury, initial encounter (Primary)          ED Disposition Condition    Discharge Stable        ED Prescriptions     Medication Sig Dispense Start Date End Date Auth. Provider    butalbital-acetaminophen-caffeine -40 mg (FIORICET, ESGIC) -40 mg per tablet Take 1 tablet by mouth every 4 (four) hours as needed for Pain. 13 tablet 1/4/2022 2/3/2022 Zeeshan Reese MD    ondansetron (ZOFRAN-ODT) 4 MG TbDL Take 1 tablet (4 mg total) by mouth every 6 (six) hours as needed (Nausea and vomiting). 20 tablet 1/4/2022  Zeeshan Reese MD        Follow-up Information     Follow up With Specialties Details Why Contact Info    Yoseph Dallas MD Internal Medicine   [address]  [address]  [facility] [address]  [phone]      [facility] Emergency Dept Emergency Medicine  As needed, for any new or worsening symptoms [address]  [facility] [id number] [phone]    Yoseph Dallas MD Internal Medicine Schedule an appointment as soon as possible for a visit in 3 days For follow-up and re-evaluation [address]  [address]  [facility] [address]  [phone]      [facility] - Emergency Dept Emergency Medicine  As needed, for any new or worsening symptoms [address]  [facility] [id number] [phone]           Zeeshan Reese MD  01/04/22 1040

## 2022-01-19 ENCOUNTER — OFFICE VISIT (OUTPATIENT)
Dept: NEUROLOGY | Facility: CLINIC | Age: 72
End: 2022-01-19
Payer: MEDICARE

## 2022-01-19 DIAGNOSIS — F33.1 MODERATE EPISODE OF RECURRENT MAJOR DEPRESSIVE DISORDER: Primary | ICD-10-CM

## 2022-01-19 DIAGNOSIS — R41.3 MEMORY LOSS: ICD-10-CM

## 2022-01-19 PROCEDURE — 99499 UNLISTED E&M SERVICE: CPT | Mod: 95,,, | Performed by: CLINICAL NEUROPSYCHOLOGIST

## 2022-01-19 PROCEDURE — 90791 PR PSYCHIATRIC DIAGNOSTIC EVALUATION: ICD-10-PCS | Mod: 95,FQ,, | Performed by: CLINICAL NEUROPSYCHOLOGIST

## 2022-01-19 PROCEDURE — 90791 PSYCH DIAGNOSTIC EVALUATION: CPT | Mod: 95,FQ,, | Performed by: CLINICAL NEUROPSYCHOLOGIST

## 2022-01-19 PROCEDURE — 99499 NO LOS: ICD-10-PCS | Mod: 95,,, | Performed by: CLINICAL NEUROPSYCHOLOGIST

## 2022-01-19 NOTE — PROGRESS NOTES
NEUROPSYCHOLOGICAL EVALUATION - CONFIDENTIAL    Referring Provider: Yoseph Dallas MD  Medical Necessity: Evaluate cognitive and emotional functioning, participate in treatment planning/management, and provide supportive therapy in the setting of memory loss.   Date Conducted: 2022  Present At Visit: the patient   Billin = 60 minutes  Referral Diagnoses: Z82.0 (ICD-10-CM) - Family history of Alzheimer's disease  Consent: The patient expressed an understanding of the purpose of the evaluation and consented to all procedures. We discussed the limits of confidentiality and discussed an emergency plan.    Telemedicine Details:   Established Patient - Audio Only Telehealth Visit  The patient location is: home  The chief complaint leading to consultation is: memory loss  Visit type: Virtual visit with audio only (telephone)  Total time spent with patient: 60 minutes  The reason for the audio only service rather than synchronous audio and video virtual visit was related to technical difficulties or patient preference/necessity.  Each patient to whom I provide medical services by telemedicine is: (1) informed of the relationship between the physician and patient and the respective role of any other health care provider with respect to management of the patient; and (2) notified that they may decline to receive medical services by telemedicine and may withdraw from such care at any time. Patient verbally consented to receive this service via voice-only telephone call.    ASSESSMENT & PLAN:   Ms. Gely Green is an 71 y.o., female with 16 years of education and pertinent medical history including DM2, HLD, diabetic peripheral neuropathy, thyromegaly, history of TIA, and episodic depression who was referred for a neuropsychological evaluation in the setting of gradually progressive decline in thinking and memory. She is independent and largely without difficulty in IADLs. She has a family history of AD      Full report to follow completion of testing.   Problem List Items Addressed This Visit        Neuro    Memory loss       Psychiatric    Recurrent major depressive disorder - Primary      Thank you for allowing me to assist in Ms. Gely Green's care. If you have any questions, please contact me at [phone].      Brittany Perdomo, PhD  Licensed Clinical Neuropsychologist  [facility]     CLINICAL INTERVIEW & RECORD REVIEW     Cognitive Functioning   Cognitive screener: MoCA = 24/30 (September 2018)  Previous evaluation(s): Completed a neuropsychological evaluation with Dr. Flavio Zuniga on 9/10/2018. Results revealed the following:   Ms. Gely Green is a 67-year-old female who was referred for a neuropsychological evaluation in the setting of self-reported memory complaints over the past 2 years. She described her instances of memory loss as frustrating rather than functionally debilitating, but she does worry about developing Alzheimers disease. She is functionally independent with few reported problems.      Ms. Mendez mood was the most noteworthy aspects of her history and presentation. She described considerable depression since her sons death in 2014, with the anniversary of his death occurring just 4 days before this evaluation. She continues to think of him on a daily basis, is constantly sad/tearful, has recently been fantasizing about suicide (with no intent), and constantly keeps herself busy to avoid downtime. It is encouraging that she recently reengaged with a grief support group, but this group meets only once per month. Depression and complicated bereavement are clearly impacting her daily functioning.       Regarding cognition, the neuropsychological evaluation was remarkable for inconsistent performance on tests of learning and memory with inefficiencies in cognitive organization/retrieval and source memory. However, a cognitive  diagnosis will be deferred at the present time in the setting of marked depression. Furthermore, a neurodegenerative condition  is considered unlikely at the present time, especially when considering her high level of functioning with no difficulty completing complex activities of daily living (with the exception of periodic amotivation). She also does not present with the type of dima amnesia seen in Alzheimer's disease. In addition to depression, mild cerebrovascular disease (HLD, DMII) is another possible contributing factor to cognitive inefficencies. Interval assessment is warranted.     Course of difficulty since 2018: Initially when went to Dr. Zuniga it was because she had basic concerns for AD due to her family history. Recently, in the last two years, she has noticed a gradually progressive decline in memory that has become very concerning to her.   Fluctuations: none  Severity of changes: 6 or 7/10, where 10/10 represents her baseline level of cognitive functioning.   Examples:  Attention/Working Memory/Executive Functioning: attention is okay. finishing tasks that she starts. Makes plans to call people but won't always follow through (needs to refer to her log).  Processing Speed: no problems identified   Language: word finding difficulty  Visuospatial: sometimes has to stop and think about where a particular place is located while driving.   Learning & Memory: forgetting things, like names. Has to write down everything she wants to do in order to remember. Forgets details of conversations, not whole conversations. Misplacing items. tries to be very conscious about where she is putting items but when goes back to that spot she has moved it somewhere else.   Exacerbating factors: none  Ameliorating factors: none  Medication for cognition: none     Daily Functioning (I/ADLs)  ADLs: Independent and without difficulty  IADLs:  Finances: Independent and without difficulty. Has a log she keeps.   Medication  "Mgmt: Independent and without difficulty  Driving: Sometimes has to stop and think about where a particular place is located while driving.   Household Mgmt: Independent and without difficulty  Cooking/Meal Preparation: Independent and without difficulty.  Shopping: Independent and without difficulty. Keeps up with her To-Do List.   Appointment Mgmt: She has to keep up with her calendar, won't always recall appointment times without it.     Psychiatric/Neuropsychiatric Symptoms  Mood: "well, I'm depressed and somewhat overwhelmingly sad"   Depression: yes - been sad in the past but the past 3 or 4 months she has been overwhelmingly sad. Had suicidal thoughts in December. None more recently.   Anxiety: no  Stress: no, "well like at a level 3. Just sad."   Social Withdrawal: no  Neurovegetative Sxs:  Appetite: okay  Sleep: doing better with sleep over the past month - not as frequent early awakenings. 7 hours at least. Snoring, yes, but not gasping for air. Not acting out dreams.   Energy: okay  Hallucinations: no  Delusional/Paranoid Thinking: no  Impulsivity: no  Obsessive/Compulsive Behaviors: no  Disinhibition: no  Irritability/Agitation: yes and mostly in response to what is going on with her right now (in terms of her thinking changes).   Aggression: no  Apathy/Indifference: yes  Other changes in personality: no    Physical Functioning  Motor/Gait Sxs: no  Autonomic Sxs: urinary urgency for a few years.   Bulbar Sxs: no  Sensory Sxs: no  Pain: no  Physical Exercise Routine: usually 3 to 5 days a week     RELEVANT HISTORY  This patient has a past medical history of Arthritis, Chronic constipation, Depression, Diabetes mellitus, Diabetes mellitus, Hypertension, and Osteopenia.    Past Surgical History:   Procedure Laterality Date     SECTION      2x    COLONOSCOPY      COLONOSCOPY N/A 3/21/2019    Procedure: COLONOSCOPY;  Surgeon: Marshall Contreras MD;  Location: [facility] ([address] [place] [address]);  Service: " "Endoscopy;  Laterality: N/A;  pt states that she had to be resuscitated after receiving an epidural during childbirth "30 something" years ago.  Ok for 4th floor per Dr. Hernandez-MS    EYE SURGERY Bilateral     cataract removal    HYSTERECTOMY      full hyst 1999    OOPHORECTOMY      TONSILLECTOMY       Neurological History   Headaches/Migraines: no  TBI: no  Seizures: no  Stroke: TIA x 1  Tumor: no  Previous Episodes of Delirium: no  Movement Disorder: no  CNS Infection: no  Other: no    Neurodiagnostics  Results for orders placed or performed during the hospital encounter of 01/04/22   CT Head Without Contrast    Narrative    EXAMINATION:  CT HEAD WITHOUT CONTRAST    CLINICAL HISTORY:  Head trauma, minor (Age >= 65y);    TECHNIQUE:  Low dose axial images were obtained through the head.  Coronal and sagittal reformations were also performed. Contrast was not administered.    COMPARISON:  MRI dated 12/28/2020    FINDINGS:  No intracranial blood products identified.  No extra-axial masses or for collections.    Mild sequelae of microvascular ischemic changes seen on prior MRI.  Ventricles are symmetric.  No mass or mass effect.    Osseous structures show no acute process.  Mastoid air cells and paranasal sinuses are grossly clear.      Impression    No acute intracranial process.    Mild sequelae of microvascular ischemic change better evaluated on prior MRI.      Electronically signed by: Benjamin Dewitt MD  Date:    01/04/2022  Time:    10:18   Results for orders placed or performed during the hospital encounter of 12/27/20   MRI BRAIN W WO CONTRAST    Narrative    EXAMINATION:  MRI BRAIN W WO CONTRAST    CLINICAL HISTORY:  TIA;.    TECHNIQUE:  Multiplanar multisequence MR imaging of the brain was performed before and after the administration of 10 mL Gadavist  intravenous contrast.    COMPARISON:  Head CT 12/27/2020, CTA head neck 08/06/2020, MRA neck 02/14/2019    FINDINGS:  There is no abnormal restricted " diffusion to suggest acute infarction. The ventricles are normal in size and configuration without evidence for hydrocephalus. There are no abnormal areas of gradient susceptibility to suggest parenchymal hemorrhage.  There are multiple scattered foci of T2/FLAIR signal hyperintensity in the supratentorial white matter which demonstrate no corresponding post-contrast enhancement or abnormal restricted diffusion.  While nonspecific findings likely relate to sequela of chronic microvascular ischemic change although findings can be seen in the setting of migraine headaches and as the residua from inflammatory and traumatic insults to the brain.  Clinical correlation is advised.  Following the administration of IV contrast, no pathologic foci of enhancement are advised.  No extra-axial hemorrhage or fluid collections the craniocervical junction and sellar regions are within normal limits.      Impression    1. No acute intracranial abnormality identified on today's exam.  Specifically, no evidence of acute infarction or enhancing lesion.  2. Scattered foci of T2/FLAIR signal hyperintensity in supratentorial white matter. While nonspecific, findings likely relate to sequela of chronic microvascular ischemic change although findings can be seen in the setting of migraine headaches and as the residua from inflammatory and traumatic insults to the brain. Clinical correlation is advised.      Electronically signed by: Dania Vieira MD  Date:    12/29/2020  Time:    04:31   MRA Brain    Narrative    EXAMINATION:  MRA BRAIN WITHOUT CONTRAST    CLINICAL HISTORY:  TIA; .    TECHNIQUE:  Non-contrast 3-D time-of-flight intracranial MR angiography was performed through the [place] [facility] [place] with MIP reformatting.    COMPARISON:  CTA head neck 08/06/2020    FINDINGS:  Anterior intracranial circulation: No high-grade focal stenosis, occlusion, aneurysm, or vascular malformation. The right A1 segment is hypoplastic.    Posterior  "intracranial circulation: No high-grade focal stenosis, occlusion, aneurysm, or vascular malformation.  Small right posterior communicating artery present.      Impression    MRA brain is within normal limits without evidence of hemodynamically significant stenosis, proximal large vessel occlusion or other significant abnormality.      Electronically signed by: Dania Vieira MD  Date:    12/29/2020  Time:    04:32     Pertinent Lab Work  Lab Results   Component Value Date    [id number] 07/06/2017     No results found for: RPR  Lab Results   Component Value Date    FOLATE 12.4 07/06/2017     Lab Results   Component Value Date    TSH 0.931 03/23/2021     Lab Results   Component Value Date    HGBA1C 6.9 (H) 10/22/2021     No results found for: HIV1X2, IPU70GIHS    Medications  Current Outpatient Medications   Medication Instructions    ACCU-CHEK SMARTVIEW TEST STRIP Strp ACCU-CHEK SMARTVIEW TEST STRIP Strp,    aspirin (ECOTRIN) 325 mg, Oral, Daily    butalbital-acetaminophen-caffeine -40 mg (FIORICET, ESGIC) -40 mg per tablet 1 tablet, Oral, Every 4 hours PRN    calcium carbonate (OS-REDD) 600 mg, Oral, 2 times daily with meals    flavoxATE (URISPAS) 100 mg, Oral, 3 times daily PRN    fluticasone propionate (FLONASE) 100 mcg, Each Nostril, Daily    FREESTYLE SHANNON 2 SENSOR Kit USE AS DIRECTED EVERY 2 WEEKS    GVOKE HYPOPEN 2-PACK 1 mg/0.2 mL AtIn No dose, route, or frequency recorded.    irbesartan (AVAPRO) 150 mg, Oral, Nightly    lancets (ONETOUCH DELICA LANCETS) 33 gauge Misc 1 lancet, Misc.(Non-Drug; Combo Route), 3 times daily    levocetirizine (XYZAL) 5 mg, Oral, Nightly    metFORMIN (GLUCOPHAGE-XR) 500 MG ER 24hr tablet TAKE 1 TABLET(500 MG) BY MOUTH TWICE DAILY    ondansetron (ZOFRAN-ODT) 4 mg, Oral, Every 6 hours PRN    ONETOUCH VERIO TEST STRIPS Strp TEST THREE TIMES DAILY    pen needle, diabetic (NOVOFINE 32) 32 gauge x 1/4" Ndle use subcutaneously every evening    polyethylene " glycol (GLYCOLAX) 17 g, Oral, Daily    rosuvastatin (CRESTOR) 10 MG tablet TAKE 1 TABLET BY MOUTH EVERY DAY    TOUJEO SOLOSTAR U-300 INSULIN 300 unit/mL (1.5 mL) InPn pen INJECT 33 UNITS UNDER THE SKIN EVERY EVENING    vitamin D (VITAMIN D3) 1,000 Units, Oral, Daily     Recent changes to medications: no    Psychiatric History  Prior Diagnoses: Episodic depression.   History of Trauma/Abuse: no  History of Suicide Attempts: no, but like a flash of what it would be like if something happened. Has thought about drowning, like rolling off the bridge but then states she would never act on it. her daughter and grandson are protective factors.    Current Ideation, Intention, or Plan: before  was the last time she had this thought  Homicidal Ideation: no  Medication(s): no and not a medicine person so doesn't want to be back on one. Effexor 6 years ago.   Hospitalization(s): no  Psychotherapy/Counseling: was going to group therapy but did not like it and is interested in returning to individual counseling. Has a few names but has been procrastinating getting something scheduled.     Substance Use History  Social History     Tobacco Use    Smoking status: Former Smoker     Packs/day: 1.50     Years: 25.00     Pack years: 37.50     Types: Cigarettes     Quit date:      Years since quittin.0    Smokeless tobacco: Never Used   Substance and Sexual Activity    Alcohol use: No    Drug use: No    Sexual activity: Not Currently     Partners: Male     Birth control/protection: None     History of abuse/overuse: no    Family Neurological & Psychiatric History    Family History   Problem Relation Age of Onset    Breast cancer Maternal Cousin     Heart attack Mother     Heart disease Mother     Alzheimer's disease Mother     Heart attack Father     Heart disease Father     Heart failure Father     Hypertension Father     Hyperlipidemia Sister     Hypertension Sister     Diabetes Sister     Abnormal  "EKG Sister     Dementia Sister     Alcohol abuse Brother     Fibroids Daughter     Brain cancer Son     Early death Son 33    Ovarian cancer Neg Hx     Cervical cancer Neg Hx     Endometrial cancer Neg Hx     Vaginal cancer Neg Hx     Stroke Neg Hx     Colon cancer Neg Hx     Esophageal cancer Neg Hx     Vision loss Neg Hx      Neurologic: Mother diagnosed with Alzheimers disease,  at age 86. Son  from brain cancer  Psychiatric: see above.    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 Two children, one . 1 grandchild  Support System: good - family, two close friends  Hobbies/Activities: wants to travel but can't because of COVID-19.   Current Living Situation: lives at home with her     Legal  Current: none    OBJECTIVE:     MENTAL STATUS AND OBSERVATIONS:   Appearance: Unable to assess   Alertness: Attentive and alert.   Orientation:   O x 4   Gait:  Unable to assess   Psychomotor:  Unable to assess   Handedness:  Right   Vision & Hearing:  Adequate for session   Speech/language: Normal in rate, rhythm, tone, and volume. No significant word finding difficulty observed. Comprehension was normal.   Mood/Affect:  The patients stated mood was "well, I'm depressed and somewhat overwhelmingly sad." Affect was slightly dysthymic   Interpersonal Behavior:  Rapport was quickly and easily established    Suicidality/Homicidality: Denied   Hallucinations/Delusions:  None evidenced or endorsed   Thought Content: Logical   Though " Processes: Goal-directed   Insight & Judgment:  Appropriate   Participation in Interview:  Full     PROCEDURES/TESTS ADMINISTERED: Performed a review of pertinent medical records, reviewed limits to confidentiality, conducted a clinical interview, and explained procedures.           This service was not originating from a related E/M service provided within the previous 7 days nor will  to an E/M service or procedure within the next 24 hours or my soonest available appointment.  Prevailing standard of care was able to be met in this audio-only visit.

## 2022-01-24 ENCOUNTER — TELEPHONE (OUTPATIENT)
Dept: SURGERY | Facility: CLINIC | Age: 72
End: 2022-01-24
Payer: MEDICARE

## 2022-01-31 ENCOUNTER — OFFICE VISIT (OUTPATIENT)
Dept: NEUROLOGY | Facility: CLINIC | Age: 72
End: 2022-01-31
Attending: INTERNAL MEDICINE
Payer: MEDICARE

## 2022-01-31 DIAGNOSIS — Z82.0 FAMILY HISTORY OF ALZHEIMER'S DISEASE: ICD-10-CM

## 2022-01-31 DIAGNOSIS — F33.0 MILD EPISODE OF RECURRENT MAJOR DEPRESSIVE DISORDER: ICD-10-CM

## 2022-01-31 DIAGNOSIS — G31.84 MILD NEUROCOGNITIVE DISORDER: Primary | ICD-10-CM

## 2022-01-31 PROCEDURE — 96133 NRPSYC TST EVAL PHYS/QHP EA: CPT | Mod: S$PBB,,, | Performed by: CLINICAL NEUROPSYCHOLOGIST

## 2022-01-31 PROCEDURE — 96138 PR PSYCH/NEUROPSYCH TEST ADMIN/SCORING, BY TECH, 2+ TESTS, 1ST 30 MIN: ICD-10-PCS | Mod: S$PBB,,, | Performed by: CLINICAL NEUROPSYCHOLOGIST

## 2022-01-31 PROCEDURE — 96138 PSYCL/NRPSYC TECH 1ST: CPT | Mod: S$PBB,,, | Performed by: CLINICAL NEUROPSYCHOLOGIST

## 2022-01-31 PROCEDURE — 96139 PR PSYCH/NEUROPSYCH TEST ADMIN/SCORING, BY TECH, 2+ TESTS, EA ADDTL 30 MIN: ICD-10-PCS | Mod: S$PBB,,, | Performed by: CLINICAL NEUROPSYCHOLOGIST

## 2022-01-31 PROCEDURE — 96139 PSYCL/NRPSYC TST TECH EA: CPT | Mod: S$PBB,,, | Performed by: CLINICAL NEUROPSYCHOLOGIST

## 2022-01-31 PROCEDURE — 96132 NRPSYC TST EVAL PHYS/QHP 1ST: CPT | Mod: S$PBB,,, | Performed by: CLINICAL NEUROPSYCHOLOGIST

## 2022-01-31 PROCEDURE — 99999 PR PBB SHADOW E&M-EST. PATIENT-LVL I: CPT | Mod: PBBFAC,,,

## 2022-01-31 PROCEDURE — 99499 UNLISTED E&M SERVICE: CPT | Mod: S$PBB,,, | Performed by: CLINICAL NEUROPSYCHOLOGIST

## 2022-01-31 PROCEDURE — 99499 NO LOS: ICD-10-PCS | Mod: S$PBB,,, | Performed by: CLINICAL NEUROPSYCHOLOGIST

## 2022-01-31 PROCEDURE — 96133 PR NEUROPSYCHOLOGIC TEST EVAL SVCS, EA ADDTL HR: ICD-10-PCS | Mod: S$PBB,,, | Performed by: CLINICAL NEUROPSYCHOLOGIST

## 2022-01-31 PROCEDURE — 99999 PR PBB SHADOW E&M-EST. PATIENT-LVL I: ICD-10-PCS | Mod: PBBFAC,,,

## 2022-01-31 PROCEDURE — 99211 OFF/OP EST MAY X REQ PHY/QHP: CPT | Mod: PBBFAC

## 2022-01-31 PROCEDURE — 96132 PR NEUROPSYCHOLOGIC TEST EVAL SVCS, 1ST HR: ICD-10-PCS | Mod: S$PBB,,, | Performed by: CLINICAL NEUROPSYCHOLOGIST

## 2022-02-06 NOTE — PROGRESS NOTES
"NEUROPSYCHOLOGICAL EVALUATION - CONFIDENTIAL    Referring Provider: Yoseph Dallas MD  Medical Necessity: Evaluate cognitive and emotional functioning, participate in treatment planning/management, and provide supportive therapy in the setting of memory loss.   Date Conducted: 1/19/2022 & 1/31/2022  Present At Visit: the patient   Referral Diagnoses: Z82.0 (ICD-10-CM) - Family history of Alzheimer's disease  Consent: The patient expressed an understanding of the purpose of the evaluation and consented to all procedures. We discussed the limits of confidentiality and discussed an emergency plan.    ASSESSMENT & PLAN:   Ms. Gely Green is an 71 y.o., female with 16 years of education and pertinent medical history including DM2, HLD, diabetic peripheral neuropathy, thyromegaly, history of TIA, and episodic depression who was referred for a neuropsychological evaluation in the setting of gradually progressive decline in thinking and memory. She is independent and largely without difficulty in IADLs. She has a family history of AD.     Compared to her 2018 evaluation, results of the current evaluation are relatively stable. Cognitively, her profile reveals intact/at expectation attention, working memory, processing speed, executive functioning, language functioning, visuospatial functioning, and learning. Her free recall/memory retrieval is mildly reduced. She recognized almost all the information when she was provided with cues, but made many false positive errors, indicating reduced discriminability. Temporal orientation was intact. There was not a split between letter and semantic verbal fluency performances. Psychologically, she is currently reporting a mild degree of clinically significant depressive symptoms. While this is an improvement from her last evaluation, she reported in the clinical interview that she has been "overwhelmingly sad" over the past five months with suicidal ideation in December 2021 " (none currently). She does not want to get back on an SNRI/SSRI and has not yet called a therapist to start individual counseling. She cried during memory testing and expressed her frustration over her memory trouble.     Despite perception of worsening cognition, her test scores have remained stable over a 4-year period. This argues against an emerging neurodegenerative condition as the etiology. I believe her vascular health could be contributing to some degree as well as her continued psychological distress. I think it's reasonable to consider updating labs to assess for reversible causes of memory difficulty, but will defer to her medical providers for this. I think next steps should include returning for a visit with her neurologist, Dr. Torres, particularly since she has not seen him since April 2021. She is also strongly encouraged to follow through with scheduling an appointment for talk therapy/counseling. She would likely benefit from restarting an SNRI/SSRI, but will respect her decision to not proceed with medication management. Behaviors that promote brain health and cognitive tips and strategies will be included in the after visit summary. We can re-evaluate her cognition in one year. She is welcome to return sooner, however, she should notice significant worsening of her cognition or if she would like to have a check-in appointment.     Problem List Items Addressed This Visit        Neuro    Mild neurocognitive disorder - Primary       Psychiatric    Recurrent major depressive disorder      Other Visit Diagnoses     Family history of Alzheimer's disease          Thank you for allowing me to assist in Ms. Gely Green's care. If you have any questions, please contact me at [phone].      Brittany Perdomo, PhD  Licensed Clinical Neuropsychologist  [facility]     CLINICAL INTERVIEW & RECORD REVIEW     Cognitive Functioning   Cognitive screener:  MoCA = 24/30 (September 2018)  Previous evaluation(s): Completed a neuropsychological evaluation with Dr. Flavio Zuniga on 9/10/2018. Results revealed the following:   Ms. Gely Green is a 67-year-old female who was referred for a neuropsychological evaluation in the setting of self-reported memory complaints over the past 2 years. She described her instances of memory loss as frustrating rather than functionally debilitating, but she does worry about developing Alzheimers disease. She is functionally independent with few reported problems.      Ms. Mendez mood was the most noteworthy aspects of her history and presentation. She described considerable depression since her sons death in 2014, with the anniversary of his death occurring just 4 days before this evaluation. She continues to think of him on a daily basis, is constantly sad/tearful, has recently been fantasizing about suicide (with no intent), and constantly keeps herself busy to avoid downtime. It is encouraging that she recently reengaged with a grief support group, but this group meets only once per month. Depression and complicated bereavement are clearly impacting her daily functioning.       Regarding cognition, the neuropsychological evaluation was remarkable for inconsistent performance on tests of learning and memory with inefficiencies in cognitive organization/retrieval and source memory. However, a cognitive diagnosis will be deferred at the present time in the setting of marked depression. Furthermore, a neurodegenerative condition  is considered unlikely at the present time, especially when considering her high level of functioning with no difficulty completing complex activities of daily living (with the exception of periodic amotivation). She also does not present with the type of dima amnesia seen in Alzheimer's disease. In addition to depression, mild cerebrovascular disease (HLD, DMII) is another possible contributing factor  "to cognitive inefficencies. Interval assessment is warranted.     Course of difficulty since 2018: Initially when went to Dr. Zuniga it was because she had basic concerns for AD due to her family history. Recently, in the last two years, she has noticed a gradually progressive decline in memory that has become very concerning to her.   Fluctuations: none  Severity of changes: 6 or 7/10, where 10/10 represents her baseline level of cognitive functioning.   Examples:  Attention/Working Memory/Executive Functioning: attention is okay. finishing tasks that she starts. Makes plans to call people but won't always follow through (needs to refer to her log).  Processing Speed: no problems identified   Language: word finding difficulty  Visuospatial: sometimes has to stop and think about where a particular place is located while driving.   Learning & Memory: forgetting things, like names. Has to write down everything she wants to do in order to remember. Forgets details of conversations, not whole conversations. Misplacing items. tries to be very conscious about where she is putting items but when goes back to that spot she has moved it somewhere else.   Exacerbating factors: none  Ameliorating factors: none  Medication for cognition: none     Daily Functioning (I/ADLs)  ADLs: Independent and without difficulty  IADLs:  Finances: Independent and without difficulty. Has a log she keeps.   Medication Mgmt: Independent and without difficulty  Driving: Sometimes has to stop and think about where a particular place is located while driving.   Household Mgmt: Independent and without difficulty  Cooking/Meal Preparation: Independent and without difficulty.  Shopping: Independent and without difficulty. Keeps up with her To-Do List.   Appointment Mgmt: She has to keep up with her calendar, won't always recall appointment times without it.     Psychiatric/Neuropsychiatric Symptoms  Mood: "well, I'm depressed and somewhat " "overwhelmingly sad"   Depression: yes - been sad in the past but the past 3 or 4 months she has been overwhelmingly sad. Had suicidal thoughts in December. None more recently.   Anxiety: no  Stress: no, "well like at a level 3. Just sad."   Social Withdrawal: no  Neurovegetative Sxs:  Appetite: okay  Sleep: doing better with sleep over the past month - not as frequent early awakenings. 7 hours at least. Snoring, yes, but not gasping for air. Not acting out dreams.   Energy: okay  Hallucinations: no  Delusional/Paranoid Thinking: no  Impulsivity: no  Obsessive/Compulsive Behaviors: no  Disinhibition: no  Irritability/Agitation: yes and mostly in response to what is going on with her right now (in terms of her thinking changes).   Aggression: no  Apathy/Indifference: yes  Other changes in personality: no    Physical Functioning  Motor/Gait Sxs: no  Autonomic Sxs: urinary urgency for a few years.   Bulbar Sxs: no  Sensory Sxs: no  Pain: no  Physical Exercise Routine: usually 3 to 5 days a week     RELEVANT HISTORY  This patient has a past medical history of Arthritis, Chronic constipation, Depression, Diabetes mellitus, Diabetes mellitus, Hypertension, and Osteopenia.    Past Surgical History:   Procedure Laterality Date     SECTION      2x    COLONOSCOPY      COLONOSCOPY N/A 3/21/2019    Procedure: COLONOSCOPY;  Surgeon: Marshall Contreras MD;  Location: [facility] ([address] [place] [address]);  Service: Endoscopy;  Laterality: N/A;  pt states that she had to be resuscitated after receiving an epidural during childbirth "30 something" years ago.  Ok for 4th floor per Dr. Hrenandez-MS    EYE SURGERY Bilateral     cataract removal    HYSTERECTOMY      full hyst 1999    OOPHORECTOMY      TONSILLECTOMY       Neurological History   Headaches/Migraines: no  TBI: no  Seizures: no  Stroke: TIA x 1  Tumor: no  Previous Episodes of Delirium: no  Movement Disorder: no  CNS Infection: no  Other: no    Neurodiagnostics  Results for " orders placed or performed during the hospital encounter of 01/04/22   CT Head Without Contrast    Narrative    EXAMINATION:  CT HEAD WITHOUT CONTRAST    CLINICAL HISTORY:  Head trauma, minor (Age >= 65y);    TECHNIQUE:  Low dose axial images were obtained through the head.  Coronal and sagittal reformations were also performed. Contrast was not administered.    COMPARISON:  MRI dated 12/28/2020    FINDINGS:  No intracranial blood products identified.  No extra-axial masses or for collections.    Mild sequelae of microvascular ischemic changes seen on prior MRI.  Ventricles are symmetric.  No mass or mass effect.    Osseous structures show no acute process.  Mastoid air cells and paranasal sinuses are grossly clear.      Impression    No acute intracranial process.    Mild sequelae of microvascular ischemic change better evaluated on prior MRI.      Electronically signed by: Benjamin Dewitt MD  Date:    01/04/2022  Time:    10:18   Results for orders placed or performed during the hospital encounter of 12/27/20   MRI BRAIN W WO CONTRAST    Narrative    EXAMINATION:  MRI BRAIN W WO CONTRAST    CLINICAL HISTORY:  TIA;.    TECHNIQUE:  Multiplanar multisequence MR imaging of the brain was performed before and after the administration of 10 mL Gadavist  intravenous contrast.    COMPARISON:  Head CT 12/27/2020, CTA head neck 08/06/2020, MRA neck 02/14/2019    FINDINGS:  There is no abnormal restricted diffusion to suggest acute infarction. The ventricles are normal in size and configuration without evidence for hydrocephalus. There are no abnormal areas of gradient susceptibility to suggest parenchymal hemorrhage.  There are multiple scattered foci of T2/FLAIR signal hyperintensity in the supratentorial white matter which demonstrate no corresponding post-contrast enhancement or abnormal restricted diffusion.  While nonspecific findings likely relate to sequela of chronic microvascular ischemic change although findings  can be seen in the setting of migraine headaches and as the residua from inflammatory and traumatic insults to the brain.  Clinical correlation is advised.  Following the administration of IV contrast, no pathologic foci of enhancement are advised.  No extra-axial hemorrhage or fluid collections the craniocervical junction and sellar regions are within normal limits.      Impression    1. No acute intracranial abnormality identified on today's exam.  Specifically, no evidence of acute infarction or enhancing lesion.  2. Scattered foci of T2/FLAIR signal hyperintensity in supratentorial white matter. While nonspecific, findings likely relate to sequela of chronic microvascular ischemic change although findings can be seen in the setting of migraine headaches and as the residua from inflammatory and traumatic insults to the brain. Clinical correlation is advised.      Electronically signed by: Dania Vieira MD  Date:    12/29/2020  Time:    04:31   MRA Brain    Narrative    EXAMINATION:  MRA BRAIN WITHOUT CONTRAST    CLINICAL HISTORY:  TIA; .    TECHNIQUE:  Non-contrast 3-D time-of-flight intracranial MR angiography was performed through the [facility] [place] with MIP reformatting.    COMPARISON:  CTA head neck 08/06/2020    FINDINGS:  Anterior intracranial circulation: No high-grade focal stenosis, occlusion, aneurysm, or vascular malformation. The right A1 segment is hypoplastic.    Posterior intracranial circulation: No high-grade focal stenosis, occlusion, aneurysm, or vascular malformation.  Small right posterior communicating artery present.      Impression    MRA brain is within normal limits without evidence of hemodynamically significant stenosis, proximal large vessel occlusion or other significant abnormality.      Electronically signed by: Dania Vieira MD  Date:    12/29/2020  Time:    04:32     Pertinent Lab Work  Lab Results   Component Value Date    [id number] 356 07/06/2017     No results found for:  "RPR  Lab Results   Component Value Date    FOLATE 12.4 07/06/2017     Lab Results   Component Value Date    TSH 0.931 03/23/2021     Lab Results   Component Value Date    HGBA1C 6.9 (H) 10/22/2021     No results found for: HIV1X2, [id number]    Medications  Current Outpatient Medications   Medication Instructions    ACCU-CHEK SMARTVIEW TEST STRIP Strp ACCU-CHEK SMARTVIEW TEST STRIP Strp,    aspirin (ECOTRIN) 325 mg, Oral, Daily    calcium carbonate (OS-REDD) 600 mg, Oral, 2 times daily with meals    flavoxATE (URISPAS) 100 mg, Oral, 3 times daily PRN    fluticasone propionate (FLONASE) 100 mcg, Each Nostril, Daily    FREESTYLE SHANNON 2 SENSOR Kit USE AS DIRECTED EVERY 2 WEEKS    GVOKE HYPOPEN 2-PACK 1 mg/0.2 mL AtIn No dose, route, or frequency recorded.    irbesartan (AVAPRO) 150 mg, Oral, Nightly    lancets (ONETOUCH DELICA LANCETS) 33 gauge Misc 1 lancet, Misc.(Non-Drug; Combo Route), 3 times daily    levocetirizine (XYZAL) 5 mg, Oral, Nightly    metFORMIN (GLUCOPHAGE-XR) 500 MG ER 24hr tablet TAKE 1 TABLET(500 MG) BY MOUTH TWICE DAILY    ondansetron (ZOFRAN-ODT) 4 mg, Oral, Every 6 hours PRN    ONETOUCH VERIO TEST STRIPS Strp TEST THREE TIMES DAILY    pen needle, diabetic (NOVOFINE 32) 32 gauge x 1/4" Ndle use subcutaneously every evening    rosuvastatin (CRESTOR) 10 MG tablet TAKE 1 TABLET BY MOUTH EVERY DAY    TOUJEO SOLOSTAR U-300 INSULIN 300 unit/mL (1.5 mL) InPn pen INJECT 33 UNITS UNDER THE SKIN EVERY EVENING    vitamin D (VITAMIN D3) 1,000 Units, Oral, Daily     Recent changes to medications: no    Psychiatric History  Prior Diagnoses: Episodic depression.   History of Trauma/Abuse: no  History of Suicide Attempts: no, but like a flash of what it would be like if something happened. Has thought about drowning, like rolling off the bridge but then states she would never act on it. her daughter and grandson are protective factors.    Current Ideation, Intention, or Plan: before Carmen was " the last time she had this thought  Homicidal Ideation: no  Medication(s): no and not a medicine person so doesn't want to be back on one. Effexor 6 years ago.   Hospitalization(s): no  Psychotherapy/Counseling: was going to group therapy but did not like it and is interested in returning to individual counseling. Has a few names but has been procrastinating getting something scheduled.     Substance Use History  Social History     Tobacco Use    Smoking status: Former Smoker     Packs/day: 1.50     Years: 25.00     Pack years: 37.50     Types: Cigarettes     Quit date:      Years since quittin.1    Smokeless tobacco: Never Used   Substance and Sexual Activity    Alcohol use: No    Drug use: No    Sexual activity: Not Currently     Partners: Male     Birth control/protection: None     History of abuse/overuse: no    Family Neurological & Psychiatric History    Family History   Problem Relation Age of Onset    Breast cancer Maternal Cousin     Heart attack Mother     Heart disease Mother     Alzheimer's disease Mother     Heart attack Father     Heart disease Father     Heart failure Father     Hypertension Father     Hyperlipidemia Sister     Hypertension Sister     Diabetes Sister     Abnormal EKG Sister     Dementia Sister     Alcohol abuse Brother     Fibroids Daughter     Brain cancer Son     Early death Son 33    Ovarian cancer Neg Hx     Cervical cancer Neg Hx     Endometrial cancer Neg Hx     Vaginal cancer Neg Hx     Stroke Neg Hx     Colon cancer Neg Hx     Esophageal cancer Neg Hx     Vision loss Neg Hx      Neurologic: Mother diagnosed with Alzheimers disease,  at age 86. Son  from brain cancer  Psychiatric: see above.    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 Two children, one . 1 grandchild  Support System: good - family, two close friends  Hobbies/Activities: wants to travel but can't because of COVID-19.   Current Living Situation: lives at home with her     Legal  Current: none    OBJECTIVE:     MENTAL STATUS AND OBSERVATIONS:   Appearance: Appropriate to setting   Alertness: Alert but required frequent prompts and redirection.   Orientation:   O x 4   Gait:  Independent   Psychomotor:  Unremarkable   Handedness:  Right   Vision & Hearing:  Wore reading glasses on the day of testing. Hearing was adequate for session   Speech/language: Normal in rate, rhythm, tone, and volume. No significant word finding difficulty observed during the interview. She had some difficulty on testing. Comprehension was normal during the interview. She asked for repetition and clarification of test directions and asked for arithmetic problems to be repeated with regularity.    Mood/Affect:  The patients stated mood was "well, I'm depressed and somewhat overwhelmingly sad." Affect was slightly dysthymic during the interview. She was tearful with a dysthymic mood on the day of testing. She periodically appeared anxious and required additional reassurance from the examiner.    Interpersonal Behavior:  Rapport was quickly and easily established    Suicidality/Homicidality: Denied   Hallucinations/Delusions:  None evidenced or endorsed   Thought Content: Logical   Though Processes: Goal-directed   Insight & Judgment:  Appropriate   Participation in Interview:  Full     PROCEDURES/TESTS ADMINISTERED: In addition to performing a review of pertinent medical records, reviewing limits to confidentiality, " "conducting a clinical interview, and explaining procedures, the following measures were administered: [place] Cognitive Assessment (MoCA); MSVT; Test of Premorbid Functioning (TOPF); Wechsler Adult Intelligence Scale, Fourth Edition (WAIS-IV) [Digit Span, Arithmetic, Symbol Search, and Coding subtests]; [place] Verbal Learning Test-Second edition (CVLT-2); Neuropsychological Assessment Battery (NAB) [Naming and Auditory Comprehension subtests, form 1]; Verbal fluency tests (FAS & animal naming; Yesenia et al., 2004 norms); Vincent Complex Figure Test (RCFT) [copy only]; Trail Making Test, parts A and B (Yesenia et al., 2004 norms); Geriatric Depression Scale (GDS-30); and Generalized Anxiety Disorder - 7 Item Scale (HEBERT-7). Manual norms were used unless otherwise indicated.       TEST TAKING BEHAVIOR AND VALIDITY: This patient worked at a slow pace throughout testing. She cried during memory testing stating, "It's so frustrating. I can't remember."  The examiner offered her a break, but she refused and instead regained her composure and continued on. She had source memory difficulty (recalling details from a memory screening on another memory measure). She often repeated her responses on verbal fluency measures. When asked to name a ladle on a confrontation naming task, she said, "lat...spoon... it's a lat. That's what I call it...ladder...lat".  When asked to name a canteen, she said, "You drink out of it...I can't recall the word...You can drink wine out of it...It's for hiking...I can describe it, but I can't think of the name."  With cues, she was able to name all of the pictures except for one. She lost her place on a divided attention task but was able to get back on track, and had some difficulty integrating design elements while drawing a copy of a complex geometric figure. She was generally aware of her errors and self-corrected when possible. Overall, she persevered throughout the evaluation. Scores on " stand-alone and embedded performance validity measures were variable, with some falling below cutoffs. The current results, therefore, are interpreted cautiously as testing may underestimate the patient's current functioning.    TEST RESULTS   2018 Evaluation 2022 Evaluation       Raw Score     Standardized Score Raw Score Type of Standardized Score Standardized Score Percentile/CP Descriptor   MSVT IR   90 - - - -   MSVT DR   90 - - - -   MSVT Cons   80 - - - -   ACS RDS 9  6 - - - -   CVLT-II FC   16 - - - -   PREMORBID FUNCTIONING Raw Score Standardized Score Raw Score Type of Standardized Score Standardized Score Percentile/CP Descriptor   TOPF simple dem. eFSIQ   - SS 96 39 Average   TOPF pred. eFSIQ   - SS 99 47 Average   TOPF simple + pred. eFSIQ   - SS 95 37 Average   COGNITIVE SCREENING Raw Score Standardized Score Raw Score Type of Standardized Score Standardized Score Percentile/CP Descriptor   MoCA 24  24 - - - Impaired   Orientation - Place   2/2 - - - -   Orientation - Date   4/4 - - - -   LANGUAGE FUNCTIONING Raw Score Standardized Score Raw Score Type of Standardized Score Standardized Score Percentile/CP Descriptor   TOPF Word Reading   36 SS 96 39 Average   NAB Naming 27 30 27 Tscore 32 4 Below Average   NAB Auditory Comprehension  53 86 Tscore 39 14 Low Average   FAS  48 32 Tscore 46 34 Average   Animal Naming  37 14 Tscore 46 34 Average   VISUOSPATIAL FUNCTIONING Raw Score Standardized Score Raw Score Type of Standardized Score Standardized Score Percentile/CP Descriptor   RCFT Copy 25 <1 26 - - 2-5 Below Average   RCFT Time to Copy   197 - - >16 WNL   LEARNING & MEMORY Raw Score Standardized Score Raw Score Type of Standardized Score Standardized Score Percentile/CP Descriptor   CVLT-II           Trials 1-5 (T-Score)  37 31 Tscore 38 12 Low Average   List A Trial 1  0 5 zscore -0.5 16 Average   List A Trial 5   6 zscore -2 2 Below Average   List B  -1.0 5 zscore 0 50 Average   SDFR  -2.0 1  zscore -2 2 Below Average   SDCR  -4.0 3 zscore -2.5 0.1 Exceptionally Low    LDFR  -2.0 3 zscore -2 2 Below Average   LDCR  -3.5 3 zscore -2.5 0.1 Exceptionally Low    Semantic Clustering   0.4 zscore 0 50 Average   Learning Wallace   0.1 zscore -2.5 0.1 Exceptionally Low    Repetitions   9 zscore 1 84 High Average   Intrusions   24 zscore 3.5 99.9 Exceptionally High   Recognition Hits 15  14 zscore -0.5 16 Average   False Positives 20  13 zscore 2.5 99.9 Exceptionally High   Discriminability  -2.5 1.4 zscore -1.5 2 Below Average   ATTENTION/WORKING MEMORY Raw Score Standardized Score Raw Score Type of Standardized Score Standardized Score Percentile/CP Descriptor   WAIS-IV WMI  97 - SS 89 23 Low Average   WAIS-IV Digit Span  10 20 ss 8 25 Average         DS Forward   8 ss 8 25 Average         DS Backward   6 ss 8 25 Average         DS Sequence   6 ss 9 37 Average         Longest Digit Forward 7  6 - - - -         Longest Digit Backward 4  4 - - - -         Longest Digit Sequence 6  4 - - - -   WAIS-IV Arithmetic  9 11 ss 8 25 Average   MENTAL PROCESSING SPEED Raw Score Standardized Score Raw Score Type of Standardized Score Standardized Score Percentile/CP Descriptor   WAIS-IV PSI  105 -  50 Average   WAIS-IV Symbol Search  12 24 ss 10 50 Average   WAIS-IV Coding  10 51 ss 10 50 Average   TMT A   50 34 Tscore 52 58 Average   TMT A errors   0 - - - -   EXECUTIVE FUNCTIONING Raw Score Standardized Score Raw Score Type of Standardized Score Standardized Score Percentile/CP Descriptor   TMT B  50 92 Tscore 52 58 Average   TMT B errors 0  1 - - - -   MOOD & PERSONALITY Raw Score Standardized Score Raw Score Type of Standardized Score Standardized Score Percentile/CP Descriptor   GDS-30 BDI-2 = 21  17 - - - Mild   HEBERT-7 MEGHAN = 27  4 - - - WNL   ss = scaled score (mean = 10, SD = 3); SS = standard score (mean = 100, SD = 15); Tscore mean = 50, SD = 10; zscore (mean = 0.00, SD = 1)    It is important to note that  scores/percentiles should only be interpreted by a neuropsychologist. It is common for healthy individuals to have 1-3 isolated low/unusual scores that are not indicative of any significant cognitive dysfunction.         BILLING  Code Description Minutes Units   56783 Psychiatric Interview 0    17257 Nubhvl xm phys/qhp 1st hr 0    25458 Nubhvl xm phy/qhp ea addl hr 0    10729 Psycl tst eval phys/qhp 1st 0    84806 Psycl tst eval phys/qhp ea 0    61518 Nrpsyc tst eval phys/qhp 1st 60 1   68501 Nrpsyc tst eval phys/qhp ea 97 2     Referral review/test selection 25      Tech consult/test review/modifications 10      Patient limitation management 0      Patient behavior management 0      Patient symptom monitoring 0      Record Review/Integration/Report Generation 87      Face-to-Face interpretive Feedback 35    55318 Psycl/nrpsyc tst phy/qhp 1st 0    43351 Psycl/nrpsyc tst phy/qhp ea 0    82715 Psycl/nrpsyc tech 1st 30 1   92750 Psycl/nrpsyc tst tech ea 153 5

## 2022-02-17 ENCOUNTER — OFFICE VISIT (OUTPATIENT)
Dept: NEUROLOGY | Facility: CLINIC | Age: 72
End: 2022-02-17
Payer: MEDICARE

## 2022-02-17 ENCOUNTER — PATIENT MESSAGE (OUTPATIENT)
Dept: NEUROLOGY | Facility: CLINIC | Age: 72
End: 2022-02-17
Payer: MEDICARE

## 2022-02-17 DIAGNOSIS — F33.0 MILD EPISODE OF RECURRENT MAJOR DEPRESSIVE DISORDER: ICD-10-CM

## 2022-02-17 DIAGNOSIS — G31.84 MILD NEUROCOGNITIVE DISORDER: Primary | ICD-10-CM

## 2022-02-17 PROCEDURE — 99499 NO LOS: ICD-10-PCS | Mod: 95,,, | Performed by: CLINICAL NEUROPSYCHOLOGIST

## 2022-02-17 PROCEDURE — 99499 UNLISTED E&M SERVICE: CPT | Mod: 95,,, | Performed by: CLINICAL NEUROPSYCHOLOGIST

## 2022-02-17 NOTE — PATIENT INSTRUCTIONS
MANAGING VASCULAR RISK FACTORS  A personal history of disorders that affect the cardiovascular system (e.g., hypertension, high cholesterol, diabetes, heart disease) can have a negative impact on brain functioning especially over many years. Therefore, it is very important for this patient to maintain good control over his/her risk factors. The following is recommended:   Take all medications as prescribed and follow-up with recommendations above.   Get regular physical exercise to the extent that it is possible. Family may need to structure this into their loved ones day or week and develop a transportation plan.   Eat a well-balanced diet and following the MIND diet (see handout) has been shown to be most brain protective.    Check your blood pressure, cholesterol levels, blood sugar, and others as appropriate.    PRACTICE GOOD COGNITIVE HYGIENE  · Engage in regular exercise, which increases alertness and arousal and can improve attention and focus.  Consider lower impact exercises, such as yoga or light walking. Try to exercise for at least 150 minutes per week  · Get a good nights sleep, as this can enhance alertness and cognition.  · Eat healthy foods and balanced meals. It is notable that research indicates certain nutrients may aid in brain function, such as B vitamins (especially B6, B12, and folic acid), antioxidants (such as vitamins C and E, and beta carotene), and Omega-3 fatty acids. Here are some common tips for diet (Adopted from Shana et al, [place], 2018):  ? Eat primarily plant-based foods, such as fruits and vegetables, whole grains, legumes   ? (beans) and nuts.  ? Limit refined carbohydrates (white pasta, bread, rice).  ? Replace butter with healthy fats such as olive oil.  ? Use herbs and spices instead of salt to flavor foods.  ? Limit red meat and processed meats to no more than a few times a month.  ? Avoid sugary sodas, bakery goods, and sweets.  ? Eat fish and poultry at least twice a  week.  · Keep your brain active. Find activities to stay mentally active, such as reading, games (cards, checkers), puzzles (crosswords, Sudoku, jig saw), crafts (models, woodworking), gardening, or participating in activities in the community.  · Stay socially engaged. Continue staying active with your family and friends.    RESOURCE  Consider purchasing the book, High-Octane Brain: 5 Science-Based Steps to Sharpen Your Memory and Reduce Your Risk of Alzheimer's by Dr. Karla Stephens.    COGNITIVE TIPS AND STRATEGIES  The following tips and strategies are provided to help assist in daily activities:      Attention: Remember that inattention and lack of focus are major culprits to forgetting information so be sure and practice paying attention for adequate learning of information. If you rely on passive attention to remembering something (e.g., yeah, uh-huh approach), youll find you cannot recall it later. I recommend the following to improve attention, which may aid in later recall:  1. Reduce distractions in the area as much as possible  2. Look at the person as they are speaking to you.   3. Paraphrase as they are speaking  4. Write down important pieces of information   5. Ask them to repeat if you zone out.  6. Have them simplify and reduce information that you need to attend to during conversation.  7. Have visual cues to remind you if you need to do something later.     Processing Speed:  1. Using multiple modalities (e.g., listening, writing notes, asking questions, recording) to learn new information is likely to allow additional time for processing, thus improving memory for the material.   2. Allowing sufficient time to complete tasks will reduce frustration and help to ensure completion.     Executive Functionin. Dont attempt to multi-task.  Separate tasks so that each can be completed one at a time  2. Consider using a calendar/day planner, as that may be effective to help you plan and stay  on track.  Color-coding specific tasks by importance may add additional benefit to your planner  3. Break down large projects into smaller tasks and write down the steps to completing the task.  Taking notes while reading can help with recall.     Storing Information: Use the below strategies to help you further enhance how information is stored  1. Rehearse - Immediately after seeing/hearing something, try to recall it.  Wait a few minutes, then check again.  Gradually lengthen the intervals between rehearsals.  2. Repetition of learned material is critical to ensure storage of information to be learned. Self-test at home to ensure learning.  3. Write down important information to improve your attention and focus and to have something to look back on when you need to recall it.  4. Make sure the person doesnt rattle off, but presents in a clear, logical, and unhurried manner.      Recalling Information:  1. Jog your memory - Lose something?  Think back to when you last had it.  What did you do next?  And after that?  Mentally walk yourself through each activity that followed.  Prodding your memory this way may enable you to recall the location of the missing item.  2. Use a cue - Symbolic reminders (the proverbial string around the finger) are helpful.  So too are memos, timers, calendar notes, etc.--keep them in visible, appropriate place  3. Get organized - Have fixed locations for all important papers, key phone numbers, medications, keys, wallet, glasses, tools, etc.  4. Develop routines - Routines can anchor memories so they do not drift away.

## 2022-02-17 NOTE — PROGRESS NOTES
NEUROPSYCHOLOGICAL EVALUATION FEEDBACK    TELEMEDICINE DETAILS:   Established Patient - Audio Only Telehealth Visit  The patient location is: home  The chief complaint leading to consultation is: feedback regarding neuropsychological test results  Visit type: Virtual visit with audio only (telephone)  Total time spent with patient: 31 minutes  The reason for the audio only service rather than synchronous audio and video virtual visit was related to technical difficulties or patient preference/necessity.  Each patient to whom I provide medical services by telemedicine is:  (1) informed of the relationship between the physician and patient and the respective role of any other health care provider with respect to management of the patient; and (2) notified that they may decline to receive medical services by telemedicine and may withdraw from such care at any time. Patient verbally consented to receive this service via voice-only telephone call.  HPI: see below  Assessment and plan:  See below    Gely Green attended a feedback session today.  We discussed the results of the neuropsychological evaluation and I gave time to discuss questions and concerns. For full evaluation details, please see the note from this provider dated 1/31/2022. A copy of the report was provided via [employer] and a second copy was placed in the mail, per her request.      Problem List Items Addressed This Visit        Neuro    Mild neurocognitive disorder - Primary       Psychiatric    Recurrent major depressive disorder        Brittany Perdomo, PhD  Licensed Clinical Neuropsychologist  [facility] - Department of Neurology       This service was not originating from a related E/M service provided within the previous 7 days nor will  to an E/M service or procedure within the next 24 hours or my soonest available appointment.  Prevailing standard of care was able to be met in this audio-only visit.

## 2022-02-28 ENCOUNTER — PATIENT MESSAGE (OUTPATIENT)
Dept: INTERNAL MEDICINE | Facility: CLINIC | Age: 72
End: 2022-02-28
Payer: MEDICARE

## 2022-02-28 NOTE — TELEPHONE ENCOUNTER
Etiology of her symptoms is unclear and needs appt to be evaluated   She can make appt with urogynecology and recommend keep scheduled appt with pcp   - if any symptoms worsen prior to upcoming appointment then should follow up with a provider for sooner evaluation

## 2022-03-02 ENCOUNTER — OFFICE VISIT (OUTPATIENT)
Dept: UROGYNECOLOGY | Facility: CLINIC | Age: 72
End: 2022-03-02
Payer: MEDICARE

## 2022-03-02 VITALS
HEIGHT: 66 IN | WEIGHT: 164.44 LBS | SYSTOLIC BLOOD PRESSURE: 142 MMHG | BODY MASS INDEX: 26.43 KG/M2 | DIASTOLIC BLOOD PRESSURE: 82 MMHG

## 2022-03-02 DIAGNOSIS — K59.00 CONSTIPATION, UNSPECIFIED CONSTIPATION TYPE: ICD-10-CM

## 2022-03-02 DIAGNOSIS — N89.8 VAGINAL DISCHARGE: ICD-10-CM

## 2022-03-02 DIAGNOSIS — N30.10 INTERSTITIAL CYSTITIS: Primary | ICD-10-CM

## 2022-03-02 DIAGNOSIS — R39.89 BLADDER PAIN: ICD-10-CM

## 2022-03-02 LAB
BACTERIA #/AREA URNS AUTO: ABNORMAL /HPF
MICROSCOPIC COMMENT: ABNORMAL
RBC #/AREA URNS AUTO: 5 /HPF (ref 0–4)
WBC #/AREA URNS AUTO: 1 /HPF (ref 0–5)

## 2022-03-02 PROCEDURE — 81001 URINALYSIS AUTO W/SCOPE: CPT | Performed by: PHYSICIAN ASSISTANT

## 2022-03-02 PROCEDURE — 99214 PR OFFICE/OUTPT VISIT, EST, LEVL IV, 30-39 MIN: ICD-10-PCS | Mod: S$PBB,25,, | Performed by: PHYSICIAN ASSISTANT

## 2022-03-02 PROCEDURE — 51701 PR INSERTION OF NON-INDWELLING BLADDER CATHETERIZATION FOR RESIDUAL UR: ICD-10-PCS | Mod: S$PBB,,, | Performed by: PHYSICIAN ASSISTANT

## 2022-03-02 PROCEDURE — 99214 OFFICE O/P EST MOD 30 MIN: CPT | Mod: PBBFAC | Performed by: PHYSICIAN ASSISTANT

## 2022-03-02 PROCEDURE — 99999 PR PBB SHADOW E&M-EST. PATIENT-LVL IV: ICD-10-PCS | Mod: PBBFAC,,, | Performed by: PHYSICIAN ASSISTANT

## 2022-03-02 PROCEDURE — 51701 INSERT BLADDER CATHETER: CPT | Mod: PBBFAC | Performed by: PHYSICIAN ASSISTANT

## 2022-03-02 PROCEDURE — 87801 DETECT AGNT MULT DNA AMPLI: CPT | Performed by: PHYSICIAN ASSISTANT

## 2022-03-02 PROCEDURE — 87086 URINE CULTURE/COLONY COUNT: CPT | Performed by: PHYSICIAN ASSISTANT

## 2022-03-02 PROCEDURE — 87481 CANDIDA DNA AMP PROBE: CPT | Mod: 59 | Performed by: PHYSICIAN ASSISTANT

## 2022-03-02 PROCEDURE — 99214 OFFICE O/P EST MOD 30 MIN: CPT | Mod: S$PBB,25,, | Performed by: PHYSICIAN ASSISTANT

## 2022-03-02 PROCEDURE — 99999 PR PBB SHADOW E&M-EST. PATIENT-LVL IV: CPT | Mod: PBBFAC,,, | Performed by: PHYSICIAN ASSISTANT

## 2022-03-02 PROCEDURE — 51701 INSERT BLADDER CATHETER: CPT | Mod: S$PBB,,, | Performed by: PHYSICIAN ASSISTANT

## 2022-03-02 RX ORDER — SOLIFENACIN SUCCINATE 5 MG/1
5 TABLET, FILM COATED ORAL DAILY
Qty: 30 TABLET | Refills: 11 | Status: SHIPPED | OUTPATIENT
Start: 2022-03-02 | End: 2022-03-25

## 2022-03-02 RX ORDER — PHENAZOPYRIDINE HYDROCHLORIDE 200 MG/1
200 TABLET, FILM COATED ORAL 3 TIMES DAILY PRN
Qty: 90 TABLET | Refills: 0 | Status: SHIPPED | OUTPATIENT
Start: 2022-03-02 | End: 2022-03-06

## 2022-03-02 RX ORDER — HYDROXYZINE PAMOATE 25 MG/1
25 CAPSULE ORAL NIGHTLY PRN
Qty: 20 CAPSULE | Refills: 0 | Status: SHIPPED | OUTPATIENT
Start: 2022-03-02 | End: 2023-01-27 | Stop reason: SDUPTHER

## 2022-03-02 NOTE — PATIENT INSTRUCTIONS
1) Abdominal/Lower back pain  -- urine culture  -- low suspicion for pyelo  -- likely constipation or muscle related     2) Interstitial cystitis:  --avoid dietary irritants (see list)  --Start Prelief to help alkinize the urine if think associated with dietary triggers:   --Prelief Tablets : Each tablet contains 345 mg calcium glycerophosphate (65 mg of elemental calcium). The tablets also contain 0.25% magnesium  stearate as a processing aid. Two tablets are equivalent to 690 mg calcium glycerophosphate (130mg of elemental calcium).   --Prelief Powder : Each ¼ teaspoon usage of powder is comparable to two tablets. The powder dissolves rapidly in food or non-alcoholic beverages.  Tablets are recommended for taking with alcoholic beverages   --Usage:     --Tablets: 2-3 tablets, 2 times a day.    --Powder: ¼ teaspoon of powder 2 times a day. More can be used when needed  --Start the IC diet:  https://www.ichelp.org/wp-content/uploads/2015/07/food-list.pdf   -- Start Vesicare 5 mg daily to help with urinary urgency. Takes 2-4 weeks to see if will have effect.  For dry mouth: use Biotene mouthwash, get sour, sugar free lozenge or gum.  May increase constipation.   --Empty bladder every 2-3 hours even if you don't have the urge.  Empty well: wait a minute, lean forward on toilet.    --Try not to go more frequently than once an hour. When you get the urge to urinate after you have just gone, distract yourself until the urge passes.   --Avoid dietary irritants (see sheet).  Keep diary x 3-5 days to determine your irritants.  --KEGELS: do 10 in AM and 10 in PM, holding each x 10 seconds.  When you feel urge to go, STOP, KEGEL, and when urge has passed, then go to bathroom.    -- Start Hydroxyzine at night as needed for relief of bladder pain   --Bladder instillations previously   --Hydrodistention With Cystoscopy: allows physicians to take a much closer look at the bladder wall. While the AUA no longer suggests that it be  used as a diagnostic method (unless a diagnosis is in doubt), hydrodistention may provide modest relief in IC symptoms which can last from three to six months. It can, however, be a more difficult, painful procedure. As a result, it requires careful consideration and discussion.   --flares: Flavoxate not working, Uribel helps but expensive, try pyridium instead; consider anticholinergic if symptoms increase  --empty bladder every 3 hours; lean forward on toilet and help last bit out.  --Re-start Pelvic Floor Physical Therapy (PT). Please call to make appointment. [facility] (all take Medicaid):   1)  [facility]: (p) [phone]. (f) [phone]. Established patients call:  [phone].   3)  [facility]: (p) [phone].  Or [phone].    4)  [facility]. (p) [phone].      3) Anxiety:  --under control at this time.  No longer taking paxil, has been off for years.  --re-start grief counseling at her [employer] and re-starting with [facility]  --important to control IC symptoms     4) Vaginal atrophy (dryness) Use REPLENS OTC: 1/2 applicator full in vagina twice a week.      5) Constipation:  -- Controlling constipation may help bladder urgency/leakage and fiber may better control cholesterol and blood glucose.  --  Start daily fiber.  Take 1 tsp of fiber powder (psyllium or other sugar-free powder, try Fibercon).  Mix in 8 oz of water.  Take x 3-5 days.  Then, increase fiber by 1 tsp every 3-5 days until stool is easy to pass.  Stop and continue at that dose.   Do not exceed 6 tsps/day.  --  Use over the counter stool softener (ex. Colace) 1-2 x/day.  Try to AVOID laxatives.  --continue flax seed  --get a Squatty Potty

## 2022-03-02 NOTE — PROGRESS NOTES
"[facility] UROGYNECOLOGY  [address] [address]  2022     Gely LOTT Peter  [id number]  1950    UROGYN FOLLOW-UP  3/2/2022     71 y.o. female,   presents for urogyn follow up. She c/o   Chief Complaint   Patient presents with    Abdominal Pain    Pelvic Pain    .     Changes from last visit:  Well known patient to [facility] and Dr. Alan presents for recurrence of bladder pain. Has not had symptoms for years, but in the last few months has been having flares. Did two bladder installations without improvement. Today patient c/o IC pain daily, feels relief when she empties her bladder. Taking flavoxate daily, but does not help. Has tried Uribel and it helps, but it is expensive. BMs have been "horrible," takes miralax most days. Denies fever. Blood sugars have been 120-150 but may go up to 200, then sometimes drops suddenly to 60.     Past Medical History:   Diagnosis Date    Arthritis     Chronic constipation     Depression     Diabetes mellitus     Diabetes mellitus     Hypertension     Osteopenia        Past Surgical History:   Procedure Laterality Date     SECTION      2x    COLONOSCOPY      COLONOSCOPY N/A 3/21/2019    Procedure: COLONOSCOPY;  Surgeon: Marshall Contreras MD;  Location: [facility] ([address] [place] [address]);  Service: Endoscopy;  Laterality: N/A;  pt states that she had to be resuscitated after receiving an epidural during childbirth "30 something" years ago.  Ok for 4th floor per Dr. Hernandez-MS    EYE SURGERY Bilateral     cataract removal    HYSTERECTOMY      full hyst     OOPHORECTOMY      TONSILLECTOMY         Family History   Problem Relation Age of Onset    Breast cancer Maternal Cousin     Heart attack Mother     Heart disease Mother     Alzheimer's disease Mother     Heart attack Father     Heart disease Father     Heart failure Father     Hypertension Father     Hyperlipidemia Sister     Hypertension Sister     Diabetes " Sister     Abnormal EKG Sister     Dementia Sister     Alcohol abuse Brother     Fibroids Daughter     Brain cancer Son     Early death Son 33    Ovarian cancer Neg Hx     Cervical cancer Neg Hx     Endometrial cancer Neg Hx     Vaginal cancer Neg Hx     Stroke Neg Hx     Colon cancer Neg Hx     Esophageal cancer Neg Hx     Vision loss Neg Hx        Social History     Socioeconomic History    Marital status:    Tobacco Use    Smoking status: Former Smoker     Packs/day: 1.50     Years: 25.00     Pack years: 37.50     Types: Cigarettes     Quit date:      Years since quittin.1    Smokeless tobacco: Never Used   Substance and Sexual Activity    Alcohol use: No    Drug use: No    Sexual activity: Not Currently     Partners: Male     Birth control/protection: None       Current Outpatient Medications   Medication Sig Dispense Refill    ACCU-CHEK SMARTVIEW TEST STRIP Strp ACCU-CHEK SMARTVIEW TEST STRIP Strp, 120 strip 11    aspirin (ECOTRIN) 325 MG EC tablet Take 1 tablet (325 mg total) by mouth once daily. 30 tablet 11    calcium carbonate (OS-REDD) 600 mg calcium (1,500 mg) Tab Take 600 mg by mouth 2 (two) times daily with meals.      flavoxATE (URISPAS) 100 mg Tab Take 1 tablet (100 mg total) by mouth 3 (three) times daily as needed. 30 tablet 11    fluticasone propionate (FLONASE) 50 mcg/actuation nasal spray 2 sprays (100 mcg total) by Each Nostril route once daily. 48 g 3    FREESTYLE SHANNON 2 SENSOR Kit USE AS DIRECTED EVERY 2 WEEKS      GVOKE HYPOPEN 2-PACK 1 mg/0.2 mL AtIn       irbesartan (AVAPRO) 150 MG tablet Take 1 tablet (150 mg total) by mouth every evening. 90 tablet 3    lancets (ONETOUCH DELICA LANCETS) 33 gauge Misc 1 lancet by Misc.(Non-Drug; Combo Route) route 3 (three) times daily. 100 each 11    levocetirizine (XYZAL) 5 MG tablet Take 1 tablet (5 mg total) by mouth every evening. 30 tablet 11    metFORMIN (GLUCOPHAGE-XR) 500 MG ER 24hr tablet TAKE 1  "TABLET(500 MG) BY MOUTH TWICE DAILY 180 tablet 4    ondansetron (ZOFRAN-ODT) 4 MG TbDL Take 1 tablet (4 mg total) by mouth every 6 (six) hours as needed (Nausea and vomiting). 20 tablet 0    ONETOUCH VERIO TEST STRIPS Strp TEST THREE TIMES DAILY 200 strip 11    pen needle, diabetic (NOVOFINE 32) 32 gauge x 1/4" Ndle use subcutaneously every evening 100 each 11    rosuvastatin (CRESTOR) 10 MG tablet TAKE 1 TABLET BY MOUTH EVERY DAY 90 tablet 2    TOUJEO SOLOSTAR U-300 INSULIN 300 unit/mL (1.5 mL) InPn pen INJECT 33 UNITS UNDER THE SKIN EVERY EVENING (Patient taking differently: 10 Units.) 18 mL 3    vitamin D 1000 units Tab Take 1,000 Units by mouth once daily.       No current facility-administered medications for this visit.       Review of patient's allergies indicates:  No Known Allergies    OB History        3    Para   3    Term   3            AB        Living   2       SAB        IAB        Ectopic        Multiple        Live Births                      Well Woman  Patient's last menstrual period was 1999.   Last pap: denies h/o abn paps- post hysterectomy- no further screening  Last mammogram: 2021--normal  Colonoscopy: 2019 Tortuous colon.                         - The entire examined colon is normal on direct and    - The entire examined colon is normal on direct and                         retroflexion views.                         - No specimens collected  DEXA: 2019 Osteopenia of both femoral necks.  Normal bone mineral density of the lumbar spine.    ROS  As per HPI.      Physical Exam  BP (!) 142/82 (BP Location: Left arm, Patient Position: Sitting, BP Method: Medium (Manual))   Ht 5' 6" (1.676 m)   Wt 74.6 kg (164 lb 7.4 oz)   LMP 1999   BMI 26.55 kg/m²   General: alert and oriented, no acute distress  Respiratory: normal respiratory effort  Abd: soft, non-tender, non-distended  Pelvic:  Ext. Genitalia: normal external genitalia. Normal bartholin's and " skeens glands  Vagina: + atrophy. Normal vaginal mucosa without lesions. +white discharge noted.   Non-tender bladder base without palpable mass.  Cervix: absent  Uterus:  surgically absent, vaginal cuff well healed   Urethra: no masses or tenderness  Urethral meatus: no lesions, caruncle or prolapse.    Impression  1. Interstitial cystitis  phenazopyridine (PYRIDIUM) 200 MG tablet    Ambulatory referral/consult to Physical/Occupational Therapy    solifenacin (VESICARE) 5 MG tablet    hydrOXYzine pamoate (VISTARIL) 25 MG Cap   2. Constipation, unspecified constipation type  Ambulatory referral/consult to Physical/Occupational Therapy   3. Bladder pain  hydrOXYzine pamoate (VISTARIL) 25 MG Cap    Urine culture    Urinalysis Microscopic   4. Vaginal discharge  Vaginosis Screen by DNA Probe     We reviewed the above issues and discussed options for short-term versus long-term management of her problems.     Plan:   1) Abdominal/Lower back pain  -- urine culture  -- low suspicion for pyelo  -- likely constipation or muscle related     2) Interstitial cystitis:  --avoid dietary irritants (see list)  --Start Prelief to help alkinize the urine if think associated with dietary triggers:   --Prelief Tablets : Each tablet contains 345 mg calcium glycerophosphate (65 mg of elemental calcium). The tablets also contain 0.25% magnesium  stearate as a processing aid. Two tablets are equivalent to 690 mg calcium glycerophosphate (130mg of elemental calcium).   --Prelief Powder : Each ¼ teaspoon usage of powder is comparable to two tablets. The powder dissolves rapidly in food or non-alcoholic beverages.  Tablets are recommended for taking with alcoholic beverages   --Usage:     --Tablets: 2-3 tablets, 2 times a day.    --Powder: ¼ teaspoon of powder 2 times a day. More can be used when needed  --Start the IC diet:  https://www.ichelp.org/wp-content/uploads/2015/07/food-list.pdf   -- Start Vesicare 5 mg daily to help with urinary  urgency. Takes 2-4 weeks to see if will have effect.  For dry mouth: use Biotene mouthwash, get sour, sugar free lozenge or gum.  May increase constipation. If it does, will try to switch to mirabegron.   --Empty bladder every 2-3 hours even if you don't have the urge.  Empty well: wait a minute, lean forward on toilet.    --Try not to go more frequently than once an hour. When you get the urge to urinate after you have just gone, distract yourself until the urge passes.   --Avoid dietary irritants (see sheet).  Keep diary x 3-5 days to determine your irritants.  --KEGELS: do 10 in AM and 10 in PM, holding each x 10 seconds.  When you feel urge to go, STOP, KEGEL, and when urge has passed, then go to bathroom.    -- Start Hydroxyzine at night as needed for relief of bladder pain   --Bladder instillations previously   --Hydrodistention With Cystoscopy: allows physicians to take a much closer look at the bladder wall. While the AUA no longer suggests that it be used as a diagnostic method (unless a diagnosis is in doubt), hydrodistention may provide modest relief in IC symptoms which can last from three to six months. It can, however, be a more difficult, painful procedure. As a result, it requires careful consideration and discussion.   --flares: Flavoxate not working, Uribel helps but expensive, try pyridium instead--will have PA call to review with patient that she should only use these 3x/day x 3 days and should have several weeks in between uses; if using more frequently, she should stop and NOT use this med; consider anticholinergic if symptoms increase  --empty bladder every 3 hours; lean forward on toilet and help last bit out.  --Re-start Pelvic Floor Physical Therapy (PT). Please call to make appointment. [facility] (all take Medicaid):   1)  [facility]: (p) [phone]. (f) [phone]. Established patients call:  [phone].   3)  [facility]: (p) [phone].  Or [phone].     4)  [facility]. (p) [phone].      **MOST OF PAIN HAS BEEN RELATED TO PELVIC FLOOR MUSCLE TENSION IN PAST AND FLARES WITH LIFE STRESSORS. Would really emphasize need to focus on pelvic floor PT/getting levator tension better and anxiety/stress control.  Unsure that OAB meds will help--will CTM and have low threshold to stop, remberto if exacerbates constipation. Pyridium should only be used sparingly--this will be re-emphasized to patient.  Should really focus on constipation control.      3) Anxiety:  --under control at this time.  No longer taking paxil, has been off for years.  --re-start grief counseling at [facility] and re-starting with [facility]  --important to control IC symptoms     4) Vaginal atrophy (dryness) Use REPLENS OTC: 1/2 applicator full in vagina twice a week.      5) Constipation:  -- Controlling constipation may help bladder urgency/leakage and fiber may better control cholesterol and blood glucose.  --  Start daily fiber.  Take 1 tsp of fiber powder (psyllium or other sugar-free powder, try Fibercon).  Mix in 8 oz of water.  Take x 3-5 days.  Then, increase fiber by 1 tsp every 3-5 days until stool is easy to pass.  Stop and continue at that dose.   Do not exceed 6 tsps/day.  --  Use over the counter stool softener (ex. Colace) 1-2 x/day.  Try to AVOID laxatives.  --continue flax seed  --get a Squatty Potty    7) RTC 3 months for follow up    Mario Arroyo PA-C  Division of Female Pelvic Medicine and Reconstructive Surgery  Department of Obstetrics & Gynecology  [facility] [place], [place]    --------------------    I have reviewed the above note per RAVINDER Arroyo.  I was not present for the exam/discussion and am only reviewing the note post-visit.  I have made corrections/notes to plan and will review those with RAVINDER Arroyo.   Johny Alan MD

## 2022-03-03 LAB
BACTERIA UR CULT: NO GROWTH
BACTERIAL VAGINOSIS DNA: POSITIVE
CANDIDA GLABRATA DNA: NEGATIVE
CANDIDA KRUSEI DNA: NEGATIVE
CANDIDA RRNA VAG QL PROBE: NEGATIVE
T VAGINALIS RRNA GENITAL QL PROBE: NEGATIVE

## 2022-03-04 ENCOUNTER — TELEPHONE (OUTPATIENT)
Dept: UROGYNECOLOGY | Facility: CLINIC | Age: 72
End: 2022-03-04
Payer: MEDICARE

## 2022-03-04 ENCOUNTER — PATIENT MESSAGE (OUTPATIENT)
Dept: UROGYNECOLOGY | Facility: CLINIC | Age: 72
End: 2022-03-04
Payer: MEDICARE

## 2022-03-04 DIAGNOSIS — N89.8 VAGINAL DISCHARGE: Primary | ICD-10-CM

## 2022-03-04 DIAGNOSIS — B96.89 BACTERIAL VAGINOSIS: ICD-10-CM

## 2022-03-04 DIAGNOSIS — N76.0 BACTERIAL VAGINOSIS: ICD-10-CM

## 2022-03-04 RX ORDER — METRONIDAZOLE 500 MG/1
500 TABLET ORAL EVERY 12 HOURS
Qty: 14 TABLET | Refills: 0 | Status: SHIPPED | OUTPATIENT
Start: 2022-03-04 | End: 2022-03-11

## 2022-03-04 NOTE — TELEPHONE ENCOUNTER
----- Message from Mario Arroyo PA-C sent at 3/4/2022  8:03 AM CST -----  Please let  patient know I am sending in an antibiotic for bacterial vaginosis--which is not a STD. It is a bacteria that some times over grows in the vagina and replaces normal vaginal claire. I sent in a prescription for Flagyl 500 mg to take twice daily for 7 days. Avoid alcohol while taking medication and for 24 hours after last dose.     Her urine culture was negative for infection, so I think her pain is coming from IC and constipation. Has she started taking the vesicare and hydroxyzine? Fiber? Squatty potty?    I would like to see her back for another cath specimen to make sure the blood has cleared in about 1 month, it is likely from inflammation from IC, but I would like to re-check sooner rather than later.     Thanks!

## 2022-03-04 NOTE — TELEPHONE ENCOUNTER
Spoke to pt. And relayed all instructions pt. States she hasn't picked up her meds as of yet but is planning on picking everything today. Pt. verbaliezed understanding of all instructions

## 2022-03-07 ENCOUNTER — TELEPHONE (OUTPATIENT)
Dept: UROGYNECOLOGY | Facility: CLINIC | Age: 72
End: 2022-03-07
Payer: MEDICARE

## 2022-03-07 NOTE — TELEPHONE ENCOUNTER
Spoke to patient about getting back on anxiety meds. Per Dr. Alan her similar symptoms in the past have resolved when patient was on Paxil. She is scheduled to re-start PT. Also discussed how to take vesicare versus pyridium. She understands to take vesicare daily and pyidium only as needed for up to 3 times daily for a max of 2-3 days. Patient wrote on prescription bottle to clarify.She said BMs have been better as well. Will follow up in a couple of months.

## 2022-03-23 ENCOUNTER — CLINICAL SUPPORT (OUTPATIENT)
Dept: REHABILITATION | Facility: OTHER | Age: 72
End: 2022-03-23
Payer: MEDICARE

## 2022-03-23 DIAGNOSIS — N30.10 INTERSTITIAL CYSTITIS: ICD-10-CM

## 2022-03-23 DIAGNOSIS — K59.00 CONSTIPATION, UNSPECIFIED CONSTIPATION TYPE: ICD-10-CM

## 2022-03-23 PROCEDURE — 97112 NEUROMUSCULAR REEDUCATION: CPT

## 2022-03-23 PROCEDURE — 97162 PT EVAL MOD COMPLEX 30 MIN: CPT

## 2022-03-23 PROCEDURE — 97530 THERAPEUTIC ACTIVITIES: CPT

## 2022-03-23 NOTE — PLAN OF CARE
[facility]  Pelvic Health Physical Therapy Initial Evaluation    Date: 3/23/2022   Name: Gely LOTT [facility] Number: [id number]  Therapy Diagnosis: No diagnosis found.  Physician: Mario Arroyo PA-C    Physician Orders: PT Eval and Treat  Medical Diagnosis from Referral:   N30.10 (ICD-10-CM) - Interstitial cystitis   K59.00 (ICD-10-CM) - Constipation, unspecified constipation type   Evaluation Date: 3/23/2022  Authorization Period Expiration: 1 visit  Plan of Care Expiration: 6/21/22  Visit # / Visits authorized: 1/ 1    Time In: 1103  Time Out: 1200  Total Appointment Time (timed & untimed codes): 57 minutes    Precautions: universal    Subjective     Date of onset: Oct 2020, with bowel incontinence progressed to bladder as well    History of current condition - Gely reports: IC has been flaring up. Never saw follow up appts after our first PT eval in December so didn't realize she was supposed to come back. But urogynecologist recommended come back now.       Previously reported at Dec 21 eval: Started having leakage of stool, told PCP and they referred her to colorectal surgeon. Saw colorectal surgeon on 11/17 and she referred you to PFPT. Started October 2020. Has lifetime hx of constipation. When FI occurs its small pieces that come out, never total emptying. FI occurs 3-4x/week. (had been doing well with less leakage up until last week when started having leakage again, better since yesterday) Was diagnosed with IC years ago, was having pain with bladder filling at that time. Has been better but had a flare up in late September and saw urologist in October. Had bladder distillation done which helped. Had one flare up since but mostly fine. Sept flare up was first one in 8 years. (Has been having small flare ups about once a week which is why she went back to urogynecology)    Takes Miralax 3x/week  Takes Veslcare daily, hydroxizyne as needed but doesn't like it bc makes her drowsy next  day, not taking pyridium for fear of side effects of kidney injury  Saw Mario on 3/2 who also treated for a yeast infection     OB/GYN History:  and caesarean  Surgical History: hysterectomy  d/t fibroids and bleeding  Birth Control: menopause  History of chronic yeast, BV or UTIs? Yes - yeast and UTIs, recently had yeast 3/2 but treated  Sexually active? No  Pain with vaginal exams, intercourse or tampon use? No  Are you comfortable with the appearance of your genitals? Yes     Bladder/Bowel History:   · Frequency of urination:   Daytime: normal now                                          Nighttime: 2-3  · Difficulty initiating urine stream: No  · Urine stream: strong  · Complete emptying: Yes  · Bladder leakage: small UUI on way to toilet if holds too long, but leakage is better  · Frequency of incidents/Type of incontinence: UUI  · Amount leaked (urine): small squirt   · Urinary Urgency: still has this  · Frequency of bowel movements: can have small pieces of stool come out daily with urinating, but complete BM can be 2-3 weeks in between complete emptying  · Difficulty initiating BM: Yes, typically only stimulated by several days on Miralax, has to strain a lot  · Quality/Shape of BM: [place] Stool Chart 1  · Does Patient Feel Empty after BM? No  · Fiber Supplements or Laxative Use? Miralax I/M - about every 3 days  · Colon leakage: Yes  · Frequency of incidents: varies, some days doesn't happen, some days 2-3x  · Fecal Urgency: No  · Form of protection: none  · Number of pads required in 24 hours: none  · History of coccyx injury: No     Prolapse Screening:  Feeling of vaginal bulge: No     Pain:  Location: right knee(s)      Medical History: Gely  has a past medical history of Arthritis, Chronic constipation, Depression, Diabetes mellitus, Diabetes mellitus, Hypertension, and Osteopenia.      Surgical History: Gely Green  has a past surgical history that includes  section; Hysterectomy;  Colonoscopy; Colonoscopy (N/A, 3/21/2019); Oophorectomy; Tonsillectomy; and Eye surgery (Bilateral).     Medications: Gely has a current medication list which includes the following prescription(s): accu-chek smartview test strip, aspirin, calcium carbonate, flavoxate, fluticasone propionate, freestyle kristan 2 sensor, gvoke hypopen 2-pack, irbesartan, lancets, levocetirizine, meloxicam, metformin, onetouch verio test strips, pen needle, diabetic, polyethylene glycol, rosuvastatin, toujeo solostar u-300 insulin, and vitamin d.     Allergies: Review of patient's allergies indicates:  No Known Allergies      Prior Therapy/Previous treatment included: nothing  Social History: lives with their spouse  Current exercise: walking daily, swim (not lately due to weather),  aerobics  Occupation: retired, volunteers, active     Types of fluid intake: water 48 ounces, soda - diet and tea - decaf occasionally  Diet: fruits and vegetables, seafood   Habitus: well developed, well nourished      Abuse/Neglect: No   History of disordered eating: No   History of anxiety/depression: Yes past hx of medication and therapy in past. Plans to resume therapy.      Pts goals: to stop stool leakage, to better control IC - less bladder pain    OBJECTIVE     See EMR under MEDIA for written consent provided 3/23/2022  Chaperone: Declined    ORTHO SCREEN  Posture in sitting: slouched   Posture in standing: posterior pelvic tilt   Pelvic alignment: Not assessed today      ABDOMINAL WALL ASSESSMENT  Palpation: WNL  Abdominal strength: poor, poor load transfer  Scarring: midline, distal restrictions, no pain  Pelvic Floor Muscle and Transverse Abdominus Synergy: absent  Diastasis: absent      BREATHING MECHANICS ASSESSMENT   Thorax Assessment During Quiet Respiration: Decreased excursion of abdominal wall  and Decreased excursion bilaterally of lateral ribs   Thorax Assessment During Deep Respiration: Decreased excursion of abdominal wall  and  Decreased excursion bilaterally of lateral ribs     VAGINAL PELVIC FLOOR EXAM    EXTERNAL ASSESSMENT  Introitus: WNL  Skin condition: WNL  Scarring: none   Sensation: WNL   Pain: none  Voluntary contraction: visible lift  Voluntary relaxation: visible drop  Involuntary contraction: bulge  Bearing down: visible drop  Perineal descent: absent  Comments: na      INTERNAL ASSESSMENT  Pain: tender areas noted as follows: charissa-urethral fascia   Sensation: able to localized pressure appropriately   Vaginal vault: WNL   Muscle Bulk: hypertonus   Muscle Power: 1+/5  Muscle Endurance: 10 sec  # Reps To Fatigue: 2    Fast Contractions in 10 seconds: 3     Quality of contraction: slow relaxation and incomplete relaxation   Specificity: patient contracts: abd   Coordination: tends to hold breath during PFM contration and cannot isolate PFM from abdominals   Prolapse check: none  Comments: na    Limitation/Restriction for FOTO Bowel Survey    Therapist reviewed FOTO scores for Gely Green on 3/23/2022.   FOTO documents entered into [employer] - see Media section.    Limitation Score: 33%       TREATMENT     Treatment Time In: 1135  Treatment Time Out: 1200  Total Treatment time (time-based codes) separate from Evaluation: 25 minutes    Neuromuscular Re-education to develop Coordination, Control, Down training and Proprioception for 10 minutes including:   diaphragmatic breathing and pelvic floor mm contractions focus on activation at 3 layers sequentially in isolation and then sequentially focus on complete deactivation in supine and seated    Manual Therapy to develop flexibility and extensibility for 5 minutes including:   trigger point/myofascial release of perivesicular fascia and pelvic floor mm    Therapeutic Activity Patient participated in dynamic functional therapeutic activities to improve functional performance for 10 minutes. Including: Education as described below.   Prescription of bowel and bladder  diaries  Education on importance of managing constipation and improving stool consistency with regular, consistent use of Miralax and start taking daily at either full or half dose, at least 5 days to determine up or down on dose and at least 2 weeks to determine full effect    Patient Education provided:   general anatomy/physiology of urinary/ bowel  system and benefits of treatment were discussed with the pt. Additionally, anatomy/physiology of pelvic floor, diaphragmatic breathing, kegels and behavior modifications were reviewed.     Home Exercises provided:  Written Home Exercises provided: Yes  Exercises were reviewed and Gely was able to demonstrate them prior to the end of the session.    Gely demonstrated good  understanding of the education provided.     See EMR under Patient Instructions for exercises provided 3/23/2022.    Assessment     Gely is a 71 y.o. female referred to outpatient Physical Therapy with a medical diagnosis of interstitial cystitis, constipation. Pt presents with poor trunk stability, increased tension of the pelvic muscles, poor quality of pelvic muscle contraction, increased nocturia, poor coordination of pelvic floor muscles during ADL's leading to urinary or fecal leakage, poor fluid intake, dysfunctional defecation and unable to co-contract or co-relax abdominal wall and pelvic floor muscles. Pelvic exam reveals overactivity and weakness due to active insufficiency. PFME initiated with fair demonstration, though inconsistent, and will require reinforcement and may need additional modalities such as use of RUSI for biofeedback for learning. Suspect behavioral contributions and diaries prescribed to further assess symptoms. She reports inconsistent use of Miralax and recommend start with daily usage. May also benefit from addition of fiber.       Pt prognosis is Excellent  Pt will benefit from skilled outpatient Physical Therapy to address the deficits stated above and in the  chart below, provide pt/family education, and to maximize pt's level of independence.     Plan of care discussed with patient: Yes  Pt's spiritual, cultural and educational needs considered and patient is agreeable to the plan of care and goals as stated below:     Anticipated Barriers for therapy: None    Medical Necessity is demonstrated by the following:    History  Co-morbidities and personal factors that may impact the plan of care Co-morbidities   chronic constipation, interstitial cystitis and prior abdominal surgery    Personal Factors  no deficits     high   Examination  Body structures and functions, activity limitations and participation restrictions that may impact the plan of care Body Regions/Systems/Functions:  poor trunk stability, increased tension of the pelvic muscles, poor quality of pelvic muscle contraction, increased nocturia, poor coordination of pelvic floor muscles during ADL's leading to urinary or fecal leakage, poor fluid intake, dysfunctional defecation and unable to co-contract or co-relax abdominal wall and pelvic floor muscles    Activity Limitations:  urgency , delaying urge to urinate, initiating a BM, pain with full bladder affecting ADL participation and/or sleep, sleep uninterrupted by excessive nocturia and incontinence with ADLs    Participation Restrictions:  all ADLs/iADLs uninterrupted by urinary incontinence/urgency/frequency, all ADLs/iADLs uninterrupted by discomfort associated with chronic constipation and regularly having a comfortable BM    Activity limitations:   Learning and applying knowledge  No deficits    General Tasks and Commands  No deficits    Communication  No deficits    Mobility  No deficits    Self care  No deficits    Domestic Life  No deficits    Interactions/Relationships  No deficits    Life Areas  No deficits    Community and Social Life  No deficits       high   Clinical Presentation evolving clinical presentation with changing clinical  characteristics moderate   Decision Making/ Complexity Score: moderate       Goals:  Short Term Goals: 4 weeks   Pt indep in HEP  Pt indep in functional brace technique for decreased strain of pelvic structures or UI with activities which increase IAP  Pt able to wait at least 2 hours between trips to the bathroom for improved participation in ADLs  Pt demo pelvic floor mm strength at least 2+/5 for improved continence and support of pelvic organs    Long Term Goals: 8 weeks   Pt indep in progressive HEP  Pt reports 0 episodes of leakage in at least 2 weeks for improved ADL participation and decreased risk of skin breakdown  Pt reports having complete BM on at least 3 days a week for improved pelvic health  Nocturia no more than 1x/night for improved quality of sleep  Pt demo pelvic floor mm strength at least 3+/5 for improved continence and support of pelvic organs    Plan     Plan of care Certification: 3/23/2022 to 6/21/22.    Outpatient Physical Therapy 1 times weekly for 12 weeks to include the following interventions: therapeutic exercises, therapeutic activity, neuromuscular re-education, manual therapy, patient/family education and self care/home management    Roxana Rowe, PT

## 2022-03-23 NOTE — PATIENT INSTRUCTIONS
Home Program 3/23/22:        1) Breathing - 360 Breath - Inhale long, slow and deep. You should feel as if your lower ribs are expanding in all directions like the way an umbrella opens. You should feel the belly, back and sides gently expand and you may notice a relaxation in the pelvic floor.     Continue to breath like this for 10 breaths. Repeat 3 times/day.     2) Pelvic floor muscle exercise - Contract pelvic floor muscles by closing around the openings and pulling up and in. Repeat 5 times. Do 3 sets/day. Do a couple breaths before and after each set of squeezes.    3) Complete diaries    4) Begin regular, daily use of Miralax

## 2022-03-25 ENCOUNTER — OFFICE VISIT (OUTPATIENT)
Dept: INTERNAL MEDICINE | Facility: CLINIC | Age: 72
End: 2022-03-25
Attending: INTERNAL MEDICINE
Payer: MEDICARE

## 2022-03-25 ENCOUNTER — LAB VISIT (OUTPATIENT)
Dept: LAB | Facility: OTHER | Age: 72
End: 2022-03-25
Attending: INTERNAL MEDICINE
Payer: MEDICARE

## 2022-03-25 VITALS
HEIGHT: 66 IN | WEIGHT: 160.69 LBS | SYSTOLIC BLOOD PRESSURE: 152 MMHG | OXYGEN SATURATION: 98 % | DIASTOLIC BLOOD PRESSURE: 90 MMHG | HEART RATE: 64 BPM | BODY MASS INDEX: 25.83 KG/M2

## 2022-03-25 DIAGNOSIS — E11.9 TYPE 2 DIABETES MELLITUS WITHOUT COMPLICATION, WITH LONG-TERM CURRENT USE OF INSULIN: ICD-10-CM

## 2022-03-25 DIAGNOSIS — M79.604 RIGHT LEG PAIN: Primary | ICD-10-CM

## 2022-03-25 DIAGNOSIS — E78.5 HYPERLIPIDEMIA ASSOCIATED WITH TYPE 2 DIABETES MELLITUS: ICD-10-CM

## 2022-03-25 DIAGNOSIS — R20.0 ANESTHESIA OF SKIN: ICD-10-CM

## 2022-03-25 DIAGNOSIS — Z79.4 TYPE 2 DIABETES MELLITUS WITHOUT COMPLICATION, WITH LONG-TERM CURRENT USE OF INSULIN: ICD-10-CM

## 2022-03-25 DIAGNOSIS — I15.2 HYPERTENSION ASSOCIATED WITH TYPE 2 DIABETES MELLITUS: ICD-10-CM

## 2022-03-25 DIAGNOSIS — E11.69 HYPERLIPIDEMIA ASSOCIATED WITH TYPE 2 DIABETES MELLITUS: ICD-10-CM

## 2022-03-25 DIAGNOSIS — M79.604 RIGHT LEG PAIN: ICD-10-CM

## 2022-03-25 DIAGNOSIS — E11.59 HYPERTENSION ASSOCIATED WITH TYPE 2 DIABETES MELLITUS: ICD-10-CM

## 2022-03-25 LAB
CHOLEST SERPL-MCNC: 167 MG/DL (ref 120–199)
CHOLEST/HDLC SERPL: 2.6 {RATIO} (ref 2–5)
ESTIMATED AVG GLUCOSE: 160 MG/DL (ref 68–131)
HBA1C MFR BLD: 7.2 % (ref 4–5.6)
HDLC SERPL-MCNC: 64 MG/DL (ref 40–75)
HDLC SERPL: 38.3 % (ref 20–50)
LDLC SERPL CALC-MCNC: 92.8 MG/DL (ref 63–159)
NONHDLC SERPL-MCNC: 103 MG/DL
TRIGL SERPL-MCNC: 51 MG/DL (ref 30–150)
VIT B12 SERPL-MCNC: 477 PG/ML (ref 210–950)

## 2022-03-25 PROCEDURE — 86592 SYPHILIS TEST NON-TREP QUAL: CPT | Performed by: INTERNAL MEDICINE

## 2022-03-25 PROCEDURE — 86803 HEPATITIS C AB TEST: CPT | Performed by: INTERNAL MEDICINE

## 2022-03-25 PROCEDURE — 80061 LIPID PANEL: CPT | Performed by: INTERNAL MEDICINE

## 2022-03-25 PROCEDURE — 99215 OFFICE O/P EST HI 40 MIN: CPT | Mod: PBBFAC | Performed by: INTERNAL MEDICINE

## 2022-03-25 PROCEDURE — 99214 OFFICE O/P EST MOD 30 MIN: CPT | Mod: S$PBB,,, | Performed by: INTERNAL MEDICINE

## 2022-03-25 PROCEDURE — 87389 HIV-1 AG W/HIV-1&-2 AB AG IA: CPT | Performed by: INTERNAL MEDICINE

## 2022-03-25 PROCEDURE — 99999 PR PBB SHADOW E&M-EST. PATIENT-LVL V: ICD-10-PCS | Mod: PBBFAC,,, | Performed by: INTERNAL MEDICINE

## 2022-03-25 PROCEDURE — 83036 HEMOGLOBIN GLYCOSYLATED A1C: CPT | Performed by: INTERNAL MEDICINE

## 2022-03-25 PROCEDURE — 84425 ASSAY OF VITAMIN B-1: CPT | Performed by: INTERNAL MEDICINE

## 2022-03-25 PROCEDURE — 99999 PR PBB SHADOW E&M-EST. PATIENT-LVL V: CPT | Mod: PBBFAC,,, | Performed by: INTERNAL MEDICINE

## 2022-03-25 PROCEDURE — 82607 VITAMIN B-12: CPT | Performed by: INTERNAL MEDICINE

## 2022-03-25 PROCEDURE — 99214 PR OFFICE/OUTPT VISIT, EST, LEVL IV, 30-39 MIN: ICD-10-PCS | Mod: S$PBB,,, | Performed by: INTERNAL MEDICINE

## 2022-03-25 RX ORDER — INSULIN GLARGINE 300 U/ML
12 INJECTION, SOLUTION SUBCUTANEOUS NIGHTLY
Qty: 6 PEN | Refills: 2 | Status: SHIPPED | OUTPATIENT
Start: 2022-03-25 | End: 2023-10-26 | Stop reason: SDUPTHER

## 2022-03-25 NOTE — PROGRESS NOTES
"Subjective:       Patient ID: Gely Green is a 71 y.o. female.    Chief Complaint: Back Pain    Here for urgent visit    Concerned about intermittent pain of feet. Dull achy pain, strikes in in there toes. Concerned about neuropathy. Bilateral but R>>L.   Remains concerned about her concentration and memory.      Review of Systems   Constitutional: Negative for chills, fatigue, fever and unexpected weight change.   HENT: Negative for ear pain, hearing loss, postnasal drip, tinnitus, trouble swallowing and voice change.    Respiratory: Negative for cough, chest tightness, shortness of breath and wheezing.    Cardiovascular: Negative for chest pain, palpitations and leg swelling.   Gastrointestinal: Negative for abdominal pain, blood in stool, diarrhea, nausea and vomiting.   Endocrine: Negative for polydipsia, polyphagia and polyuria.   Genitourinary: Negative for difficulty urinating, dysuria, hematuria and vaginal bleeding.   Skin: Negative for rash.   Allergic/Immunologic: Negative for food allergies.   Neurological: Negative for dizziness, numbness and headaches.   Hematological: Does not bruise/bleed easily.   Psychiatric/Behavioral: The patient is not nervous/anxious.        Objective:      Vitals:    03/25/22 1213   BP: (!) 152/90   Pulse: 64   SpO2: 98%   Weight: 72.9 kg (160 lb 11.5 oz)   Height: 5' 6" (1.676 m)      Physical Exam  Constitutional:       General: She is not in acute distress.     Appearance: She is well-developed.   HENT:      Head: Normocephalic and atraumatic.      Mouth/Throat:      Pharynx: No oropharyngeal exudate.   Eyes:      General: No scleral icterus.     Conjunctiva/sclera: Conjunctivae normal.      Pupils: Pupils are equal, round, and reactive to light.   Neck:      Thyroid: No thyromegaly.   Cardiovascular:      Rate and Rhythm: Normal rate and regular rhythm.      Heart sounds: Normal heart sounds. No murmur heard.  Pulmonary:      Effort: Pulmonary effort is normal.      " Breath sounds: Normal breath sounds. No wheezing or rales.   Abdominal:      General: There is no distension.      Palpations: Abdomen is soft.      Tenderness: There is no abdominal tenderness.   Musculoskeletal:         General: No tenderness.   Lymphadenopathy:      Cervical: No cervical adenopathy.   Skin:     General: Skin is warm and dry.   Neurological:      Mental Status: She is alert and oriented to person, place, and time.   Psychiatric:         Behavior: Behavior normal.         Assessment:       1. Right leg pain    2. Hypertension associated with type 2 diabetes mellitus    3. Hyperlipidemia associated with type 2 diabetes mellitus    4. Type 2 diabetes mellitus without complication, with long-term current use of insulin    5. Anesthesia of skin         Plan:       Gely was seen today for back pain.    Diagnoses and all orders for this visit:    Right leg pain   Will get serum workup for common causes of neuropathy but suspect MSK vs lumbar. Pain clinic will help us.   -     Heavy Metals Screen, Blood (Quantitative); Future  -     Hepatitis C antibody; Future  -     HIV 1/2 Ag/Ab (4th Gen); Future  -     Vitamin B12; Future  -     Vitamin B1; Future  -     Ambulatory referral/consult to Pain Clinic; Future  -     RPR; Future    Hypertension associated with type 2 diabetes mellitus    Hyperlipidemia associated with type 2 diabetes mellitus  -     Lipid Panel; Future    Type 2 diabetes mellitus without complication, with long-term current use of insulin  -     Hemoglobin A1C; Future    Anesthesia of skin   -     Vitamin B12; Future    Other orders  -     insulin glargine, TOUJEO, (TOUJEO SOLOSTAR U-300 INSULIN) 300 unit/mL (1.5 mL) InPn pen; Inject 12 Units into the skin every evening.           Yoseph Mercado MD  Internal Medicine-[facility]        Side effects of medication(s) were discussed in detail and patient voiced understanding.  Patient will call back for any issues or complications.

## 2022-03-28 LAB
ARSENIC BLD-MCNC: <1 NG/ML
CADMIUM BLD-MCNC: 0.5 NG/ML
CITY: NORMAL
COUNTY: NORMAL
GUARDIAN FIRST NAME: NORMAL
GUARDIAN LAST NAME: NORMAL
HCV AB SERPL QL IA: NEGATIVE
HIV 1+2 AB+HIV1 P24 AG SERPL QL IA: NEGATIVE
HOME PHONE: NORMAL
LEAD BLD-MCNC: <1 MCG/DL
MERCURY BLD-MCNC: <1 NG/ML
RACE: NORMAL
RPR SER QL: NORMAL
STATE: NORMAL
STREET ADDRESS: NORMAL
VENOUS/CAPILLARY: NORMAL
ZIP: NORMAL

## 2022-03-29 LAB — VIT B1 BLD-MCNC: 60 UG/L (ref 38–122)

## 2022-03-29 RX ORDER — AMLODIPINE BESYLATE 5 MG/1
5 TABLET ORAL DAILY
Qty: 90 TABLET | Refills: 0 | Status: SHIPPED | OUTPATIENT
Start: 2022-03-29 | End: 2022-04-20

## 2022-04-04 ENCOUNTER — TELEPHONE (OUTPATIENT)
Dept: INTERNAL MEDICINE | Facility: CLINIC | Age: 72
End: 2022-04-04
Payer: MEDICARE

## 2022-04-04 DIAGNOSIS — E11.9 TYPE 2 DIABETES MELLITUS WITHOUT COMPLICATION, WITH LONG-TERM CURRENT USE OF INSULIN: ICD-10-CM

## 2022-04-04 DIAGNOSIS — Z79.4 TYPE 2 DIABETES MELLITUS WITHOUT COMPLICATION, WITH LONG-TERM CURRENT USE OF INSULIN: ICD-10-CM

## 2022-04-04 RX ORDER — METFORMIN HYDROCHLORIDE 500 MG/1
1000 TABLET, EXTENDED RELEASE ORAL 2 TIMES DAILY
Qty: 360 TABLET | Refills: 4 | Status: SHIPPED | OUTPATIENT
Start: 2022-04-04

## 2022-04-04 NOTE — PROGRESS NOTES
All labs look good except sugars are climbing. Please increase metformin from one tablet once a day to one tablet twice a day.

## 2022-04-04 NOTE — TELEPHONE ENCOUNTER
The listed message from MD was given to the patient. She states okay and will take two 500 mg pills twice a day.     From Dr. Dallas  rec increaser to 1000mg in am and 1000mg in pm          Called and spoke to the patient. The listed message was given to the patient. States she is already taking Metformin 500 mg twice a day. Told her I would give this information to her MD Yoseph Dallas, MD CHRISTIN Hameed Staff  All labs look good except sugars are climbing. Please increase metformin from one tablet once a day to one tablet twice a day.

## 2022-04-05 ENCOUNTER — TELEPHONE (OUTPATIENT)
Dept: PAIN MEDICINE | Facility: CLINIC | Age: 72
End: 2022-04-05
Payer: MEDICARE

## 2022-04-05 NOTE — TELEPHONE ENCOUNTER
This message is for patient in regards to his/her appointment 04/06/22 at 1:30pm      [facility] Policy: For the safety of all patients and staff members.     Patient Visitor policy: During this visit we are asking all patients to only have one visitor over the age of 18yrs old to accompany them to be seen by . If patient do not required assistance with their visit, we're asking all visitors to remain outside the waiting area.       also inquired IPM within message.    [facility] providers and Mid-levels offer interventional, procedure--based options to treat chronic pain. The goal is to manage chronic pain by reducing pain frequency and intensity and address your functional goals for activities of daily living while simultaneously reducing or eliminating your reliance on medications. Please bring any records or images that you have from prior treatments for your pain. You will be presented with multi-modal treatment plan that may or may not include imaging, interventional procedures, physical/occupational/aqua therapy, pain creams, and non-narcotic pain medications used for the treatments of chronic pain.

## 2022-04-05 NOTE — TELEPHONE ENCOUNTER
Staff spoke with pt in regards to appointment on 4/6/22 with  but pt requested to reschedule to 4/20/22 at 8:00am with Dr Arenas.

## 2022-04-06 ENCOUNTER — OFFICE VISIT (OUTPATIENT)
Dept: UROGYNECOLOGY | Facility: CLINIC | Age: 72
End: 2022-04-06
Payer: MEDICARE

## 2022-04-06 VITALS
HEIGHT: 66 IN | WEIGHT: 162.69 LBS | SYSTOLIC BLOOD PRESSURE: 130 MMHG | DIASTOLIC BLOOD PRESSURE: 76 MMHG | BODY MASS INDEX: 26.14 KG/M2

## 2022-04-06 DIAGNOSIS — N95.2 VAGINAL ATROPHY: ICD-10-CM

## 2022-04-06 DIAGNOSIS — N89.8 VAGINAL DISCHARGE: Primary | ICD-10-CM

## 2022-04-06 PROCEDURE — 99999 PR PBB SHADOW E&M-EST. PATIENT-LVL IV: CPT | Mod: PBBFAC,,, | Performed by: PHYSICIAN ASSISTANT

## 2022-04-06 PROCEDURE — 99999 PR PBB SHADOW E&M-EST. PATIENT-LVL IV: ICD-10-PCS | Mod: PBBFAC,,, | Performed by: PHYSICIAN ASSISTANT

## 2022-04-06 PROCEDURE — 99213 OFFICE O/P EST LOW 20 MIN: CPT | Mod: S$PBB,,, | Performed by: PHYSICIAN ASSISTANT

## 2022-04-06 PROCEDURE — 99213 PR OFFICE/OUTPT VISIT, EST, LEVL III, 20-29 MIN: ICD-10-PCS | Mod: S$PBB,,, | Performed by: PHYSICIAN ASSISTANT

## 2022-04-06 PROCEDURE — 99214 OFFICE O/P EST MOD 30 MIN: CPT | Mod: PBBFAC | Performed by: PHYSICIAN ASSISTANT

## 2022-04-06 PROCEDURE — 87481 CANDIDA DNA AMP PROBE: CPT | Mod: 59 | Performed by: PHYSICIAN ASSISTANT

## 2022-04-06 RX ORDER — FAMOTIDINE 40 MG/1
40 TABLET, FILM COATED ORAL EVERY MORNING
COMMUNITY
Start: 2022-02-02 | End: 2024-01-03

## 2022-04-06 RX ORDER — SOLIFENACIN SUCCINATE 5 MG/1
TABLET, FILM COATED ORAL DAILY
COMMUNITY
End: 2023-01-27 | Stop reason: SDUPTHER

## 2022-04-06 RX ORDER — METHENAMINE, SODIUM PHOSPHATE, MONOBASIC, MONOHYDRATE, PHENYL SALICYLATE, METHYLENE BLUE, AND HYOSCYAMINE SULFATE 118; 40.8; 36; 10; .12 MG/1; MG/1; MG/1; MG/1; MG/1
CAPSULE ORAL
COMMUNITY
End: 2023-09-06 | Stop reason: SDUPTHER

## 2022-04-06 RX ORDER — PHENAZOPYRIDINE HYDROCHLORIDE 200 MG/1
200 TABLET, FILM COATED ORAL 3 TIMES DAILY PRN
COMMUNITY
End: 2024-01-03

## 2022-04-06 NOTE — PATIENT INSTRUCTIONS
1) Abdominal/Lower back pain  -- urine cultures previously negative  -- low suspicion for pyelo  -- likely constipation or muscle related  -- primary care is sending to pain mgmt     2) OAB:  -- drink plenty of water during the day!  -- A1C increased, work on controlling blood sugar to help control the bladder  --avoid dietary irritants (see list)  -- Continue Vesicare 5 mg daily to help with urinary urgency. Takes 2-4 weeks to see if will have effect.  For dry mouth: use Biotene mouthwash, get sour, sugar free lozenge or gum.  May increase constipation. If it does, will try to switch to mirabegron.   --Empty bladder every 2-3 hours even if you don't have the urge.  Empty well: wait a minute, lean forward on toilet.    --Try not to go more frequently than once an hour. When you get the urge to urinate after you have just gone, distract yourself until the urge passes.   --Avoid dietary irritants (see sheet).  Keep diary x 3-5 days to determine your irritants.  --KEGELS: do 10 in AM and 10 in PM, holding each x 10 seconds.  When you feel urge to go, STOP, KEGEL, and when urge has passed, then go to bathroom.    -- Start Hydroxyzine at night as needed, may help with nocturia (waking up at night to pee)    3) IC  --No flares recently, OAB med seems to be keeping  things at bay, stress also at bay  --Bladder instillations previously   --Hydrodistention With Cystoscopy: allows physicians to take a much closer look at the bladder wall. While the AUA no longer suggests that it be used as a diagnostic method (unless a diagnosis is in doubt), hydrodistention may provide modest relief in IC symptoms which can last from three to six months. It can, however, be a more difficult, painful procedure. As a result, it requires careful consideration and discussion.   --flares: Flavoxate not working, Uribel helps but expensive. Don't take pyridium (phenazypyridine) and Uribel togetehr, take one or the other for flares. If take for more  "than 2-3 days, please let us know.   --empty bladder every 3 hours; lean forward on toilet and help last bit out.  --Continue Pelvic Floor Physical Therapy (PT) with Roxana. [facility]: (p) [phone].  Or [phone].      Per Dr. Alan: MOST OF PAIN HAS BEEN RELATED TO PELVIC FLOOR MUSCLE TENSION IN PAST AND FLARES WITH LIFE STRESSORS. Would really emphasize need to focus on pelvic floor PT/getting levator tension better and anxiety/stress control.  Pyridium should only be used sparingly (Use Uribel instead).  Should really focus on constipation control.       3) Anxiety:  --under control at this time.  No longer taking paxil, has been off for years.  --re-started grief counseling at her [employer], attends weekly  --important to control IC/OAB symptoms     4) Vaginal atrophy (dryness) Continue REPLENS OTC: 1/2 applicator full in vagina twice a week.      5) Constipation:  -- Controlling constipation may help bladder urgency/leakage and fiber may better control cholesterol and blood glucose.  --  Start daily fiber.  Take 1 tsp of fiber powder (psyllium or other sugar-free powder, try Benefiber or Fibercon).  Mix in 8 oz of water.  Take x 3-5 days.  Then, increase fiber by 1 tsp every 3-5 days until stool is easy to pass.  Stop and continue at that dose.   Do not exceed 6 tsps/day.  --  Use over the counter stool softener (ex. Colace) 1-2 x/day.  Try to AVOID laxatives.  --continue flax seed  -- Try not to use Miralax unless needed, use fiber and stool softener instead  -- Can also try "Natrual Calm" magnesium ~400 mg per night (helps with sleep, anxiety, and constipation)  --get a Squatty Potty. Try [employer] or Online.     6) Nocturia (nighttime urination):   -- Stop fluids 2-3 hours before bed.   -- Be sure to urinate a few times in hours leading up to bedtime    7) RTC 3 months for follow up  "

## 2022-04-06 NOTE — PROGRESS NOTES
"[facility] - UROGYNECOLOGY  [address] [address]  2022     Gely Green  [id number]  1950    UROGYN FOLLOW-UP  2022     71 y.o. female,   presents for urogyn follow up. She c/o   Chief Complaint   Patient presents with    Urinary Tract Infection    Follow-up    .     Last HPI from 3/2/2022:  Well known patient to [facility] and Dr. Alan presents for recurrence of bladder pain. Has not had symptoms for years, but in the last few months has been having flares. Did two bladder installations without improvement. Today patient c/o IC pain daily, feels relief when she empties her bladder. Taking flavoxate daily, but does not help. Has tried Uribel and it helps, but it is expensive. BMs have been "horrible," takes miralax most days. Denies fever. Blood sugars have been 120-150 but may go up to 200, then sometimes drops suddenly to 60    Changes from last visit:  Patient is going well. No complaints. Has not had any more IC flares since started solifenacin 5 mg. Denies worsening constipation, though constiopation is still an issue. Uses flax seed in her food, but no fiber supplement or stool softener. Uses Miralax when she remembers. Blood sugars have been high again, A1C increased to 7.2. Stress/anxiety has been well controlled since started going to weekly group grief counseling sessions at [facility]. She is using Replens two nights per week. Denies UTI symptoms at this time, except still waking up 3x nightly to urinate. Does not want to increase solifenacin to higher dose.     Past Medical History:   Diagnosis Date    Arthritis     Chronic constipation     Depression     Diabetes mellitus     Diabetes mellitus     Hypertension     Osteopenia        Past Surgical History:   Procedure Laterality Date     SECTION      2x    COLONOSCOPY      COLONOSCOPY N/A 3/21/2019    Procedure: COLONOSCOPY;  Surgeon: Marshall Contreras MD;  Location: [facility] (4TH " "[facility]);  Service: Endoscopy;  Laterality: N/A;  pt states that she had to be resuscitated after receiving an epidural during childbirth "30 something" years ago.  Ok for 4th floor per Dr. Hernandez-MS    EYE SURGERY Bilateral     cataract removal    HYSTERECTOMY      full hyst     OOPHORECTOMY      TONSILLECTOMY         Family History   Problem Relation Age of Onset    Breast cancer Maternal Cousin     Heart attack Mother     Heart disease Mother     Alzheimer's disease Mother     Heart attack Father     Heart disease Father     Heart failure Father     Hypertension Father     Hyperlipidemia Sister     Hypertension Sister     Diabetes Sister     Abnormal EKG Sister     Dementia Sister     Alcohol abuse Brother     Fibroids Daughter     Brain cancer Son     Early death Son 33    Ovarian cancer Neg Hx     Cervical cancer Neg Hx     Endometrial cancer Neg Hx     Vaginal cancer Neg Hx     Stroke Neg Hx     Colon cancer Neg Hx     Esophageal cancer Neg Hx     Vision loss Neg Hx        Social History     Socioeconomic History    Marital status:    Tobacco Use    Smoking status: Former Smoker     Packs/day: 1.50     Years: 25.00     Pack years: 37.50     Types: Cigarettes     Quit date:      Years since quittin.2    Smokeless tobacco: Never Used   Substance and Sexual Activity    Alcohol use: No    Drug use: No    Sexual activity: Not Currently     Partners: Male     Birth control/protection: None       Current Outpatient Medications   Medication Sig Dispense Refill    ACCU-CHEK SMARTVIEW TEST STRIP Strp ACCU-CHEK SMARTVIEW TEST STRIP Strp, 120 strip 11    amLODIPine (NORVASC) 5 MG tablet Take 1 tablet (5 mg total) by mouth once daily. 90 tablet 0    aspirin (ECOTRIN) 325 MG EC tablet Take 1 tablet (325 mg total) by mouth once daily. 30 tablet 11    calcium carbonate (OS-REDD) 600 mg calcium (1,500 mg) Tab Take 600 mg by mouth 2 (two) times daily with meals.      " "famotidine (PEPCID) 40 MG tablet Take 40 mg by mouth every morning.      fluticasone propionate (FLONASE) 50 mcg/actuation nasal spray 2 sprays (100 mcg total) by Each Nostril route once daily. 48 g 3    FREESTYLE SHANNON 2 SENSOR Kit USE AS DIRECTED EVERY 2 WEEKS      GVOKE HYPOPEN 2-PACK 1 mg/0.2 mL AtIn       hydrOXYzine pamoate (VISTARIL) 25 MG Cap Take 1 capsule (25 mg total) by mouth nightly as needed (bladder pain). 20 capsule 0    insulin glargine, TOUJEO, (TOUJEO SOLOSTAR U-300 INSULIN) 300 unit/mL (1.5 mL) InPn pen Inject 12 Units into the skin every evening. 6 pen 2    irbesartan (AVAPRO) 150 MG tablet Take 1 tablet (150 mg total) by mouth every evening. 90 tablet 3    lancets (ONETOUCH DELICA LANCETS) 33 gauge Misc 1 lancet by Misc.(Non-Drug; Combo Route) route 3 (three) times daily. 100 each 11    metFORMIN (GLUCOPHAGE-XR) 500 MG ER 24hr tablet Take 2 tablets (1,000 mg total) by mouth 2 (two) times a day. 360 tablet 4    methen-m.blue-s.phos-phsal-hyo (URIBEL) 118-10-40.8-36 mg Cap Take by mouth.      ondansetron (ZOFRAN-ODT) 4 MG TbDL Take 1 tablet (4 mg total) by mouth every 6 (six) hours as needed (Nausea and vomiting). 20 tablet 0    ONETOUCH VERIO TEST STRIPS Strp TEST THREE TIMES DAILY 200 strip 11    pen needle, diabetic (NOVOFINE 32) 32 gauge x 1/4" Ndle use subcutaneously every evening 100 each 11    phenazopyridine (PYRIDIUM) 200 MG tablet Take 200 mg by mouth 3 (three) times daily as needed for Pain.      rosuvastatin (CRESTOR) 10 MG tablet TAKE 1 TABLET BY MOUTH EVERY DAY 90 tablet 2    solifenacin (VESICARE) 5 MG tablet Take by mouth once daily.      vitamin D 1000 units Tab Take 1,000 Units by mouth once daily.      levocetirizine (XYZAL) 5 MG tablet Take 1 tablet (5 mg total) by mouth every evening. 30 tablet 11     No current facility-administered medications for this visit.       Review of patient's allergies indicates:  No Known Allergies    OB History        3    " "Para   3    Term   3            AB        Living   2       SAB        IAB        Ectopic        Multiple        Live Births                      Well Woman  Patient's last menstrual period was 1999.   Last pap: denies h/o abn paps- post hysterectomy- no further screening  Last mammogram: 2021--normal  Colonoscopy: 2019 Tortuous colon.                         - The entire examined colon is normal on direct and    - The entire examined colon is normal on direct and                         retroflexion views.                         - No specimens collected  DEXA: 2019 Osteopenia of both femoral necks.  Normal bone mineral density of the lumbar spine.    ROS  As per HPI.      Physical Exam  /76 (BP Location: Right arm, Patient Position: Sitting, BP Method: Medium (Manual))   Ht 5' 6" (1.676 m)   Wt 73.8 kg (162 lb 11.2 oz)   LMP 1999   BMI 26.26 kg/m²   General: alert and oriented, no acute distress  Respiratory: normal respiratory effort  Abd: soft, non-tender, non-distended  Pelvic:  Ext. Genitalia: normal external genitalia. Normal bartholin's and skeens glands  Vagina: ++ atrophy. Normal vaginal mucosa without lesions. No discharge noted.   Non-tender bladder base without palpable mass.  Cervix: absent  Uterus:  surgically absent, vaginal cuff well healed   Urethra: no masses or tenderness  Urethral meatus: no lesions, caruncle or prolapse.    Impression  1. Vaginal discharge  Vaginosis Screen by DNA Probe   2. Vaginal atrophy  conjugated estrogens (PREMARIN) vaginal cream     We reviewed the above issues and discussed options for short-term versus long-term management of her problems.     Plan:   1) Abdominal/Lower back pain  -- urine cultures previously negative  -- low suspicion for pyelo  -- likely constipation or muscle related  -- primary care is sending to pain mgmt     2) OAB:  -- drink plenty of water!  -- A1C increased, work on decreasing   --avoid dietary irritants " (see list)  -- Continue Vesicare 5 mg daily to help with urinary urgency. Takes 2-4 weeks to see if will have effect.  For dry mouth: use Biotene mouthwash, get sour, sugar free lozenge or gum.  May increase constipation. If it does, will try to switch to mirabegron.   --Empty bladder every 2-3 hours even if you don't have the urge.  Empty well: wait a minute, lean forward on toilet.    --Try not to go more frequently than once an hour. When you get the urge to urinate after you have just gone, distract yourself until the urge passes.   --Avoid dietary irritants (see sheet).  Keep diary x 3-5 days to determine your irritants.  --KEGELS: do 10 in AM and 10 in PM, holding each x 10 seconds.  When you feel urge to go, STOP, KEGEL, and when urge has passed, then go to bathroom.    -- Start Hydroxyzine at night as needed, may help with nocturia (waking up at night to pee)    3) IC  --No flares recently, OAB med seems to be keeping  things at bay, stress also at bay  --Bladder instillations previously   --Hydrodistention With Cystoscopy: allows physicians to take a much closer look at the bladder wall. While the AUA no longer suggests that it be used as a diagnostic method (unless a diagnosis is in doubt), hydrodistention may provide modest relief in IC symptoms which can last from three to six months. It can, however, be a more difficult, painful procedure. As a result, it requires careful consideration and discussion.   --flares: Flavoxate not working, Uribel helps but expensive. Don't take pyridium (phenazypyridine) and Uribel togetehr, take one or the other for flares. If take for more than 2-3 days, please let us know.   --empty bladder every 3 hours; lean forward on toilet and help last bit out.  --Continue Pelvic Floor Physical Therapy (PT) with [facility]. [facility]: (p) [phone].  Or [phone].      Per Dr. Alan: MOST OF PAIN HAS BEEN RELATED TO PELVIC FLOOR MUSCLE TENSION IN PAST AND FLARES WITH LIFE  "STRESSORS. Would really emphasize need to focus on pelvic floor PT/getting levator tension better and anxiety/stress control.  Pyridium should only be used sparingly (Use Uribel instead).  Should really focus on constipation control.       3) Anxiety:  --under control at this time.  No longer taking paxil, has been off for years.  --re-start grief counseling at her [facility] and re-starting with [facility]  --important to control IC symptoms     4) Vaginal atrophy (dryness): Use 1 gram of estrogen cream in vagina two nights a week. Can also use REPLENS OTC: 1/2 applicator full in vagina twice a week.      5) Constipation:  -- Controlling constipation may help bladder urgency/leakage and fiber may better control cholesterol and blood glucose.  --  Start daily fiber.  Take 1 tsp of fiber powder (psyllium or other sugar-free powder, try Benefiber or Fibercon).  Mix in 8 oz of water.  Take x 3-5 days.  Then, increase fiber by 1 tsp every 3-5 days until stool is easy to pass.  Stop and continue at that dose.   Do not exceed 6 tsps/day.  --  Use over the counter stool softener (ex. Colace) 1-2 x/day.  Try to AVOID laxatives.  --continue flax seed  -- Try not to use Miralax unless needed, use fiber and stool softener instead  -- Can also try "Natrual Calm" magnesium ~400 mg per night (helps with sleep, anxiety, and constipation)  --get a Squatty Potty. Try [employer] or Online.     6) Nocturia (nighttime urination):   -- Stop fluids 2-3 hours before bed.   -- Be sure to urinate a few times in hours leading up to bedtime    7) RTC 3 months for follow up    Mario Arroyo PA-C  Division of Female Pelvic Medicine and Reconstructive Surgery  Department of Obstetrics & Gynecology  [facility] [place], [place]        "

## 2022-04-07 LAB
BACTERIAL VAGINOSIS DNA: POSITIVE
CANDIDA GLABRATA DNA: NEGATIVE
CANDIDA KRUSEI DNA: NEGATIVE
CANDIDA RRNA VAG QL PROBE: NEGATIVE
T VAGINALIS RRNA GENITAL QL PROBE: NEGATIVE

## 2022-04-08 ENCOUNTER — TELEPHONE (OUTPATIENT)
Dept: UROGYNECOLOGY | Facility: CLINIC | Age: 72
End: 2022-04-08
Payer: MEDICARE

## 2022-04-08 DIAGNOSIS — B96.89 BACTERIAL VAGINOSIS: Primary | ICD-10-CM

## 2022-04-08 DIAGNOSIS — N76.0 BACTERIAL VAGINOSIS: Primary | ICD-10-CM

## 2022-04-08 RX ORDER — METRONIDAZOLE 7.5 MG/G
GEL VAGINAL
Qty: 25 G | Refills: 0 | Status: SHIPPED | OUTPATIENT
Start: 2022-04-08 | End: 2022-05-23 | Stop reason: SDUPTHER

## 2022-04-08 NOTE — TELEPHONE ENCOUNTER
lvm for patient to check her patient portal for a message and call if she has any questions in regards to the medication she was sent in

## 2022-04-08 NOTE — TELEPHONE ENCOUNTER
----- Message from Mario Arroyo PA-C sent at 4/8/2022  3:52 PM CDT -----  Please let patient know that her vaginal screen came back positive again for bacterial vaginosis, the benign bacterial infection. Rx for metrogel to use vaginally x 1 week sent to pharmacy. Please have her temporarily stop the vaginal estrogen cream and start this med.  When she's done with the metrogel, she should restart the premarin cream vaginally, which may help balance vaginal pH and reduce likelihood of vaginal infections, as well. Thanks!

## 2022-04-08 NOTE — PROGRESS NOTES
Please let patient know that her vaginal screen came back positive again for bacterial vaginosis, the benign bacterial infection. Rx for metrogel to use vaginally x 1 week sent to pharmacy. Please have her temporarily stop the vaginal estrogen cream and start this med.  When she's done with the metrogel, she should restart the premarin cream vaginally, which may help balance vaginal pH and reduce likelihood of vaginal infections, as well. Thanks!

## 2022-04-08 NOTE — TELEPHONE ENCOUNTER
Returned pt call no answer, Left voice message for pt to give the office a call back at [phone].

## 2022-04-18 ENCOUNTER — PATIENT OUTREACH (OUTPATIENT)
Dept: ADMINISTRATIVE | Facility: HOSPITAL | Age: 72
End: 2022-04-18
Payer: MEDICARE

## 2022-04-19 ENCOUNTER — TELEPHONE (OUTPATIENT)
Dept: PAIN MEDICINE | Facility: CLINIC | Age: 72
End: 2022-04-19
Payer: MEDICARE

## 2022-04-19 NOTE — TELEPHONE ENCOUNTER
This message is for patient in regards to his/her appointment 4/20/22 at 8 am      [facility] Policy: For the safety of all patients and staff members.     Patient Visitor policy: During this visit we are asking all patients to only have one visitor over the age of 18yrs old to accompany them to be seen by  . If patient do not required assistance with their visit, we're asking all visitors to remain outside the waiting area.       also inquired IPM within message.    [facility] providers and Mid-levels offer interventional, procedure--based options to treat chronic pain. The goal is to manage chronic pain by reducing pain frequency and intensity and address your functional goals for activities of daily living while simultaneously reducing or eliminating your reliance on medications. Please bring any records or images that you have from prior treatments for your pain. You will be presented with multi-modal treatment plan that may or may not include imaging, interventional procedures, physical/occupational/aqua therapy, pain creams, and non-narcotic pain medications used for the treatments of chronic pain.

## 2022-04-20 ENCOUNTER — CLINICAL SUPPORT (OUTPATIENT)
Dept: INTERNAL MEDICINE | Facility: CLINIC | Age: 72
End: 2022-04-20
Payer: MEDICARE

## 2022-04-20 ENCOUNTER — OFFICE VISIT (OUTPATIENT)
Dept: PAIN MEDICINE | Facility: CLINIC | Age: 72
End: 2022-04-20
Attending: INTERNAL MEDICINE
Payer: MEDICARE

## 2022-04-20 ENCOUNTER — TELEPHONE (OUTPATIENT)
Dept: INTERNAL MEDICINE | Facility: CLINIC | Age: 72
End: 2022-04-20

## 2022-04-20 VITALS
TEMPERATURE: 98 F | WEIGHT: 138.88 LBS | HEART RATE: 60 BPM | SYSTOLIC BLOOD PRESSURE: 148 MMHG | RESPIRATION RATE: 18 BRPM | HEIGHT: 66 IN | DIASTOLIC BLOOD PRESSURE: 90 MMHG | BODY MASS INDEX: 22.32 KG/M2 | OXYGEN SATURATION: 100 %

## 2022-04-20 VITALS — OXYGEN SATURATION: 99 % | HEART RATE: 58 BPM

## 2022-04-20 DIAGNOSIS — M70.61 TROCHANTERIC BURSITIS OF RIGHT HIP: Primary | ICD-10-CM

## 2022-04-20 DIAGNOSIS — I15.2 HYPERTENSION ASSOCIATED WITH TYPE 2 DIABETES MELLITUS: ICD-10-CM

## 2022-04-20 DIAGNOSIS — E11.59 HYPERTENSION ASSOCIATED WITH TYPE 2 DIABETES MELLITUS: ICD-10-CM

## 2022-04-20 DIAGNOSIS — M79.604 RIGHT LEG PAIN: ICD-10-CM

## 2022-04-20 PROCEDURE — 99214 OFFICE O/P EST MOD 30 MIN: CPT | Mod: PBBFAC | Performed by: ANESTHESIOLOGY

## 2022-04-20 PROCEDURE — 99999 PR PBB SHADOW E&M-EST. PATIENT-LVL IV: ICD-10-PCS | Mod: PBBFAC,,, | Performed by: ANESTHESIOLOGY

## 2022-04-20 PROCEDURE — 99999 PR PBB SHADOW E&M-EST. PATIENT-LVL IV: CPT | Mod: PBBFAC,,, | Performed by: ANESTHESIOLOGY

## 2022-04-20 PROCEDURE — 99999 PR PBB SHADOW E&M-EST. PATIENT-LVL III: ICD-10-PCS | Mod: PBBFAC,,,

## 2022-04-20 PROCEDURE — 99999 PR PBB SHADOW E&M-EST. PATIENT-LVL III: CPT | Mod: PBBFAC,,,

## 2022-04-20 PROCEDURE — 99204 OFFICE O/P NEW MOD 45 MIN: CPT | Mod: S$PBB,,, | Performed by: ANESTHESIOLOGY

## 2022-04-20 PROCEDURE — 99213 OFFICE O/P EST LOW 20 MIN: CPT | Mod: PBBFAC,27

## 2022-04-20 PROCEDURE — 99204 PR OFFICE/OUTPT VISIT, NEW, LEVL IV, 45-59 MIN: ICD-10-PCS | Mod: S$PBB,,, | Performed by: ANESTHESIOLOGY

## 2022-04-20 RX ORDER — IRBESARTAN 300 MG/1
300 TABLET ORAL NIGHTLY
Qty: 90 TABLET | Refills: 3 | Status: SHIPPED | OUTPATIENT
Start: 2022-04-20 | End: 2022-04-20 | Stop reason: SDUPTHER

## 2022-04-20 NOTE — PROGRESS NOTES
PCP: Yoseph Dallas MD    REFERRING PHYSICIAN: Yoseph Dallas MD    CHIEF COMPLAINT: bilateral foot pain    Original HISTORY OF PRESENT ILLNESS: Gely Green presents to the clinic for the evaluation of the above pain. The pain started about 9 months to one year ago. No inciting factor. Patient states the pain is only in the R hip and is constant but at night radiates down lateral aspect of her R thigh and into posterior leg, stopping above the ankle. She is able to walk 6 miles without any issues. She has tried ibuprofen in the past but prefers to avoid any medications if she does not need them. The pain seems to come on mainly at night time. She states she sleeps on her R side, which is the affected side. Patient would prefer to avoid any injections at this time and stick to conservative management.     Original Pain Description:  The pain is located in the R hip and radiates to the lateral aspect of the R thigh, through the calf and down to ankle. The pain is described as aching and dull. Exacerbating factors: Laying and Night Time. Mitigating factors include placing pillow below knees when sleeping. Symptoms interfere with sleeping. The patient feels like symptoms have been worsening. Patient denies night fever/night sweats, urinary incontinence, significant weight loss, significant motor weakness and loss of sensations.    Original PAIN SCORES:  Best: Pain is 1  Worst: Pain is 10  Usually: Pain is 5  Current: Pain is 1    INTERVAL HISTORY:     6 weeks of Conservative therapy (PT/Chiro/Home Exercises with Dates)  PT: 3/22-4/22    Treatments / Medications: (Ice/Heat/NSAIDS/APAP/etc):  Ibuprofen     Report:  n/a    Interventional Pain Procedures: (Previous injections)  None    Past Medical History:   Diagnosis Date    Arthritis     Chronic constipation     Depression     Diabetes mellitus     Diabetes mellitus     Hypertension     Osteopenia      Past Surgical History:   Procedure  "Laterality Date     SECTION      2x    COLONOSCOPY      COLONOSCOPY N/A 3/21/2019    Procedure: COLONOSCOPY;  Surgeon: Marshall Contreras MD;  Location: [facility] ([address] [place] [address]);  Service: Endoscopy;  Laterality: N/A;  pt states that she had to be resuscitated after receiving an epidural during childbirth "30 something" years ago.  Ok for 4th floor per Dr. Hernandez-MS    EYE SURGERY Bilateral     cataract removal    HYSTERECTOMY      full hyst     OOPHORECTOMY      TONSILLECTOMY       Social History     Socioeconomic History    Marital status:    Tobacco Use    Smoking status: Former Smoker     Packs/day: 1.50     Years: 25.00     Pack years: 37.50     Types: Cigarettes     Quit date:      Years since quittin.3    Smokeless tobacco: Never Used   Substance and Sexual Activity    Alcohol use: No    Drug use: No    Sexual activity: Not Currently     Partners: Male     Birth control/protection: None     Family History   Problem Relation Age of Onset    Breast cancer Maternal Cousin     Heart attack Mother     Heart disease Mother     Alzheimer's disease Mother     Heart attack Father     Heart disease Father     Heart failure Father     Hypertension Father     Hyperlipidemia Sister     Hypertension Sister     Diabetes Sister     Abnormal EKG Sister     Dementia Sister     Alcohol abuse Brother     Fibroids Daughter     Brain cancer Son     Early death Son 33    Ovarian cancer Neg Hx     Cervical cancer Neg Hx     Endometrial cancer Neg Hx     Vaginal cancer Neg Hx     Stroke Neg Hx     Colon cancer Neg Hx     Esophageal cancer Neg Hx     Vision loss Neg Hx        Review of patient's allergies indicates:  No Known Allergies    Current Outpatient Medications   Medication Sig    ACCU-CHEK SMARTVIEW TEST STRIP Strp ACCU-CHEK SMARTVIEW TEST STRIP Strp,    amLODIPine (NORVASC) 5 MG tablet Take 1 tablet (5 mg total) by mouth once daily.    aspirin (ECOTRIN) 325 MG " "EC tablet Take 1 tablet (325 mg total) by mouth once daily.    calcium carbonate (OS-REDD) 600 mg calcium (1,500 mg) Tab Take 600 mg by mouth 2 (two) times daily with meals.    conjugated estrogens (PREMARIN) vaginal cream Place 1 g vaginally twice a week.    famotidine (PEPCID) 40 MG tablet Take 40 mg by mouth every morning.    fluticasone propionate (FLONASE) 50 mcg/actuation nasal spray 2 sprays (100 mcg total) by Each Nostril route once daily.    FREESTYLE SHANNON 2 SENSOR Kit USE AS DIRECTED EVERY 2 WEEKS    GVOKE HYPOPEN 2-PACK 1 mg/0.2 mL AtIn     hydrOXYzine pamoate (VISTARIL) 25 MG Cap Take 1 capsule (25 mg total) by mouth nightly as needed (bladder pain).    insulin glargine, TOUJEO, (TOUJEO SOLOSTAR U-300 INSULIN) 300 unit/mL (1.5 mL) InPn pen Inject 12 Units into the skin every evening.    irbesartan (AVAPRO) 150 MG tablet Take 1 tablet (150 mg total) by mouth every evening.    lancets (ONETOUCH DELICA LANCETS) 33 gauge Misc 1 lancet by Misc.(Non-Drug; Combo Route) route 3 (three) times daily.    levocetirizine (XYZAL) 5 MG tablet Take 1 tablet (5 mg total) by mouth every evening.    metFORMIN (GLUCOPHAGE-XR) 500 MG ER 24hr tablet Take 2 tablets (1,000 mg total) by mouth 2 (two) times a day.    methen-m.blue-s.phos-phsal-hyo (URIBEL) 118-10-40.8-36 mg Cap Take by mouth.    ondansetron (ZOFRAN-ODT) 4 MG TbDL Take 1 tablet (4 mg total) by mouth every 6 (six) hours as needed (Nausea and vomiting).    ONETOUCH VERIO TEST STRIPS Strp TEST THREE TIMES DAILY    pen needle, diabetic (NOVOFINE 32) 32 gauge x 1/4" Ndle use subcutaneously every evening    phenazopyridine (PYRIDIUM) 200 MG tablet Take 200 mg by mouth 3 (three) times daily as needed for Pain.    rosuvastatin (CRESTOR) 10 MG tablet TAKE 1 TABLET BY MOUTH EVERY DAY    solifenacin (VESICARE) 5 MG tablet Take by mouth once daily.    vitamin D 1000 units Tab Take 1,000 Units by mouth once daily.     No current facility-administered " "medications for this visit.       ROS:  GENERAL: No fever. No chills. No fatigue. Denies weight loss. Denies weight gain.  HEENT: Denies headaches. Denies vision change. Denies eye pain. Denies double vision. Denies ear pain.   CV: Denies chest pain.   PULM: Denies of shortness of breath.  GI: Endorses constipation. No diarrhea. No abdominal pain. Denies nausea. Denies vomiting. No blood in stool.  HEME: Denies bleeding problems.  : Denies urgency. No painful urination. No blood in urine.  MS: Denies joint stiffness. Denies joint swelling.  Denies back pain.  SKIN: Denies rash.   NEURO: Denies seizures. No weakness.  PSYCH:  Endorses anxiety. Denies difficulty sleeping. Denies depression. No suicidal thoughts.       VITALS:   Vitals:    04/20/22 0806   BP: (!) 148/90   Pulse: 60   Resp: 18   Temp: 98 °F (36.7 °C)   SpO2: 100%   Weight: 63 kg (138 lb 14.2 oz)   Height: 5' 6" (1.676 m)   PainSc: 0-No pain         PHYSICAL EXAM:   GENERAL: Well appearing, in no acute distress, alert and oriented x3.  PSYCH:  Mood and affect appropriate.  SKIN: Skin color, texture, turgor normal, no rashes or lesions.  HEENT:  Normocephalic, atraumatic. Cranial nerves grossly intact.  NECK: No pain to palpation over the cervical paraspinous muscles. No pain with neck flexion, extension, or lateral flexion.   PULM: No evidence of respiratory difficulty, symmetric chest rise.  GI:  Non-distended  BACK: Normal range of motion. No pain to palpation over the spinous processes.There is no pain with palpation over the sacroiliac joints bilaterally. Straight leg raising in the supine position is negative to radicular pain.   EXTREMITIES: No deformities, edema, or skin discoloration.   MUSCULOSKELETAL:  Hip provocative maneuvers are negative. -FADIR and -PADMINI. Pain to palpation of Right GTB. No pain to palpation of L GTB. Bilateral upper and lower extremity strength is normal and symmetric. No atrophy is noted.  NEURO: Sensation is equal and " appropriate bilaterally. Bilateral  lower extremity coordination and muscle stretch reflexes are physiologic and symmetric. Plantar response are downgoing.   GAIT: normal.      LABS: reviewed       IMAGING:    XR Hip R (with Pelvis) 12/3/21  FINDINGS:  Surgical clips overlie the right lower quadrant.  No acute fracture or dislocation.  Normal alignment.  Mild degenerative changes of the bilateral hips with joint space narrowing and subchondral sclerosis.  Additional mild degenerative changes of the lumbar spine.  Scattered vascular calcifications.  Soft tissues are otherwise unremarkable.     Impression:     No acute osseous abnormality.      ASSESSMENT: 71 y.o. year old female with pain, consistent with trochanteric bursitis of R hip.  1. Trochanteric bursitis of right hip  Ambulatory referral/consult to Physical/Occupational Therapy   2. Right leg pain  Ambulatory referral/consult to Pain Clinic         DISCUSSION: Mrs. Green is a pleasant 70 yo Female with anxiety who comes to us from Dr. Dallas for pain in the right hip that radiates down the side of the leg at night. She has pain to palpation over the right GTB and a normal hip exam and straight leg raise. She also reports sleeping on her right side. Of note, she can walk 6 miles. We discussed that her hip pain is likely greater trochanteric bursitis. She prefers conservative management at this time.    PLAN:  1. Referral to PT/OT placed  2. Recommended heat/ice and lidocaine patches OTC at night to help with pain control  3. Follow up in 2 months to consider GTB injection if necessary      I would like to thank Yoseph Dallas MD for the opportunity to assist in the care of this patient. We had a very nice visit and I look forward to continuing her care. Please let me know if I can be of further assistance.     Karey Arenas  04/20/2022

## 2022-04-20 NOTE — TELEPHONE ENCOUNTER
Santa Anajhonatan Green 71 y.o. female is here for Blood Pressure check. in person    Manual Blood pressure reading was  152/80, Pulse 58. (Checked at the end of the visit)    If high, was it repeated after 15 minutes? yes: 146/82. Pulse: 59    Diagnosed with Hypertension yes.    Patient took blood pressure medication today yes.  Last dose of blood pressure medication was taken at 04/19/2022 at [phone]. Patient took Irbesartan.     All Medications and OTC medication updated yes    Does patient have record of home blood pressure readings / Blood Pressure Log yes.     Does the pt have any complaints today in regards to their blood pressure medication? no. But, can she take her BP meds in the AM versus in PM. Complains of N/A. Patient is asymptomatic.     Were you sitting still for 5-10 minutes prior to taking your Blood pressure?     Pt's Home blood pressure machine read in office 175/94; 170/97; 171/70.     Has your blood pressure monitor ever been checked? no When was last time we checked your blood pressure monitor? Never    Updated vitals yes    Follow up date is 5/12/2022     Dr. Dallas notified.     Creatinine   Date Value Ref Range Status   03/23/2021 0.8 0.5 - 1.4 mg/dL Final     Sodium   Date Value Ref Range Status   03/23/2021 143 136 - 145 mmol/L Final     Potassium   Date Value Ref Range Status   03/23/2021 4.0 3.5 - 5.1 mmol/L Final

## 2022-04-20 NOTE — TELEPHONE ENCOUNTER
mera norris pt to call us back to give info for irbesartan and schedule NV for bp check and sent a portal message

## 2022-04-20 NOTE — PROGRESS NOTES
[place] Davidjhonatan Green 71 y.o. female is here for Blood Pressure check. in person    Manual Blood pressure reading was  152/80, Pulse 58. (Checked at the end of the visit)    If high, was it repeated after 15 minutes? yes: 146/82.    Diagnosed with Hypertension yes.    Patient took blood pressure medication today yes.  Last dose of blood pressure medication was taken at 04/19/2022 at [id number]. Patient took Irbesartan.     All Medications and OTC medication updated yes    Does patient have record of home blood pressure readings / Blood Pressure Log yes.     Does the pt have any complaints today in regards to their blood pressure medication? no. But, can she take her BP meds in the AM versus in PM. Complains of N/A. Patient is asymptomatic.     Were you sitting still for 5-10 minutes prior to taking your Blood pressure?     Pt's Home blood pressure machine read in office 175/94; 170/97; 171/70.     Has your blood pressure monitor ever been checked? no When was last time we checked your blood pressure monitor? Never    Updated vitals yes    Follow up date is 5/12/2022     Dr. Dallas notified.     Creatinine   Date Value Ref Range Status   03/23/2021 0.8 0.5 - 1.4 mg/dL Final     Sodium   Date Value Ref Range Status   03/23/2021 143 136 - 145 mmol/L Final     Potassium   Date Value Ref Range Status   03/23/2021 4.0 3.5 - 5.1 mmol/L Final

## 2022-04-21 NOTE — TELEPHONE ENCOUNTER
Reached out to pt to give previous message from Dr. Dallas on yesterday but pt said someone had already called her

## 2022-04-25 ENCOUNTER — CLINICAL SUPPORT (OUTPATIENT)
Dept: REHABILITATION | Facility: HOSPITAL | Age: 72
End: 2022-04-25
Payer: MEDICARE

## 2022-04-25 DIAGNOSIS — M25.551 RIGHT HIP PAIN: ICD-10-CM

## 2022-04-25 DIAGNOSIS — M70.61 TROCHANTERIC BURSITIS OF RIGHT HIP: ICD-10-CM

## 2022-04-25 PROCEDURE — 97161 PT EVAL LOW COMPLEX 20 MIN: CPT | Mod: PO

## 2022-04-25 NOTE — PROGRESS NOTES
[facility]  Physical Therapy Initial Evaluation - Hip     Name: Gely Green  Clinic Number: [id number]    Therapy Diagnosis:   Encounter Diagnoses   Name Primary?    Trochanteric bursitis of right hip     Right hip pain      Physician: Elfego Corona MD    Physician Orders: PT Eval and Treat   Medical Diagnosis from Referral: M70.61 (ICD-10-CM) - Trochanteric bursitis of right hip  Evaluation Date: 4/25/2022  Plan of Care Expiration: 5/23/2022 or 4th Visit  Authorization Period Expiration: 4/20/2022 - 4/20/2023  Visit # / Visits authorized: 1/ 1  FOTO: Issued Visit # 1      Please see Plan of Care notation section to view [facility] Initial Evaluation.       Jose Beckwith, PT,DPT

## 2022-04-26 PROBLEM — M25.551 RIGHT HIP PAIN: Status: ACTIVE | Noted: 2022-04-26

## 2022-04-26 NOTE — PLAN OF CARE
[facility]  Physical Therapy Initial Evaluation - Hip     Name: Gely Green  Clinic Number: [id number]    Therapy Diagnosis:   Encounter Diagnoses   Name Primary?    Trochanteric bursitis of right hip     Right hip pain      Physician: Elfego Corona MD    Physician Orders: PT Eval and Treat   Medical Diagnosis from Referral: M70.61 (ICD-10-CM) - Trochanteric bursitis of right hip  Evaluation Date: 2022  Plan of Care Expiration: 2022 or 4th Visit  Authorization Period Expiration: 2022 - 2023  Visit # / Visits authorized:   FOTO: Issued Visit # 1    Time In: 3:50 pm  Time Out: 4:30 pm  Total Billable Time: 40 minutes    Precautions: Standard and Diabetes    Subjective     History of current condition - Gely is a 71 y.o. year old female who presents to the [facility] PT clinic  with complaint of Right hip pain that radiates down to the ankle. Pt report onset of the symptoms occurred one year prior to evaluation. Precipitating event:Insidious Onset . Current symptoms include: pain and stiffness. Aggravating factors: sleeping, walking long distances, and swimming.  Evaluation to date interventions: Physician visit office. Treatment to date: none. Patients presently rates pain 0/10 on pain scale.    Mechanism of Injury: Insidious Onset  Next MD Visit: TBD     Medical History:   Past Medical History:   Diagnosis Date    Arthritis     Chronic constipation     Depression     Diabetes mellitus     Diabetes mellitus     Hypertension     Osteopenia        Surgical History:   Gely Green  has a past surgical history that includes  section; Hysterectomy; Colonoscopy; Colonoscopy (N/A, 3/21/2019); Oophorectomy; Tonsillectomy; and Eye surgery (Bilateral).    Medications:   Gely has a current medication list which includes the following prescription(s): accu-chek smartview test strip, aspirin, calcium carbonate, conjugated estrogens, famotidine,  fluticasone propionate, freestyle kristan 2 sensor, gvoke hypopen 2-pack, hydroxyzine pamoate, toujeo solostar u-300 insulin, irbesartan, lancets, levocetirizine, metformin, uribel, ondansetron, onetouch verio test strips, pen needle, diabetic, phenazopyridine, rosuvastatin, solifenacin, and vitamin d.    Allergies:   Review of patient's allergies indicates:  No Known Allergies     Imaging: N/A    Prior Therapy: No prior therapy received for current condition   Social History: Gely lives in a single story home with 0 steps to enter and  lives with their family  Occupation: Retired  Assistive Devices Owned: none   Hobbies/Exercise: Swimming  Prior Level of Function: Independent  Current Level of Function: Modified Independent secondary to Right hip pain and stiffness.    Pain:  Current 0/10, worst 10/10 (walking long distance and side sleeping at night increases pain), best 0/10 (rest and movement reduces pain)  Location: right hip  Description: Throbbing, aching, and nagging  Aggravating Factors: walking long distance and side sleeping at night increases pain  Easing Factors: rest and movement    Pts goals: Relieve Right hip pain in order to increase ease with recreational activities.    Objective     Posture: Fair  Palpation: mild generalized muscle tenderness  Sensation: intact to light touch  DTRs: Intact    Range of Motion/Strength:     Hip Left Right Pain/Dysfunction with Movement   PROM         Hip flexion  (120)  120°   120°  End range pain with Right hip flexion   Hip abduction (45)  45°   45°     Hip external rotation (45)   45°   45°     Hip internal rotation (45)  25°   25°     Knee flexion (135)  135°   135°     Knee extension (0) 0°   0°       RLE 5/5 4+/5 4/5 4-/5 3+/5 3/5 3-/5 2+/5 2/5 2-/5 1/5 0 NT   Hip Flexion    x                 Hip Extension    x                 Hip Abduction    x                 Hip Adduction    x                 Hip Internal Rotation    x                 Hip External Rotation     x                 Knee Flexion   x                  Knee Extension   x                     LLE 5/5 4+/5 4/5 4-/5 3+/5 3/5 3-/5 2+/5 2/5 2-/5 1/5 0 NT   Hip Flexion      x                    Hip Extension      x                    Hip Abduction      x                    Hip Adduction      x                    Hip Internal Rotation      x                    Hip External Rotation      x                    Knee Flexion    x                      Knee Extension    x                           Special Tests Left Right Comments   PADMINI  Positive Positive    Scour Negative Positive    Flexibility       90/90 Hamstrings  70° 70°      Gait Analysis   Gely Green ambulated 100 feet with none device with independence assistance. Gely Green displays no gait deviation during 1 gait cycle.      Other:   Sit to Stand - 14 Reps. Completed in 30 sec.        CMS Impairment/Limitation/Restriction for FOTO Hip Survey    Therapist reviewed FOTO scores for Gely Green on 4/25/2022.   FOTO documents entered into [employer] - see Media section.    Limitation Score: See Scanned Media   Category: Mobility           TREATMENT   Treatment Time In: 4:25 pm  Treatment Time Out: 4:30 pm   Total Treatment time separate from Evaluation: 5 minutes    Gely received therapeutic exercises to develop understanding of home exercise program  For 5 minutes including:    straight leg raise, 15x  Side Lying Straight Leg hip abduction, 15x  Bridges, 15x    Home Exercises and Patient Education Provided    Education provided:   Patient was provided educational information regarding: role of Physical Therapy, short and long term goals, patient/therapist expectations, scheduling, and attendance policy.    Written Home Exercises Provided: yes  Exercises were reviewed and Gely was able to demonstrate them prior to the end of the session.  Gely demonstrated good  understanding of the education provided.     See EMR under: Patient Instructions for  exercises provided 4/25/2022.    Assessment     Gely is a 71 y.o. female referred to outpatient Physical Therapy with a medical diagnosis of M70.61 (ICD-10-CM) - Trochanteric bursitis of right hip. Patient has tenderness along the Right lateral hip. Reports having difficulty side sleeping on the Right hip due to pain and radiating symptoms. Pt presents with displays of weakness, impaired functional mobility, decreased lower extremity function and pain. Patient would benefit from skilled physical therapy to increase functional strength and mobility of the Right hip in order to increase ease with recreational activities and decrease pain.    Pt prognosis is Good.     Pt will benefit from skilled outpatient Physical Therapy to address the deficits stated above and in the chart below, provide pt/family education, and to maximize pt's level of independence.     Plan of care discussed with patient: Yes  Pt's spiritual, cultural and educational needs considered and patient is agreeable to the plan of care and goals as stated below:     Anticipated Barriers for therapy: None Identified during initial evaluation     Medical Necessity is demonstrated by the following  History  Co-morbidities and personal factors that may impact the plan of care Co-morbidities:     Past Medical History:   Diagnosis Date    Arthritis     Chronic constipation     Depression     Diabetes mellitus     Diabetes mellitus     Hypertension     Osteopenia        Personal Factors:   no deficits     low   Examination  Body Structures and Functions, activity limitations and participation restrictions that may impact the plan of care Body Regions:   lower extremities    Body Systems:    strength    Participation Restrictions:   N/A    Activity limitations:   Learning and applying knowledge  no deficits    General Tasks and Commands  no deficits    Communication  no deficits    Mobility  no deficits    Self care  no deficits    Domestic Life  no  deficits    Interactions/Relationships  no deficits    Life Areas  no deficits    Community and Social Life  no deficits         low   Clinical Presentation stable and uncomplicated low   Decision Making/ Complexity Score: low     Goals:  Short Term Goals: 2 weeks       Goal Status    1. Pt. to report decreased right hip pain </ = 6/10 at worst to increase tolerance for ADL's and recreational activities     2. Pt. to demonstrate increased right  hamstring flexibility via 80 degrees displayed during  90/90 hamstring flexibility test to improve ease with stair descent.    3. Pt to demonstrate increased MMT for right  hip flexion and extension to 4/5 to increase ease with ADLs and work activities.    4. Pt. demonstrate increased MMT for right  hip abduction  and adduction  to 4/5 to increase ease with ascending / descending stairs.     5. Pt to be Independent with HEP to improve ROM and independence with ADL's        Long Term Goals: 4 weeks     Goal Status    1. Pt. to report decreased right hip pain </ = 3/10 at worst to increase  tolerance for ADL's and recreational activities     2. Pt. to demonstrate increased right  hamstring flexibility via 90 degrees displayed during  90/90 hamstring flexibility testst to improve ease with stair descent.    3. Pt to demonstrate increased MMT for right  hip flexion and extension to 4+/5 to increase ease with ADLs and work activities.    4. Pt. demonstrate increased MMT for right  hip abduction  and adduction  to 4+/5 to increase ease with ascending / descending stairs.     5. Pt to be Independent with HEP to improve ROM and independence with ADL's        Plan   Plan of care Certification: 4/25/2022 to 5/24/2022    Outpatient Physical Therapy 1 times weekly for 4 weeks to include the following interventions: Manual Therapy, Moist Heat/ Ice, Neuromuscular Re-ed, Patient Education, Therapeutic Activities and Therapeutic Exercise.     Jose Beckwith, PT,DPT

## 2022-04-28 ENCOUNTER — CLINICAL SUPPORT (OUTPATIENT)
Dept: REHABILITATION | Facility: HOSPITAL | Age: 72
End: 2022-04-28
Payer: MEDICARE

## 2022-04-28 ENCOUNTER — CLINICAL SUPPORT (OUTPATIENT)
Dept: INTERNAL MEDICINE | Facility: CLINIC | Age: 72
End: 2022-04-28
Payer: MEDICARE

## 2022-04-28 ENCOUNTER — TELEPHONE (OUTPATIENT)
Dept: INTERNAL MEDICINE | Facility: CLINIC | Age: 72
End: 2022-04-28

## 2022-04-28 VITALS — SYSTOLIC BLOOD PRESSURE: 132 MMHG | HEART RATE: 61 BPM | DIASTOLIC BLOOD PRESSURE: 68 MMHG | OXYGEN SATURATION: 98 %

## 2022-04-28 DIAGNOSIS — M25.551 RIGHT HIP PAIN: Primary | ICD-10-CM

## 2022-04-28 PROCEDURE — 97110 THERAPEUTIC EXERCISES: CPT | Mod: PO

## 2022-04-28 PROCEDURE — 99999 PR PBB SHADOW E&M-EST. PATIENT-LVL III: ICD-10-PCS | Mod: PBBFAC,,,

## 2022-04-28 PROCEDURE — 99999 PR PBB SHADOW E&M-EST. PATIENT-LVL III: CPT | Mod: PBBFAC,,,

## 2022-04-28 PROCEDURE — 99213 OFFICE O/P EST LOW 20 MIN: CPT | Mod: PBBFAC

## 2022-04-28 NOTE — PROGRESS NOTES
Physical Therapy Daily Treatment Note     Name: Gely LOTT [facility] Number: [id number]    Therapy Diagnosis:   Encounter Diagnosis   Name Primary?    Right hip pain Yes     Physician: Elfego Corona MD    Visit Date: 2022    Physician Orders: PT Eval and Treat   Medical Diagnosis from Referral: M70.61 (ICD-10-CM) - Trochanteric bursitis of right hip  Evaluation Date: 2022  Plan of Care Expiration: 2022 or 4th Visit  Authorization Period Expiration: 2022 - 2022  Visit # / Visits authorized:   FOTO: Issued Visit # 1    5th Visit FOTO -  10th Visit FOTO -   D/C FOTO -     Time In: 8:34 am  Time Out: 9:15 am  Billable Time: 41 minutes  Non-Billable Time: 00 minutes    Precautions: Standard  Insurance: Payor: MEDICARE / Plan: MEDICARE PART A & B / Product Type: Government /     Subjective     Pt reports: No significant change from the evaluation.   .  She is compliant with home exercise program.    Response to previous treatment: First treatment     Pre-Treatment Pain Ratin/10  Post-Treatment Pain Ratin/10  Location: right hip      Objective     Gely received therapeutic exercises to develop strength, endurance, ROM and flexibility for 41 minutes including:      Supine    straight leg raise, 3x10  Side Lying Straight Leg hip abduction, 3x10  Bridges, 3x10  Knee extension with dorsiflexion at 90/90, 3x10 - bilateral       Seated    Hamstring stretch, 4x30 seconds - bilateral     Standing    Lateral walks with red theraband (10 steps to the Left then Right) - 3 laps     *Bold exercise performed       Home Exercises Provided and Patient Education Provided     Education provided:   - home exercise program updated on 2022    Written Home Exercises Provided: Patient instructed to cont prior HEP.  Exercises were reviewed and Gely was able to demonstrate them prior to the end of the session.  Gely demonstrated good  understanding of the education provided.     See EMR  under Patient Instructions for exercises provided prior visit.    Assessment     Pt currently reports displaying Trochanteric bursitis of the Right hip.  Pt required an increase in verbal and tactile cueing during today's treatment session proper form and pacing of exercises. Pt tolerated treatment session good  and will continue to benefit from skilled therapy to address deficits.  Pt performed today's therapeutic interventions without adverse events.  Pt educated to continue HEP to improve progression.     Instructed the patient to reduce her daily walking distance to three miles, instead of the usual six miles. Mentioned to the patient she shouldn't be pushing through levels of pain and discomfort with the exercises due to the potential of increased symptoms.     Gely Is progressing well towards His goals.     Pt prognosis is Good.     Pt will continue to benefit from skilled outpatient physical therapy to address the deficits listed in the problem list box on initial evaluation, provide pt/family education and to maximize pt's level of independence in the home and community environment.     Pt's spiritual, cultural and educational needs considered and pt agreeable to plan of care and goals.    Anticipated barriers to physical therapy: None    Goals:  Short Term Goals: 2 weeks         Goal Status    1. Pt. to report decreased right hip pain </ = 6/10 at worst to increase tolerance for ADL's and recreational activities      2. Pt. to demonstrate increased right  hamstring flexibility via 80 degrees displayed during  90/90 hamstring flexibility test to improve ease with stair descent.     3. Pt to demonstrate increased MMT for right  hip flexion and extension to 4/5 to increase ease with ADLs and work activities.     4. Pt. demonstrate increased MMT for right  hip abduction  and adduction  to 4/5 to increase ease with ascending / descending stairs.      5. Pt to be Independent with HEP to improve ROM and  independence with ADL's           Long Term Goals: 4 weeks      Goal Status    1. Pt. to report decreased right hip pain </ = 3/10 at worst to increase  tolerance for ADL's and recreational activities      2. Pt. to demonstrate increased right  hamstring flexibility via 90 degrees displayed during  90/90 hamstring flexibility testst to improve ease with stair descent.     3. Pt to demonstrate increased MMT for right  hip flexion and extension to 4+/5 to increase ease with ADLs and work activities.     4. Pt. demonstrate increased MMT for right  hip abduction  and adduction  to 4+/5 to increase ease with ascending / descending stairs.      5. Pt to be Independent with HEP to improve ROM and independence with ADL's           Plan     Continued with current POC      Jose Beckwith, PT ,DPT  4/28/2022

## 2022-04-28 NOTE — TELEPHONE ENCOUNTER
Allenjhonatan Green 71 y.o. female is here for Blood Pressure check. in person    Manual Blood pressure reading was  132/68, Pulse 61. (Checked at the end of the visit)    Digital BP machine brought to the clinic readin/77 P: 62    Diagnosed with Hypertension yes.    Patient took blood pressure medication today no.  Last dose of blood pressure medication was taken at 22 at [phone]. Patient took Irbesartan.     All Medications and OTC medication updated yes    Does patient have record of home blood pressure readings /Blood Pressure Log yes.     Does the pt have any complaints today in regards to their blood pressure medication? no. Complains of N/A. Patient is asymptomatic.     Were you sitting still for 5-10 minutes prior to taking your Blood pressure? yes     Pt's Home blood pressure machine read in office 145/81, Pulse 60; BP: 115/72, P: 65; BP: 126/73 P: 68; BP: 162/73 P: 60; BP: 125/75 P: 58; BP: 135/73 P: 60; BP: 134/77 P: 60.     Has your blood pressure monitor ever been checked? yes When was last time we checked your blood pressure monitor? Today    Updated vitals yes    Follow up date is .     Dr. Dallas notified.     Creatinine   Date Value Ref Range Status   2021 0.8 0.5 - 1.4 mg/dL Final     Sodium   Date Value Ref Range Status   2021 143 136 - 145 mmol/L Final     Potassium   Date Value Ref Range Status   2021 4.0 3.5 - 5.1 mmol/L Final

## 2022-04-28 NOTE — PROGRESS NOTES
Eckleyjhonatan Green 71 y.o. female is here for Blood Pressure check. in person    Manual Blood pressure reading was  132/68, Pulse 61. (Checked at the end of the visit)    Digital BP machine read: 141/77 P: 62    Diagnosed with Hypertension yes.    Patient took blood pressure medication today no.  Last dose of blood pressure medication was taken at 4/27/22 at [phone]. Patient took Irbesartan.     All Medications and OTC medication updated yes    Does patient have record of home blood pressure readings /Blood Pressure Log yes.     Does the pt have any complaints today in regards to their blood pressure medication? no. Complains of N/A. Patient is asymptomatic.     Were you sitting still for 5-10 minutes prior to taking your Blood pressure? yes     Pt's Home blood pressure machine read in office 145/81, Pulse 60; BP: 115/72, P: 65; BP: 126/73 P: 68; BP: 162/73 P: 60; BP: 125/75 P: 58; BP: 135/73 P: 60; BP: 134/77 P: 60.     Has your blood pressure monitor ever been checked? yes When was last time we checked your blood pressure monitor? Today    Updated vitals yes    Follow up date is 5/12.     Dr. Dallas notified.     Creatinine   Date Value Ref Range Status   03/23/2021 0.8 0.5 - 1.4 mg/dL Final     Sodium   Date Value Ref Range Status   03/23/2021 143 136 - 145 mmol/L Final     Potassium   Date Value Ref Range Status   03/23/2021 4.0 3.5 - 5.1 mmol/L Final

## 2022-05-04 ENCOUNTER — TELEPHONE (OUTPATIENT)
Dept: UROGYNECOLOGY | Facility: CLINIC | Age: 72
End: 2022-05-04
Payer: MEDICARE

## 2022-05-04 NOTE — TELEPHONE ENCOUNTER
----- Message from Rocky Hobbs sent at 5/4/2022  3:51 PM CDT -----  PT SAYS THE MEDICATION IS $300.00 PT CAN BE REACHED @ [phone]

## 2022-05-04 NOTE — TELEPHONE ENCOUNTER
LVM for patient to let us know which medication -- was it vaginal estrogen cream or metrogel? If metrogel, we can send in oral. If vaginal estrogen, we can use compounding pharmacy or [facility] for cheaper.

## 2022-05-05 NOTE — TELEPHONE ENCOUNTER
Pt. Stated she is on her way to the pharmacy now to get clarification. I informed pt. That I would give her another call in 30mins.  Pt. verbalized understanding and appreciation

## 2022-05-06 ENCOUNTER — PATIENT MESSAGE (OUTPATIENT)
Dept: UROGYNECOLOGY | Facility: CLINIC | Age: 72
End: 2022-05-06
Payer: MEDICARE

## 2022-05-10 ENCOUNTER — CLINICAL SUPPORT (OUTPATIENT)
Dept: REHABILITATION | Facility: HOSPITAL | Age: 72
End: 2022-05-10
Payer: MEDICARE

## 2022-05-10 DIAGNOSIS — M25.551 RIGHT HIP PAIN: Primary | ICD-10-CM

## 2022-05-10 PROCEDURE — 97110 THERAPEUTIC EXERCISES: CPT | Mod: PO,CQ

## 2022-05-10 NOTE — PROGRESS NOTES
Physical Therapy Daily Treatment Note     Name: Gely LOTT [facility] Number: [id number]    Therapy Diagnosis:   Encounter Diagnosis   Name Primary?    Right hip pain Yes     Physician: Elfego Corona MD    Visit Date: 5/10/2022    Physician Orders: PT Eval and Treat   Medical Diagnosis from Referral: M70.61 (ICD-10-CM) - Trochanteric bursitis of right hip  Evaluation Date: 2022  Plan of Care Expiration: 2022 or 4th Visit  Authorization Period Expiration: 2022 - 2022  Visit # / Visits authorized:   PTA Visit #:   FOTO: Issued Visit # 1    5th Visit FOTO -  10th Visit FOTO -   D/C FOTO -     Time In: 3:55 am  Time Out: 4:40 am  Billable Time: 45 minutes  Non-Billable Time: 00 minutes    Precautions: Standard  Insurance: Payor: MEDICARE / Plan: MEDICARE PART A & B / Product Type: Government /     Subjective     Pt reports: Hip feels okay today, just a little pain when getting out of the car if she's been riding for a while  .  She is compliant with home exercise program.    Response to previous treatment:     Pre-Treatment Pain Ratin/10  Post-Treatment Pain Ratin/10  Location: right hip      Objective     Gely received therapeutic exercises to develop strength, endurance, ROM and flexibility for 45 minutes including:      Supine    straight leg raise, 3x10  Side Lying Straight Leg hip abduction, 3x10  Bridges, 3x10  Knee extension with dorsiflexion at 90/90, 3x10 - bilateral       Seated    Hamstring stretch, 4x30 seconds - bilateral   Bike - 10 min    Standing    Lateral walks with red theraband (10 steps to the Left then Right) - 3 laps     *Bold exercise performed       Home Exercises Provided and Patient Education Provided     Education provided:   - home exercise program updated on 2022    Written Home Exercises Provided: Patient instructed to cont prior HEP.  Exercises were reviewed and Gely was able to demonstrate them prior to the end of the session.  Gely  demonstrated good  understanding of the education provided.     See EMR under Patient Instructions for exercises provided prior visit.    Assessment     Patient tolerated therapy well. Verbal and tactile cues needed for side lying hip abduction to prevent pt's hips from rolling back. She demonstrated partial understanding. Patient also displays quadriceps weakness and is challenged to keep leg straight during Straight Leg Raise and side lying abduction. PTA educated pt on not overworking at the gym after therapy and the importance of a gradual increase in exercise length and intensity. She verbalized understanding. Patient will continue to benefit from skilled therapy.      Gely Is progressing well towards His goals.     Pt prognosis is Good.     Pt will continue to benefit from skilled outpatient physical therapy to address the deficits listed in the problem list box on initial evaluation, provide pt/family education and to maximize pt's level of independence in the home and community environment.     Pt's spiritual, cultural and educational needs considered and pt agreeable to plan of care and goals.    Anticipated barriers to physical therapy: None    Goals:  Short Term Goals: 2 weeks         Goal Status    1. Pt. to report decreased right hip pain </ = 6/10 at worst to increase tolerance for ADL's and recreational activities   Progressing 5/10/2022     2. Pt. to demonstrate increased right  hamstring flexibility via 80 degrees displayed during  90/90 hamstring flexibility test to improve ease with stair descent. Progressing 5/10/2022    3. Pt to demonstrate increased MMT for right  hip flexion and extension to 4/5 to increase ease with ADLs and work activities.  Progressing 5/10/2022     4. Pt. demonstrate increased MMT for right  hip abduction  and adduction  to 4/5 to increase ease with ascending / descending stairs.  Progressing 5/10/2022      5. Pt to be Independent with HEP to improve ROM and independence  with ADL's Progressing 5/10/2022            Long Term Goals: 4 weeks      Goal Status    1. Pt. to report decreased right hip pain </ = 3/10 at worst to increase  tolerance for ADL's and recreational activities      2. Pt. to demonstrate increased right  hamstring flexibility via 90 degrees displayed during  90/90 hamstring flexibility testst to improve ease with stair descent.     3. Pt to demonstrate increased MMT for right  hip flexion and extension to 4+/5 to increase ease with ADLs and work activities.     4. Pt. demonstrate increased MMT for right  hip abduction  and adduction  to 4+/5 to increase ease with ascending / descending stairs.      5. Pt to be Independent with HEP to improve ROM and independence with ADL's           Plan     Continued with current POC      Manan Lorenzo, PTA   5/11/2022

## 2022-05-12 ENCOUNTER — TELEPHONE (OUTPATIENT)
Dept: INTERNAL MEDICINE | Facility: CLINIC | Age: 72
End: 2022-05-12

## 2022-05-12 ENCOUNTER — OFFICE VISIT (OUTPATIENT)
Dept: INTERNAL MEDICINE | Facility: CLINIC | Age: 72
End: 2022-05-12
Attending: INTERNAL MEDICINE
Payer: MEDICARE

## 2022-05-12 ENCOUNTER — LAB VISIT (OUTPATIENT)
Dept: LAB | Facility: OTHER | Age: 72
End: 2022-05-12
Attending: INTERNAL MEDICINE
Payer: MEDICARE

## 2022-05-12 VITALS
WEIGHT: 159.38 LBS | SYSTOLIC BLOOD PRESSURE: 120 MMHG | DIASTOLIC BLOOD PRESSURE: 70 MMHG | HEIGHT: 66 IN | HEART RATE: 64 BPM | OXYGEN SATURATION: 98 % | BODY MASS INDEX: 25.61 KG/M2

## 2022-05-12 DIAGNOSIS — E11.9 TYPE 2 DIABETES MELLITUS WITHOUT COMPLICATION, WITH LONG-TERM CURRENT USE OF INSULIN: Primary | ICD-10-CM

## 2022-05-12 DIAGNOSIS — I15.2 HYPERTENSION ASSOCIATED WITH TYPE 2 DIABETES MELLITUS: ICD-10-CM

## 2022-05-12 DIAGNOSIS — Z79.4 TYPE 2 DIABETES MELLITUS WITHOUT COMPLICATION, WITH LONG-TERM CURRENT USE OF INSULIN: Primary | ICD-10-CM

## 2022-05-12 DIAGNOSIS — E11.9 TYPE 2 DIABETES MELLITUS WITHOUT COMPLICATION, WITH LONG-TERM CURRENT USE OF INSULIN: ICD-10-CM

## 2022-05-12 DIAGNOSIS — F43.23 ADJUSTMENT DISORDER WITH MIXED ANXIETY AND DEPRESSED MOOD: ICD-10-CM

## 2022-05-12 DIAGNOSIS — E11.69 HYPERLIPIDEMIA ASSOCIATED WITH TYPE 2 DIABETES MELLITUS: ICD-10-CM

## 2022-05-12 DIAGNOSIS — E78.5 HYPERLIPIDEMIA ASSOCIATED WITH TYPE 2 DIABETES MELLITUS: ICD-10-CM

## 2022-05-12 DIAGNOSIS — Z79.4 TYPE 2 DIABETES MELLITUS WITHOUT COMPLICATION, WITH LONG-TERM CURRENT USE OF INSULIN: ICD-10-CM

## 2022-05-12 DIAGNOSIS — E11.59 HYPERTENSION ASSOCIATED WITH TYPE 2 DIABETES MELLITUS: ICD-10-CM

## 2022-05-12 DIAGNOSIS — I49.3 PVC'S (PREMATURE VENTRICULAR CONTRACTIONS): ICD-10-CM

## 2022-05-12 LAB
ALBUMIN/CREAT UR: 9.2 UG/MG (ref 0–30)
CREAT UR-MCNC: 152.6 MG/DL (ref 15–325)
MICROALBUMIN UR DL<=1MG/L-MCNC: 14 UG/ML

## 2022-05-12 PROCEDURE — 99215 OFFICE O/P EST HI 40 MIN: CPT | Mod: S$PBB,,, | Performed by: INTERNAL MEDICINE

## 2022-05-12 PROCEDURE — 99214 OFFICE O/P EST MOD 30 MIN: CPT | Mod: PBBFAC | Performed by: INTERNAL MEDICINE

## 2022-05-12 PROCEDURE — 82043 UR ALBUMIN QUANTITATIVE: CPT | Performed by: INTERNAL MEDICINE

## 2022-05-12 PROCEDURE — 82570 ASSAY OF URINE CREATININE: CPT | Performed by: INTERNAL MEDICINE

## 2022-05-12 PROCEDURE — 99215 PR OFFICE/OUTPT VISIT, EST, LEVL V, 40-54 MIN: ICD-10-PCS | Mod: S$PBB,,, | Performed by: INTERNAL MEDICINE

## 2022-05-12 PROCEDURE — 99999 PR PBB SHADOW E&M-EST. PATIENT-LVL IV: CPT | Mod: PBBFAC,,, | Performed by: INTERNAL MEDICINE

## 2022-05-12 PROCEDURE — 99999 PR PBB SHADOW E&M-EST. PATIENT-LVL IV: ICD-10-PCS | Mod: PBBFAC,,, | Performed by: INTERNAL MEDICINE

## 2022-05-12 RX ORDER — PEN NEEDLE, DIABETIC 31 GX5/16"
NEEDLE, DISPOSABLE MISCELLANEOUS
COMMUNITY
Start: 2022-04-11 | End: 2024-01-03

## 2022-05-12 NOTE — PROGRESS NOTES
Subjective:       Patient ID: Gely Green is a 71 y.o. female.    Chief Complaint: 6mth f/u (HTN ASS. W/ DM)    Here for routine f/u    Patient presents today for routine evaluation, physical, and labs. Patient has no major concerns or complaints today.       #### palpitations ####  04/06 to 05/05/2021 Event monitor.  The predominant rhythm was sinus.  Heart rates  beats per minute.  There were 13 transmitted events.  The 2 symptomatic transmitted events which were done for an unknown symptom.  Each of these showed sinus rhythm at 80-90 beats per minute.  The automatic tracings all showed sinus rhythm, frequently with premature ventricular contractions.  Heart rates  beats per minute.Impression:  Sinus rhythm with single PVCs.  5/12/22 When she lays on her left side in bed she still feels her heart beat. Denies CP at rest or with exertion, dizziness with exertion, shortness of breath out of proportion to level of activity, orthopnea, PND, or LE edema.       Trochanteric bursitis so far responding well to PT. Patient would prefer to avoid any injections at this time and stick to conservative management.      ### DM ###  Endocrine at [facility]. Was having lows so her night time was decreased to 10 units.  Eye MD Russo  She likes cakes and confections       HGBA1C                   7.2 (H)             03/25/2022 01:02 PM        HGBA1C                   6.9 (H)             10/22/2021 12:33 PM        HGBA1C                   6.2 (H)             03/23/2021 08:40 AM        HGBA1C                   6.2 (H)             03/23/2021 08:40 AM        HGBA1C                   6.3 (H)             12/21/2020 03:00 PM        HGBA1C                   6.1 (H)             06/02/2020 09:43 AM        HGBA1C                   6.4 (H)             11/18/2019 08:45 AM        HGBA1C                   7.1 (H)             06/19/2019 02:30 PM        HGBA1C                   6.8 (H)             12/31/2018 01:48 PM        HGBA1C   "                 6.3 (H)             05/01/2018 10:49 AM   5/12/22 Reports having eye exam one month prior  Diabetes Management Status    Statin: Taking  ACE/ARB: Taking    Screening or Prevention Patient's value Goal Complete/Controlled?  HgA1C Testing and Control  Lab Results      Component                Value               Date                      HGBA1C                   7.2 (H)             03/25/2022            Annually/Less than 8% Yes  Lipid profile : 03/25/2022 Annually Yes  LDL control Lab Results      Component                Value               Date                      LDLCALC                  92.8                03/25/2022           Annually/Less than 100 mg/dl Yes  Nephropathy screening Lab Results      Component                Value               Date                      LABMICR                  <2.5 05/21/2021   Lab Results      Component                Value               Date                      PROTEINUA                Negative            12/27/2020            No results found for: UTPCR  Annually Yes  Blood pressure BP Readings from Last 1 Encounters:  05/12/22 : 120/70  Less than 140/90 Yes  Dilated retinal exam : 06/02/2021 Annually Yes  Foot exam  : 01/12/2021 Annually No      ### IC ###  Ubrel and urispas and instillations with urologist    ### Facial numbness ###  12/27/20 developed acute onset of numbness of top lip and left perioral with radiation to left maxillary region and left pre auricular. She has a left sided neck discomfort that is different in quality and behaves separately. Her left face and lip numbness. She was admitted to [facility], [place], operated by [facility] and had normal CT head. MRI brain 12/2/20 "Scattered foci of T2/FLAIR signal hyperintensity in supratentorial white matter. While nonspecific, findings likely relate to sequela of chronic microvascular ischemic change although findings can be seen in the setting of migraine headaches and as the residua from inflammatory and traumatic insults to " "the brain. Clinical correlation is advised" MRA neck and brain WNL.   Had subsequent U/S as outpt despite normal MRA neck that was also normal. No pain. Hx of left hand numbness with normal EMG 2017.    -Neuro Dr Torres writes "Without involvement of the teeth or gums this seems to be less likely related to a central cause.  Bertha allergies not completely clear.  Patient should however have treatment as if this is a TIA and will be given high dose aspirin for secondary prevention."      Review of Systems   Constitutional: Positive for activity change. Negative for chills, fatigue, fever and unexpected weight change.   HENT: Negative for ear pain, hearing loss, postnasal drip, tinnitus, trouble swallowing and voice change.    Respiratory: Negative for cough, chest tightness, shortness of breath and wheezing.    Cardiovascular: Negative for chest pain, palpitations and leg swelling.   Gastrointestinal: Positive for constipation. Negative for abdominal pain, blood in stool, diarrhea, nausea and vomiting.   Endocrine: Negative for polydipsia, polyphagia and polyuria.   Genitourinary: Positive for dysuria. Negative for difficulty urinating, hematuria and vaginal bleeding.   Musculoskeletal: Positive for arthralgias.   Skin: Negative for rash.   Allergic/Immunologic: Negative for food allergies.   Neurological: Negative for dizziness, facial asymmetry, speech difficulty, light-headedness, numbness and headaches.   Hematological: Does not bruise/bleed easily.   Psychiatric/Behavioral: The patient is not nervous/anxious.        Objective:      Vitals:    05/12/22 1015   BP: 120/70   Pulse: 64   SpO2: 98%   Weight: 72.3 kg (159 lb 6.3 oz)   Height: 5' 6" (1.676 m)      Physical Exam  Constitutional:       General: She is not in acute distress.     Appearance: She is well-developed.   HENT:      Head: Normocephalic and atraumatic.      Mouth/Throat:      Pharynx: No oropharyngeal exudate.   Eyes:      General: No scleral " icterus.     Conjunctiva/sclera: Conjunctivae normal.      Pupils: Pupils are equal, round, and reactive to light.   Neck:      Thyroid: No thyromegaly.   Cardiovascular:      Rate and Rhythm: Normal rate and regular rhythm.      Heart sounds: Normal heart sounds. No murmur heard.  Pulmonary:      Effort: Pulmonary effort is normal.      Breath sounds: Normal breath sounds. No wheezing or rales.   Abdominal:      General: There is no distension.      Palpations: Abdomen is soft.      Tenderness: There is no abdominal tenderness.   Musculoskeletal:         General: No tenderness.   Lymphadenopathy:      Cervical: No cervical adenopathy.   Skin:     General: Skin is warm and dry.   Neurological:      Mental Status: She is alert and oriented to person, place, and time.   Psychiatric:         Behavior: Behavior normal.         Assessment:       1. Type 2 diabetes mellitus without complication, with long-term current use of insulin    2. Hypertension associated with type 2 diabetes mellitus    3. Hyperlipidemia associated with type 2 diabetes mellitus    4. Adjustment disorder with mixed anxiety and depressed mood    5. PVC's (premature ventricular contractions)        Plan:       Gely was seen today for 6mth f/u.    Diagnoses and all orders for this visit:    Type 2 diabetes mellitus without complication, with long-term current use of insulin  -     Microalbumin/Creatinine Ratio, Urine; Future    Hypertension associated with type 2 diabetes mellitus   Controlled and asymptomatic.  Continue current Rx regimen.    Hyperlipidemia associated with type 2 diabetes mellitus   Tolerating statin. Continue this.     Adjustment disorder with mixed anxiety and depressed mood    PVC's (premature ventricular contractions)   reassurance provided. Office and Emergency Department prompts discussed.     Continue current Rx regimen.       60 minutes were spent today reviewing patient chart.  Much of today's visit was spent discussing  with patient my chart review, their concerns, health maintenance, my assessment, and recommendations.         Yoseph Mercado MD  Internal Medicine-[facility]        Side effects of medication(s) were discussed in detail and patient voiced understanding.  Patient will call back for any issues or complications.

## 2022-05-12 NOTE — TELEPHONE ENCOUNTER
Faxed LAURA to Dr. Willian Rsuso to fax# [phone]/office# [phone] for optometry records . I  Have attached a confirmation sheet to this fax located in my file cabinet.

## 2022-05-16 ENCOUNTER — CLINICAL SUPPORT (OUTPATIENT)
Dept: REHABILITATION | Facility: HOSPITAL | Age: 72
End: 2022-05-16
Payer: MEDICARE

## 2022-05-16 DIAGNOSIS — M25.551 RIGHT HIP PAIN: Primary | ICD-10-CM

## 2022-05-16 PROCEDURE — 97110 THERAPEUTIC EXERCISES: CPT | Mod: PO

## 2022-05-16 NOTE — PROGRESS NOTES
Physical Therapy Daily Treatment Note     [id number]    Therapy Diagnosis:   Encounter Diagnosis   Name Primary?    Right hip pain Yes     Physician: Elfego Corona MD    Visit Date: 2022    Physician Orders: PT Eval and Treat   Medical Diagnosis from Referral: M70.61 (ICD-10-CM) - Trochanteric bursitis of right hip  Evaluation Date: 2022  Plan of Care Expiration: 2022 or 4th Visit  Authorization Period Expiration: 2022 - 2022  Visit # / Visits authorized: 3/ 20  PTA Visit #: 0/6  FOTO: Issued Visit # 1    5th Visit FOTO -  10th Visit FOTO -   D/C FOTO -     Time In: 1:38 am  Time Out: 2:16 am  Billable Time: 38 minutes  Non-Billable Time: 00 minutes    Precautions: Standard  Insurance: Payor: MEDICARE / Plan: MEDICARE PART A & B / Product Type: Government /     Subjective     Pt reports: she is ready to be discharged. Patient continues to do well without any adverse effects of the Right hip.  .  She is compliant with home exercise program.    Response to previous treatment:     Pre-Treatment Pain Ratin/10  Post-Treatment Pain Ratin/10  Location: right hip      Objective     Posture: Good  Palpation: no tenderness with palpation  Sensation: intact to light touch  DTRs: Intact     Range of Motion/Strength:      Hip Left Right Pain/Dysfunction with Movement   PROM          Hip flexion  (120)  120°   120°     Hip abduction (45)  45°   45°      Hip external rotation (45)   45°   45°      Hip internal rotation (45)  25°   25°      Knee flexion (135)  135°   135°      Knee extension (0) 0°   0°         RLE 5/5 4+/5 4/5 4-/5 3+/5 3/5 3-/5 2+/5 2/5 2-/5 1/5 0 NT   Hip Flexion    x                       Hip Extension    x                       Hip Abduction    x                       Hip Adduction    x                       Hip Internal Rotation    x                       Hip External Rotation    x                       Knee Flexion    x                        Knee Extension    x                          LLE 5/5 4+/5 4/5 4-/5 3+/5 3/5 3-/5 2+/5 2/5 2-/5 1/5 0 NT   Hip Flexion   x                       Hip Extension   x                       Hip Abduction   x                       Hip Adduction   x                       Hip Internal Rotation   x                       Hip External Rotation   x                       Knee Flexion   x                       Knee Extension   x                             Special Tests Left Right Comments   PADMINI  Negative Negative     Scour Negative Negative     Flexibility          90/90 Hamstrings  85° 85°        Gait Analysis   Gely Green ambulated 100 feet with none device with independence assistance. Gely Green displays no gait deviation during 1 gait cycle.       Other:   Sit to Stand - 14 Reps. Completed in 30 sec.       TREATMENT       Gely received therapeutic exercises to develop strength, endurance, ROM and flexibility for 38 minutes including:      Supine    Straight leg raise, 3x10  Side Lying Straight Leg hip abduction, 3x10  Bridges, 3x10  Knee extension with dorsiflexion at 90/90, 3x10 - bilateral   MVP shuttle - 3C- 3x10       Seated    Hamstring stretch, 4x30 seconds - bilateral   Bike x 5 minutes - For muscular endurance and cardiovascular health.    Standing    Lateral walks with red theraband (10 steps to the Left then Right) - 3 laps   Step ups, 3x10 - bilateral     *Bold exercise performed       Home Exercises Provided and Patient Education Provided     Education provided:   - home exercise program updated on 4/28/2022    Written Home Exercises Provided: Patient instructed to cont prior HEP.  Exercises were reviewed and Gely was able to demonstrate them prior to the end of the session.  Gely demonstrated good  understanding of the education provided.     See EMR under Patient Instructions for exercises provided prior visit.    Assessment     Patient started therapy because of trochanteric  bursitis of the right hip due to a sudden increase in activity level.  Pt required an increase in verbal and tactile cueing during today's treatment session for proper form and pacing of exercises.  Pt tolerated treatment session good  and without adverse events.  Pt educated to continue HEP to improve progression. Patient has shown great improvements in physical therapy and understanding of structuring exercise program. Patient is discharge from physical therapy.    Pt's spiritual, cultural and educational needs considered and pt agreeable to plan of care and goals.    Anticipated barriers to physical therapy: None    Goals:  Short Term Goals: 2 weeks         Goal Status    1. Pt. to report decreased right hip pain </ = 6/10 at worst to increase tolerance for ADL's and recreational activities  Goal Met - 5/16/2022   2. Pt. to demonstrate increased right  hamstring flexibility via 80 degrees displayed during  90/90 hamstring flexibility test to improve ease with stair descent. Goal Met - 5/16/2022   3. Pt to demonstrate increased MMT for right  hip flexion and extension to 4/5 to increase ease with ADLs and work activities.  Goal Met - 5/16/2022   4. Pt. demonstrate increased MMT for right  hip abduction  and adduction  to 4/5 to increase ease with ascending / descending stairs.  Goal Met - 5/16/2022   5. Pt to be Independent with HEP to improve ROM and independence with ADL's Goal Met - 5/16/2022         Long Term Goals: 4 weeks      Goal Status    1. Pt. to report decreased right hip pain </ = 3/10 at worst to increase  tolerance for ADL's and recreational activities   Goal Met - 5/16/2022   2. Pt. to demonstrate increased right  hamstring flexibility via 90 degrees displayed during  90/90 hamstring flexibility testst to improve ease with stair descent. Discharge Goal - 5/16/2022   3. Pt to demonstrate increased MMT for right  hip flexion and extension to 4+/5 to increase ease with ADLs and work activities. Goal  Met - 5/16/2022    4. Pt. demonstrate increased MMT for right  hip abduction  and adduction  to 4+/5 to increase ease with ascending / descending stairs.   Goal Met - 5/16/2022   5. Pt to be Independent with HEP to improve ROM and independence with ADL's  Goal Met - 5/16/2022         Plan     Continued with current POC      Jose Beckwith, PT   5/16/2022

## 2022-05-17 ENCOUNTER — PATIENT MESSAGE (OUTPATIENT)
Dept: UROGYNECOLOGY | Facility: CLINIC | Age: 72
End: 2022-05-17
Payer: MEDICARE

## 2022-05-18 DIAGNOSIS — N95.2 VAGINAL ATROPHY: Primary | ICD-10-CM

## 2022-05-18 RX ORDER — ESTRADIOL 0.1 MG/G
1 CREAM VAGINAL
Qty: 425 G | Refills: 3 | Status: SHIPPED | OUTPATIENT
Start: 2022-05-19 | End: 2023-01-04

## 2022-05-23 DIAGNOSIS — N76.0 BACTERIAL VAGINOSIS: ICD-10-CM

## 2022-05-23 DIAGNOSIS — B96.89 BACTERIAL VAGINOSIS: ICD-10-CM

## 2022-05-23 RX ORDER — METRONIDAZOLE 7.5 MG/G
GEL VAGINAL
Qty: 25 G | Refills: 0 | Status: SHIPPED | OUTPATIENT
Start: 2022-05-23 | End: 2022-05-28

## 2022-06-06 DIAGNOSIS — Z79.4 TYPE 2 DIABETES MELLITUS WITH DIABETIC NEUROPATHY, WITH LONG-TERM CURRENT USE OF INSULIN: ICD-10-CM

## 2022-06-06 DIAGNOSIS — E11.40 TYPE 2 DIABETES MELLITUS WITH DIABETIC NEUROPATHY, WITH LONG-TERM CURRENT USE OF INSULIN: ICD-10-CM

## 2022-06-07 RX ORDER — LANCETS 33 GAUGE
1 EACH MISCELLANEOUS 3 TIMES DAILY
Qty: 300 EACH | Refills: 3 | Status: SHIPPED | OUTPATIENT
Start: 2022-06-07

## 2022-06-07 NOTE — TELEPHONE ENCOUNTER
Refill Authorization Note   Gely Green  is requesting a refill authorization.  Brief Assessment and Rationale for Refill:  Approve    -Medication-Related Problems Identified: Requires labs  Medication Therapy Plan:       Medication Reconciliation Completed: No   Comments:     Provider Staff:     Action is required for this patient.   Please see care gap opportunities below in Care Due Message.     Thanks!  [facility]     Appointments      Date Provider   Last Visit   5/12/2022 Yoseph Dallas MD   Next Visit   9/26/2022 Yoseph Dallas MD     Note composed:1:28 PM 06/07/2022           Note composed:1:28 PM 06/07/2022

## 2022-06-07 NOTE — TELEPHONE ENCOUNTER
Care Due:                  Date            Visit Type   Department     Provider  --------------------------------------------------------------------------------                                EP -                              PRIMARY      [facility] INTERNAL  Last Visit: 05-      CARE ([facility])   MEDICINE       Yoseph Dallas                              EP -                              PRIMARY      [facility] INTERNAL  Next Visit: 09-      CARE ([facility])   MEDICINE       Yoseph Dallas                                                            Last  Test          Frequency    Reason                     Performed    Due Date  --------------------------------------------------------------------------------    CMP.........  12 months..  rosuvastatin.............  03- 03-    [facility] Embedded Care Gaps. Reference number: [id number]. 6/06/2022   9:50:39 PM CDT

## 2022-06-20 ENCOUNTER — TELEPHONE (OUTPATIENT)
Dept: PRIMARY CARE CLINIC | Facility: CLINIC | Age: 72
End: 2022-06-20
Payer: MEDICARE

## 2022-06-20 ENCOUNTER — OFFICE VISIT (OUTPATIENT)
Dept: PRIMARY CARE CLINIC | Facility: CLINIC | Age: 72
End: 2022-06-20
Payer: MEDICARE

## 2022-06-20 VITALS
BODY MASS INDEX: 25.37 KG/M2 | SYSTOLIC BLOOD PRESSURE: 120 MMHG | TEMPERATURE: 99 F | HEART RATE: 68 BPM | WEIGHT: 157.88 LBS | HEIGHT: 66 IN | DIASTOLIC BLOOD PRESSURE: 74 MMHG | OXYGEN SATURATION: 98 %

## 2022-06-20 DIAGNOSIS — U07.1 COVID-19: Primary | ICD-10-CM

## 2022-06-20 DIAGNOSIS — J06.9 UPPER RESPIRATORY TRACT INFECTION, UNSPECIFIED TYPE: ICD-10-CM

## 2022-06-20 LAB
CTP QC/QA: YES
CTP QC/QA: YES
S PYO RRNA THROAT QL PROBE: NEGATIVE
SARS-COV-2 RDRP RESP QL NAA+PROBE: POSITIVE

## 2022-06-20 PROCEDURE — 99999 PR PBB SHADOW E&M-EST. PATIENT-LVL V: ICD-10-PCS | Mod: PBBFAC,CR,, | Performed by: INTERNAL MEDICINE

## 2022-06-20 PROCEDURE — U0002 COVID-19 LAB TEST NON-CDC: HCPCS | Mod: PBBFAC,PN | Performed by: INTERNAL MEDICINE

## 2022-06-20 PROCEDURE — 99214 PR OFFICE/OUTPT VISIT, EST, LEVL IV, 30-39 MIN: ICD-10-PCS | Mod: S$PBB,CR,, | Performed by: INTERNAL MEDICINE

## 2022-06-20 PROCEDURE — 87880 STREP A ASSAY W/OPTIC: CPT | Mod: PBBFAC,PN | Performed by: INTERNAL MEDICINE

## 2022-06-20 PROCEDURE — 99999 PR PBB SHADOW E&M-EST. PATIENT-LVL V: CPT | Mod: PBBFAC,CR,, | Performed by: INTERNAL MEDICINE

## 2022-06-20 PROCEDURE — 99215 OFFICE O/P EST HI 40 MIN: CPT | Mod: PBBFAC,PN | Performed by: INTERNAL MEDICINE

## 2022-06-20 PROCEDURE — 99214 OFFICE O/P EST MOD 30 MIN: CPT | Mod: S$PBB,CR,, | Performed by: INTERNAL MEDICINE

## 2022-06-20 RX ORDER — PREDNISONE 20 MG/1
20 TABLET ORAL DAILY
Qty: 5 TABLET | Refills: 0 | Status: SHIPPED | OUTPATIENT
Start: 2022-06-20 | End: 2022-08-04

## 2022-06-20 RX ORDER — BENZONATATE 200 MG/1
200 CAPSULE ORAL 3 TIMES DAILY PRN
Qty: 30 CAPSULE | Refills: 0 | Status: SHIPPED | OUTPATIENT
Start: 2022-06-20 | End: 2022-06-30

## 2022-06-20 NOTE — PATIENT INSTRUCTIONS
"New info from [facility] is that quarantine can end after 5 days from onset of symptoms (rather than 10-14 days) with no testing and if asymptomatic. Alternative is also that quarantine can end after 5 days from onset of symptoms if a negative test is obtained and pt is asymptomatic. If you decide to retest, make sure to make sure that you are not febrile for 24 hours prior.              An UPPER RESPIRATORY ILLNESS is initially caused by a virus or allergies 95% of the time. The goal to help you feel better is drying up the drainage & stopping the cough so the virus can run it's course in about 10 days. If your drainage becomes more thick and worse after 7-10 days of trying the below  over the counter medications, please make an appointment for further evaluation    If you have POST NASAL DRIP , "RUNNY NOSE" or SORE THROAT FROM CLEARING YOUR THROAT :  Take an ANTIHISTAMINE  (Claritin or Zyrtec or Allegra ) AT NIGHT    Nasal Saline: Tilt head back and squirt into nostril 2-3 times until you taste saline in back of throat. Spit, and blow nose. Do this 4 times, alternating right and left nostril.   Do this routine 2-3 times per day.     NASAL SPRAY - Ipratropium , Nasacort (nasal steroid) or Flonase (nasal steroid)  Twice daily to decrease swelling & post nasal drip ------ very safe , works where the congestion is , & does not interfere with other medication or go through your blood stream. Consider prescription Ipratropium  Bromide nasal 0.03%    If you STILL HAVE DRAINAGE or EAR POPPING/ PRESSURE/ DECREASED HEARING  You can add a DECONGESTANT like Sudafed (if it doesn't cause palpitations, do NOT take this is you have high blood pressure or atrial fibrillation) or Sudafed PE  In the MORNING.     If you have THICK MUCOUS DRAINAGE from the NOSE or COUGHING UP THICK PHLEGM:  You can add a MUCOLYTIC like MUCINEX plain (guaifenesin) 1200mg twice a day    If your COUGH PERSISTS:  Nasal Spray - Ipratropium 4 times a " "day  Acetylcysteine reduces cough  You can add a COUGH SUPPRESSANT like DELSYM (dextromethorphan) twice a day    If you have PAIN, SORE THROAT, FEVER OR CHILLS:  You can ALTERNATE 250- 500 mg of  TYLENOL with  200-400 mg of IBUPROFEN every 3 hours - for the next 3 days  Discontinue and call me if  you have stomach upset    Zinc acetate or gluconate 80-92mg daily  helps with general symptoms    Lactobacillus Casei 200g daily fermented dairy product containing L. Casei  - lessened length of cold symptoms    For SORE THROAT , try: Gargle with warm salt water 2-3 times per day.     Drink lots of WATER until your urine is clear because all of the above medications can "dry you out" and cause constipation.    "

## 2022-06-20 NOTE — TELEPHONE ENCOUNTER
Please call to let pt know that she incorrectly scheduled an appt w/ me at 11am today. Please assist w/ rescheduling. appt will be canceled.  
Spoke with pt. Explained that Dr Sariah Evans is in [facility], and is only seeing her patients right now. Offered to make appt for her, she is having a bit of cough / congestion. Would like to see provider. Made appt for Dr Henderson at [facility].   
Moni Nugent)  Geriatric Medicine; Internal Medicine  [address] [place] [address]  Phone: [phone]  Fax: [phone]  Follow Up Time: 2 weeks

## 2022-06-20 NOTE — PROGRESS NOTES
"Subjective:      Patient ID: Gely Green is a 71 y.o. female.    Chief Complaint: Cough, Headache, and Chest Congestion    Here today for general exam.     URI sx: Since last night. Coughing, no sore throat. No fevers. She wanted to get seen as she felt that this is not her baseline. No sick contacts around her. Has not tried anything to help with sx. POCT COVID positive. POCT Strep negative.     Denies any chest pain, shortness of breath, nausea vomiting constipation diarrhea, blood in stool, heartburn    Review of Systems   Constitutional: Positive for chills and malaise/fatigue. Negative for diaphoresis, fever and weight loss.   HENT: Positive for congestion, sinus pain and sore throat. Negative for ear discharge, ear pain and tinnitus.    Eyes: Negative for pain and discharge.   Respiratory: Positive for cough and sputum production. Negative for shortness of breath, wheezing and stridor.    Cardiovascular: Negative for chest pain.   Gastrointestinal: Negative for abdominal pain, constipation, diarrhea, nausea and vomiting.   Genitourinary: Negative for frequency and urgency.   Musculoskeletal: Positive for myalgias.   Skin: Negative for itching and rash.   Neurological: Positive for headaches. Negative for dizziness, tingling, tremors and weakness.   Psychiatric/Behavioral: Negative for depression. The patient is not nervous/anxious and does not have insomnia.          Current Outpatient Medications:     BD ULTRA-FINE SHORT PEN NEEDLE 31 gauge x 5/16" Ndle, USE AS DIRECTED DAILY, Disp: , Rfl:     calcium carbonate (OS-REDD) 600 mg calcium (1,500 mg) Tab, Take 600 mg by mouth 2 (two) times daily with meals., Disp: , Rfl:     estradioL (ESTRACE) 0.01 % (0.1 mg/gram) vaginal cream, Place 1 g vaginally twice a week., Disp: 425 g, Rfl: 3    famotidine (PEPCID) 40 MG tablet, Take 40 mg by mouth every morning., Disp: , Rfl:     fluticasone propionate (FLONASE) 50 mcg/actuation nasal spray, 2 sprays (100 mcg " "total) by Each Nostril route once daily., Disp: 48 g, Rfl: 3    FREESTYLE SHANNON 2 SENSOR Kit, USE AS DIRECTED EVERY 2 WEEKS, Disp: , Rfl:     GVOKE HYPOPEN 2-PACK 1 mg/0.2 mL AtIn, , Disp: , Rfl:     hydrOXYzine pamoate (VISTARIL) 25 MG Cap, Take 1 capsule (25 mg total) by mouth nightly as needed (bladder pain)., Disp: 20 capsule, Rfl: 0    insulin glargine, TOUJEO, (TOUJEO SOLOSTAR U-300 INSULIN) 300 unit/mL (1.5 mL) InPn pen, Inject 12 Units into the skin every evening., Disp: 6 pen, Rfl: 2    irbesartan (AVAPRO) 300 MG tablet, Take 1 tablet (300 mg total) by mouth every evening., Disp: 90 tablet, Rfl: 3    lancets (ONETOUCH DELICA LANCETS) 33 gauge Misc, 1 lancet by Misc.(Non-Drug; Combo Route) route 3 (three) times daily., Disp: 300 each, Rfl: 3    metFORMIN (GLUCOPHAGE-XR) 500 MG ER 24hr tablet, Take 2 tablets (1,000 mg total) by mouth 2 (two) times a day., Disp: 360 tablet, Rfl: 4    methen-m.blue-s.phos-phsal-hyo (URIBEL) 118-10-40.8-36 mg Cap, Take by mouth., Disp: , Rfl:     ondansetron (ZOFRAN-ODT) 4 MG TbDL, Take 1 tablet (4 mg total) by mouth every 6 (six) hours as needed (Nausea and vomiting)., Disp: 20 tablet, Rfl: 0    ONETOUCH VERIO TEST STRIPS Strp, TEST THREE TIMES DAILY, Disp: 200 strip, Rfl: 11    pen needle, diabetic (NOVOFINE 32) 32 gauge x 1/4" Ndle, use subcutaneously every evening, Disp: 100 each, Rfl: 11    phenazopyridine (PYRIDIUM) 200 MG tablet, Take 200 mg by mouth 3 (three) times daily as needed for Pain., Disp: , Rfl:     rosuvastatin (CRESTOR) 10 MG tablet, TAKE 1 TABLET BY MOUTH EVERY DAY, Disp: 90 tablet, Rfl: 2    solifenacin (VESICARE) 5 MG tablet, Take by mouth once daily., Disp: , Rfl:     vitamin D 1000 units Tab, Take 1,000 Units by mouth once daily., Disp: , Rfl:     aspirin (ECOTRIN) 325 MG EC tablet, Take 1 tablet (325 mg total) by mouth once daily., Disp: 30 tablet, Rfl: 11    benzonatate (TESSALON) 200 MG capsule, Take 1 capsule (200 mg total) by mouth 3 " (three) times daily as needed for Cough., Disp: 30 capsule, Rfl: 0    levocetirizine (XYZAL) 5 MG tablet, Take 1 tablet (5 mg total) by mouth every evening., Disp: 30 tablet, Rfl: 11    predniSONE (DELTASONE) 20 MG tablet, Take 1 tablet (20 mg total) by mouth once daily., Disp: 5 tablet, Rfl: 0    Lab Results   Component Value Date    HGBA1C 7.2 (H) 2022    HGBA1C 6.9 (H) 10/22/2021    HGBA1C 6.2 (H) 2021    HGBA1C 6.2 (H) 2021     Lab Results   Component Value Date    MICALBCREAT 9.2 2022     Lab Results   Component Value Date    LDLCALC 92.8 2022    LDLCALC 81.0 2020    CHOL 167 2022    HDL 64 2022    TRIG 51 2022       Lab Results   Component Value Date     2022    K 4.3 2022     2022    CO2 24 2022     2022    BUN 13 2022    CREATININE 0.8 2022    CALCIUM 9.7 2022    PROT 7.7 2021    ALBUMIN 4.2 2021    BILITOT 0.4 2021    ALKPHOS 72 2021    AST 21 2021    ALT 15 2021    ANIONGAP 12 2022    ESTGFRAFRICA >60 2022    EGFRNONAA >60 2022    WBC 5.63 2021    HGB 12.2 2021    HGB 12.1 2021    HCT 38.8 2021    MCV 88 2021     2021    TSH 0.931 2021    HEPCAB Negative 2022       Lab Results   Component Value Date    WFWHNFJQ60LE 52 2020    RLZJZHNM73YM 38 2016    KONRPVRD47 477 2022    FERRITIN 48 2016    IRON 49 2016    TRANSFERRIN 273 2016    TIBC 404 2016    FESATURATED 12 (L) 2016         Past Medical History:   Diagnosis Date    Arthritis     Chronic constipation     Depression     Diabetes mellitus     Diabetes mellitus     Hypertension     Osteopenia      Past Surgical History:   Procedure Laterality Date     SECTION      2x    COLONOSCOPY      COLONOSCOPY N/A 3/21/2019    Procedure: COLONOSCOPY;  Surgeon: Marshall SWANN  "MD Diane;  Location: [facility] (4TH FLR);  Service: Endoscopy;  Laterality: N/A;  pt states that she had to be resuscitated after receiving an epidural during childbirth "30 something" years ago.  Ok for 4th floor per Dr. Hernandez-MS    EYE SURGERY Bilateral     cataract removal    HYSTERECTOMY      full hyst 1999    OOPHORECTOMY      TONSILLECTOMY       Social History     Social History Narrative    Not on file     Family History   Problem Relation Age of Onset    Breast cancer Maternal Cousin     Heart attack Mother     Heart disease Mother     Alzheimer's disease Mother     Heart attack Father     Heart disease Father     Heart failure Father     Hypertension Father     Hyperlipidemia Sister     Hypertension Sister     Diabetes Sister     Abnormal EKG Sister     Dementia Sister     Alcohol abuse Brother     Fibroids Daughter     Brain cancer Son     Early death Son 33    Ovarian cancer Neg Hx     Cervical cancer Neg Hx     Endometrial cancer Neg Hx     Vaginal cancer Neg Hx     Stroke Neg Hx     Colon cancer Neg Hx     Esophageal cancer Neg Hx     Vision loss Neg Hx      Vitals:    06/20/22 1133   BP: 120/74   Pulse: 68   Temp: 98.8 °F (37.1 °C)   SpO2: 98%   Weight: 71.6 kg (157 lb 13.6 oz)   Height: 5' 6" (1.676 m)   PainSc:   5     Objective:   Physical Exam  Constitutional:       General: She is not in acute distress.     Appearance: Normal appearance. She is not ill-appearing.   HENT:      Head: Normocephalic.      Right Ear: Tympanic membrane, ear canal and external ear normal.      Left Ear: Tympanic membrane, ear canal and external ear normal.      Nose: Congestion and rhinorrhea present.      Mouth/Throat:      Mouth: Mucous membranes are moist.      Pharynx: No oropharyngeal exudate or posterior oropharyngeal erythema.   Eyes:      Extraocular Movements: Extraocular movements intact.      Conjunctiva/sclera: Conjunctivae normal.      Pupils: Pupils are equal, round, and " "reactive to light.   Cardiovascular:      Rate and Rhythm: Normal rate.   Pulmonary:      Effort: Pulmonary effort is normal. No respiratory distress.      Breath sounds: Normal breath sounds. No wheezing.   Chest:      Chest wall: No tenderness.   Musculoskeletal:      Cervical back: Normal range of motion.   Neurological:      Mental Status: She is alert.       Assessment:     1. COVID-19    2. Upper respiratory tract infection, unspecified type      Plan:     Orders Placed This Encounter    benzonatate (TESSALON) 200 MG capsule    predniSONE (DELTASONE) 20 MG tablet       Patient Instructions   New info from [facility] is that quarantine can end after 5 days from onset of symptoms (rather than 10-14 days) with no testing and if asymptomatic. Alternative is also that quarantine can end after 5 days from onset of symptoms if a negative test is obtained and pt is asymptomatic. If you decide to retest, make sure to make sure that you are not febrile for 24 hours prior.              An UPPER RESPIRATORY ILLNESS is initially caused by a virus or allergies 95% of the time. The goal to help you feel better is drying up the drainage & stopping the cough so the virus can run it's course in about 10 days. If your drainage becomes more thick and worse after 7-10 days of trying the below  over the counter medications, please make an appointment for further evaluation    If you have POST NASAL DRIP , "RUNNY NOSE" or SORE THROAT FROM CLEARING YOUR THROAT :  Take an ANTIHISTAMINE  (Claritin or Zyrtec or Allegra ) AT NIGHT    Nasal Saline: Tilt head back and squirt into nostril 2-3 times until you taste saline in back of throat. Spit, and blow nose. Do this 4 times, alternating right and left nostril.   Do this routine 2-3 times per day.     NASAL SPRAY - Ipratropium , Nasacort (nasal steroid) or Flonase (nasal steroid)  Twice daily to decrease swelling & post nasal drip ------ very safe , works where the congestion is , & does not " "interfere with other medication or go through your blood stream. Consider prescription Ipratropium  Bromide nasal 0.03%    If you STILL HAVE DRAINAGE or EAR POPPING/ PRESSURE/ DECREASED HEARING  You can add a DECONGESTANT like Sudafed (if it doesn't cause palpitations, do NOT take this is you have high blood pressure or atrial fibrillation) or Sudafed PE  In the MORNING.     If you have THICK MUCOUS DRAINAGE from the NOSE or COUGHING UP THICK PHLEGM:  You can add a MUCOLYTIC like MUCINEX plain (guaifenesin) 1200mg twice a day    If your COUGH PERSISTS:  Nasal Spray - Ipratropium 4 times a day  Acetylcysteine reduces cough  You can add a COUGH SUPPRESSANT like DELSYM (dextromethorphan) twice a day    If you have PAIN, SORE THROAT, FEVER OR CHILLS:  You can ALTERNATE 250- 500 mg of  TYLENOL with  200-400 mg of IBUPROFEN every 3 hours - for the next 3 days  Discontinue and call me if  you have stomach upset    Zinc acetate or gluconate 80-92mg daily  helps with general symptoms    Lactobacillus Casei 200g daily fermented dairy product containing L. Casei  - lessened length of cold symptoms    For SORE THROAT , try: Gargle with warm salt water 2-3 times per day.     Drink lots of WATER until your urine is clear because all of the above medications can "dry you out" and cause constipation.                                "

## 2022-06-27 ENCOUNTER — PATIENT MESSAGE (OUTPATIENT)
Dept: INTERNAL MEDICINE | Facility: CLINIC | Age: 72
End: 2022-06-27
Payer: MEDICARE

## 2022-06-27 DIAGNOSIS — R14.2 BELCHING: Primary | ICD-10-CM

## 2022-06-28 ENCOUNTER — LAB VISIT (OUTPATIENT)
Dept: LAB | Facility: OTHER | Age: 72
End: 2022-06-28
Attending: INTERNAL MEDICINE
Payer: MEDICARE

## 2022-06-28 DIAGNOSIS — I15.2 HYPERTENSION ASSOCIATED WITH TYPE 2 DIABETES MELLITUS: ICD-10-CM

## 2022-06-28 DIAGNOSIS — E11.59 HYPERTENSION ASSOCIATED WITH TYPE 2 DIABETES MELLITUS: ICD-10-CM

## 2022-06-28 LAB
ANION GAP SERPL CALC-SCNC: 11 MMOL/L (ref 8–16)
BUN SERPL-MCNC: 12 MG/DL (ref 8–23)
CALCIUM SERPL-MCNC: 9.6 MG/DL (ref 8.7–10.5)
CHLORIDE SERPL-SCNC: 106 MMOL/L (ref 95–110)
CO2 SERPL-SCNC: 27 MMOL/L (ref 23–29)
CREAT SERPL-MCNC: 0.8 MG/DL (ref 0.5–1.4)
EST. GFR  (AFRICAN AMERICAN): >60 ML/MIN/1.73 M^2
EST. GFR  (NON AFRICAN AMERICAN): >60 ML/MIN/1.73 M^2
GLUCOSE SERPL-MCNC: 108 MG/DL (ref 70–110)
POTASSIUM SERPL-SCNC: 4.1 MMOL/L (ref 3.5–5.1)
SODIUM SERPL-SCNC: 144 MMOL/L (ref 136–145)

## 2022-06-28 PROCEDURE — 80048 BASIC METABOLIC PNL TOTAL CA: CPT | Performed by: INTERNAL MEDICINE

## 2022-06-28 PROCEDURE — 36415 COLL VENOUS BLD VENIPUNCTURE: CPT | Performed by: INTERNAL MEDICINE

## 2022-06-28 NOTE — TELEPHONE ENCOUNTER
She can schedule an appt with me if she has concerns about post COVID and we can evaluate and discuss.

## 2022-07-06 ENCOUNTER — OFFICE VISIT (OUTPATIENT)
Dept: CARDIOLOGY | Facility: CLINIC | Age: 72
End: 2022-07-06
Payer: MEDICARE

## 2022-07-06 VITALS
HEART RATE: 56 BPM | HEIGHT: 66 IN | BODY MASS INDEX: 24.94 KG/M2 | WEIGHT: 155.19 LBS | DIASTOLIC BLOOD PRESSURE: 80 MMHG | SYSTOLIC BLOOD PRESSURE: 129 MMHG

## 2022-07-06 DIAGNOSIS — R00.2 PALPITATIONS: Primary | ICD-10-CM

## 2022-07-06 DIAGNOSIS — R07.89 ATYPICAL CHEST PAIN: ICD-10-CM

## 2022-07-06 DIAGNOSIS — I10 ESSENTIAL HYPERTENSION: ICD-10-CM

## 2022-07-06 DIAGNOSIS — I47.19 ATRIAL TACHYCARDIA: ICD-10-CM

## 2022-07-06 PROCEDURE — 99214 PR OFFICE/OUTPT VISIT, EST, LEVL IV, 30-39 MIN: ICD-10-PCS | Mod: S$PBB,,, | Performed by: INTERNAL MEDICINE

## 2022-07-06 PROCEDURE — 99214 OFFICE O/P EST MOD 30 MIN: CPT | Mod: S$PBB,,, | Performed by: INTERNAL MEDICINE

## 2022-07-06 PROCEDURE — 99215 OFFICE O/P EST HI 40 MIN: CPT | Mod: PBBFAC,PN | Performed by: INTERNAL MEDICINE

## 2022-07-06 PROCEDURE — 99999 PR PBB SHADOW E&M-EST. PATIENT-LVL V: ICD-10-PCS | Mod: PBBFAC,,, | Performed by: INTERNAL MEDICINE

## 2022-07-06 PROCEDURE — 99999 PR PBB SHADOW E&M-EST. PATIENT-LVL V: CPT | Mod: PBBFAC,,, | Performed by: INTERNAL MEDICINE

## 2022-07-06 NOTE — PROGRESS NOTES
"[facility] [address]     SUBJECTIVE:     History of Present Illness:  Patient is a 71 y.o. female presents with palpitations and was seen by . In 2018, was seen and evaluated for rapid heart beat. No history of MI or heart failure.     Primary Diagnosis:   1. DM  2. Hypertension  3. SVT  ROS  Since last visit :   A. Has done well  B. No chest pains  C. No shortness of breath; no PND or orthopnea   D. Hears her heart beat in her ear and pounds it.   E. Blood pressures normal; heart rates are in the 60's   F. No dizziness, no syncope  G. Still active doing all her physical work without symptoms   Review of patient's allergies indicates:  No Known Allergies    Past Medical History:   Diagnosis Date    Arthritis     Chronic constipation     Depression     Diabetes mellitus     Diabetes mellitus     Hypertension     Osteopenia        Past Surgical History:   Procedure Laterality Date     SECTION      2x    COLONOSCOPY      COLONOSCOPY N/A 3/21/2019    Procedure: COLONOSCOPY;  Surgeon: Marshall Contreras MD;  Location: [facility] ([address] [place] [address]);  Service: Endoscopy;  Laterality: N/A;  pt states that she had to be resuscitated after receiving an epidural during childbirth "30 something" years ago.  Ok for 4th floor per Dr. Hernandez-MS    EYE SURGERY Bilateral     cataract removal    HYSTERECTOMY      full hyst     OOPHORECTOMY      TONSILLECTOMY         Family History   Problem Relation Age of Onset    Breast cancer Maternal Cousin     Heart attack Mother     Heart disease Mother     Alzheimer's disease Mother     Heart attack Father     Heart disease Father     Heart failure Father     Hypertension Father     Hyperlipidemia Sister     Hypertension Sister     Diabetes Sister     Abnormal EKG Sister     Dementia Sister     Alcohol abuse Brother     Fibroids Daughter     Brain cancer Son     Early death Son 33    Ovarian cancer Neg Hx     Cervical cancer Neg Hx     " "Endometrial cancer Neg Hx     Vaginal cancer Neg Hx     Stroke Neg Hx     Colon cancer Neg Hx     Esophageal cancer Neg Hx     Vision loss Neg Hx        Social History     Tobacco Use    Smoking status: Former Smoker     Packs/day: 1.50     Years: 25.00     Pack years: 37.50     Types: Cigarettes     Quit date:      Years since quittin.5    Smokeless tobacco: Never Used   Substance Use Topics    Alcohol use: No    Drug use: No        Past Hospitalization:     Home meds:  Current Outpatient Medications on File Prior to Visit   Medication Sig Dispense Refill    BD ULTRA-FINE SHORT PEN NEEDLE 31 gauge x 5/16" Ndle USE AS DIRECTED DAILY      calcium carbonate (OS-REDD) 600 mg calcium (1,500 mg) Tab Take 600 mg by mouth 2 (two) times daily with meals.      estradioL (ESTRACE) 0.01 % (0.1 mg/gram) vaginal cream Place 1 g vaginally twice a week. 425 g 3    famotidine (PEPCID) 40 MG tablet Take 40 mg by mouth every morning.      fluticasone propionate (FLONASE) 50 mcg/actuation nasal spray 2 sprays (100 mcg total) by Each Nostril route once daily. 48 g 3    FREESTYLE SHANNON 2 SENSOR Kit USE AS DIRECTED EVERY 2 WEEKS      GVOKE HYPOPEN 2-PACK 1 mg/0.2 mL AtIn       hydrOXYzine pamoate (VISTARIL) 25 MG Cap Take 1 capsule (25 mg total) by mouth nightly as needed (bladder pain). 20 capsule 0    insulin glargine, TOUJEO, (TOUJEO SOLOSTAR U-300 INSULIN) 300 unit/mL (1.5 mL) InPn pen Inject 12 Units into the skin every evening. 6 pen 2    irbesartan (AVAPRO) 300 MG tablet Take 1 tablet (300 mg total) by mouth every evening. 90 tablet 3    lancets (ONETOUCH DELICA LANCETS) 33 gauge Misc 1 lancet by Misc.(Non-Drug; Combo Route) route 3 (three) times daily. 300 each 3    metFORMIN (GLUCOPHAGE-XR) 500 MG ER 24hr tablet Take 2 tablets (1,000 mg total) by mouth 2 (two) times a day. 360 tablet 4    methen-m.blue-s.phos-phsal-hyo (URIBEL) 118-10-40.8-36 mg Cap Take by mouth.      ondansetron (ZOFRAN-ODT) 4 MG " "TbDL Take 1 tablet (4 mg total) by mouth every 6 (six) hours as needed (Nausea and vomiting). 20 tablet 0    ONETOUCH VERIO TEST STRIPS Strp TEST THREE TIMES DAILY 200 strip 11    pen needle, diabetic (NOVOFINE 32) 32 gauge x 1/4" Ndle use subcutaneously every evening 100 each 11    phenazopyridine (PYRIDIUM) 200 MG tablet Take 200 mg by mouth 3 (three) times daily as needed for Pain.      predniSONE (DELTASONE) 20 MG tablet Take 1 tablet (20 mg total) by mouth once daily. 5 tablet 0    rosuvastatin (CRESTOR) 10 MG tablet TAKE 1 TABLET BY MOUTH EVERY DAY 90 tablet 2    solifenacin (VESICARE) 5 MG tablet Take by mouth once daily.      vitamin D 1000 units Tab Take 1,000 Units by mouth once daily.      aspirin (ECOTRIN) 325 MG EC tablet Take 1 tablet (325 mg total) by mouth once daily. 30 tablet 11    levocetirizine (XYZAL) 5 MG tablet Take 1 tablet (5 mg total) by mouth every evening. 30 tablet 11     No current facility-administered medications on file prior to visit.       Cardiac meds:  Irbesartan 150 mg  Metformin  ASA 81 mg  Rosuvastatin 10 mg         OBJECTIVE:     Vital Signs (Most Recent)  Pulse: (!) 56 (07/06/22 1322)  BP: 129/80 (07/06/22 1322)      Physical Exam:    Neck: normal carotids, no bruits; normal JVP  Lungs :clear  Heart: RR, normal S1,S2, no murmurs, no gallops  Abd: no masses; no bruits;   Exts: normal DP and PT pulses bilaterally, no edema noted           LABS      CBC  Hemoglobin (g/dL)   Date Value   03/23/2021 12.2   03/23/2021 12.1   12/27/2020 11.7 (L)     Hematocrit (%)   Date Value   03/23/2021 38.8   03/23/2021 38.9   12/27/2020 37.4          BMP  Sodium (mmol/L)   Date Value   06/28/2022 144   05/12/2022 144   03/23/2021 143     Potassium (mmol/L)   Date Value   06/28/2022 4.1   05/12/2022 4.3   03/23/2021 4.0     CO2 (mmol/L)   Date Value   06/28/2022 27   05/12/2022 24   03/23/2021 27     Chloride (mmol/L)   Date Value   06/28/2022 106   05/12/2022 108   03/23/2021 105 "     BUN (mg/dL)   Date Value   06/28/2022 12   05/12/2022 13   03/23/2021 12     Creatinine (mg/dL)   Date Value   06/28/2022 0.8   05/12/2022 0.8   03/23/2021 0.8     Glucose (mg/dL)   Date Value   06/28/2022 108   05/12/2022 106   03/23/2021 110     eGFR if non African American (mL/min/1.73 m^2)   Date Value   06/28/2022 >60   05/12/2022 >60   03/23/2021 >60       BNP  No results found for: BNP    Troponin panel:   No results found for: TROPONIN      COAGS  INR (no units)   Date Value   12/28/2020 1.0   12/27/2020 1.0     aPTT (sec)   Date Value   12/28/2020 29.0         Lipid panel:    Lab Results   Component Value Date    CHOL 167 03/25/2022    CHOL 167 12/27/2020    CHOL 163 12/21/2020     Lab Results   Component Value Date    HDL 64 03/25/2022    HDL 73 12/27/2020    HDL 66 12/21/2020     Lab Results   Component Value Date    LDLCALC 92.8 03/25/2022    LDLCALC 81.0 12/27/2020    LDLCALC 89.2 12/21/2020     Lab Results   Component Value Date    TRIG 51 03/25/2022    TRIG 65 12/27/2020    TRIG 39 12/21/2020     Lab Results   Component Value Date    CHOLHDL 38.3 03/25/2022    CHOLHDL 43.7 12/27/2020    CHOLHDL 40.5 12/21/2020     3/22: A1c 7.2   Diagnostic Results:    30 day event monitor: negative and benign   Echo: 12/20: normal EF; normal diastology     Chart review:  Holter 2018: PVC's, PAC's ; one 8 beat run of atrial tachycardia   Stress echo: 2017: negative     ASSESSMENT/PLAN:     1. Palpitations: no correlation with studies done such as a 30 day event montior  2. Hypertension: controlled    Plan:stress echo to assess LV function and her concern of dying   Return 1 year     Geni Galvin MD

## 2022-07-11 ENCOUNTER — OFFICE VISIT (OUTPATIENT)
Dept: OTOLARYNGOLOGY | Facility: CLINIC | Age: 72
End: 2022-07-11
Payer: MEDICARE

## 2022-07-11 VITALS
DIASTOLIC BLOOD PRESSURE: 81 MMHG | SYSTOLIC BLOOD PRESSURE: 127 MMHG | BODY MASS INDEX: 25.35 KG/M2 | HEART RATE: 59 BPM | WEIGHT: 157.06 LBS

## 2022-07-11 DIAGNOSIS — H90.42 SENSORINEURAL HEARING LOSS (SNHL) OF LEFT EAR WITH UNRESTRICTED HEARING OF RIGHT EAR: Chronic | ICD-10-CM

## 2022-07-11 DIAGNOSIS — J30.89 NON-SEASONAL ALLERGIC RHINITIS, UNSPECIFIED TRIGGER: Primary | Chronic | ICD-10-CM

## 2022-07-11 DIAGNOSIS — R09.A2 GLOBUS SENSATION: ICD-10-CM

## 2022-07-11 PROCEDURE — 31575 LARYNGOSCOPY: ICD-10-PCS | Mod: S$PBB,,, | Performed by: OTOLARYNGOLOGY

## 2022-07-11 PROCEDURE — 99214 OFFICE O/P EST MOD 30 MIN: CPT | Mod: PBBFAC,PN | Performed by: OTOLARYNGOLOGY

## 2022-07-11 PROCEDURE — 99214 OFFICE O/P EST MOD 30 MIN: CPT | Mod: 25,S$PBB,, | Performed by: OTOLARYNGOLOGY

## 2022-07-11 PROCEDURE — 99999 PR PBB SHADOW E&M-EST. PATIENT-LVL IV: ICD-10-PCS | Mod: PBBFAC,,, | Performed by: OTOLARYNGOLOGY

## 2022-07-11 PROCEDURE — 31575 DIAGNOSTIC LARYNGOSCOPY: CPT | Mod: PBBFAC,PN | Performed by: OTOLARYNGOLOGY

## 2022-07-11 PROCEDURE — 99999 PR PBB SHADOW E&M-EST. PATIENT-LVL IV: CPT | Mod: PBBFAC,,, | Performed by: OTOLARYNGOLOGY

## 2022-07-11 PROCEDURE — 99214 PR OFFICE/OUTPT VISIT, EST, LEVL IV, 30-39 MIN: ICD-10-PCS | Mod: 25,S$PBB,, | Performed by: OTOLARYNGOLOGY

## 2022-07-11 RX ORDER — AZELASTINE 1 MG/ML
1 SPRAY, METERED NASAL 2 TIMES DAILY
Qty: 30 ML | Refills: 0 | Status: SHIPPED | OUTPATIENT
Start: 2022-07-11 | End: 2023-04-24

## 2022-07-11 NOTE — PROGRESS NOTES
Chief Complaint   Patient presents with    Follow-up     Improved since last visit    .    HPI:     Gely Green is a 71 y.o. female who presents for evaluation of 2 week history of right facial pain/pressure.  She states it is gradually worsening.  She localizes it to the right periorbital region.  She states that it radiates to the temporal region and down the back of her neck.  The pressure is constant but the pain will come and go.  She notes increased nasal congestion.  Denies rhinorrhea or postnasal drip.  Reports bilateral aural fullness and notes a pulsatile tinnitus bilaterally.  She has seen Dr. Carissa Fu in 2019 for similar issue with her ears.  A CT scan of the temporal bones with contrast was performed which was within normal limits.  For the pain and pressure she has tried over-the-counter Claritin and and over-the-counter cold and Sinus perforation without relief.          Interval HPI 2022:  Follow up visit. Reports that she has feeling of post-nasal drip. She feels nasal congestion is present and itnermittent. She feels her symptoms have been worse since summer began.  Remains on saline rinses and  Flonase.  Denies change in sense of smell or taste, maxillary tooth pain, or rhinorrhea or post-nasal drip. She does report that she has had indigestion daily. She is on Pepcid for GERD. She has upcoming GI appt with BRIDGER Winslow.         Past Medical History:   Diagnosis Date    Arthritis     Chronic constipation     Depression     Diabetes mellitus     Diabetes mellitus     Hypertension     Osteopenia      Social History     Socioeconomic History    Marital status:    Tobacco Use    Smoking status: Former Smoker     Packs/day: 1.50     Years: 25.00     Pack years: 37.50     Types: Cigarettes     Quit date:      Years since quittin.5    Smokeless tobacco: Never Used   Substance and Sexual Activity    Alcohol use: No    Drug use: No     "Sexual activity: Not Currently     Partners: Male     Birth control/protection: None     Past Surgical History:   Procedure Laterality Date     SECTION      2x    COLONOSCOPY      COLONOSCOPY N/A 3/21/2019    Procedure: COLONOSCOPY;  Surgeon: Marshall Contreras MD;  Location: [facility] ([address] [place] [address]);  Service: Endoscopy;  Laterality: N/A;  pt states that she had to be resuscitated after receiving an epidural during childbirth "30 something" years ago.  Ok for 4th floor per Dr. Hernandez-MS    EYE SURGERY Bilateral     cataract removal    HYSTERECTOMY      full hyst     OOPHORECTOMY      TONSILLECTOMY       Family History   Problem Relation Age of Onset    Breast cancer Maternal Cousin     Heart attack Mother     Heart disease Mother     Alzheimer's disease Mother     Heart attack Father     Heart disease Father     Heart failure Father     Hypertension Father     Hyperlipidemia Sister     Hypertension Sister     Diabetes Sister     Abnormal EKG Sister     Dementia Sister     Alcohol abuse Brother     Fibroids Daughter     Brain cancer Son     Early death Son 33    Ovarian cancer Neg Hx     Cervical cancer Neg Hx     Endometrial cancer Neg Hx     Vaginal cancer Neg Hx     Stroke Neg Hx     Colon cancer Neg Hx     Esophageal cancer Neg Hx     Vision loss Neg Hx            Review of Systems  General: negative for chills, fever or weight loss  Psychological: negative for mood changes or depression  Ophthalmic: negative for blurry vision, photophobia or eye pain  ENT: see HPI  Respiratory: no cough, shortness of breath, or wheezing  Cardiovascular: no chest pain or dyspnea on exertion  Gastrointestinal: no abdominal pain, change in bowel habits, or black/ bloody stools  Musculoskeletal: negative for gait disturbance or muscular weakness  Neurological: no syncope or seizures; no ataxia  Dermatological: negative for puritis,  rash and jaundice  Hematologic/lymphatic: no easy bruising, " no new lumps or bumps      Physical Exam:    Vitals:    07/11/22 0953   BP: 127/81   Pulse: (!) 59       Constitutional: Well appearing / communicating without difficutly.  NAD.  Eyes: EOM I Bilaterally  Head/Face: Normocephalic.  Negative paranasal sinus pressure/tenderness.  Salivary glands WNL.  House Brackmann I Bilaterally.    Right Ear: Auricle normal appearance. External Auditory Canal within normal limits,TM w/o masses/lesions/perforations. TM mobility noted.   Left Ear: Auricle normal appearance. External Auditory Canal WNL,TM w/o masses/lesions/perforations. TM mobility noted.  Nose: No gross nasal septal deviation. Inferior Turbinates 3+ bilaterally. No septal perforation. No masses/lesions. External nasal skin appears normal without masses/lesions.+ mucopurulence bilateral inferior meatus.   Oral Cavity: Gingiva/lips within normal limits.  Dentition/gingiva healthy appearing. Mucus membranes moist. Floor of mouth soft, no masses palpated. Oral Tongue mobile. Hard Palate appears normal.    Oropharynx: Base of tongue appears normal. No masses/lesions noted. Tonsillar fossa/pharyngeal wall without lesions. Posterior oropharynx WNL.  Soft palate without masses. Midline uvula.   Neck/Lymphatic: No LAD I-VI bilaterally.  No thyromegaly.  No masses noted on exam.      See separate procedure note for FFL.     Diagnostic studies:   CT temporal bones 3/12/2020: Bilateral temporal bone appears within normal limits.  No significant abnormality is identified.    CTA neck 8/6/2020:  CTA of the head neck shows no significant abnormality to correlate with patient's symptoms of tinnitus.  There is no evidence of high-grade stenosis, occlusion, aneurysm or vascular malformation.  Noted: mild mucosal thickening of the left maxillary sinus and ethmoid sinuses.     CT sinus 4/3/2021: images interpreted by me.   Aplastic frontal sinuses noted, normal variant.  There is mild mucosal thickening in the anterior ethmoidal air  cells. There is moderate mucosal thickening in the base of the left maxillary sinus measuring 1.6 cm (series 601, image 71). The right maxillary sinus is clear. The ostiomeatal openings are patent bilaterally.  No hyperostosis or osseous erosions.  The sphenoid sinuses are clear.  The nasal septum is mildly deviated to the left.  The inferior and middle nasal turbinates are symmetric in appearance. Extra paranasal soft tissues are unremarkable.     CT head 1/4/2022: images interpreted from ENT standpoint. Imaged paranasal sinuses are clear.     Assessment:    ICD-10-CM ICD-9-CM    1. Non-seasonal allergic rhinitis, unspecified trigger  J30.89 477.8    2. Sensorineural hearing loss (SNHL) of left ear with unrestricted hearing of right ear  H90.42 389.15      The primary encounter diagnosis was Non-seasonal allergic rhinitis, unspecified trigger. A diagnosis of Sensorineural hearing loss (SNHL) of left ear with unrestricted hearing of right ear was also pertinent to this visit.      Plan:  No orders of the defined types were placed in this encounter.    Continue Nasal saline rinses b.i.d.  Continue  Flonase 2 sprays per nostril daily  Start Astelin 1 spray per nostril BID  Discussed that the globus sensation appears to be more related to GERD/LPR. Recommend continue famotidine and keep GI appt.     Follow-up in  1 year    Savannah Rush MD

## 2022-07-11 NOTE — PROCEDURES
Laryngoscopy    Date/Time: 7/11/2022 10:00 AM  Performed by: Savannah Rush MD  Authorized by: Savannah Rush MD     Consent Done?:  Yes (Verbal)  Anesthesia:     Local anesthetic:  Lidocaine 2% and Cyril-Synephrine 1/2%  Laryngoscopy:     Areas examined:  Nasal cavities, nasopharynx, oropharynx, hypopharynx, larynx and vocal cords  Nose External:      No external nasal deformity  Nose Intranasal:      Mucosa no polyps     Mucosa ulcers not present     No mucosa lesions present     No septum gross deformity     Enlarged turbinates  Nasopharynx:      No mucosa lesions     Adenoids not present     Posterior choanae patent     Eustachian tube patent  Larynx/hypopharynx:      No epiglottis lesions     No epiglottis edema     No AE folds lesions     No vocal cord polyps     Equal and normal bilateral     No hypopharynx lesions     No piriform sinus pooling     No piriform sinus lesions     Post cricoid edema (mild)     Post cricoid erythema (mild)

## 2022-07-20 ENCOUNTER — HOSPITAL ENCOUNTER (OUTPATIENT)
Dept: CARDIOLOGY | Facility: HOSPITAL | Age: 72
Discharge: HOME | End: 2022-07-20
Attending: INTERNAL MEDICINE
Payer: MEDICARE

## 2022-07-20 VITALS — BODY MASS INDEX: 25.23 KG/M2 | WEIGHT: 157 LBS | HEIGHT: 66 IN

## 2022-07-20 DIAGNOSIS — I10 ESSENTIAL HYPERTENSION: ICD-10-CM

## 2022-07-20 DIAGNOSIS — R07.89 ATYPICAL CHEST PAIN: ICD-10-CM

## 2022-07-20 PROCEDURE — 93351 STRESS ECHO (CUPID ONLY): ICD-10-PCS | Mod: 26,,, | Performed by: INTERNAL MEDICINE

## 2022-07-20 PROCEDURE — 93351 STRESS TTE COMPLETE: CPT

## 2022-07-20 PROCEDURE — 93351 STRESS TTE COMPLETE: CPT | Mod: 26,,, | Performed by: INTERNAL MEDICINE

## 2022-07-21 LAB
BSA FOR ECHO PROCEDURE: 1.82 M2
CV STRESS BASE HR: 60 BPM
DIASTOLIC BLOOD PRESSURE: 58 MMHG
EJECTION FRACTION: 65 %
OHS CV CPX 1 MINUTE RECOVERY HEART RATE: 113 BPM
OHS CV CPX 85 PERCENT MAX PREDICTED HEART RATE MALE: 123
OHS CV CPX ESTIMATED METS: 11
OHS CV CPX MAX PREDICTED HEART RATE: 144
OHS CV CPX PATIENT IS FEMALE: 1
OHS CV CPX PATIENT IS MALE: 0
OHS CV CPX PEAK DIASTOLIC BLOOD PRESSURE: 100 MMHG
OHS CV CPX PEAK HEAR RATE: 164 BPM
OHS CV CPX PEAK RATE PRESSURE PRODUCT: NORMAL
OHS CV CPX PEAK SYSTOLIC BLOOD PRESSURE: 179 MMHG
OHS CV CPX PERCENT MAX PREDICTED HEART RATE ACHIEVED: 114
OHS CV CPX RATE PRESSURE PRODUCT PRESENTING: 7440
STRESS ECHO POST EXERCISE DUR MIN: 9 MINUTES
STRESS ECHO POST EXERCISE DUR SEC: 28 SECONDS
SYSTOLIC BLOOD PRESSURE: 124 MMHG

## 2022-07-22 ENCOUNTER — TELEPHONE (OUTPATIENT)
Dept: SPINE | Facility: CLINIC | Age: 72
End: 2022-07-22
Payer: MEDICARE

## 2022-07-22 NOTE — TELEPHONE ENCOUNTER
This message is for patient in regards to his/her appointment 07/25/22 at 03:45p   With Dr. Karey Arenas MD.      [facility] Policy: For the safety of all patients and staff members.     Patient Visitor policy: During this visit we're informing all patients that face mask are now optional, upon visit you have the options of requesting clinical staff to wear a mask for your protection and thiers.      If you have any questions or concerns please contact [phone]      Staff left a voicemail reminding pt of their appt

## 2022-08-04 ENCOUNTER — LAB VISIT (OUTPATIENT)
Dept: LAB | Facility: OTHER | Age: 72
End: 2022-08-04
Attending: INTERNAL MEDICINE
Payer: MEDICARE

## 2022-08-04 ENCOUNTER — OFFICE VISIT (OUTPATIENT)
Dept: INTERNAL MEDICINE | Facility: CLINIC | Age: 72
End: 2022-08-04
Attending: INTERNAL MEDICINE
Payer: MEDICARE

## 2022-08-04 ENCOUNTER — TELEPHONE (OUTPATIENT)
Dept: INTERNAL MEDICINE | Facility: CLINIC | Age: 72
End: 2022-08-04
Payer: MEDICARE

## 2022-08-04 VITALS
SYSTOLIC BLOOD PRESSURE: 138 MMHG | HEART RATE: 57 BPM | HEIGHT: 66 IN | BODY MASS INDEX: 25.26 KG/M2 | OXYGEN SATURATION: 98 % | DIASTOLIC BLOOD PRESSURE: 84 MMHG | WEIGHT: 157.19 LBS

## 2022-08-04 DIAGNOSIS — I15.2 HYPERTENSION ASSOCIATED WITH TYPE 2 DIABETES MELLITUS: ICD-10-CM

## 2022-08-04 DIAGNOSIS — M85.80 OSTEOPENIA, UNSPECIFIED LOCATION: ICD-10-CM

## 2022-08-04 DIAGNOSIS — E11.9 TYPE 2 DIABETES MELLITUS WITHOUT COMPLICATION, WITH LONG-TERM CURRENT USE OF INSULIN: Primary | ICD-10-CM

## 2022-08-04 DIAGNOSIS — Z79.4 TYPE 2 DIABETES MELLITUS WITHOUT COMPLICATION, WITH LONG-TERM CURRENT USE OF INSULIN: Primary | ICD-10-CM

## 2022-08-04 DIAGNOSIS — E11.9 TYPE 2 DIABETES MELLITUS WITHOUT COMPLICATION, WITH LONG-TERM CURRENT USE OF INSULIN: ICD-10-CM

## 2022-08-04 DIAGNOSIS — E11.69 HYPERLIPIDEMIA ASSOCIATED WITH TYPE 2 DIABETES MELLITUS: ICD-10-CM

## 2022-08-04 DIAGNOSIS — E78.5 HYPERLIPIDEMIA ASSOCIATED WITH TYPE 2 DIABETES MELLITUS: ICD-10-CM

## 2022-08-04 DIAGNOSIS — Z79.4 TYPE 2 DIABETES MELLITUS WITHOUT COMPLICATION, WITH LONG-TERM CURRENT USE OF INSULIN: ICD-10-CM

## 2022-08-04 DIAGNOSIS — E11.59 HYPERTENSION ASSOCIATED WITH TYPE 2 DIABETES MELLITUS: ICD-10-CM

## 2022-08-04 LAB
25(OH)D3+25(OH)D2 SERPL-MCNC: 43 NG/ML (ref 30–96)
ESTIMATED AVG GLUCOSE: 154 MG/DL (ref 68–131)
HBA1C MFR BLD: 7 % (ref 4–5.6)

## 2022-08-04 PROCEDURE — 82306 VITAMIN D 25 HYDROXY: CPT | Performed by: INTERNAL MEDICINE

## 2022-08-04 PROCEDURE — 99214 PR OFFICE/OUTPT VISIT, EST, LEVL IV, 30-39 MIN: ICD-10-PCS | Mod: S$PBB,,, | Performed by: INTERNAL MEDICINE

## 2022-08-04 PROCEDURE — 99214 OFFICE O/P EST MOD 30 MIN: CPT | Mod: PBBFAC | Performed by: INTERNAL MEDICINE

## 2022-08-04 PROCEDURE — 99214 OFFICE O/P EST MOD 30 MIN: CPT | Mod: S$PBB,,, | Performed by: INTERNAL MEDICINE

## 2022-08-04 PROCEDURE — 99999 PR PBB SHADOW E&M-EST. PATIENT-LVL IV: ICD-10-PCS | Mod: PBBFAC,,, | Performed by: INTERNAL MEDICINE

## 2022-08-04 PROCEDURE — 36415 COLL VENOUS BLD VENIPUNCTURE: CPT | Performed by: INTERNAL MEDICINE

## 2022-08-04 PROCEDURE — 99999 PR PBB SHADOW E&M-EST. PATIENT-LVL IV: CPT | Mod: PBBFAC,,, | Performed by: INTERNAL MEDICINE

## 2022-08-04 PROCEDURE — 83036 HEMOGLOBIN GLYCOSYLATED A1C: CPT | Performed by: INTERNAL MEDICINE

## 2022-08-04 NOTE — TELEPHONE ENCOUNTER
Faxed LAURA to Dr. Derek Russo to [phone]# /office# [phone] for opthalmology records. I  Have attached a confirmation sheet to this fax located in my file cabinet.

## 2022-08-04 NOTE — PROGRESS NOTES
Subjective:       Patient ID: Gely Green is a 71 y.o. female.    Chief Complaint: leg cramps and Foot Pain    Here for routine f/u        COVID 19 infection 05/2022. Was having charlies horses immediately after but this has resolved. Has not been walking or swimming for fear they will return.  Patient presents today for routine evaluation, physical, and labs. Patient has no major concerns or complaints today.          #### palpitations ####  04/06 to 05/05/2021 Event monitor.  The predominant rhythm was sinus.  Heart rates  beats per minute.  There were 13 transmitted events.  The 2 symptomatic transmitted events which were done for an unknown symptom.  Each of these showed sinus rhythm at 80-90 beats per minute.  The automatic tracings all showed sinus rhythm, frequently with premature ventricular contractions.  Heart rates  beats per minute.Impression:  Sinus rhythm with single PVCs.  5/12/22 When she lays on her left side in bed she still feels her heart beat. Denies CP at rest or with exertion, dizziness with exertion, shortness of breath out of proportion to level of activity, orthopnea, PND, or LE edema.       ### hip pain ###  Trochanteric bursitis so far responding well to PT. Patient would prefer to avoid any injections at this time and stick to conservative management.       ### DM ###  Endocrine at [facility]. Was having lows so her night time was decreased to 10 units.  Eye MD Russo  She likes cakes and confections       HGBA1C                   7.2 (H)             03/25/2022 01:02 PM        HGBA1C                   6.9 (H)             10/22/2021 12:33 PM        HGBA1C                   6.2 (H)             03/23/2021 08:40 AM        HGBA1C                   6.2 (H)             03/23/2021 08:40 AM        HGBA1C                   6.3 (H)             12/21/2020 03:00 PM        HGBA1C                   6.1 (H)             06/02/2020 09:43 AM        HGBA1C                   6.4 (H)           "   11/18/2019 08:45 AM        HGBA1C                   7.1 (H)             06/19/2019 02:30 PM        HGBA1C                   6.8 (H)             12/31/2018 01:48 PM        HGBA1C                   6.3 (H)             05/01/2018 10:49 AM   5/12/22 Reports having eye exam one month prior  8/4/22 NTR        ### IC ###  Ubrel and urispas and instillations with urologist     ### Facial numbness ###  12/27/20 developed acute onset of numbness of top lip and left perioral with radiation to left maxillary region and left pre auricular. She has a left sided neck discomfort that is different in quality and behaves separately. Her left face and lip numbness. [facility] and had normal CT head. MRI brain 12/2/20 "Scattered foci of T2/FLAIR signal hyperintensity in supratentorial white matter. While nonspecific, findings likely relate to sequela of chronic microvascular ischemic change although findings can be seen in the setting of migraine headaches and as the residua from inflammatory and traumatic insults to the brain. Clinical correlation is advised" MRA neck and brain WNL.   Had subsequent U/S as outpt despite normal MRA neck that was also normal. No pain. Hx of left hand numbness with normal EMG 2017.    -Neuro Dr Torres writes "Without involvement of the teeth or gums this seems to be less likely related to a central cause.  Bertha allergies not completely clear.  Patient should however have treatment as if this is a TIA and will be given high dose aspirin for secondary prevention."           Review of Systems   Constitutional: Positive for unexpected weight change. Negative for activity change.   HENT: Negative for hearing loss, rhinorrhea and trouble swallowing.    Eyes: Negative for discharge and visual disturbance.   Respiratory: Negative for chest tightness and wheezing.    Cardiovascular: Positive for palpitations. Negative for chest pain.   Gastrointestinal: Positive for constipation. Negative for " "blood in stool, diarrhea and vomiting.   Endocrine: Negative for polydipsia and polyuria.   Genitourinary: Negative for difficulty urinating, dysuria, hematuria and menstrual problem.   Musculoskeletal: Negative for arthralgias, joint swelling and neck pain.   Neurological: Negative for weakness and headaches.   Psychiatric/Behavioral: Negative for confusion and dysphoric mood.       Objective:      Vitals:    08/04/22 1048   BP: 138/84   BP Location: Left arm   Patient Position: Sitting   Pulse: (!) 57   SpO2: 98%   Weight: 71.3 kg (157 lb 3 oz)   Height: 5' 6" (1.676 m)      Physical Exam  Constitutional:       General: She is not in acute distress.     Appearance: She is well-developed.   HENT:      Head: Normocephalic and atraumatic.      Mouth/Throat:      Pharynx: No oropharyngeal exudate.   Eyes:      General: No scleral icterus.     Conjunctiva/sclera: Conjunctivae normal.      Pupils: Pupils are equal, round, and reactive to light.   Neck:      Thyroid: No thyromegaly.   Cardiovascular:      Rate and Rhythm: Normal rate and regular rhythm.      Pulses:           Dorsalis pedis pulses are 2+ on the right side and 2+ on the left side.      Heart sounds: Normal heart sounds. No murmur heard.  Pulmonary:      Effort: Pulmonary effort is normal.      Breath sounds: Normal breath sounds. No wheezing or rales.   Abdominal:      General: There is no distension.      Palpations: Abdomen is soft.      Tenderness: There is no abdominal tenderness.   Musculoskeletal:         General: No tenderness.   Feet:      Right foot:      Protective Sensation: 10 sites tested. 10 sites sensed.      Skin integrity: No ulcer, skin breakdown, callus or dry skin.      Left foot:      Protective Sensation: 10 sites tested. 10 sites sensed.      Skin integrity: No ulcer, skin breakdown, callus or dry skin.   Lymphadenopathy:      Cervical: No cervical adenopathy.   Skin:     General: Skin is warm and dry.   Neurological:      Mental " Status: She is alert and oriented to person, place, and time.   Psychiatric:         Behavior: Behavior normal.         Assessment:       1. Type 2 diabetes mellitus without complication, with long-term current use of insulin    2. Osteopenia, unspecified location    3. Hypertension associated with type 2 diabetes mellitus    4. Hyperlipidemia associated with type 2 diabetes mellitus        Plan:       Gely was seen today for leg cramps and foot pain.    Diagnoses and all orders for this visit:    Type 2 diabetes mellitus without complication, with long-term current use of insulin  -     Hemoglobin A1C; Future    Osteopenia, unspecified location  -     Vitamin D; Future    Hypertension associated with type 2 diabetes mellitus   Managed by digital HTN and today is an outlier    Hyperlipidemia associated with type 2 diabetes mellitus           Yoseph Mercado MD  Internal Medicine-[facility]        Side effects of medication(s) were discussed in detail and patient voiced understanding.  Patient will call back for any issues or complications.

## 2022-08-17 ENCOUNTER — TELEPHONE (OUTPATIENT)
Dept: GASTROENTEROLOGY | Facility: CLINIC | Age: 72
End: 2022-08-17
Payer: MEDICARE

## 2022-08-17 ENCOUNTER — OFFICE VISIT (OUTPATIENT)
Dept: GASTROENTEROLOGY | Facility: CLINIC | Age: 72
End: 2022-08-17
Payer: MEDICARE

## 2022-08-17 DIAGNOSIS — R14.2 BELCHING: ICD-10-CM

## 2022-08-17 PROCEDURE — 99499 NO LOS: ICD-10-PCS | Mod: S$PBB,,, | Performed by: NURSE PRACTITIONER

## 2022-08-17 PROCEDURE — 99499 UNLISTED E&M SERVICE: CPT | Mod: S$PBB,,, | Performed by: NURSE PRACTITIONER

## 2022-08-17 NOTE — TELEPHONE ENCOUNTER
Called patient regarding her appointment for today do to she was seen by [facility] regarding. She didn't answer left voice mail.

## 2022-08-17 NOTE — PROGRESS NOTES
Patient not seen. Appointment was for second opinion and patient preferred to be seen by doctor.

## 2022-08-17 NOTE — TELEPHONE ENCOUNTER
----- Message from Tiffany Winslow NP sent at 8/17/2022  8:17 AM CDT -----  Regarding: second opinion  Seen by Reji LOPEZ last year for same problem

## 2022-08-31 DIAGNOSIS — Z78.0 MENOPAUSE: ICD-10-CM

## 2022-09-06 ENCOUNTER — TELEPHONE (OUTPATIENT)
Dept: INTERNAL MEDICINE | Facility: CLINIC | Age: 72
End: 2022-09-06
Payer: MEDICARE

## 2022-09-06 DIAGNOSIS — Z12.31 BREAST CANCER SCREENING BY MAMMOGRAM: Primary | ICD-10-CM

## 2022-09-06 NOTE — TELEPHONE ENCOUNTER
----- Message from TamikainDegree sent at 9/6/2022 10:24 AM CDT -----  Regarding: Pt called to request an order for her annual mammogram due to receiving a message via [facility] and pt would like a call back once the order is ready for scheduling  Patient Advice:    Pt called to request an order for her annual mammogram due to receiving a message via [facility] and pt would like a call back once the order is ready for scheduling    Pt can be reached at [phone]

## 2022-09-06 NOTE — TELEPHONE ENCOUNTER
Patient has been overdue for mammogram per health [facility] since 8/19/2022. Mammogram has been ordered and signed per written order guidelines. Patient was given the number to call to scheduled mammogram appointment. Thanks.

## 2022-09-12 ENCOUNTER — PATIENT MESSAGE (OUTPATIENT)
Dept: INTERNAL MEDICINE | Facility: CLINIC | Age: 72
End: 2022-09-12
Payer: MEDICARE

## 2022-09-12 DIAGNOSIS — F33.0 MILD EPISODE OF RECURRENT MAJOR DEPRESSIVE DISORDER: ICD-10-CM

## 2022-09-12 DIAGNOSIS — F43.23 ADJUSTMENT DISORDER WITH MIXED ANXIETY AND DEPRESSED MOOD: Primary | ICD-10-CM

## 2022-09-12 NOTE — LETTER
September 13, 2022      [facility]  [address]  [facility] [address]  Phone: [phone]  Fax: [phone]       Dear Gely Green,    Our office has been unsuccessful in our attempts to contact you via telephone or [facility] account.  on  You may reach our office at [phone] or contact us via [facility]. Thanks you for you Prompt attention to this matter.    Alicia

## 2022-09-13 NOTE — TELEPHONE ENCOUNTER
LVM for patient to return call to office. Please see message below for regards. Thanks.     Please schedule patient an appointment for paperwork completion. Thanks.

## 2022-09-13 NOTE — TELEPHONE ENCOUNTER
----- Message from Austen Smith sent at 9/13/2022 11:10 AM CDT -----  Pt would like to be called back asap regarding paperwork needing to be signed consenting to travel.     Pt can be reached at [phone].    Thanks

## 2022-09-14 ENCOUNTER — PATIENT MESSAGE (OUTPATIENT)
Dept: INTERNAL MEDICINE | Facility: CLINIC | Age: 72
End: 2022-09-14
Payer: MEDICARE

## 2022-09-19 ENCOUNTER — OFFICE VISIT (OUTPATIENT)
Dept: INTERNAL MEDICINE | Facility: CLINIC | Age: 72
End: 2022-09-19
Attending: INTERNAL MEDICINE
Payer: MEDICARE

## 2022-09-19 VITALS
BODY MASS INDEX: 24.91 KG/M2 | SYSTOLIC BLOOD PRESSURE: 130 MMHG | HEIGHT: 66 IN | WEIGHT: 155 LBS | OXYGEN SATURATION: 98 % | DIASTOLIC BLOOD PRESSURE: 76 MMHG | HEART RATE: 62 BPM

## 2022-09-19 DIAGNOSIS — Z79.4 TYPE 2 DIABETES MELLITUS WITHOUT COMPLICATION, WITH LONG-TERM CURRENT USE OF INSULIN: Primary | ICD-10-CM

## 2022-09-19 DIAGNOSIS — E11.9 TYPE 2 DIABETES MELLITUS WITHOUT COMPLICATION, WITH LONG-TERM CURRENT USE OF INSULIN: Primary | ICD-10-CM

## 2022-09-19 PROCEDURE — 99999 PR PBB SHADOW E&M-EST. PATIENT-LVL III: ICD-10-PCS | Mod: PBBFAC,,, | Performed by: INTERNAL MEDICINE

## 2022-09-19 PROCEDURE — 99213 PR OFFICE/OUTPT VISIT, EST, LEVL III, 20-29 MIN: ICD-10-PCS | Mod: S$PBB,,, | Performed by: INTERNAL MEDICINE

## 2022-09-19 PROCEDURE — 99213 OFFICE O/P EST LOW 20 MIN: CPT | Mod: S$PBB,,, | Performed by: INTERNAL MEDICINE

## 2022-09-19 PROCEDURE — 99213 OFFICE O/P EST LOW 20 MIN: CPT | Mod: PBBFAC | Performed by: INTERNAL MEDICINE

## 2022-09-19 PROCEDURE — 99999 PR PBB SHADOW E&M-EST. PATIENT-LVL III: CPT | Mod: PBBFAC,,, | Performed by: INTERNAL MEDICINE

## 2022-09-19 NOTE — PROGRESS NOTES
"Subjective:       Patient ID: Gely Green is a 71 y.o. female.    Chief Complaint: Weight Loss (Paper work)    Here for urgent visit    Needs paperwork completed for travel with [employer]. Hx of DM. Well controlled. No recent highs or lows.           Review of Systems   Constitutional:  Negative for chills, fatigue, fever and unexpected weight change.   HENT:  Negative for ear pain, hearing loss, postnasal drip, tinnitus, trouble swallowing and voice change.    Respiratory:  Negative for cough, chest tightness, shortness of breath and wheezing.    Cardiovascular:  Negative for chest pain, palpitations and leg swelling.   Gastrointestinal:  Negative for abdominal pain, blood in stool, diarrhea, nausea and vomiting.   Endocrine: Negative for polydipsia, polyphagia and polyuria.   Genitourinary:  Negative for difficulty urinating, dysuria, hematuria and vaginal bleeding.   Skin:  Negative for rash.   Allergic/Immunologic: Negative for food allergies.   Neurological:  Negative for dizziness, numbness and headaches.   Hematological:  Does not bruise/bleed easily.   Psychiatric/Behavioral:  The patient is not nervous/anxious.      Objective:      Vitals:    09/19/22 1528   BP: 130/76   BP Location: Left arm   Pulse: 62   SpO2: 98%   Weight: 70.3 kg (154 lb 15.7 oz)   Height: 5' 6" (1.676 m)      Physical Exam  Vitals and nursing note reviewed.   Constitutional:       General: She is not in acute distress.     Appearance: Normal appearance. She is well-developed.   HENT:      Head: Normocephalic and atraumatic.      Mouth/Throat:      Pharynx: No oropharyngeal exudate.   Eyes:      General: No scleral icterus.     Conjunctiva/sclera: Conjunctivae normal.      Pupils: Pupils are equal, round, and reactive to light.   Neck:      Thyroid: No thyromegaly.   Cardiovascular:      Rate and Rhythm: Normal rate and regular rhythm.      Heart sounds: Normal heart sounds. No murmur heard.  Pulmonary:      Effort: " Pulmonary effort is normal.      Breath sounds: Normal breath sounds. No wheezing or rales.   Abdominal:      General: There is no distension.   Musculoskeletal:         General: No tenderness.   Lymphadenopathy:      Cervical: No cervical adenopathy.   Skin:     General: Skin is warm and dry.   Neurological:      Mental Status: She is alert and oriented to person, place, and time.   Psychiatric:         Behavior: Behavior normal.       Assessment:       1. Type 2 diabetes mellitus without complication, with long-term current use of insulin          Plan:       Gely was seen today for weight loss.    Diagnoses and all orders for this visit:    Type 2 diabetes mellitus without complication, with long-term current use of insulin     Excellent! Your diabetes is controlled, confirmed by a hemoglobin A1c less than 7%. Congratulations on your hard work. That being said the overall goal of diabetic treatment is to get your A1c as low as possible without hurting you. Well done and keep going. Slow and steady.       Yoseph Mercado MD  Internal Medicine-[facility]        Side effects of medication(s) were discussed in detail and patient voiced understanding.  Patient will call back for any issues or complications.

## 2022-09-21 ENCOUNTER — HOSPITAL ENCOUNTER (OUTPATIENT)
Dept: RADIOLOGY | Facility: OTHER | Age: 72
Discharge: HOME | End: 2022-09-21
Attending: INTERNAL MEDICINE
Payer: MEDICARE

## 2022-09-21 DIAGNOSIS — Z78.0 MENOPAUSE: ICD-10-CM

## 2022-09-21 PROCEDURE — 77080 DEXA BONE DENSITY SPINE HIP: ICD-10-PCS | Mod: 26,,, | Performed by: RADIOLOGY

## 2022-09-21 PROCEDURE — 77080 DXA BONE DENSITY AXIAL: CPT | Mod: TC

## 2022-09-21 PROCEDURE — 77080 DXA BONE DENSITY AXIAL: CPT | Mod: 26,,, | Performed by: RADIOLOGY

## 2022-09-21 NOTE — PROGRESS NOTES
Your DXA, or bone scan, is consistent with osteopenia.  This means the density of your bones is mildly lower than normal.  You do not have osteoporosis, which is more severe.    I recommend taking over the counter calcium and vitamin D, such as citracal-D or os-merrill-D, one tablet daily. I also recommend weight bearing exercises, for example resistance training, jogging, jumping, and/or walking, for 30 minutes a day at least 3 days a week. Also if you are a smoker, continuing to smoke will only accelerate your bone loss.

## 2022-09-26 ENCOUNTER — HOSPITAL ENCOUNTER (OUTPATIENT)
Dept: RADIOLOGY | Facility: HOSPITAL | Age: 72
Discharge: HOME | End: 2022-09-26
Attending: INTERNAL MEDICINE
Payer: MEDICARE

## 2022-09-26 VITALS — HEIGHT: 67 IN | BODY MASS INDEX: 24.17 KG/M2 | WEIGHT: 154 LBS

## 2022-09-26 DIAGNOSIS — Z12.31 BREAST CANCER SCREENING BY MAMMOGRAM: ICD-10-CM

## 2022-09-26 PROCEDURE — 77063 BREAST TOMOSYNTHESIS BI: CPT | Mod: 26,,, | Performed by: RADIOLOGY

## 2022-09-26 PROCEDURE — 77063 MAMMO DIGITAL SCREENING BILAT WITH TOMO: ICD-10-PCS | Mod: 26,,, | Performed by: RADIOLOGY

## 2022-09-26 PROCEDURE — 77067 MAMMO DIGITAL SCREENING BILAT WITH TOMO: ICD-10-PCS | Mod: 26,,, | Performed by: RADIOLOGY

## 2022-09-26 PROCEDURE — 77067 SCR MAMMO BI INCL CAD: CPT | Mod: TC

## 2022-09-26 PROCEDURE — 77067 SCR MAMMO BI INCL CAD: CPT | Mod: 26,,, | Performed by: RADIOLOGY

## 2022-09-29 ENCOUNTER — IMMUNIZATION (OUTPATIENT)
Dept: INTERNAL MEDICINE | Facility: CLINIC | Age: 72
End: 2022-09-29
Payer: MEDICARE

## 2022-09-29 DIAGNOSIS — Z23 NEED FOR VACCINATION: Primary | ICD-10-CM

## 2022-09-29 PROCEDURE — 91312 COVID-19, MRNA, LNP-S, BIVALENT BOOSTER, PF, 30 MCG/0.3 ML DOSE: CPT | Mod: PBBFAC

## 2022-09-29 PROCEDURE — 0124A COVID-19, MRNA, LNP-S, BIVALENT BOOSTER, PF, 30 MCG/0.3 ML DOSE: CPT | Mod: PBBFAC

## 2022-10-05 ENCOUNTER — PATIENT MESSAGE (OUTPATIENT)
Dept: BEHAVIORAL HEALTH | Facility: OTHER | Age: 72
End: 2022-10-05
Payer: MEDICARE

## 2022-10-20 ENCOUNTER — PATIENT MESSAGE (OUTPATIENT)
Dept: BEHAVIORAL HEALTH | Facility: OTHER | Age: 72
End: 2022-10-20
Payer: MEDICARE

## 2022-10-20 ENCOUNTER — IMMUNIZATION (OUTPATIENT)
Dept: PHARMACY | Facility: CLINIC | Age: 72
End: 2022-10-20
Payer: MEDICARE

## 2022-10-25 ENCOUNTER — PES CALL (OUTPATIENT)
Dept: ADMINISTRATIVE | Facility: CLINIC | Age: 72
End: 2022-10-25
Payer: MEDICARE

## 2022-10-31 ENCOUNTER — OFFICE VISIT (OUTPATIENT)
Dept: PODIATRY | Facility: CLINIC | Age: 72
End: 2022-10-31
Payer: MEDICARE

## 2022-10-31 VITALS
HEIGHT: 67 IN | WEIGHT: 154 LBS | DIASTOLIC BLOOD PRESSURE: 78 MMHG | SYSTOLIC BLOOD PRESSURE: 150 MMHG | HEART RATE: 67 BPM | BODY MASS INDEX: 24.17 KG/M2

## 2022-10-31 DIAGNOSIS — E11.9 TYPE 2 DIABETES MELLITUS WITHOUT COMPLICATION, WITH LONG-TERM CURRENT USE OF INSULIN: ICD-10-CM

## 2022-10-31 DIAGNOSIS — E11.9 ENCOUNTER FOR DIABETIC FOOT EXAM: ICD-10-CM

## 2022-10-31 DIAGNOSIS — L85.3 DRY SKIN: Primary | ICD-10-CM

## 2022-10-31 DIAGNOSIS — Z79.4 TYPE 2 DIABETES MELLITUS WITHOUT COMPLICATION, WITH LONG-TERM CURRENT USE OF INSULIN: ICD-10-CM

## 2022-10-31 PROCEDURE — 99214 PR OFFICE/OUTPT VISIT, EST, LEVL IV, 30-39 MIN: ICD-10-PCS | Mod: S$PBB,,, | Performed by: PODIATRIST

## 2022-10-31 PROCEDURE — 99999 PR PBB SHADOW E&M-EST. PATIENT-LVL V: ICD-10-PCS | Mod: PBBFAC,,, | Performed by: PODIATRIST

## 2022-10-31 PROCEDURE — 99215 OFFICE O/P EST HI 40 MIN: CPT | Mod: PBBFAC,PN | Performed by: PODIATRIST

## 2022-10-31 PROCEDURE — 99214 OFFICE O/P EST MOD 30 MIN: CPT | Mod: S$PBB,,, | Performed by: PODIATRIST

## 2022-10-31 PROCEDURE — 99999 PR PBB SHADOW E&M-EST. PATIENT-LVL V: CPT | Mod: PBBFAC,,, | Performed by: PODIATRIST

## 2022-10-31 RX ORDER — FLASH GLUCOSE SCANNING READER
EACH MISCELLANEOUS
COMMUNITY
Start: 2022-08-09 | End: 2023-06-13 | Stop reason: SDUPTHER

## 2022-10-31 RX ORDER — UREA 200 MG/G
1 CREAM TOPICAL DAILY
Qty: 75 G | Refills: 10 | Status: SHIPPED | OUTPATIENT
Start: 2022-10-31 | End: 2023-01-04

## 2022-10-31 NOTE — PATIENT INSTRUCTIONS
How to Check Your Feet    Below are tips to help you look for foot problems. Try to check your feet at the same time each day, such as when you get out of bed in the morning.    Check the top of each foot. The tops of toes, back of the heel, and outer edge of the foot can get a lot of rubbing from poor-fitting shoes.    Check the bottom of each foot. Daily wear and tear often leads to problems at pressure spots.    Check the toes and nails. Fungal infections often occur between toes. Toenail problems can also be a sign of fungal infections or lead to breaks in the skin.    Check your shoes, too. Loose objects inside a shoe can injure the foot. Use your hand to feel inside your shoes for things like lorenzo, loose stitching, or rough areas that could irritate your skin.        Diabetic Foot Care    Diabetes can lead to a number of different foot complications. Fortunately, most of these complications can be prevented with a little extra foot care. If diabetes is not well controlled, the high blood sugar can cause damage to blood vessels and result in poor circulation to the foot. When the skin does not get enough blood flow, it becomes prone to pressure sores and ulcers, which heal slowly.  High blood sugar can also damage nerves, interfering with the ability to feel pain and pressure. When you cant feel your foot normally, it is easy to injure your skin, bones and joints without knowing it. For these reasons diabetes increases the risk of fungal infections, bunions and ulcers. Deep ulcers can lead to bone infection. Gangrene is the most serious foot complication of diabetes. It usually occurs on the tips of the toes as blacked areas of skin. The black area is dead tissue. In severe cases, gangrene spreads to involve the entire toe, other toes and the entire foot. Foot or toe amputation may be required. Good foot care and blood sugar control can prevent this.    Home Care  Wear comfortable, proper fitting  shoes.  Wash your feet daily with warm water and mild soap.  After drying, apply a moisturizing cream or lotion.  Check your feet daily for skin breaks, blisters, swelling, or redness. Look between your toes also.  Wear cotton socks and change them every day.  Trim toe nails carefully and do not cut your cuticles.  Strive to keep your blood sugar under control with a combination of medicines, diet and activity.  If you smoke and have diabetes, it is very important that you stop. Smoking reduces blood flow to your foot.  Avoid activities that increase your risk of foot injury:  Do not walk barefoot.  Do not use heating pads or hot water bottles on your feet.  Do not put your foot in a hot tub without first checking the temperature with your hand.  10) Schedule yearly foot exams.    Follow Up  with your doctor or as advised by our staff. Report any cut, puncture, scrape, other injury, blister, ingrown toenail or ulcer on your foot.    Get Prompt Medical Attention  if any of the following occur:  -- Open ulcer with pus draining from the wound  -- Increasing foot or leg pain  -- New areas of redness or swelling or tender areas of the foot    © 9548-4605 [employer]. [address] [place] [address], [place] [address]. All rights reserved. This information is not intended as a substitute for professional medical care. Always follow your healthcare professional's instructions.     General nail care measures for abnormal nails include:  Keeping nails trimmed short, but avoid overzealous trimming  Avoiding trauma   Avoiding contact irritants   Keeping nails dry (avoiding wet work)  Avoiding all nail cosmetics  Wearing shoes that fit well at the toe box.  Avoid tight shoes.

## 2022-10-31 NOTE — PROGRESS NOTES
"  Chief Complaint   Patient presents with    Diabetic Foot Exam     Foot Exam/PCP Yoseph Dallas MD 09/19/22              HPI:   The patient is a 71 y.o.  female  who presents for a diabetic foot exam.     Patient reports no presence of abnormal sensation to the feet .    History of diabetic foot ulcers: none   History of foot surgery: none.     Shoes worn today:  Slip on flats.   She is concerned about dry skin around her nails and toes.  She moisturizes and soaks her feet often.           Primary care doctor is: Yoseph Dallas MD  Diabetic Foot Exam (Foot Exam/PCP Yoseph Dallas MD 09/19/22)            Past Medical History:   Diagnosis Date    Arthritis     Chronic constipation     Depression     Diabetes mellitus     Diabetes mellitus     Hypertension     Osteopenia            Current Outpatient Medications on File Prior to Visit   Medication Sig Dispense Refill    azelastine (ASTELIN) 137 mcg (0.1 %) nasal spray 1 spray (137 mcg total) by Nasal route 2 (two) times daily. 30 mL 0    BD ULTRA-FINE SHORT PEN NEEDLE 31 gauge x 5/16" Ndle USE AS DIRECTED DAILY      calcium carbonate (OS-REDD) 600 mg calcium (1,500 mg) Tab Take 600 mg by mouth 2 (two) times daily with meals.      estradioL (ESTRACE) 0.01 % (0.1 mg/gram) vaginal cream Place 1 g vaginally twice a week. 425 g 3    famotidine (PEPCID) 40 MG tablet Take 40 mg by mouth every morning.      fluticasone propionate (FLONASE) 50 mcg/actuation nasal spray 2 sprays (100 mcg total) by Each Nostril route once daily. 48 g 3    FREESTYLE SHANNON 2 READER Misc       FREESTYLE SHANNON 2 SENSOR Kit USE AS DIRECTED EVERY 2 WEEKS      GVOKE HYPOPEN 2-PACK 1 mg/0.2 mL AtIn       hydrOXYzine pamoate (VISTARIL) 25 MG Cap Take 1 capsule (25 mg total) by mouth nightly as needed (bladder pain). 20 capsule 0    insulin glargine, TOUJEO, (TOUJEO SOLOSTAR U-300 INSULIN) 300 unit/mL (1.5 mL) InPn pen Inject 12 Units into the skin every evening. 6 pen 2    irbesartan " "(AVAPRO) 300 MG tablet Take 1 tablet (300 mg total) by mouth every evening. 90 tablet 3    lancets (ONETOUCH DELICA LANCETS) 33 gauge Misc 1 lancet by Misc.(Non-Drug; Combo Route) route 3 (three) times daily. 300 each 3    metFORMIN (GLUCOPHAGE-XR) 500 MG ER 24hr tablet Take 2 tablets (1,000 mg total) by mouth 2 (two) times a day. 360 tablet 4    methen-m.blue-s.phos-phsal-hyo (URIBEL) 118-10-40.8-36 mg Cap Take by mouth.      ondansetron (ZOFRAN-ODT) 4 MG TbDL Take 1 tablet (4 mg total) by mouth every 6 (six) hours as needed (Nausea and vomiting). 20 tablet 0    ONETOUCH VERIO TEST STRIPS Strp TEST THREE TIMES DAILY 200 strip 11    pen needle, diabetic (NOVOFINE 32) 32 gauge x 1/4" Ndle use subcutaneously every evening 100 each 11    phenazopyridine (PYRIDIUM) 200 MG tablet Take 200 mg by mouth 3 (three) times daily as needed for Pain.      rosuvastatin (CRESTOR) 10 MG tablet TAKE 1 TABLET BY MOUTH EVERY DAY 90 tablet 2    solifenacin (VESICARE) 5 MG tablet Take by mouth once daily.      vitamin D 1000 units Tab Take 1,000 Units by mouth once daily.      aspirin (ECOTRIN) 325 MG EC tablet Take 1 tablet (325 mg total) by mouth once daily. 30 tablet 11    levocetirizine (XYZAL) 5 MG tablet Take 1 tablet (5 mg total) by mouth every evening. 30 tablet 11     No current facility-administered medications on file prior to visit.           Review of patient's allergies indicates:  No Known Allergies        Social History     Socioeconomic History    Marital status:    Tobacco Use    Smoking status: Former     Packs/day: 1.50     Years: 25.00     Pack years: 37.50     Types: Cigarettes     Quit date:      Years since quittin.8    Smokeless tobacco: Never   Substance and Sexual Activity    Alcohol use: No    Drug use: No    Sexual activity: Not Currently     Partners: Male     Birth control/protection: None           ROS:  General ROS: negative  Respiratory ROS: no cough, shortness of breath, or " "wheezing  Cardiovascular ROS: no chest pain or dyspnea on exertion  Musculoskeletal ROS: negative  Neurological ROS:   negative for - impaired coordination/balance or numbness/tingling  Dermatological ROS: negative      LAST HbA1c:   Hemoglobin A1C   Date Value Ref Range Status   08/04/2022 7.0 (H) 4.0 - 5.6 % Final     Comment:     ADA Screening Guidelines:  5.7-6.4%  Consistent with prediabetes  >or=6.5%  Consistent with diabetes    High levels of fetal hemoglobin interfere with the HbA1C  assay. Heterozygous hemoglobin variants (HbS, HgC, etc)do  not significantly interfere with this assay.   However, presence of multiple variants may affect accuracy.     03/25/2022 7.2 (H) 4.0 - 5.6 % Final     Comment:     ADA Screening Guidelines:  5.7-6.4%  Consistent with prediabetes  >or=6.5%  Consistent with diabetes    High levels of fetal hemoglobin interfere with the HbA1C  assay. Heterozygous hemoglobin variants (HbS, HgC, etc)do  not significantly interfere with this assay.   However, presence of multiple variants may affect accuracy.     10/22/2021 6.9 (H) 4.0 - 5.6 % Final     Comment:     ADA Screening Guidelines:  5.7-6.4%  Consistent with prediabetes  >or=6.5%  Consistent with diabetes    High levels of fetal hemoglobin interfere with the HbA1C  assay. Heterozygous hemoglobin variants (HbS, HgC, etc)do  not significantly interfere with this assay.   However, presence of multiple variants may affect accuracy.             EXAM:   Vitals:    10/31/22 1335   BP: (!) 150/78   Pulse: 67   Weight: 69.9 kg (154 lb)   Height: 5' 6.5" (1.689 m)       General: alert, no distress, cooperative    Vascular:   Dorsalis Pedis:  present   Posterior Tibial:  present  Capillary refill time:  3 seconds  Temperature of toes cool to touch  Edema:  Trace and non-pitting       Neurological:     Sharp touch:  normal  Light touch: normal  Tinels Sign:  Absent  Mulders Click:   Absent  SWMF:  diminished      Dermatological:   Skin: Normal " tone and turgor  Wounds/Ulcers:  Absent  Bruising:  Absent  Erythema:  Absent  Toenails x 10 are elongated by 1-4mm, thickened, without paronychia or discoloration  Hyperkeratosis around the nails.       Musculoskeletal:   Metatarsophalangeal range of motion:   full range of motion  Subtalar joint range of motion: full range of motion  Ankle joint range of motion:  full range of motion  Bunions:  Absent  Hammertoes: Absent               ASSESSMENT/PLAN:          Problem List Items Addressed This Visit       Type 2 diabetes mellitus, with long-term current use of insulin     Other Visit Diagnoses       Dry skin    -  Primary    Relevant Medications    urea 20 % Crea    Encounter for diabetic foot exam                    I counseled the patient on the patient's conditions, their implications and medical management.   Shoe inspection.     Continue good nutrition and blood sugar control to help prevent podiatric complications of diabetes.   Maintain proper foot hygiene.   Continue wearing proper shoe gear, daily foot inspections, never walking without protective shoe gear, never putting sharp instruments to feet.  Urea as ordered.   Shoe and activity modification as needed for relief.   Annual diabetes foot exam.                     Heide Gillis DPM

## 2022-11-22 ENCOUNTER — TELEPHONE (OUTPATIENT)
Dept: INTERNAL MEDICINE | Facility: CLINIC | Age: 72
End: 2022-11-22
Payer: MEDICARE

## 2022-11-22 ENCOUNTER — TELEPHONE (OUTPATIENT)
Dept: OTOLARYNGOLOGY | Facility: CLINIC | Age: 72
End: 2022-11-22
Payer: MEDICARE

## 2022-11-22 NOTE — TELEPHONE ENCOUNTER
Returned call. Informed pt that Dr. Mckeon is out on medical leave. Offered to schedule apt with Dr. Bailey at the [facility] location for today at 2:15. Pt accepted apt and thanked me for returning her call.     ----- Message from Italo Hinton sent at 11/22/2022  8:09 AM CST -----  Contact: Patient  The pt called and would like to have someone call her back    She would like to be seen but there is nothing available until 2/6/23    Please call her back at [phone]

## 2022-11-22 NOTE — TELEPHONE ENCOUNTER
Returned pt's call.  Pt states she's been having pain to her sinuses for a few weeks now. Pain in her eyes and neck. Has been unsuccessfully trying to get in with ENT.    Offered VV, pt declined as the last one didn't work out for her and she was unable to connect.    Offered next In person visit in our office. Accepted.  Notified patient of emergency prompts.

## 2022-11-22 NOTE — TELEPHONE ENCOUNTER
----- Message from [facility] sent at 11/22/2022  3:27 PM CST -----  Type:  Needs Medical Advice    Who Called: Pt   Would the patient rather a call back or a response via [facility]? Callback   Best Call Back Number: [phone]  Additional Information: Pt requesting callback regarding sinus pain. Pt has been unable to get appt for a month now.

## 2022-11-29 ENCOUNTER — OFFICE VISIT (OUTPATIENT)
Dept: INTERNAL MEDICINE | Facility: CLINIC | Age: 72
End: 2022-11-29
Payer: MEDICARE

## 2022-11-29 ENCOUNTER — TELEPHONE (OUTPATIENT)
Dept: INTERNAL MEDICINE | Facility: CLINIC | Age: 72
End: 2022-11-29
Payer: MEDICARE

## 2022-11-29 VITALS
OXYGEN SATURATION: 99 % | DIASTOLIC BLOOD PRESSURE: 72 MMHG | HEIGHT: 67 IN | HEART RATE: 62 BPM | SYSTOLIC BLOOD PRESSURE: 146 MMHG | WEIGHT: 160.63 LBS | BODY MASS INDEX: 25.21 KG/M2

## 2022-11-29 DIAGNOSIS — B96.89 ACUTE BACTERIAL SINUSITIS: Primary | ICD-10-CM

## 2022-11-29 DIAGNOSIS — J01.90 ACUTE BACTERIAL SINUSITIS: Primary | ICD-10-CM

## 2022-11-29 PROCEDURE — 99214 OFFICE O/P EST MOD 30 MIN: CPT | Mod: S$PBB,,, | Performed by: PHYSICIAN ASSISTANT

## 2022-11-29 PROCEDURE — 99999 PR PBB SHADOW E&M-EST. PATIENT-LVL V: ICD-10-PCS | Mod: PBBFAC,,, | Performed by: PHYSICIAN ASSISTANT

## 2022-11-29 PROCEDURE — 99999 PR PBB SHADOW E&M-EST. PATIENT-LVL V: CPT | Mod: PBBFAC,,, | Performed by: PHYSICIAN ASSISTANT

## 2022-11-29 PROCEDURE — 99215 OFFICE O/P EST HI 40 MIN: CPT | Mod: PBBFAC | Performed by: PHYSICIAN ASSISTANT

## 2022-11-29 PROCEDURE — 99214 PR OFFICE/OUTPT VISIT, EST, LEVL IV, 30-39 MIN: ICD-10-PCS | Mod: S$PBB,,, | Performed by: PHYSICIAN ASSISTANT

## 2022-11-29 RX ORDER — MOMETASONE FUROATE 50 UG/1
2 SPRAY, METERED NASAL DAILY
Qty: 17 G | Refills: 0 | Status: SHIPPED | OUTPATIENT
Start: 2022-11-29 | End: 2023-08-29 | Stop reason: SDUPTHER

## 2022-11-29 RX ORDER — AMOXICILLIN AND CLAVULANATE POTASSIUM 875; 125 MG/1; MG/1
1 TABLET, FILM COATED ORAL 2 TIMES DAILY
Qty: 20 TABLET | Refills: 0 | Status: SHIPPED | OUTPATIENT
Start: 2022-11-29 | End: 2022-12-09

## 2022-11-29 RX ORDER — PREDNISONE 20 MG/1
40 TABLET ORAL DAILY
Qty: 10 TABLET | Refills: 0 | Status: SHIPPED | OUTPATIENT
Start: 2022-11-29 | End: 2022-12-04

## 2022-11-29 RX ORDER — CETIRIZINE HYDROCHLORIDE 10 MG/1
10 TABLET ORAL DAILY
Qty: 30 TABLET | Refills: 11 | Status: SHIPPED | OUTPATIENT
Start: 2022-11-29 | End: 2023-01-04

## 2022-11-29 NOTE — PROGRESS NOTES
"INTERNAL MEDICINE URGENT VISIT NOTE    CHIEF COMPLAINT     Chief Complaint   Patient presents with    Sinus Problem       HPI     Gely Green is a 72 y.o. female who presents for an urgent visit today.    PCP is Yoseph Dallas MD, patient is new to me.     Patient presents with complaints of sinus issues for the past 6 months, was treated with streoids and antibiotics about 2 months ago, worked well for a whie. Symptoms are back  Still taking astelin, no longer taking Flonase or xyzal.       Past Medical History:  Past Medical History:   Diagnosis Date    Arthritis     Chronic constipation     Depression     Diabetes mellitus     Diabetes mellitus     Hypertension     Osteopenia        Home Medications:  Prior to Admission medications    Medication Sig Start Date End Date Taking? Authorizing Provider   aspirin (ECOTRIN) 325 MG EC tablet Take 1 tablet (325 mg total) by mouth once daily. 4/15/21 11/29/22 Yes Marcelino Torres MD   azelastine (ASTELIN) 137 mcg (0.1 %) nasal spray 1 spray (137 mcg total) by Nasal route 2 (two) times daily. 7/11/22 7/11/23 Yes Savannah Rush MD   BD ULTRA-FINE SHORT PEN NEEDLE 31 gauge x 5/16" Ndle USE AS DIRECTED DAILY 4/11/22  Yes Historical Provider   calcium carbonate (OS-REDD) 600 mg calcium (1,500 mg) Tab Take 600 mg by mouth 2 (two) times daily with meals.   Yes Historical Provider   estradioL (ESTRACE) 0.01 % (0.1 mg/gram) vaginal cream Place 1 g vaginally twice a week. 5/19/22  Yes Mario Campos PA-C   famotidine (PEPCID) 40 MG tablet Take 40 mg by mouth every morning. 2/2/22  Yes Historical Provider   fluticasone propionate (FLONASE) 50 mcg/actuation nasal spray 2 sprays (100 mcg total) by Each Nostril route once daily. 8/17/20  Yes Savannah Rush MD   FREESTYLE SHANNON 2 READER Misc  8/9/22  Yes Historical Provider   FREESTYLE SHANNON 2 SENSOR Kit USE AS DIRECTED EVERY 2 WEEKS 6/22/21  Yes Historical Provider   GVOKE HYPOPEN 2-PACK 1 mg/0.2 mL " "AtIn  6/8/21  Yes Historical Provider   hydrOXYzine pamoate (VISTARIL) 25 MG Cap Take 1 capsule (25 mg total) by mouth nightly as needed (bladder pain). 3/2/22  Yes Mario Campos PA-C   insulin glargine, TOUJEO, (TOUJEO SOLOSTAR U-300 INSULIN) 300 unit/mL (1.5 mL) InPn pen Inject 12 Units into the skin every evening. 3/25/22  Yes Yoseph Dallas MD   irbesartan (AVAPRO) 300 MG tablet Take 1 tablet (300 mg total) by mouth every evening. 4/20/22 4/20/23 Yes Yoseph Dallas MD   metFORMIN (GLUCOPHAGE-XR) 500 MG ER 24hr tablet Take 2 tablets (1,000 mg total) by mouth 2 (two) times a day. 4/4/22  Yes MD nhan Martphos-phsal-hyo (URIBEL) 118-10-40.8-36 mg Cap Take by mouth.   Yes Historical Provider   ondansetron (ZOFRAN-ODT) 4 MG TbDL Take 1 tablet (4 mg total) by mouth every 6 (six) hours as needed (Nausea and vomiting). 1/4/22  Yes Zeeshan Reese MD   ONETOUCH VERIO TEST STRIPS Strp TEST THREE TIMES DAILY 12/31/20  Yes Yoseph Dallas MD   pen needle, diabetic (NOVOFINE 32) 32 gauge x 1/4" Ndle use subcutaneously every evening 9/15/21  Yes Yoseph Dallas MD   phenazopyridine (PYRIDIUM) 200 MG tablet Take 200 mg by mouth 3 (three) times daily as needed for Pain.   Yes Historical Provider   rosuvastatin (CRESTOR) 10 MG tablet TAKE 1 TABLET BY MOUTH EVERY DAY 9/2/22  Yes Yoseph Dallas MD   solifenacin (VESICARE) 5 MG tablet Take by mouth once daily.   Yes Historical Provider   urea 20 % Crea Apply 1 application topically once daily. To dry skin on the feet. 10/31/22  Yes Heide Gillis DPM   vitamin D 1000 units Tab Take 1,000 Units by mouth once daily.   Yes Historical Provider   lancets (ONETOUCH DELICA LANCETS) 33 gauge Misc 1 lancet by Misc.(Non-Drug; Combo Route) route 3 (three) times daily. 6/7/22   Yoseph Dallas MD   levocetirizine (XYZAL) 5 MG tablet Take 1 tablet (5 mg total) by mouth every evening. 3/30/21 8/4/22  Savannah Rush MD " "      Review of Systems:  Review of Systems   Constitutional:  Negative for chills and fever.   HENT:  Positive for congestion and sinus pressure. Negative for sore throat and trouble swallowing.    Eyes:  Negative for visual disturbance.   Respiratory:  Positive for cough. Negative for shortness of breath.    Cardiovascular:  Negative for chest pain.   Gastrointestinal:  Negative for abdominal pain, constipation, diarrhea, nausea and vomiting.   Genitourinary:  Negative for dysuria and flank pain.   Musculoskeletal:  Negative for back pain, neck pain and neck stiffness.   Skin:  Negative for rash.   Neurological:  Negative for dizziness, syncope, weakness and headaches.   Psychiatric/Behavioral:  Negative for confusion.      Health Maintainence:   Immunizations:  Health Maintenance         Date Due Completion Date    Eye Exam 06/02/2022 6/2/2021    Override on 8/15/2016: Done    Hemoglobin A1c 02/04/2023 8/4/2022    Lipid Panel 03/25/2023 3/25/2022    Diabetes Urine Screening 05/12/2023 5/12/2022    Foot Exam 08/04/2023 8/4/2022    Override on 1/23/2019: Done    Override on 6/2/2017: Done    Mammogram 09/26/2023 9/26/2022    Override on 5/1/2016: Done    High Dose Statin 10/31/2023 10/31/2022    DEXA Scan 09/21/2025 9/21/2022    TETANUS VACCINE 05/31/2026 5/31/2016    Colorectal Cancer Screening 03/21/2029 3/21/2019    Override on 5/6/2014: Done    Override on 4/1/2007: Done             PHYSICAL EXAM     BP (!) 146/72   Pulse 62   Ht 5' 6.5" (1.689 m)   Wt 72.8 kg (160 lb 9.7 oz)   LMP 01/01/1999   SpO2 99%   BMI 25.53 kg/m²     Physical Exam  Vitals and nursing note reviewed.   Constitutional:       Appearance: Normal appearance.      Comments: Healthy appearing female in NAD or apparent pain. She makes good eye contact, speaks in clear full sentences and ambulates with ease.        HENT:      Head: Normocephalic and atraumatic.      Comments: HEENT inflammation - no exudate or edema   No asymmetry      " Nose: Nose normal.      Mouth/Throat:      Pharynx: Oropharynx is clear.   Eyes:      Conjunctiva/sclera: Conjunctivae normal.   Cardiovascular:      Rate and Rhythm: Normal rate and regular rhythm.      Pulses: Normal pulses.   Pulmonary:      Effort: No respiratory distress.   Abdominal:      Tenderness: There is no abdominal tenderness.   Musculoskeletal:         General: Normal range of motion.      Cervical back: No rigidity.   Skin:     General: Skin is warm and dry.      Capillary Refill: Capillary refill takes less than 2 seconds.      Findings: No rash.   Neurological:      General: No focal deficit present.      Mental Status: She is alert.      Gait: Gait normal.   Psychiatric:         Mood and Affect: Mood normal.       LABS     Lab Results   Component Value Date    HGBA1C 7.0 (H) 08/04/2022     CMP  Sodium   Date Value Ref Range Status   06/28/2022 144 136 - 145 mmol/L Final     Potassium   Date Value Ref Range Status   06/28/2022 4.1 3.5 - 5.1 mmol/L Final     Chloride   Date Value Ref Range Status   06/28/2022 106 95 - 110 mmol/L Final     CO2   Date Value Ref Range Status   06/28/2022 27 23 - 29 mmol/L Final     Glucose   Date Value Ref Range Status   06/28/2022 108 70 - 110 mg/dL Final     BUN   Date Value Ref Range Status   06/28/2022 12 8 - 23 mg/dL Final     Creatinine   Date Value Ref Range Status   06/28/2022 0.8 0.5 - 1.4 mg/dL Final     Calcium   Date Value Ref Range Status   06/28/2022 9.6 8.7 - 10.5 mg/dL Final     Total Protein   Date Value Ref Range Status   03/23/2021 7.7 6.0 - 8.4 g/dL Final     Albumin   Date Value Ref Range Status   03/23/2021 4.2 3.5 - 5.2 g/dL Final     Total Bilirubin   Date Value Ref Range Status   03/23/2021 0.4 0.1 - 1.0 mg/dL Final     Comment:     For infants and newborns, interpretation of results should be based  on gestational age, weight and in agreement with clinical  observations.    Premature Infant recommended reference ranges:  Up to 24  hours.............<8.0 mg/dL  Up to 48 hours............<12.0 mg/dL  3-5 days..................<15.0 mg/dL  6-29 days.................<15.0 mg/dL       Alkaline Phosphatase   Date Value Ref Range Status   03/23/2021 72 55 - 135 U/L Final     AST   Date Value Ref Range Status   03/23/2021 21 10 - 40 U/L Final     ALT   Date Value Ref Range Status   03/23/2021 15 10 - 44 U/L Final     Anion Gap   Date Value Ref Range Status   06/28/2022 11 8 - 16 mmol/L Final     eGFR if    Date Value Ref Range Status   06/28/2022 >60 >60 mL/min/1.73 m^2 Final     eGFR if non    Date Value Ref Range Status   06/28/2022 >60 >60 mL/min/1.73 m^2 Final     Comment:     Calculation used to obtain the estimated glomerular filtration  rate (eGFR) is the CKD-EPI equation.        Lab Results   Component Value Date    WBC 5.63 03/23/2021    HGB 12.2 03/23/2021    HCT 38.8 03/23/2021    MCV 88 03/23/2021     03/23/2021     Lab Results   Component Value Date    CHOL 167 03/25/2022    CHOL 167 12/27/2020    CHOL 163 12/21/2020     Lab Results   Component Value Date    HDL 64 03/25/2022    HDL 73 12/27/2020    HDL 66 12/21/2020     Lab Results   Component Value Date    LDLCALC 92.8 03/25/2022    LDLCALC 81.0 12/27/2020    LDLCALC 89.2 12/21/2020     Lab Results   Component Value Date    TRIG 51 03/25/2022    TRIG 65 12/27/2020    TRIG 39 12/21/2020     Lab Results   Component Value Date    CHOLHDL 38.3 03/25/2022    CHOLHDL 43.7 12/27/2020    CHOLHDL 40.5 12/21/2020     Lab Results   Component Value Date    TSH 0.931 03/23/2021       ASSESSMENT/PLAN     Gely Green is a 72 y.o. female     Gely was seen today for sinus problem. Will treat as acute bacterial sinusitis. Pt to report back to clinic if symptoms do not improve as expected - she is amenable to plan.     Diagnoses and all orders for this visit:    Acute bacterial sinusitis    Other orders  -     predniSONE (DELTASONE) 20 MG tablet; Take 2  tablets (40 mg total) by mouth once daily. for 5 days  -     amoxicillin-clavulanate 875-125mg (AUGMENTIN) 875-125 mg per tablet; Take 1 tablet by mouth 2 (two) times daily. for 10 days  -     cetirizine (ZYRTEC) 10 MG tablet; Take 1 tablet (10 mg total) by mouth once daily.  -     mometasone (NASONEX) 50 mcg/actuation nasal spray; 2 sprays by Nasal route once daily.         Patient was counseled on when and how to seek emergent care.       Alicia Ortiz PA-C   Department of Internal Medicine - [facility]   10:13 AM

## 2022-12-03 ENCOUNTER — PATIENT MESSAGE (OUTPATIENT)
Dept: PODIATRY | Facility: CLINIC | Age: 72
End: 2022-12-03
Payer: MEDICARE

## 2022-12-21 ENCOUNTER — PATIENT OUTREACH (OUTPATIENT)
Dept: ADMINISTRATIVE | Facility: HOSPITAL | Age: 72
End: 2022-12-21
Payer: MEDICARE

## 2022-12-30 ENCOUNTER — OFFICE VISIT (OUTPATIENT)
Dept: INTERNAL MEDICINE | Facility: CLINIC | Age: 72
End: 2022-12-30
Payer: MEDICARE

## 2022-12-30 VITALS
HEIGHT: 67 IN | HEART RATE: 58 BPM | SYSTOLIC BLOOD PRESSURE: 132 MMHG | BODY MASS INDEX: 25 KG/M2 | WEIGHT: 159.31 LBS | DIASTOLIC BLOOD PRESSURE: 68 MMHG | OXYGEN SATURATION: 99 %

## 2022-12-30 DIAGNOSIS — F43.23 ADJUSTMENT DISORDER WITH MIXED ANXIETY AND DEPRESSED MOOD: ICD-10-CM

## 2022-12-30 DIAGNOSIS — J32.9 CHRONIC SINUSITIS, UNSPECIFIED LOCATION: Primary | ICD-10-CM

## 2022-12-30 PROCEDURE — 99999 PR PBB SHADOW E&M-EST. PATIENT-LVL V: ICD-10-PCS | Mod: PBBFAC,,, | Performed by: INTERNAL MEDICINE

## 2022-12-30 PROCEDURE — 99215 OFFICE O/P EST HI 40 MIN: CPT | Mod: PBBFAC | Performed by: INTERNAL MEDICINE

## 2022-12-30 PROCEDURE — 99213 OFFICE O/P EST LOW 20 MIN: CPT | Mod: S$PBB,,, | Performed by: INTERNAL MEDICINE

## 2022-12-30 PROCEDURE — 99999 PR PBB SHADOW E&M-EST. PATIENT-LVL V: CPT | Mod: PBBFAC,,, | Performed by: INTERNAL MEDICINE

## 2022-12-30 PROCEDURE — 99213 PR OFFICE/OUTPT VISIT, EST, LEVL III, 20-29 MIN: ICD-10-PCS | Mod: S$PBB,,, | Performed by: INTERNAL MEDICINE

## 2023-01-03 ENCOUNTER — TELEPHONE (OUTPATIENT)
Dept: OTOLARYNGOLOGY | Facility: CLINIC | Age: 73
End: 2023-01-03
Payer: MEDICARE

## 2023-01-03 NOTE — TELEPHONE ENCOUNTER
Returned call to patient and scheduled visit for tomorrow with [facility] ENT for recurrent sinus infections. Patient also stated that she wanted to keep her march appointment with DR. Mckeon just in case. Was thanked for calling.   ----- Message from Felecia Alcantara sent at 1/3/2023  8:57 AM CST -----  Pt would like to be called back regarding  an appt    Pt can be reached at  [phone]

## 2023-01-04 ENCOUNTER — OFFICE VISIT (OUTPATIENT)
Dept: INTERNAL MEDICINE | Facility: CLINIC | Age: 73
End: 2023-01-04
Attending: INTERNAL MEDICINE
Payer: MEDICARE

## 2023-01-04 ENCOUNTER — LAB VISIT (OUTPATIENT)
Dept: LAB | Facility: OTHER | Age: 73
End: 2023-01-04
Attending: INTERNAL MEDICINE
Payer: MEDICARE

## 2023-01-04 ENCOUNTER — OFFICE VISIT (OUTPATIENT)
Dept: OTOLARYNGOLOGY | Facility: CLINIC | Age: 73
End: 2023-01-04
Payer: MEDICARE

## 2023-01-04 VITALS
DIASTOLIC BLOOD PRESSURE: 79 MMHG | SYSTOLIC BLOOD PRESSURE: 144 MMHG | WEIGHT: 161.38 LBS | HEART RATE: 79 BPM | BODY MASS INDEX: 25.66 KG/M2

## 2023-01-04 VITALS
DIASTOLIC BLOOD PRESSURE: 70 MMHG | WEIGHT: 159.63 LBS | OXYGEN SATURATION: 98 % | HEIGHT: 67 IN | HEART RATE: 73 BPM | SYSTOLIC BLOOD PRESSURE: 136 MMHG | BODY MASS INDEX: 25.06 KG/M2

## 2023-01-04 DIAGNOSIS — I49.3 PVC'S (PREMATURE VENTRICULAR CONTRACTIONS): ICD-10-CM

## 2023-01-04 DIAGNOSIS — I70.0 AORTIC ATHEROSCLEROSIS: ICD-10-CM

## 2023-01-04 DIAGNOSIS — J30.9 ALLERGIC RHINITIS, UNSPECIFIED SEASONALITY, UNSPECIFIED TRIGGER: ICD-10-CM

## 2023-01-04 DIAGNOSIS — N30.10 INTERSTITIAL CYSTITIS: ICD-10-CM

## 2023-01-04 DIAGNOSIS — E78.5 HYPERLIPIDEMIA ASSOCIATED WITH TYPE 2 DIABETES MELLITUS: ICD-10-CM

## 2023-01-04 DIAGNOSIS — E11.59 HYPERTENSION ASSOCIATED WITH TYPE 2 DIABETES MELLITUS: ICD-10-CM

## 2023-01-04 DIAGNOSIS — E11.9 TYPE 2 DIABETES MELLITUS WITHOUT COMPLICATION, WITH LONG-TERM CURRENT USE OF INSULIN: Primary | ICD-10-CM

## 2023-01-04 DIAGNOSIS — F33.0 MILD EPISODE OF RECURRENT MAJOR DEPRESSIVE DISORDER: ICD-10-CM

## 2023-01-04 DIAGNOSIS — R23.4 CHANGES IN SKIN TEXTURE: ICD-10-CM

## 2023-01-04 DIAGNOSIS — R51.9 CHRONIC RIGHT-SIDED HEADACHES: Primary | ICD-10-CM

## 2023-01-04 DIAGNOSIS — I15.2 HYPERTENSION ASSOCIATED WITH TYPE 2 DIABETES MELLITUS: ICD-10-CM

## 2023-01-04 DIAGNOSIS — M85.80 OSTEOPENIA, UNSPECIFIED LOCATION: ICD-10-CM

## 2023-01-04 DIAGNOSIS — Z12.31 BREAST CANCER SCREENING BY MAMMOGRAM: ICD-10-CM

## 2023-01-04 DIAGNOSIS — I47.19 ATRIAL TACHYCARDIA: ICD-10-CM

## 2023-01-04 DIAGNOSIS — E11.9 TYPE 2 DIABETES MELLITUS WITHOUT COMPLICATION, WITH LONG-TERM CURRENT USE OF INSULIN: ICD-10-CM

## 2023-01-04 DIAGNOSIS — Z79.4 TYPE 2 DIABETES MELLITUS WITHOUT COMPLICATION, WITH LONG-TERM CURRENT USE OF INSULIN: Primary | ICD-10-CM

## 2023-01-04 DIAGNOSIS — G50.8 TRIGEMINAL NEURITIS: ICD-10-CM

## 2023-01-04 DIAGNOSIS — E11.69 HYPERLIPIDEMIA ASSOCIATED WITH TYPE 2 DIABETES MELLITUS: ICD-10-CM

## 2023-01-04 DIAGNOSIS — Z82.0 FAMILY HISTORY OF ALZHEIMER'S DISEASE: ICD-10-CM

## 2023-01-04 DIAGNOSIS — G89.29 CHRONIC RIGHT-SIDED HEADACHES: Primary | ICD-10-CM

## 2023-01-04 DIAGNOSIS — R91.1 INCIDENTAL LUNG NODULE, > 3MM AND < 8MM: ICD-10-CM

## 2023-01-04 DIAGNOSIS — Z79.4 TYPE 2 DIABETES MELLITUS WITHOUT COMPLICATION, WITH LONG-TERM CURRENT USE OF INSULIN: ICD-10-CM

## 2023-01-04 LAB
ALBUMIN SERPL BCP-MCNC: 4 G/DL (ref 3.5–5.2)
ALP SERPL-CCNC: 80 U/L (ref 55–135)
ALT SERPL W/O P-5'-P-CCNC: 29 U/L (ref 10–44)
ANION GAP SERPL CALC-SCNC: 7 MMOL/L (ref 8–16)
AST SERPL-CCNC: 27 U/L (ref 10–40)
BASOPHILS # BLD AUTO: 0.03 K/UL (ref 0–0.2)
BASOPHILS NFR BLD: 0.6 % (ref 0–1.9)
BILIRUB SERPL-MCNC: 0.3 MG/DL (ref 0.1–1)
BUN SERPL-MCNC: 17 MG/DL (ref 8–23)
CALCIUM SERPL-MCNC: 9.7 MG/DL (ref 8.7–10.5)
CHLORIDE SERPL-SCNC: 108 MMOL/L (ref 95–110)
CHOLEST SERPL-MCNC: 179 MG/DL (ref 120–199)
CHOLEST/HDLC SERPL: 2.7 {RATIO} (ref 2–5)
CO2 SERPL-SCNC: 25 MMOL/L (ref 23–29)
CREAT SERPL-MCNC: 0.8 MG/DL (ref 0.5–1.4)
DIFFERENTIAL METHOD: ABNORMAL
EOSINOPHIL # BLD AUTO: 0.1 K/UL (ref 0–0.5)
EOSINOPHIL NFR BLD: 1.3 % (ref 0–8)
ERYTHROCYTE [DISTWIDTH] IN BLOOD BY AUTOMATED COUNT: 14.6 % (ref 11.5–14.5)
EST. GFR  (NO RACE VARIABLE): >60 ML/MIN/1.73 M^2
ESTIMATED AVG GLUCOSE: 163 MG/DL (ref 68–131)
GLUCOSE SERPL-MCNC: 135 MG/DL (ref 70–110)
HBA1C MFR BLD: 7.3 % (ref 4–5.6)
HCT VFR BLD AUTO: 36.7 % (ref 37–48.5)
HDLC SERPL-MCNC: 66 MG/DL (ref 40–75)
HDLC SERPL: 36.9 % (ref 20–50)
HGB BLD-MCNC: 11.8 G/DL (ref 12–16)
IMM GRANULOCYTES # BLD AUTO: 0.01 K/UL (ref 0–0.04)
IMM GRANULOCYTES NFR BLD AUTO: 0.2 % (ref 0–0.5)
LDLC SERPL CALC-MCNC: 101.8 MG/DL (ref 63–159)
LYMPHOCYTES # BLD AUTO: 1.7 K/UL (ref 1–4.8)
LYMPHOCYTES NFR BLD: 31.6 % (ref 18–48)
MCH RBC QN AUTO: 27.9 PG (ref 27–31)
MCHC RBC AUTO-ENTMCNC: 32.2 G/DL (ref 32–36)
MCV RBC AUTO: 87 FL (ref 82–98)
MONOCYTES # BLD AUTO: 0.5 K/UL (ref 0.3–1)
MONOCYTES NFR BLD: 9.4 % (ref 4–15)
NEUTROPHILS # BLD AUTO: 3.1 K/UL (ref 1.8–7.7)
NEUTROPHILS NFR BLD: 56.9 % (ref 38–73)
NONHDLC SERPL-MCNC: 113 MG/DL
NRBC BLD-RTO: 0 /100 WBC
PLATELET # BLD AUTO: 279 K/UL (ref 150–450)
PMV BLD AUTO: 10.5 FL (ref 9.2–12.9)
POTASSIUM SERPL-SCNC: 4.3 MMOL/L (ref 3.5–5.1)
PROT SERPL-MCNC: 7.3 G/DL (ref 6–8.4)
RBC # BLD AUTO: 4.23 M/UL (ref 4–5.4)
SODIUM SERPL-SCNC: 140 MMOL/L (ref 136–145)
TRIGL SERPL-MCNC: 56 MG/DL (ref 30–150)
TSH SERPL DL<=0.005 MIU/L-ACNC: 1.03 UIU/ML (ref 0.4–4)
WBC # BLD AUTO: 5.45 K/UL (ref 3.9–12.7)

## 2023-01-04 PROCEDURE — 99999 PR PBB SHADOW E&M-EST. PATIENT-LVL III: ICD-10-PCS | Mod: PBBFAC,,, | Performed by: OTOLARYNGOLOGY

## 2023-01-04 PROCEDURE — 83036 HEMOGLOBIN GLYCOSYLATED A1C: CPT | Performed by: INTERNAL MEDICINE

## 2023-01-04 PROCEDURE — 85025 COMPLETE CBC W/AUTO DIFF WBC: CPT | Performed by: INTERNAL MEDICINE

## 2023-01-04 PROCEDURE — 99214 PR OFFICE/OUTPT VISIT, EST, LEVL IV, 30-39 MIN: ICD-10-PCS | Mod: S$PBB,,, | Performed by: OTOLARYNGOLOGY

## 2023-01-04 PROCEDURE — 99999 PR PBB SHADOW E&M-EST. PATIENT-LVL III: CPT | Mod: PBBFAC,,, | Performed by: OTOLARYNGOLOGY

## 2023-01-04 PROCEDURE — 99215 OFFICE O/P EST HI 40 MIN: CPT | Mod: 25,PBBFAC | Performed by: INTERNAL MEDICINE

## 2023-01-04 PROCEDURE — 99214 PR OFFICE/OUTPT VISIT, EST, LEVL IV, 30-39 MIN: ICD-10-PCS | Mod: S$PBB,,, | Performed by: INTERNAL MEDICINE

## 2023-01-04 PROCEDURE — 99999 PR PBB SHADOW E&M-EST. PATIENT-LVL V: ICD-10-PCS | Mod: PBBFAC,,, | Performed by: INTERNAL MEDICINE

## 2023-01-04 PROCEDURE — 99999 PR PBB SHADOW E&M-EST. PATIENT-LVL V: CPT | Mod: PBBFAC,,, | Performed by: INTERNAL MEDICINE

## 2023-01-04 PROCEDURE — 80061 LIPID PANEL: CPT | Performed by: INTERNAL MEDICINE

## 2023-01-04 PROCEDURE — 99214 OFFICE O/P EST MOD 30 MIN: CPT | Mod: S$PBB,,, | Performed by: INTERNAL MEDICINE

## 2023-01-04 PROCEDURE — 99214 OFFICE O/P EST MOD 30 MIN: CPT | Mod: S$PBB,,, | Performed by: OTOLARYNGOLOGY

## 2023-01-04 PROCEDURE — 99213 OFFICE O/P EST LOW 20 MIN: CPT | Mod: PBBFAC,27 | Performed by: OTOLARYNGOLOGY

## 2023-01-04 PROCEDURE — 36415 COLL VENOUS BLD VENIPUNCTURE: CPT | Performed by: INTERNAL MEDICINE

## 2023-01-04 PROCEDURE — 80053 COMPREHEN METABOLIC PANEL: CPT | Performed by: INTERNAL MEDICINE

## 2023-01-04 PROCEDURE — 84443 ASSAY THYROID STIM HORMONE: CPT | Performed by: INTERNAL MEDICINE

## 2023-01-04 NOTE — PROGRESS NOTES
Subjective:       Patient ID: Gely Green is a 72 y.o. female.    Chief Complaint: Diabetes    Here for routine f/u    Not exercising and getting out as much due to mood. Plans to contact a therapist to discuss.     She has appt with neurology to discuss her concern about her memory      ### IC ###  Ubrel and urispas and instillations with urologist     ENT earlier this year for chronic rhinitis and suggested regular use of flonase, astelin and nasal rinses  Patient presents today for routine evaluation, physical, and labs. Patient has no major concerns or complaints today.      Belching for the past 2 years. Tx by GI with 2 meds without improvement. She drinks carbonated beverages. Denies unexplained weight loss, dysphagia or sensation of things sticking in throat, BRBPR, melena, night sweats.          #### palpitations ####  04/06 to 05/05/2021 Event monitor.  The predominant rhythm was sinus.  Heart rates  beats per minute.  There were 13 transmitted events.  The 2 symptomatic transmitted events which were done for an unknown symptom.  Each of these showed sinus rhythm at 80-90 beats per minute.  The automatic tracings all showed sinus rhythm, frequently with premature ventricular contractions.  Heart rates  beats per minute.Impression:  Sinus rhythm with single PVCs.  5/12/22 When she lays on her left side in bed she still feels her heart beat. Denies CP at rest or with exertion, dizziness with exertion, shortness of breath out of proportion to level of activity, orthopnea, PND, or LE edema.         Trochanteric bursitis so far responding well to PT. Patient would prefer to avoid any injections at this time and stick to conservative management.       ### DM ###  Endocrine at [facility]. Was having lows so her night time was decreased to 10 units.  Eye MD [place]  She likes cakes and confections       HGBA1C                   7.2 (H)             03/25/2022 01:02 PM        HGBA1C                    6.9 (H)             10/22/2021 12:33 PM        HGBA1C                   6.2 (H)             03/23/2021 08:40 AM        HGBA1C                   6.2 (H)             03/23/2021 08:40 AM        HGBA1C                   6.3 (H)             12/21/2020 03:00 PM        HGBA1C                   6.1 (H)             06/02/2020 09:43 AM        HGBA1C                   6.4 (H)             11/18/2019 08:45 AM        HGBA1C                   7.1 (H)             06/19/2019 02:30 PM        HGBA1C                   6.8 (H)             12/31/2018 01:48 PM        HGBA1C                   6.3 (H)             05/01/2018 10:49 AM   5/12/22 Reports having eye exam one month prior  Diabetes Management Status     Statin: Taking  ACE/ARB: Taking     Screening or Prevention Patient's value Goal Complete/Controlled?  HgA1C Testing and Control  Lab Results      Component                Value               Date                      HGBA1C                   7.2 (H)             03/25/2022            Annually/Less than 8% Yes  Lipid profile : 03/25/2022 Annually Yes  LDL control Lab Results      Component                Value               Date                      LDLCALC                  92.8                03/25/2022           Annually/Less than 100 mg/dl Yes  Nephropathy screening Lab Results      Component                Value               Date                      LABMICR                  <2.5 05/21/2021   Lab Results      Component                Value               Date                      PROTEINUA                Negative            12/27/2020            No results found for: UTPCR  Annually Yes  Blood pressure BP Readings from Last 1 Encounters:  05/12/22 : 120/70  Less than 140/90 Yes  Dilated retinal exam : 06/02/2021 Annually Yes  Foot exam  : 01/12/2021 Annually No        ### IC ###  Ubrel and urispas and instillations with urologist     ### Facial numbness ###  12/27/20 developed acute onset of numbness of top lip and left perioral  "with radiation to left maxillary region and left pre auricular. She has a left sided neck discomfort that is different in quality and behaves separately. Her left face and lip numbness. She was admitted to [facility], [place], operated by [facility] and had normal CT head. MRI brain 12/2/20 "Scattered foci of T2/FLAIR signal hyperintensity in supratentorial white matter. While nonspecific, findings likely relate to sequela of chronic microvascular ischemic change although findings can be seen in the setting of migraine headaches and as the residua from inflammatory and traumatic insults to the brain. Clinical correlation is advised" MRA neck and brain WNL.   Had subsequent U/S as outpt despite normal MRA neck that was also normal. No pain. Hx of left hand numbness with normal EMG 2017.    -Neuro Dr Torres writes "Without involvement of the teeth or gums this seems to be less likely related to a central cause.  Bertha allergies not completely clear.  Patient should however have treatment as if this is a TIA and will be given high dose aspirin for secondary prevention."           Review of Systems   Constitutional:  Negative for chills, fatigue, fever and unexpected weight change.   HENT:  Negative for ear pain, hearing loss, postnasal drip, tinnitus, trouble swallowing and voice change.    Respiratory:  Negative for cough, chest tightness, shortness of breath and wheezing.    Cardiovascular:  Negative for chest pain, palpitations and leg swelling.   Gastrointestinal:  Negative for abdominal pain, blood in stool, diarrhea, nausea and vomiting.   Endocrine: Negative for polydipsia, polyphagia and polyuria.   Genitourinary:  Negative for difficulty urinating, dysuria, hematuria and vaginal bleeding.   Skin:  Negative for rash.   Allergic/Immunologic: Negative for food allergies.   Neurological:  Negative for dizziness, numbness and headaches.   Hematological:  Does not bruise/bleed easily.   Psychiatric/Behavioral:  The patient is not nervous/anxious.    " "  Objective:      Vitals:    01/04/23 1046   BP: 136/70   Pulse: 73   SpO2: 98%   Weight: 72.4 kg (159 lb 9.8 oz)   Height: 5' 6.5" (1.689 m)      Physical Exam  Vitals and nursing note reviewed.   Constitutional:       General: She is not in acute distress.     Appearance: Normal appearance. She is well-developed.   HENT:      Head: Normocephalic and atraumatic.      Mouth/Throat:      Pharynx: No oropharyngeal exudate.   Eyes:      General: No scleral icterus.     Conjunctiva/sclera: Conjunctivae normal.      Pupils: Pupils are equal, round, and reactive to light.   Neck:      Thyroid: No thyromegaly.   Cardiovascular:      Rate and Rhythm: Normal rate and regular rhythm.      Heart sounds: Normal heart sounds. No murmur heard.  Pulmonary:      Effort: Pulmonary effort is normal.      Breath sounds: Normal breath sounds. No wheezing or rales.   Abdominal:      General: There is no distension.   Musculoskeletal:         General: No tenderness.   Lymphadenopathy:      Cervical: No cervical adenopathy.   Skin:     General: Skin is warm and dry.   Neurological:      Mental Status: She is alert and oriented to person, place, and time.   Psychiatric:         Behavior: Behavior normal.       Assessment:       1. Type 2 diabetes mellitus without complication, with long-term current use of insulin    2. Osteopenia, unspecified location    3. Hypertension associated with type 2 diabetes mellitus    4. Hyperlipidemia associated with type 2 diabetes mellitus    5. PVC's (premature ventricular contractions)    6. Family history of Alzheimer's disease    7. Trigeminal neuritis    8. Interstitial cystitis    9. Breast cancer screening by mammogram    10. Changes in skin texture    11. Incidental lung nodule, > 3mm and < 8mm    12. Atrial tachycardia    13. Aortic atherosclerosis    14. Mild episode of recurrent major depressive disorder          Plan:       Gely was seen today for diabetes.    Diagnoses and all orders for this " visit:    Type 2 diabetes mellitus without complication, with long-term current use of insulin  -     Ambulatory referral/consult to Optometry; Future  -     Comprehensive Metabolic Panel; Future  -     CBC Auto Differential; Future  -     Hemoglobin A1C; Future    Osteopenia, unspecified location  -     TSH; Future  -     CBC Auto Differential; Future  -     Cancel: Vitamin D; Future    Hypertension associated with type 2 diabetes mellitus   Controlled and asymptomatic.  Continue current Rx regimen.    Hyperlipidemia associated with type 2 diabetes mellitus  -     Lipid Panel; Future    PVC's (premature ventricular contractions)    Family history of Alzheimer's disease    Trigeminal neuritis    Interstitial cystitis   F/u urology    Breast cancer screening by mammogram    Changes in skin texture  -     TSH; Future    Incidental lung nodule, > 3mm and < 8mm  -     CT Chest Without Contrast; Future    Atrial tachycardia   Controlled and asymptomatic.  Continue current Rx regimen.    Aortic atherosclerosis   Tolerating statin. Continue this.     Mild episode of recurrent major depressive disorder   No improvement. F/u mental health. Pt not interested in medication         Yoseph Mercado MD  Internal Medicine-[facility]        Side effects of medication(s) were discussed in detail and patient voiced understanding.  Patient will call back for any issues or complications.

## 2023-01-05 NOTE — PROGRESS NOTES
I have reviewed your recent lab work.  Your labs look good, except:    1) Your diabetes is un-controlled, defined by a hemoglobin A1c greater than 7%. This definition was made because those who keep their A1c consistently below 7% have a much, much less chance of developing the complications of diabetes or progression of their diabetes one day.  Complications include but are not limited to increased risk of heart attacks, strokes, blindness, kidney dysfunction, chronic stomach problems, and a permanent, painful neuropathy.   To avoid the above I would like to increase metformin to 1000mg twice a day instead of the way you are currently taking at 500mg twice a day  Please make sure you are looking at all nutritional labels and please continue to reduce your consumption of simple sugars and processed complex carbohydrates. Weight loss is a good recommendation for most of my patients. We need to repeat your A1c in 2-3 months.      Your kidney function is normal.  Your liver studies are normal.  Your thyroid studies are normal.  Your cholesterol levels look good.    If you have any questions or concerns about particular lab results please notify me via [facility] messaging or by calling our office at [phone].    Respectfully,  Yoseph Mercado

## 2023-01-11 NOTE — PROGRESS NOTES
"Chief Complaint   Patient presents with    Recurrent Sinusitis         72 y.o. female presents with chronic right sided facial pain and pressure. Has long seen Dr. Mckeon for sinus complaints. Previous CT sinus and more recent CT head without evidence of sinus disease. She is currently using Astelin, Nasonex and nasal saline sprays. No previous nasal surgery.      Review of Systems     Constitutional: Negative for fatigue and unexpected weight change.   HENT: per HPI.  Eyes: Negative for visual disturbance.   Respiratory: Negative for shortness of breath, hemoptysis  Cardiovascular: Negative for chest pain and palpitations.   Genitourinary: Negative for dysuria and difficulty urinating.   Musculoskeletal: Negative for decreased ROM, back pain.   Skin: Negative for rash, sunburn, itching.   Neurological: Negative for dizziness and seizures.   Hematological: Negative for adenopathy. Does not bruise/bleed easily.   Psychiatric/Behavioral: Negative for agitation. The patient is not nervous/anxious.   Endocrine: Negative for rapid weight loss/weight gain, heat/cold intolerance.     Past Medical History:   Diagnosis Date    Arthritis     Chronic constipation     Depression     Diabetes mellitus     Diabetes mellitus     Hypertension     Osteopenia        Past Surgical History:   Procedure Laterality Date     SECTION      2x    COLONOSCOPY      COLONOSCOPY N/A 2019    Procedure: COLONOSCOPY;  Surgeon: Marshall Contreras MD;  Location: [facility] ([address] [place] [address]);  Service: Endoscopy;  Laterality: N/A;  pt states that she had to be resuscitated after receiving an epidural during childbirth "30 something" years ago.  Ok for 4th floor per Dr. Hernandez-MS    EYE SURGERY Bilateral     cataract removal    HYSTERECTOMY      full hyst     OOPHORECTOMY      TONSILLECTOMY         family history includes Abnormal EKG in her sister; Alcohol abuse in her brother; Alzheimer's disease in her mother; Brain cancer in her son; " Breast cancer in her maternal cousin; Dementia in her sister; Diabetes in her sister; Early death (age of onset: 33) in her son; Fibroids in her daughter; Heart attack in her father and mother; Heart disease in her father and mother; Heart failure in her father; Hyperlipidemia in her sister; Hypertension in her father and sister; Kidney disease in her sister.    Pt  reports that she quit smoking about 24 years ago. Her smoking use included cigarettes. She started smoking about 45 years ago. She has a 25.00 pack-year smoking history. She has never used smokeless tobacco. She reports that she does not drink alcohol and does not use drugs.    Review of patient's allergies indicates:  No Known Allergies     Physical Exam    Vitals:    01/04/23 1340   BP: (!) 144/79   Pulse: 79     Body mass index is 25.66 kg/m².    Physical Exam  Vitals and nursing note reviewed.   Constitutional:       General: She is not in acute distress.     Appearance: She is well-developed. She is not diaphoretic.   HENT:      Head: Normocephalic and atraumatic.      Right Ear: Hearing and external ear normal. No decreased hearing noted.      Left Ear: Hearing and external ear normal. No decreased hearing noted.      Nose: Nose normal. No mucosal edema or rhinorrhea.      Comments: Bilateral nasal cavities inspected, no polyps or purulent fluid seen.     Eyes:      General: Lids are normal.         Right eye: No discharge.         Left eye: No discharge.      Conjunctiva/sclera: Conjunctivae normal.      Pupils: Pupils are equal, round, and reactive to light.   Neck:      Thyroid: No thyroid mass or thyromegaly.      Trachea: Trachea and phonation normal. No tracheal tenderness or tracheal deviation.   Cardiovascular:      Heart sounds: Normal heart sounds.   Pulmonary:      Breath sounds: Normal breath sounds. No stridor.   Abdominal:      Palpations: Abdomen is soft.   Musculoskeletal:      Cervical back: Normal range of motion and neck supple.  No edema or erythema.   Lymphadenopathy:      Cervical: No cervical adenopathy.   Skin:     General: Skin is warm and dry.      Coloration: Skin is not pale.      Findings: No erythema or rash.   Neurological:      Mental Status: She is alert and oriented to person, place, and time.          Assessment     1. Chronic right-sided headaches    2. Allergic rhinitis, unspecified seasonality, unspecified trigger          Plan  In summary, Ms. Green is a 72 year old female with right sided headaches, which I suspect are neurological. Recommend Neurology evaluation. Continue consistent nasal regimen for improved mucociliary clearance. All questions were answered. Return to clinic if new concerns occur.

## 2023-01-12 ENCOUNTER — HOSPITAL ENCOUNTER (OUTPATIENT)
Dept: RADIOLOGY | Facility: OTHER | Age: 73
Discharge: HOME | End: 2023-01-12
Attending: INTERNAL MEDICINE
Payer: MEDICARE

## 2023-01-12 DIAGNOSIS — R91.1 INCIDENTAL LUNG NODULE, > 3MM AND < 8MM: ICD-10-CM

## 2023-01-12 PROCEDURE — 71250 CT THORAX DX C-: CPT | Mod: 26,,, | Performed by: RADIOLOGY

## 2023-01-12 PROCEDURE — 71250 CT CHEST WITHOUT CONTRAST: ICD-10-PCS | Mod: 26,,, | Performed by: RADIOLOGY

## 2023-01-12 PROCEDURE — 71250 CT THORAX DX C-: CPT | Mod: TC

## 2023-01-23 ENCOUNTER — HOSPITAL ENCOUNTER (OUTPATIENT)
Dept: RADIOLOGY | Facility: OTHER | Age: 73
Discharge: HOME | End: 2023-01-23
Attending: OTOLARYNGOLOGY
Payer: MEDICARE

## 2023-01-23 DIAGNOSIS — J32.9 CHRONIC SINUSITIS: ICD-10-CM

## 2023-01-23 PROCEDURE — 70486 CT MEDTRONIC SINUSES WITHOUT: ICD-10-PCS | Mod: 26,,, | Performed by: RADIOLOGY

## 2023-01-23 PROCEDURE — 70486 CT MAXILLOFACIAL W/O DYE: CPT | Mod: TC

## 2023-01-23 PROCEDURE — 70486 CT MAXILLOFACIAL W/O DYE: CPT | Mod: 26,,, | Performed by: RADIOLOGY

## 2023-01-27 ENCOUNTER — OFFICE VISIT (OUTPATIENT)
Dept: UROGYNECOLOGY | Facility: CLINIC | Age: 73
End: 2023-01-27
Payer: MEDICARE

## 2023-01-27 VITALS
SYSTOLIC BLOOD PRESSURE: 140 MMHG | BODY MASS INDEX: 24.36 KG/M2 | WEIGHT: 155.19 LBS | HEIGHT: 67 IN | DIASTOLIC BLOOD PRESSURE: 60 MMHG

## 2023-01-27 DIAGNOSIS — K59.00 CONSTIPATION, UNSPECIFIED CONSTIPATION TYPE: ICD-10-CM

## 2023-01-27 DIAGNOSIS — Z01.419 WELL WOMAN EXAM: Primary | ICD-10-CM

## 2023-01-27 DIAGNOSIS — N30.10 INTERSTITIAL CYSTITIS: ICD-10-CM

## 2023-01-27 DIAGNOSIS — R39.89 BLADDER PAIN: ICD-10-CM

## 2023-01-27 PROCEDURE — 99214 OFFICE O/P EST MOD 30 MIN: CPT | Mod: PBBFAC | Performed by: NURSE PRACTITIONER

## 2023-01-27 PROCEDURE — 99999 PR PBB SHADOW E&M-EST. PATIENT-LVL IV: CPT | Mod: PBBFAC,,, | Performed by: NURSE PRACTITIONER

## 2023-01-27 PROCEDURE — G0101 CA SCREEN;PELVIC/BREAST EXAM: HCPCS | Mod: S$PBB,GZ,, | Performed by: NURSE PRACTITIONER

## 2023-01-27 PROCEDURE — G0101 PR CA SCREEN;PELVIC/BREAST EXAM: ICD-10-PCS | Mod: S$PBB,GZ,, | Performed by: NURSE PRACTITIONER

## 2023-01-27 PROCEDURE — 99999 PR PBB SHADOW E&M-EST. PATIENT-LVL IV: ICD-10-PCS | Mod: PBBFAC,,, | Performed by: NURSE PRACTITIONER

## 2023-01-27 RX ORDER — SOLIFENACIN SUCCINATE 5 MG/1
5 TABLET, FILM COATED ORAL DAILY
Qty: 30 TABLET | Refills: 11 | Status: SHIPPED | OUTPATIENT
Start: 2023-01-27 | End: 2024-01-03

## 2023-01-27 RX ORDER — HYDROXYZINE PAMOATE 25 MG/1
25 CAPSULE ORAL NIGHTLY
Qty: 30 CAPSULE | Refills: 11 | Status: SHIPPED | OUTPATIENT
Start: 2023-01-27 | End: 2023-02-15

## 2023-01-27 NOTE — PATIENT INSTRUCTIONS
Well woman  --up to date    2. Constipation:  --Start daily fiber.  Take 1 tsp of fiber powder (psyllium or other sugar-free powder).  Mix in 8 oz of water.  Take x 3-5 days.  Then, increase fiber by 1 tsp every 3-5 days until stool is easy to pass.  Stop and continue at that dose.   Do not exceed 6 tsps/day.  May also use over the counter stool softener 1-2 x/day.  AVOID laxatives.  --OK to use miralax daily    3 Vaginal atrophy (dryness):  Use 1 gram of estrogen cream in vagina  twice a week     4.  OAB:  -- drink plenty of water!  -- A1C increased, work on decreasing   --avoid dietary irritants (see list)  -- Continue Vesicare 5 mg daily to help with urinary urgency. Takes 2-4 weeks to see if will have effect.  For dry mouth: use Biotene mouthwash, get sour, sugar free lozenge or gum.  May increase constipation. If it does, will try to switch to mirabegron.   --Empty bladder every 2-3 hours even if you don't have the urge.  Empty well: wait a minute, lean forward on toilet.    --Try not to go more frequently than once an hour. When you get the urge to urinate after you have just gone, distract yourself until the urge passes.   --Avoid dietary irritants (see sheet).  Keep diary x 3-5 days to determine your irritants.  --KEGELS: do 10 in AM and 10 in PM, holding each x 10 seconds.  When you feel urge to go, STOP, KEGEL, and when urge has passed, then go to bathroom.    -- cotninue Hydroxyzine at night as needed, may help with nocturia (waking up at night to pee)     5) IC  -- OAB med seems to be keeping  things at bay, stress can increase flares-- as well constipation.  Take vesicare (solifenacin ) daily  --Bladder instillations previously   --Hydrodistention With Cystoscopy: allows physicians to take a much closer look at the bladder wall. While the AUA no longer suggests that it be used as a diagnostic method (unless a diagnosis is in doubt), hydrodistention may provide modest relief in IC symptoms which can  last from three to six months. It can, however, be a more difficult, painful procedure. As a result, it requires careful consideration and discussion.      6)RTC 1 year for follow up visit

## 2023-01-27 NOTE — PROGRESS NOTES
"2023    SUBJECTIVE:   72 y.o. female for annual exam.    Past Medical History:   Diagnosis Date    Arthritis     Chronic constipation     Depression     Diabetes mellitus     Diabetes mellitus     Hypertension     Osteopenia        Past Surgical History:   Procedure Laterality Date     SECTION      2x    COLONOSCOPY      COLONOSCOPY N/A 2019    Procedure: COLONOSCOPY;  Surgeon: Marshall Contreras MD;  Location: [facility] ([address] [place] [address]);  Service: Endoscopy;  Laterality: N/A;  pt states that she had to be resuscitated after receiving an epidural during childbirth "30 something" years ago.  Ok for 4th floor per Dr. Hernandez-MS    EYE SURGERY Bilateral     cataract removal    HYSTERECTOMY      full hyst     OOPHORECTOMY      TONSILLECTOMY         Family History   Problem Relation Age of Onset    Breast cancer Maternal Cousin     Heart attack Mother     Heart disease Mother     Alzheimer's disease Mother     Heart attack Father     Heart disease Father     Heart failure Father     Hypertension Father     Hyperlipidemia Sister     Hypertension Sister     Diabetes Sister     Abnormal EKG Sister     Dementia Sister     Kidney disease Sister     Alcohol abuse Brother     Fibroids Daughter     Brain cancer Son     Early death Son 33    Ovarian cancer Neg Hx     Cervical cancer Neg Hx     Endometrial cancer Neg Hx     Vaginal cancer Neg Hx     Stroke Neg Hx     Colon cancer Neg Hx     Esophageal cancer Neg Hx     Vision loss Neg Hx        Social History     Socioeconomic History    Marital status:    Tobacco Use    Smoking status: Former     Packs/day: 1.00     Years: 25.00     Pack years: 25.00     Types: Cigarettes     Start date: 1978     Quit date: 1999     Years since quittin.0    Smokeless tobacco: Never   Substance and Sexual Activity    Alcohol use: No    Drug use: No    Sexual activity: Not Currently     Partners: Male     Birth control/protection: None       Current Outpatient " "Medications   Medication Sig Dispense Refill    azelastine (ASTELIN) 137 mcg (0.1 %) nasal spray 1 spray (137 mcg total) by Nasal route 2 (two) times daily. 30 mL 0    BD ULTRA-FINE SHORT PEN NEEDLE 31 gauge x 5/16" Ndle USE AS DIRECTED DAILY      calcium carbonate (OS-REDD) 600 mg calcium (1,500 mg) Tab Take 600 mg by mouth 2 (two) times daily with meals.      famotidine (PEPCID) 40 MG tablet Take 40 mg by mouth every morning.      FREESTYLE SHANNON 2 READER Misc       FREESTYLE SHANNON 2 SENSOR Kit USE AS DIRECTED EVERY 2 WEEKS      insulin glargine, TOUJEO, (TOUJEO SOLOSTAR U-300 INSULIN) 300 unit/mL (1.5 mL) InPn pen Inject 12 Units into the skin every evening. 6 pen 2    irbesartan (AVAPRO) 300 MG tablet Take 1 tablet (300 mg total) by mouth every evening. 90 tablet 3    lancets (ONETOUCH DELICA LANCETS) 33 gauge Misc 1 lancet by Misc.(Non-Drug; Combo Route) route 3 (three) times daily. 300 each 3    metFORMIN (GLUCOPHAGE-XR) 500 MG ER 24hr tablet Take 2 tablets (1,000 mg total) by mouth 2 (two) times a day. (Patient taking differently: Take 1,000 mg by mouth 2 (two) times a day. Pt taking 500mg BID) 360 tablet 4    methen-m.blue-s.phos-phsal-hyo (URIBEL) 118-10-40.8-36 mg Cap Take by mouth.      ONETOUCH VERIO TEST STRIPS Strp TEST THREE TIMES DAILY 200 strip 11    pen needle, diabetic (NOVOFINE 32) 32 gauge x 1/4" Ndle use subcutaneously every evening 100 each 11    phenazopyridine (PYRIDIUM) 200 MG tablet Take 200 mg by mouth 3 (three) times daily as needed for Pain.      rosuvastatin (CRESTOR) 10 MG tablet TAKE 1 TABLET BY MOUTH EVERY DAY 90 tablet 2    vitamin D 1000 units Tab Take 1,000 Units by mouth once daily.      aspirin (ECOTRIN) 325 MG EC tablet Take 1 tablet (325 mg total) by mouth once daily. 30 tablet 11    hydrOXYzine pamoate (VISTARIL) 25 MG Cap Take 1 capsule (25 mg total) by mouth nightly. for 20 doses 30 capsule 11    mometasone (NASONEX) 50 mcg/actuation nasal spray 2 sprays by Nasal route once " "daily. (Patient not taking: Reported on 2023) 17 g 0    solifenacin (VESICARE) 5 MG tablet Take 1 tablet (5 mg total) by mouth once daily. 30 tablet 11     No current facility-administered medications for this visit.       Review of patient's allergies indicates:  No Known Allergies    Patient's last menstrual period was 1999.    Well Woman:  Pap:posy hysterectomy  Mammo:2022 normal   Colonoscopy:2019 tortuous colon-normal-- repeat 10 years  Dexa:2022 Osteopenia lumbar spine and both hips.  Decrease in bone mineral density lumbar spine by 4.7%.   Taking calcium and vitamin D    OB History          3    Para   3    Term   3            AB        Living   2         SAB        IAB        Ectopic        Multiple        Live Births                       ROS:  Feeling well.   No dyspnea or chest pain on exertion.    No abdominal pain, change in bowel habits, black or bloody stools.  + constipation-- has not been taking miralax  +UUI -- not using pads.  Voiding every 3-4 hours. Having intermittent IC flare. Taking hydroxyzine nightly.  Taking vesicare intermittently.  Not taking uribel   GYN ROS: no breast pain or new or enlarging lumps on self exam, no vaginal bleeding. Not using vaginal estrogen cream  No neurological complaints.    OBJECTIVE:   The patient appears well, alert, oriented x 3, in no distress.  BP (!) 140/60 (BP Location: Right arm, Patient Position: Sitting, BP Method: Medium (Manual))   Ht 5' 6.5" (1.689 m)   Wt 70.4 kg (155 lb 3.3 oz)   LMP 1999   BMI 24.68 kg/m²   ENT normal.  Neck supple. No adenopathy or thyromegaly. MILVIA.   Normal respiratory effort.   Pulse with regular rate and rhythm.   Abdomen soft without tenderness, guarding, mass or organomegaly.   Extremities show no edema, normal peripheral pulses.   Neurological is normal, no focal findings.    BREAST EXAM: breasts appear normal, no suspicious masses, no skin or nipple changes or axillary " nodes    PELVIC EXAM:   VULVA: normal appearing vulva with no masses, tenderness or lesions,   VAGINA: normal appearing vagina with normal color and discharge, no lesions, atrophic,  CERVIX: surgically absent,   UTERUS: absent,   ADNEXA: no masses,   RECTAL: normal rectal, no masses    ASSESSMENT:   1. Well woman exam        2. Interstitial cystitis  hydrOXYzine pamoate (VISTARIL) 25 MG Cap    solifenacin (VESICARE) 5 MG tablet      3. Bladder pain  hydrOXYzine pamoate (VISTARIL) 25 MG Cap    solifenacin (VESICARE) 5 MG tablet      4. Constipation, unspecified constipation type            PLAN:     Well woman  --up to date    2. Constipation:  --Start daily fiber.  Take 1 tsp of fiber powder (psyllium or other sugar-free powder).  Mix in 8 oz of water.  Take x 3-5 days.  Then, increase fiber by 1 tsp every 3-5 days until stool is easy to pass.  Stop and continue at that dose.   Do not exceed 6 tsps/day.  May also use over the counter stool softener 1-2 x/day.  AVOID laxatives.  --OK to use miralax daily    3 Vaginal atrophy (dryness):  Use 1 gram of estrogen cream in vagina  twice a week     4.  OAB:  -- drink plenty of water!  -- A1C increased, work on decreasing   --avoid dietary irritants (see list)  -- Continue Vesicare 5 mg daily to help with urinary urgency. Takes 2-4 weeks to see if will have effect.  For dry mouth: use Biotene mouthwash, get sour, sugar free lozenge or gum.  May increase constipation. If it does, will try to switch to mirabegron.   --Empty bladder every 2-3 hours even if you don't have the urge.  Empty well: wait a minute, lean forward on toilet.    --Try not to go more frequently than once an hour. When you get the urge to urinate after you have just gone, distract yourself until the urge passes.   --Avoid dietary irritants (see sheet).  Keep diary x 3-5 days to determine your irritants.  --KEGELS: do 10 in AM and 10 in PM, holding each x 10 seconds.  When you feel urge to go, STOP, KEGEL,  and when urge has passed, then go to bathroom.    -- cotninue Hydroxyzine at night as needed, may help with nocturia (waking up at night to pee)     5) IC  -- OAB med seems to be keeping  things at bay, stress can increase flares-- as well constipation.  Take vesicare (solifenacin ) daily  --Bladder instillations previously   --Hydrodistention With Cystoscopy: allows physicians to take a much closer look at the bladder wall. While the AUA no longer suggests that it be used as a diagnostic method (unless a diagnosis is in doubt), hydrodistention may provide modest relief in IC symptoms which can last from three to six months. It can, however, be a more difficult, painful procedure. As a result, it requires careful consideration and discussion.      6)RTC 1 year for annual        30 minutes were spent in face to face time with this patient  90 % of this time was spent in counseling and/or coordination of care    Althea ROD [facility]  Division of Female Pelvic Medicine and Reconstructive Surgery  Department of Obstetrics & Gynecology

## 2023-02-06 ENCOUNTER — TELEPHONE (OUTPATIENT)
Dept: BEHAVIORAL HEALTH | Facility: CLINIC | Age: 73
End: 2023-02-06
Payer: MEDICARE

## 2023-02-07 ENCOUNTER — PES CALL (OUTPATIENT)
Dept: ADMINISTRATIVE | Facility: CLINIC | Age: 73
End: 2023-02-07
Payer: MEDICARE

## 2023-02-12 ENCOUNTER — PATIENT MESSAGE (OUTPATIENT)
Dept: ADMINISTRATIVE | Facility: OTHER | Age: 73
End: 2023-02-12
Payer: MEDICARE

## 2023-02-24 ENCOUNTER — PATIENT MESSAGE (OUTPATIENT)
Dept: OTOLARYNGOLOGY | Facility: CLINIC | Age: 73
End: 2023-02-24
Payer: MEDICARE

## 2023-03-03 ENCOUNTER — OFFICE VISIT (OUTPATIENT)
Dept: URGENT CARE | Facility: CLINIC | Age: 73
End: 2023-03-03
Payer: MEDICARE

## 2023-03-03 VITALS
HEART RATE: 61 BPM | HEIGHT: 66 IN | BODY MASS INDEX: 24.91 KG/M2 | WEIGHT: 155 LBS | RESPIRATION RATE: 20 BRPM | SYSTOLIC BLOOD PRESSURE: 149 MMHG | DIASTOLIC BLOOD PRESSURE: 72 MMHG | TEMPERATURE: 99 F | OXYGEN SATURATION: 100 %

## 2023-03-03 DIAGNOSIS — J06.9 VIRAL URI WITH COUGH: Primary | ICD-10-CM

## 2023-03-03 LAB
CTP QC/QA: YES
SARS-COV-2 AG RESP QL IA.RAPID: NEGATIVE

## 2023-03-03 PROCEDURE — 87811 SARS CORONAVIRUS 2 ANTIGEN POCT, MANUAL READ: ICD-10-PCS | Mod: QW,CR,S$GLB, | Performed by: NURSE PRACTITIONER

## 2023-03-03 PROCEDURE — 99214 PR OFFICE/OUTPT VISIT, EST, LEVL IV, 30-39 MIN: ICD-10-PCS | Mod: CR,S$GLB,, | Performed by: NURSE PRACTITIONER

## 2023-03-03 PROCEDURE — 87811 SARS-COV-2 COVID19 W/OPTIC: CPT | Mod: QW,CR,S$GLB, | Performed by: NURSE PRACTITIONER

## 2023-03-03 PROCEDURE — 99214 OFFICE O/P EST MOD 30 MIN: CPT | Mod: CR,S$GLB,, | Performed by: NURSE PRACTITIONER

## 2023-03-03 RX ORDER — BENZONATATE 100 MG/1
200 CAPSULE ORAL 3 TIMES DAILY PRN
Qty: 40 CAPSULE | Refills: 0 | Status: SHIPPED | OUTPATIENT
Start: 2023-03-03 | End: 2023-03-11 | Stop reason: SDUPTHER

## 2023-03-03 RX ORDER — PROMETHAZINE HYDROCHLORIDE AND DEXTROMETHORPHAN HYDROBROMIDE 6.25; 15 MG/5ML; MG/5ML
5 SYRUP ORAL NIGHTLY PRN
Qty: 118 ML | Refills: 0 | Status: SHIPPED | OUTPATIENT
Start: 2023-03-03 | End: 2023-03-13

## 2023-03-03 NOTE — PATIENT INSTRUCTIONS
Cough syrup may cause drowsiness, take with precautions  Coughing pearls during the day time    You must understand that you've received an Urgent Care treatment only and that you may be released before all your medical problems are known or treated. You, the patient, will arrange for follow up care as instructed.  If your condition worsens we recommend that you receive another evaluation at the emergency room immediately or contact your primary medical clinics after hours call service to discuss your concerns.  Please return here or go to the Emergency Department for any concerns or worsening of condition.

## 2023-03-03 NOTE — PROGRESS NOTES
"Subjective:       Patient ID: Gely Green is a 72 y.o. female.    Vitals:  height is 5' 6" (1.676 m) and weight is 70.3 kg (155 lb). Her temperature is 98.6 °F (37 °C). Her blood pressure is 149/72 (abnormal) and her pulse is 61. Her respiration is 20 and oxygen saturation is 100%.     Chief Complaint: Cough    This is a 72 y.o. female who presents today with a chief complaint of cough and congestion. Symptoms started on Tuesday. Pt has taken otc mediations for symptoms. She states that the cough is keeping her awake at bedtime.         Cough  This is a new problem. The current episode started in the past 7 days. The problem has been unchanged. The cough is Non-productive. Pertinent negatives include no chest pain, chills, fever, nasal congestion or sore throat. Nothing aggravates the symptoms. She has tried nothing for the symptoms. The treatment provided no relief.     Constitution: Negative for chills and fever.   HENT:  Negative for sore throat.    Cardiovascular:  Negative for chest pain.   Respiratory:  Positive for cough.      Past Medical History:   Diagnosis Date    Arthritis     Chronic constipation     Depression     Diabetes mellitus     Diabetes mellitus     Hypertension     Osteopenia        Objective:      Physical Exam   Constitutional: She is oriented to person, place, and time. She appears well-developed. She is cooperative.  Non-toxic appearance. She does not appear ill. No distress.   HENT:   Head: Normocephalic and atraumatic.   Ears:   Right Ear: Hearing, tympanic membrane, external ear and ear canal normal.   Left Ear: Hearing, tympanic membrane, external ear and ear canal normal.   Nose: Nose normal. No mucosal edema, rhinorrhea or nasal deformity. No epistaxis. Right sinus exhibits no maxillary sinus tenderness and no frontal sinus tenderness. Left sinus exhibits no maxillary sinus tenderness and no frontal sinus tenderness.   Mouth/Throat: Uvula is midline, oropharynx is clear and " moist and mucous membranes are normal. No trismus in the jaw. Normal dentition. No uvula swelling. No oropharyngeal exudate, posterior oropharyngeal edema or posterior oropharyngeal erythema.   Eyes: Conjunctivae and lids are normal. No scleral icterus.   Neck: Trachea normal and phonation normal. Neck supple. No edema present. No erythema present. No neck rigidity present.   Cardiovascular: Normal rate, regular rhythm, normal heart sounds and normal pulses.   Pulmonary/Chest: Effort normal and breath sounds normal. No respiratory distress. She has no decreased breath sounds. She has no rhonchi.   No coughing throughout exam   Lungs clear         Comments: No coughing throughout exam   Lungs clear    Abdominal: Normal appearance.   Musculoskeletal: Normal range of motion.         General: No deformity. Normal range of motion.   Neurological: She is alert and oriented to person, place, and time. She exhibits normal muscle tone. Coordination normal.   Skin: Skin is warm, dry, intact, not diaphoretic and not pale.   Psychiatric: Her speech is normal and behavior is normal. Judgment and thought content normal.   Nursing note and vitals reviewed.      Results for orders placed or performed in visit on 03/03/23   SARS Coronavirus 2 Antigen, POCT Manual Read   Result Value Ref Range    SARS Coronavirus 2 Antigen Negative Negative     Acceptable Yes        Assessment:       1. Cough, unspecified type          Plan:         Cough, unspecified type  -     SARS Coronavirus 2 Antigen, POCT Manual Read                   Patient Instructions   Cough syrup may cause drowsiness, take with precautions  Coughing pearls during the day time    You must understand that you've received an Urgent Care treatment only and that you may be released before all your medical problems are known or treated. You, the patient, will arrange for follow up care as instructed.  If your condition worsens we recommend that you receive  another evaluation at the emergency room immediately or contact your primary medical clinics after hours call service to discuss your concerns.  Please return here or go to the Emergency Department for any concerns or worsening of condition.

## 2023-03-08 ENCOUNTER — OFFICE VISIT (OUTPATIENT)
Dept: OTOLARYNGOLOGY | Facility: CLINIC | Age: 73
End: 2023-03-08
Payer: MEDICARE

## 2023-03-08 VITALS
BODY MASS INDEX: 24.94 KG/M2 | TEMPERATURE: 97 F | SYSTOLIC BLOOD PRESSURE: 135 MMHG | DIASTOLIC BLOOD PRESSURE: 76 MMHG | WEIGHT: 155.19 LBS | HEART RATE: 61 BPM | HEIGHT: 66 IN

## 2023-03-08 DIAGNOSIS — K21.9 LARYNGOPHARYNGEAL REFLUX (LPR): Primary | Chronic | ICD-10-CM

## 2023-03-08 DIAGNOSIS — R05.3 CHRONIC COUGH: ICD-10-CM

## 2023-03-08 DIAGNOSIS — J31.0 CHRONIC RHINITIS: Chronic | ICD-10-CM

## 2023-03-08 PROCEDURE — 99214 OFFICE O/P EST MOD 30 MIN: CPT | Mod: 25,S$PBB,, | Performed by: OTOLARYNGOLOGY

## 2023-03-08 PROCEDURE — 99214 OFFICE O/P EST MOD 30 MIN: CPT | Mod: PBBFAC,PN | Performed by: OTOLARYNGOLOGY

## 2023-03-08 PROCEDURE — 31575 DIAGNOSTIC LARYNGOSCOPY: CPT | Mod: PBBFAC,PN | Performed by: OTOLARYNGOLOGY

## 2023-03-08 PROCEDURE — 99999 PR PBB SHADOW E&M-EST. PATIENT-LVL IV: ICD-10-PCS | Mod: PBBFAC,,, | Performed by: OTOLARYNGOLOGY

## 2023-03-08 PROCEDURE — 99214 PR OFFICE/OUTPT VISIT, EST, LEVL IV, 30-39 MIN: ICD-10-PCS | Mod: 25,S$PBB,, | Performed by: OTOLARYNGOLOGY

## 2023-03-08 PROCEDURE — 31575 LARYNGOSCOPY: ICD-10-PCS | Mod: S$PBB,,, | Performed by: OTOLARYNGOLOGY

## 2023-03-08 PROCEDURE — 99999 PR PBB SHADOW E&M-EST. PATIENT-LVL IV: CPT | Mod: PBBFAC,,, | Performed by: OTOLARYNGOLOGY

## 2023-03-08 RX ORDER — IPRATROPIUM BROMIDE 21 UG/1
2 SPRAY, METERED NASAL NIGHTLY
Qty: 30 ML | Refills: 3 | Status: SHIPPED | OUTPATIENT
Start: 2023-03-08 | End: 2023-04-24

## 2023-03-08 RX ORDER — PANTOPRAZOLE SODIUM 40 MG/1
40 TABLET, DELAYED RELEASE ORAL DAILY
Qty: 30 TABLET | Refills: 3 | Status: SHIPPED | OUTPATIENT
Start: 2023-03-08 | End: 2023-03-16

## 2023-03-08 NOTE — PROCEDURES
Laryngoscopy    Date/Time: 3/8/2023 3:00 PM  Performed by: Savannah Rush MD  Authorized by: Savannah Rush MD     Consent Done?:  Yes (Verbal)  Anesthesia:     Local anesthetic:  Lidocaine 2% and Cyril-Synephrine 1/2%  Laryngoscopy:     Areas examined:  Nasal cavities, nasopharynx, oropharynx, hypopharynx, larynx and vocal cords  Nose External:      No external nasal deformity  Nose Intranasal:      Mucosa no polyps     Mucosa ulcers not present     No mucosa lesions present     No septum gross deformity     Turbinates not enlarged  Nasopharynx:      No mucosa lesions     Adenoids not present     Posterior choanae patent     Eustachian tube patent  Larynx/hypopharynx:      No epiglottis lesions     No epiglottis edema     No AE folds lesions     No vocal cord polyps     Equal and normal bilateral     No hypopharynx lesions     No piriform sinus pooling     No piriform sinus lesions     Post cricoid edema (mild)     Post cricoid erythema (mild)

## 2023-03-08 NOTE — PROGRESS NOTES
"  Chief Complaint   Patient presents with    Sinus Problem   .     HPI:Gely Green is a 72 y.o. female who me from prior visits presents today with a history of cough for several months.  She reports in November she had a sinus infection that was treated with Augmentin, prednisone, and Nasonex by an internist.  She states that she continued to have feelings of pressure and congestion and saw 2 separate ENTs.  The most recent visit was with Dr. Dumont in January who ordered a CT scan of the sinuses which was virtually clear.  She notes that the cough is present primarily at night when she lays down.  She describes a globus sensation feeling of something dripping in her throat.  She reports intermittent hoarseness.  She denies dysphagia, odynophagia, throat pain, and otalgia.  There is no hemoptysis or hematemesis. She is breathing well.    She admits to heartburn and reflux.        Past Medical History:   Diagnosis Date    Arthritis     Chronic constipation     Depression     Diabetes mellitus     Diabetes mellitus     Hypertension     Osteopenia      Social History     Socioeconomic History    Marital status:    Tobacco Use    Smoking status: Former     Packs/day: 1.00     Years: 25.00     Pack years: 25.00     Types: Cigarettes     Start date: 1978     Quit date: 1999     Years since quittin.1    Smokeless tobacco: Never   Substance and Sexual Activity    Alcohol use: No    Drug use: No    Sexual activity: Not Currently     Partners: Male     Birth control/protection: None     Past Surgical History:   Procedure Laterality Date     SECTION      2x    COLONOSCOPY      COLONOSCOPY N/A 2019    Procedure: COLONOSCOPY;  Surgeon: Marshall Contreras MD;  Location: [facility] ([address] [place] [address]);  Service: Endoscopy;  Laterality: N/A;  pt states that she had to be resuscitated after receiving an epidural during childbirth "30 something" years ago.  Ok for 4th floor per Dr. Hernandez-MS    EYE " SURGERY Bilateral     cataract removal    HYSTERECTOMY      full hyst 1999    OOPHORECTOMY      TONSILLECTOMY       Family History   Problem Relation Age of Onset    Breast cancer Maternal Cousin     Heart attack Mother     Heart disease Mother     Alzheimer's disease Mother     Heart attack Father     Heart disease Father     Heart failure Father     Hypertension Father     Hyperlipidemia Sister     Hypertension Sister     Diabetes Sister     Abnormal EKG Sister     Dementia Sister     Kidney disease Sister     Alcohol abuse Brother     Fibroids Daughter     Brain cancer Son     Early death Son 33    Ovarian cancer Neg Hx     Cervical cancer Neg Hx     Endometrial cancer Neg Hx     Vaginal cancer Neg Hx     Stroke Neg Hx     Colon cancer Neg Hx     Esophageal cancer Neg Hx     Vision loss Neg Hx            Review of Systems  General: negative for chills, fever or weight loss  Psychological: negative for mood changes or depression  Ophthalmic: negative for blurry vision, photophobia or eye pain  ENT: see HPI  Respiratory: no cough, shortness of breath, or wheezing  Cardiovascular: no chest pain or dyspnea on exertion  Gastrointestinal: no abdominal pain, change in bowel habits, or black/ bloody stools  Musculoskeletal: negative for gait disturbance or muscular weakness  Neurological: no syncope or seizures; no ataxia  Dermatological: negative for puritis,  rash and jaundice  Hematologic/lymphatic: no easy bruising, no new lumps or bumps      Physical Exam:    Vitals:    03/08/23 1532   BP: 135/76   Pulse: 61   Temp: 97.1 °F (36.2 °C)       Constitutional: Well appearing / communicating without difficutly.  NAD.  Eyes: EOM I Bilaterally  Head/Face: Normocephalic.  Negative paranasal sinus pressure/tenderness.  Salivary glands WNL.  House Brackmann I Bilaterally.    Right Ear: Auricle normal appearance. External Auditory Canal within normal limits no lesions or masses,TM w/o masses/lesions/perforations. TM mobility  noted.   Left Ear: Auricle normal appearance. External Auditory Canal within normal limits no lesions or masses,TM w/o masses/lesions/perforations. TM mobility noted.  Nose: No gross nasal septal deviation. Inferior Turbinates 3+ bilaterally. No septal perforation. No masses/lesions. External nasal skin appears normal without masses/lesions.  Oral Cavity: Gingiva/lips within normal limits.  Dentition/gingiva healthy appearing. Mucus membranes moist. Floor of mouth soft, no masses palpated. Oral Tongue mobile. Hard Palate appears normal.    Oropharynx: Base of tongue appears normal. No masses/lesions noted. Tonsillar fossa/pharyngeal wall without lesions. Posterior oropharynx WNL.  Soft palate without masses. Midline uvula.   Neck/Lymphatic: No LAD I-VI bilaterally.  No thyromegaly.  No masses noted on exam.        See separate procedure note for FFL.    Diagnostic studies reviewed:   CT sinus 1/23/2023: Impression:     Minimal mucosal membrane thickening right maxillary sinus.  Appearance improved compared to prior.      Chest CT 1/12/2023: Impression:     No acute process seen.     Stable pulmonary nodules all measuring less than 6 mm.  No additional follow-up required, though patient may benefit from annual screening low-dose chest CT if there is a smoking history.    Assessment:    ICD-10-CM ICD-9-CM    1. Laryngopharyngeal reflux (LPR)  K21.9 478.79       2. Chronic rhinitis  J31.0 472.0       3. Chronic cough  R05.3 786.2         The primary encounter diagnosis was Laryngopharyngeal reflux (LPR). Diagnoses of Chronic rhinitis and Chronic cough were also pertinent to this visit.      Plan:  No orders of the defined types were placed in this encounter.    Recommend nasal saline rinses twice daily  Continue mometasone 2 sprays per nostril daily  Add ipratropium bromide spray 2 sprays per nostril at bedtime     I recommended that the patient start taking  Protonix 40mg PO daily* and provided a prescription.     I  also recommended that the patient refrain from eating within 3 hours of going to bed, to elevate the head of bed very subtly and optimize the impact of gravity on the potential reflux, and to avoid alcohol, caffeine, tobacco, tomato sauce, spicy foods, fried food, and chocolate.      Follow up in 8 weeks to reassess progress with treatment regimen.     Savannah Rush MD

## 2023-03-11 ENCOUNTER — HOSPITAL ENCOUNTER (EMERGENCY)
Facility: OTHER | Age: 73
Discharge: HOME | End: 2023-03-11
Attending: EMERGENCY MEDICINE
Payer: MEDICARE

## 2023-03-11 VITALS
RESPIRATION RATE: 24 BRPM | OXYGEN SATURATION: 99 % | HEIGHT: 66 IN | SYSTOLIC BLOOD PRESSURE: 162 MMHG | WEIGHT: 154 LBS | BODY MASS INDEX: 24.75 KG/M2 | TEMPERATURE: 98 F | HEART RATE: 67 BPM | DIASTOLIC BLOOD PRESSURE: 88 MMHG

## 2023-03-11 DIAGNOSIS — R07.89 CHEST HEAVINESS: ICD-10-CM

## 2023-03-11 DIAGNOSIS — J06.9 VIRAL URI WITH COUGH: ICD-10-CM

## 2023-03-11 DIAGNOSIS — R05.9 COUGH: Primary | ICD-10-CM

## 2023-03-11 LAB
ALBUMIN SERPL BCP-MCNC: 4 G/DL (ref 3.5–5.2)
ALP SERPL-CCNC: 69 U/L (ref 55–135)
ALT SERPL W/O P-5'-P-CCNC: 13 U/L (ref 10–44)
ANION GAP SERPL CALC-SCNC: 10 MMOL/L (ref 8–16)
AST SERPL-CCNC: 26 U/L (ref 10–40)
BASOPHILS # BLD AUTO: 0.03 K/UL (ref 0–0.2)
BASOPHILS NFR BLD: 0.5 % (ref 0–1.9)
BILIRUB SERPL-MCNC: 0.4 MG/DL (ref 0.1–1)
BUN SERPL-MCNC: 11 MG/DL (ref 8–23)
CALCIUM SERPL-MCNC: 9.2 MG/DL (ref 8.7–10.5)
CHLORIDE SERPL-SCNC: 110 MMOL/L (ref 95–110)
CO2 SERPL-SCNC: 22 MMOL/L (ref 23–29)
CREAT SERPL-MCNC: 0.8 MG/DL (ref 0.5–1.4)
CTP QC/QA: YES
DIFFERENTIAL METHOD: ABNORMAL
EOSINOPHIL # BLD AUTO: 0.1 K/UL (ref 0–0.5)
EOSINOPHIL NFR BLD: 1.2 % (ref 0–8)
ERYTHROCYTE [DISTWIDTH] IN BLOOD BY AUTOMATED COUNT: 14.3 % (ref 11.5–14.5)
EST. GFR  (NO RACE VARIABLE): >60 ML/MIN/1.73 M^2
GLUCOSE SERPL-MCNC: 105 MG/DL (ref 70–110)
HCT VFR BLD AUTO: 36.8 % (ref 37–48.5)
HCV AB SERPL QL IA: NEGATIVE
HGB BLD-MCNC: 11.7 G/DL (ref 12–16)
HIV 1+2 AB+HIV1 P24 AG SERPL QL IA: NEGATIVE
IMM GRANULOCYTES # BLD AUTO: 0.01 K/UL (ref 0–0.04)
IMM GRANULOCYTES NFR BLD AUTO: 0.2 % (ref 0–0.5)
LYMPHOCYTES # BLD AUTO: 1.6 K/UL (ref 1–4.8)
LYMPHOCYTES NFR BLD: 24.4 % (ref 18–48)
MCH RBC QN AUTO: 28.2 PG (ref 27–31)
MCHC RBC AUTO-ENTMCNC: 31.8 G/DL (ref 32–36)
MCV RBC AUTO: 89 FL (ref 82–98)
MONOCYTES # BLD AUTO: 0.7 K/UL (ref 0.3–1)
MONOCYTES NFR BLD: 10.8 % (ref 4–15)
NEUTROPHILS # BLD AUTO: 4.1 K/UL (ref 1.8–7.7)
NEUTROPHILS NFR BLD: 62.9 % (ref 38–73)
NRBC BLD-RTO: 0 /100 WBC
PLATELET # BLD AUTO: 233 K/UL (ref 150–450)
PMV BLD AUTO: 10.3 FL (ref 9.2–12.9)
POTASSIUM SERPL-SCNC: 4.4 MMOL/L (ref 3.5–5.1)
PROT SERPL-MCNC: 7.3 G/DL (ref 6–8.4)
RBC # BLD AUTO: 4.15 M/UL (ref 4–5.4)
SARS-COV-2 RDRP RESP QL NAA+PROBE: NEGATIVE
SODIUM SERPL-SCNC: 142 MMOL/L (ref 136–145)
TROPONIN I SERPL DL<=0.01 NG/ML-MCNC: <0.006 NG/ML (ref 0–0.03)
WBC # BLD AUTO: 6.57 K/UL (ref 3.9–12.7)

## 2023-03-11 PROCEDURE — 85025 COMPLETE CBC W/AUTO DIFF WBC: CPT | Performed by: EMERGENCY MEDICINE

## 2023-03-11 PROCEDURE — 87389 HIV-1 AG W/HIV-1&-2 AB AG IA: CPT | Performed by: EMERGENCY MEDICINE

## 2023-03-11 PROCEDURE — 86803 HEPATITIS C AB TEST: CPT | Performed by: EMERGENCY MEDICINE

## 2023-03-11 PROCEDURE — 93010 ELECTROCARDIOGRAM REPORT: CPT | Mod: ,,, | Performed by: INTERNAL MEDICINE

## 2023-03-11 PROCEDURE — 93005 ELECTROCARDIOGRAM TRACING: CPT

## 2023-03-11 PROCEDURE — 84484 ASSAY OF TROPONIN QUANT: CPT | Performed by: EMERGENCY MEDICINE

## 2023-03-11 PROCEDURE — 99285 EMERGENCY DEPT VISIT HI MDM: CPT | Mod: 25

## 2023-03-11 PROCEDURE — 80053 COMPREHEN METABOLIC PANEL: CPT | Performed by: EMERGENCY MEDICINE

## 2023-03-11 PROCEDURE — 93010 EKG 12-LEAD: ICD-10-PCS | Mod: ,,, | Performed by: INTERNAL MEDICINE

## 2023-03-11 RX ORDER — FLUTICASONE PROPIONATE 50 MCG
1 SPRAY, SUSPENSION (ML) NASAL 2 TIMES DAILY PRN
Qty: 15 G | Refills: 0 | Status: SHIPPED | OUTPATIENT
Start: 2023-03-11 | End: 2024-01-03

## 2023-03-11 RX ORDER — BENZONATATE 200 MG/1
200 CAPSULE ORAL 3 TIMES DAILY PRN
Qty: 25 CAPSULE | Refills: 0 | Status: SHIPPED | OUTPATIENT
Start: 2023-03-11 | End: 2024-01-03

## 2023-03-11 NOTE — ED PROVIDER NOTES
"Encounter Date: 3/11/2023    SCRIBE #1 NOTE: I, Lyn Rush, am scribing for, and in the presence of,  Funmi Matthew MD.     History     Chief Complaint   Patient presents with    Cough     Reports persistent dry cough since , states she never felt fatigued until yesterday. Reports sore throat, cough, and a heaviness in her chest. Denies chest pain.      Time seen by provider: 7:30 AM    This is a 72 y.o. female who presents with complaint of cough that started about 12 days ago. She notes that she went to water aerobics and the coughing began that evening. She reports that during the day she can go about performing tasks, however, at night, the cough becomes persistent. She notes that she often has to wake up and sit up while sleeping because of the coughing. Patient states it feels like "phelgm is coming up but nothing comes out".  Patient had otherwise been feeling well but then yesterday started having some chest tightness and fatigue.  The chest tightness has persisted until today.  Patient denies any history of reflux but states that she does have frequent belching and was recently prescribed omeprazole which she started yesterday.  Denies any history of asthma, inhaler use, or smoking.  She denies any fevers/chills, congestion, shortness of breath.  Denies frequent allergies.      The history is provided by the patient.   Review of patient's allergies indicates:  No Known Allergies  Past Medical History:   Diagnosis Date    Arthritis     Chronic constipation     Depression     Diabetes mellitus     Diabetes mellitus     Hypertension     Osteopenia      Past Surgical History:   Procedure Laterality Date     SECTION      2x    COLONOSCOPY      COLONOSCOPY N/A 2019    Procedure: COLONOSCOPY;  Surgeon: Marshall Contreras MD;  Location: [address];  Service: Endoscopy;  Laterality: N/A;  pt states that she had to be resuscitated after receiving an epidural during childbirth "30 " "something" years ago.  Ok for 4th floor per Dr. Hernandez-MS    EYE SURGERY Bilateral     cataract removal    HYSTERECTOMY      full hyst     OOPHORECTOMY      TONSILLECTOMY       Family History   Problem Relation Age of Onset    Breast cancer Maternal Cousin     Heart attack Mother     Heart disease Mother     Alzheimer's disease Mother     Heart attack Father     Heart disease Father     Heart failure Father     Hypertension Father     Hyperlipidemia Sister     Hypertension Sister     Diabetes Sister     Abnormal EKG Sister     Dementia Sister     Kidney disease Sister     Alcohol abuse Brother     Fibroids Daughter     Brain cancer Son     Early death Son 33    Ovarian cancer Neg Hx     Cervical cancer Neg Hx     Endometrial cancer Neg Hx     Vaginal cancer Neg Hx     Stroke Neg Hx     Colon cancer Neg Hx     Esophageal cancer Neg Hx     Vision loss Neg Hx      Social History     Tobacco Use    Smoking status: Former     Packs/day: 1.00     Years: 25.00     Pack years: 25.00     Types: Cigarettes     Start date: 1978     Quit date: 1999     Years since quittin.2    Smokeless tobacco: Never   Substance Use Topics    Alcohol use: No    Drug use: No     Review of Systems   Constitutional:  Negative for chills and fever.   HENT:  Negative for congestion, rhinorrhea and sore throat.    Eyes:  Negative for pain.   Respiratory:  Positive for cough and chest tightness. Negative for shortness of breath.    Cardiovascular:  Negative for chest pain and palpitations.   Gastrointestinal:  Negative for abdominal pain, diarrhea, nausea and vomiting.   Genitourinary:  Negative for dysuria and flank pain.   Musculoskeletal:  Negative for arthralgias, back pain and neck pain.   Skin:  Negative for color change, rash and wound.   Neurological:  Negative for dizziness and headaches.     Physical Exam     Initial Vitals [23 0716]   BP Pulse Resp Temp SpO2   (!) 189/77 77 18 98.2 °F (36.8 °C) 99 %      MAP       --  "        Physical Exam    Nursing note and vitals reviewed.  Constitutional: She appears well-developed and well-nourished.   HENT:   Head: Normocephalic and atraumatic.   Eyes: Conjunctivae are normal.   Neck: Neck supple.   Normal range of motion.  Cardiovascular:  Normal rate, regular rhythm and normal heart sounds.           Pulmonary/Chest: Breath sounds normal. No respiratory distress. She has no wheezes. She has no rales.   Abdominal: Abdomen is soft. Bowel sounds are normal. She exhibits no distension. There is no abdominal tenderness. There is no rebound.   Musculoskeletal:         General: Normal range of motion.      Cervical back: Normal range of motion and neck supple.     Neurological: She is alert and oriented to person, place, and time.   Ambulatory with steady gait.   Skin: Skin is warm and dry. Capillary refill takes less than 2 seconds.       ED Course   Procedures  Labs Reviewed   COMPREHENSIVE METABOLIC PANEL - Abnormal; Notable for the following components:       Result Value    CO2 22 (*)     All other components within normal limits   CBC W/ AUTO DIFFERENTIAL - Abnormal; Notable for the following components:    Hemoglobin 11.7 (*)     Hematocrit 36.8 (*)     MCHC 31.8 (*)     All other components within normal limits   TROPONIN I   HEPATITIS C ANTIBODY    Narrative:     Release to patient->Immediate   HIV 1 / 2 ANTIBODY    Narrative:     Release to patient->Immediate   SARS-COV-2 RDRP GENE     EKG Readings: (Independently Interpreted)   8:08AM:  Rate of 57.  Sinus bradycardia.  Occasional PVCs.  Normal axis.  Normal intervals.  No ST or ischemic changes.   ECG Results              EKG 12-lead (In process)  Result time 03/11/23 10:22:05      In process by Interface, Lab In [facility] (03/11/23 10:22:05)                   Narrative:    Test Reason : R07.89,    Vent. Rate : 060 BPM     Atrial Rate : 060 BPM     P-R Int : 196 ms          QRS Dur : 068 ms      QT Int : 434 ms       P-R-T Axes : 065 018  049 degrees     QTc Int : 434 ms    Sinus rhythm with occasional Premature ventricular complexes  Nonspecific T wave abnormality  Abnormal ECG      Referred By: [email or web address]   SELF           Confirmed By:                                   Imaging Results              X-Ray Chest PA And Lateral (Final result)  Result time 03/11/23 08:06:50      Final result by Catracho Carey MD (03/11/23 08:06:50)                   Impression:      No acute abnormality.      Electronically signed by: Catracho Carey MD  Date:    03/11/2023  Time:    08:06               Narrative:    EXAMINATION:  XR CHEST PA AND LATERAL    CLINICAL HISTORY:  Cough, unspecified    TECHNIQUE:  PA and lateral views of the chest were performed.    COMPARISON:  June 25, 2018    FINDINGS:  Lungs are clear.  No focal consolidation.    No effusion or pneumothorax.    Unremarkable cardiomediastinal silhouette.    No acute bone abnormality.                                    X-Rays:   Independently Interpreted Readings:   Chest X-Ray: Trachea midline.  No cardiomegaly.  No effusion, infiltrate, edema.   Medications - No data to display  Medical Decision Making:   History:   Old Medical Records: I decided to obtain old medical records.  Old Records Summarized: other records and records from clinic visits.  Initial Assessment:   7:30AM:  Patient is a 72-year-old female who presents to the emergency department with a cough.  Patient appears well, nontoxic.  Her lung sounds are clear and she is breathing comfortably with no respiratory distress.  Will plan for labs, x-ray, cardiac eval, will continue to follow and reassess.  Independently Interpreted Test(s):   I have ordered and independently interpreted X-rays - see prior notes.  I have ordered and independently interpreted EKG Reading(s) - see prior notes  Clinical Tests:   Lab Tests: Ordered and Reviewed  Radiological Study: Ordered and Reviewed  Medical Tests: Ordered and Reviewed     9:26AM:  Patient doing  well, remains stable.  Her chest x-ray is clear.  Her labs are otherwise unremarkable.  Her EKG is unremarkable as well.  I have a low suspicion that her pain is cardiac in nature.  I suspect she may have a GERD aspect to her cough, given that she does endorse a history of frequent belching.  I did encourage that she continue her omeprazole.  In the meantime will also do a course of Flonase and Tessalon Perles to see if this alleviates her symptoms.  She did see ENT previously and will have her continue follow up.  I do not feel that further work up in the ED is indicated at this time.  I updated pt regarding results and I counseled pt regarding supportive care measures.  I have discussed with the pt ED return warnings and need for close PCP f/u.  Pt agreeable to plan and all questions answered.  I feel that pt is stable for discharge and management as an outpatient and no further intervention is needed at this time.  Pt is comfortable returning to the ED if needed.  Will DC home in stable condition.       Scribe Attestation:   Scribe #1: I performed the above scribed service and the documentation accurately describes the services I performed. I attest to the accuracy of the note.            Physician Attestation for Scribe: I, Funmi Matthew, reviewed documentation as scribed in my presence, which is both accurate and complete.        Clinical Impression:   Final diagnoses:  [R05.9] Cough (Primary)  [R07.89] Chest heaviness        ED Disposition Condition    Discharge Stable          ED Prescriptions       Medication Sig Dispense Start Date End Date Auth. Provider    fluticasone propionate (FLONASE) 50 mcg/actuation nasal spray 1 spray (50 mcg total) by Each Nostril route 2 (two) times daily as needed for Rhinitis. 15 g 3/11/2023 -- Funmi Matthew MD    benzonatate (TESSALON) 200 MG capsule Take 1 capsule (200 mg total) by mouth 3 (three) times daily as needed for Cough. 25 capsule 3/11/2023 -- Funmi Matthew MD           Follow-up Information       Follow up With Specialties Details Why Contact Info    Primary Care Physician                 Funmi Matthew MD  03/11/23 [id number]

## 2023-03-11 NOTE — ED TRIAGE NOTES
Pt presents to ED with c/o non-productive cough that began on Feb 28th worst yesterday. Pt was seen at  on Feb 28th -COVID and prescribed promethazine and benzonatate with no relief of symptoms.  -SOB, -CP, -Voice changes. AAOx4, NAD noted. ED workup in progress. VSS. Safety measures taken. Will continue to monitor.

## 2023-03-11 NOTE — DISCHARGE INSTRUCTIONS
We have prescribed medication for your cough.  Please fill and take as directed.    Please return to the ER if you have chest pain, difficulty breathing, fevers, altered mental status, dizziness, weakness, or any other concerns.      Follow up with your primary care physician.

## 2023-03-13 ENCOUNTER — TELEPHONE (OUTPATIENT)
Dept: INTERNAL MEDICINE | Facility: CLINIC | Age: 73
End: 2023-03-13

## 2023-03-13 NOTE — TELEPHONE ENCOUNTER
Returned pt's call.  Pt has been having cough since 2/28/23. She went to UC on 3/3/23 and then to ER on 3/11/23 as it was worse and she was needing extra pillows to try and sleep, also had chest tightness then. Pt is still having cough and is hoarse. Denies SOB. Made appt for pt to see PCP on 3/16/23 at 11:40 and held extra time due to needed appt length at 1:40.

## 2023-03-13 NOTE — TELEPHONE ENCOUNTER
----- Message from Austen Smith sent at 3/13/2023  8:09 AM CDT -----  Pt would like to be called back asap regarding questions concerning her current condition(Chronic cough).  ED visit on 3/11/23  Pt can be reached at [phone].    Thanks

## 2023-03-16 ENCOUNTER — OFFICE VISIT (OUTPATIENT)
Dept: INTERNAL MEDICINE | Facility: CLINIC | Age: 73
End: 2023-03-16
Attending: INTERNAL MEDICINE
Payer: MEDICARE

## 2023-03-16 ENCOUNTER — PATIENT MESSAGE (OUTPATIENT)
Dept: INTERNAL MEDICINE | Facility: CLINIC | Age: 73
End: 2023-03-16

## 2023-03-16 VITALS
DIASTOLIC BLOOD PRESSURE: 74 MMHG | WEIGHT: 149.5 LBS | HEIGHT: 66 IN | BODY MASS INDEX: 24.03 KG/M2 | OXYGEN SATURATION: 99 % | SYSTOLIC BLOOD PRESSURE: 136 MMHG | HEART RATE: 70 BPM

## 2023-03-16 DIAGNOSIS — Z87.891 FORMER SMOKER: ICD-10-CM

## 2023-03-16 DIAGNOSIS — Z79.4 TYPE 2 DIABETES MELLITUS WITHOUT COMPLICATION, WITH LONG-TERM CURRENT USE OF INSULIN: ICD-10-CM

## 2023-03-16 DIAGNOSIS — F43.23 ADJUSTMENT DISORDER WITH MIXED ANXIETY AND DEPRESSED MOOD: ICD-10-CM

## 2023-03-16 DIAGNOSIS — R05.2 SUBACUTE COUGH: Primary | ICD-10-CM

## 2023-03-16 DIAGNOSIS — J43.2 CENTRILOBULAR EMPHYSEMA: ICD-10-CM

## 2023-03-16 DIAGNOSIS — E11.9 TYPE 2 DIABETES MELLITUS WITHOUT COMPLICATION, WITH LONG-TERM CURRENT USE OF INSULIN: ICD-10-CM

## 2023-03-16 DIAGNOSIS — Z01.00 ROUTINE EYE EXAM: ICD-10-CM

## 2023-03-16 PROCEDURE — 99215 OFFICE O/P EST HI 40 MIN: CPT | Mod: PBBFAC | Performed by: INTERNAL MEDICINE

## 2023-03-16 PROCEDURE — 99999 PR PBB SHADOW E&M-EST. PATIENT-LVL V: ICD-10-PCS | Mod: PBBFAC,,, | Performed by: INTERNAL MEDICINE

## 2023-03-16 PROCEDURE — 99999 PR PBB SHADOW E&M-EST. PATIENT-LVL V: CPT | Mod: PBBFAC,,, | Performed by: INTERNAL MEDICINE

## 2023-03-16 PROCEDURE — 99214 OFFICE O/P EST MOD 30 MIN: CPT | Mod: S$PBB,,, | Performed by: INTERNAL MEDICINE

## 2023-03-16 PROCEDURE — 99214 PR OFFICE/OUTPT VISIT, EST, LEVL IV, 30-39 MIN: ICD-10-PCS | Mod: S$PBB,,, | Performed by: INTERNAL MEDICINE

## 2023-03-16 RX ORDER — OMEPRAZOLE 20 MG/1
20 CAPSULE, DELAYED RELEASE ORAL
COMMUNITY
Start: 2023-03-09

## 2023-03-16 NOTE — PROGRESS NOTES
"Subjective:       Patient ID: Gely Green is a 72 y.o. female.    Chief Complaint: Cough and ER F/U    Here for routine f/u    Recent URI symptoms and cough since 2/28/23. Seen in ED and given tessaolon and rhinitis tx to cover her URI, nasal symptoms but also rec to continue to address underlying reflux. She is a former smoker. CT Chest done 01/12/2023 "Stable pulmonary nodules all measuring less than 6 mm.  No additional follow-up required, though patient may benefit from annual screening low-dose chest CT if there is a smoking history." She weas seen by ENT prior to ED visit on 3/8/23 and "The primary encounter diagnosis was Laryngopharyngeal reflux (LPR). Diagnoses of Chronic rhinitis and Chronic cough were also pertinent to this visit."  Cough improving a little    Tolerating increase in metformin 1g BID.      ### DM ###  Endocrine at [facility]. Was having lows so her night time was decreased to 10 units.  Eye MD [place]  She likes cakes and confections       HGBA1C                   7.2 (H)             03/25/2022 01:02 PM        HGBA1C                   6.9 (H)             10/22/2021 12:33 PM        HGBA1C                   6.2 (H)             03/23/2021 08:40 AM        HGBA1C                   6.2 (H)             03/23/2021 08:40 AM        HGBA1C                   6.3 (H)             12/21/2020 03:00 PM        HGBA1C                   6.1 (H)             06/02/2020 09:43 AM        HGBA1C                   6.4 (H)             11/18/2019 08:45 AM        HGBA1C                   7.1 (H)             06/19/2019 02:30 PM        HGBA1C                   6.8 (H)             12/31/2018 01:48 PM        HGBA1C                   6.3 (H)             05/01/2018 10:49 AM   5/12/22 Reports having eye exam one month prior    Prior HPI:  ENT earlier this year for chronic rhinitis and suggested regular use of flonase, astelin and nasal rinses  Patient presents today for routine evaluation, physical, and labs. Patient has " "no major concerns or complaints today.      Prior HPI  Belching for the past 2 years. Tx by GI with 2 meds without improvement. She drinks carbonated beverages. Denies unexplained weight loss, dysphagia or sensation of things sticking in throat, BRBPR, melena, night sweats.     Trochanteric bursitis so far responding well to PT. Patient would prefer to avoid any injections at this time and stick to conservative management.        #### PVCs ####  04/06 to 05/05/2021 Event monitor.  The predominant rhythm was sinus.  Heart rates  beats per minute.  There were 13 transmitted events.  The 2 symptomatic transmitted events which were done for an unknown symptom.  Each of these showed sinus rhythm at 80-90 beats per minute.  The automatic tracings all showed sinus rhythm, frequently with premature ventricular contractions.  Heart rates  beats per minute.Impression:  Sinus rhythm with single PVCs.  5/12/22 When she lays on her left side in bed she still feels her heart beat. Denies CP at rest or with exertion, dizziness with exertion, shortness of breath out of proportion to level of activity, orthopnea, PND, or LE edema.           ### IC ###  Ubrel and urispas and instillations with urologist     ### Facial numbness ###  12/27/20 developed acute onset of numbness of top lip and left perioral with radiation to left maxillary region and left pre auricular. She has a left sided neck discomfort that is different in quality and behaves separately. Her left face and lip numbness. She was admitted to [facility] and had normal CT head. MRI brain 12/2/20 "Scattered foci of T2/FLAIR signal hyperintensity in supratentorial white matter. While nonspecific, findings likely relate to sequela of chronic microvascular ischemic change although findings can be seen in the setting of migraine headaches and as the residua from inflammatory and traumatic insults to the brain. Clinical correlation is advised" MRA neck and brain WNL. " "  Had subsequent U/S as outpt despite normal MRA neck that was also normal. No pain. Hx of left hand numbness with normal EMG 2017.    -Neuro Dr Torres writes "Without involvement of the teeth or gums this seems to be less likely related to a central cause.  Bertha allergies not completely clear.  Patient should however have treatment as if this is a TIA and will be given high dose aspirin for secondary prevention."          Review of Systems   Constitutional:  Negative for chills, fatigue, fever and unexpected weight change.   HENT:  Negative for ear pain, hearing loss, postnasal drip, tinnitus, trouble swallowing and voice change.    Respiratory:  Positive for cough. Negative for chest tightness, shortness of breath and wheezing.    Cardiovascular:  Negative for chest pain, palpitations and leg swelling.   Gastrointestinal:  Negative for abdominal pain, blood in stool, diarrhea, nausea and vomiting.   Endocrine: Negative for polydipsia, polyphagia and polyuria.   Genitourinary:  Negative for difficulty urinating, dysuria, hematuria and vaginal bleeding.   Skin:  Negative for rash.   Allergic/Immunologic: Negative for food allergies.   Neurological:  Negative for dizziness, numbness and headaches.   Hematological:  Does not bruise/bleed easily.   Psychiatric/Behavioral:  The patient is not nervous/anxious.      Objective:      Vitals:    03/16/23 1147 03/16/23 1203   BP: (!) 140/78 136/74   Pulse: 70    SpO2: 99%    Weight: 67.8 kg (149 lb 7.6 oz)    Height: 5' 6" (1.676 m)       Physical Exam  Vitals and nursing note reviewed.   Constitutional:       General: She is not in acute distress.     Appearance: Normal appearance. She is well-developed.   HENT:      Head: Normocephalic and atraumatic.      Mouth/Throat:      Pharynx: No oropharyngeal exudate.   Eyes:      General: No scleral icterus.     Conjunctiva/sclera: Conjunctivae normal.      Pupils: Pupils are equal, round, and reactive to light.   Neck:      " Thyroid: No thyromegaly.   Cardiovascular:      Rate and Rhythm: Normal rate and regular rhythm.      Heart sounds: Normal heart sounds. No murmur heard.  Pulmonary:      Effort: Pulmonary effort is normal.      Breath sounds: Normal breath sounds. No wheezing or rales.   Abdominal:      General: There is no distension.   Musculoskeletal:         General: No tenderness.   Lymphadenopathy:      Cervical: No cervical adenopathy.   Skin:     General: Skin is warm and dry.   Neurological:      Mental Status: She is alert and oriented to person, place, and time.   Psychiatric:         Behavior: Behavior normal.       Assessment:       1. Subacute cough    2. Routine eye exam    3. Former smoker    4. Type 2 diabetes mellitus without complication, with long-term current use of insulin    5. Adjustment disorder with mixed anxiety and depressed mood    6. Centrilobular emphysema          Plan:       Gely was seen today for cough and er f/u.    Diagnoses and all orders for this visit:    Subacute cough   Discussed upper airway causes.   Routine eye exam    Former smoker     Type 2 diabetes mellitus without complication, with long-term current use of insulin  -     Microalbumin/Creatinine Ratio, Urine; Future  -     Ambulatory referral/consult to Optometry; Future  -     Diabetic Eye Screening Photo; Future    Adjustment disorder with mixed anxiety and depressed mood  -     Ambulatory referral/consult to Primary Care Behavioral Health (Non-Opioids); Future    Centrilobular emphysema   Controlled and asymptomatic.  Continue current Rx regimen.           Yoseph Mercado MD  Internal Medicine-[facility]        Side effects of medication(s) were discussed in detail and patient voiced understanding.  Patient will call back for any issues or complications.

## 2023-04-03 ENCOUNTER — PATIENT MESSAGE (OUTPATIENT)
Dept: UROGYNECOLOGY | Facility: CLINIC | Age: 73
End: 2023-04-03
Payer: MEDICARE

## 2023-04-03 DIAGNOSIS — N30.10 INTERSTITIAL CYSTITIS: Primary | ICD-10-CM

## 2023-04-03 RX ORDER — HYDROXYZINE HYDROCHLORIDE 25 MG/1
25 TABLET, FILM COATED ORAL NIGHTLY
Qty: 30 TABLET | Refills: 11 | Status: SHIPPED | OUTPATIENT
Start: 2023-04-03 | End: 2023-09-06 | Stop reason: SDUPTHER

## 2023-04-12 ENCOUNTER — PES CALL (OUTPATIENT)
Dept: ADMINISTRATIVE | Facility: CLINIC | Age: 73
End: 2023-04-12
Payer: MEDICARE

## 2023-04-14 ENCOUNTER — PATIENT MESSAGE (OUTPATIENT)
Dept: BEHAVIORAL HEALTH | Facility: CLINIC | Age: 73
End: 2023-04-14
Payer: MEDICARE

## 2023-04-24 ENCOUNTER — OFFICE VISIT (OUTPATIENT)
Dept: INTERNAL MEDICINE | Facility: CLINIC | Age: 73
End: 2023-04-24
Attending: INTERNAL MEDICINE
Payer: MEDICARE

## 2023-04-24 VITALS
BODY MASS INDEX: 24.8 KG/M2 | OXYGEN SATURATION: 96 % | HEART RATE: 75 BPM | HEIGHT: 66 IN | WEIGHT: 154.31 LBS | SYSTOLIC BLOOD PRESSURE: 130 MMHG | DIASTOLIC BLOOD PRESSURE: 70 MMHG

## 2023-04-24 DIAGNOSIS — Z79.4 TYPE 2 DIABETES MELLITUS WITHOUT COMPLICATION, WITH LONG-TERM CURRENT USE OF INSULIN: Primary | ICD-10-CM

## 2023-04-24 DIAGNOSIS — E11.9 TYPE 2 DIABETES MELLITUS WITHOUT COMPLICATION, WITH LONG-TERM CURRENT USE OF INSULIN: Primary | ICD-10-CM

## 2023-04-24 DIAGNOSIS — E11.59 HYPERTENSION ASSOCIATED WITH TYPE 2 DIABETES MELLITUS: ICD-10-CM

## 2023-04-24 DIAGNOSIS — I15.2 HYPERTENSION ASSOCIATED WITH TYPE 2 DIABETES MELLITUS: ICD-10-CM

## 2023-04-24 PROCEDURE — 99214 OFFICE O/P EST MOD 30 MIN: CPT | Mod: PBBFAC | Performed by: INTERNAL MEDICINE

## 2023-04-24 PROCEDURE — 99999 PR PBB SHADOW E&M-EST. PATIENT-LVL IV: ICD-10-PCS | Mod: PBBFAC,,, | Performed by: INTERNAL MEDICINE

## 2023-04-24 PROCEDURE — 99213 OFFICE O/P EST LOW 20 MIN: CPT | Mod: S$PBB,,, | Performed by: INTERNAL MEDICINE

## 2023-04-24 PROCEDURE — 99999 PR PBB SHADOW E&M-EST. PATIENT-LVL IV: CPT | Mod: PBBFAC,,, | Performed by: INTERNAL MEDICINE

## 2023-04-24 PROCEDURE — 99213 PR OFFICE/OUTPT VISIT, EST, LEVL III, 20-29 MIN: ICD-10-PCS | Mod: S$PBB,,, | Performed by: INTERNAL MEDICINE

## 2023-04-24 NOTE — PROGRESS NOTES
"Subjective:       Patient ID: Gely Green is a 72 y.o. female.    Chief Complaint: belching, Weight Loss, and [facility] paperwork    Here for urgent visit    Traveling to [place] for a [employer] event and needs signature and med list. No change in health since last visit.      Review of Systems   Constitutional:  Negative for chills, fatigue, fever and unexpected weight change.   HENT:  Negative for ear pain, hearing loss, postnasal drip, tinnitus, trouble swallowing and voice change.    Respiratory:  Negative for cough, chest tightness, shortness of breath and wheezing.    Cardiovascular:  Negative for chest pain, palpitations and leg swelling.   Gastrointestinal:  Negative for abdominal pain, blood in stool, diarrhea, nausea and vomiting.   Endocrine: Negative for polydipsia, polyphagia and polyuria.   Genitourinary:  Negative for difficulty urinating, dysuria, hematuria and vaginal bleeding.   Skin:  Negative for rash.   Allergic/Immunologic: Negative for food allergies.   Neurological:  Negative for dizziness, numbness and headaches.   Hematological:  Does not bruise/bleed easily.   Psychiatric/Behavioral:  The patient is not nervous/anxious.      Objective:      Vitals:    04/24/23 1352   BP: 130/70   Pulse: 75   SpO2: 96%   Weight: 70 kg (154 lb 5.2 oz)   Height: 5' 6" (1.676 m)      Physical Exam  Vitals and nursing note reviewed.   Constitutional:       General: She is not in acute distress.     Appearance: Normal appearance. She is well-developed.   HENT:      Head: Normocephalic and atraumatic.      Mouth/Throat:      Pharynx: No oropharyngeal exudate.   Eyes:      General: No scleral icterus.     Conjunctiva/sclera: Conjunctivae normal.      Pupils: Pupils are equal, round, and reactive to light.   Neck:      Thyroid: No thyromegaly.   Cardiovascular:      Rate and Rhythm: Normal rate and regular rhythm.      Heart sounds: Normal heart sounds. No murmur heard.  Pulmonary:      Effort: Pulmonary " effort is normal.      Breath sounds: Normal breath sounds. No wheezing or rales.   Abdominal:      General: There is no distension.   Musculoskeletal:         General: No tenderness.   Lymphadenopathy:      Cervical: No cervical adenopathy.   Skin:     General: Skin is warm and dry.   Neurological:      Mental Status: She is alert and oriented to person, place, and time.   Psychiatric:         Behavior: Behavior normal.       Assessment:       1. Type 2 diabetes mellitus without complication, with long-term current use of insulin    2. Hypertension associated with type 2 diabetes mellitus        Plan:       Gely was seen today for belching, weight loss and [facility] paperwork.    Diagnoses and all orders for this visit:    Type 2 diabetes mellitus without complication, with long-term current use of insulin   Controlled and asymptomatic.  Continue current Rx regimen.    Hypertension associated with type 2 diabetes mellitus   Controlled and asymptomatic.  Continue current Rx regimen.           Yoseph Mercado MD  Internal Medicine-[facility]        Side effects of medication(s) were discussed in detail and patient voiced understanding.  Patient will call back for any issues or complications.

## 2023-04-26 DIAGNOSIS — I15.2 HYPERTENSION ASSOCIATED WITH TYPE 2 DIABETES MELLITUS: ICD-10-CM

## 2023-04-26 DIAGNOSIS — E11.59 HYPERTENSION ASSOCIATED WITH TYPE 2 DIABETES MELLITUS: ICD-10-CM

## 2023-04-26 RX ORDER — IRBESARTAN 300 MG/1
300 TABLET ORAL NIGHTLY
Qty: 90 TABLET | Refills: 3 | Status: SHIPPED | OUTPATIENT
Start: 2023-04-26 | End: 2024-04-25

## 2023-04-26 NOTE — TELEPHONE ENCOUNTER
Care Due:                  Date            Visit Type   Department     Provider  --------------------------------------------------------------------------------                                EP -                              PRIMARY      [facility] INTERNAL  Last Visit: 04-      CARE ([facility])   MEDICINE       Yoseph Dallas  Next Visit: None Scheduled  None         None Found                                                            Last  Test          Frequency    Reason                     Performed    Due Date  --------------------------------------------------------------------------------    HBA1C.......  6 months...  insulin, metFORMIN.......  01- 07-    [facility] Embedded Care Gaps. Reference number: [id number]. 4/26/2023   7:56:49 AM CDT

## 2023-05-05 ENCOUNTER — PES CALL (OUTPATIENT)
Dept: ADMINISTRATIVE | Facility: CLINIC | Age: 73
End: 2023-05-05
Payer: MEDICARE

## 2023-05-17 ENCOUNTER — TELEPHONE (OUTPATIENT)
Dept: BEHAVIORAL HEALTH | Facility: CLINIC | Age: 73
End: 2023-05-17
Payer: MEDICARE

## 2023-05-17 NOTE — PROGRESS NOTES
Behavioral Health Community Health Worker  Initial Assessment  Completed by:  Althea Swanson    Date:  5/17/2023    Patient Enrollment in Behavioral Health Program:  Patient verbalized understanding of Behavioral Health Integration services to include:  Patient understands that CHW, LCSW, PharmD and consulting Psychiatrist are members of the care team working collaboratively with his/her primary care provider: Yes  Patient understands that activation of their [facility] patient portal account is required for accessing the full scope of team services: Yes  Patient understands that some counseling sessions may occur via video: Yes  Clinic visits with the psychiatrist may be subject to a co-pay based on your insurance: Yes  Patient consents to enroll in BHI program: Yes    Assessments     Single Item Health Literacy Scale:  How often do you need to have someone help you read instructions, pamphlets or other written material from your doctor or pharmacy?: Rarely    Promis 10:  Promis 10 Responses  In general, would you say your health is: Good  In general, would you say your quality of life is: Poor  In general, how would you rate your physical health?: Very good  In general, how would you rate your mental health, including your mood and your ability to think?: Poor  In general, how would you rate your satisfaction with your social activities and relationships?: Fair  In general, please rate how well you carry out your usual social activities and roles. (This includes activities at home, at work and in your community, and responsibilities as a parent, child, spouse, employee, friend, etc.): Good  To what extent are you able to carry out your everyday physical activities such as walking, climbing stairs, carrying groceries, or moving a chair? : Completely  How often have you been bothered by emotional problems such as feeling anxious, depressed or irritable?: Often  In the past 7 days, how would you rate your fatigue on  average?: Moderate  In the past 7 days, on a scale of 0 to 10 (where 0 is no pain and 10 is the worst pain imaginable) how would you rate your pain on average?: 2  Global Physical Health: 14  Global Mental health Score: 8    Depression PHQ:  PHQ9 2023   Total Score 11         Generalized Anxiety Disorder 7-Item Scale:  GAD7 2023   1. Feeling nervous, anxious, or on edge? 2   2. Not being able to stop or control worrying? 3   3. Worrying too much about different things? 3   4. Trouble relaxing? 3   5. Being so restless that it is hard to sit still? 3   6. Becoming easily annoyed or irritable? 3   7. Feeling afraid as if something awful might happen? 3   8. If you checked off any problems, how difficult have these problems made it for you to do your work, take care of things at home, or get along with other people? 2   HEBERT-7 Score 20       History     Social History     Socioeconomic History    Marital status:    Tobacco Use    Smoking status: Former     Packs/day: 1.00     Years: 25.00     Pack years: 25.00     Types: Cigarettes     Start date: 1978     Quit date: 1999     Years since quittin.3    Smokeless tobacco: Never   Substance and Sexual Activity    Alcohol use: No    Drug use: No    Sexual activity: Not Currently     Partners: Male     Birth control/protection: None       Call Summary     Patient called in with interest in [facility] program. Patient has been enrolled in program and scheduled to see Genny Sanders LCSW on 23.

## 2023-05-19 ENCOUNTER — PATIENT OUTREACH (OUTPATIENT)
Dept: BEHAVIORAL HEALTH | Facility: CLINIC | Age: 73
End: 2023-05-19
Payer: MEDICARE

## 2023-05-19 DIAGNOSIS — F33.0 MILD EPISODE OF RECURRENT MAJOR DEPRESSIVE DISORDER: Primary | ICD-10-CM

## 2023-05-19 PROCEDURE — 99484 CARE MGMT SVC BHVL HLTH COND: CPT | Mod: S$PBB,,, | Performed by: SOCIAL WORKER

## 2023-05-19 PROCEDURE — 99484 PR CARE MGMT SVCS, BEHAVIORAL HEALTH, >= 20 MIN: ICD-10-PCS | Mod: S$PBB,,, | Performed by: SOCIAL WORKER

## 2023-05-22 ENCOUNTER — PATIENT MESSAGE (OUTPATIENT)
Dept: BEHAVIORAL HEALTH | Facility: CLINIC | Age: 73
End: 2023-05-22
Payer: MEDICARE

## 2023-05-22 ENCOUNTER — OFFICE VISIT (OUTPATIENT)
Dept: BEHAVIORAL HEALTH | Facility: CLINIC | Age: 73
End: 2023-05-22
Attending: INTERNAL MEDICINE
Payer: MEDICARE

## 2023-05-22 DIAGNOSIS — F43.23 ADJUSTMENT DISORDER WITH MIXED ANXIETY AND DEPRESSED MOOD: ICD-10-CM

## 2023-05-22 PROCEDURE — 90791 PSYCH DIAGNOSTIC EVALUATION: CPT | Mod: ,,, | Performed by: SOCIAL WORKER

## 2023-05-22 PROCEDURE — 90791 PR PSYCHIATRIC DIAGNOSTIC EVALUATION: ICD-10-PCS | Mod: ,,, | Performed by: SOCIAL WORKER

## 2023-05-22 NOTE — PROGRESS NOTES
Primary Care Behavioral Health Integration: Initial  Date:  2023  Referral Source:  Yoseph Dallas MD  Type of Visit:  In person  Length of Appointment: 45    .  History of Present Illness:  Gely Green, a 72 y.o. female with history of Adjustment disorders; with mixed emotional features [F43.23] referred by Yoseph Dallas MD.  Patient was seen, examined and chart was reviewed.    Met with patient. PT stated that she feels anxious and depressed/sad she feels overwhelmed and hopeless. PT does not feel happy and has not been happy for quite some time. PT feels like life passed her by and she did not do anything of substance. PT feels like her life just went and she woke up every morning and just did things to pass the day away. Eight years ago, PT son  due illness. PT has been through therapy and grief support when he passed away at 33 yrs old. PT feels sad/depressed everyday for the past 2 years.    Current symptoms:  Depression: dysphoric mood, anhedonia, insomnia, worthlessness/guilt, hopelessness, difficulty concentrating, decreased appetite, and SI.  Anxiety: excessive worrying, restlessness, obsessions/compulsions, and flashbacks/nightmares.  Sleep: early morning awakening and frequent night time awakening.  Lorenza:  increased goal directed activity  and racing thoughts or rumination.  Psychosis: denies .    PHQ9 2023   Total Score 11     GAD7 2023   1. Feeling nervous, anxious, or on edge? 2   2. Not being able to stop or control worrying? 3   3. Worrying too much about different things? 3   4. Trouble relaxing? 3   5. Being so restless that it is hard to sit still? 3   6. Becoming easily annoyed or irritable? 3   7. Feeling afraid as if something awful might happen? 3   8. If you checked off any problems, how difficult have these problems made it for you to do your work, take care of things at home, or get along with other people? 2   HEBERT-7 Score 20       Risk  "assessment:  Patient reports suicidal ideation: PT think about what life would be like if she was not here. PT has never created a plan, the thoughts just pass by.  Patient reports no homicidal ideation  Patient reports no self-injurious behavior  Patient reports no violent behavior    Past Medical History:   Diagnosis Date    Arthritis     Chronic constipation     Depression     Diabetes mellitus     Diabetes mellitus     Hypertension     Osteopenia          Current Outpatient Medications:     aspirin (ECOTRIN) 325 MG EC tablet, Take 1 tablet (325 mg total) by mouth once daily., Disp: 30 tablet, Rfl: 11    BD ULTRA-FINE SHORT PEN NEEDLE 31 gauge x 5/16" Ndle, USE AS DIRECTED DAILY, Disp: , Rfl:     benzonatate (TESSALON) 200 MG capsule, Take 1 capsule (200 mg total) by mouth 3 (three) times daily as needed for Cough., Disp: 25 capsule, Rfl: 0    calcium carbonate (OS-REDD) 600 mg calcium (1,500 mg) Tab, Take 600 mg by mouth 2 (two) times daily with meals., Disp: , Rfl:     famotidine (PEPCID) 40 MG tablet, Take 40 mg by mouth every morning., Disp: , Rfl:     fluticasone propionate (FLONASE) 50 mcg/actuation nasal spray, 1 spray (50 mcg total) by Each Nostril route 2 (two) times daily as needed for Rhinitis. (Patient not taking: Reported on 3/16/2023), Disp: 15 g, Rfl: 0    FREESTYLE SHANNON 2 READER Misc, , Disp: , Rfl:     FREESTYLE SHANNON 2 SENSOR Kit, USE AS DIRECTED EVERY 2 WEEKS, Disp: , Rfl:     hydrOXYzine HCL (ATARAX) 25 MG tablet, Take 1 tablet (25 mg total) by mouth every evening., Disp: 30 tablet, Rfl: 11    insulin glargine, TOUJEO, (TOUJEO SOLOSTAR U-300 INSULIN) 300 unit/mL (1.5 mL) InPn pen, Inject 12 Units into the skin every evening., Disp: 6 pen, Rfl: 2    irbesartan (AVAPRO) 300 MG tablet, Take 1 tablet (300 mg total) by mouth every evening., Disp: 90 tablet, Rfl: 3    lancets (ONETOUCH DELICA LANCETS) 33 gauge Misc, 1 lancet by Misc.(Non-Drug; Combo Route) route 3 (three) times daily., Disp: 300 " "each, Rfl: 3    metFORMIN (GLUCOPHAGE-XR) 500 MG ER 24hr tablet, Take 2 tablets (1,000 mg total) by mouth 2 (two) times a day. (Patient taking differently: Take 1,000 mg by mouth 2 (two) times a day. Pt taking 500mg BID), Disp: 360 tablet, Rfl: 4    methen-m.blue-s.phos-phsal-hyo (URIBEL) 118-10-40.8-36 mg Cap, Take by mouth., Disp: , Rfl:     mometasone (NASONEX) 50 mcg/actuation nasal spray, 2 sprays by Nasal route once daily., Disp: 17 g, Rfl: 0    omeprazole (PRILOSEC) 20 MG capsule, Take 20 mg by mouth., Disp: , Rfl:     ONETOUCH VERIO TEST STRIPS Strp, TEST THREE TIMES DAILY, Disp: 200 strip, Rfl: 11    pen needle, diabetic (NOVOFINE 32) 32 gauge x 1/4" Ndle, use subcutaneously every evening, Disp: 100 each, Rfl: 11    phenazopyridine (PYRIDIUM) 200 MG tablet, Take 200 mg by mouth 3 (three) times daily as needed for Pain., Disp: , Rfl:     rosuvastatin (CRESTOR) 10 MG tablet, TAKE 1 TABLET BY MOUTH EVERY DAY, Disp: 90 tablet, Rfl: 2    solifenacin (VESICARE) 5 MG tablet, Take 1 tablet (5 mg total) by mouth once daily. (Patient not taking: Reported on 4/24/2023), Disp: 30 tablet, Rfl: 11    vitamin D 1000 units Tab, Take 1,000 Units by mouth once daily., Disp: , Rfl:     Psychiatric History:  Psychiatric Diagnosis:  PT is not currently taking mental health meds They are, if absolutely needed interested in medication changes.  Previous Medication Trials: Yes Pt has taken paroxetine (Paxil) cant remember mg once daily for Depression.  Previous Psychiatric Outpatient Treatment:  No  Previous Psychiatric Hospitalizations:  No  Previous Suicide Attempts:  No  History of Trauma:  No  Access to a Firearm:  Yes    Substance Use History:  Tobacco/Nicotine:  No   Alcohol: none  Illicit Substances: No  Misuse of Prescription Medications:  No  Caffeine: Yes - 1 cup every week    Mental Status Exam  General Appearance:  unremarkable, age appropriate   Speech: normal tone, normal rate, normal pitch, normal volume      Level " of Cooperation: cooperative      Thought Processes: normal and logical   Mood: steady      Thought Content: normal, no suicidality, no homicidality, delusions, or paranoia   Affect: congruent and appropriate   Orientation: Oriented x3   Memory: recent >  intact   Attention Span & Concentration: intact   Fund of General Knowledge: intact and appropriate to age and level of education   Abstract Reasoning: interpretation of similarities was abstract   Judgment & Insight: fair     Language  intact       Impression:   My diagnostic impression is Adjustment disorders; with anxious mood [F43.22], as evidenced by intake assessment.     Provisional Diagnosis:     ICD-10-CM ICD-9-CM   1. Adjustment disorder with mixed anxiety and depressed mood  F43.23 309.28       Treatment Goals and Plan: Initial appointment focused on gathering history, identifying treatment goals and developing a treatment plan.      Depression: acquiring relapse prevention skills, increasing energy, increasing motivation, reducing excessive guilt, reducing fatigue, and reducing negative automatic thoughts    Future treatment will utilize CBT, Mindfulness Techniques, Motivational Interviewing, and Relaxation Techniques .      RETURN TO CLINIC: 2 weeks

## 2023-06-12 ENCOUNTER — PATIENT MESSAGE (OUTPATIENT)
Dept: INTERNAL MEDICINE | Facility: CLINIC | Age: 73
End: 2023-06-12
Payer: MEDICARE

## 2023-06-12 DIAGNOSIS — Z79.4 TYPE 2 DIABETES MELLITUS WITHOUT COMPLICATION, WITH LONG-TERM CURRENT USE OF INSULIN: Primary | ICD-10-CM

## 2023-06-12 DIAGNOSIS — E11.9 TYPE 2 DIABETES MELLITUS WITHOUT COMPLICATION, WITH LONG-TERM CURRENT USE OF INSULIN: Primary | ICD-10-CM

## 2023-06-13 RX ORDER — FLASH GLUCOSE SENSOR
KIT MISCELLANEOUS
Qty: 1 KIT | Refills: 0 | Status: SHIPPED | OUTPATIENT
Start: 2023-06-13

## 2023-06-13 RX ORDER — FLASH GLUCOSE SCANNING READER
1 EACH MISCELLANEOUS
Qty: 4 EACH | Refills: 12 | Status: SHIPPED | OUTPATIENT
Start: 2023-06-13

## 2023-06-13 NOTE — TELEPHONE ENCOUNTER
No care due was identified.  [facility] Embedded Care Due Messages. Reference number: [id number].   6/13/2023 12:11:29 PM CDT

## 2023-06-14 ENCOUNTER — PATIENT MESSAGE (OUTPATIENT)
Dept: BEHAVIORAL HEALTH | Facility: CLINIC | Age: 73
End: 2023-06-14
Payer: MEDICARE

## 2023-06-14 ENCOUNTER — OFFICE VISIT (OUTPATIENT)
Dept: BEHAVIORAL HEALTH | Facility: CLINIC | Age: 73
End: 2023-06-14
Payer: MEDICARE

## 2023-06-14 DIAGNOSIS — F33.0 MILD EPISODE OF RECURRENT MAJOR DEPRESSIVE DISORDER: Primary | ICD-10-CM

## 2023-06-14 DIAGNOSIS — F43.21 GRIEF: ICD-10-CM

## 2023-06-14 DIAGNOSIS — F43.23 ADJUSTMENT DISORDER WITH MIXED ANXIETY AND DEPRESSED MOOD: ICD-10-CM

## 2023-06-14 PROCEDURE — 90834 PSYTX W PT 45 MINUTES: CPT | Mod: ,,, | Performed by: SOCIAL WORKER

## 2023-06-14 PROCEDURE — 90834 PR PSYCHOTHERAPY W/PATIENT, 45 MIN: ICD-10-PCS | Mod: ,,, | Performed by: SOCIAL WORKER

## 2023-06-14 NOTE — PROGRESS NOTES
Individual Psychotherapy (LCSW/PhD)  Gely Green,  2023    REFERRAL SOURCE:  Yoseph Dallas MD  TYPE OF VISIT:  In person  LENGTH OF SESSION: 45  Site:  [facility]    Therapeutic Intervention: Met with patient for individual psychotherapy.    Chief complaint/reason for encounter: depression and anxiety     Interval history and content of current session: PT niece  by suicide  and PT is grieving the loss as well as still grieving the loss of her 33 year old son. PT also spoke about feeling unfulfilled with life as a result of comparing herself to other. SW discussed the stages of grief and provided PT with a reading to help educate her about the grief process. SW also printed out tips to manage depression/anxiety and negative thinking. PT stated she will review and report progress/outcome at the next visit.      Treatment plan:  Target symptoms: depression, anxiety , adjustment, grief  Why chosen therapy is appropriate versus another modality: relevant to diagnosis  Outcome monitoring methods: self-report  Therapeutic intervention type: insight oriented psychotherapy    Risk parameters:  Patient reports no suicidal ideation  Patient reports no homicidal ideation  Patient reports no self-injurious behavior  Patient reports no violent behavior    Verbal deficits: None    Patient's response to intervention:  The patient's response to intervention is accepting.    Progress toward goals and other mental status changes:  The patient's progress toward goals is fair .  No flowsheet data found.   GAD7 6/10/2023 2023 2022   1. Feeling nervous, anxious, or on edge? 0 2 0   2. Not being able to stop or control worrying? 0 3 1   3. Worrying too much about different things? 0 3 1   4. Trouble relaxing? 2 3 1   5. Being so restless that it is hard to sit still? 2 3 0   6. Becoming easily annoyed or irritable? 0 3 0   7. Feeling afraid as if something awful might happen? 0 3 1   8.  If you checked off any problems, how difficult have these problems made it for you to do your work, take care of things at home, or get along with other people? - 2 0   HEBERT-7 Score 4 20 4        Diagnosis: No diagnosis found.      Plan: Pt plans to continue individual psychotherapy    Return to clinic: 2 weeks    Length of Service (minutes): 45

## 2023-06-16 ENCOUNTER — TELEPHONE (OUTPATIENT)
Dept: INTERNAL MEDICINE | Facility: CLINIC | Age: 73
End: 2023-06-16
Payer: MEDICARE

## 2023-06-16 DIAGNOSIS — Z79.4 TYPE 2 DIABETES MELLITUS WITHOUT COMPLICATION, WITH LONG-TERM CURRENT USE OF INSULIN: ICD-10-CM

## 2023-06-16 DIAGNOSIS — E11.9 TYPE 2 DIABETES MELLITUS WITHOUT COMPLICATION, WITH LONG-TERM CURRENT USE OF INSULIN: ICD-10-CM

## 2023-06-16 NOTE — TELEPHONE ENCOUNTER
----- Message from Lexie Nura sent at 6/16/2023  9:17 AM CDT -----  Regarding: Medication Assistance  Contact: Patient  Patient is requesting a call back in reference to medication   Patient stated she sent a msg via [facility] account in regards to OneTouch Meter and Test Strips   Patient stated she  is going out of town and is attempting to get a prescription for both   Patient stated she spoke with insurance and was informed she is eligible to receive  Patient stated she would like a call back today    Please Assist     Patient can be reached at [phone]

## 2023-06-16 NOTE — TELEPHONE ENCOUNTER
Returned call to Ms. Green. Advised to return call to . See RX sent to [facility] for Freestyle Ciara Stewart and Sensor 06/13/2023

## 2023-06-19 ENCOUNTER — TELEPHONE (OUTPATIENT)
Dept: INTERNAL MEDICINE | Facility: CLINIC | Age: 73
End: 2023-06-19
Payer: MEDICARE

## 2023-06-19 ENCOUNTER — PATIENT OUTREACH (OUTPATIENT)
Dept: ADMINISTRATIVE | Facility: HOSPITAL | Age: 73
End: 2023-06-19
Payer: MEDICARE

## 2023-06-19 NOTE — TELEPHONE ENCOUNTER
Per HAKAN Conde Patient was informed of message below and verbalize understanding. Thanks.     Mrs. Curtis's message and also stated [facility] were fax[facility] to us requesting chart notes and BG readings(attached). Dr. Dallas has to review and sign these forms

## 2023-06-19 NOTE — TELEPHONE ENCOUNTER
2nd attempt  LVM stating Mrs. Curtis's message and also stated [facility] CM[facility] were fax[facility] to us requesting chart notes and BG readings(attached). Dr. Dallas has to review and sign these forms

## 2023-06-19 NOTE — TELEPHONE ENCOUNTER
----- Message from Asia Stokes sent at 6/19/2023 12:09 PM CDT -----  .Type: Patient Call Back    Who called:self    What is the request in detail: patient has questions and requesting a callback    Can the clinic reply by [facility]? call    Would the patient rather a call back or a response via [facility]? call    Best call back number: .[phone]     Additional Information:

## 2023-06-20 ENCOUNTER — TELEPHONE (OUTPATIENT)
Dept: INTERNAL MEDICINE | Facility: CLINIC | Age: 73
End: 2023-06-20
Payer: MEDICARE

## 2023-06-20 NOTE — TELEPHONE ENCOUNTER
[facility] FREESTYLE SHANNON W/ ATTACHED CHART NOTES FORM 4/24/2023 AND 3/16/2023 AND A1C LAB RESULTS FORM 1/4/2023 BACK TO fax# [phone]/office# [phone]. I have attached a confirmation sheet to this fax located in my file cabinet. Fax will be sent to scan after 3 months!

## 2023-06-21 ENCOUNTER — TELEPHONE (OUTPATIENT)
Dept: INTERNAL MEDICINE | Facility: CLINIC | Age: 73
End: 2023-06-21
Payer: MEDICARE

## 2023-06-21 NOTE — TELEPHONE ENCOUNTER
----- Message from Kavitha Bess sent at 6/20/2023 10:41 AM CDT -----  Regarding: fax confirmation  Name of Who is Calling:Oli ramirez/ [employer]  [phone]          What is the request in detail: Oli is Asking if you received a request for chart notes for the pt. Please fax over or c/b to assist   Fax #[phone]          Can the clinic reply by [facility]:          What Number to Call Back if not in [facility]:

## 2023-06-28 ENCOUNTER — OFFICE VISIT (OUTPATIENT)
Dept: BEHAVIORAL HEALTH | Facility: CLINIC | Age: 73
End: 2023-06-28
Payer: MEDICARE

## 2023-06-28 DIAGNOSIS — F43.21 GRIEF: ICD-10-CM

## 2023-06-28 DIAGNOSIS — F33.0 MILD EPISODE OF RECURRENT MAJOR DEPRESSIVE DISORDER: Primary | ICD-10-CM

## 2023-06-28 PROCEDURE — 90834 PSYTX W PT 45 MINUTES: CPT | Mod: ,,, | Performed by: SOCIAL WORKER

## 2023-06-28 PROCEDURE — 90834 PR PSYCHOTHERAPY W/PATIENT, 45 MIN: ICD-10-PCS | Mod: ,,, | Performed by: SOCIAL WORKER

## 2023-07-03 ENCOUNTER — TELEPHONE (OUTPATIENT)
Dept: INTERNAL MEDICINE | Facility: CLINIC | Age: 73
End: 2023-07-03
Payer: MEDICARE

## 2023-07-03 ENCOUNTER — OFFICE VISIT (OUTPATIENT)
Dept: INTERNAL MEDICINE | Facility: CLINIC | Age: 73
End: 2023-07-03
Attending: INTERNAL MEDICINE
Payer: MEDICARE

## 2023-07-03 VITALS
OXYGEN SATURATION: 98 % | HEART RATE: 61 BPM | HEIGHT: 66 IN | BODY MASS INDEX: 24.06 KG/M2 | SYSTOLIC BLOOD PRESSURE: 130 MMHG | DIASTOLIC BLOOD PRESSURE: 76 MMHG | WEIGHT: 149.69 LBS

## 2023-07-03 DIAGNOSIS — E11.59 HYPERTENSION ASSOCIATED WITH TYPE 2 DIABETES MELLITUS: ICD-10-CM

## 2023-07-03 DIAGNOSIS — I15.2 HYPERTENSION ASSOCIATED WITH TYPE 2 DIABETES MELLITUS: ICD-10-CM

## 2023-07-03 DIAGNOSIS — Z79.4 TYPE 2 DIABETES MELLITUS WITHOUT COMPLICATION, WITH LONG-TERM CURRENT USE OF INSULIN: Primary | ICD-10-CM

## 2023-07-03 DIAGNOSIS — E11.9 TYPE 2 DIABETES MELLITUS WITHOUT COMPLICATION, WITH LONG-TERM CURRENT USE OF INSULIN: Primary | ICD-10-CM

## 2023-07-03 PROCEDURE — 99999 PR PBB SHADOW E&M-EST. PATIENT-LVL IV: ICD-10-PCS | Mod: PBBFAC,,, | Performed by: INTERNAL MEDICINE

## 2023-07-03 PROCEDURE — 99214 OFFICE O/P EST MOD 30 MIN: CPT | Mod: PBBFAC | Performed by: INTERNAL MEDICINE

## 2023-07-03 PROCEDURE — 99214 OFFICE O/P EST MOD 30 MIN: CPT | Mod: S$PBB,,, | Performed by: INTERNAL MEDICINE

## 2023-07-03 PROCEDURE — 99214 PR OFFICE/OUTPT VISIT, EST, LEVL IV, 30-39 MIN: ICD-10-PCS | Mod: S$PBB,,, | Performed by: INTERNAL MEDICINE

## 2023-07-03 PROCEDURE — 99999 PR PBB SHADOW E&M-EST. PATIENT-LVL IV: CPT | Mod: PBBFAC,,, | Performed by: INTERNAL MEDICINE

## 2023-07-03 RX ORDER — ASPIRIN 81 MG/1
81 TABLET ORAL DAILY
COMMUNITY
End: 2024-01-02 | Stop reason: SDUPTHER

## 2023-07-03 NOTE — TELEPHONE ENCOUNTER
Spoke with pt stating message below:    Dr. Mercado's schedule today allows him to offer you to come in early if you would like. He is in no way asking you to come in early, especially if your schedule does not allow. We just wanted to let you know you are welcome to come in anytime between now and your scheduled time if this works out better for you. We will see you when you get here. If you are unable to come to today's appointment please notify us as soon as possible.

## 2023-07-03 NOTE — PROGRESS NOTES
"Subjective:       Patient ID: Gely Green is a 72 y.o. female.    Chief Complaint: Diabetes, Foot Pain, and Bradycardia    Here for routine f/u    Feels her heart pound when things are quiet.  She is concerned about Dx of aortic atherosclerosis and atrial tachycardia in her chart. She is on a statin. Dx explained.     She has had 2 deaths lately, family and friend. She is established with counseling here and it has been helpful.     Review of Systems   Constitutional:  Negative for chills, fatigue, fever and unexpected weight change.   HENT:  Negative for ear pain, hearing loss, postnasal drip, tinnitus, trouble swallowing and voice change.    Respiratory:  Negative for cough, chest tightness, shortness of breath and wheezing.    Cardiovascular:  Negative for chest pain, palpitations and leg swelling.   Gastrointestinal:  Negative for abdominal pain, blood in stool, diarrhea, nausea and vomiting.   Endocrine: Negative for polydipsia, polyphagia and polyuria.   Genitourinary:  Negative for difficulty urinating, dysuria, hematuria and vaginal bleeding.   Skin:  Negative for rash.   Allergic/Immunologic: Negative for food allergies.   Neurological:  Negative for dizziness, numbness and headaches.   Hematological:  Does not bruise/bleed easily.   Psychiatric/Behavioral:  The patient is not nervous/anxious.      Objective:      Vitals:    07/03/23 1201   BP: 130/76   Pulse: 61   SpO2: 98%   Weight: 67.9 kg (149 lb 11.1 oz)   Height: 5' 6" (1.676 m)      Physical Exam  Vitals and nursing note reviewed.   Constitutional:       General: She is not in acute distress.     Appearance: Normal appearance. She is well-developed.   HENT:      Head: Normocephalic and atraumatic.      Mouth/Throat:      Pharynx: No oropharyngeal exudate.   Eyes:      General: No scleral icterus.     Conjunctiva/sclera: Conjunctivae normal.      Pupils: Pupils are equal, round, and reactive to light.   Neck:      Thyroid: No thyromegaly. "   Cardiovascular:      Rate and Rhythm: Normal rate and regular rhythm.      Heart sounds: Normal heart sounds. No murmur heard.  Pulmonary:      Effort: Pulmonary effort is normal.      Breath sounds: Normal breath sounds. No wheezing or rales.   Abdominal:      General: There is no distension.   Musculoskeletal:         General: No tenderness.   Lymphadenopathy:      Cervical: No cervical adenopathy.   Skin:     General: Skin is warm and dry.   Neurological:      Mental Status: She is alert and oriented to person, place, and time.   Psychiatric:         Behavior: Behavior normal.       Assessment:       1. Type 2 diabetes mellitus without complication, with long-term current use of insulin    2. Hypertension associated with type 2 diabetes mellitus        Plan:       Gely was seen today for diabetes, foot pain and bradycardia.    Diagnoses and all orders for this visit:    Type 2 diabetes mellitus without complication, with long-term current use of insulin  -     Hemoglobin A1C; Future    Hypertension associated with type 2 diabetes mellitus   Home readings high with digital cuff. Rec another home BP cuff to compare device and we will compare those to office readings.    Controlled and asymptomatic.  Continue current Rx regimen.    Pounding heart  NSR. HPI not concerning. Office and Emergency Department prompts discussed.           Yoseph Mercado MD  Internal Medicine-[facility]        Side effects of medication(s) were discussed in detail and patient voiced understanding.  Patient will call back for any issues or complications.

## 2023-07-05 ENCOUNTER — PATIENT MESSAGE (OUTPATIENT)
Dept: INTERNAL MEDICINE | Facility: CLINIC | Age: 73
End: 2023-07-05
Payer: MEDICARE

## 2023-07-05 DIAGNOSIS — E11.9 TYPE 2 DIABETES MELLITUS WITHOUT COMPLICATION, WITH LONG-TERM CURRENT USE OF INSULIN: Primary | ICD-10-CM

## 2023-07-05 DIAGNOSIS — Z79.4 TYPE 2 DIABETES MELLITUS WITHOUT COMPLICATION, WITH LONG-TERM CURRENT USE OF INSULIN: Primary | ICD-10-CM

## 2023-07-06 ENCOUNTER — LAB VISIT (OUTPATIENT)
Dept: LAB | Facility: OTHER | Age: 73
End: 2023-07-06
Attending: INTERNAL MEDICINE
Payer: MEDICARE

## 2023-07-06 DIAGNOSIS — E11.9 TYPE 2 DIABETES MELLITUS WITHOUT COMPLICATION, WITH LONG-TERM CURRENT USE OF INSULIN: ICD-10-CM

## 2023-07-06 DIAGNOSIS — Z79.4 TYPE 2 DIABETES MELLITUS WITHOUT COMPLICATION, WITH LONG-TERM CURRENT USE OF INSULIN: ICD-10-CM

## 2023-07-06 LAB
ESTIMATED AVG GLUCOSE: 146 MG/DL (ref 68–131)
HBA1C MFR BLD: 6.7 % (ref 4–5.6)

## 2023-07-06 PROCEDURE — 36415 COLL VENOUS BLD VENIPUNCTURE: CPT | Performed by: INTERNAL MEDICINE

## 2023-07-06 PROCEDURE — 83036 HEMOGLOBIN GLYCOSYLATED A1C: CPT | Performed by: INTERNAL MEDICINE

## 2023-07-06 RX ORDER — BLOOD-GLUCOSE TRANSMITTER
EACH MISCELLANEOUS
Qty: 1 EACH | Refills: 1 | Status: CANCELLED | OUTPATIENT
Start: 2023-07-06

## 2023-07-06 RX ORDER — BLOOD-GLUCOSE SENSOR
EACH MISCELLANEOUS
Qty: 10 EACH | Refills: 3 | Status: CANCELLED | OUTPATIENT
Start: 2023-07-06

## 2023-07-06 RX ORDER — BLOOD-GLUCOSE,RECEIVER,CONT
EACH MISCELLANEOUS
Qty: 1 EACH | Refills: 1 | Status: CANCELLED | OUTPATIENT
Start: 2023-07-06

## 2023-07-14 NOTE — PROGRESS NOTES
Individual Psychotherapy (LCSW/PhD)  Gely Green,  2023    REFERRAL SOURCE:  Yoseph Dallas MD  TYPE OF VISIT:  In person  LENGTH OF SESSION: 45  Site:  [facility]    Therapeutic Intervention: Met with patient for individual psychotherapy.    Chief complaint/reason for encounter: depression and anxiety     Interval history and content of current session: PT attended her niece's  and is working through her grief. PT is also grieving the loss of her 33 year old son. PT has been applying skills to manage anxiety, grief and depression and has been coping. PT depression symptoms have improved some. PT will continue applying skills and report progress/outcome at the next visit.              Treatment plan:  Target symptoms: depression, anxiety , adjustment, grief  Why chosen therapy is appropriate versus another modality: relevant to diagnosis  Outcome monitoring methods: self-report  Therapeutic intervention type: insight oriented psychotherapy    Risk parameters:  Patient reports no suicidal ideation  Patient reports no homicidal ideation  Patient reports no self-injurious behavior  Patient reports no violent behavior    Verbal deficits: None    Patient's response to intervention:  The patient's response to intervention is accepting.    Progress toward goals and other mental status changes:  The patient's progress toward goals is fair .  No flowsheet data found.   GAD7 6/10/2023 2023 2022   1. Feeling nervous, anxious, or on edge? 0 2 0   2. Not being able to stop or control worrying? 0 3 1   3. Worrying too much about different things? 0 3 1   4. Trouble relaxing? 2 3 1   5. Being so restless that it is hard to sit still? 2 3 0   6. Becoming easily annoyed or irritable? 0 3 0   7. Feeling afraid as if something awful might happen? 0 3 1   8. If you checked off any problems, how difficult have these problems made it for you to do your work, take care of things at home, or  get along with other people? - 2 0   HEBERT-7 Score 4 20 4        Diagnosis:     ICD-10-CM ICD-9-CM   1. Mild episode of recurrent major depressive disorder  F33.0 296.31   2. Grief  F43.21 309.0         Plan: Pt plans to continue individual psychotherapy    Return to clinic: 2 weeks    Length of Service (minutes): 45

## 2023-07-19 ENCOUNTER — PATIENT MESSAGE (OUTPATIENT)
Dept: BEHAVIORAL HEALTH | Facility: CLINIC | Age: 73
End: 2023-07-19
Payer: MEDICARE

## 2023-07-19 ENCOUNTER — OFFICE VISIT (OUTPATIENT)
Dept: BEHAVIORAL HEALTH | Facility: CLINIC | Age: 73
End: 2023-07-19
Payer: MEDICARE

## 2023-07-19 DIAGNOSIS — F43.23 ADJUSTMENT DISORDER WITH MIXED ANXIETY AND DEPRESSED MOOD: Primary | ICD-10-CM

## 2023-07-19 DIAGNOSIS — F43.21 GRIEF: ICD-10-CM

## 2023-07-19 PROCEDURE — 90834 PSYTX W PT 45 MINUTES: CPT | Mod: ,,, | Performed by: SOCIAL WORKER

## 2023-07-19 PROCEDURE — 90834 PR PSYCHOTHERAPY W/PATIENT, 45 MIN: ICD-10-PCS | Mod: ,,, | Performed by: SOCIAL WORKER

## 2023-07-24 RX ORDER — ROSUVASTATIN CALCIUM 10 MG/1
TABLET, COATED ORAL
Qty: 90 TABLET | Refills: 1 | Status: SHIPPED | OUTPATIENT
Start: 2023-07-24 | End: 2024-01-03

## 2023-07-24 NOTE — TELEPHONE ENCOUNTER
No care due was identified.  [facility] Embedded Care Due Messages. Reference number: [id number].   7/24/2023 5:55:17 AM CDT

## 2023-07-24 NOTE — TELEPHONE ENCOUNTER
Refill Decision Note   Gely Peter  is requesting a refill authorization.  Brief Assessment and Rationale for Refill:  Approve     Medication Therapy Plan:         Comments:     Note composed:6:20 AM 07/24/2023             Appointments     Last Visit   7/3/2023 Yoseph Dallas MD   Next Visit   Visit date not found Yoseph Dallas MD

## 2023-07-25 ENCOUNTER — OFFICE VISIT (OUTPATIENT)
Dept: NEUROLOGY | Facility: CLINIC | Age: 73
End: 2023-07-25
Payer: MEDICARE

## 2023-07-25 DIAGNOSIS — F43.21 GRIEF: ICD-10-CM

## 2023-07-25 DIAGNOSIS — G31.84 MILD NEUROCOGNITIVE DISORDER: Primary | ICD-10-CM

## 2023-07-25 DIAGNOSIS — F33.0 MILD EPISODE OF RECURRENT MAJOR DEPRESSIVE DISORDER: ICD-10-CM

## 2023-07-25 PROCEDURE — 96116 NUBHVL XM PHYS/QHP 1ST HR: CPT | Mod: FQ,95,, | Performed by: CLINICAL NEUROPSYCHOLOGIST

## 2023-07-25 PROCEDURE — 99499 NO LOS: ICD-10-PCS | Mod: 95,,, | Performed by: CLINICAL NEUROPSYCHOLOGIST

## 2023-07-25 PROCEDURE — 96116 PR NEUROBEHAVIORAL STATUS EXAM BY PSYCH/PHYS: ICD-10-PCS | Mod: FQ,95,, | Performed by: CLINICAL NEUROPSYCHOLOGIST

## 2023-07-25 PROCEDURE — 99499 UNLISTED E&M SERVICE: CPT | Mod: 95,,, | Performed by: CLINICAL NEUROPSYCHOLOGIST

## 2023-07-25 NOTE — PROGRESS NOTES
NEUROPSYCHOLOGY FOLLOW-UP APPOINTMENT  CONFIDENTIAL    Referring Provider: Brittany Perdomo, PhD  Medical Necessity: Follow up on cognitive and emotional functioning, participate in treatment planning/management, and provide supportive therapy in the setting of mild neurocognitive disorder  Date Conducted: 2023  Present At Visit: the patient   [id number] = 45 minutes  Referral Diagnoses: Mild Neurocognitive Disorder   Consent: The patient expressed an understanding of the purpose of the evaluation and consented to all procedures. We discussed the limits of confidentiality and discussed an emergency plan.    TELEMEDICINE DETAILS:  Established Patient - Audio Only Telehealth Visit   The patient location is: [place]  The chief complaint leading to consultation is: Mild Neurocognitive Disorder   Visit type: Virtual visit with audio only (telephone)  Total time spent with patient: 45 minutes   The reason for the audio only service rather than synchronous audio and video virtual visit was related to technical difficulties or patient preference/necessity.   Each patient to whom I provide medical services by telemedicine is:  (1) informed of the relationship between the physician and patient and the respective role of any other health care provider with respect to management of the patient; and (2) notified that they may decline to receive medical services by telemedicine and may withdraw from such care at any time. Patient verbally consented to receive this service via voice-only telephone call.    ASSESSMENT & PLAN   Ms. Gely Green is an 71 y.o., female with 16 years of education and pertinent medical history including DM2, HLD, diabetic peripheral neuropathy, thyromegaly, history of TIA, and episodic depression who is returning for a neuropsychological re-evaluation in the setting of gradually progressive decline in thinking and memory. She is independent and largely without difficulty in IADLs. She  has a family history of AD.     Problem List Items Addressed This Visit          Neuro    Mild neurocognitive disorder - Primary       Psychiatric    Grief    Recurrent major depressive disorder     Thank you for allowing me to assist in Ms. Gely Green's care. If you have any questions, please contact me at [phone].    Brittany Perdomo, PhD  Licensed Clinical Neuropsychologist  [facility] - Department of Neurology    SUBJECTIVE     Cognitive Functioning   Cognitive screener: MoCA = 24/30 (September 2018)  Previous evaluation(s):   Completed a neuropsychological evaluation with Dr. Flavio Zuniga on 9/10/2018. Results revealed the following:   Ms. Gely Green is a 67-year-old female who was referred for a neuropsychological evaluation in the setting of self-reported memory complaints over the past 2 years. She described her instances of memory loss as frustrating rather than functionally debilitating, but she does worry about developing Alzheimers disease. She is functionally independent with few reported problems.      Ms. Mendez mood was the most noteworthy aspects of her history and presentation. She described considerable depression since her sons death in 2014, with the anniversary of his death occurring just 4 days before this evaluation. She continues to think of him on a daily basis, is constantly sad/tearful, has recently been fantasizing about suicide (with no intent), and constantly keeps herself busy to avoid downtime. It is encouraging that she recently reengaged with a grief support group, but this group meets only once per month. Depression and complicated bereavement are clearly impacting her daily functioning.       Regarding cognition, the neuropsychological evaluation was remarkable for inconsistent performance on tests of learning and memory with inefficiencies in cognitive organization/retrieval and source memory. However, a cognitive diagnosis will be deferred at  "the present time in the setting of marked depression. Furthermore, a neurodegenerative condition  is considered unlikely at the present time, especially when considering her high level of functioning with no difficulty completing complex activities of daily living (with the exception of periodic amotivation). She also does not present with the type of dima amnesia seen in Alzheimer's disease. In addition to depression, mild cerebrovascular disease (HLD, DMII) is another possible contributing factor to cognitive inefficencies. Interval assessment is warranted.     Completed a second evaluation with me on 1/19/2022 & 1/31/2022. Results of that evaluation revealed the following:   Compared to her 2018 evaluation, results of the current evaluation are relatively stable. Cognitively, her profile reveals intact/at expectation attention, working memory, processing speed, executive functioning, language functioning, visuospatial functioning, and learning. Her free recall/memory retrieval is mildly reduced. She recognized almost all the information when she was provided with cues, but made many false positive errors, indicating reduced discriminability. Temporal orientation was intact. There was not a split between letter and semantic verbal fluency performances. Psychologically, she is currently reporting a mild degree of clinically significant depressive symptoms. While this is an improvement from her last evaluation, she reported in the clinical interview that she has been "overwhelmingly sad" over the past five months with suicidal ideation in December 2021 (none currently). She does not want to get back on an SNRI/SSRI and has not yet called a therapist to start individual counseling. She cried during memory testing and expressed her frustration over her memory trouble.      Despite perception of worsening cognition, her test scores have remained stable over a 4-year period. This argues against an emerging " neurodegenerative condition as the etiology. I believe her vascular health could be contributing to some degree as well as her continued psychological distress. I think it's reasonable to consider updating labs to assess for reversible causes of memory difficulty, but will defer to her medical providers for this. I think next steps should include returning for a visit with her neurologist, Dr. Torres, particularly since she has not seen him since April 2021. She is also strongly encouraged to follow through with scheduling an appointment for talk therapy/counseling. She would likely benefit from restarting an SNRI/SSRI, but will respect her decision to not proceed with medication management. Behaviors that promote brain health and cognitive tips and strategies will be included in the after visit summary. We can re-evaluate her cognition in one year. She is welcome to return sooner, however, she should notice significant worsening of her cognition or if she would like to have a check-in appointment.       Course of difficulty since 2022 evaluation: progressively worsening  Fluctuations: none  Examples:   Attention/Working Memory/Executive Functioning: not focusing as well as she used to. Getting sidetracked a lot more.   Processing Speed: a little slower  Language: difficulty pulling up names and more trouble with word finding  Visuospatial: slightly worse, taking her a minute to think through where to go more often.   Learning & Memory: forgetting even more. Misplacing items. Trouble pulling up details. Repeats herself sometimes but realizes she is doing it and then tries to cover it up.   Exacerbating factors: none  Ameliorating factors: none  Medication for cognition: none     Daily Functioning   ADLs:    Bathing: Independent and without difficulty  Dressing: Independent and without difficulty  Grooming: Independent and without difficulty   Toileting: Independent and without difficulty  Transferring: Independent  "and without difficulty.  Eating: Independent and without difficulty.   IADLs:    Finances: Independent and without difficulty  Medication Mgmt: Independent and without difficulty  Driving: taking her a minute to think through where to go more often.   Household Mgmt: Independent and without difficulty Cooking/Meal Preparation: Independent and without difficulty.  Shopping: Independent and without difficulty.  Appointment Mgmt: Independent and without difficulty       Psychiatric/Neuropsychiatric Symptoms   Mood: "I've been depressed"  Depression: yes and going to talk therapy since May and it is helping. Thinks its a little worse and specifically this year, not sure why. Since Jan 1 feels depression and anxiety have gone down hill.   Lorenza/Hypomania: no  Anxiety: doesn't worry about things but worries about not being able to function on her own.   Stress: moderate - thinking changes  Social Withdrawal: no  Neurovegetative Sxs:  Appetite: okay but has lost a lot of weight. 170 before COVID, and now it keeps going down. Has discussed it with her doctor. He doesn't think anything is wrong but I am concerned about that.   Sleep: "up and down" - today was up at 4 AM. The last time she did an eval she was not sleeping as well as she is now.   Energy: wnl   Hallucinations: no  Delusional/Paranoid Thinking: no  Impulsivity: no  Obsessive/Compulsive Behaviors: no  Disinhibition: no  Irritability/Agitation: yes, mostly in response to forgetfulness. Prays which then helps  Aggression: no  Apathy/Indifference: no  Other changes in personality: no     Physical Functioning   Tremor: no  Difficulty walking: no  Imbalance: no  Falls: no  Weakness: no  Trouble with fine motor movements: no   Lightheadedness: no  Urinary Urgency: no  Sensory Sxs: no  Pain: minor aches and pains   Physical Exercise Routine: walking a couple times a week      Other Updates   BF passed away on June 23rd. Two days prior to that niece committed suicide. " "    Problem with constipation and burping all the time. Linzess helps with constipation but still burping all the time and has a stomach ache.         OBJECTIVE     MENTAL STATUS AND OBSERVATIONS:   Appearance: Unable to assess   Alertness: Attentive and alert.   Orientation:   O x 4    Gait:  Unable to assess   Psychomotor:  Unable to assess   Handedness:  Right   Vision & Hearing:  Adequate for session   Speech/language: Normal in rate, rhythm, tone, and volume. No significant word finding difficulty observed. Comprehension was normal.   Mood/Affect:  The patients stated mood was "depressed." Affect was dysthymic.    Interpersonal Behavior:  Rapport was quickly and easily established    Suicidality/Homicidality: Denied   Hallucinations/Delusions:  None evidenced or endorsed   Thought Content: Logical   Though Processes: Goal-directed   Insight & Judgment:  Appropriate   Participation in Interview:  Full     PROCEDURES/TESTS ADMINISTERED: Performed a review of pertinent medical records, reviewed limits to confidentiality, conducted a clinical interview, and explained procedures.       This service was not originating from a related E/M service provided within the previous 7 days nor will  to an E/M service or procedure within the next 24 hours or my soonest available appointment.  Prevailing standard of care was able to be met in this audio-only visit.          "

## 2023-07-26 NOTE — PROGRESS NOTES
Individual Psychotherapy (LCSW/PhD)  Gely Green,  2023    REFERRAL SOURCE:  Yoseph Dallas MD  TYPE OF VISIT:  In person  LENGTH OF SESSION: 45  Site:  [facility]    Therapeutic Intervention: Met with patient for individual psychotherapy.    Chief complaint/reason for encounter: depression and anxiety     Interval history and content of current session: PT attended her niece's  and is working through her grief. PT is also grieving the loss of her 33 year old son. PT has been applying skills to manage anxiety, grief and depression and has been coping. PT depression symptoms have improved some. PT will continue applying skills and report progress/outcome at the next visit.              Treatment plan:  Target symptoms: depression, anxiety , adjustment, grief  Why chosen therapy is appropriate versus another modality: relevant to diagnosis  Outcome monitoring methods: self-report  Therapeutic intervention type: insight oriented psychotherapy    Risk parameters:  Patient reports no suicidal ideation  Patient reports no homicidal ideation  Patient reports no self-injurious behavior  Patient reports no violent behavior    Verbal deficits: None    Patient's response to intervention:  The patient's response to intervention is accepting.    Progress toward goals and other mental status changes:  The patient's progress toward goals is fair .  No flowsheet data found.   GAD7 6/10/2023 2023 2022   1. Feeling nervous, anxious, or on edge? 0 2 0   2. Not being able to stop or control worrying? 0 3 1   3. Worrying too much about different things? 0 3 1   4. Trouble relaxing? 2 3 1   5. Being so restless that it is hard to sit still? 2 3 0   6. Becoming easily annoyed or irritable? 0 3 0   7. Feeling afraid as if something awful might happen? 0 3 1   8. If you checked off any problems, how difficult have these problems made it for you to do your work, take care of things at home, or  get along with other people? - 2 0   HEBERT-7 Score 4 20 4        Diagnosis:     ICD-10-CM ICD-9-CM   1. Adjustment disorder with mixed anxiety and depressed mood  F43.23 309.28   2. Grief  F43.21 309.0           Plan: Pt plans to continue individual psychotherapy    Return to clinic: 2 weeks    Length of Service (minutes): 45

## 2023-07-27 ENCOUNTER — OFFICE VISIT (OUTPATIENT)
Dept: OPTOMETRY | Facility: CLINIC | Age: 73
End: 2023-07-27
Attending: INTERNAL MEDICINE
Payer: MEDICARE

## 2023-07-27 DIAGNOSIS — Z13.5 GLAUCOMA SCREENING: ICD-10-CM

## 2023-07-27 DIAGNOSIS — H25.13 NUCLEAR SCLEROSIS, BILATERAL: ICD-10-CM

## 2023-07-27 DIAGNOSIS — E11.9 TYPE 2 DIABETES MELLITUS WITHOUT COMPLICATION, WITH LONG-TERM CURRENT USE OF INSULIN: Primary | ICD-10-CM

## 2023-07-27 DIAGNOSIS — Z79.4 TYPE 2 DIABETES MELLITUS WITHOUT COMPLICATION, WITH LONG-TERM CURRENT USE OF INSULIN: Primary | ICD-10-CM

## 2023-07-27 PROCEDURE — 99213 OFFICE O/P EST LOW 20 MIN: CPT | Mod: PBBFAC | Performed by: OPTOMETRIST

## 2023-07-27 PROCEDURE — 92004 PR EYE EXAM, NEW PATIENT,COMPREHESV: ICD-10-PCS | Mod: S$PBB,,, | Performed by: OPTOMETRIST

## 2023-07-27 PROCEDURE — 92004 COMPRE OPH EXAM NEW PT 1/>: CPT | Mod: S$PBB,,, | Performed by: OPTOMETRIST

## 2023-07-27 PROCEDURE — 99999 PR PBB SHADOW E&M-EST. PATIENT-LVL III: CPT | Mod: PBBFAC,,, | Performed by: OPTOMETRIST

## 2023-07-27 PROCEDURE — 99999 PR PBB SHADOW E&M-EST. PATIENT-LVL III: ICD-10-PCS | Mod: PBBFAC,,, | Performed by: OPTOMETRIST

## 2023-07-27 NOTE — PROGRESS NOTES
HPI    Annual Diabetic Eye Exam   DM Exam   [facility] 1 mo ago [facility] with [facility]    Pt denies F/F   Pt denies Dry/ Itchy/ Burning  Gtt: No    BS: 120  Hemoglobin A1C       Date                     Value               Ref Range             Status                07/06/2023               6.7 (H)             4.0 - 5.6 %           Final                Last edited by Abisai Romano, OD on 7/27/2023 10:42 AM.            Assessment /Plan     For exam results, see Encounter Report.    Type 2 diabetes mellitus without complication, with long-term current use of insulin  -     Ambulatory referral/consult to Optometry  -No retinopathy noted today.  Continued control with primary care physician and annual comprehensive eye exam.    Nuclear sclerosis, bilateral  -Educated patient on presence of cataracts at today's exam, monitor at annual dilated fundus exam. 5+ years surgical estimate.    Glaucoma screening  -Monitor with annual eye exam and IOP check      RTC 1 yr

## 2023-08-01 ENCOUNTER — OFFICE VISIT (OUTPATIENT)
Dept: BEHAVIORAL HEALTH | Facility: CLINIC | Age: 73
End: 2023-08-01
Payer: MEDICARE

## 2023-08-01 ENCOUNTER — PATIENT MESSAGE (OUTPATIENT)
Dept: BEHAVIORAL HEALTH | Facility: CLINIC | Age: 73
End: 2023-08-01
Payer: MEDICARE

## 2023-08-01 DIAGNOSIS — F43.23 ADJUSTMENT DISORDER WITH MIXED ANXIETY AND DEPRESSED MOOD: Primary | ICD-10-CM

## 2023-08-01 DIAGNOSIS — F43.21 GRIEF: ICD-10-CM

## 2023-08-01 PROCEDURE — 90834 PSYTX W PT 45 MINUTES: CPT | Mod: ,,, | Performed by: SOCIAL WORKER

## 2023-08-01 PROCEDURE — 90834 PR PSYCHOTHERAPY W/PATIENT, 45 MIN: ICD-10-PCS | Mod: ,,, | Performed by: SOCIAL WORKER

## 2023-08-02 NOTE — PROGRESS NOTES
Individual Psychotherapy (LCSW/PhD)  Gely Green,  8/1/2023    REFERRAL SOURCE:  Yoseph Dallas MD  TYPE OF VISIT:  In person  LENGTH OF SESSION: 45  Site:  [facility]    Therapeutic Intervention: Met with patient for individual psychotherapy.    Chief complaint/reason for encounter: depression and anxiety     Interval history and content of current session: PT stated she has been feeling well  and managing her anxiety and depression since last visit. PT did report feeling frustrated and some anger towards her .  PT discussed a situation that occurred with her  and the lack of communication between them. SW assisted PT with processing the event and sent PT some tips on effective communication. PT will review and apply skills and report progress/outcome at the next visit.           Treatment plan:  Target symptoms: depression, anxiety , adjustment, grief  Why chosen therapy is appropriate versus another modality: relevant to diagnosis  Outcome monitoring methods: self-report  Therapeutic intervention type: insight oriented psychotherapy    Risk parameters:  Patient reports no suicidal ideation  Patient reports no homicidal ideation  Patient reports no self-injurious behavior  Patient reports no violent behavior    Verbal deficits: None    Patient's response to intervention:  The patient's response to intervention is accepting.    Progress toward goals and other mental status changes:  The patient's progress toward goals is fair .  No flowsheet data found.   GAD7 6/10/2023 5/17/2023 1/31/2022   1. Feeling nervous, anxious, or on edge? 0 2 0   2. Not being able to stop or control worrying? 0 3 1   3. Worrying too much about different things? 0 3 1   4. Trouble relaxing? 2 3 1   5. Being so restless that it is hard to sit still? 2 3 0   6. Becoming easily annoyed or irritable? 0 3 0   7. Feeling afraid as if something awful might happen? 0 3 1   8. If you checked off any  problems, how difficult have these problems made it for you to do your work, take care of things at home, or get along with other people? - 2 0   HEBERT-7 Score 4 20 4        Diagnosis:     ICD-10-CM ICD-9-CM   1. Adjustment disorder with mixed anxiety and depressed mood  F43.23 309.28   2. Grief  F43.21 309.0             Plan: Pt plans to continue individual psychotherapy    Return to clinic: 2 weeks    Length of Service (minutes): 45

## 2023-08-08 ENCOUNTER — PES CALL (OUTPATIENT)
Dept: ADMINISTRATIVE | Facility: CLINIC | Age: 73
End: 2023-08-08
Payer: MEDICARE

## 2023-08-09 ENCOUNTER — OFFICE VISIT (OUTPATIENT)
Dept: NEUROLOGY | Facility: CLINIC | Age: 73
End: 2023-08-09
Payer: MEDICARE

## 2023-08-09 DIAGNOSIS — F43.23 ADJUSTMENT DISORDER WITH MIXED ANXIETY AND DEPRESSED MOOD: ICD-10-CM

## 2023-08-09 DIAGNOSIS — G31.84 MILD NEUROCOGNITIVE DISORDER: Primary | ICD-10-CM

## 2023-08-09 PROCEDURE — 96138 PR PSYCH/NEUROPSYCH TEST ADMIN/SCORING, BY TECH, 2+ TESTS, 1ST 30 MIN: ICD-10-PCS | Mod: ,,, | Performed by: CLINICAL NEUROPSYCHOLOGIST

## 2023-08-09 PROCEDURE — 96139 PR PSYCH/NEUROPSYCH TEST ADMIN/SCORING, BY TECH, 2+ TESTS, EA ADDTL 30 MIN: ICD-10-PCS | Mod: ,,, | Performed by: CLINICAL NEUROPSYCHOLOGIST

## 2023-08-09 PROCEDURE — 96139 PSYCL/NRPSYC TST TECH EA: CPT | Mod: ,,, | Performed by: CLINICAL NEUROPSYCHOLOGIST

## 2023-08-09 PROCEDURE — 96132 PR NEUROPSYCHOLOGIC TEST EVAL SVCS, 1ST HR: ICD-10-PCS | Mod: ,,, | Performed by: CLINICAL NEUROPSYCHOLOGIST

## 2023-08-09 PROCEDURE — 96138 PSYCL/NRPSYC TECH 1ST: CPT | Mod: ,,, | Performed by: CLINICAL NEUROPSYCHOLOGIST

## 2023-08-09 PROCEDURE — 99499 NO LOS: ICD-10-PCS | Mod: S$PBB,,, | Performed by: CLINICAL NEUROPSYCHOLOGIST

## 2023-08-09 PROCEDURE — 96133 NRPSYC TST EVAL PHYS/QHP EA: CPT | Mod: ,,, | Performed by: CLINICAL NEUROPSYCHOLOGIST

## 2023-08-09 PROCEDURE — 96132 NRPSYC TST EVAL PHYS/QHP 1ST: CPT | Mod: ,,, | Performed by: CLINICAL NEUROPSYCHOLOGIST

## 2023-08-09 PROCEDURE — 96133 PR NEUROPSYCHOLOGIC TEST EVAL SVCS, EA ADDTL HR: ICD-10-PCS | Mod: ,,, | Performed by: CLINICAL NEUROPSYCHOLOGIST

## 2023-08-09 PROCEDURE — 99499 UNLISTED E&M SERVICE: CPT | Mod: S$PBB,,, | Performed by: CLINICAL NEUROPSYCHOLOGIST

## 2023-08-09 NOTE — PROGRESS NOTES
NEUROPSYCHOLOGY FOLLOW-UP APPOINTMENT CONFIDENTIAL    Referring Provider: Brittany Perdomo, PhD  Medical Necessity: Follow up on cognitive and emotional functioning, participate in treatment planning/management, and provide supportive therapy in the setting of mild neurocognitive disorder  Date Conducted: 7/25/2023 & 8/9/2023  Present At Visit: the patient   Referral Diagnoses: Mild Neurocognitive Disorder   Consent: The patient expressed an understanding of the purpose of the evaluation and consented to all procedures. We discussed the limits of confidentiality and discussed an emergency plan.    ASSESSMENT & PLAN   Ms. Gely Green is an 71 y.o., female with 16 years of education and pertinent medical history including DM2, HLD, diabetic peripheral neuropathy, thyromegaly, history of TIA, and episodic depression who is returning for a neuropsychological re-evaluation in the setting of gradually progressive decline in thinking and memory. She is independent and largely without difficulty in IADLs. She has a family history of AD.     Compared to average range premorbid estimates (based on both demographic information and a word reading test), results of the current evaluation reveal a relative strength in processing speed and at or near expectation performances in simple attention, divided attention/multitasking, visuospatial constructional skills, and speeded verbal fluency. Temporal orientation was intact (with the exception of stating the incorrect date one time). Confrontation naming and comprehension were both reduced. Learning was variable across measures, with greater difficulty learning information that was not meaningfully organized for her (such as a list of information) versus when information was presented in a meaningful way (such as short narratives). She freely recalled anywhere from 100% to 35% to 0% of information she learned and variably benefited from recognition cueing. There is some  evidence of reduced planning and organization. She earned a score of 22/30 on the MoCA. Psychological screening questionnaires revealed a severe degree of clinically significant depressive symptoms and a mild degree of clinically significant anxiety symptoms.     Compared to her 2022 evaluation, there is stability across most domains with some areas of mild decline. Specifically, a cognitive screener dropped by 2 points and mental math and confrontation naming have both mildly worsened. Degree of depressive symptoms is worse and now in the severe range compared to 2022 (mild last year). While anxiety symptoms are mild, they are worse now than they were in 2022.      Overall, I am again most concerned about her worsening psychological functioning. Her pattern on cognitive testing does not indicate an emerging neurodegenerative condition, but we can keep monitoring given that she has had some mild worsening in some areas (combined with her family history). I suspect a few factors are currently impacting cognition, including depression, anxiety, vascular health, and side effects of medications (her prescriptions of Atarax and Vesicare put her at a high level of risk for experiencing side effects of confusion and memory trouble). She is to be commended for all that she has done to manage her A1C, as keeping this and other factors that can impact cardiovascular functioning and cerebrovascular functioning can help to minimize any future cognitive changes and help to strengthen brain health. Additional recommendations are listed below:     Ms. Green may want to discuss her prescriptions of Atarax and Vesicare with prescribing physicians to determine if any changes can be made to reduce cognitive burden.   Continue focusing on brain health behaviors and managing vascular risk factors. Brain health resource books and pod casts, and a list of brain training applications with research showing they help to improve cognition  are included at the end of this report.    Since it's been 2 years since she has seen a neurology provider and she has had some mild cognitive worsening, she may want to check-in with her neurologist.   Continue working with her therapist and psychiatrist to manage depression and anxiety. Often times, as people begin a therapy process, symptoms get worse before they get better as a function of discussing and working through the underlying causes of depression and anxiety. Ms. Wings symptoms of depression and anxiety have been worse since she has been in therapy. This is normal and symptoms should start to lessen as she continues to attend sessions.   Continue to work on improving sleep.   Re-evaluation in one year. She is welcome to return for a check-in at any time to update treatment planning.      Problem List Items Addressed This Visit          Neuro    Mild neurocognitive disorder - Primary       Psychiatric    Adjustment disorder with mixed anxiety and depressed mood     Thank you for allowing me to assist in Ms. Gely Green's care. If you have any questions, please contact me at [phone].    Brittany Perdomo, PhD  Licensed Clinical Neuropsychologist  [facility] - Department of Neurology    SUBJECTIVE     Cognitive Functioning   Cognitive screener: MoCA = 24/30 (September 2018)  Previous evaluation(s):   Completed a neuropsychological evaluation with Dr. Flavio Zuniga on 9/10/2018. Results revealed the following:   Ms. Gely Green is a 67-year-old female who was referred for a neuropsychological evaluation in the setting of self-reported memory complaints over the past 2 years. She described her instances of memory loss as frustrating rather than functionally debilitating, but she does worry about developing Alzheimers disease. She is functionally independent with few reported problems.      Ms. Mendez mood was the most noteworthy aspects of her history and presentation. She  described considerable depression since her sons death in 2014, with the anniversary of his death occurring just 4 days before this evaluation. She continues to think of him on a daily basis, is constantly sad/tearful, has recently been fantasizing about suicide (with no intent), and constantly keeps herself busy to avoid downtime. It is encouraging that she recently reengaged with a grief support group, but this group meets only once per month. Depression and complicated bereavement are clearly impacting her daily functioning.       Regarding cognition, the neuropsychological evaluation was remarkable for inconsistent performance on tests of learning and memory with inefficiencies in cognitive organization/retrieval and source memory. However, a cognitive diagnosis will be deferred at the present time in the setting of marked depression. Furthermore, a neurodegenerative condition  is considered unlikely at the present time, especially when considering her high level of functioning with no difficulty completing complex activities of daily living (with the exception of periodic amotivation). She also does not present with the type of dima amnesia seen in Alzheimer's disease. In addition to depression, mild cerebrovascular disease (HLD, DMII) is another possible contributing factor to cognitive inefficencies. Interval assessment is warranted.     Completed a second evaluation with me on 1/19/2022 & 1/31/2022. Results of that evaluation revealed the following:   Compared to her 2018 evaluation, results of the current evaluation are relatively stable. Cognitively, her profile reveals intact/at expectation attention, working memory, processing speed, executive functioning, language functioning, visuospatial functioning, and learning. Her free recall/memory retrieval is mildly reduced. She recognized almost all the information when she was provided with cues, but made many false positive errors, indicating reduced  "discriminability. Temporal orientation was intact. There was not a split between letter and semantic verbal fluency performances. Psychologically, she is currently reporting a mild degree of clinically significant depressive symptoms. While this is an improvement from her last evaluation, she reported in the clinical interview that she has been "overwhelmingly sad" over the past five months with suicidal ideation in December 2021 (none currently). She does not want to get back on an SNRI/SSRI and has not yet called a therapist to start individual counseling. She cried during memory testing and expressed her frustration over her memory trouble.      Despite perception of worsening cognition, her test scores have remained stable over a 4-year period. This argues against an emerging neurodegenerative condition as the etiology. I believe her vascular health could be contributing to some degree as well as her continued psychological distress. I think it's reasonable to consider updating labs to assess for reversible causes of memory difficulty, but will defer to her medical providers for this. I think next steps should include returning for a visit with her neurologist, Dr. Torres, particularly since she has not seen him since April 2021. She is also strongly encouraged to follow through with scheduling an appointment for talk therapy/counseling. She would likely benefit from restarting an SNRI/SSRI, but will respect her decision to not proceed with medication management. Behaviors that promote brain health and cognitive tips and strategies will be included in the after visit summary. We can re-evaluate her cognition in one year. She is welcome to return sooner, however, she should notice significant worsening of her cognition or if she would like to have a check-in appointment.     Course of difficulty since 2022 evaluation: progressively worsening  Fluctuations: none  Examples:   Attention/Working Memory/Executive " "Functioning: not focusing as well as she used to. Getting sidetracked a lot more.   Processing Speed: a little slower  Language: difficulty pulling up names and more trouble with word finding  Visuospatial: slightly worse, taking her a minute to think through where to go more often.   Learning & Memory: forgetting even more. Misplacing items. Trouble pulling up details. Repeats herself sometimes but realizes she is doing it and then tries to cover it up.   Exacerbating factors: none  Ameliorating factors: none  Medication for cognition: none     Daily Functioning   ADLs:    Bathing: Independent and without difficulty  Dressing: Independent and without difficulty  Grooming: Independent and without difficulty   Toileting: Independent and without difficulty  Transferring: Independent and without difficulty.  Eating: Independent and without difficulty.   IADLs:    Finances: Independent and without difficulty  Medication Mgmt: Independent and without difficulty  Driving: taking her a minute to think through where to go more often.   Household Mgmt: Independent and without difficulty Cooking/Meal Preparation: Independent and without difficulty.  Shopping: Independent and without difficulty.  Appointment Mgmt: Independent and without difficulty       Psychiatric/Neuropsychiatric Symptoms   Mood: "I've been depressed"  Depression: yes and going to talk therapy since May and it is helping. Thinks its a little worse and specifically this year, not sure why. Since Jan 1 feels depression and anxiety have gone down hill.   Lorenza/Hypomania: no  Anxiety: doesn't worry about things but worries about not being able to function on her own.   Stress: moderate - thinking changes  Social Withdrawal: no  Neurovegetative Sxs:  Appetite: okay but has lost a lot of weight. 170 before COVID, and now it keeps going down. Has discussed it with her doctor. He doesn't think anything is wrong but she is concerned about that.   Sleep: "up and " "down" - today was up at 4 AM. The last time she did an eval she was not sleeping as well as she is now.   Energy: wnl   Hallucinations: no  Delusional/Paranoid Thinking: no  Impulsivity: no  Obsessive/Compulsive Behaviors: no  Disinhibition: no  Irritability/Agitation: yes, mostly in response to forgetfulness. Prays which then helps  Aggression: no  Apathy/Indifference: no  Other changes in personality: no     Physical Functioning   Tremor: no  Difficulty walking: no  Imbalance: no  Falls: no  Weakness: no  Trouble with fine motor movements: no   Lightheadedness: no  Urinary Urgency: no  Sensory Sxs: no  Pain: minor aches and pains   Physical Exercise Routine: walking a couple times a week      Other Updates   BF passed away on June 23rd. Two days prior to that niece committed suicide.     Problem with constipation and burping all the time. Linzess helps with constipation but still burping all the time and has a stomach ache.         OBJECTIVE     MENTAL STATUS AND OBSERVATIONS:   Appearance: Appropriate to setting    Alertness: Attentive and alert.   Orientation:   With the exception of stating the incorrect date one time (she stated it was the "11th" when it was the 9th), she was O x 4 across both evaluation days   Gait:  Independent    Psychomotor:  Unremarkable   Handedness:  Right   Vision & Hearing:  Wore bifocal glasses on the day of testing. Vision and hearing were adequate for session   Speech/language: Normal in rate, rhythm, tone, and volume. No significant word finding difficulty observed. She sometimes mispronounced words and said nonsense words. She typically realized it and said, "Wait, that's not a word." Comprehension was normal during the clinical interview though she asked for clarification and repetition of complex task information to ensure her comprehension during testing.   Mood/Affect:  The patients stated mood was "depressed." Affect was dysthymic during the clinical interview. She appeared " anxious on the day of testing and required some additional reassurance from the examiner throughout the session.     Interpersonal Behavior:  Rapport was quickly and easily established    Suicidality/Homicidality: Denied   Hallucinations/Delusions:  None evidenced or endorsed   Thought Content: Logical   Though Processes: Goal-directed   Insight & Judgment:  Appropriate   Participation in Interview:  Full     PROCEDURES/TESTS ADMINISTERED: In addition to performing a review of pertinent medical records, reviewing limits to confidentiality, conducting a clinical interview, and explaining procedures, the following measures were administered by DIANE Gao, a trained psychometrician/psychometrist under the direct supervision of Dr. Perdomo: Elliott Cognitive Assessment (MoCA); MSVT; [place]-in-the-Hand Test; Test of Premorbid Functioning (TOPF); Wechsler Adult Intelligence Scale, Fourth Edition (WAIS-IV) [Digit Span, Arithmetic, Symbol Search, and Coding subtests]; Wechsler Memory Scale, Fourth Edition (WMS-IV) [Logical Memory subtest]; Casanova Verbal Learning Test-Revised (HVLT-R; Form 1); Brief Visuospatial Memory Test-Revised (BVMT-R, form 1); Neuropsychological Assessment Battery (NAB) [Naming and Auditory Comprehension subtests, form 1]; Verbal fluency tests (FAS & animal naming; Yesenia et al., 2004 norms); Vincent Complex Figure Test (RCFT) [copy only]; Trail Making Test, parts A and B (Yesenia et al., 2004 norms); Geriatric Depression Scale (GDS-30); and Generalized Anxiety Disorder - 7 Item Scale (HEBERT-7). Manual norms were used unless otherwise indicated.      TEST TAKING BEHAVIOR AND VALIDITY: Ms. Green yawned a few times throughout the testing session. She closed her eyes when she was thinking and talked to herself while she was answering test questions.  A few times she mumbled and was difficult to understand. She mispronounced words and said nonsense words twice. She realized both times and said,  ""Wait, that's not a word."She was on the verge of crying during memory testing and stated, "I can't remember even one" on delayed recall trial of a word list learning task. She made some source memory errors on other memory measures (recalling information from one measure during the recall trial of another). She stated that she lost her concentration a few times during testing. She struggled to pull up names of common objects (26/31) but was able to guess 4 more correctly with cueing (+2 with semantic cueing and + 2 with phonemic cueing). She maintained the gestalt when making a copy of a complex geometric figure, but misaligned sections of her drawing causing imprecision in design elements. She made one sequencing error on a divided attention task. She was aware of her errors and self-corrected whenever possible. She worked at an average pace. Scores on stand-alone and embedded performance validity measures were within normal limits. The current results, therefore, are likely an accurate reflection of the patient's current functioning.    TEST RESULTS   2023 Evaluation 2022 Evaluation 2018 Evaluation      Raw Score Type of Standardized Score Standardized Score Percentile/CP Standardized Score Standardized Score   ACS LM II Rec 16 - - -     ACS RDS 8 - - -     CITH 10 - - - 6 9   HVLT-R Recognition Discrimination 7 - - -     PREMORBID FUNCTIONING Raw Score Type of Standardized Score Standardized Score Percentile/CP Standardized Score Standardized Score   TOPF simple dem. eFSIQ - SS 96 39 96    TOPF pred. eFSIQ - SS 93 32 99    TOPF simple + pred. eFSIQ - SS 92 30 95    COGNITIVE SCREENING Raw Score Type of Standardized Score Standardized Score Percentile/CP Standardized Score Standardized Score   MoCA 22 - - - 24 24   Orientation - Place 2/2 - - - 2/2    Orientation - Date 3/4 - - - 4/4    LANGUAGE FUNCTIONING Raw Score Type of Standardized Score Standardized Score Percentile/CP Standardized Score Standardized Score "   [facility] Word Reading 30 SS 90 25 96    NAB Auditory Comprehension 85 Tscore 36 8 39 53   NAB Naming 26 Tscore 28 1 32 30   FAS 37 Tscore 49 46 46 48   Animal Naming 14 Tscore 46 34 46 37   VISUOSPATIAL FUNCTIONING Raw Score Type of Standardized Score Standardized Score Percentile/CP Standardized Score Standardized Score   RCFT Copy 25 - - <1 2-5 (%ile) <1 (%ile)   RCFT Time to Copy 189 - - >16     BVMT-R Copy 11 - - -     LEARNING & MEMORY Raw Score Type of Standardized Score Standardized Score Percentile/CP Standardized Score Standardized Score   HVLT-R         Total Immediate (4, 6, 6) 16 Tscore 35 7 CVLT-2=38 CVLT-2 =37   Delayed Recall 0 Tscore <20 0.1 CVLT-2=30 CVLT-2=30   Retention % 0 Tscore <20 0.1     Hits 9 - - -     False Positives 2 - - -     Discrimination  7 Tscore 31 3 CVLT-2=30 CVLT-2=25   WMS-IV Subtests         LM I 23 ss 7 16     LM II 8 ss 6 9     LM Recognition 16 - - 17-25     BVMT-R         IR (2, 1, 2) 5 Tscore 22 0.3     DR 3 Tscore 29 2     Discrimination Index 1 - - <1     ATTENTION/WORKING MEMORY Raw Score Type of Standardized Score Standardized Score Percentile/CP Standardized Score Standardized Score   WAIS-IV WMI - SS 86 18 89 97   WAIS-IV Digit Span 24 ss 10 50 8 10         DS Forward 9 ss 9 37 8          DS Backward 8 ss 10 50 8          DS Sequence 7 ss 10 50 9          Longest Digit Forward 7 - - -  7 (raw)         Longest Digit Backward 4 - - -  4 (raw)         Longest Digit Sequence 5 - - -  6 (raw)   WAIS-IV Arithmetic 7 ss 5 5 8 9   MENTAL PROCESSING SPEED Raw Score Type of Standardized Score Standardized Score Percentile/CP Standardized Score Standardized Score   WAIS-IV PSI -  87 100 105   WAIS-IV Symbol Search 37 ss 16 98 10 12   WAIS-IV Coding 49 ss 10 50 10 10   TMT A  35 Tscore 52 58 52 50   TMT A errors 0 - - - 0 (raw)    EXECUTIVE FUNCTIONING Raw Score Type of Standardized Score Standardized Score Percentile/CP Standardized Score Standardized Score   TMT B 101  Tscore 52 58 52 50   TMT B errors 1 - - - 1 (raw) 0 (raw)   MOOD & PERSONALITY Raw Score Type of Standardized Score Standardized Score Percentile/CP Standardized Score Standardized Score   GDS-30 22 - - - GDS-30=17 BDI-2=21   HEBERT-7 7 - - - HEBERT-7=4 MEGHAN=27   ss = scaled score (mean = 10, SD = 3); SS = standard score (mean = 100, SD = 15); Tscore mean = 50, SD = 10; zscore (mean = 0.00, SD = 1)         BILLING  Code Description Minutes Units   26812 Psychiatric Interview 0    24759 Nubhvl xm phys/qhp 1st hr 0    40968 Nubhvl xm phy/qhp ea addl hr 0    08232 Psycl tst eval phys/qhp 1st 0    76994 Psycl tst eval phys/qhp ea 0    24625 Nrpsyc tst eval phys/qhp 1st 60 1   25390 Nrpsyc tst eval phys/qhp ea 97 2     Referral review/test selection 30      Tech consult/test review/modifications 10      Patient limitation management 0      Patient behavior management 0      Patient symptom monitoring 0      Record Review/Integration/Report Generation 86      Face-to-Face interpretive Feedback 31    18993 Psycl/nrpsyc tst phy/qhp 1st 0    39700 Psycl/nrpsyc tst phy/qhp ea 0    47257 Psycl/nrpsyc tech 1st 30 1   44940 Psycl/nrpsyc tst tech ea 120 4

## 2023-08-17 NOTE — TELEPHONE ENCOUNTER
----- Message from Ene Oritz sent at 7/25/2017  1:33 PM CDT -----  Contact: PT  PT would like to get her appointment rescheduled.     PT callback: [phone]  
Appointment scheduled for patient to be seen after EMG is done in August.  
77 y/o F well known to me from my [facility] practice. she was admitted at [facility] 7/12-7/22 w aspiration PNA, was treated w CEFEPIME, developed an allergic rash,  dCHF, + MAC on AFB culture, had been progressively getting more and more lethargic and dyspneic at home since DC.   In  am of 8/11/23  ptn presented with respiratory distress w hypoxia and hypercarbia requiring intubation 2/2 volume overload +/- Asp PNA      Neuro   off sedation  well known to Dr. Bianchi, consult reviewed   Baseline MS AOx3, aphasic   - h/o CVA , on aspirin and statin . resumed w feeding tube  - eeg  2/2 tremors, no sz focus  - starting to become more responsive but not triggering the vent  - awaiting MR brain to image the brainstem    Cardiac   Ptn well known to Dr. Dunn, consult reviewed   CHFpEF   TTE 7/2023 with EF 59%, with severe LVH and diastolic dysfunction   pro-BNP > 43466, trop 78       Pulmonary   Acute hypercapnic and hypoxemic respiratory failure   well known to Dr. Mcnulty, consult reviewed   DDx: aspiration vs volume overload   VBG with respiratory acidosis pCO2 111  CT chest: mod ( worsening) R pl effusion  - cefepime DCed on 8/13, started on Moxifloxacin on 8/13, on 8/14 off ABx    Renal   Hyperkalemia with EKG changes  , initially treated w LOKELMA,  now resolved   Ptn well know to Dr. Colon, consult reviewed   7.2, hemolyzed,    worsening renal function w oliguria. may need a renal biopsy next week. might need HD in the next 2 days.       GI  NPO for now, on tube feeds  would start GI ppx w IV PPI    Endocrine  T2DM   A1C 7.1 in 7/2023   - BG goal 140-200     ID   No fever/leukocytosis, recheck temp   Hx latent TB which was treated, no concern for TB   - grew MAC on AFB culture from most recent admission. would call ID consult  Started on empiric vancomycin and aztreonam,  changed to cefepime 8/12, cefepime dced on 8/13(cefepime/zosyn allergy.  developed rash when on cefepime on previous admission ) . started on AVALOX on 8/13, off ABx on 8/14. ptn has an allergic rash, prob 2/2 cefepime  - f/u MRSA   - all cxs NTD    Heme/Onc  ppx: heparin q8 hrs     Ethics  GOC - Discussed GOC with daughter and , they have opted in the past for full code. and she remains full code at present          
Yes

## 2023-08-23 ENCOUNTER — PATIENT MESSAGE (OUTPATIENT)
Dept: BEHAVIORAL HEALTH | Facility: CLINIC | Age: 73
End: 2023-08-23
Payer: MEDICARE

## 2023-08-25 ENCOUNTER — OFFICE VISIT (OUTPATIENT)
Dept: NEUROLOGY | Facility: CLINIC | Age: 73
End: 2023-08-25
Payer: MEDICARE

## 2023-08-25 ENCOUNTER — PATIENT MESSAGE (OUTPATIENT)
Dept: NEUROLOGY | Facility: CLINIC | Age: 73
End: 2023-08-25
Payer: MEDICARE

## 2023-08-25 DIAGNOSIS — G31.84 MILD NEUROCOGNITIVE DISORDER: Primary | ICD-10-CM

## 2023-08-25 DIAGNOSIS — F43.23 ADJUSTMENT DISORDER WITH MIXED ANXIETY AND DEPRESSED MOOD: ICD-10-CM

## 2023-08-25 DIAGNOSIS — F43.23 ADJUSTMENT DISORDER WITH MIXED ANXIETY AND DEPRESSED MOOD: Primary | ICD-10-CM

## 2023-08-25 PROCEDURE — 99499 UNLISTED E&M SERVICE: CPT | Mod: S$PBB,,, | Performed by: CLINICAL NEUROPSYCHOLOGIST

## 2023-08-25 PROCEDURE — 99499 NO LOS: ICD-10-PCS | Mod: S$PBB,,, | Performed by: CLINICAL NEUROPSYCHOLOGIST

## 2023-08-25 NOTE — PATIENT INSTRUCTIONS
MANAGING VASCULAR RISK FACTORS  A personal history of disorders that affect the cardiovascular system (e.g., hypertension, high cholesterol, diabetes, heart disease) can have a negative impact on brain functioning especially over many years. Therefore, it is very important for this patient to maintain good control over his/her risk factors. The following is recommended:  Take all medications as prescribed and follow-up with recommendations above.  Get regular physical exercise to the extent that it is possible. Family may need to structure this into their loved one's day or week and develop a transportation plan.  Eat a well-balanced diet and following the MIND diet (see handout) has been shown to be most brain protective.   Check your blood pressure, cholesterol levels, blood sugar, and others as appropriate.    PRACTICE GOOD COGNITIVE HYGIENE  Engage in regular exercise, which increases alertness and arousal and can improve attention and focus.  Consider lower impact exercises, such as yoga or light walking. Try to exercise for at least 150 minutes per week  Get a good night's sleep, as this can enhance alertness and cognition.  Eat healthy foods and balanced meals. It is notable that research indicates certain nutrients may aid in brain function, such as B vitamins (especially B6, B12, and folic acid), antioxidants (such as vitamins C and E, and beta carotene), and Omega-3 fatty acids. Here are some common tips for diet (Adopted from Shana et al, [place], 2018):  Eat primarily plant-based foods, such as fruits and vegetables, whole grains, legumes   (beans) and nuts.  Limit refined carbohydrates (white pasta, bread, rice).  Replace butter with healthy fats such as olive oil.  Use herbs and spices instead of salt to flavor foods.  Limit red meat and processed meats to no more than a few times a month.  Avoid sugary sodas, bakery goods, and sweets.  Eat fish and poultry at least twice a week.  Keep your brain  active. Find activities to stay mentally active, such as reading, games (cards, checkers), puzzles (crosswords, Sudoku, jig saw), crafts (models, woodworking), gardening, or participating in activities in the community.  Stay socially engaged. Continue staying active with your family and friends.    RESOURCE BOOKS & POD CASTS  Consider purchasing the following books on brain health:   High-Octane Brain: 5 Science-Based Steps to Sharpen Your Memory and Reduce Your Risk of Alzheimer's by Karla Stephens, PhD, ABPP-CN.   Keep Your Wits About You: The Science of Brain Maintenance As You Age by Juanita Nelson, PhD.   The Brain Health Book: Using the Power of Neuroscience to Improve Your Life by Pawan Matute, PhD, ABPP-CN.     Consider listening to Brain Beat, a pod cast series by the National Academy of Neuropsychology Foundation featuring discussions with experts on brain health and functioning.     BRAIN TRAINING APPS  · BrainHQ (https://www.[employer].Surgient/) - BrainHQ has more than two dozen brain-training exercises organized into six categories: Attention, Brain Speed, Memory, People Skills, Intelligence, and Navigation. It allows you to fit brain exercises into your busy life, and access brain training on most internet-connected devices. Plus, each exercise continuously adapts to your unique performance. So you train at the right level for you.     · Mind Games (https://www.mindgames.com/) - Mind Games is a great collection of games based in part on principles derived from cognitive tasks to help you practice different mental skills. This includes a handful of free games. Additionally, there are a number of trial games included that can be played 3 times. All games include your score history and a graph of your progress. Using some principles of standardized testing, your scores are also converted to a standard scale so that you can see where you need work and excel. The training center does the work for you by picking  the perfect mix of exercises to keep you engaged.     · Elevate (https://Instant BioScan.com/) - Elevate was selected by [employer] as Rivera of the Year! Elevate is a brain training program designed to improve focus, speaking abilities, processing speed, memory, math skills, and more. Each person is provided with a personalized training program that adjusts over time to maximize results. Theoretically, the more you train with Elevate, the more you'll improve critical cognitive skills that are proven to boost productivity, earning power, and self-confidence. Users who train at least 3 times per week have reported dramatic gains and increased confidence. In-rivera purchases.     · Peak (https://www.Amaya Gaming.net/) - Reach Peak performance with over 40 unique games, each one developed by neuroscientists and game experts to challenge your cognitive skills and push you further. Use , the  for your brain, to find the right workout for you at the right time. Choose from 's best recommendations to push your skills to the max. Or take contextual workouts like Coffee Break if you're short on time.  will help you track your progress using in-depth insights and keep you going when you need it most. Play for free or upgrade to Pro and get the best brain training experience available. In-rivera purchases.     OTHER SUGGESTIONS  Games and Apps like Sudoku; Crossword Puzzles; Word Find; Memory Games; Logic Games; Solitaire; and Hidden Object Mysteries. Find some you like and play them. It will exercise many of the skills necessary to improve function.    COGNITIVE TIPS AND STRATEGIES  The following tips and strategies are provided to help assist in daily activities:      Attention: Remember that inattention and lack of focus are major culprits to forgetting information so be sure and practice paying attention for adequate learning of information. If you rely on passive attention to remembering something (e.g., yeah,  uh-huh approach), you'll find you cannot recall it later. I recommend the following to improve attention, which may aid in later recall:  1. Reduce distractions in the area as much as possible  2. Look at the person as they are speaking to you.   3. Paraphrase as they are speaking  4. Write down important pieces of information   5. Ask them to repeat if you zone out.  6. Have them simplify and reduce information that you need to attend to during conversation.  7. Have visual cues to remind you if you need to do something later.     Processing Speed:  1. Using multiple modalities (e.g., listening, writing notes, asking questions, recording) to learn new information is likely to allow additional time for processing, thus improving memory for the material.   2. Allowing sufficient time to complete tasks will reduce frustration and help to ensure completion.     Executive Functionin. Don't attempt to multi-task.  Separate tasks so that each can be completed one at a time  2. Consider using a calendar/day planner, as that may be effective to help you plan and stay on track.  Color-coding specific tasks by importance may add additional benefit to your planner  3. Break down large projects into smaller tasks and write down the steps to completing the task.  Taking notes while reading can help with recall.     Storing Information: Use the below strategies to help you further enhance how information is stored  1. Rehearse - Immediately after seeing/hearing something, try to recall it.  Wait a few minutes, then check again.  Gradually lengthen the intervals between rehearsals.  2. Repetition of learned material is critical to ensure storage of information to be learned. Self-test at home to ensure learning.  3. Write down important information to improve your attention and focus and to have something to look back on when you need to recall it.  4. Make sure the person doesn't rattle off, but presents in a clear,  logical, and unhurried manner.      Recalling Information:  1. Jog your memory - Lose something?  Think back to when you last had it.  What did you do next?  And after that?  Mentally walk yourself through each activity that followed.  Prodding your memory this way may enable you to recall the location of the missing item.  2. Use a cue - Symbolic reminders (the proverbial string around the finger) are helpful.  So too are memos, timers, calendar notes, etc.--keep them in visible, appropriate place  3. Get organized - Have fixed locations for all important papers, key phone numbers, medications, keys, wallet, glasses, tools, etc.  4. Develop routines - Routines can anchor memories so they do not drift away.       Word Finding:   Not being able to find a word when you need it is a common and very annoying problem. It is not strictly a memory issue, but more a filing and retrieval issue. You know the word or name you want, but it cannot be found in your brain file. It is like having all the files in your file cabinet emptied on the floor. Every piece of information is there but finding it can be a challenge.   One strategy that may help is working with a speech pathologist/therapist to learn techniques to decrease word finding problems. Some of those strategies/techniques include the following:  Use circumlocutions. Describe the object you're trying to name instead of naming it.  Recite you're A, B, Cs. Go through the alphabet to see if a letter triggers finding the word.  Picture it. Try to visualize the spelling or writing of the word.  Relax. The harder you try to force yourself to come up with the word, the more frustrated you get and the worse you function.   Write it down. In situations where using correct words is critical, such as communicating on the radio while flying, write down the key words so that they are in front of you if needed.  Use word association. For words or names you continually misfile  and can't find, try to come up with a word association, something that reminds you of the word or name.  Write a script. Try scripting something important that you want to say. Either write it out or practice it beforehand, so that you get it right.  Play word games. Doing crossword puzzles and playing word finding games may help your brain become more efficient at filing and retrieving words.    SLEEP HYGIENE  Poor sleep has a negative effect on cognition. Several strategies have been shown to improve sleep:   Caffeine intake in the afternoon and evening, as well as stuffing oneself at supper, can decrease the quality of restful sleep throughout the night.   Bedtime and wake-up times should be consistent every night and morning so the body becomes used to a single routine, even on the weekends.  Engage in daily physical activity, but not 2-3 hours before bedtime.   No technology use (television, computer, iPad) 1-2 hours before bed.   Have a wind down routine (e.g., soft lights in the house, bath before bed, reduced fluid intake, songs, reading, less noise) to promote sleep readiness.   Visit the www.sleepfoundation.org for more strategies.

## 2023-08-25 NOTE — PROGRESS NOTES
NEUROPSYCHOLOGICAL EVALUATION FEEDBACK    Gely OREN Green attended a feedback session today.  We discussed the results of the neuropsychological evaluation and I gave time to discuss questions and concerns. For full evaluation details, please see the note from this provider dated 8/9/2023. A copy of the report was printed and provided to her today. She asked that a referral be placed for Psychiatry's CBT-I program. Will place referral and message with contact information. 36 minutes    Problem List Items Addressed This Visit          Neuro    Mild neurocognitive disorder - Primary       Psychiatric    Adjustment disorder with mixed anxiety and depressed mood         Brittany Perdomo, PhD  Licensed Clinical Neuropsychologist  [facility] - Department of Neurology

## 2023-08-29 ENCOUNTER — OFFICE VISIT (OUTPATIENT)
Dept: OTOLARYNGOLOGY | Facility: CLINIC | Age: 73
End: 2023-08-29
Payer: MEDICARE

## 2023-08-29 ENCOUNTER — CLINICAL SUPPORT (OUTPATIENT)
Dept: OTOLARYNGOLOGY | Facility: CLINIC | Age: 73
End: 2023-08-29
Payer: MEDICARE

## 2023-08-29 VITALS
BODY MASS INDEX: 23.73 KG/M2 | WEIGHT: 147.06 LBS | DIASTOLIC BLOOD PRESSURE: 69 MMHG | SYSTOLIC BLOOD PRESSURE: 111 MMHG | HEART RATE: 63 BPM

## 2023-08-29 DIAGNOSIS — H93.19 TINNITUS, UNSPECIFIED LATERALITY: Primary | ICD-10-CM

## 2023-08-29 DIAGNOSIS — R09.A2 GLOBUS SENSATION: ICD-10-CM

## 2023-08-29 DIAGNOSIS — K21.9 LARYNGOPHARYNGEAL REFLUX (LPR): Primary | Chronic | ICD-10-CM

## 2023-08-29 DIAGNOSIS — J31.0 CHRONIC RHINITIS: Chronic | ICD-10-CM

## 2023-08-29 DIAGNOSIS — H90.42 SENSORINEURAL HEARING LOSS (SNHL) OF LEFT EAR WITH UNRESTRICTED HEARING OF RIGHT EAR: Chronic | ICD-10-CM

## 2023-08-29 DIAGNOSIS — H93.13 TINNITUS AURIUM, BILATERAL: Chronic | ICD-10-CM

## 2023-08-29 PROCEDURE — 31575 DIAGNOSTIC LARYNGOSCOPY: CPT | Mod: PBBFAC,PN | Performed by: OTOLARYNGOLOGY

## 2023-08-29 PROCEDURE — 99214 OFFICE O/P EST MOD 30 MIN: CPT | Mod: 25,S$PBB,, | Performed by: OTOLARYNGOLOGY

## 2023-08-29 PROCEDURE — 31575 LARYNGOSCOPY: ICD-10-PCS | Mod: S$PBB,,, | Performed by: OTOLARYNGOLOGY

## 2023-08-29 PROCEDURE — 99214 PR OFFICE/OUTPT VISIT, EST, LEVL IV, 30-39 MIN: ICD-10-PCS | Mod: 25,S$PBB,, | Performed by: OTOLARYNGOLOGY

## 2023-08-29 PROCEDURE — 99214 OFFICE O/P EST MOD 30 MIN: CPT | Mod: PBBFAC,PN,25 | Performed by: OTOLARYNGOLOGY

## 2023-08-29 PROCEDURE — 92567 TYMPANOMETRY: CPT | Mod: PBBFAC,PN | Performed by: PHYSICIAN ASSISTANT

## 2023-08-29 PROCEDURE — 92556 SPEECH AUDIOMETRY COMPLETE: CPT | Mod: PBBFAC,PN | Performed by: PHYSICIAN ASSISTANT

## 2023-08-29 PROCEDURE — 92552 PURE TONE AUDIOMETRY AIR: CPT | Mod: PBBFAC,PN | Performed by: PHYSICIAN ASSISTANT

## 2023-08-29 PROCEDURE — 99999 PR PBB SHADOW E&M-EST. PATIENT-LVL IV: CPT | Mod: PBBFAC,,, | Performed by: OTOLARYNGOLOGY

## 2023-08-29 PROCEDURE — 99999 PR PBB SHADOW E&M-EST. PATIENT-LVL IV: ICD-10-PCS | Mod: PBBFAC,,, | Performed by: OTOLARYNGOLOGY

## 2023-08-29 PROCEDURE — 99999PBSHW PR PBB SHADOW TECHNICAL ONLY FILED TO HB: Mod: PBBFAC,,,

## 2023-08-29 PROCEDURE — 99999PBSHW PR PBB SHADOW TECHNICAL ONLY FILED TO HB: ICD-10-PCS | Mod: PBBFAC,,,

## 2023-08-29 RX ORDER — IPRATROPIUM BROMIDE 21 UG/1
2 SPRAY, METERED NASAL 2 TIMES DAILY
Qty: 30 ML | Refills: 0 | Status: SHIPPED | OUTPATIENT
Start: 2023-08-29

## 2023-08-29 RX ORDER — MOMETASONE FUROATE 50 UG/1
2 SPRAY, METERED NASAL DAILY
Qty: 17 G | Refills: 0 | Status: SHIPPED | OUTPATIENT
Start: 2023-08-29

## 2023-08-29 NOTE — PROGRESS NOTES
Chief Complaint   Patient presents with    Follow-up     Improvement    .     HPI:Gely Green is a 72 y.o. female who me from prior visits presents today with a history of cough for several months.  She reports in November she had a sinus infection that was treated with Augmentin, prednisone, and Nasonex by an internist.  She states that she continued to have feelings of pressure and congestion and saw 2 separate ENTs.  The most recent visit was with Dr. Dumont in January who ordered a CT scan of the sinuses which was virtually clear.  She notes that the cough is present primarily at night when she lays down.  She describes a globus sensation feeling of something dripping in her throat.  She reports intermittent hoarseness.  She denies dysphagia, odynophagia, throat pain, and otalgia.  There is no hemoptysis or hematemesis. She is breathing well.    She admits to heartburn and reflux.    Interval HPI 2023:  Follow up visit. Reports that she has been taking omeprazole 20 mg PO daily. She does feel that the cough has been improved. She feels that the hoarseness she was experiencing is doing better. She reports that she is still experiencing belching/dsypepsia and recently underwent EGD with outside GI Dr. Wolfe at [facility]. She is awaiting results. Reports hearing loss and tinnitus bilaterally.     Past Medical History:   Diagnosis Date    Arthritis     Chronic constipation     Depression     Diabetes mellitus     Diabetes mellitus     Hypertension     Osteopenia      Social History     Socioeconomic History    Marital status:    Tobacco Use    Smoking status: Former     Current packs/day: 0.00     Average packs/day: 1 pack/day for 25.0 years (25.0 ttl pk-yrs)     Types: Cigarettes     Start date: 1978     Quit date: 1999     Years since quittin.6    Smokeless tobacco: Never   Substance and Sexual Activity    Alcohol use: No    Drug use: No    Sexual activity: Not Currently      "Partners: Male     Birth control/protection: None     Social Determinants of Health     Financial Resource Strain: Low Risk  (3/31/2020)    Overall Financial Resource Strain (CARDIA)     Difficulty of Paying Living Expenses: Not hard at all   Food Insecurity: No Food Insecurity (3/31/2020)    Hunger Vital Sign     Worried About Running Out of Food in the Last Year: Never true     Ran Out of Food in the Last Year: Never true   Transportation Needs: No Transportation Needs (3/31/2020)    PRAPARE - Transportation     Lack of Transportation (Medical): No     Lack of Transportation (Non-Medical): No   Physical Activity: Sufficiently Active (2020)    Exercise Vital Sign     Days of Exercise per Week: 3 days     Minutes of Exercise per Session: 120 min   Stress: Stress Concern Present (2020)    [employer] [place] [employer] - Occupational Stress Questionnaire     Feeling of Stress : To some extent   Social Connections: Socially Integrated (3/31/2020)    Social Connection and Isolation Panel [NHANES]     Frequency of Communication with Friends and Family: More than three times a week     Frequency of Social Gatherings with Friends and Family: Once a week     Attends Restoration Services: More than 4 times per year     Active Member of Clubs or Organizations: Yes     Attends Club or Organization Meetings: More than 4 times per year     Marital Status:      Past Surgical History:   Procedure Laterality Date     SECTION      2x    COLONOSCOPY      COLONOSCOPY N/A 2019    Procedure: COLONOSCOPY;  Surgeon: Marshall Contreras MD;  Location: [facility] ([address] [place] [address]);  Service: Endoscopy;  Laterality: N/A;  pt states that she had to be resuscitated after receiving an epidural during childbirth "30 something" years ago.  Ok for 4th floor per Dr. Hernandez-MS    EYE SURGERY Bilateral     cataract removal    HYSTERECTOMY      full hyst     OOPHORECTOMY      TONSILLECTOMY       Family History "   Problem Relation Age of Onset    Breast cancer Maternal Cousin     Heart attack Mother     Heart disease Mother     Alzheimer's disease Mother     Heart attack Father     Heart disease Father     Heart failure Father     Hypertension Father     Hyperlipidemia Sister     Hypertension Sister     Diabetes Sister     Abnormal EKG Sister     Dementia Sister     Kidney disease Sister     Alcohol abuse Brother     Fibroids Daughter     Brain cancer Son     Early death Son 33    Ovarian cancer Neg Hx     Cervical cancer Neg Hx     Endometrial cancer Neg Hx     Vaginal cancer Neg Hx     Stroke Neg Hx     Colon cancer Neg Hx     Esophageal cancer Neg Hx     Vision loss Neg Hx            Review of Systems  General: negative for chills, fever or weight loss  Psychological: negative for mood changes or depression  Ophthalmic: negative for blurry vision, photophobia or eye pain  ENT: see HPI  Respiratory: no cough, shortness of breath, or wheezing  Cardiovascular: no chest pain or dyspnea on exertion  Gastrointestinal: no abdominal pain, change in bowel habits, or black/ bloody stools  Musculoskeletal: negative for gait disturbance or muscular weakness  Neurological: no syncope or seizures; no ataxia  Dermatological: negative for puritis,  rash and jaundice  Hematologic/lymphatic: no easy bruising, no new lumps or bumps      Physical Exam:    Vitals:    08/29/23 1435   BP: 111/69   Pulse: 63       Constitutional: Well appearing / communicating without difficutly.  NAD.  Eyes: EOM I Bilaterally  Head/Face: Normocephalic.  Negative paranasal sinus pressure/tenderness.  Salivary glands WNL.  House Brackmann I Bilaterally.    Right Ear: Auricle normal appearance. External Auditory Canal within normal limits no lesions or masses,TM w/o masses/lesions/perforations. TM mobility noted.   Left Ear: Auricle normal appearance. External Auditory Canal within normal limits no lesions or masses,TM w/o masses/lesions/perforations. TM mobility  noted.  Nose: No gross nasal septal deviation. Inferior Turbinates 3+ bilaterally. No septal perforation. No masses/lesions. External nasal skin appears normal without masses/lesions.  Oral Cavity: Gingiva/lips within normal limits.  Dentition/gingiva healthy appearing. Mucus membranes moist. Floor of mouth soft, no masses palpated. Oral Tongue mobile. Hard Palate appears normal.    Oropharynx: Base of tongue appears normal. No masses/lesions noted. Tonsillar fossa/pharyngeal wall without lesions. Posterior oropharynx WNL.  Soft palate without masses. Midline uvula.   Neck/Lymphatic: No LAD I-VI bilaterally.  No thyromegaly.  No masses noted on exam.        See separate procedure note for FFL.    Diagnostic studies reviewed:   CT sinus 1/23/2023: Impression:     Minimal mucosal membrane thickening right maxillary sinus.  Appearance improved compared to prior.      Chest CT 1/12/2023: Impression:     No acute process seen.     Stable pulmonary nodules all measuring less than 6 mm.  No additional follow-up required, though patient may benefit from annual screening low-dose chest CT if there is a smoking history.    Assessment:    ICD-10-CM ICD-9-CM    1. Laryngopharyngeal reflux (LPR)  K21.9 478.79       2. Chronic rhinitis  J31.0 472.0       3. Sensorineural hearing loss (SNHL) of left ear with unrestricted hearing of right ear  H90.42 389.15       4. Tinnitus aurium, bilateral  H93.13 388.31       5. Globus sensation  R09.89 784.99           The primary encounter diagnosis was Laryngopharyngeal reflux (LPR). Diagnoses of Chronic rhinitis, Sensorineural hearing loss (SNHL) of left ear with unrestricted hearing of right ear, Tinnitus aurium, bilateral, and Globus sensation were also pertinent to this visit.      Plan:  No orders of the defined types were placed in this encounter.    Recommend nasal saline rinses twice daily  Continue mometasone 2 sprays per nostril daily  Continue ipratropium bromide spray 2 sprays  per nostril at bedtime    Continue omeprazole 20mg PO daily.      I also recommended that the patient refrain from eating within 3 hours of going to bed, to elevate the head of bed very subtly and optimize the impact of gravity on the potential reflux, and to avoid alcohol, caffeine, tobacco, tomato sauce, spicy foods, fried food, and chocolate.     Obtain audiogram today to reassess. Last audiogram in 2020.     Follow up in  4-6 months    Savannah Rush MD

## 2023-08-29 NOTE — PROCEDURES
Laryngoscopy    Date/Time: 8/29/2023 2:20 PM    Performed by: Savannah Miller MD  Authorized by: Savannah Miller MD    Consent Done?:  Yes (Verbal)  Anesthesia:     Local anesthetic:  Lidocaine 2% and Cyril-Synephrine 1/2%  Laryngoscopy:     Areas examined:  Nasal cavities, nasopharynx, oropharynx, hypopharynx, larynx and vocal cords  Nose External:      No external nasal deformity  Nose Intranasal:      Mucosa no polyps     Mucosa ulcers not present     No mucosa lesions present     No septum gross deformity     Turbinates not enlarged  Nasopharynx:      No mucosa lesions     Adenoids not present     Posterior choanae patent     Eustachian tube patent  Larynx/hypopharynx:      No epiglottis lesions     No epiglottis edema     No AE folds lesions     No vocal cord polyps     Equal and normal bilateral     No hypopharynx lesions     No piriform sinus pooling     No piriform sinus lesions     Post cricoid edema (mild)     Post cricoid erythema (mild)

## 2023-08-29 NOTE — PROGRESS NOTES
Gely Green, a 72 y.o. female, was seen today in the clinic for an audiologic evaluation.  Patients main complaint was mild hearing loss and tinnitus.      Audiogram results revealed normal hearing sensitivity bilaterally.  Speech reception thresholds were noted at 5 dB in the right ear and 5 dB in the left ear.  Speech discrimination scores were 92% in the right ear and 96% in the left ear.  Tympanometry revealed Type A in the right ear and Type A in the left ear.     Recommendations:  Otologic evaluation  Annual audiogram  Hearing protection when in noise

## 2023-08-30 ENCOUNTER — LAB VISIT (OUTPATIENT)
Dept: LAB | Facility: OTHER | Age: 73
End: 2023-08-30
Attending: INTERNAL MEDICINE
Payer: MEDICARE

## 2023-08-30 DIAGNOSIS — I15.2 HYPERTENSION ASSOCIATED WITH TYPE 2 DIABETES MELLITUS: ICD-10-CM

## 2023-08-30 DIAGNOSIS — E11.59 HYPERTENSION ASSOCIATED WITH TYPE 2 DIABETES MELLITUS: ICD-10-CM

## 2023-08-30 LAB
ANION GAP SERPL CALC-SCNC: 9 MMOL/L (ref 8–16)
BUN SERPL-MCNC: 10 MG/DL (ref 8–23)
CALCIUM SERPL-MCNC: 9.6 MG/DL (ref 8.7–10.5)
CHLORIDE SERPL-SCNC: 106 MMOL/L (ref 95–110)
CO2 SERPL-SCNC: 26 MMOL/L (ref 23–29)
CREAT SERPL-MCNC: 0.8 MG/DL (ref 0.5–1.4)
EST. GFR  (NO RACE VARIABLE): >60 ML/MIN/1.73 M^2
GLUCOSE SERPL-MCNC: 137 MG/DL (ref 70–110)
POTASSIUM SERPL-SCNC: 4.4 MMOL/L (ref 3.5–5.1)
SODIUM SERPL-SCNC: 141 MMOL/L (ref 136–145)

## 2023-08-30 PROCEDURE — 36415 COLL VENOUS BLD VENIPUNCTURE: CPT | Performed by: INTERNAL MEDICINE

## 2023-08-30 PROCEDURE — 80048 BASIC METABOLIC PNL TOTAL CA: CPT | Performed by: INTERNAL MEDICINE

## 2023-09-05 ENCOUNTER — PATIENT MESSAGE (OUTPATIENT)
Dept: NEUROLOGY | Facility: CLINIC | Age: 73
End: 2023-09-05
Payer: MEDICARE

## 2023-09-06 ENCOUNTER — OFFICE VISIT (OUTPATIENT)
Dept: UROGYNECOLOGY | Facility: CLINIC | Age: 73
End: 2023-09-06
Payer: MEDICARE

## 2023-09-06 VITALS
DIASTOLIC BLOOD PRESSURE: 70 MMHG | HEIGHT: 66 IN | BODY MASS INDEX: 24.38 KG/M2 | SYSTOLIC BLOOD PRESSURE: 150 MMHG | HEART RATE: 57 BPM | WEIGHT: 151.69 LBS

## 2023-09-06 DIAGNOSIS — N30.10 INTERSTITIAL CYSTITIS: Primary | ICD-10-CM

## 2023-09-06 DIAGNOSIS — R39.89 BLADDER PAIN: ICD-10-CM

## 2023-09-06 LAB
BILIRUB SERPL-MCNC: NORMAL MG/DL
BLOOD URINE, POC: NEGATIVE
CLARITY, POC UA: CLEAR
COLOR, POC UA: YELLOW
GLUCOSE UR QL STRIP: NORMAL
KETONES UR QL STRIP: NEGATIVE
LEUKOCYTE ESTERASE URINE, POC: NORMAL
NITRITE, POC UA: NEGATIVE
PH, POC UA: 5
PROTEIN, POC: NEGATIVE
SPECIFIC GRAVITY, POC UA: 1.01
UROBILINOGEN, POC UA: NEGATIVE

## 2023-09-06 PROCEDURE — 99999 PR PBB SHADOW E&M-EST. PATIENT-LVL V: ICD-10-PCS | Mod: PBBFAC,,, | Performed by: PHYSICIAN ASSISTANT

## 2023-09-06 PROCEDURE — 99213 PR OFFICE/OUTPT VISIT, EST, LEVL III, 20-29 MIN: ICD-10-PCS | Mod: S$PBB,,, | Performed by: PHYSICIAN ASSISTANT

## 2023-09-06 PROCEDURE — 99213 OFFICE O/P EST LOW 20 MIN: CPT | Mod: S$PBB,,, | Performed by: PHYSICIAN ASSISTANT

## 2023-09-06 PROCEDURE — 99999 PR PBB SHADOW E&M-EST. PATIENT-LVL V: CPT | Mod: PBBFAC,,, | Performed by: PHYSICIAN ASSISTANT

## 2023-09-06 PROCEDURE — 99999PBSHW POCT URINE DIPSTICK WITHOUT MICROSCOPE: ICD-10-PCS | Mod: PBBFAC,,,

## 2023-09-06 PROCEDURE — 99215 OFFICE O/P EST HI 40 MIN: CPT | Mod: PBBFAC | Performed by: PHYSICIAN ASSISTANT

## 2023-09-06 PROCEDURE — 81002 URINALYSIS NONAUTO W/O SCOPE: CPT | Mod: PBBFAC | Performed by: PHYSICIAN ASSISTANT

## 2023-09-06 PROCEDURE — 99999PBSHW POCT URINE DIPSTICK WITHOUT MICROSCOPE: Mod: PBBFAC,,,

## 2023-09-06 RX ORDER — METHENAMINE, SODIUM PHOSPHATE, MONOBASIC, MONOHYDRATE, PHENYL SALICYLATE, METHYLENE BLUE, AND HYOSCYAMINE SULFATE 118; 40.8; 36; 10; .12 MG/1; MG/1; MG/1; MG/1; MG/1
1 CAPSULE ORAL 4 TIMES DAILY PRN
Qty: 20 CAPSULE | Refills: 11 | Status: SHIPPED | OUTPATIENT
Start: 2023-09-06 | End: 2024-02-15 | Stop reason: SDUPTHER

## 2023-09-06 RX ORDER — HYDROXYZINE HYDROCHLORIDE 25 MG/1
25 TABLET, FILM COATED ORAL 2 TIMES DAILY PRN
Qty: 30 TABLET | Refills: 11 | Status: SHIPPED | OUTPATIENT
Start: 2023-09-06 | End: 2024-02-15 | Stop reason: SDUPTHER

## 2023-09-06 RX ORDER — MIRABEGRON 50 MG/1
1 TABLET, FILM COATED, EXTENDED RELEASE ORAL DAILY
Qty: 30 TABLET | Refills: 11 | Status: SHIPPED | OUTPATIENT
Start: 2023-09-06 | End: 2024-01-03

## 2023-09-06 RX ORDER — LINACLOTIDE 72 UG/1
72 CAPSULE, GELATIN COATED ORAL EVERY MORNING
COMMUNITY
Start: 2023-08-03

## 2023-09-06 NOTE — PATIENT INSTRUCTIONS
"Well woman  --up to date     2. Constipation:  -- Controlling constipation may help bladder urgency/leakage and fiber may better control cholesterol and blood glucose.   -- Continue Linzess  -- May also use over the counter stool softener (ex Colace) 1-2 x/day.  **AVOID laxatives.  -- Can also try "Natural Vitality Calm" powder or magnesium oxide 400 mg per night (helps with sleep, anxiety, and constipation)  -- Drink plenty of water!  -- Get a SQUATTY POTTY     3 Vaginal atrophy (dryness):    Non-estrogen options for vaginal dryness:  --Use REPLENS or REFRESH OTC: 1/2 applicator full in vagina twice a week.    --coconut oil, extra virgin olive oil, or vitamin E oil: dime-sized amount with finger (as far as can reach internally) and around the vaginal opening and inner lips up to nightly as needed  --Uberlube, Astro[place]ide, [place] liquibeads, Satin by Sliquid Natural Intimate Moisturizer     4.  OAB:  -- drink plenty of water!  -- A1C increased, work on decreasing   --avoid dietary irritants (see list)  -- Discontine Vesicare 5 mg daily due to potential memory issues as recommended by neuropsych. Try Myrbetriq instead. Takes 2-4 weeks to see if will have effect. Monitor BP, if elevated continuously after weeks on medication, please stop and let us know. For dry mouth: use Biotene mouthwash, get sour, sugar free lozenge or gum.   If it does, will try to switch to mirabegron.   --Empty bladder every 2-3 hours even if you don't have the urge.  Empty well: wait a minute, lean forward on toilet.    --Try not to go more frequently than once an hour. When you get the urge to urinate after you have just gone, distract yourself until the urge passes.   --Avoid dietary irritants (see sheet).  Keep diary x 3-5 days to determine your irritants.  --KEGELS: do 10 in AM and 10 in PM, holding each x 10 seconds.  When you feel urge to go, STOP, KEGEL, and when urge has passed, then go to bathroom.    -- continue Hydroxyzine at night as " needed, may help with nocturia (waking up at night to pee)     5) IC  --For flares: Take hydroxyzine and Uribel  --Bladder instillations previously   --Hydrodistention With Cystoscopy: allows physicians to take a much closer look at the bladder wall. While the AUA no longer suggests that it be used as a diagnostic method (unless a diagnosis is in doubt), hydrodistention may provide modest relief in IC symptoms which can last from three to six months. It can, however, be a more difficult, painful procedure. As a result, it requires careful consideration and discussion.

## 2023-09-06 NOTE — PROGRESS NOTES
"[facility] - UROGYNECOLOGY  [address] [address]  2023     Gely LOTT Peter  [id number]  1950    UROGYN FOLLOW-UP  2023     72 y.o. female,   presents for urogyn follow up. She c/o   Chief Complaint   Patient presents with    Bladder Pain     Bladder pain      .     HPI from 3/2/2022:  Well known patient to Al[facility] and Dr. Alan presents for recurrence of bladder pain. Has not had symptoms for years, but in the last few months has been having flares. Did two bladder installations without improvement. Today patient c/o IC pain daily, feels relief when she empties her bladder. Taking flavoxate daily, but does not help. Has tried Uribel and it helps, but it is expensive. BMs have been "horrible," takes miralax most days. Denies fever. Blood sugars have been 120-150 but may go up to 200, then sometimes drops suddenly to 60     2022:  Patient is going well. No complaints. Has not had any more IC flares since started solifenacin 5 mg. Denies worsening constipation, though constiopation is still an issue. Uses flax seed in her food, but no fiber supplement or stool softener. Uses Miralax when she remembers. Blood sugars have been high again, A1C increased to 7.2. Stress/anxiety has been well controlled since started going to weekly group grief counseling sessions at [facility]. She is using Replens two nights per week. Denies UTI symptoms at this time, except still waking up 3x nightly to urinate. Does not want to increase solifenacin to higher dose.     Changes from last visit:  Patient has been doing well until 1 week ago, she thinks flare brought on because she started drinking caffeinated hnanah again. Still constipated. Her neuropsych doctor pulled her off of VesiCare and hydroxyzine due to risk of memory issues.  Blood sugars have been elevated. She is using Replens regularly.     Past Medical History:   Diagnosis Date    Arthritis     Chronic constipation     " "Depression     Diabetes mellitus     Diabetes mellitus     Hypertension     Osteopenia        Past Surgical History:   Procedure Laterality Date     SECTION      2x    COLONOSCOPY      COLONOSCOPY N/A 2019    Procedure: COLONOSCOPY;  Surgeon: Marshall Contreras MD;  Location: [facility] ([address] [place] [address]);  Service: Endoscopy;  Laterality: N/A;  pt states that she had to be resuscitated after receiving an epidural during childbirth "30 something" years ago.  Ok for 4th floor per Dr. Hernandez-MS    EYE SURGERY Bilateral     cataract removal    HYSTERECTOMY      full hyst     OOPHORECTOMY      TONSILLECTOMY         Family History   Problem Relation Age of Onset    Breast cancer Maternal Cousin     Heart attack Mother     Heart disease Mother     Alzheimer's disease Mother     Heart attack Father     Heart disease Father     Heart failure Father     Hypertension Father     Hyperlipidemia Sister     Hypertension Sister     Diabetes Sister     Abnormal EKG Sister     Dementia Sister     Kidney disease Sister     Alcohol abuse Brother     Fibroids Daughter     Brain cancer Son     Early death Son 33    Ovarian cancer Neg Hx     Cervical cancer Neg Hx     Endometrial cancer Neg Hx     Vaginal cancer Neg Hx     Stroke Neg Hx     Colon cancer Neg Hx     Esophageal cancer Neg Hx     Vision loss Neg Hx        Social History     Socioeconomic History    Marital status:    Tobacco Use    Smoking status: Former     Current packs/day: 0.00     Average packs/day: 1 pack/day for 25.0 years (25.0 ttl pk-yrs)     Types: Cigarettes     Start date: 1978     Quit date: 1999     Years since quittin.6    Smokeless tobacco: Never   Substance and Sexual Activity    Alcohol use: No    Drug use: No    Sexual activity: Not Currently     Partners: Male     Birth control/protection: None     Social Determinants of Health     Financial Resource Strain: Low Risk  (3/31/2020)    Overall Financial Resource Strain (CARDIA) " "    Difficulty of Paying Living Expenses: Not hard at all   Food Insecurity: No Food Insecurity (3/31/2020)    Hunger Vital Sign     Worried About Running Out of Food in the Last Year: Never true     Ran Out of Food in the Last Year: Never true   Transportation Needs: No Transportation Needs (3/31/2020)    PRAPARE - Transportation     Lack of Transportation (Medical): No     Lack of Transportation (Non-Medical): No   Physical Activity: Sufficiently Active (9/24/2020)    Exercise Vital Sign     Days of Exercise per Week: 3 days     Minutes of Exercise per Session: 120 min   Stress: Stress Concern Present (9/24/2020)    [employer] - Occupational Stress Questionnaire     Feeling of Stress : To some extent   Social Connections: Socially Integrated (3/31/2020)    Social Connection and Isolation Panel [NHANES]     Frequency of Communication with Friends and Family: More than three times a week     Frequency of Social Gatherings with Friends and Family: Once a week     Attends Hinduism Services: More than 4 times per year     Active Member of Clubs or Organizations: Yes     Attends Club or Organization Meetings: More than 4 times per year     Marital Status:        Current Outpatient Medications   Medication Sig Dispense Refill    aspirin (ECOTRIN) 81 MG EC tablet Take 81 mg by mouth once daily.      BD ULTRA-FINE SHORT PEN NEEDLE 31 gauge x 5/16" Ndle USE AS DIRECTED DAILY      benzonatate (TESSALON) 200 MG capsule Take 1 capsule (200 mg total) by mouth 3 (three) times daily as needed for Cough. 25 capsule 0    calcium carbonate (OS-REDD) 600 mg calcium (1,500 mg) Tab Take 600 mg by mouth 2 (two) times daily with meals.      famotidine (PEPCID) 40 MG tablet Take 40 mg by mouth every morning.      fluticasone propionate (FLONASE) 50 mcg/actuation nasal spray 1 spray (50 mcg total) by Each Nostril route 2 (two) times daily as needed for Rhinitis. 15 g 0    FREESTYLE SHANNON 2 READER Misc " "Inject 1 each into the skin every 14 (fourteen) days. 4 each 12    FREESTYLE SHANNON 2 SENSOR Kit USE AS DIRECTED EVERY 2 WEEKS 1 kit 0    hydroCHLOROthiazide (HYDRODIURIL) 12.5 MG Tab Take 1 tablet (12.5 mg total) by mouth once daily. 30 tablet 11    hydrOXYzine HCL (ATARAX) 25 MG tablet Take 1 tablet (25 mg total) by mouth every evening. 30 tablet 11    insulin glargine, TOUJEO, (TOUJEO SOLOSTAR U-300 INSULIN) 300 unit/mL (1.5 mL) InPn pen Inject 12 Units into the skin every evening. 6 pen 2    ipratropium (ATROVENT) 21 mcg (0.03 %) nasal spray 2 sprays by Each Nostril route 2 (two) times daily. 30 mL 0    irbesartan (AVAPRO) 300 MG tablet Take 1 tablet (300 mg total) by mouth every evening. 90 tablet 3    lancets (ONETOUCH DELICA LANCETS) 33 gauge Misc 1 lancet by Misc.(Non-Drug; Combo Route) route 3 (three) times daily. 300 each 3    LINZESS 72 mcg Cap capsule Take 72 mcg by mouth every morning.      metFORMIN (GLUCOPHAGE-XR) 500 MG ER 24hr tablet Take 2 tablets (1,000 mg total) by mouth 2 (two) times a day. (Patient taking differently: Take 1,000 mg by mouth 2 (two) times a day. Pt taking 500mg BID) 360 tablet 4    methen-m.blue-s.phos-phsal-hyo (URIBEL) 118-10-40.8-36 mg Cap Take by mouth.      mometasone (NASONEX) 50 mcg/actuation nasal spray 2 sprays by Nasal route once daily. 17 g 0    omeprazole (PRILOSEC) 20 MG capsule Take 20 mg by mouth.      ONETOUCH VERIO TEST STRIPS Strp TEST THREE TIMES DAILY 200 strip 11    pen needle, diabetic (NOVOFINE 32) 32 gauge x 1/4" Ndle use subcutaneously every evening 100 each 11    phenazopyridine (PYRIDIUM) 200 MG tablet Take 200 mg by mouth 3 (three) times daily as needed for Pain.      rosuvastatin (CRESTOR) 10 MG tablet TAKE 1 TABLET BY MOUTH EVERY DAY 90 tablet 1    solifenacin (VESICARE) 5 MG tablet Take 1 tablet (5 mg total) by mouth once daily. 30 tablet 11    vitamin D 1000 units Tab Take 1,000 Units by mouth once daily.       No current facility-administered " "medications for this visit.       Review of patient's allergies indicates:  No Known Allergies    OB History          3    Para   3    Term   3            AB        Living   2         SAB        IAB        Ectopic        Multiple        Live Births                      Well Woman  Patient's last menstrual period was 1999.   Pap:  Mammo:  Colonoscopy:  Dexa:    ROS  As per HPI.      Physical Exam  BP (!) 150/70 (BP Location: Right arm, Patient Position: Sitting, BP Method: Medium (Automatic))   Pulse (!) 57   Ht 5' 6" (1.676 m)   Wt 68.8 kg (151 lb 10.8 oz)   LMP 1999   BMI 24.48 kg/m²   General: alert and oriented, no acute distress  Respiratory: normal respiratory effort    Pelvic:  deferred    Impression  1. Interstitial cystitis  POCT URINE DIPSTICK WITHOUT MICROSCOPE        We reviewed the above issues and discussed options for short-term versus long-term management of her problems.     Plan:   Well woman  --up to date     2. Constipation:  -- Controlling constipation may help bladder urgency/leakage and fiber may better control cholesterol and blood glucose.   -- Continue Linzess  -- May also use over the counter stool softener (ex Colace) 1-2 x/day.  **AVOID laxatives.  -- Can also try "Natural Vitality Calm" powder or magnesium oxide 400 mg per night (helps with sleep, anxiety, and constipation)  -- Drink plenty of water!  -- Get a SQUATTY POTTY    3 Vaginal atrophy (dryness):    Non-estrogen options for vaginal dryness:  --Use REPLENS or REFRESH OTC: 1/2 applicator full in vagina twice a week.    --coconut oil, extra virgin olive oil, or vitamin E oil: dime-sized amount with finger (as far as can reach internally) and around the vaginal opening and inner lips up to nightly as needed  --Uberlube, [place]roglide, [place] liquibeads, Satin by Sliquid Natural Intimate Moisturizer     4.  OAB:  -- drink plenty of water!  -- A1C increased, work on decreasing   --avoid dietary irritants (see " list)  -- Discontine Vesicare 5 mg daily due to potential memory issues as recommended by neuropsych. Try Myrbetriq instead. Takes 2-4 weeks to see if will have effect. Monitor BP, if elevated continuously after weeks on medication, please stop and let us know. For dry mouth: use Biotene mouthwash, get sour, sugar free lozenge or gum.   If it does, will try to switch to Gemtesa.   --Empty bladder every 2-3 hours even if you don't have the urge.  Empty well: wait a minute, lean forward on toilet.    --Try not to go more frequently than once an hour. When you get the urge to urinate after you have just gone, distract yourself until the urge passes.   --Avoid dietary irritants (see sheet).  Keep diary x 3-5 days to determine your irritants.  --KEGELS: do 10 in AM and 10 in PM, holding each x 10 seconds.  When you feel urge to go, STOP, KEGEL, and when urge has passed, then go to bathroom.    -- continue Hydroxyzine at night as needed, may help with nocturia (waking up at night to pee)  --A1C 6.7     5) IC  --For flares: Take hydroxyzine and Uribel  --Bladder instillations previously   --Hydrodistention With Cystoscopy: allows physicians to take a much closer look at the bladder wall. While the AUA no longer suggests that it be used as a diagnostic method (unless a diagnosis is in doubt), hydrodistention may provide modest relief in IC symptoms which can last from three to six months. It can, however, be a more difficult, painful procedure. As a result, it requires careful consideration and discussion.     27 minutes were spent in face to face time with this patient  100 % of this time was spent in counseling and/or coordination of care    Mario Campos PA-C  Division of Female Pelvic Medicine and Reconstructive Surgery  Department of Obstetrics & Gynecology  [facility] [place], [place]

## 2023-09-07 ENCOUNTER — TELEPHONE (OUTPATIENT)
Dept: BEHAVIORAL HEALTH | Facility: CLINIC | Age: 73
End: 2023-09-07

## 2023-09-13 ENCOUNTER — PATIENT MESSAGE (OUTPATIENT)
Dept: ADMINISTRATIVE | Facility: HOSPITAL | Age: 73
End: 2023-09-13
Payer: MEDICARE

## 2023-09-28 NOTE — TELEPHONE ENCOUNTER
Spoke with patient on the phone regarding the BHI program. Patient expressed interest but stated she will have to call me back for scheduling. If patient does not call back to schedule in the next 2 weeks, I will be closing her episode of care and she will need a new referral to the program.

## 2023-10-04 DIAGNOSIS — Z79.4 TYPE 2 DIABETES MELLITUS WITH DIABETIC NEUROPATHY, WITH LONG-TERM CURRENT USE OF INSULIN: ICD-10-CM

## 2023-10-04 DIAGNOSIS — E11.40 TYPE 2 DIABETES MELLITUS WITH DIABETIC NEUROPATHY, WITH LONG-TERM CURRENT USE OF INSULIN: ICD-10-CM

## 2023-10-04 RX ORDER — PEN NEEDLE, DIABETIC 30 GX3/16"
1 NEEDLE, DISPOSABLE MISCELLANEOUS DAILY
Qty: 100 EACH | Refills: 3 | Status: SHIPPED | OUTPATIENT
Start: 2023-10-04

## 2023-10-04 NOTE — TELEPHONE ENCOUNTER
No care due was identified.  [facility] Embedded Care Due Messages. Reference number: [id number].   10/04/2023 10:36:50 AM CDT

## 2023-10-04 NOTE — TELEPHONE ENCOUNTER
----- Message from Lynne Baeza sent at 10/4/2023  9:13 AM CDT -----  Regarding: Questions and concerns  Contact: [phone]  Type:  RX Refill Request    Who Called: Gely  Refill or New Rx:Refill  RX Name and Strength: Diabetic pin needles  How is the patient currently taking it? (ex. 1XDay):1 day   Is this a 30 day or 90 day RX: 90 day  Preferred Pharmacy with phone number: [facility] - [phone]  Local or Mail Order:local  Ordering Provider:Celena  Would the patient rather a call back or a response via [facility]? Call  Best Call Back Number:[phone]  Additional Information: Patient is out of needles

## 2023-10-04 NOTE — TELEPHONE ENCOUNTER
Refill Routing Note   Medication(s) are not appropriate for processing by [facility] for the following reason(s):      No active prescription written by provider    [facility] action(s):  Defer Care Due:  None identified            Appointments  past 12m or future 3m with PCP    Date Provider   Last Visit   7/3/2023 Yoseph Dallas MD   Next Visit   Visit date not found Yoseph Dallas MD   ED visits in past 90 days: 0        Note composed:12:26 PM 10/04/2023

## 2023-10-11 ENCOUNTER — HOSPITAL ENCOUNTER (OUTPATIENT)
Dept: RADIOLOGY | Facility: HOSPITAL | Age: 73
Discharge: HOME | End: 2023-10-11
Attending: INTERNAL MEDICINE
Payer: MEDICARE

## 2023-10-11 DIAGNOSIS — Z12.31 ENCOUNTER FOR SCREENING MAMMOGRAM FOR BREAST CANCER: ICD-10-CM

## 2023-10-11 PROCEDURE — 77063 MAMMO DIGITAL SCREENING BILAT WITH TOMO: ICD-10-PCS | Mod: 26,,, | Performed by: RADIOLOGY

## 2023-10-11 PROCEDURE — 77067 SCR MAMMO BI INCL CAD: CPT | Mod: 26,,, | Performed by: RADIOLOGY

## 2023-10-11 PROCEDURE — 77063 BREAST TOMOSYNTHESIS BI: CPT | Mod: 26,,, | Performed by: RADIOLOGY

## 2023-10-11 PROCEDURE — 77067 SCR MAMMO BI INCL CAD: CPT | Mod: TC

## 2023-10-11 PROCEDURE — 77067 MAMMO DIGITAL SCREENING BILAT WITH TOMO: ICD-10-PCS | Mod: 26,,, | Performed by: RADIOLOGY

## 2023-10-16 ENCOUNTER — OFFICE VISIT (OUTPATIENT)
Dept: BEHAVIORAL HEALTH | Facility: CLINIC | Age: 73
End: 2023-10-16
Payer: MEDICARE

## 2023-10-16 ENCOUNTER — PATIENT MESSAGE (OUTPATIENT)
Dept: BEHAVIORAL HEALTH | Facility: CLINIC | Age: 73
End: 2023-10-16
Payer: MEDICARE

## 2023-10-16 DIAGNOSIS — F43.21 GRIEF: Primary | ICD-10-CM

## 2023-10-16 DIAGNOSIS — F43.23 ADJUSTMENT DISORDER WITH MIXED ANXIETY AND DEPRESSED MOOD: ICD-10-CM

## 2023-10-16 PROCEDURE — 90837 PSYTX W PT 60 MINUTES: CPT | Mod: ,,, | Performed by: SOCIAL WORKER

## 2023-10-16 PROCEDURE — 90837 PR PSYCHOTHERAPY W/PATIENT, 60 MIN: ICD-10-PCS | Mod: ,,, | Performed by: SOCIAL WORKER

## 2023-10-24 NOTE — PROGRESS NOTES
Individual Psychotherapy (LCSW/PhD)  Gely Green,  10/16/2023    REFERRAL SOURCE:  Yoseph Dallas MD  TYPE OF VISIT:  In person  LENGTH OF SESSION: 60  Site:  [facility]    Therapeutic Intervention: Met with patient for individual psychotherapy.    Chief complaint/reason for encounter: depression and anxiety     Interval history and content of current session: PT stated she has been attending several funerals of friends and family. This has let PT to start thinking about mortality more and having fers regarding getting old, dying and end of life. SW assisted PT with processing her feelings and reminded PT of the life cycle. SW encouraged PT to monitor negative thing and focus on the things she can control. PT stated she like viewing it as the life cycle and felt more at peace with the thought of the cycle ending. PT agreed to monitor her thoughts, shift her perspective and continue practicing skills. PT will report progress/outcome at the next visit.     Treatment plan:  Target symptoms: depression, anxiety , adjustment, grief  Why chosen therapy is appropriate versus another modality: relevant to diagnosis  Outcome monitoring methods: self-report  Therapeutic intervention type: insight oriented psychotherapy    Risk parameters:  Patient reports no suicidal ideation  Patient reports no homicidal ideation  Patient reports no self-injurious behavior  Patient reports no violent behavior    Verbal deficits: None    Patient's response to intervention:  The patient's response to intervention is accepting.    Progress toward goals and other mental status changes:  The patient's progress toward goals is fair .       No data to display                   6/10/2023    10:44 PM 5/17/2023    10:02 AM 1/31/2022     2:00 PM   GAD7   1. Feeling nervous, anxious, or on edge? 0 2 0   2. Not being able to stop or control worrying? 0 3 1   3. Worrying too much about different things? 0 3 1   4. Trouble  relaxing? 2 3 1   5. Being so restless that it is hard to sit still? 2 3 0   6. Becoming easily annoyed or irritable? 0 3 0   7. Feeling afraid as if something awful might happen? 0 3 1   8. If you checked off any problems, how difficult have these problems made it for you to do your work, take care of things at home, or get along with other people?  2 0   HEBERT-7 Score 4 20 4        Diagnosis:     ICD-10-CM ICD-9-CM   1. Grief  F43.21 309.0   2. Adjustment disorder with mixed anxiety and depressed mood  F43.23 309.28               Plan: Pt plans to continue individual psychotherapy    Return to clinic: 2 weeks    Length of Service (minutes): 60

## 2023-10-27 RX ORDER — INSULIN GLARGINE 300 U/ML
12 INJECTION, SOLUTION SUBCUTANEOUS NIGHTLY
Qty: 6 ML | Refills: 1 | Status: SHIPPED | OUTPATIENT
Start: 2023-10-27

## 2023-10-27 NOTE — TELEPHONE ENCOUNTER
Provider Staff:  Action required for this patient     Please see care gap opportunities below in Care Due Message.    Thanks!  [facility]     Appointments      Date Provider   Last Visit   7/3/2023 Yoseph Dallas MD   Next Visit   Visit date not found Yoseph Dallas MD     Refill Decision Note   Gely Green  is requesting a refill authorization.  Brief Assessment and Rationale for Refill:  Approve     Medication Therapy Plan:         Comments:     Note composed:8:44 AM 10/27/2023

## 2023-10-27 NOTE — TELEPHONE ENCOUNTER
Care Due:                  Date            Visit Type   Department     Provider  --------------------------------------------------------------------------------                                MYCHART                              FOLLOWUP/OF  [facility] INTERNAL  Last Visit: 07-      FICE VISIT   MEDICINE       Yoseph Dallas  Next Visit: None Scheduled  None         None Found                                                            Last  Test          Frequency    Reason                     Performed    Due Date  --------------------------------------------------------------------------------    HBA1C.......  6 months...  insulin, metFORMIN.......  07- 01-    Lipid Panel.  12 months..  rosuvastatin.............  01- 12-    [employer] Embedded Care Due Messages. Reference number: [id number].   10/26/2023 8:08:12 PM CDT

## 2023-11-02 ENCOUNTER — TELEPHONE (OUTPATIENT)
Dept: BEHAVIORAL HEALTH | Facility: CLINIC | Age: 73
End: 2023-11-02
Payer: MEDICARE

## 2024-01-02 ENCOUNTER — OFFICE VISIT (OUTPATIENT)
Dept: NEUROLOGY | Facility: CLINIC | Age: 74
End: 2024-01-02
Payer: MEDICARE

## 2024-01-02 VITALS
DIASTOLIC BLOOD PRESSURE: 69 MMHG | HEART RATE: 55 BPM | WEIGHT: 147.94 LBS | BODY MASS INDEX: 23.77 KG/M2 | SYSTOLIC BLOOD PRESSURE: 131 MMHG | HEIGHT: 66 IN

## 2024-01-02 DIAGNOSIS — E11.59 HYPERTENSION ASSOCIATED WITH TYPE 2 DIABETES MELLITUS: ICD-10-CM

## 2024-01-02 DIAGNOSIS — I15.2 HYPERTENSION ASSOCIATED WITH TYPE 2 DIABETES MELLITUS: ICD-10-CM

## 2024-01-02 DIAGNOSIS — G31.84 MILD NEUROCOGNITIVE DISORDER: Primary | ICD-10-CM

## 2024-01-02 DIAGNOSIS — F33.9 RECURRENT MAJOR DEPRESSIVE DISORDER, REMISSION STATUS UNSPECIFIED: ICD-10-CM

## 2024-01-02 DIAGNOSIS — G45.9 TIA (TRANSIENT ISCHEMIC ATTACK): ICD-10-CM

## 2024-01-02 DIAGNOSIS — E11.42 DIABETIC PERIPHERAL NEUROPATHY: ICD-10-CM

## 2024-01-02 PROCEDURE — 99214 OFFICE O/P EST MOD 30 MIN: CPT | Mod: PBBFAC | Performed by: STUDENT IN AN ORGANIZED HEALTH CARE EDUCATION/TRAINING PROGRAM

## 2024-01-02 PROCEDURE — 99499 UNLISTED E&M SERVICE: CPT | Mod: S$PBB,,, | Performed by: STUDENT IN AN ORGANIZED HEALTH CARE EDUCATION/TRAINING PROGRAM

## 2024-01-02 PROCEDURE — 99215 OFFICE O/P EST HI 40 MIN: CPT | Mod: S$PBB,,, | Performed by: STUDENT IN AN ORGANIZED HEALTH CARE EDUCATION/TRAINING PROGRAM

## 2024-01-02 PROCEDURE — G2211 COMPLEX E/M VISIT ADD ON: HCPCS | Mod: S$PBB,,, | Performed by: STUDENT IN AN ORGANIZED HEALTH CARE EDUCATION/TRAINING PROGRAM

## 2024-01-02 PROCEDURE — 99999 PR PBB SHADOW E&M-EST. PATIENT-LVL IV: CPT | Mod: PBBFAC,,, | Performed by: STUDENT IN AN ORGANIZED HEALTH CARE EDUCATION/TRAINING PROGRAM

## 2024-01-02 RX ORDER — ASPIRIN 81 MG/1
81 TABLET ORAL DAILY
Qty: 30 TABLET | Refills: 11
Start: 2024-01-02 | End: 2025-01-01

## 2024-01-02 NOTE — PROGRESS NOTES
"[facility] Cardiology [address]     SUBJECTIVE:     History of Present Illness:  Patient is a 73 y.o. female presents with palpitations and was seen by . In 2018, was seen and evaluated for rapid heart beat. No history of MI or heart failure.     Primary Diagnosis:   1. DM  2. Hypertension  3. SVT  ROS  Since last visit :   A. Has done well  B. No chest pains  C. No shortness of breath; no PND or orthopnea   D. Hears her heart beat in her ear and pounds it. No persistent tachycardia   E. Blood pressures normal; heart rates are in the 60's   F. No dizziness, no syncope  G. Still active doing all her physical work without symptoms   Review of patient's allergies indicates:  No Known Allergies    Past Medical History:   Diagnosis Date    Arthritis     Chronic constipation     Depression     Diabetes mellitus     Diabetes mellitus     Hypertension     Osteopenia        Past Surgical History:   Procedure Laterality Date     SECTION      2x    COLONOSCOPY      COLONOSCOPY N/A 2019    Procedure: COLONOSCOPY;  Surgeon: Marsahll Contreras MD;  Location: [facility] ([address] [place] [address]);  Service: Endoscopy;  Laterality: N/A;  pt states that she had to be resuscitated after receiving an epidural during childbirth "30 something" years ago.  Ok for 4th floor per Dr. Hernandez-MS    EYE SURGERY Bilateral     cataract removal    HYSTERECTOMY      full hyst     OOPHORECTOMY      TONSILLECTOMY         Family History   Problem Relation Age of Onset    Breast cancer Maternal Cousin     Heart attack Mother     Heart disease Mother     Alzheimer's disease Mother     Heart attack Father     Heart disease Father     Heart failure Father     Hypertension Father     Hyperlipidemia Sister     Hypertension Sister     Diabetes Sister     Abnormal EKG Sister     Dementia Sister     Kidney disease Sister     Alcohol abuse Brother     Fibroids Daughter     Brain cancer Son     Early death Son 33    Ovarian cancer Neg Hx     Cervical cancer " "Neg Hx     Endometrial cancer Neg Hx     Vaginal cancer Neg Hx     Stroke Neg Hx     Colon cancer Neg Hx     Esophageal cancer Neg Hx     Vision loss Neg Hx        Social History     Tobacco Use    Smoking status: Former     Current packs/day: 0.00     Average packs/day: 1 pack/day for 25.0 years (25.0 ttl pk-yrs)     Types: Cigarettes     Start date: 1978     Quit date: 1999     Years since quittin.0    Smokeless tobacco: Never   Substance Use Topics    Alcohol use: No    Drug use: No        Past Hospitalization:     Home meds:  Current Outpatient Medications on File Prior to Visit   Medication Sig Dispense Refill    aspirin (ECOTRIN) 81 MG EC tablet Take 1 tablet (81 mg total) by mouth once daily. 30 tablet 11    BD ULTRA-FINE SHORT PEN NEEDLE 31 gauge x 5/16" Ndle USE AS DIRECTED DAILY      benzonatate (TESSALON) 200 MG capsule Take 1 capsule (200 mg total) by mouth 3 (three) times daily as needed for Cough. 25 capsule 0    calcium carbonate (OS-REDD) 600 mg calcium (1,500 mg) Tab Take 600 mg by mouth 2 (two) times daily with meals.      famotidine (PEPCID) 40 MG tablet Take 40 mg by mouth every morning.      fluticasone propionate (FLONASE) 50 mcg/actuation nasal spray 1 spray (50 mcg total) by Each Nostril route 2 (two) times daily as needed for Rhinitis. 15 g 0    FREESTYLE SHANNON 2 READER Misc Inject 1 each into the skin every 14 (fourteen) days. 4 each 12    FREESTYLE SHANNON 2 SENSOR Kit USE AS DIRECTED EVERY 2 WEEKS 1 kit 0    hydroCHLOROthiazide (HYDRODIURIL) 12.5 MG Tab Take 1 tablet (12.5 mg total) by mouth once daily. 30 tablet 11    hydrOXYzine HCL (ATARAX) 25 MG tablet Take 1 tablet (25 mg total) by mouth 2 (two) times daily as needed (bladder pain). 30 tablet 11    insulin glargine, TOUJEO, (TOUJEO SOLOSTAR U-300 INSULIN) 300 unit/mL (1.5 mL) InPn pen Inject 12 Units into the skin every evening. 6 mL 1    ipratropium (ATROVENT) 21 mcg (0.03 %) nasal spray 2 sprays by Each Nostril route 2 " "(two) times daily. 30 mL 0    irbesartan (AVAPRO) 300 MG tablet Take 1 tablet (300 mg total) by mouth every evening. 90 tablet 3    lancets (ONETOUCH DELICA LANCETS) 33 gauge Misc 1 lancet by Misc.(Non-Drug; Combo Route) route 3 (three) times daily. 300 each 3    LINZESS 72 mcg Cap capsule Take 72 mcg by mouth every morning.      metFORMIN (GLUCOPHAGE-XR) 500 MG ER 24hr tablet Take 2 tablets (1,000 mg total) by mouth 2 (two) times a day. (Patient taking differently: Take 1,000 mg by mouth 2 (two) times a day. Pt taking 500mg BID) 360 tablet 4    methen-m.blue-s.phos-phsal-hyo (URIBEL) 118-10-40.8-36 mg Cap Take 1 capsule by mouth 4 (four) times daily as needed (bladder pain). 20 capsule 11    mirabegron (MYRBETRIQ) 50 mg Tb24 Take 1 tablet (50 mg total) by mouth once daily. 30 tablet 11    mometasone (NASONEX) 50 mcg/actuation nasal spray 2 sprays by Nasal route once daily. 17 g 0    omeprazole (PRILOSEC) 20 MG capsule Take 20 mg by mouth.      ONETOUCH VERIO TEST STRIPS Strp TEST THREE TIMES DAILY 200 strip 11    pen needle, diabetic (BD ULTRA-FINE SHORT PEN NEEDLE) 31 gauge x 5/16" Ndle 1 Needle by Misc.(Non-Drug; Combo Route) route once daily. 100 each 3    phenazopyridine (PYRIDIUM) 200 MG tablet Take 200 mg by mouth 3 (three) times daily as needed for Pain.      rosuvastatin (CRESTOR) 10 MG tablet TAKE 1 TABLET BY MOUTH EVERY DAY (Patient not taking: Reported on 1/2/2024) 90 tablet 1    solifenacin (VESICARE) 5 MG tablet Take 1 tablet (5 mg total) by mouth once daily. 30 tablet 11    vitamin D 1000 units Tab Take 1,000 Units by mouth once daily.       No current facility-administered medications on file prior to visit.       Cardiac meds:  Irbesartan 300 mg  Metformin  ASA 81 mg  HCTZ 12.5 mg   Rosuvastatin 20 mg         OBJECTIVE:     Vital Signs (Most Recent)  Pulse: (!) 55 (01/03/24 1434)  BP: 136/78 (01/03/24 1434)  SpO2: 97 % (01/03/24 1434)      Physical Exam:    Neck: normal carotids, no bruits; normal " JVP  Lungs :clear  Heart: RR, normal S1,S2, no murmurs, no gallops  Abd: no masses; no bruits;   Exts: normal DP and PT pulses bilaterally, no edema noted           LABS    3/22: A1c 7.2   1/23: 101  9/23: A1c 6.7;   12/23: A1c: 7.3;   Diagnostic Results:    EKG:3/23; nonspecific T wave changes; sinus   2.Echo: 12/20: normal EF; normal diastology   3.stress echo: 7/22: negative; normal EF;   4.30 day event monitor: negative and benign   Chart review:  Holter 2018: PVC's, PAC's ; one 8 beat run of atrial tachycardia   Stress echo: 2017: negative     ASSESSMENT/PLAN:     1. Palpitations: no correlation with studies done such as a 30 day event montior  2. Hypertension: controlled  3. Diabetes control needed  4. Lipids great     Plan:continue the same medications   Return 1 year     Geni Galvin MD

## 2024-01-02 NOTE — PROGRESS NOTES
Chief Complaint and Duration     Follow up for history of TIA back in [id number]    History of Present Illness     Gely Green is a 73 y.o. female w reported hx of TIA in 2020 with left-sided numbness and tingling and jaw tingling, taking baby aspirin 81mg and cholesterol medication.  Patient has not had recurrence of symptoms.  Is doing well.  Is seeing neuropsych for mild cognitive disorder, unable to do her own ADLs.  Patient is doing well.    Denies any significant numbness or weakness.  No difficulty swallowing.  No double vision or new vision changes.    Review of patient's allergies indicates:  No Known Allergies  Current Outpatient Medications   Medication Sig Dispense Refill    calcium carbonate (OS-REDD) 600 mg calcium (1,500 mg) Tab Take 600 mg by mouth 2 (two) times daily with meals.      FREESTYLE SHANNON 2 READER Misc Inject 1 each into the skin every 14 (fourteen) days. 4 each 12    FREESTYLE SHANNON 2 SENSOR Kit USE AS DIRECTED EVERY 2 WEEKS 1 kit 0    hydroCHLOROthiazide (HYDRODIURIL) 12.5 MG Tab Take 1 tablet (12.5 mg total) by mouth once daily. 30 tablet 11    hydrOXYzine HCL (ATARAX) 25 MG tablet Take 1 tablet (25 mg total) by mouth 2 (two) times daily as needed (bladder pain). 30 tablet 11    insulin glargine, TOUJEO, (TOUJEO SOLOSTAR U-300 INSULIN) 300 unit/mL (1.5 mL) InPn pen Inject 12 Units into the skin every evening. 6 mL 1    ipratropium (ATROVENT) 21 mcg (0.03 %) nasal spray 2 sprays by Each Nostril route 2 (two) times daily. 30 mL 0    irbesartan (AVAPRO) 300 MG tablet Take 1 tablet (300 mg total) by mouth every evening. 90 tablet 3    lancets (ONETOUCH DELICA LANCETS) 33 gauge Misc 1 lancet by Misc.(Non-Drug; Combo Route) route 3 (three) times daily. 300 each 3    LINZESS 72 mcg Cap capsule Take 72 mcg by mouth every morning.      metFORMIN (GLUCOPHAGE-XR) 500 MG ER 24hr tablet Take 2 tablets (1,000 mg total) by mouth 2 (two) times a day. (Patient taking differently: Take 1,000 mg by  "mouth 2 (two) times a day. Pt taking 500mg BID) 360 tablet 4    methen-m.blue-s.phos-phsal-hyo (URIBEL) 118-10-40.8-36 mg Cap Take 1 capsule by mouth 4 (four) times daily as needed (bladder pain). 20 capsule 11    mometasone (NASONEX) 50 mcg/actuation nasal spray 2 sprays by Nasal route once daily. 17 g 0    omeprazole (PRILOSEC) 20 MG capsule Take 20 mg by mouth.      ONETOUCH VERIO TEST STRIPS Strp TEST THREE TIMES DAILY 200 strip 11    pen needle, diabetic (BD ULTRA-FINE SHORT PEN NEEDLE) 31 gauge x 5/16" Ndle 1 Needle by Misc.(Non-Drug; Combo Route) route once daily. 100 each 3    vitamin D 1000 units Tab Take 1,000 Units by mouth once daily.      aspirin (ECOTRIN) 81 MG EC tablet Take 1 tablet (81 mg total) by mouth once daily. 30 tablet 11    rosuvastatin (CRESTOR) 20 MG tablet Take 1 tablet (20 mg total) by mouth once daily. 90 tablet 1     No current facility-administered medications for this visit.       Medical History     Past Medical History:   Diagnosis Date    Arthritis     Chronic constipation     Depression     Diabetes mellitus     Diabetes mellitus     Hypertension     Osteopenia      Past Surgical History:   Procedure Laterality Date     SECTION      2x    COLONOSCOPY      COLONOSCOPY N/A 2019    Procedure: COLONOSCOPY;  Surgeon: Marshall Contreras MD;  Location: [address]);  Service: Endoscopy;  Laterality: N/A;  pt states that she had to be resuscitated after receiving an epidural during childbirth "30 something" years ago.  Ok for 4th floor per Dr. Hernandez-MS    EYE SURGERY Bilateral     cataract removal    HYSTERECTOMY      full hyst     OOPHORECTOMY      TONSILLECTOMY       Family History   Problem Relation Age of Onset    Breast cancer Maternal Cousin     Heart attack Mother     Heart disease Mother     Alzheimer's disease Mother     Heart attack Father     Heart disease Father     Heart failure Father     Hypertension Father     Hyperlipidemia Sister     " Hypertension Sister     Diabetes Sister     Abnormal EKG Sister     Dementia Sister     Kidney disease Sister     Alcohol abuse Brother     Fibroids Daughter     Brain cancer Son     Early death Son 33    Ovarian cancer Neg Hx     Cervical cancer Neg Hx     Endometrial cancer Neg Hx     Vaginal cancer Neg Hx     Stroke Neg Hx     Colon cancer Neg Hx     Esophageal cancer Neg Hx     Vision loss Neg Hx      Social History     Socioeconomic History    Marital status:    Tobacco Use    Smoking status: Former     Current packs/day: 0.00     Average packs/day: 1 pack/day for 25.0 years (25.0 ttl pk-yrs)     Types: Cigarettes     Start date: 1978     Quit date: 1999     Years since quittin.0    Smokeless tobacco: Never   Substance and Sexual Activity    Alcohol use: No    Drug use: No    Sexual activity: Not Currently     Partners: Male     Birth control/protection: None     Social Determinants of Health     Financial Resource Strain: Low Risk  (2023)    Overall Financial Resource Strain (CARDIA)     Difficulty of Paying Living Expenses: Not hard at all   Food Insecurity: No Food Insecurity (2023)    Hunger Vital Sign     Worried About Running Out of Food in the Last Year: Never true     Ran Out of Food in the Last Year: Never true   Transportation Needs: No Transportation Needs (2023)    PRAPARE - Transportation     Lack of Transportation (Medical): No     Lack of Transportation (Non-Medical): No   Physical Activity: Unknown (2023)    Exercise Vital Sign     Days of Exercise per Week: 2 days   Stress: Stress Concern Present (2023)    [employer] - Occupational Stress Questionnaire     Feeling of Stress : To some extent   Social Connections: Unknown (2023)    Social Connection and Isolation Panel [NHANES]     Frequency of Communication with Friends and Family: Twice a week     Frequency of Social Gatherings with Friends and Family: Once a  week     Active Member of Clubs or Organizations: Yes     Attends Club or Organization Meetings: More than 4 times per year     Marital Status:    Housing Stability: Unknown (12/18/2023)    Housing Stability Vital Sign     Unable to Pay for Housing in the Last Year: No     Number of Places Lived in the Last Year: 1       Exam     Vitals:    01/02/24 0951   BP: 131/69   Pulse: (!) 55      Physical Exam:  General: Not in acute distress. Not ill-appearing.   HENT: Normocephalic and atraumatic. Moist mucous membranes.  Eyes: Conjunctivae normal.   Pulmonary: Pulmonary effort is normal.   Abdominal: Abdomen is soft and flat.   Skin: Skin is warm and dry. No rashes.   Psychiatric: Mood normal.        Neurologic Exam   Mental status: oriented to person, place, and time  Attention: Normal. Concentration: normal.  Speech: speech is normal.  Cranial Nerves: PERRL, EOMI intact, V1-V3 Facial sensation intact. Symmetric facies. Hearing grossly intact. Palate and uvula midline, symmetric. No tongue deviation. Trapezius strength intact.     Motor exam: bulk and tone normal. Strength 5/5 in bilateral upper extremities: deltoids, biceps, triceps, wrist flexion/extension, finger abduction/adduction. Strength 5/5 in bilateral lower extremities: hip flexion/extension, thigh adduction/abduction, knee flexion/extension, dorsiflexion/plantarflexion, foot eversion/inversion.    Reflexes: 2+ in bilateral upper extremities: biceps and brachiaradialis, 2+ in bilateral lower extremities: patellar and achilles  Plantar reflex: normal  Zuniga's/Clonus: negative    Sensory exam: light touch intact    Gait exam: normal  Romberg: negative  Coordination: normal    Tremor: none  Cogwheel rigidity: none    Labs and Imaging     Labs: reviewed  No results found for this or any previous visit (from the past 24 hour(s)).    Thyroid normal  HgA1C%:  6.7  LDL:  101  Vit B12: 477 in 2022    Imaging:   I have personally reviewed the images performed.    MRI of the brain back in 2020 with no acute intracranial processes.  Has chronic microvascular change.  MRA of the brain with no significant stenosis.  Carotid ultrasound no significant stenosis.    Assessment and Plan     Problem List Items Addressed This Visit          Neuro    Diabetic peripheral neuropathy    Mild neurocognitive disorder - Primary    TIA (transient ischemic attack)    Overview     Without involvement of the teeth or gums this seems to be less likely related to a central cause.  Bertha allergies not completely clear.  Patient should however have treatment as if this is a TIA and will be given high dose aspirin for secondary prevention.         Relevant Medications    aspirin (ECOTRIN) 81 MG EC tablet       Psychiatric    Recurrent major depressive disorder       Cardiac/Vascular    Hypertension associated with type 2 diabetes mellitus     This is a 73-year-old female here for follow up, new to me and here to establish care.  Patient has a history of a TIA back in 2020/2021.  Has significant vascular risk factors in which patient is following with the PCP for.  Was taking high-dose aspirin however currently taking baby aspirin 81 and a cholesterol medication.  Patient also following up with neuropsych for cognitive disorder, able to do her own ADLs.  We will plan to continue patient on aspirin 81, LDL goal less than 70.  Follow up with PCP for vascular risk factors and vascular control.  Otherwise patient is doing well.  Discussed lifestyle modifications including diet and exercise, patient states she will be going back to the gym and stopped during COVID.    Discussed if any stroke-like symptoms, to present to the ER.  Questions answered.    Time spent on this encounter:  45 minutes. This includes face to face time and non-face to face time preparing to see the patient (eg, review of tests), obtaining and/or reviewing separately obtained history, documenting clinical information in the  electronic or other health record, independently interpreting results and communicating results to the patient/family/caregiver, or care coordinator.     Visit today is associated with current or anticipated ongoing medical care related to this patient's single serious condition/complex condition of TIA w concurrent vascular risk factors, as well as mild cognitive impairment. Patient is to return to the office when needed with further questions, if have any further neurologic symptoms.     This note was created by combination of typed  and M-Modal dictation. Transcription and phonetic errors may be present.  If there are any questions, please contact me.

## 2024-01-03 ENCOUNTER — OFFICE VISIT (OUTPATIENT)
Dept: CARDIOLOGY | Facility: CLINIC | Age: 74
End: 2024-01-03
Payer: MEDICARE

## 2024-01-03 ENCOUNTER — PATIENT MESSAGE (OUTPATIENT)
Dept: BEHAVIORAL HEALTH | Facility: CLINIC | Age: 74
End: 2024-01-03
Payer: MEDICARE

## 2024-01-03 VITALS
HEIGHT: 66 IN | OXYGEN SATURATION: 97 % | HEART RATE: 55 BPM | DIASTOLIC BLOOD PRESSURE: 78 MMHG | SYSTOLIC BLOOD PRESSURE: 136 MMHG | WEIGHT: 151.44 LBS | BODY MASS INDEX: 24.34 KG/M2

## 2024-01-03 DIAGNOSIS — I10 ESSENTIAL HYPERTENSION: Primary | ICD-10-CM

## 2024-01-03 DIAGNOSIS — R00.2 PALPITATIONS: ICD-10-CM

## 2024-01-03 DIAGNOSIS — I47.19 ATRIAL TACHYCARDIA: ICD-10-CM

## 2024-01-03 PROCEDURE — 99214 OFFICE O/P EST MOD 30 MIN: CPT | Mod: S$PBB,,, | Performed by: INTERNAL MEDICINE

## 2024-01-03 PROCEDURE — 99999 PR PBB SHADOW E&M-EST. PATIENT-LVL III: CPT | Mod: PBBFAC,,, | Performed by: INTERNAL MEDICINE

## 2024-01-03 PROCEDURE — 99213 OFFICE O/P EST LOW 20 MIN: CPT | Mod: PBBFAC,PN | Performed by: INTERNAL MEDICINE

## 2024-01-03 RX ORDER — ROSUVASTATIN CALCIUM 20 MG/1
20 TABLET, COATED ORAL DAILY
Qty: 90 TABLET | Refills: 1
Start: 2024-01-03

## 2024-01-12 ENCOUNTER — PATIENT MESSAGE (OUTPATIENT)
Dept: INTERNAL MEDICINE | Facility: CLINIC | Age: 74
End: 2024-01-12
Payer: MEDICARE

## 2024-01-16 ENCOUNTER — OFFICE VISIT (OUTPATIENT)
Dept: BEHAVIORAL HEALTH | Facility: CLINIC | Age: 74
End: 2024-01-16
Payer: MEDICARE

## 2024-01-16 DIAGNOSIS — F43.23 ADJUSTMENT DISORDER WITH MIXED ANXIETY AND DEPRESSED MOOD: Primary | ICD-10-CM

## 2024-01-16 DIAGNOSIS — F43.21 GRIEF: ICD-10-CM

## 2024-01-16 PROCEDURE — 90837 PSYTX W PT 60 MINUTES: CPT | Mod: 95,,, | Performed by: SOCIAL WORKER

## 2024-01-17 ENCOUNTER — TELEPHONE (OUTPATIENT)
Dept: INTERNAL MEDICINE | Facility: CLINIC | Age: 74
End: 2024-01-17
Payer: MEDICARE

## 2024-01-17 NOTE — TELEPHONE ENCOUNTER
"----- Message from Kelsey Smith sent at 1/16/2024  3:40 PM CST -----  Regarding: Clinical Notes  "Type:  Patient Call Back    Who Called:Oli Carolina)    What is the reqeust in detail:Requesting pt recent clinical notes. Please advise    Can the clinic reply by [facility]?no    Best Call Back Number:[phone]      Additional Information:Within 6 months            "

## 2024-01-17 NOTE — TELEPHONE ENCOUNTER
Spoke to lady at [facility] need is the providers last note to fill the patients rx for Dexacom meter. Information was faxed to [phone]

## 2024-01-24 NOTE — PROGRESS NOTES
Individual Psychotherapy (LCSW/PhD)  Gely Green,  1/16/2024    REFERRAL SOURCE:  Yoseph Dallas MD  TYPE OF VISIT:  In person  LENGTH OF SESSION: 60  Site:  [facility]    Therapeutic Intervention: Met with patient for individual psychotherapy.    Chief complaint/reason for encounter: depression and anxiety     Interval history and content of current session: PT stated she has been attending several funerals of friends and family. This has let PT to start thinking about mortality more and having fears regarding getting older, dying and end of life. SW assisted PT with processing her feelings and reminded PT of the life cycle. SW encouraged PT to monitor negative thing and focus on the things she can control. SW reviewed anxiety strategies like challenging negative thoughts and shifting perspective. PT agreed to monitor her thoughts, shift her perspective and continue practicing skills. PT will report progress/outcome at the next visit.     Treatment plan:  Target symptoms: depression, anxiety , adjustment, grief  Why chosen therapy is appropriate versus another modality: relevant to diagnosis  Outcome monitoring methods: self-report  Therapeutic intervention type: insight oriented psychotherapy    Risk parameters:  Patient reports no suicidal ideation  Patient reports no homicidal ideation  Patient reports no self-injurious behavior  Patient reports no violent behavior    Verbal deficits: None    Patient's response to intervention:  The patient's response to intervention is accepting.    Progress toward goals and other mental status changes:  The patient's progress toward goals is fair .      1/16/2024     1:55 PM 11/5/2023    11:09 PM   Results of the PHQ8   Little interest or pleasure in doing things Not at all More than half the days   Feeling down, depressed, or hopeless Several days More than half the days   Trouble falling or staying asleep, or sleeping too much Not at all More than  half the days   Feeling tired or having little energy Not at all More than half the days   Poor appetite or overeating Not at all More than half the days   Feeling bad about yourself - or that you are a failure or have let yourself or your family down Several days More than half the days   Trouble concentrating on things, such as reading the newspaper or watching television Several days Not at all   Moving or speaking so slowly that other people could have noticed. Or the opposite - being so fidgety or restless that you have been moving around a lot more than usual Not at all Not at all   Total Score  3 12          1/16/2024     1:54 PM 11/5/2023    11:07 PM 6/10/2023    10:44 PM   GAD7   1. Feeling nervous, anxious, or on edge? 1 2 0   2. Not being able to stop or control worrying? 1 2 0   3. Worrying too much about different things? 1 2 0   4. Trouble relaxing? 1 2 2   5. Being so restless that it is hard to sit still? 1 2 2   6. Becoming easily annoyed or irritable? 1 2 0   7. Feeling afraid as if something awful might happen? 1 1 0   HEBERT-7 Score 7 13 4        Diagnosis:     ICD-10-CM ICD-9-CM   1. Adjustment disorder with mixed anxiety and depressed mood  F43.23 309.28   2. Grief  F43.21 309.0                 Plan: Pt plans to continue individual psychotherapy    Return to clinic: 2 weeks    Length of Service (minutes): 60

## 2024-02-07 ENCOUNTER — TELEPHONE (OUTPATIENT)
Dept: BEHAVIORAL HEALTH | Facility: CLINIC | Age: 74
End: 2024-02-07
Payer: MEDICARE

## 2024-02-13 ENCOUNTER — PATIENT MESSAGE (OUTPATIENT)
Dept: ADMINISTRATIVE | Facility: OTHER | Age: 74
End: 2024-02-13
Payer: MEDICARE

## 2024-02-14 ENCOUNTER — OFFICE VISIT (OUTPATIENT)
Dept: BEHAVIORAL HEALTH | Facility: CLINIC | Age: 74
End: 2024-02-14
Payer: MEDICARE

## 2024-02-14 DIAGNOSIS — Z63.6 CAREGIVER STRESS: ICD-10-CM

## 2024-02-14 DIAGNOSIS — F33.0 MILD EPISODE OF RECURRENT MAJOR DEPRESSIVE DISORDER: Primary | ICD-10-CM

## 2024-02-14 DIAGNOSIS — F43.21 GRIEF: ICD-10-CM

## 2024-02-14 PROCEDURE — 90837 PSYTX W PT 60 MINUTES: CPT | Mod: ,,, | Performed by: SOCIAL WORKER

## 2024-02-14 SDOH — SOCIAL DETERMINANTS OF HEALTH (SDOH): DEPENDENT RELATIVE NEEDING CARE AT HOME: Z63.6

## 2024-02-15 ENCOUNTER — OFFICE VISIT (OUTPATIENT)
Dept: UROGYNECOLOGY | Facility: CLINIC | Age: 74
End: 2024-02-15
Payer: MEDICARE

## 2024-02-15 VITALS
RESPIRATION RATE: 18 BRPM | WEIGHT: 150.81 LBS | DIASTOLIC BLOOD PRESSURE: 70 MMHG | HEIGHT: 66 IN | HEART RATE: 64 BPM | BODY MASS INDEX: 24.24 KG/M2 | SYSTOLIC BLOOD PRESSURE: 133 MMHG

## 2024-02-15 DIAGNOSIS — N30.10 INTERSTITIAL CYSTITIS: ICD-10-CM

## 2024-02-15 DIAGNOSIS — R39.89 BLADDER PAIN: ICD-10-CM

## 2024-02-15 DIAGNOSIS — K59.00 CONSTIPATION, UNSPECIFIED CONSTIPATION TYPE: Primary | ICD-10-CM

## 2024-02-15 PROBLEM — Z63.6 CAREGIVER STRESS: Status: ACTIVE | Noted: 2024-02-15

## 2024-02-15 LAB
BILIRUB UR QL STRIP: NEGATIVE
GLUCOSE UR QL STRIP: NEGATIVE
KETONES UR QL STRIP: NEGATIVE
LEUKOCYTE ESTERASE UR QL STRIP: NEGATIVE
PH, POC UA: 7
POC BLOOD, URINE: POSITIVE
POC NITRATES, URINE: NEGATIVE
PROT UR QL STRIP: NEGATIVE
SP GR UR STRIP: 1.01 (ref 1–1.03)
UROBILINOGEN UR STRIP-ACNC: NEGATIVE (ref 0.1–1.1)

## 2024-02-15 PROCEDURE — 99214 OFFICE O/P EST MOD 30 MIN: CPT | Mod: PBBFAC | Performed by: PHYSICIAN ASSISTANT

## 2024-02-15 PROCEDURE — 99213 OFFICE O/P EST LOW 20 MIN: CPT | Mod: S$PBB,,, | Performed by: PHYSICIAN ASSISTANT

## 2024-02-15 PROCEDURE — 99999 PR PBB SHADOW E&M-EST. PATIENT-LVL IV: CPT | Mod: PBBFAC,,, | Performed by: PHYSICIAN ASSISTANT

## 2024-02-15 RX ORDER — LANOLIN ALCOHOL/MO/W.PET/CERES
400 CREAM (GRAM) TOPICAL NIGHTLY
Qty: 30 TABLET | Refills: 11 | Status: SHIPPED | OUTPATIENT
Start: 2024-02-15

## 2024-02-15 RX ORDER — METHENAMINE, SODIUM PHOSPHATE, MONOBASIC, MONOHYDRATE, PHENYL SALICYLATE, METHYLENE BLUE, AND HYOSCYAMINE SULFATE 118; 40.8; 36; 10; .12 MG/1; MG/1; MG/1; MG/1; MG/1
1 CAPSULE ORAL 4 TIMES DAILY PRN
Qty: 20 CAPSULE | Refills: 11 | Status: SHIPPED | OUTPATIENT
Start: 2024-02-15

## 2024-02-15 RX ORDER — HYDROXYZINE HYDROCHLORIDE 25 MG/1
25 TABLET, FILM COATED ORAL 2 TIMES DAILY PRN
Qty: 30 TABLET | Refills: 11 | Status: SHIPPED | OUTPATIENT
Start: 2024-02-15 | End: 2025-02-15

## 2024-02-15 NOTE — PROGRESS NOTES
Individual Psychotherapy (LCSW/PhD)  Gely Green,  2/14/2024    REFERRAL SOURCE:  Yoseph Dallas MD  TYPE OF VISIT:  In person  LENGTH OF SESSION: 60  Site:  [facility]    Therapeutic Intervention: Met with patient for individual psychotherapy.    Chief complaint/reason for encounter: depression and anxiety     Interval history and content of current session: PT stated she has been attending several funerals of friends and family. This has led PT to start thinking about mortality more and having fears regarding getting older, dying and end of life. PT also spoke about health issues her  is having and expressed anger towards him and herself for not being more proactive as it relates to 's health. 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 She expressed resentment towards  for not taking care of himself. SW discussed PT feelings and asked PT to not take an all or nothing approach. PT can still live her life in some aspects and be a caregiver as well. SW encouraged PT to adjust thinking and their home to make it more inexpedient for  so that he does not solely rely on PT to take care of his every need. PT agreed to make adjustments and will report progress/outcome at the next visit.         Treatment plan:  Target symptoms: depression, anxiety , adjustment, grief  Why chosen therapy is appropriate versus another modality: relevant to diagnosis  Outcome monitoring methods: self-report  Therapeutic intervention type: insight oriented psychotherapy    Risk parameters:  Patient reports no suicidal ideation  Patient reports no homicidal ideation  Patient reports no self-injurious behavior  Patient reports no violent behavior    Verbal deficits: None    Patient's response to intervention:  The patient's response to intervention is accepting.    Progress toward goals and other mental status changes:  The patient's  progress toward goals is fair .      2/13/2024     6:28 AM 1/16/2024     1:55 PM 11/5/2023    11:09 PM   Results of the PHQ8   Little interest or pleasure in doing things More than half the days Not at all More than half the days   Feeling down, depressed, or hopeless More than half the days Several days More than half the days   Trouble falling or staying asleep, or sleeping too much Several days Not at all More than half the days   Feeling tired or having little energy Not at all Not at all More than half the days   Poor appetite or overeating Not at all Not at all More than half the days   Feeling bad about yourself - or that you are a failure or have let yourself or your family down More than half the days Several days More than half the days   Trouble concentrating on things, such as reading the newspaper or watching television More than half the days Several days Not at all   Moving or speaking so slowly that other people could have noticed. Or the opposite - being so fidgety or restless that you have been moving around a lot more than usual Not at all Not at all Not at all   Total Score  9 3 12          2/13/2024     6:23 AM 1/16/2024     1:54 PM 11/5/2023    11:07 PM   GAD7   1. Feeling nervous, anxious, or on edge? 2 1 2   2. Not being able to stop or control worrying? 2 1 2   3. Worrying too much about different things? 2 1 2   4. Trouble relaxing? 2 1 2   5. Being so restless that it is hard to sit still? 2 1 2   6. Becoming easily annoyed or irritable? 2 1 2   7. Feeling afraid as if something awful might happen? 2 1 1   HEBERT-7 Score 14 7 13        Diagnosis:     ICD-10-CM ICD-9-CM   1. Mild episode of recurrent major depressive disorder  F33.0 296.31   2. Grief  F43.21 309.0   3. Caregiver stress  Z63.6 V61.49                   Plan: Pt plans to continue individual psychotherapy    Return to clinic: 2 weeks    Length of Service (minutes): 60

## 2024-02-15 NOTE — PROGRESS NOTES
"  [facility] - UROGYNECOLOGY  [address] [address]  February 15, 2024     Gely LOTT Peter  [id number]  1950    UROGYN FOLLOW-UP  2/15/2024     73 y.o. female,   presents for urogyn follow up. She c/o   Chief Complaint   Patient presents with    Follow-up    .     HPI from 3/2/2022:  Well known patient to [facility] and Dr. Alan presents for recurrence of bladder pain. Has not had symptoms for years, but in the last few months has been having flares. Did two bladder installations without improvement. Today patient c/o IC pain daily, feels relief when she empties her bladder. Taking flavoxate daily, but does not help. Has tried Uribel and it helps, but it is expensive. BMs have been "horrible," takes miralax most days. Denies fever. Blood sugars have been 120-150 but may go up to 200, then sometimes drops suddenly to 60     2022:  Patient is going well. No complaints. Has not had any more IC flares since started solifenacin 5 mg. Denies worsening constipation, though constiopation is still an issue. Uses flax seed in her food, but no fiber supplement or stool softener. Uses Miralax when she remembers. Blood sugars have been high again, A1C increased to 7.2. Stress/anxiety has been well controlled since started going to weekly group grief counseling sessions at [facility]. She is using Replens two nights per week. Denies UTI symptoms at this time, except still waking up 3x nightly to urinate. Does not want to increase solifenacin to higher dose.      2023:  Patient has been doing well until 1 week ago, she thinks flare brought on because she started drinking caffeinated hannah again. Still constipated. Her neuropsych doctor pulled her off of VesiCare and hydroxyzine due to risk of memory issues.  Blood sugars have been elevated. She is using Replens regularly.     Changes from last visit:  Patient is doing well. She has not had a flare since her last visit. Blood sugars " "have been more regular. She has hydroxyzine and Uribel for flares. She is still suffering with constipation. Taking Linzess, metamucil, and senna.     Past Medical History:   Diagnosis Date    Arthritis     Chronic constipation     Depression     Diabetes mellitus     Diabetes mellitus     Hypertension     Osteopenia        Past Surgical History:   Procedure Laterality Date     SECTION      2x    COLONOSCOPY      COLONOSCOPY N/A 2019    Procedure: COLONOSCOPY;  Surgeon: Marshall Contreras MD;  Location: [facility] ([address] [place] [address]);  Service: Endoscopy;  Laterality: N/A;  pt states that she had to be resuscitated after receiving an epidural during childbirth "30 something" years ago.  Ok for 4th floor per Dr. Hernandez-MS    EYE SURGERY Bilateral     cataract removal    HYSTERECTOMY      full hyst     OOPHORECTOMY      TONSILLECTOMY         Family History   Problem Relation Age of Onset    Breast cancer Maternal Cousin     Heart attack Mother     Heart disease Mother     Alzheimer's disease Mother     Heart attack Father     Heart disease Father     Heart failure Father     Hypertension Father     Hyperlipidemia Sister     Hypertension Sister     Diabetes Sister     Abnormal EKG Sister     Dementia Sister     Kidney disease Sister     Alcohol abuse Brother     Fibroids Daughter     Brain cancer Son     Early death Son 33    Ovarian cancer Neg Hx     Cervical cancer Neg Hx     Endometrial cancer Neg Hx     Vaginal cancer Neg Hx     Stroke Neg Hx     Colon cancer Neg Hx     Esophageal cancer Neg Hx     Vision loss Neg Hx        Social History     Socioeconomic History    Marital status:    Tobacco Use    Smoking status: Former     Current packs/day: 0.00     Average packs/day: 1 pack/day for 25.0 years (25.0 ttl pk-yrs)     Types: Cigarettes     Start date: 1978     Quit date: 1999     Years since quittin.1    Smokeless tobacco: Never   Substance and Sexual Activity    Alcohol use: No    " Drug use: No    Sexual activity: Not Currently     Partners: Male     Birth control/protection: None     Social Determinants of Health     Financial Resource Strain: Low Risk  (12/18/2023)    Overall Financial Resource Strain (CARDIA)     Difficulty of Paying Living Expenses: Not hard at all   Food Insecurity: No Food Insecurity (12/18/2023)    Hunger Vital Sign     Worried About Running Out of Food in the Last Year: Never true     Ran Out of Food in the Last Year: Never true   Transportation Needs: No Transportation Needs (12/18/2023)    PRAPARE - Transportation     Lack of Transportation (Medical): No     Lack of Transportation (Non-Medical): No   Physical Activity: Unknown (12/18/2023)    Exercise Vital Sign     Days of Exercise per Week: 2 days   Stress: Stress Concern Present (12/18/2023)    [employer] - Occupational Stress Questionnaire     Feeling of Stress : To some extent   Social Connections: Unknown (12/18/2023)    Social Connection and Isolation Panel [NHANES]     Frequency of Communication with Friends and Family: Twice a week     Frequency of Social Gatherings with Friends and Family: Once a week     Active Member of Clubs or Organizations: Yes     Attends Club or Organization Meetings: More than 4 times per year     Marital Status:    Housing Stability: Unknown (12/18/2023)    Housing Stability Vital Sign     Unable to Pay for Housing in the Last Year: No     Number of Places Lived in the Last Year: 1       Current Outpatient Medications   Medication Sig Dispense Refill    aspirin (ECOTRIN) 81 MG EC tablet Take 1 tablet (81 mg total) by mouth once daily. 30 tablet 11    calcium carbonate (OS-REDD) 600 mg calcium (1,500 mg) Tab Take 600 mg by mouth 2 (two) times daily with meals.      FREESTYLE SHANNON 2 READER Misc Inject 1 each into the skin every 14 (fourteen) days. 4 each 12    FREESTYLE SHANNON 2 SENSOR Kit USE AS DIRECTED EVERY 2 WEEKS 1 kit 0     "hydroCHLOROthiazide (HYDRODIURIL) 12.5 MG Tab Take 1 tablet (12.5 mg total) by mouth once daily. 30 tablet 11    hydrOXYzine HCL (ATARAX) 25 MG tablet Take 1 tablet (25 mg total) by mouth 2 (two) times daily as needed (bladder pain). 30 tablet 11    insulin glargine, TOUJEO, (TOUJEO SOLOSTAR U-300 INSULIN) 300 unit/mL (1.5 mL) InPn pen Inject 12 Units into the skin every evening. 6 mL 1    ipratropium (ATROVENT) 21 mcg (0.03 %) nasal spray 2 sprays by Each Nostril route 2 (two) times daily. 30 mL 0    irbesartan (AVAPRO) 300 MG tablet Take 1 tablet (300 mg total) by mouth every evening. 90 tablet 3    lancets (ONETOUCH DELICA LANCETS) 33 gauge Misc 1 lancet by Misc.(Non-Drug; Combo Route) route 3 (three) times daily. 300 each 3    LINZESS 72 mcg Cap capsule Take 72 mcg by mouth every morning.      metFORMIN (GLUCOPHAGE-XR) 500 MG ER 24hr tablet Take 2 tablets (1,000 mg total) by mouth 2 (two) times a day. (Patient taking differently: Take 1,000 mg by mouth 2 (two) times a day. Pt taking 500mg BID) 360 tablet 4    sheyla-m.blue-s.phos-phsal-hyo (URIBEL) 118-10-40.8-36 mg Cap Take 1 capsule by mouth 4 (four) times daily as needed (bladder pain). 20 capsule 11    mometasone (NASONEX) 50 mcg/actuation nasal spray 2 sprays by Nasal route once daily. 17 g 0    omeprazole (PRILOSEC) 20 MG capsule Take 20 mg by mouth.      ONETOUCH VERIO TEST STRIPS Strp TEST THREE TIMES DAILY 200 strip 11    pen needle, diabetic (BD ULTRA-FINE SHORT PEN NEEDLE) 31 gauge x 5/16" Ndle 1 Needle by Misc.(Non-Drug; Combo Route) route once daily. 100 each 3    rosuvastatin (CRESTOR) 20 MG tablet Take 1 tablet (20 mg total) by mouth once daily. 90 tablet 1    vitamin D 1000 units Tab Take 1,000 Units by mouth once daily.       No current facility-administered medications for this visit.       Review of patient's allergies indicates:  No Known Allergies    OB History          2    Para   2    Term   2            AB        Living   2 " "        SAB        IAB        Ectopic        Multiple        Live Births                      ROS  As per HPI.      Physical Exam  /70 (BP Location: Right arm, Patient Position: Sitting, BP Method: Small (Automatic))   Pulse 64   Resp 18   Ht 5' 6" (1.676 m)   Wt 68.4 kg (150 lb 12.7 oz)   LMP 01/01/1999   BMI 24.34 kg/m²   General: alert and oriented, no acute distress  Respiratory: normal respiratory effort  Abd: soft, non-tender, non-distended  Pelvic:  deferred    Impression  1. Constipation, unspecified constipation type  magnesium oxide (MAG-OX) 400 mg (241.3 mg magnesium) tablet      2. Interstitial cystitis  hydrOXYzine HCL (ATARAX) 25 MG tablet    methen-m.blue-s.phos-phsal-hyo (URIBEL) 118-10-40.8-36 mg Cap      3. Bladder pain  hydrOXYzine HCL (ATARAX) 25 MG tablet    methen-m.blue-s.phos-phsal-hyo (URIBEL) 118-10-40.8-36 mg Cap        We reviewed the above issues and discussed options for short-term versus long-term management of her problems.     Plan:   Well woman  --up to date     2. Constipation:  -- Controlling constipation may help bladder urgency/leakage and fiber may better control cholesterol and blood glucose.   -- Continue Linzess  -- May also use over the counter stool softener (ex Colace) 1-2 x/day.  **AVOID laxatives.  -- Can also try "Natural Vitality Calm" powder or magnesium oxide 400 mg per night (helps with sleep, anxiety, and constipation)  -- Drink plenty of water!  -- Get a SQUATTY POTTY     3 Vaginal atrophy (dryness):    Non-estrogen options for vaginal dryness:  --Use REPLENS or RepHRESH OTC: 1/2 applicator full in vagina twice a week.    --coconut oil, extra virgin olive oil, or vitamin E oil: dime-sized amount with finger (as far as can reach internally) and around the vaginal opening and inner lips up to nightly as needed  --Uberlube, Astroglide, [place] liquibeads, Satin by Sliquid Natural Intimate Moisturizer     4.  OAB:  -- drink plenty of water!  --avoid dietary " irritants (see list)  -- Discontine Vesicare 5 mg daily due to potential memory issues as recommended by neuropsych. Myrbetriq was expensive. Takes 2-4 weeks to see if will have effect. Monitor BP, if elevated continuously after weeks on medication, please stop and let us know. For dry mouth: use Biotene mouthwash, get sour, sugar free lozenge or gum.   If it does, will try to switch to Gemtesa.   --Empty bladder every 2-3 hours even if you don't have the urge.  Empty well: wait a minute, lean forward on toilet.    --Try not to go more frequently than once an hour. When you get the urge to urinate after you have just gone, distract yourself until the urge passes.   --Avoid dietary irritants (see sheet).  Keep diary x 3-5 days to determine your irritants.  --KEGELS: do 10 in AM and 10 in PM, holding each x 10 seconds.  When you feel urge to go, STOP, KEGEL, and when urge has passed, then go to bathroom.    -- continue Hydroxyzine at night as needed, may help with nocturia (waking up at night to pee)  --A1C 6.7      5) IC  --For flares: Take hydroxyzine up to 3 times a day and Uribel up to 4 times a day  --Bladder instillations previously   --Hydrodistention With Cystoscopy: allows physicians to take a much closer look at the bladder wall. While the AUA no longer suggests that it be used as a diagnostic method (unless a diagnosis is in doubt), hydrodistention may provide modest relief in IC symptoms which can last from three to six months. It can, however, be a more difficult, painful procedure. As a result, it requires careful consideration and discussion.     RTC in 1 year    18 minutes were spent in face to face time with this patient  100 % of this time was spent in counseling and/or coordination of care    Mario Campos PA-C  Division of Female Pelvic Medicine and Reconstructive Surgery  Department of Obstetrics & Gynecology  [facility] [place], [place]

## 2024-02-15 NOTE — PATIENT INSTRUCTIONS
"Well woman  --up to date     2. Constipation:  -- Controlling constipation may help bladder urgency/leakage and fiber may better control cholesterol and blood glucose.   -- Continue Linzess  -- May also use over the counter stool softener (ex Colace) 1-2 x/day.  **AVOID laxatives.  -- Can also try "Natural Vitality Calm" powder or magnesium oxide 400 mg per night (helps with sleep, anxiety, and constipation)  -- Drink plenty of water!  -- Get a SQUATTY POTTY     3 Vaginal atrophy (dryness):    Non-estrogen options for vaginal dryness:  --Use REPLENS or RepHRESH OTC: 1/2 applicator full in vagina twice a week.    --coconut oil, extra virgin olive oil, or vitamin E oil: dime-sized amount with finger (as far as can reach internally) and around the vaginal opening and inner lips up to nightly as needed  --Uberlube, Astroglide, [place] liquibeads, Satin by Sliquid Natural Intimate Moisturizer     4.  OAB:  -- drink plenty of water!  --avoid dietary irritants (see list)  -- Discontine Vesicare 5 mg daily due to potential memory issues as recommended by neuropsych. Myrbetriq was expensive. Takes 2-4 weeks to see if will have effect. Monitor BP, if elevated continuously after weeks on medication, please stop and let us know. For dry mouth: use Biotene mouthwash, get sour, sugar free lozenge or gum.   If it does, will try to switch to Gemtesa.   --Empty bladder every 2-3 hours even if you don't have the urge.  Empty well: wait a minute, lean forward on toilet.    --Try not to go more frequently than once an hour. When you get the urge to urinate after you have just gone, distract yourself until the urge passes.   --Avoid dietary irritants (see sheet).  Keep diary x 3-5 days to determine your irritants.  --KEGELS: do 10 in AM and 10 in PM, holding each x 10 seconds.  When you feel urge to go, STOP, KEGEL, and when urge has passed, then go to bathroom.    -- continue Hydroxyzine at night as needed, may help with nocturia (waking up " at night to pee)  --A1C 6.7      5) IC  --For flares: Take hydroxyzine up to 3 times a day and Uribel up to 4 times a day  --Bladder instillations previously   --Hydrodistention With Cystoscopy: allows physicians to take a much closer look at the bladder wall. While the AUA no longer suggests that it be used as a diagnostic method (unless a diagnosis is in doubt), hydrodistention may provide modest relief in IC symptoms which can last from three to six months. It can, however, be a more difficult, painful procedure. As a result, it requires careful consideration and discussion.     RTC in 1 year

## 2024-02-27 ENCOUNTER — PATIENT MESSAGE (OUTPATIENT)
Dept: BEHAVIORAL HEALTH | Facility: CLINIC | Age: 74
End: 2024-02-27
Payer: MEDICARE

## 2024-02-27 ENCOUNTER — LAB VISIT (OUTPATIENT)
Dept: LAB | Facility: OTHER | Age: 74
End: 2024-02-27
Attending: INTERNAL MEDICINE
Payer: MEDICARE

## 2024-02-27 ENCOUNTER — OFFICE VISIT (OUTPATIENT)
Dept: PODIATRY | Facility: CLINIC | Age: 74
End: 2024-02-27
Payer: MEDICARE

## 2024-02-27 ENCOUNTER — OFFICE VISIT (OUTPATIENT)
Dept: INTERNAL MEDICINE | Facility: CLINIC | Age: 74
End: 2024-02-27
Payer: MEDICARE

## 2024-02-27 ENCOUNTER — OFFICE VISIT (OUTPATIENT)
Dept: BEHAVIORAL HEALTH | Facility: CLINIC | Age: 74
End: 2024-02-27
Payer: MEDICARE

## 2024-02-27 VITALS
DIASTOLIC BLOOD PRESSURE: 72 MMHG | HEIGHT: 66 IN | WEIGHT: 150 LBS | HEART RATE: 58 BPM | SYSTOLIC BLOOD PRESSURE: 137 MMHG | BODY MASS INDEX: 24.11 KG/M2

## 2024-02-27 VITALS
HEIGHT: 66 IN | BODY MASS INDEX: 24.17 KG/M2 | HEART RATE: 68 BPM | WEIGHT: 150.38 LBS | SYSTOLIC BLOOD PRESSURE: 131 MMHG | DIASTOLIC BLOOD PRESSURE: 66 MMHG | OXYGEN SATURATION: 98 %

## 2024-02-27 DIAGNOSIS — L85.3 DRY SKIN: ICD-10-CM

## 2024-02-27 DIAGNOSIS — D64.9 ANEMIA, UNSPECIFIED TYPE: ICD-10-CM

## 2024-02-27 DIAGNOSIS — E11.9 TYPE 2 DIABETES MELLITUS WITHOUT COMPLICATION, WITH LONG-TERM CURRENT USE OF INSULIN: ICD-10-CM

## 2024-02-27 DIAGNOSIS — Z63.6 CAREGIVER STRESS: ICD-10-CM

## 2024-02-27 DIAGNOSIS — L84 CORN OR CALLUS: ICD-10-CM

## 2024-02-27 DIAGNOSIS — E11.9 ENCOUNTER FOR DIABETIC FOOT EXAM: Primary | ICD-10-CM

## 2024-02-27 DIAGNOSIS — Z79.4 TYPE 2 DIABETES MELLITUS WITHOUT COMPLICATION, WITH LONG-TERM CURRENT USE OF INSULIN: ICD-10-CM

## 2024-02-27 DIAGNOSIS — R31.29 MICROSCOPIC HEMATURIA: Primary | ICD-10-CM

## 2024-02-27 DIAGNOSIS — F43.21 GRIEF: ICD-10-CM

## 2024-02-27 DIAGNOSIS — F33.0 MILD EPISODE OF RECURRENT MAJOR DEPRESSIVE DISORDER: Primary | ICD-10-CM

## 2024-02-27 DIAGNOSIS — D64.9 NORMOCYTIC ANEMIA: ICD-10-CM

## 2024-02-27 LAB
ALBUMIN/CREAT UR: NORMAL UG/MG (ref 0–30)
CREAT UR-MCNC: 90.5 MG/DL (ref 15–325)
MICROALBUMIN UR DL<=1MG/L-MCNC: <5 UG/ML

## 2024-02-27 PROCEDURE — 90785 PSYTX COMPLEX INTERACTIVE: CPT | Mod: ,,, | Performed by: SOCIAL WORKER

## 2024-02-27 PROCEDURE — 99999 PR PBB SHADOW E&M-EST. PATIENT-LVL IV: CPT | Mod: PBBFAC,,, | Performed by: STUDENT IN AN ORGANIZED HEALTH CARE EDUCATION/TRAINING PROGRAM

## 2024-02-27 PROCEDURE — 99214 OFFICE O/P EST MOD 30 MIN: CPT | Mod: S$PBB,,, | Performed by: STUDENT IN AN ORGANIZED HEALTH CARE EDUCATION/TRAINING PROGRAM

## 2024-02-27 PROCEDURE — 82043 UR ALBUMIN QUANTITATIVE: CPT | Performed by: INTERNAL MEDICINE

## 2024-02-27 PROCEDURE — 99214 OFFICE O/P EST MOD 30 MIN: CPT | Mod: PBBFAC | Performed by: STUDENT IN AN ORGANIZED HEALTH CARE EDUCATION/TRAINING PROGRAM

## 2024-02-27 PROCEDURE — 99213 OFFICE O/P EST LOW 20 MIN: CPT | Mod: S$PBB,,, | Performed by: PODIATRIST

## 2024-02-27 PROCEDURE — 99999 PR PBB SHADOW E&M-EST. PATIENT-LVL IV: CPT | Mod: PBBFAC,,, | Performed by: PODIATRIST

## 2024-02-27 PROCEDURE — 99214 OFFICE O/P EST MOD 30 MIN: CPT | Mod: PBBFAC,27,PN | Performed by: PODIATRIST

## 2024-02-27 PROCEDURE — 90837 PSYTX W PT 60 MINUTES: CPT | Mod: ,,, | Performed by: SOCIAL WORKER

## 2024-02-27 RX ORDER — BLOOD-GLUCOSE,RECEIVER,CONT
EACH MISCELLANEOUS
COMMUNITY
Start: 2023-12-01

## 2024-02-27 SDOH — SOCIAL DETERMINANTS OF HEALTH (SDOH): DEPENDENT RELATIVE NEEDING CARE AT HOME: Z63.6

## 2024-02-27 NOTE — PROGRESS NOTES
Chief Complaint   Patient presents with    Nail Problem     Hard skin around toenail (big toes) and rough skin bottom of feet and diabetic care.  PCP Dr Taylor 2/27/24              HPI:   The patient is a 73 y.o.  female  who presents for a diabetic foot exam.     Patient reports no presence of abnormal sensation to the feet .    History of diabetic foot ulcers: none   History of foot surgery: none.     Shoes worn today:  Slip on flats.   She is concerned about dry skin around her nails and toes.  She moisturizes and soaks her feet often.           Primary care doctor is: Yoseph Dallas MD  Nail Problem (Hard skin around toenail (big toes) and rough skin bottom of feet and diabetic care./PCP Dr Taylor 2/27/24)              Past Medical History:   Diagnosis Date    Arthritis     Chronic constipation     Depression     Diabetes mellitus     Diabetes mellitus     Diabetes mellitus, type 2     Hypertension     Osteopenia            Current Outpatient Medications on File Prior to Visit   Medication Sig Dispense Refill    aspirin (ECOTRIN) 81 MG EC tablet Take 1 tablet (81 mg total) by mouth once daily. 30 tablet 11    calcium carbonate (OS-REDD) 600 mg calcium (1,500 mg) Tab Take 600 mg by mouth 2 (two) times daily with meals.      DEXCOM G7  Misc USE AS DIRECTED EVERY 3 MONTHS      FREESTYLE SHANNON 2 READER Misc Inject 1 each into the skin every 14 (fourteen) days. 4 each 12    FREESTYLE SHANNON 2 SENSOR Kit USE AS DIRECTED EVERY 2 WEEKS 1 kit 0    hydroCHLOROthiazide (HYDRODIURIL) 12.5 MG Tab Take 1 tablet (12.5 mg total) by mouth once daily. 30 tablet 11    hydrOXYzine HCL (ATARAX) 25 MG tablet Take 1 tablet (25 mg total) by mouth 2 (two) times daily as needed (bladder pain). 30 tablet 11    insulin glargine, TOUJEO, (TOUJEO SOLOSTAR U-300 INSULIN) 300 unit/mL (1.5 mL) InPn pen Inject 12 Units into the skin every evening. 6 mL 1    ipratropium (ATROVENT) 21 mcg (0.03 %) nasal spray 2 sprays by Each Nostril  "route 2 (two) times daily. (Patient not taking: Reported on 2024) 30 mL 0    irbesartan (AVAPRO) 300 MG tablet Take 1 tablet (300 mg total) by mouth every evening. 90 tablet 3    lancets (ONETOUCH DELICA LANCETS) 33 gauge Misc 1 lancet by Misc.(Non-Drug; Combo Route) route 3 (three) times daily. 300 each 3    LINZESS 72 mcg Cap capsule Take 72 mcg by mouth every morning.      magnesium oxide (MAG-OX) 400 mg (241.3 mg magnesium) tablet Take 1 tablet (400 mg total) by mouth every evening. 30 tablet 11    metFORMIN (GLUCOPHAGE-XR) 500 MG ER 24hr tablet Take 2 tablets (1,000 mg total) by mouth 2 (two) times a day. (Patient taking differently: Take 1,000 mg by mouth 2 (two) times a day. Pt taking 500mg BID) 360 tablet 4    methen-m.blue-s.phos-phsal-hyo (URIBEL) 118-10-40.8-36 mg Cap Take 1 capsule by mouth 4 (four) times daily as needed (bladder pain). 20 capsule 11    mometasone (NASONEX) 50 mcg/actuation nasal spray 2 sprays by Nasal route once daily. (Patient not taking: Reported on 2024) 17 g 0    omeprazole (PRILOSEC) 20 MG capsule Take 20 mg by mouth.      ONETOUCH VERIO TEST STRIPS Strp TEST THREE TIMES DAILY 200 strip 11    pen needle, diabetic (BD ULTRA-FINE SHORT PEN NEEDLE) 31 gauge x 5/16" Ndle 1 Needle by Misc.(Non-Drug; Combo Route) route once daily. 100 each 3    rosuvastatin (CRESTOR) 20 MG tablet Take 1 tablet (20 mg total) by mouth once daily. 90 tablet 1    vitamin D 1000 units Tab Take 1,000 Units by mouth once daily.       No current facility-administered medications on file prior to visit.           Review of patient's allergies indicates:  No Known Allergies        Social History     Socioeconomic History    Marital status:    Tobacco Use    Smoking status: Former     Current packs/day: 0.00     Average packs/day: 1 pack/day for 25.0 years (25.0 ttl pk-yrs)     Types: Cigarettes     Start date: 1978     Quit date: 1999     Years since quittin.1    Smokeless tobacco: " Never   Substance and Sexual Activity    Alcohol use: No    Drug use: No    Sexual activity: Not Currently     Partners: Male     Birth control/protection: None     Social Determinants of Health     Financial Resource Strain: Low Risk  (12/18/2023)    Overall Financial Resource Strain (CARDIA)     Difficulty of Paying Living Expenses: Not hard at all   Food Insecurity: No Food Insecurity (12/18/2023)    Hunger Vital Sign     Worried About Running Out of Food in the Last Year: Never true     Ran Out of Food in the Last Year: Never true   Transportation Needs: No Transportation Needs (12/18/2023)    PRAPARE - Transportation     Lack of Transportation (Medical): No     Lack of Transportation (Non-Medical): No   Physical Activity: Unknown (12/18/2023)    Exercise Vital Sign     Days of Exercise per Week: 2 days   Stress: Stress Concern Present (12/18/2023)    [employer] [place] [employer] - Occupational Stress Questionnaire     Feeling of Stress : To some extent   Social Connections: Unknown (12/18/2023)    Social Connection and Isolation Panel [NHANES]     Frequency of Communication with Friends and Family: Twice a week     Frequency of Social Gatherings with Friends and Family: Once a week     Active Member of Clubs or Organizations: Yes     Attends Club or Organization Meetings: More than 4 times per year     Marital Status:    Housing Stability: Unknown (12/18/2023)    Housing Stability Vital Sign     Unable to Pay for Housing in the Last Year: No     Number of Places Lived in the Last Year: 1           ROS:  General ROS: negative  Respiratory ROS: no cough, shortness of breath, or wheezing  Cardiovascular ROS: no chest pain or dyspnea on exertion  Musculoskeletal ROS: negative  Neurological ROS:   negative for - impaired coordination/balance or numbness/tingling  Dermatological ROS: negative      LAST HbA1c:   Hemoglobin A1C   Date Value Ref Range Status   02/27/2024 6.9 (H) 4.0 - 5.6 % Final      "Comment:     ADA Screening Guidelines:  5.7-6.4%  Consistent with prediabetes  >or=6.5%  Consistent with diabetes    High levels of fetal hemoglobin interfere with the HbA1C  assay. Heterozygous hemoglobin variants (HbS, HgC, etc)do  not significantly interfere with this assay.   However, presence of multiple variants may affect accuracy.     07/06/2023 6.7 (H) 4.0 - 5.6 % Final     Comment:     ADA Screening Guidelines:  5.7-6.4%  Consistent with prediabetes  >or=6.5%  Consistent with diabetes    High levels of fetal hemoglobin interfere with the HbA1C  assay. Heterozygous hemoglobin variants (HbS, HgC, etc)do  not significantly interfere with this assay.   However, presence of multiple variants may affect accuracy.     01/04/2023 7.3 (H) 4.0 - 5.6 % Final     Comment:     ADA Screening Guidelines:  5.7-6.4%  Consistent with prediabetes  >or=6.5%  Consistent with diabetes    High levels of fetal hemoglobin interfere with the HbA1C  assay. Heterozygous hemoglobin variants (HbS, HgC, etc)do  not significantly interfere with this assay.   However, presence of multiple variants may affect accuracy.             EXAM:   Vitals:    02/27/24 1012   BP: 137/72   Pulse: (!) 58   Weight: 68 kg (150 lb)   Height: 5' 6" (1.676 m)       General: alert, no distress, cooperative    Vascular:   Dorsalis Pedis:  present   Posterior Tibial:  present  Capillary refill time:  3 seconds  Temperature of toes cool to touch  Edema:  Trace and non-pitting       Neurological:     Sharp touch:  normal  Light touch: normal  Tinels Sign:  Absent  Mulders Click:   Absent  SWMF:  diminished      Dermatological:   Skin: Normal tone and turgor  Wounds/Ulcers:  Absent  Bruising:  Absent  Erythema:  Absent  Toenails x 10 are elongated by 1-4mm, thickened, without paronychia or discoloration  Hyperkeratosis around the nails.       Musculoskeletal:   Metatarsophalangeal range of motion:   full range of motion  Subtalar joint range of motion: full range " of motion  Ankle joint range of motion:  full range of motion  Bunions:  Absent  Hammertoes: Absent               ASSESSMENT/PLAN:          Problem List Items Addressed This Visit       Type 2 diabetes mellitus, with long-term current use of insulin    Relevant Orders    DIABETIC SHOES FOR HOME USE     Other Visit Diagnoses       Encounter for diabetic foot exam    -  Primary    Relevant Orders    DIABETIC SHOES FOR HOME USE    Dry skin        Relevant Orders    DIABETIC SHOES FOR HOME USE    Corn or callus        Relevant Orders    DIABETIC SHOES FOR HOME USE                  I counseled the patient on the patient's conditions, their implications and medical management.   Shoe inspection.     Continue good nutrition and blood sugar control to help prevent podiatric complications of diabetes.   Maintain proper foot hygiene.   Continue wearing proper shoe gear, daily foot inspections, never walking without protective shoe gear, never putting sharp instruments to feet.  Continue Urea as ordered.   Shoe and activity modification as needed for relief.   Annual diabetes foot exam.         I spent a total of 25 minutes on the day of the visit.  This includes face to face time and non-face to face time preparing to see the patient (eg, review of tests), obtaining and/or reviewing separately obtained history, documenting clinical information in the electronic or other health record, independently interpreting results and communicating results to the patient/family/caregiver, or care coordinator.

## 2024-03-01 NOTE — PATIENT INSTRUCTIONS
How to Check Your Feet    Below are tips to help you look for foot problems. Try to check your feet at the same time each day, such as when you get out of bed in the morning.    Check the top of each foot. The tops of toes, back of the heel, and outer edge of the foot can get a lot of rubbing from poor-fitting shoes.    Check the bottom of each foot. Daily wear and tear often leads to problems at pressure spots.    Check the toes and nails. Fungal infections often occur between toes. Toenail problems can also be a sign of fungal infections or lead to breaks in the skin.    Check your shoes, too. Loose objects inside a shoe can injure the foot. Use your hand to feel inside your shoes for things like lorenzo, loose stitching, or rough areas that could irritate your skin.        Diabetic Foot Care    Diabetes can lead to a number of different foot complications. Fortunately, most of these complications can be prevented with a little extra foot care. If diabetes is not well controlled, the high blood sugar can cause damage to blood vessels and result in poor circulation to the foot. When the skin does not get enough blood flow, it becomes prone to pressure sores and ulcers, which heal slowly.  High blood sugar can also damage nerves, interfering with the ability to feel pain and pressure. When you cant feel your foot normally, it is easy to injure your skin, bones and joints without knowing it. For these reasons diabetes increases the risk of fungal infections, bunions and ulcers. Deep ulcers can lead to bone infection. Gangrene is the most serious foot complication of diabetes. It usually occurs on the tips of the toes as blacked areas of skin. The black area is dead tissue. In severe cases, gangrene spreads to involve the entire toe, other toes and the entire foot. Foot or toe amputation may be required. Good foot care and blood sugar control can prevent this.    Home Care  Wear comfortable, proper fitting  shoes.  Wash your feet daily with warm water and mild soap.  After drying, apply a moisturizing cream or lotion.  Check your feet daily for skin breaks, blisters, swelling, or redness. Look between your toes also.  Wear cotton socks and change them every day.  Trim toe nails carefully and do not cut your cuticles.  Strive to keep your blood sugar under control with a combination of medicines, diet and activity.  If you smoke and have diabetes, it is very important that you stop. Smoking reduces blood flow to your foot.  Avoid activities that increase your risk of foot injury:  Do not walk barefoot.  Do not use heating pads or hot water bottles on your feet.  Do not put your foot in a hot tub without first checking the temperature with your hand.  10) Schedule yearly foot exams.    Follow Up  with your doctor or as advised by our staff. Report any cut, puncture, scrape, other injury, blister, ingrown toenail or ulcer on your foot.    Get Prompt Medical Attention  if any of the following occur:  -- Open ulcer with pus draining from the wound  -- Increasing foot or leg pain  -- New areas of redness or swelling or tender areas of the foot    © 7469-3189 [employer]. [address] [place] [address], [place] [address]. All rights reserved. This information is not intended as a substitute for professional medical care. Always follow your healthcare professional's instructions.

## 2024-03-06 ENCOUNTER — TELEPHONE (OUTPATIENT)
Dept: INTERNAL MEDICINE | Facility: CLINIC | Age: 74
End: 2024-03-06
Payer: MEDICARE

## 2024-03-06 DIAGNOSIS — R31.29 MICROSCOPIC HEMATURIA: Primary | ICD-10-CM

## 2024-03-06 NOTE — TELEPHONE ENCOUNTER
"Found UA ordered as "clinic collect" in feb 2024 visit. Canceled since not completed  Pended/routing new order that can be done at the lab to provider to advise if still needed  "

## 2024-03-11 NOTE — PROGRESS NOTES
Individual Psychotherapy (LCSW/PhD)  Gely Green,  2/27/2024    REFERRAL SOURCE:  Yoseph Dallas MD  TYPE OF VISIT:  In person  LENGTH OF SESSION: 60  Site:  [facility]    Therapeutic Intervention: Met with patient for individual psychotherapy.    Chief complaint/reason for encounter: depression and anxiety     Interval history and content of current session: PT stated things are in a better place with her . He has become more independent and has increased his mobility. PT has been mindful of her negative thinking and applying skills when needed. PT expressed some anxiety about deciding to go on a cruise with the elderly support group. SW assisted PT with processing the pros and cons of her decision. PT decided she was going to go at the end of the visit. PT will report progress/outcome at the next visit.    This session involved Interactive Complexity (56115); that is, specific communication factors complicated the delivery of the procedure.  In order to facilitate understanding, written communication regarding topics discussed in today's session was shared with patient via the [facility]. Negative self talk handout    Treatment plan:  Target symptoms: depression, anxiety , adjustment, grief  Why chosen therapy is appropriate versus another modality: relevant to diagnosis  Outcome monitoring methods: self-report  Therapeutic intervention type: insight oriented psychotherapy    Risk parameters:  Patient reports no suicidal ideation  Patient reports no homicidal ideation  Patient reports no self-injurious behavior  Patient reports no violent behavior    Verbal deficits: None    Patient's response to intervention:  The patient's response to intervention is accepting.    Progress toward goals and other mental status changes:  The patient's progress toward goals is fair .      2/13/2024     6:28 AM 1/16/2024     1:55 PM 11/5/2023    11:09 PM   Results of the PHQ8   Little interest  or pleasure in doing things More than half the days Not at all More than half the days   Feeling down, depressed, or hopeless More than half the days Several days More than half the days   Trouble falling or staying asleep, or sleeping too much Several days Not at all More than half the days   Feeling tired or having little energy Not at all Not at all More than half the days   Poor appetite or overeating Not at all Not at all More than half the days   Feeling bad about yourself - or that you are a failure or have let yourself or your family down More than half the days Several days More than half the days   Trouble concentrating on things, such as reading the newspaper or watching television More than half the days Several days Not at all   Moving or speaking so slowly that other people could have noticed. Or the opposite - being so fidgety or restless that you have been moving around a lot more than usual Not at all Not at all Not at all   Total Score  9 3 12          2/13/2024     6:23 AM 1/16/2024     1:54 PM 11/5/2023    11:07 PM   GAD7   1. Feeling nervous, anxious, or on edge? 2 1 2   2. Not being able to stop or control worrying? 2 1 2   3. Worrying too much about different things? 2 1 2   4. Trouble relaxing? 2 1 2   5. Being so restless that it is hard to sit still? 2 1 2   6. Becoming easily annoyed or irritable? 2 1 2   7. Feeling afraid as if something awful might happen? 2 1 1   HEBERT-7 Score 14 7 13        Diagnosis:     ICD-10-CM ICD-9-CM   1. Mild episode of recurrent major depressive disorder  F33.0 296.31   2. Grief  F43.21 309.0   3. Caregiver stress  Z63.6 V61.49                     Plan: Pt plans to continue individual psychotherapy    Return to clinic: 2 weeks    Length of Service (minutes): 60

## 2024-03-12 ENCOUNTER — OFFICE VISIT (OUTPATIENT)
Dept: BEHAVIORAL HEALTH | Facility: CLINIC | Age: 74
End: 2024-03-12
Payer: MEDICARE

## 2024-03-12 ENCOUNTER — PATIENT MESSAGE (OUTPATIENT)
Dept: INTERNAL MEDICINE | Facility: CLINIC | Age: 74
End: 2024-03-12
Payer: MEDICARE

## 2024-03-12 DIAGNOSIS — F33.0 MILD EPISODE OF RECURRENT MAJOR DEPRESSIVE DISORDER: Primary | ICD-10-CM

## 2024-03-12 DIAGNOSIS — E11.9 TYPE 2 DIABETES MELLITUS WITHOUT COMPLICATION, WITH LONG-TERM CURRENT USE OF INSULIN: Primary | ICD-10-CM

## 2024-03-12 DIAGNOSIS — F43.21 GRIEF: ICD-10-CM

## 2024-03-12 DIAGNOSIS — Z79.4 TYPE 2 DIABETES MELLITUS WITHOUT COMPLICATION, WITH LONG-TERM CURRENT USE OF INSULIN: Primary | ICD-10-CM

## 2024-03-12 PROCEDURE — 90837 PSYTX W PT 60 MINUTES: CPT | Mod: ,,, | Performed by: SOCIAL WORKER

## 2024-03-12 NOTE — PROGRESS NOTES
Individual Psychotherapy (LCSW/PhD)  Gely Green,  3/12/2024    REFERRAL SOURCE:  Yoseph Dallas MD  TYPE OF VISIT:  In person  LENGTH OF SESSION: 60  Site:  [facility]    Therapeutic Intervention: Met with patient for individual psychotherapy.    Chief complaint/reason for encounter: depression and anxiety     Interval history and content of current session: PT stated things are in a better place with her . He has become more independent and has increased his mobility. PT has been mindful of her negative thinking and applying skills when needed. PT expressed some anxiety about deciding to go on a cruise with the elderly support group. PT thought she had come to a decision after the last visit, however, PT let her negative thinking talk her out of booking the cruise. SW encouraged PT to challenge negative thinking and make a commitment to attend the cruise or to not attend. PT is still struggling with adusting to the life cycle. PT does not feel fulfilled like she wanted to and feels she never been happy. PT can't say what happiness looks like. SW encouraged PT to figure out what happiness is for her and report back at the next visit.        Treatment plan:  Target symptoms: depression, anxiety , adjustment, grief  Why chosen therapy is appropriate versus another modality: relevant to diagnosis  Outcome monitoring methods: self-report  Therapeutic intervention type: insight oriented psychotherapy    Risk parameters:  Patient reports no suicidal ideation  Patient reports no homicidal ideation  Patient reports no self-injurious behavior  Patient reports no violent behavior    Verbal deficits: None    Patient's response to intervention:  The patient's response to intervention is accepting.    Progress toward goals and other mental status changes:  The patient's progress toward goals is fair .      2/13/2024     6:28 AM 1/16/2024     1:55 PM 11/5/2023    11:09 PM   Results of the PHQ8    Little interest or pleasure in doing things More than half the days Not at all More than half the days   Feeling down, depressed, or hopeless More than half the days Several days More than half the days   Trouble falling or staying asleep, or sleeping too much Several days Not at all More than half the days   Feeling tired or having little energy Not at all Not at all More than half the days   Poor appetite or overeating Not at all Not at all More than half the days   Feeling bad about yourself - or that you are a failure or have let yourself or your family down More than half the days Several days More than half the days   Trouble concentrating on things, such as reading the newspaper or watching television More than half the days Several days Not at all   Moving or speaking so slowly that other people could have noticed. Or the opposite - being so fidgety or restless that you have been moving around a lot more than usual Not at all Not at all Not at all   Total Score  9 3 12          2/13/2024     6:23 AM 1/16/2024     1:54 PM 11/5/2023    11:07 PM   GAD7   1. Feeling nervous, anxious, or on edge? 2 1 2   2. Not being able to stop or control worrying? 2 1 2   3. Worrying too much about different things? 2 1 2   4. Trouble relaxing? 2 1 2   5. Being so restless that it is hard to sit still? 2 1 2   6. Becoming easily annoyed or irritable? 2 1 2   7. Feeling afraid as if something awful might happen? 2 1 1   HEBERT-7 Score 14 7 13        Diagnosis:     ICD-10-CM ICD-9-CM   1. Mild episode of recurrent major depressive disorder  F33.0 296.31   2. Grief  F43.21 309.0                       Plan: Pt plans to continue individual psychotherapy    Return to clinic: 2 weeks    Length of Service (minutes): 60

## 2024-03-13 RX ORDER — FLASH GLUCOSE SENSOR
KIT MISCELLANEOUS
Qty: 1 KIT | Refills: 0 | Status: SHIPPED | OUTPATIENT
Start: 2024-03-13

## 2024-03-13 NOTE — TELEPHONE ENCOUNTER
No care due was identified.  [facility] Embedded Care Due Messages. Reference number: [id number].   3/13/2024 8:22:15 AM CDT

## 2024-03-14 NOTE — TELEPHONE ENCOUNTER
Called Pt daughter Yulisa. She said her mom is out of town. I told her to give her mom a msg that she is due for her urinalysis lab work.

## 2024-03-15 ENCOUNTER — LAB VISIT (OUTPATIENT)
Dept: LAB | Facility: OTHER | Age: 74
End: 2024-03-15
Attending: STUDENT IN AN ORGANIZED HEALTH CARE EDUCATION/TRAINING PROGRAM
Payer: MEDICARE

## 2024-03-15 DIAGNOSIS — R31.29 MICROSCOPIC HEMATURIA: ICD-10-CM

## 2024-03-15 LAB
BILIRUB UR QL STRIP: NEGATIVE
CLARITY UR: CLEAR
COLOR UR: YELLOW
GLUCOSE UR QL STRIP: NEGATIVE
HGB UR QL STRIP: NEGATIVE
KETONES UR QL STRIP: NEGATIVE
LEUKOCYTE ESTERASE UR QL STRIP: NEGATIVE
NITRITE UR QL STRIP: NEGATIVE
PH UR STRIP: 6 [PH] (ref 5–8)
PROT UR QL STRIP: NEGATIVE
SP GR UR STRIP: 1.01 (ref 1–1.03)
URN SPEC COLLECT METH UR: NORMAL
UROBILINOGEN UR STRIP-ACNC: NEGATIVE EU/DL

## 2024-03-15 PROCEDURE — 81003 URINALYSIS AUTO W/O SCOPE: CPT | Performed by: STUDENT IN AN ORGANIZED HEALTH CARE EDUCATION/TRAINING PROGRAM

## 2024-03-27 ENCOUNTER — PATIENT MESSAGE (OUTPATIENT)
Dept: BEHAVIORAL HEALTH | Facility: CLINIC | Age: 74
End: 2024-03-27
Payer: MEDICARE

## 2024-03-27 ENCOUNTER — OFFICE VISIT (OUTPATIENT)
Dept: BEHAVIORAL HEALTH | Facility: CLINIC | Age: 74
End: 2024-03-27
Payer: MEDICARE

## 2024-03-27 DIAGNOSIS — F33.0 MILD EPISODE OF RECURRENT MAJOR DEPRESSIVE DISORDER: ICD-10-CM

## 2024-03-27 DIAGNOSIS — F43.21 GRIEF: ICD-10-CM

## 2024-03-27 DIAGNOSIS — F43.23 ADJUSTMENT DISORDER WITH MIXED ANXIETY AND DEPRESSED MOOD: Primary | ICD-10-CM

## 2024-03-27 PROCEDURE — 90785 PSYTX COMPLEX INTERACTIVE: CPT | Mod: ,,, | Performed by: SOCIAL WORKER

## 2024-03-27 PROCEDURE — 90837 PSYTX W PT 60 MINUTES: CPT | Mod: ,,, | Performed by: SOCIAL WORKER

## 2024-03-27 NOTE — PROGRESS NOTES
Individual Psychotherapy (LCSW/PhD)  Gely Green,  3/27/2024    REFERRAL SOURCE:  Yoseph Dallas MD  TYPE OF VISIT:  In person  LENGTH OF SESSION: 60  Site:  [facility]    Therapeutic Intervention: Met with patient for individual psychotherapy.    Chief complaint/reason for encounter: depression and anxiety     Interval history and content of current session: PT stated things are in a better place with her . He has become more independent and has increased his mobility. PT has been mindful of her negative thinking and applying skills when needed. PT expressed some anxiety about making a decision to go on a cruise with the elderly support group. PT has decided not to go and feels so much better now that the decision is made. SW assisted PT with processing her difficulty to make decision. PT was not able to identify why she has a hard time meking a decision since she could not recall a significant negative outcome from any prior decisions she has made in her life. PT is still adjusting to getting older and the changes that come along with aging. ELLEN discussed change and sent PT some tips to manage change. PT will apply tips and report progress/outcome at the next visit.          This session involved Interactive Complexity (61285); that is, specific communication factors complicated the delivery of the procedure.  In order to facilitate understanding, written communication regarding topics discussed in today's session was shared with patient via the [facility].       Treatment plan:  Target symptoms: depression, anxiety , adjustment, grief  Why chosen therapy is appropriate versus another modality: relevant to diagnosis  Outcome monitoring methods: self-report  Therapeutic intervention type: insight oriented psychotherapy    Risk parameters:  Patient reports no suicidal ideation  Patient reports no homicidal ideation  Patient reports no self-injurious behavior  Patient reports no  violent behavior    Verbal deficits: None    Patient's response to intervention:  The patient's response to intervention is accepting.    Progress toward goals and other mental status changes:  The patient's progress toward goals is fair .      2/13/2024     6:28 AM 1/16/2024     1:55 PM 11/5/2023    11:09 PM   Results of the PHQ8   Little interest or pleasure in doing things More than half the days Not at all More than half the days   Feeling down, depressed, or hopeless More than half the days Several days More than half the days   Trouble falling or staying asleep, or sleeping too much Several days Not at all More than half the days   Feeling tired or having little energy Not at all Not at all More than half the days   Poor appetite or overeating Not at all Not at all More than half the days   Feeling bad about yourself - or that you are a failure or have let yourself or your family down More than half the days Several days More than half the days   Trouble concentrating on things, such as reading the newspaper or watching television More than half the days Several days Not at all   Moving or speaking so slowly that other people could have noticed. Or the opposite - being so fidgety or restless that you have been moving around a lot more than usual Not at all Not at all Not at all   Total Score  9 3 12          2/13/2024     6:23 AM 1/16/2024     1:54 PM 11/5/2023    11:07 PM   GAD7   1. Feeling nervous, anxious, or on edge? 2 1 2   2. Not being able to stop or control worrying? 2 1 2   3. Worrying too much about different things? 2 1 2   4. Trouble relaxing? 2 1 2   5. Being so restless that it is hard to sit still? 2 1 2   6. Becoming easily annoyed or irritable? 2 1 2   7. Feeling afraid as if something awful might happen? 2 1 1   HEBERT-7 Score 14 7 13        Diagnosis:     ICD-10-CM ICD-9-CM   1. Adjustment disorder with mixed anxiety and depressed mood  F43.23 309.28   2. Grief  F43.21 309.0   3. Mild episode  of recurrent major depressive disorder  F33.0 296.31                         Plan: Pt plans to continue individual psychotherapy    Return to clinic: 2 weeks    Length of Service (minutes): 60

## 2024-04-06 ENCOUNTER — PATIENT MESSAGE (OUTPATIENT)
Dept: UROGYNECOLOGY | Facility: CLINIC | Age: 74
End: 2024-04-06
Payer: MEDICARE

## 2024-04-11 ENCOUNTER — OFFICE VISIT (OUTPATIENT)
Dept: OPTOMETRY | Facility: CLINIC | Age: 74
End: 2024-04-11
Payer: MEDICARE

## 2024-04-11 DIAGNOSIS — Z13.5 GLAUCOMA SCREENING: ICD-10-CM

## 2024-04-11 DIAGNOSIS — H02.88A MEIBOMIAN GLAND DYSFUNCTION (MGD), BILATERAL, BOTH UPPER AND LOWER LIDS: ICD-10-CM

## 2024-04-11 DIAGNOSIS — Z96.1 PSEUDOPHAKIA OF BOTH EYES: ICD-10-CM

## 2024-04-11 DIAGNOSIS — E11.9 TYPE 2 DIABETES MELLITUS WITHOUT COMPLICATION, WITH LONG-TERM CURRENT USE OF INSULIN: Primary | ICD-10-CM

## 2024-04-11 DIAGNOSIS — H02.88B MEIBOMIAN GLAND DYSFUNCTION (MGD), BILATERAL, BOTH UPPER AND LOWER LIDS: ICD-10-CM

## 2024-04-11 DIAGNOSIS — Z79.4 TYPE 2 DIABETES MELLITUS WITHOUT COMPLICATION, WITH LONG-TERM CURRENT USE OF INSULIN: Primary | ICD-10-CM

## 2024-04-11 PROCEDURE — 99999 PR PBB SHADOW E&M-EST. PATIENT-LVL III: CPT | Mod: PBBFAC,,, | Performed by: OPTOMETRIST

## 2024-04-11 PROCEDURE — 92014 COMPRE OPH EXAM EST PT 1/>: CPT | Mod: S$PBB,,, | Performed by: OPTOMETRIST

## 2024-04-11 PROCEDURE — 99213 OFFICE O/P EST LOW 20 MIN: CPT | Mod: PBBFAC | Performed by: OPTOMETRIST

## 2024-04-11 NOTE — PROGRESS NOTES
HPI    BRIANA: 7/27/2023  Chief complaint (CC): Pt here for FB sensation OS  States pain began to happen about two months ago (2/15/2024)  States pain and discomfort is specifically in OS medial canthi area of OS  Glasses? +  Contacts? -  H/o eye surgery, injections or laser: PCIOL SX 2020  H/o eye injury: -  Known eye conditions? -  Family h/o eye conditions? -  Eye gtts? -      (-) Flashes (++++)  Floaters (-) Mucous   (-)  Tearing (+++) Itching (-) Burning   (-) Headaches (+) Eye Pain/discomfort (+) Irritation   (-)  Redness (-) Double vision (-) Blurry vision      Hemoglobin A1C        Date                     Value               Ref Range             Status               02/24 /2024               6.9 (H)             4.0 - 5.6 %           Final                 Last edited by Rajni Garza on 4/11/2024  9:55 AM.            Assessment /Plan     For exam results, see Encounter Report.    Type 2 diabetes mellitus without complication, with long-term current use of insulin  -No retinopathy noted today.  Continued control with primary care physician and annual comprehensive eye exam.    Meibomian gland dysfunction (MGD), bilateral, both upper and lower lids  -No FB noted, FB sensation consistent with Dry Eye  -Systane Complete PF BID+    Pseudophakia of both eyes  -clear, centered    Glaucoma screening  -Monitor with annual eye exam and IOP check      RTC 1 yr

## 2024-04-17 DIAGNOSIS — E11.59 HYPERTENSION ASSOCIATED WITH TYPE 2 DIABETES MELLITUS: ICD-10-CM

## 2024-04-17 DIAGNOSIS — I15.2 HYPERTENSION ASSOCIATED WITH TYPE 2 DIABETES MELLITUS: ICD-10-CM

## 2024-04-17 RX ORDER — IRBESARTAN 300 MG/1
300 TABLET ORAL NIGHTLY
Qty: 90 TABLET | Refills: 3 | Status: SHIPPED | OUTPATIENT
Start: 2024-04-17 | End: 2025-04-17

## 2024-04-17 NOTE — TELEPHONE ENCOUNTER
No care due was identified.  [facility] Embedded Care Due Messages. Reference number: [id number].   4/17/2024 10:47:53 AM CDT

## 2024-05-01 ENCOUNTER — OFFICE VISIT (OUTPATIENT)
Dept: INTERNAL MEDICINE | Facility: CLINIC | Age: 74
End: 2024-05-01
Payer: MEDICARE

## 2024-05-01 VITALS
OXYGEN SATURATION: 99 % | DIASTOLIC BLOOD PRESSURE: 65 MMHG | HEART RATE: 70 BPM | WEIGHT: 141.75 LBS | HEIGHT: 66 IN | BODY MASS INDEX: 22.78 KG/M2 | SYSTOLIC BLOOD PRESSURE: 118 MMHG

## 2024-05-01 DIAGNOSIS — R20.2 PARESTHESIA OF LEFT ARM: Primary | ICD-10-CM

## 2024-05-01 PROCEDURE — 99999 PR PBB SHADOW E&M-EST. PATIENT-LVL IV: CPT | Mod: PBBFAC,,, | Performed by: STUDENT IN AN ORGANIZED HEALTH CARE EDUCATION/TRAINING PROGRAM

## 2024-05-01 PROCEDURE — 99214 OFFICE O/P EST MOD 30 MIN: CPT | Mod: PBBFAC | Performed by: STUDENT IN AN ORGANIZED HEALTH CARE EDUCATION/TRAINING PROGRAM

## 2024-05-01 PROCEDURE — 99213 OFFICE O/P EST LOW 20 MIN: CPT | Mod: S$PBB,,, | Performed by: STUDENT IN AN ORGANIZED HEALTH CARE EDUCATION/TRAINING PROGRAM

## 2024-05-01 NOTE — PROGRESS NOTES
[facility]  Subjective:     Patient ID: Gely Green is a 73 y.o. female.  Chief Complaint: Left sided paresthesias   HPI:  Patient is a 73 y.o. female who   has a past medical history of Arthritis, Chronic constipation, Depression, Diabetes mellitus, Diabetes mellitus, Diabetes mellitus, type 2, Hypertension, and Osteopenia.  who presents for the above chief complaint.     Has a history of left sided pareethsias of the face. She was worked up for TIA which was negaitve.     She is now experiencing paresthesia of the left arm which are new. This happed 3 times so far over the past 2 months. Lasted a few moments then resolved.  They start in her fingers and radiate up her arm to just past her elbow.  A few of these episodes were noticed when she woke up at night.    Years ago she was told she had carpel tunnel syndrome. This is acting up on her right hand incidentally.       Current Outpatient Medications   Medication Instructions    aspirin (ECOTRIN) 81 mg, Oral, Daily    calcium carbonate (OS-REDD) 600 mg, Oral, 2 times daily with meals    DEXCOM G7  Misc USE AS DIRECTED EVERY 3 MONTHS    flash glucose sensor (FREESTYLE SHANNON 2 SENSOR) Kit Pt to check BG as needed    FREESTYLE SHANNON 2 READER Misc 1 each, Intradermal, Every 14 days    FREESTYLE SHANNON 2 SENSOR Kit USE AS DIRECTED EVERY 2 WEEKS    hydroCHLOROthiazide (HYDRODIURIL) 12.5 mg, Oral, Daily    hydrOXYzine HCL (ATARAX) 25 mg, Oral, 2 times daily PRN    ipratropium (ATROVENT) 21 mcg (0.03 %) nasal spray 2 sprays, Each Nostril, 2 times daily    irbesartan (AVAPRO) 300 mg, Oral, Nightly    lancets (ONETOUCH DELICA LANCETS) 33 gauge Misc 1 lancet , Misc.(Non-Drug; Combo Route), 3 times daily    LINZESS 72 mcg, Oral, Every morning    magnesium oxide (MAG-OX) 400 mg, Oral, Nightly    metFORMIN (GLUCOPHAGE-XR) 1,000 mg, Oral, 2 times daily    methen-m.blue-s.phos-phsal-hyo (URIBEL) 118-10-40.8-36 mg Cap 1 capsule, Oral, 4 times  "daily PRN    mometasone (NASONEX) 50 mcg/actuation nasal spray 2 sprays, Nasal, Daily    omeprazole (PRILOSEC) 20 mg    ONETOUCH VERIO TEST STRIPS Strp TEST THREE TIMES DAILY    pen needle, diabetic (BD ULTRA-FINE SHORT PEN NEEDLE) 31 gauge x 5/16" Ndle 1 Needle, Misc.(Non-Drug; Combo Route), Daily    rosuvastatin (CRESTOR) 20 mg, Oral, Daily    TOUJEO SOLOSTAR U-300 INSULIN 12 Units, Subcutaneous, Nightly    vitamin D (VITAMIN D3) 1,000 Units, Oral, Daily     Objective:      Body mass index is 22.88 kg/m².  Vitals:    05/01/24 1150   BP: 118/65   Pulse: 70   SpO2: 99%   Weight: 64.3 kg (141 lb 12.1 oz)   Height: 5' 6" (1.676 m)   PainSc: 0-No pain     Physical Exam  Constitutional:       Appearance: Normal appearance.   Cardiovascular:      Rate and Rhythm: Normal rate.      Pulses: Normal pulses.           Radial pulses are 2+ on the right side and 2+ on the left side.      Comments: Normal brachial, radial, ulnar pulses in left arm  Neurological:      General: No focal deficit present.      Mental Status: She is alert.   Psychiatric:         Mood and Affect: Mood normal.           Assessment:       1. Paresthesia of left arm        Plan:   Due to paroxysmal and fleeting nature of symptoms low concern for significant underlying neurologic or cardiovascular etiology.      They may have been due to transient ischemia from sleeping on her arm.  She was worked up for CVA in 2020.  MRA neck at that time was normal.  Did have some white matter changes consistent with microvascular ischemic injury however this would not explain her current symptoms.    She does have diabetes however this is very well-controlled and symptoms do not.  Consistent with diabetic neuropathy.    Advised continued monitoring and informed her to let us know if they worsen or become more frequent which time we can expand the workup.      .  Paresthesia of left arm        All of your core healthy metrics are met.    No follow-ups on file. or sooner " prn (as needed)          Kali Taylor  [facility]  [address]  [address]  [place] [address]  Phone [phone]  Fax [phone]    This note is dictated using the M*Modal Fluency Direct word recognition program. It may contain word recognition mistakes or wrong word substitutions (commonly he/she and is/was substitutions) that were missed on review.

## 2024-05-23 ENCOUNTER — TELEPHONE (OUTPATIENT)
Dept: BEHAVIORAL HEALTH | Facility: CLINIC | Age: 74
End: 2024-05-23
Payer: MEDICARE

## 2024-05-27 ENCOUNTER — TELEPHONE (OUTPATIENT)
Dept: BEHAVIORAL HEALTH | Facility: CLINIC | Age: 74
End: 2024-05-27
Payer: MEDICARE

## 2024-05-31 ENCOUNTER — OFFICE VISIT (OUTPATIENT)
Dept: INTERNAL MEDICINE | Facility: CLINIC | Age: 74
End: 2024-05-31
Payer: MEDICARE

## 2024-05-31 VITALS
WEIGHT: 142 LBS | HEIGHT: 66 IN | DIASTOLIC BLOOD PRESSURE: 79 MMHG | OXYGEN SATURATION: 99 % | BODY MASS INDEX: 22.82 KG/M2 | HEART RATE: 73 BPM | TEMPERATURE: 99 F | SYSTOLIC BLOOD PRESSURE: 170 MMHG

## 2024-05-31 DIAGNOSIS — J06.9 VIRAL URI WITH COUGH: Primary | ICD-10-CM

## 2024-05-31 PROCEDURE — 99213 OFFICE O/P EST LOW 20 MIN: CPT | Mod: S$PBB,,, | Performed by: STUDENT IN AN ORGANIZED HEALTH CARE EDUCATION/TRAINING PROGRAM

## 2024-05-31 PROCEDURE — 99213 OFFICE O/P EST LOW 20 MIN: CPT | Mod: PBBFAC | Performed by: STUDENT IN AN ORGANIZED HEALTH CARE EDUCATION/TRAINING PROGRAM

## 2024-05-31 PROCEDURE — 99999 PR PBB SHADOW E&M-EST. PATIENT-LVL III: CPT | Mod: PBBFAC,,, | Performed by: STUDENT IN AN ORGANIZED HEALTH CARE EDUCATION/TRAINING PROGRAM

## 2024-05-31 NOTE — PROGRESS NOTES
"[facility]  Subjective:     Patient ID: Gely Green is a 73 y.o. female.  Chief Complaint: URI Sx  HPI:  Patient is a 73 y.o. female who presents for the above chief complaint.     Sx started 4 days ago with tickle in throat. Progressed into phlegm and irriation of the roof of her mouth. Developed a cough last night. Has some nasal congestion as well. No fever. No shortness of breath. No headache.     Current Outpatient Medications   Medication Instructions    aspirin (ECOTRIN) 81 mg, Oral, Daily    calcium carbonate (OS-REDD) 600 mg, Oral, 2 times daily with meals    DEXCOM G7  Misc USE AS DIRECTED EVERY 3 MONTHS    flash glucose sensor (FREESTYLE SHANNON 2 SENSOR) Kit Pt to check BG as needed    FREESTYLE SHANNON 2 READER Misc 1 each, Intradermal, Every 14 days    FREESTYLE SHANNON 2 SENSOR Kit USE AS DIRECTED EVERY 2 WEEKS    hydroCHLOROthiazide (HYDRODIURIL) 12.5 mg, Oral, Daily    hydrOXYzine HCL (ATARAX) 25 mg, Oral, 2 times daily PRN    ipratropium (ATROVENT) 21 mcg (0.03 %) nasal spray 2 sprays, Each Nostril, 2 times daily    irbesartan (AVAPRO) 300 mg, Oral, Nightly    lancets (ONETOUCH DELICA LANCETS) 33 gauge Misc 1 lancet , Misc.(Non-Drug; Combo Route), 3 times daily    LINZESS 72 mcg, Oral, Every morning    magnesium oxide (MAG-OX) 400 mg, Oral, Nightly    metFORMIN (GLUCOPHAGE-XR) 1,000 mg, Oral, 2 times daily    methen-m.blue-s.phos-phsal-hyo (URIBEL) 118-10-40.8-36 mg Cap 1 capsule, Oral, 4 times daily PRN    mometasone (NASONEX) 50 mcg/actuation nasal spray 2 sprays, Nasal, Daily    omeprazole (PRILOSEC) 20 mg    ONETOUCH VERIO TEST STRIPS Strp TEST THREE TIMES DAILY    pen needle, diabetic (BD ULTRA-FINE SHORT PEN NEEDLE) 31 gauge x 5/16" Ndle 1 Needle, Misc.(Non-Drug; Combo Route), Daily    rosuvastatin (CRESTOR) 20 mg, Oral, Daily    TOUJEO SOLOSTAR U-300 INSULIN 12 Units, Subcutaneous, Nightly    vitamin D (VITAMIN D3) 1,000 Units, Oral, Daily     Objective:    " "  Body mass index is 22.92 kg/m².  Vitals:    05/31/24 0810   BP: (!) 170/79   Pulse: 73   Temp: 98.9 °F (37.2 °C)   SpO2: 99%   Weight: 64.4 kg (141 lb 15.6 oz)   Height: 5' 6" (1.676 m)   PainSc: 0-No pain     Physical Exam  HENT:      Nose:      Right Sinus: No maxillary sinus tenderness or frontal sinus tenderness.      Left Sinus: No maxillary sinus tenderness or frontal sinus tenderness.      Mouth/Throat:      Mouth: Mucous membranes are moist.      Pharynx: Oropharynx is clear. No oropharyngeal exudate or posterior oropharyngeal erythema.   Cardiovascular:      Rate and Rhythm: Normal rate.   Pulmonary:      Effort: Pulmonary effort is normal. No respiratory distress.      Breath sounds: No wheezing or rales.   Lymphadenopathy:      Cervical: No cervical adenopathy.   Neurological:      Mental Status: She is alert.           Assessment:       1. Viral URI with cough        Plan:       Patient presents with symptoms of a viral URI/common cold.  No signs or symptoms of bacterial etiology would warrant antibiotics and no indication for systemic glucocorticoids.  Discussed use of over-the-counter cold and flu medications for symptoms including 2nd generation antihistamines for daytime symptoms relief, and 1st generation antihistamines for nocturnal symptom relief and sleep.  Discussed risk of drowsiness and falls.     Viral URI with cough  -     POCT COVID-19 Rapid Screening    Negative     All of your core healthy metrics are met.    No follow-ups on file. or sooner prn (as needed)          Kali Garrett Bank [facility]  [address] [address]  Phone [phone]  Fax [phone]    This note is dictated using the M*Modal Fluency Direct word recognition program. It may contain word recognition mistakes or wrong word substitutions (commonly he/she and is/was substitutions) that were missed on review.    "

## 2024-06-23 NOTE — PROGRESS NOTES
"[facility]     SUBJECTIVE:     History of Present Illness:  Patient is a 73 y.o. female presents with palpitations and was seen by . In 2018, was seen and evaluated for rapid heart beat. No history of MI or heart failure.     Primary Diagnosis:   1. DM  2. Hypertension  3. SVT  ROS  Since last visit :   A. Has done well  B. Has had pressure in the chest, mid; lasts for sometimes for 2 days; no change with body movement; not daily; maybe 3 to 4 times a week; comes all of a sudden and stays   C. No shortness of breath; no PND or orthopnea   D. Still has palpitations and hears her heart beating   E. Blood pressures normal; heart rates are in the 60's   F. No dizziness, no syncope  G. Still active doing all her physical work without symptoms : walks daily; swims and water aerobics; never with chest pains   Review of patient's allergies indicates:  No Known Allergies    Past Medical History:   Diagnosis Date    Arthritis     Chronic constipation     Depression     Diabetes mellitus     Diabetes mellitus     Diabetes mellitus, type 2     Hypertension     Osteopenia        Past Surgical History:   Procedure Laterality Date     SECTION      2x    COLONOSCOPY      COLONOSCOPY N/A 2019    Procedure: COLONOSCOPY;  Surgeon: Marshall Contreras MD;  Location: [facility] ([address] [place] [address]);  Service: Endoscopy;  Laterality: N/A;  pt states that she had to be resuscitated after receiving an epidural during childbirth "30 something" years ago.  Ok for 4th floor per Dr. Hernandez-MS    EYE SURGERY Bilateral     cataract removal    HYSTERECTOMY      full hyst     OOPHORECTOMY      TONSILLECTOMY         Family History   Problem Relation Name Age of Onset    Breast cancer Maternal Cousin 2     Heart attack Mother Darrell     Heart disease Mother Darrell     Alzheimer's disease Mother Darrell     Heart attack Father Father     Heart disease Father Father     Heart failure Father Father     Hypertension " Father Father     Hyperlipidemia Sister Devi     Hypertension Sister Devi     Diabetes Sister Devi     Abnormal EKG Sister Devi     Dementia Sister Devi     Kidney disease Sister Devi     Alcohol abuse Brother Hemanth     Fibroids Daughter Yulisa     Brain cancer Son Darrell Green     Early death Son Darrell Green 33    Learning disabilities Son Darrell Green     Ovarian cancer Neg Hx      Cervical cancer Neg Hx      Endometrial cancer Neg Hx      Vaginal cancer Neg Hx      Stroke Neg Hx      Colon cancer Neg Hx      Esophageal cancer Neg Hx      Vision loss Neg Hx         Social History     Tobacco Use    Smoking status: Former     Current packs/day: 0.00     Average packs/day: 1 pack/day for 25.0 years (25.0 ttl pk-yrs)     Types: Cigarettes     Start date: 1978     Quit date: 1999     Years since quittin.4    Smokeless tobacco: Never   Substance Use Topics    Alcohol use: No    Drug use: No        Past Hospitalization:     Home meds:  Current Outpatient Medications on File Prior to Visit   Medication Sig Dispense Refill    aspirin (ECOTRIN) 81 MG EC tablet Take 1 tablet (81 mg total) by mouth once daily. 30 tablet 11    calcium carbonate (OS-REDD) 600 mg calcium (1,500 mg) Tab Take 600 mg by mouth 2 (two) times daily with meals.      DEXCOM G7  Misc USE AS DIRECTED EVERY 3 MONTHS      flash glucose sensor (FREESTYLE SHANNON 2 SENSOR) Kit Pt to check BG as needed 1 kit 0    FREESTYLE SHANNON 2 READER Misc Inject 1 each into the skin every 14 (fourteen) days. 4 each 12    FREESTYLE SHANNON 2 SENSOR Kit USE AS DIRECTED EVERY 2 WEEKS 1 kit 0    hydroCHLOROthiazide (HYDRODIURIL) 12.5 MG Tab Take 1 tablet (12.5 mg total) by mouth once daily. 30 tablet 11    hydrOXYzine HCL (ATARAX) 25 MG tablet Take 1 tablet (25 mg total) by mouth 2 (two) times daily as needed (bladder pain). 30 tablet 11    insulin glargine, TOUJEO, (TOUJEO SOLOSTAR U-300 INSULIN) 300 unit/mL (1.5 mL) InPn pen  "Inject 12 Units into the skin every evening. 6 mL 1    ipratropium (ATROVENT) 21 mcg (0.03 %) nasal spray 2 sprays by Each Nostril route 2 (two) times daily. (Patient not taking: Reported on 5/31/2024) 30 mL 0    irbesartan (AVAPRO) 300 MG tablet Take 1 tablet (300 mg total) by mouth every evening. 90 tablet 3    lancets (ONETOUCH DELICA LANCETS) 33 gauge Misc 1 lancet by Misc.(Non-Drug; Combo Route) route 3 (three) times daily. 300 each 3    LINZESS 72 mcg Cap capsule Take 72 mcg by mouth every morning.      magnesium oxide (MAG-OX) 400 mg (241.3 mg magnesium) tablet Take 1 tablet (400 mg total) by mouth every evening. 30 tablet 11    metFORMIN (GLUCOPHAGE-XR) 500 MG ER 24hr tablet Take 2 tablets (1,000 mg total) by mouth 2 (two) times a day. (Patient taking differently: Take 1,000 mg by mouth 2 (two) times a day. Pt taking 500mg BID) 360 tablet 4    methen-m.blue-s.phos-phsal-hyo (URIBEL) 118-10-40.8-36 mg Cap Take 1 capsule by mouth 4 (four) times daily as needed (bladder pain). 20 capsule 11    mometasone (NASONEX) 50 mcg/actuation nasal spray 2 sprays by Nasal route once daily. (Patient not taking: Reported on 5/1/2024) 17 g 0    omeprazole (PRILOSEC) 20 MG capsule Take 20 mg by mouth. (Patient not taking: Reported on 5/1/2024)      ONETOMessageParty VERIO TEST STRIPS Strp TEST THREE TIMES DAILY 200 strip 11    pen needle, diabetic (BD ULTRA-FINE SHORT PEN NEEDLE) 31 gauge x 5/16" Ndle 1 Needle by Misc.(Non-Drug; Combo Route) route once daily. 100 each 3    rosuvastatin (CRESTOR) 20 MG tablet Take 1 tablet (20 mg total) by mouth once daily. 90 tablet 1    vitamin D 1000 units Tab Take 1,000 Units by mouth once daily.       No current facility-administered medications on file prior to visit.       Cardiac meds:  Irbesartan 300 mg  Metformin  ASA 81 mg  HCTZ 12.5 mg   Rosuvastatin 20 mg         OBJECTIVE:     Vital Signs (Most Recent)  Pulse: 60 (06/25/24 1545)  BP: 138/75 (06/25/24 1545)  SpO2: 98 % (06/25/24 " 1545)      Physical Exam:    Neck: normal carotids, no bruits; normal JVP  Lungs :clear  Heart: RR, normal S1,S2, no murmurs, no gallops  Abd: no masses; no bruits;   Exts: normal DP and PT pulses bilaterally, no edema noted           LABS    3/22: A1c 7.2   1/23: 101  9/23: A1c 6.7;   12/23: A1c: 7.3;   2/24: A1c 6.9; LDL 76;   Diagnostic Results:    EKG:3/23; nonspecific T wave changes; sinus   2.Echo: 12/20: normal EF; normal diastology   3.stress echo: 7/22: negative; normal EF;   4.30 day event monitor: negative and benign   Chart review:  Holter 2018: PVC's, PAC's ; one 8 beat run of atrial tachycardia   Stress echo: 2017: negative     ASSESSMENT/PLAN:     1. Palpitations: no correlation with studies done such as a 30 day event montior  2. Hypertension: controlled  3. Diabetes control needed  4. Lipids great   5. Chest pains: not cardiac   Plan:continue the same medications   2. Keep a better log of the discomfort; if persists, let me know and will then consider stress test.   3. Monitor heart rate as well   4. Return 6 months     Geni Galvin MD

## 2024-06-25 ENCOUNTER — OFFICE VISIT (OUTPATIENT)
Dept: CARDIOLOGY | Facility: CLINIC | Age: 74
End: 2024-06-25
Payer: MEDICARE

## 2024-06-25 VITALS
HEIGHT: 66 IN | DIASTOLIC BLOOD PRESSURE: 75 MMHG | HEART RATE: 60 BPM | OXYGEN SATURATION: 98 % | SYSTOLIC BLOOD PRESSURE: 138 MMHG | WEIGHT: 147.19 LBS | BODY MASS INDEX: 23.65 KG/M2

## 2024-06-25 DIAGNOSIS — I10 ESSENTIAL HYPERTENSION: Primary | ICD-10-CM

## 2024-06-25 DIAGNOSIS — I47.19 ATRIAL TACHYCARDIA: ICD-10-CM

## 2024-06-25 DIAGNOSIS — R00.2 PALPITATIONS: ICD-10-CM

## 2024-06-25 PROCEDURE — 99214 OFFICE O/P EST MOD 30 MIN: CPT | Mod: S$PBB,,, | Performed by: INTERNAL MEDICINE

## 2024-06-25 PROCEDURE — 99999 PR PBB SHADOW E&M-EST. PATIENT-LVL III: CPT | Mod: PBBFAC,,, | Performed by: INTERNAL MEDICINE

## 2024-06-25 PROCEDURE — 99213 OFFICE O/P EST LOW 20 MIN: CPT | Mod: PBBFAC,PN | Performed by: INTERNAL MEDICINE

## 2024-07-03 ENCOUNTER — PATIENT MESSAGE (OUTPATIENT)
Dept: BEHAVIORAL HEALTH | Facility: CLINIC | Age: 74
End: 2024-07-03
Payer: MEDICARE

## 2024-07-03 ENCOUNTER — TELEPHONE (OUTPATIENT)
Dept: BEHAVIORAL HEALTH | Facility: CLINIC | Age: 74
End: 2024-07-03
Payer: MEDICARE

## 2024-07-08 ENCOUNTER — PATIENT MESSAGE (OUTPATIENT)
Dept: BEHAVIORAL HEALTH | Facility: CLINIC | Age: 74
End: 2024-07-08
Payer: MEDICARE

## 2024-07-08 ENCOUNTER — TELEPHONE (OUTPATIENT)
Dept: BEHAVIORAL HEALTH | Facility: CLINIC | Age: 74
End: 2024-07-08
Payer: MEDICARE

## 2024-07-14 NOTE — TELEPHONE ENCOUNTER
----- Message from Jessie Sanz MD sent at 7/26/2018  3:09 PM CDT -----  Mrs. Green,  Please review results of yur HOlter monitor. It shows extra beats, mostly coming from the lower chamber of your heart. With normal heart function we normally do not treat it.  When you had symptoms your heart rate was normal without any extra beats.  If your sy,ptoms persist despite proper hydration I would consider low dose beta blocker that should regulate your heart beating and at the same time lower your blood pressure. Please let me know how you are feeling   Hours of care: [id number]     Vitals: Afebrile, VSS.     Neuro: A&Ox4.   Cardiac: Patient denies chest pain.           Respiratory: RA, lung sounds clear, denies SOB  GI/: Voiding spontaneously.  Diet/appetite: Tolerating high calorie/ high protein diet. Denies nausea.   Activity: Up independently     Pain: Denies   Skin: No new deficits noted.  Lines: CVC caps changed and heparin locked.    Drains: none  Replacements: No further replacements indicated this shift.     Plan: Continue with current POC. Report changes to primary team.

## 2024-08-15 LAB
LEFT EYE DM RETINOPATHY: NEGATIVE
RIGHT EYE DM RETINOPATHY: NEGATIVE

## 2024-08-21 ENCOUNTER — PATIENT OUTREACH (OUTPATIENT)
Dept: ADMINISTRATIVE | Facility: HOSPITAL | Age: 74
End: 2024-08-21
Payer: MEDICARE

## 2024-08-27 ENCOUNTER — OFFICE VISIT (OUTPATIENT)
Dept: INTERNAL MEDICINE | Facility: CLINIC | Age: 74
End: 2024-08-27
Attending: INTERNAL MEDICINE
Payer: MEDICARE

## 2024-08-27 ENCOUNTER — LAB VISIT (OUTPATIENT)
Dept: LAB | Facility: OTHER | Age: 74
End: 2024-08-27
Attending: INTERNAL MEDICINE
Payer: MEDICARE

## 2024-08-27 VITALS
BODY MASS INDEX: 23.13 KG/M2 | HEART RATE: 51 BPM | OXYGEN SATURATION: 99 % | WEIGHT: 143.31 LBS | DIASTOLIC BLOOD PRESSURE: 70 MMHG | SYSTOLIC BLOOD PRESSURE: 130 MMHG

## 2024-08-27 DIAGNOSIS — E11.9 TYPE 2 DIABETES MELLITUS WITHOUT COMPLICATION, WITH LONG-TERM CURRENT USE OF INSULIN: ICD-10-CM

## 2024-08-27 DIAGNOSIS — R63.4 UNINTENDED WEIGHT LOSS: ICD-10-CM

## 2024-08-27 DIAGNOSIS — D64.9 ANEMIA, UNSPECIFIED TYPE: ICD-10-CM

## 2024-08-27 DIAGNOSIS — E11.69 HYPERLIPIDEMIA ASSOCIATED WITH TYPE 2 DIABETES MELLITUS: ICD-10-CM

## 2024-08-27 DIAGNOSIS — J43.2 CENTRILOBULAR EMPHYSEMA: ICD-10-CM

## 2024-08-27 DIAGNOSIS — G50.8 TRIGEMINAL NEURITIS: ICD-10-CM

## 2024-08-27 DIAGNOSIS — Z79.4 TYPE 2 DIABETES MELLITUS WITHOUT COMPLICATION, WITH LONG-TERM CURRENT USE OF INSULIN: ICD-10-CM

## 2024-08-27 DIAGNOSIS — I70.0 AORTIC ATHEROSCLEROSIS: ICD-10-CM

## 2024-08-27 DIAGNOSIS — Z12.31 ENCOUNTER FOR SCREENING MAMMOGRAM FOR MALIGNANT NEOPLASM OF BREAST: ICD-10-CM

## 2024-08-27 DIAGNOSIS — E78.5 HYPERLIPIDEMIA ASSOCIATED WITH TYPE 2 DIABETES MELLITUS: ICD-10-CM

## 2024-08-27 DIAGNOSIS — R79.9 ABNORMAL FINDING OF BLOOD CHEMISTRY, UNSPECIFIED: ICD-10-CM

## 2024-08-27 DIAGNOSIS — M85.80 OSTEOPENIA, UNSPECIFIED LOCATION: ICD-10-CM

## 2024-08-27 DIAGNOSIS — F33.1 MODERATE EPISODE OF RECURRENT MAJOR DEPRESSIVE DISORDER: Primary | ICD-10-CM

## 2024-08-27 DIAGNOSIS — F33.1 MODERATE EPISODE OF RECURRENT MAJOR DEPRESSIVE DISORDER: ICD-10-CM

## 2024-08-27 DIAGNOSIS — I47.19 ATRIAL TACHYCARDIA: ICD-10-CM

## 2024-08-27 LAB
ALBUMIN SERPL BCP-MCNC: 4.2 G/DL (ref 3.5–5.2)
ALP SERPL-CCNC: 60 U/L (ref 55–135)
ALT SERPL W/O P-5'-P-CCNC: 20 U/L (ref 10–44)
ANION GAP SERPL CALC-SCNC: 11 MMOL/L (ref 8–16)
AST SERPL-CCNC: 24 U/L (ref 10–40)
BASOPHILS # BLD AUTO: 0.03 K/UL (ref 0–0.2)
BASOPHILS NFR BLD: 0.7 % (ref 0–1.9)
BILIRUB SERPL-MCNC: 0.5 MG/DL (ref 0.1–1)
BUN SERPL-MCNC: 15 MG/DL (ref 8–23)
CALCIUM SERPL-MCNC: 10.2 MG/DL (ref 8.7–10.5)
CHLORIDE SERPL-SCNC: 105 MMOL/L (ref 95–110)
CHOLEST SERPL-MCNC: 138 MG/DL (ref 120–199)
CHOLEST/HDLC SERPL: 2.3 {RATIO} (ref 2–5)
CO2 SERPL-SCNC: 25 MMOL/L (ref 23–29)
CREAT SERPL-MCNC: 0.8 MG/DL (ref 0.5–1.4)
CRP SERPL-MCNC: 0.3 MG/L (ref 0–8.2)
DIFFERENTIAL METHOD BLD: ABNORMAL
EOSINOPHIL # BLD AUTO: 0 K/UL (ref 0–0.5)
EOSINOPHIL NFR BLD: 0.7 % (ref 0–8)
ERYTHROCYTE [DISTWIDTH] IN BLOOD BY AUTOMATED COUNT: 14.1 % (ref 11.5–14.5)
ERYTHROCYTE [SEDIMENTATION RATE] IN BLOOD BY PHOTOMETRIC METHOD: 38 MM/HR (ref 0–36)
EST. GFR  (NO RACE VARIABLE): >60 ML/MIN/1.73 M^2
ESTIMATED AVG GLUCOSE: 151 MG/DL (ref 68–131)
GLUCOSE SERPL-MCNC: 105 MG/DL (ref 70–110)
HBA1C MFR BLD: 6.9 % (ref 4–5.6)
HCT VFR BLD AUTO: 38.7 % (ref 37–48.5)
HDLC SERPL-MCNC: 60 MG/DL (ref 40–75)
HDLC SERPL: 43.5 % (ref 20–50)
HGB BLD-MCNC: 12.1 G/DL (ref 12–16)
IMM GRANULOCYTES # BLD AUTO: 0.01 K/UL (ref 0–0.04)
IMM GRANULOCYTES NFR BLD AUTO: 0.2 % (ref 0–0.5)
LDLC SERPL CALC-MCNC: 68 MG/DL (ref 63–159)
LYMPHOCYTES # BLD AUTO: 1.5 K/UL (ref 1–4.8)
LYMPHOCYTES NFR BLD: 32.8 % (ref 18–48)
MCH RBC QN AUTO: 27.9 PG (ref 27–31)
MCHC RBC AUTO-ENTMCNC: 31.3 G/DL (ref 32–36)
MCV RBC AUTO: 89 FL (ref 82–98)
MONOCYTES # BLD AUTO: 0.4 K/UL (ref 0.3–1)
MONOCYTES NFR BLD: 9.3 % (ref 4–15)
NEUTROPHILS # BLD AUTO: 2.6 K/UL (ref 1.8–7.7)
NEUTROPHILS NFR BLD: 56.3 % (ref 38–73)
NONHDLC SERPL-MCNC: 78 MG/DL
NRBC BLD-RTO: 0 /100 WBC
PLATELET # BLD AUTO: 257 K/UL (ref 150–450)
PMV BLD AUTO: 10.7 FL (ref 9.2–12.9)
POTASSIUM SERPL-SCNC: 4.4 MMOL/L (ref 3.5–5.1)
PROT SERPL-MCNC: 7.8 G/DL (ref 6–8.4)
RBC # BLD AUTO: 4.34 M/UL (ref 4–5.4)
SODIUM SERPL-SCNC: 141 MMOL/L (ref 136–145)
TRIGL SERPL-MCNC: 50 MG/DL (ref 30–150)
TSH SERPL DL<=0.005 MIU/L-ACNC: 0.74 UIU/ML (ref 0.4–4)
WBC # BLD AUTO: 4.6 K/UL (ref 3.9–12.7)

## 2024-08-27 PROCEDURE — 85025 COMPLETE CBC W/AUTO DIFF WBC: CPT | Performed by: INTERNAL MEDICINE

## 2024-08-27 PROCEDURE — 99999 PR PBB SHADOW E&M-EST. PATIENT-LVL IV: CPT | Mod: PBBFAC,,, | Performed by: INTERNAL MEDICINE

## 2024-08-27 PROCEDURE — 84443 ASSAY THYROID STIM HORMONE: CPT | Performed by: INTERNAL MEDICINE

## 2024-08-27 PROCEDURE — 85652 RBC SED RATE AUTOMATED: CPT | Performed by: INTERNAL MEDICINE

## 2024-08-27 PROCEDURE — 80053 COMPREHEN METABOLIC PANEL: CPT | Performed by: INTERNAL MEDICINE

## 2024-08-27 PROCEDURE — 86140 C-REACTIVE PROTEIN: CPT | Performed by: INTERNAL MEDICINE

## 2024-08-27 PROCEDURE — 83036 HEMOGLOBIN GLYCOSYLATED A1C: CPT | Performed by: INTERNAL MEDICINE

## 2024-08-27 PROCEDURE — 99214 OFFICE O/P EST MOD 30 MIN: CPT | Mod: PBBFAC | Performed by: INTERNAL MEDICINE

## 2024-08-27 PROCEDURE — 99214 OFFICE O/P EST MOD 30 MIN: CPT | Mod: S$PBB,,, | Performed by: INTERNAL MEDICINE

## 2024-08-27 PROCEDURE — 80061 LIPID PANEL: CPT | Performed by: INTERNAL MEDICINE

## 2024-08-27 PROCEDURE — G2211 COMPLEX E/M VISIT ADD ON: HCPCS | Mod: S$PBB,,, | Performed by: INTERNAL MEDICINE

## 2024-08-27 NOTE — PROGRESS NOTES
Subjective:       Patient ID: Gely Green is a 73 y.o. female.    Chief Complaint: No chief complaint on file.    Here for annual exam    Weight continues to decrease. Does not count calories. Approximates 1200. No change in appetite.  BMI Readings from Last 20 Encounters:  08/27/24 : 23.13 kg/m²  06/25/24 : 23.75 kg/m²  05/31/24 : 22.92 kg/m²  05/01/24 : 22.88 kg/m²  02/27/24 : 24.27 kg/m²  02/27/24 : 24.21 kg/m²  02/15/24 : 24.34 kg/m²  01/03/24 : 24.45 kg/m²  01/02/24 : 23.88 kg/m²  09/06/23 : 24.48 kg/m²  08/29/23 : 23.73 kg/m²  07/03/23 : 24.16 kg/m²  04/24/23 : 24.91 kg/m²  03/16/23 : 24.13 kg/m²  03/11/23 : 24.86 kg/m²  03/08/23 : 25.05 kg/m²  03/03/23 : 25.02 kg/m²  01/27/23 : 24.68 kg/m²  01/04/23 : 25.66 kg/m²  01/04/23 : 25.38 kg/m²      ### DM ###  Endocrine at [facility].   Eye MD Russo  She likes cakes and confections        HGBA1C                   6.9 (H)             02/27/2024 02:53 PM        HGBA1C                   6.7 (H)             07/06/2023 12:58 PM        HGBA1C                   7.3 (H)             01/04/2023 11:38 AM        HGBA1C                   7.0 (H)             08/04/2022 11:34 AM        HGBA1C                   7.2 (H)             03/25/2022 01:02 PM        HGBA1C                   6.9 (H)             10/22/2021 12:33 PM        HGBA1C                   6.2 (H)             03/23/2021 08:40 AM        HGBA1C                   6.2 (H)             03/23/2021 08:40 AM        HGBA1C                   6.3 (H)             12/21/2020 03:00 PM        HGBA1C                   6.1 (H)             06/02/2020 09:43 AM         5/12/22 Reports having eye exam one month prior          ## Trochanteric bursitis responded well to PT. Patient would prefer to avoid any injections        #### PVCs ####  -04/06 to 05/05/2021 Event monitor.  The predominant rhythm was sinus.  Heart rates  beats per minute.  There were 13 transmitted events.  The 2 symptomatic transmitted events which were done  "for an unknown symptom.  Each of these showed sinus rhythm at 80-90 beats per minute.  The automatic tracings all showed sinus rhythm, frequently with premature ventricular contractions.  Heart rates  beats per minute.Impression:  Sinus rhythm with single PVCs.  -5/12/22 When she lays on her left side in bed she still feels her heart beat. Denies CP at rest or with exertion, dizziness with exertion, shortness of breath out of proportion to level of activity, orthopnea, PND, or LE edema.   -[facility] Geni Galvin MD at 6/25/2024           ### IC ###  Ubrel and urispas and instillations with urologist     ### Facial numbness ###  12/27/20 developed acute onset of numbness of top lip and left perioral with radiation to left maxillary region and left pre auricular. She has a left sided neck discomfort that is different in quality and behaves separately. Her left face and lip numbness. She was admitted to [facility] and had normal CT head. MRI brain 12/2/20 "Scattered foci of T2/FLAIR signal hyperintensity in supratentorial white matter. While nonspecific, findings likely relate to sequela of chronic microvascular ischemic change although findings can be seen in the setting of migraine headaches and as the residua from inflammatory and traumatic insults to the brain. Clinical correlation is advised" MRA neck and brain WNL.   Had subsequent U/S as outpt despite normal MRA neck that was also normal. No pain. Hx of left hand numbness with normal EMG 2017.    -Neuro Dr Torres writes "Without involvement of the teeth or gums this seems to be less likely related to a central cause.  Bertha allergies not completely clear.  Patient should however have treatment as if this is a TIA and will be given high dose aspirin for secondary prevention."    Prior HPI:  ENT earlier this year for chronic rhinitis and suggested regular use of flonase, astelin and nasal rinses  Patient presents today for routine evaluation, " physical, and labs. Patient has no major concerns or complaints today.      Prior HPI  Belching for the past 2 years. Tx by GI with 2 meds without improvement. She drinks carbonated beverages. Denies unexplained weight loss, dysphagia or sensation of things sticking in throat, BRBPR, melena, night sweats.            Review of Systems   Constitutional:  Negative for chills, fatigue, fever and unexpected weight change.   HENT:  Negative for ear pain, hearing loss, postnasal drip, tinnitus, trouble swallowing and voice change.    Respiratory:  Negative for cough, chest tightness, shortness of breath and wheezing.    Cardiovascular:  Negative for chest pain, palpitations and leg swelling.   Gastrointestinal:  Negative for abdominal pain, blood in stool, diarrhea, nausea and vomiting.   Endocrine: Negative for polydipsia, polyphagia and polyuria.   Genitourinary:  Negative for difficulty urinating, dysuria, hematuria and vaginal bleeding.   Skin:  Negative for rash.   Allergic/Immunologic: Negative for food allergies.   Neurological:  Negative for dizziness, numbness and headaches.   Hematological:  Does not bruise/bleed easily.   Psychiatric/Behavioral:  The patient is not nervous/anxious.        Objective:      Vitals:    08/27/24 1034   BP: 130/70   BP Location: Left arm   Patient Position: Sitting   Pulse: (!) 51   SpO2: 99%   Weight: 65 kg (143 lb 4.8 oz)      Physical Exam  Vitals and nursing note reviewed.   Constitutional:       General: She is not in acute distress.     Appearance: Normal appearance. She is well-developed.   HENT:      Head: Normocephalic and atraumatic.      Mouth/Throat:      Pharynx: No oropharyngeal exudate.   Eyes:      General: No scleral icterus.     Conjunctiva/sclera: Conjunctivae normal.      Pupils: Pupils are equal, round, and reactive to light.   Neck:      Thyroid: No thyromegaly.   Cardiovascular:      Rate and Rhythm: Normal rate and regular rhythm.      Heart sounds: Normal  heart sounds. No murmur heard.  Pulmonary:      Effort: Pulmonary effort is normal.      Breath sounds: Normal breath sounds. No wheezing or rales.   Abdominal:      General: There is no distension.   Musculoskeletal:         General: No tenderness.   Lymphadenopathy:      Cervical: No cervical adenopathy.   Skin:     General: Skin is warm and dry.   Neurological:      Mental Status: She is alert and oriented to person, place, and time.   Psychiatric:         Behavior: Behavior normal.         Assessment:       1. Moderate episode of recurrent major depressive disorder    2. Centrilobular emphysema    3. Aortic atherosclerosis    4. Type 2 diabetes mellitus without complication, with long-term current use of insulin    5. Anemia, unspecified type    6. Hyperlipidemia associated with type 2 diabetes mellitus    7. Osteopenia, unspecified location    8. Atrial tachycardia    9. Trigeminal neuritis    10. Unintended weight loss    11. Abnormal finding of blood chemistry, unspecified    12. Encounter for screening mammogram for malignant neoplasm of breast        Plan:       Diagnoses and all orders for this visit:    Moderate episode of recurrent major depressive disorder   Needs to re-establish new counselor since hers was repositioned elsewhere.   -     TSH; Future    Centrilobular emphysema   controlled. Continue current regimen  -     CBC Auto Differential; Future    Aortic atherosclerosis   Controlled and asymptomatic.  Continue current Rx regimen.  -     Lipid Panel; Future    Type 2 diabetes mellitus without complication, with long-term current use of insulin  -     Comprehensive Metabolic Panel; Future  -     Hemoglobin A1C; Future    Anemia, unspecified type  -     CBC Auto Differential; Future    Hyperlipidemia associated with type 2 diabetes mellitus  -     Lipid Panel; Future    Osteopenia, unspecified location    Trigeminal neuritis    Unintended weight loss  Count calories and f/u in one month. Rec 2200  calories daily for next month and okay to ignore nutrition for one month. Monitor BG to avoid DKA.  -     C-REACTIVE PROTEIN; Future  -     Sedimentation rate; Future  -     Comprehensive Metabolic Panel; Future  -     Lipid Panel; Future  -     TSH; Future  -     CBC Auto Differential; Future  -     Hemoglobin A1C; Future  -     Mammo Digital Screening Bilat; Future  -     NM PET CT FDG Skull Base to Mid Thigh; Future    Abnormal finding of blood chemistry, unspecified  -     Lipid Panel; Future  -     Hemoglobin A1C; Future    Encounter for screening mammogram for malignant neoplasm of breast  -     Mammo Digital Screening Bilat; Future       Visit today is associated with current or anticipated ongoing medical care related to this patient's single serious condition/complex condition of DM, HTN. The patient will return to see me as these issues will be followed longitudinally.      Yoseph Mercado MD  Internal Medicine-[facility]        Side effects of medication(s) were discussed in detail and patient voiced understanding.  Patient will call back for any issues or complications.

## 2024-09-17 ENCOUNTER — HOSPITAL ENCOUNTER (OUTPATIENT)
Dept: RADIOLOGY | Facility: HOSPITAL | Age: 74
Discharge: HOME | End: 2024-09-17
Attending: INTERNAL MEDICINE
Payer: MEDICARE

## 2024-09-17 DIAGNOSIS — R63.4 UNINTENDED WEIGHT LOSS: ICD-10-CM

## 2024-09-17 LAB — POCT GLUCOSE: 128 MG/DL (ref 70–110)

## 2024-09-17 PROCEDURE — A9552 F18 FDG: HCPCS | Performed by: INTERNAL MEDICINE

## 2024-09-17 PROCEDURE — 78815 PET IMAGE W/CT SKULL-THIGH: CPT | Mod: TC

## 2024-09-17 PROCEDURE — 78815 PET IMAGE W/CT SKULL-THIGH: CPT | Mod: 26,PI,, | Performed by: STUDENT IN AN ORGANIZED HEALTH CARE EDUCATION/TRAINING PROGRAM

## 2024-09-17 RX ORDER — FLUDEOXYGLUCOSE F18 500 MCI/ML
12 INJECTION INTRAVENOUS
Status: COMPLETED | OUTPATIENT
Start: 2024-09-17 | End: 2024-09-17

## 2024-09-17 RX ADMIN — FLUDEOXYGLUCOSE F-18 11.21 MILLICURIE: 500 INJECTION INTRAVENOUS at 12:09

## 2024-09-25 DIAGNOSIS — Z00.00 ENCOUNTER FOR MEDICARE ANNUAL WELLNESS EXAM: ICD-10-CM

## 2024-09-30 DIAGNOSIS — I15.2 HYPERTENSION ASSOCIATED WITH TYPE 2 DIABETES MELLITUS: ICD-10-CM

## 2024-09-30 DIAGNOSIS — E11.59 HYPERTENSION ASSOCIATED WITH TYPE 2 DIABETES MELLITUS: ICD-10-CM

## 2024-09-30 RX ORDER — HYDROCHLOROTHIAZIDE 12.5 MG/1
12.5 TABLET ORAL DAILY
Qty: 90 TABLET | Refills: 3 | Status: SHIPPED | OUTPATIENT
Start: 2024-09-30

## 2024-09-30 NOTE — TELEPHONE ENCOUNTER
No care due was identified.  [facility] Embedded Care Due Messages. Reference number: [id number].   9/30/2024 10:40:56 AM CDT

## 2024-09-30 NOTE — TELEPHONE ENCOUNTER
Refill Decision Note   Gely Green  is requesting a refill authorization.  Brief Assessment and Rationale for Refill:  Approve     Medication Therapy Plan:        Comments:     Note composed:4:28 PM 09/30/2024

## 2024-10-10 ENCOUNTER — TELEPHONE (OUTPATIENT)
Dept: ADMINISTRATIVE | Facility: CLINIC | Age: 74
End: 2024-10-10
Payer: MEDICARE

## 2024-10-11 ENCOUNTER — OFFICE VISIT (OUTPATIENT)
Dept: INTERNAL MEDICINE | Facility: CLINIC | Age: 74
End: 2024-10-11
Payer: MEDICARE

## 2024-10-11 VITALS
DIASTOLIC BLOOD PRESSURE: 63 MMHG | SYSTOLIC BLOOD PRESSURE: 113 MMHG | WEIGHT: 147.94 LBS | BODY MASS INDEX: 23.77 KG/M2 | HEIGHT: 66 IN | TEMPERATURE: 99 F | OXYGEN SATURATION: 95 % | HEART RATE: 60 BPM

## 2024-10-11 DIAGNOSIS — N95.2 ATROPHIC VAGINITIS: ICD-10-CM

## 2024-10-11 DIAGNOSIS — E78.5 HYPERLIPIDEMIA ASSOCIATED WITH TYPE 2 DIABETES MELLITUS: ICD-10-CM

## 2024-10-11 DIAGNOSIS — N81.89 PELVIC FLOOR WEAKNESS IN FEMALE: ICD-10-CM

## 2024-10-11 DIAGNOSIS — F41.9 ANXIETY: ICD-10-CM

## 2024-10-11 DIAGNOSIS — M25.562 CHRONIC PAIN OF LEFT KNEE: ICD-10-CM

## 2024-10-11 DIAGNOSIS — E11.9 TYPE 2 DIABETES MELLITUS WITHOUT COMPLICATION, WITH LONG-TERM CURRENT USE OF INSULIN: ICD-10-CM

## 2024-10-11 DIAGNOSIS — R39.89 BLADDER PAIN: ICD-10-CM

## 2024-10-11 DIAGNOSIS — R29.898 WEAKNESS OF BOTH LOWER EXTREMITIES: ICD-10-CM

## 2024-10-11 DIAGNOSIS — R63.4 WEIGHT LOSS, ABNORMAL: ICD-10-CM

## 2024-10-11 DIAGNOSIS — I49.3 PVC'S (PREMATURE VENTRICULAR CONTRACTIONS): ICD-10-CM

## 2024-10-11 DIAGNOSIS — G45.9 TIA (TRANSIENT ISCHEMIC ATTACK): ICD-10-CM

## 2024-10-11 DIAGNOSIS — E11.42 DIABETIC PERIPHERAL NEUROPATHY: ICD-10-CM

## 2024-10-11 DIAGNOSIS — R29.898 DECREASED RANGE OF MOTION OF NECK: ICD-10-CM

## 2024-10-11 DIAGNOSIS — F33.1 MODERATE EPISODE OF RECURRENT MAJOR DEPRESSIVE DISORDER: ICD-10-CM

## 2024-10-11 DIAGNOSIS — N89.8 VAGINAL DISCHARGE: ICD-10-CM

## 2024-10-11 DIAGNOSIS — E11.69 HYPERLIPIDEMIA ASSOCIATED WITH TYPE 2 DIABETES MELLITUS: ICD-10-CM

## 2024-10-11 DIAGNOSIS — R00.2 POUNDING HEARTBEAT: ICD-10-CM

## 2024-10-11 DIAGNOSIS — M85.80 OSTEOPENIA, UNSPECIFIED LOCATION: ICD-10-CM

## 2024-10-11 DIAGNOSIS — Z00.00 ENCOUNTER FOR PREVENTIVE HEALTH EXAMINATION: ICD-10-CM

## 2024-10-11 DIAGNOSIS — I70.0 AORTIC ATHEROSCLEROSIS: ICD-10-CM

## 2024-10-11 DIAGNOSIS — J43.2 CENTRILOBULAR EMPHYSEMA: ICD-10-CM

## 2024-10-11 DIAGNOSIS — F43.21 GRIEF: ICD-10-CM

## 2024-10-11 DIAGNOSIS — I15.2 HYPERTENSION ASSOCIATED WITH TYPE 2 DIABETES MELLITUS: ICD-10-CM

## 2024-10-11 DIAGNOSIS — G31.84 MILD NEUROCOGNITIVE DISORDER: ICD-10-CM

## 2024-10-11 DIAGNOSIS — E01.0 THYROMEGALY: ICD-10-CM

## 2024-10-11 DIAGNOSIS — E11.59 HYPERTENSION ASSOCIATED WITH TYPE 2 DIABETES MELLITUS: ICD-10-CM

## 2024-10-11 DIAGNOSIS — N30.10 INTERSTITIAL CYSTITIS: ICD-10-CM

## 2024-10-11 DIAGNOSIS — M25.551 RIGHT HIP PAIN: ICD-10-CM

## 2024-10-11 DIAGNOSIS — Z79.4 TYPE 2 DIABETES MELLITUS WITHOUT COMPLICATION, WITH LONG-TERM CURRENT USE OF INSULIN: ICD-10-CM

## 2024-10-11 DIAGNOSIS — Z00.00 ENCOUNTER FOR MEDICARE ANNUAL WELLNESS EXAM: Primary | ICD-10-CM

## 2024-10-11 DIAGNOSIS — F43.23 ADJUSTMENT DISORDER WITH MIXED ANXIETY AND DEPRESSED MOOD: ICD-10-CM

## 2024-10-11 DIAGNOSIS — G50.8 TRIGEMINAL NEURITIS: ICD-10-CM

## 2024-10-11 DIAGNOSIS — G89.29 CHRONIC PAIN OF LEFT KNEE: ICD-10-CM

## 2024-10-11 DIAGNOSIS — F33.0 MILD EPISODE OF RECURRENT MAJOR DEPRESSIVE DISORDER: ICD-10-CM

## 2024-10-11 DIAGNOSIS — H93.A9 PULSATILE TINNITUS: ICD-10-CM

## 2024-10-11 DIAGNOSIS — Z63.6 CAREGIVER STRESS: ICD-10-CM

## 2024-10-11 PROCEDURE — 99215 OFFICE O/P EST HI 40 MIN: CPT | Mod: PBBFAC | Performed by: NURSE PRACTITIONER

## 2024-10-11 PROCEDURE — 99999 PR PBB SHADOW E&M-EST. PATIENT-LVL V: CPT | Mod: PBBFAC,,, | Performed by: NURSE PRACTITIONER

## 2024-10-11 SDOH — SOCIAL DETERMINANTS OF HEALTH (SDOH): DEPENDENT RELATIVE NEEDING CARE AT HOME: Z63.6

## 2024-10-11 NOTE — PROGRESS NOTES
"Gely Green presented for an initial Medicare AWV today. The following components were reviewed and updated:    Medical history  Family History  Social history  Allergies and Current Medications  Health Risk Assessment  Health Maintenance  Care Team    **See Completed Assessments for Annual Wellness visit with in the encounter summary    The following assessments were completed:  Depression Screening  Cognitive function Screening  Timed Get Up Test  Whisper Test      Opioid documentation:      Patient does not have a current opioid prescription.            Vitals:    10/11/24 1502   BP: 113/63   Pulse: 60   Temp: 98.5 °F (36.9 °C)   TempSrc: Oral   SpO2: 95%   Weight: 67.1 kg (147 lb 14.9 oz)   Height: 5' 6" (1.676 m)     Body mass index is 23.88 kg/m².       Physical Exam  Constitutional:       Appearance: Normal appearance.   Pulmonary:      Effort: Pulmonary effort is normal.      Breath sounds: Normal breath sounds.   Neurological:      Mental Status: She is alert.           Diagnoses and health risks identified today and associated recommendations/orders:  1. Encounter for Medicare annual wellness exam  Annual Health Risk Assessment (HRA) visit today.  Counseling and referral of health maintenance and preventative health measures performed.  Patient given annual wellness paperwork to take home.  Encouraged to return in 1 year for subsequent HRA visit.   - Ambulatory Referral/Consult to Enhanced Annual Wellness Visit (eAWV)    2. Weight loss, abnormal  Chronic. Stable. Continue current treatment plan as previously prescribed by PCP.    3. Encounter for preventive health examination  Chronic. Stable. Continue current treatment plan as previously prescribed by PCP.    4. Aortic atherosclerosis  Chronic. Stable. Continue current treatment plan as previously prescribed by PCP.    5. Hyperlipidemia associated with type 2 diabetes mellitus  Chronic. Stable. Continue current treatment plan as previously " prescribed by PCP.    6. Hypertension associated with type 2 diabetes mellitus  Chronic. Stable. Continue current treatment plan as previously prescribed by PCP.    7. Pounding heartbeat  Chronic. Stable. Continue current treatment plan as previously prescribed by PCP.    8. PVC's (premature ventricular contractions)  Chronic. Stable. Continue current treatment plan as previously prescribed by PCP.    9. Atrophic vaginitis  Chronic. Stable. Continue current treatment plan as previously prescribed by PCP.    10. Bladder pain  Chronic. Stable. Continue current treatment plan as previously prescribed by PCP.    11. Interstitial cystitis  Chronic. Stable. Continue current treatment plan as previously prescribed by PCP.    12. Pelvic floor weakness in female  Chronic. Stable. Continue current treatment plan as previously prescribed by PCP.    13. Vaginal discharge  Chronic. Stable. Continue current treatment plan as previously prescribed by PCP.    14. Thyromegaly  Chronic. Stable. Continue current treatment plan as previously prescribed by PCP.    15. Type 2 diabetes mellitus without complication, with long-term current use of insulin  Chronic. Stable. Continue current treatment plan as previously prescribed by PCP.    16. Chronic pain of left knee  Chronic. Stable. Continue current treatment plan as previously prescribed by PCP.    17. Decreased range of motion of neck  Chronic. Stable. Continue current treatment plan as previously prescribed by PCP.    18. Osteopenia, unspecified location  Chronic. Stable. Continue current treatment plan as previously prescribed by PCP.    19. Right hip pain  Chronic. Stable. Continue current treatment plan as previously prescribed by PCP.    20. Weakness of both lower extremities  Chronic. Stable. Continue current treatment plan as previously prescribed by PCP.    21. Pulsatile tinnitus  Chronic. Stable. Continue current treatment plan as previously prescribed by PCP.    22.  Centrilobular emphysema  Chronic. Stable. Continue current treatment plan as previously prescribed by PCP.    23. Mild episode of recurrent major depressive disorder  Chronic. Stable. Continue current treatment plan as previously prescribed by PCP.    24. Moderate episode of recurrent major depressive disorder  Chronic. Stable. Continue current treatment plan as previously prescribed by PCP.    25. Grief  Chronic. Stable. Continue current treatment plan as previously prescribed by PCP.    26. Caregiver stress  Chronic. Stable. Continue current treatment plan as previously prescribed by PCP.    27. Anxiety  Chronic. Stable. Continue current treatment plan as previously prescribed by PCP.    28. Adjustment disorder with mixed anxiety and depressed mood  Chronic. Stable. Continue current treatment plan as previously prescribed by PCP.    29. Trigeminal neuritis  Chronic. Stable. Continue current treatment plan as previously prescribed by PCP.    30. TIA (transient ischemic attack)  Chronic. Stable. Continue current treatment plan as previously prescribed by PCP.    31. Mild neurocognitive disorder  Chronic. Stable. Continue current treatment plan as previously prescribed by PCP.    32. Diabetic peripheral neuropathy  Chronic. Stable. Continue current treatment plan as previously prescribed by PCP.      Provided [place] with a 5-10 year written screening schedule and personal prevention plan. Recommendations were developed using the USPSTF age appropriate recommendations. Education, counseling, and referrals were provided as needed.  After Visit Summary printed and given to patient which includes a list of additional screenings\tests needed.    No follow-ups on file.      Isaiah Jain NP

## 2024-10-11 NOTE — PATIENT INSTRUCTIONS
Counseling and Referral of Other Preventative  (Italic type indicates deductible and co-insurance are waived)    Patient Name: Gely Green  Today's Date: 10/11/2024    Health Maintenance       Date Due Completion Date    RSV Vaccine (Age 60+ and Pregnant patients) (1 - Risk 60-74 years 1-dose series) Never done ---    Influenza Vaccine (1) 09/01/2024 10/11/2023    COVID-19 Vaccine (5 - 2024-25 season) 09/01/2024 9/29/2022    Mammogram 10/11/2024 10/11/2023    Override on 5/1/2016: Done    Diabetes Urine Screening 02/27/2025 2/27/2024    Foot Exam 02/27/2025 2/27/2024    Override on 1/23/2019: Done    Override on 6/2/2017: Done    Hemoglobin A1c 02/27/2025 8/27/2024    Eye Exam 08/15/2025 8/15/2024    Override on 8/15/2016: Done    Lipid Panel 08/27/2025 8/27/2024    DEXA Scan 09/21/2025 9/21/2022    TETANUS VACCINE 05/31/2026 5/31/2016    Colorectal Cancer Screening 03/21/2029 8/16/2023    Override on 5/6/2014: Done    Override on 4/1/2007: Done        No orders of the defined types were placed in this encounter.    The following information is provided to all patients.  This information is to help you find resources for any of the problems found today that may be affecting your health:                  Living healthy guide: www.[facility].[place].gov      Understanding Diabetes: www.diabetes.org      Eating healthy: www.cdc.gov/healthyweight      CDC home safety checklist: www.cdc.gov/steadi/patient.html      Agency on Aging: www.goea.[place].gov      Alcoholics anonymous (AA): www.aa.org      Physical Activity: www.kiko.nih.gov/tz2ndin      Tobacco use: www.quitwithusla.org

## 2024-10-12 DIAGNOSIS — Z79.4 TYPE 2 DIABETES MELLITUS WITH DIABETIC NEUROPATHY, WITH LONG-TERM CURRENT USE OF INSULIN: ICD-10-CM

## 2024-10-12 DIAGNOSIS — E11.40 TYPE 2 DIABETES MELLITUS WITH DIABETIC NEUROPATHY, WITH LONG-TERM CURRENT USE OF INSULIN: ICD-10-CM

## 2024-10-12 RX ORDER — PEN NEEDLE, DIABETIC 31 GX5/16"
NEEDLE, DISPOSABLE MISCELLANEOUS
Qty: 100 EACH | Refills: 3 | Status: SHIPPED | OUTPATIENT
Start: 2024-10-12

## 2024-10-12 NOTE — TELEPHONE ENCOUNTER
No care due was identified.  [facility] Embedded Care Due Messages. Reference number: [id number].   10/12/2024 5:59:10 AM CDT

## 2024-10-13 NOTE — TELEPHONE ENCOUNTER
Refill Decision Note   Gely Green  is requesting a refill authorization.  Brief Assessment and Rationale for Refill:  Approve     Medication Therapy Plan:        Comments:     Note composed:10:48 PM 10/12/2024

## 2024-10-14 ENCOUNTER — HOSPITAL ENCOUNTER (OUTPATIENT)
Dept: RADIOLOGY | Facility: OTHER | Age: 74
Discharge: HOME | End: 2024-10-14
Attending: INTERNAL MEDICINE
Payer: MEDICARE

## 2024-10-14 DIAGNOSIS — Z12.31 OTHER SCREENING MAMMOGRAM: ICD-10-CM

## 2024-10-14 PROCEDURE — 77063 BREAST TOMOSYNTHESIS BI: CPT | Mod: 26,,, | Performed by: RADIOLOGY

## 2024-10-14 PROCEDURE — 77067 SCR MAMMO BI INCL CAD: CPT | Mod: TC

## 2024-10-14 PROCEDURE — 77067 SCR MAMMO BI INCL CAD: CPT | Mod: 26,,, | Performed by: RADIOLOGY

## 2024-11-06 ENCOUNTER — TELEPHONE (OUTPATIENT)
Dept: UROLOGY | Facility: CLINIC | Age: 74
End: 2024-11-06
Payer: MEDICARE

## 2024-11-06 ENCOUNTER — TELEPHONE (OUTPATIENT)
Dept: UROGYNECOLOGY | Facility: CLINIC | Age: 74
End: 2024-11-06
Payer: MEDICARE

## 2024-11-06 DIAGNOSIS — N30.10 INTERSTITIAL CYSTITIS: ICD-10-CM

## 2024-11-06 DIAGNOSIS — R39.89 BLADDER PAIN: ICD-10-CM

## 2024-11-06 RX ORDER — HYDROXYZINE HYDROCHLORIDE 25 MG/1
TABLET, FILM COATED ORAL
Qty: 30 TABLET | Refills: 11 | Status: SHIPPED | OUTPATIENT
Start: 2024-11-06

## 2024-11-06 NOTE — TELEPHONE ENCOUNTER
----- Message from Nery sent at 11/6/2024  9:11 AM CST -----  Type:  Sooner Appointment Request    Name of Caller:Pt  When is the first available appointment?March 2025  Symptoms:IC flare up  Would the patient rather a call back or a response via [facility]? Call  Best Call Back Number: [phone]  Additional Information:

## 2024-11-06 NOTE — TELEPHONE ENCOUNTER
----- Message from Litzy sent at 11/6/2024  2:11 PM CST -----  Regarding: Patient Callback  Name of Who is Calling:   VAIBHAV JASMINE [[id number]]     What is the request in detail:   Patient is experiencing an IC Flare Up    Next appointment is not until February and patient needs to be soon     Can the clinic reply by [facility]IN[facility]:   No     What Number to Call Back if not in [email or web address]:  Telephone Information:  Mobile          [phone]

## 2024-11-07 ENCOUNTER — OFFICE VISIT (OUTPATIENT)
Dept: INTERNAL MEDICINE | Facility: CLINIC | Age: 74
End: 2024-11-07
Attending: INTERNAL MEDICINE
Payer: MEDICARE

## 2024-11-07 ENCOUNTER — OFFICE VISIT (OUTPATIENT)
Dept: UROLOGY | Facility: CLINIC | Age: 74
End: 2024-11-07
Payer: MEDICARE

## 2024-11-07 VITALS
BODY MASS INDEX: 24.62 KG/M2 | DIASTOLIC BLOOD PRESSURE: 81 MMHG | SYSTOLIC BLOOD PRESSURE: 168 MMHG | HEART RATE: 61 BPM | WEIGHT: 152.56 LBS

## 2024-11-07 VITALS
BODY MASS INDEX: 24.34 KG/M2 | WEIGHT: 151.44 LBS | OXYGEN SATURATION: 98 % | SYSTOLIC BLOOD PRESSURE: 134 MMHG | HEART RATE: 68 BPM | DIASTOLIC BLOOD PRESSURE: 70 MMHG | HEIGHT: 66 IN

## 2024-11-07 DIAGNOSIS — F33.0 MILD EPISODE OF RECURRENT MAJOR DEPRESSIVE DISORDER: ICD-10-CM

## 2024-11-07 DIAGNOSIS — N30.10 IC (INTERSTITIAL CYSTITIS): ICD-10-CM

## 2024-11-07 DIAGNOSIS — N30.10 INTERSTITIAL CYSTITIS: Primary | ICD-10-CM

## 2024-11-07 DIAGNOSIS — E11.9 TYPE 2 DIABETES MELLITUS WITHOUT COMPLICATION, WITH LONG-TERM CURRENT USE OF INSULIN: ICD-10-CM

## 2024-11-07 DIAGNOSIS — F32.A DEPRESSION, UNSPECIFIED DEPRESSION TYPE: Primary | ICD-10-CM

## 2024-11-07 DIAGNOSIS — I70.0 AORTIC ATHEROSCLEROSIS: ICD-10-CM

## 2024-11-07 DIAGNOSIS — Z79.4 TYPE 2 DIABETES MELLITUS WITHOUT COMPLICATION, WITH LONG-TERM CURRENT USE OF INSULIN: ICD-10-CM

## 2024-11-07 PROCEDURE — 99204 OFFICE O/P NEW MOD 45 MIN: CPT | Mod: S$PBB,,, | Performed by: UROLOGY

## 2024-11-07 PROCEDURE — 99214 OFFICE O/P EST MOD 30 MIN: CPT | Mod: PBBFAC | Performed by: INTERNAL MEDICINE

## 2024-11-07 PROCEDURE — 81002 URINALYSIS NONAUTO W/O SCOPE: CPT | Mod: PBBFAC | Performed by: UROLOGY

## 2024-11-07 PROCEDURE — 99999 PR PBB SHADOW E&M-EST. PATIENT-LVL IV: CPT | Mod: PBBFAC,,, | Performed by: INTERNAL MEDICINE

## 2024-11-07 PROCEDURE — 99999 PR PBB SHADOW E&M-EST. PATIENT-LVL III: CPT | Mod: PBBFAC,,, | Performed by: UROLOGY

## 2024-11-07 PROCEDURE — 99213 OFFICE O/P EST LOW 20 MIN: CPT | Mod: PBBFAC,27 | Performed by: UROLOGY

## 2024-11-07 PROCEDURE — 99999PBSHW PR PBB SHADOW TECHNICAL ONLY FILED TO HB: Mod: PBBFAC,,,

## 2024-11-07 RX ORDER — HEPARIN SODIUM 5000 [USP'U]/ML
10000 INJECTION, SOLUTION INTRAVENOUS; SUBCUTANEOUS
Status: COMPLETED | OUTPATIENT
Start: 2024-11-07 | End: 2024-11-07

## 2024-11-07 RX ORDER — BUPIVACAINE HYDROCHLORIDE 5 MG/ML
30 INJECTION, SOLUTION EPIDURAL; INTRACAUDAL
Status: COMPLETED | OUTPATIENT
Start: 2024-11-07 | End: 2024-11-07

## 2024-11-07 RX ORDER — LIDOCAINE HYDROCHLORIDE 10 MG/ML
20 INJECTION, SOLUTION INFILTRATION; PERINEURAL
Status: COMPLETED | OUTPATIENT
Start: 2024-11-07 | End: 2024-11-07

## 2024-11-07 RX ADMIN — LIDOCAINE HYDROCHLORIDE 20 ML: 10 INJECTION, SOLUTION INFILTRATION; PERINEURAL at 03:11

## 2024-11-07 RX ADMIN — HEPARIN SODIUM 10000 UNITS: 5000 INJECTION, SOLUTION INTRAVENOUS; SUBCUTANEOUS at 03:11

## 2024-11-07 RX ADMIN — HYDROCORTISONE SODIUM SUCCINATE 100 MG: 100 INJECTION, POWDER, FOR SOLUTION INTRAMUSCULAR; INTRAVENOUS at 03:11

## 2024-11-07 RX ADMIN — BUPIVACAINE HYDROCHLORIDE 150 MG: 5 INJECTION, SOLUTION EPIDURAL; INTRACAUDAL at 03:11

## 2024-11-07 NOTE — PROGRESS NOTES
Subjective:       Patient ID: Gely Green is a 74 y.o. female.    Chief Complaint: PET scan follow up and Follow-up (Bladder flare up. )      [place] in [place]     History of Present Illness    CHIEF COMPLAINT:  Gely presents for a follow-up visit to discuss recent PET scan results and weight concerns.    HPI:  Gely reports an exacerbation of her interstitial cystitis (IC) for the past week, which intensified this week, prompting her to seek medical attention. She has an appointment scheduled with a urologist immediately following this visit. Gely typically consults a urogynecologist for her IC but sought an alternative provider due to scheduling difficulties.    Gely also reports unexplained weight loss that began in 2020, coinciding with the onset of the COVID-19 pandemic. Her blood sugar started decreasing during this time, which was atypical for her. She recalls weighing approximately 170 lbs before the weight loss began. Today's weigh-in shows her weight at 151.4 lbs, an increase from 147.9 lbs at her previous visit.    A recent PET scan yielded normal results, alleviating concerns about more serious underlying conditions.    MEDICAL HISTORY:  Gely has a history of interstitial cystitis (IC), unexplained weight loss, and blood sugar drops.    IMAGING:  She recently completed a PET scan, which yielded normal results.    SOCIAL HISTORY:  Gely is involved in community activities.      ROS:  General: -fever, -chills, -fatigue, -weight gain, +weight loss  Eyes: -vision changes, -redness, -discharge  ENT: -ear pain, -nasal congestion, -sore throat  Cardiovascular: -chest pain, -palpitations, -lower extremity edema  Respiratory: -cough, -shortness of breath  Gastrointestinal: -abdominal pain, -nausea, -vomiting, -diarrhea, -constipation, -blood in stool  Genitourinary: -dysuria, -hematuria, +frequency  Musculoskeletal: -joint pain, -muscle pain  Skin: -rash,  "-lesion  Neurological: -headache, -dizziness, -numbness, -tingling  Psychiatric: -anxiety, -depression, -sleep difficulty         Review of Systems   Constitutional:  Negative for chills, fatigue, fever and unexpected weight change.   HENT:  Negative for ear pain, hearing loss, postnasal drip, tinnitus, trouble swallowing and voice change.    Respiratory:  Negative for cough, chest tightness, shortness of breath and wheezing.    Cardiovascular:  Negative for chest pain, palpitations and leg swelling.   Gastrointestinal:  Negative for abdominal pain, blood in stool, diarrhea, nausea and vomiting.   Endocrine: Negative for polydipsia, polyphagia and polyuria.   Genitourinary:  Negative for difficulty urinating, dysuria, hematuria and vaginal bleeding.   Skin:  Negative for rash.   Allergic/Immunologic: Negative for food allergies.   Neurological:  Negative for dizziness, numbness and headaches.   Hematological:  Does not bruise/bleed easily.   Psychiatric/Behavioral:  The patient is not nervous/anxious.        Objective:      Vitals:    11/07/24 1153   BP: 134/70   BP Location: Left arm   Patient Position: Sitting   Pulse: 68   SpO2: 98%   Weight: 68.7 kg (151 lb 7.3 oz)   Height: 5' 6" (1.676 m)      Physical Exam  Vitals and nursing note reviewed.   Constitutional:       General: She is not in acute distress.     Appearance: Normal appearance. She is well-developed.   HENT:      Head: Normocephalic and atraumatic.      Mouth/Throat:      Pharynx: No oropharyngeal exudate.   Eyes:      General: No scleral icterus.     Conjunctiva/sclera: Conjunctivae normal.      Pupils: Pupils are equal, round, and reactive to light.   Neck:      Thyroid: No thyromegaly.   Cardiovascular:      Rate and Rhythm: Normal rate and regular rhythm.      Heart sounds: Normal heart sounds. No murmur heard.  Pulmonary:      Effort: Pulmonary effort is normal.      Breath sounds: Normal breath sounds. No wheezing or rales.   Abdominal:      " General: There is no distension.   Musculoskeletal:         General: No tenderness.   Lymphadenopathy:      Cervical: No cervical adenopathy.   Skin:     General: Skin is warm and dry.   Neurological:      Mental Status: She is alert and oriented to person, place, and time.   Psychiatric:         Behavior: Behavior normal.         Assessment:       1. Depression, unspecified depression type    2. Type 2 diabetes mellitus without complication, with long-term current use of insulin    3. Mild episode of recurrent major depressive disorder    4. IC (interstitial cystitis)    5. Aortic atherosclerosis        Plan:       Gely was seen today for pet scan follow up and follow-up.    Diagnoses and all orders for this visit:    Depression, unspecified depression type   Improving a little.   Type 2 diabetes mellitus without complication, with long-term current use of insulin     Mild episode of recurrent major depressive disorder    IC (interstitial cystitis)   Offered POCT u/a but pt prefers to defer to urology. Appt today    Aortic atherosclerosis   Tolerating statin. Continue this.            Assessment & Plan    WEIGHT MANAGEMENT:  - Assessed patient's recent weight gain, noting an increase from 147.9 to 151.4 lbs.  - Evaluated potential causes of prior weight loss, including changes in dietary habits and blood sugar regulation.  - Concluded current weight is within healthy BMI range, alleviating concerns about unexplained weight loss.  - Discussed previous unexplained weight loss in context of normal PET CT results.         This note was generated with the assistance of ambient listening technology. Verbal consent was obtained by the patient and accompanying visitor(s) for the recording of patient appointment to facilitate this note. I attest to having reviewed and edited the generated note for accuracy, though some syntax or spelling errors may persist. Please contact the author of this note for any  clarification.    Visit today is associated with current or anticipated ongoing medical care related to this patient's single serious condition/complex condition of DM, HTN. The patient will return to see me as these issues will be followed longitudinally.      Yoseph Mercado MD  Internal Medicine-[facility]        Side effects of medication(s) were discussed in detail and patient voiced understanding.  Patient will call back for any issues or complications.

## 2024-11-07 NOTE — PROGRESS NOTES
"CHIEF COMPLAINT:    Mrs. Green is a 74 y.o. female presenting for as a self referred patient for IC     PRESENTING ILLNESS:    Gely Green is a 74 y.o. female who presents with a history of interstitial cystitis.  Diagnosed over 20 years ago.  Had a cystoscopy 2 years ago.  She states she was seeing Dr. Alan and RAVINDER Lunsford but was told that Mario is no longer with the department.  She feels like she has had a flare for the past 2 weeks.  She has lower abdominal pain.      Bladder instillations have worked in the past.  In addition, she uses a step in her toilet that functions like a Squatty Potty.  She follows the IC diet.      , hysterectomy for bleeding, not sexually active though , has severe constipation.     REVIEW OF SYSTEMS:    Review of Systems   Constitutional: Negative.    HENT: Negative.     Eyes: Negative.    Respiratory: Negative.     Cardiovascular: Negative.    Gastrointestinal:  Positive for constipation.   Genitourinary:         Pelvic pain   Musculoskeletal: Negative.    Skin: Negative.    Neurological: Negative.    Endo/Heme/Allergies: Negative.    Psychiatric/Behavioral: Negative.         PATIENT HISTORY:    Past Medical History:   Diagnosis Date    Arthritis     Chronic constipation     Depression     Diabetes mellitus     Diabetes mellitus     Diabetes mellitus, type 2     Hypertension     Osteopenia        Past Surgical History:   Procedure Laterality Date     SECTION      2x    COLONOSCOPY      COLONOSCOPY N/A 2019    Procedure: COLONOSCOPY;  Surgeon: Marshall Contreras MD;  Location: [facility] ([address] [place] [address]);  Service: Endoscopy;  Laterality: N/A;  pt states that she had to be resuscitated after receiving an epidural during childbirth "30 something" years ago.  Ok for 4th floor per Dr. Hernandez-[place]    EYE SURGERY Bilateral     cataract removal    HYSTERECTOMY      full hyst     OOPHORECTOMY      TONSILLECTOMY         Family History   Problem Relation Name " Age of Onset    Breast cancer Maternal Cousin 2     Heart attack Mother Darrell     Heart disease Mother Darrell     Alzheimer's disease Mother Darrell     Heart attack Father Father     Heart disease Father Father     Heart failure Father Father     Hypertension Father Father     Hyperlipidemia Sister Devi     Hypertension Sister Devi     Diabetes Sister Devi     Abnormal EKG Sister Devi     Dementia Sister Devi     Kidney disease Sister Devi     Alcohol abuse Brother Hemanth     Fibroids Daughter Yulisa     Brain cancer Son Darrell Green     Early death Son Darrell Green 33    Learning disabilities Son Darrell Green      Social History     Socioeconomic History    Marital status:    Tobacco Use    Smoking status: Former     Current packs/day: 0.00     Average packs/day: 1 pack/day for 25.0 years (25.0 ttl pk-yrs)     Types: Cigarettes     Start date: 1978     Quit date: 1999     Years since quittin.8    Smokeless tobacco: Never   Substance and Sexual Activity    Alcohol use: No    Drug use: No    Sexual activity: Not Currently     Partners: Male     Birth control/protection: Coitus interruptus     Social Drivers of Health     Financial Resource Strain: Low Risk  (10/11/2024)    Overall Financial Resource Strain (CARDIA)     Difficulty of Paying Living Expenses: Not hard at all   Food Insecurity: No Food Insecurity (10/11/2024)    Hunger Vital Sign     Worried About Running Out of Food in the Last Year: Never true     Ran Out of Food in the Last Year: Never true   Transportation Needs: No Transportation Needs (10/11/2024)    PRAPARE - Transportation     Lack of Transportation (Medical): No     Lack of Transportation (Non-Medical): No   Physical Activity: Sufficiently Active (10/11/2024)    Exercise Vital Sign     Days of Exercise per Week: 2 days     Minutes of Exercise per Session: 120 min   Stress: Stress Concern Present (10/11/2024)    [place] [employer]  "Occupational Health - Occupational Stress Questionnaire     Feeling of Stress : To some extent   Housing Stability: Unknown (12/18/2023)    Housing Stability Vital Sign     Unable to Pay for Housing in the Last Year: No     Number of Places Lived in the Last Year: 1       Allergies:  Ace inhibitors    Medications:  Outpatient Encounter Medications as of 11/7/2024   Medication Sig Dispense Refill    aspirin (ECOTRIN) 81 MG EC tablet Take 1 tablet (81 mg total) by mouth once daily. 30 tablet 11    BD ULTRA-FINE SHORT PEN NEEDLE 31 gauge x 5/16" Ndle USE ONCE DAILY 100 each 3    calcium carbonate (OS-REDD) 600 mg calcium (1,500 mg) Tab Take 600 mg by mouth 2 (two) times daily with meals.      DEXCOM G7  Misc USE AS DIRECTED EVERY 3 MONTHS      flash glucose sensor (FREESTYLE SHANNON 2 SENSOR) Kit Pt to check BG as needed 1 kit 0    FREESTYLE SHANNON 2 READER Misc Inject 1 each into the skin every 14 (fourteen) days. 4 each 12    FREESTYLE SHANNON 2 SENSOR Kit USE AS DIRECTED EVERY 2 WEEKS 1 kit 0    hydroCHLOROthiazide (HYDRODIURIL) 12.5 MG Tab Take 1 tablet (12.5 mg total) by mouth once daily. 90 tablet 3    hydrOXYzine HCL (ATARAX) 25 MG tablet TAKE 1 TABLET(25 MG) BY MOUTH TWICE DAILY AS NEEDED FOR BLADDER PAIN 30 tablet 11    insulin glargine, TOUJEO, (TOUJEO SOLOSTAR U-300 INSULIN) 300 unit/mL (1.5 mL) InPn pen Inject 12 Units into the skin every evening. 6 mL 1    ipratropium (ATROVENT) 21 mcg (0.03 %) nasal spray 2 sprays by Each Nostril route 2 (two) times daily. 30 mL 0    irbesartan (AVAPRO) 300 MG tablet Take 1 tablet (300 mg total) by mouth every evening. 90 tablet 3    lancets (ONETOUCH DELICA LANCETS) 33 gauge Misc 1 lancet by Misc.(Non-Drug; Combo Route) route 3 (three) times daily. 300 each 3    LINZESS 72 mcg Cap capsule Take 72 mcg by mouth every morning. (Patient taking differently: Take 72 mcg by mouth every morning. PRN)      magnesium oxide (MAG-OX) 400 mg (241.3 mg magnesium) tablet Take 1 " tablet (400 mg total) by mouth every evening. 30 tablet 11    metFORMIN (GLUCOPHAGE-XR) 500 MG ER 24hr tablet Take 2 tablets (1,000 mg total) by mouth 2 (two) times a day. 360 tablet 4    methen-m.blue-s.phos-phsal-hyo (URIBEL) 118-10-40.8-36 mg Cap Take 1 capsule by mouth 4 (four) times daily as needed (bladder pain). 20 capsule 11    mometasone (NASONEX) 50 mcg/actuation nasal spray 2 sprays by Nasal route once daily. (Patient not taking: Reported on 5/1/2024) 17 g 0    omeprazole (PRILOSEC) 20 MG capsule Take 20 mg by mouth. (Patient not taking: Reported on 5/1/2024)      ONETOUCH VERIO TEST STRIPS Strp TEST THREE TIMES DAILY 200 strip 11    rosuvastatin (CRESTOR) 20 MG tablet Take 1 tablet (20 mg total) by mouth once daily. 90 tablet 1    vitamin D 1000 units Tab Take 1,000 Units by mouth once daily.      [DISCONTINUED] hydrOXYzine HCL (ATARAX) 25 MG tablet Take 1 tablet (25 mg total) by mouth 2 (two) times daily as needed (bladder pain). 30 tablet 11     No facility-administered encounter medications on file as of 11/7/2024.         PHYSICAL EXAMINATION:    The patient generally appears in good health, is appropriately interactive, and is in no apparent distress.    Skin: No lesions.    Mental: Cooperative with normal affect.    Neuro: Grossly intact.    HEENT: Normal. No evidence of lymphadenopathy.    Chest:  normal inspiratory effort.    Abdomen: Soft, non-tender. No masses or organomegaly. Bladder is not palpable. No evidence of flank discomfort. No evidence of inguinal hernia.    Extremities: No clubbing, cyanosis, or edema    NOTE:  the exam was carried out with a nurse chaperone present  Normal external female genitalia  Urethral meatus is normal  Urethra and bladder are nontender to bimanual exam  Well supported anteriorly and posteriorly   Uterus and cervix are normal  No adnexal masses  PVR by 400 ml     LABS:    Lab Results   Component Value Date    BUN 15 08/27/2024    CREATININE 0.8 08/27/2024        UA 1.005, pH 6, tr protein, otherwise, negative.     IMPRESSION:    Interstitial cystitis  Incomplete bladder emptying    PLAN:    1.  Patient was prepped with Betadine and a 12 Fr catheter was placed.  The bladder was drained.  A bladder cocktail (lidocaine 1% 20 ml, marcaine 0.5% 20 ml, hydrocortisone 100 mg and heparin 10,000 U) instilled by gravity drainage.    Patient was encouraged to stay 15 minutes to allow her to relax and for the bladder cocktail to work  Pain noted to go from a 8 to a 5 after the instillation.    4.  She will return in 1 week for another instillation.  Will see what her PVR is at that time, consider UDS, consider another cystoscopy under sedation.     I spent a total of 45 minutes on the day of the visit.  This includes face to face time and non-face to face time preparing to see the patient (eg, review of tests), obtaining and/or reviewing separately obtained history, documenting clinical information in the electronic or other health record, independently interpreting results and communicating results to the patient/family/caregiver, or care coordinator.

## 2024-11-14 ENCOUNTER — OFFICE VISIT (OUTPATIENT)
Dept: UROLOGY | Facility: CLINIC | Age: 74
End: 2024-11-14
Payer: MEDICARE

## 2024-11-14 ENCOUNTER — TELEPHONE (OUTPATIENT)
Dept: UROLOGY | Facility: CLINIC | Age: 74
End: 2024-11-14

## 2024-11-14 VITALS
BODY MASS INDEX: 24.52 KG/M2 | HEART RATE: 60 BPM | DIASTOLIC BLOOD PRESSURE: 68 MMHG | WEIGHT: 151.88 LBS | SYSTOLIC BLOOD PRESSURE: 147 MMHG

## 2024-11-14 DIAGNOSIS — N30.10 INTERSTITIAL CYSTITIS: Primary | ICD-10-CM

## 2024-11-14 PROCEDURE — 99999 PR PBB SHADOW E&M-EST. PATIENT-LVL III: CPT | Mod: PBBFAC,,, | Performed by: UROLOGY

## 2024-11-14 PROCEDURE — 81002 URINALYSIS NONAUTO W/O SCOPE: CPT | Mod: PBBFAC | Performed by: UROLOGY

## 2024-11-14 PROCEDURE — 51798 US URINE CAPACITY MEASURE: CPT | Mod: PBBFAC | Performed by: UROLOGY

## 2024-11-14 PROCEDURE — 99214 OFFICE O/P EST MOD 30 MIN: CPT | Mod: S$PBB,,, | Performed by: UROLOGY

## 2024-11-14 PROCEDURE — 99999PBSHW PR PBB SHADOW TECHNICAL ONLY FILED TO HB: Mod: PBBFAC,,,

## 2024-11-14 PROCEDURE — 99213 OFFICE O/P EST LOW 20 MIN: CPT | Mod: PBBFAC | Performed by: UROLOGY

## 2024-11-14 NOTE — PROGRESS NOTES
"CHIEF COMPLAINT:    Mrs. Green is a 74 y.o. female presenting for a follow up on     PRESENTING ILLNESS:    Gely Green is a 74 y.o. female who presents with a history of interstitial cystitis.  She states that over the last week, she has had intermittent symptoms but yesterday and today she feels well.  She has not had a cystoscopy in many years.      REVIEW OF SYSTEMS:    Review of Systems   Constitutional: Negative.    HENT: Negative.     Eyes: Negative.    Respiratory: Negative.     Cardiovascular: Negative.    Gastrointestinal: Negative.    Genitourinary: Negative.    Musculoskeletal:  Positive for joint pain.   Skin: Negative.    Neurological: Negative.    Endo/Heme/Allergies:         Diabetes mellitus   Psychiatric/Behavioral: Negative.         PATIENT HISTORY:    Past Medical History:   Diagnosis Date    Arthritis     Chronic constipation     Depression     Diabetes mellitus     Diabetes mellitus     Diabetes mellitus, type 2     Hypertension     Osteopenia        Past Surgical History:   Procedure Laterality Date     SECTION      2x    COLONOSCOPY      COLONOSCOPY N/A 2019    Procedure: COLONOSCOPY;  Surgeon: Marshall Contreras MD;  Location: [facility] ([address] [place] [address]);  Service: Endoscopy;  Laterality: N/A;  pt states that she had to be resuscitated after receiving an epidural during childbirth "30 something" years ago.  Ok for 4th floor per Dr. Hernandez-[place]    EYE SURGERY Bilateral     cataract removal    HYSTERECTOMY      full hyst     OOPHORECTOMY      TONSILLECTOMY         Family History   Problem Relation Name Age of Onset    Breast cancer Maternal Cousin 2     Heart attack Mother Darrell     Heart disease Mother Darrell     Alzheimer's disease Mother Darrell     Heart attack Father Father     Heart disease Father Father     Heart failure Father Father     Hypertension Father Father     Hyperlipidemia Sister Devi     Hypertension Sister Devi     Diabetes Sister Devi     " Abnormal EKG Sister Devi     Dementia Sister Devi     Kidney disease Sister Devi     Alcohol abuse Brother Hemanth     Fibroids Daughter Yulisa     Brain cancer Derek rGeen     Early death Son Darrell Green 33    Learning disabilities Son Darrell Green      Social History     Socioeconomic History    Marital status:    Tobacco Use    Smoking status: Former     Current packs/day: 0.00     Average packs/day: 1 pack/day for 25.0 years (25.0 ttl pk-yrs)     Types: Cigarettes     Start date: 1978     Quit date: 1999     Years since quittin.8    Smokeless tobacco: Never   Substance and Sexual Activity    Alcohol use: No    Drug use: No    Sexual activity: Not Currently     Partners: Male     Birth control/protection: Coitus interruptus     Social Drivers of Health     Financial Resource Strain: Low Risk  (10/11/2024)    Overall Financial Resource Strain (CARDIA)     Difficulty of Paying Living Expenses: Not hard at all   Food Insecurity: No Food Insecurity (10/11/2024)    Hunger Vital Sign     Worried About Running Out of Food in the Last Year: Never true     Ran Out of Food in the Last Year: Never true   Transportation Needs: No Transportation Needs (10/11/2024)    PRAPARE - Transportation     Lack of Transportation (Medical): No     Lack of Transportation (Non-Medical): No   Physical Activity: Sufficiently Active (10/11/2024)    Exercise Vital Sign     Days of Exercise per Week: 2 days     Minutes of Exercise per Session: 120 min   Stress: Stress Concern Present (10/11/2024)    [place] [employer]ational [employer] - Occupational Stress Questionnaire     Feeling of Stress : To some extent   Housing Stability: Unknown (2023)    Housing Stability Vital Sign     Unable to Pay for Housing in the Last Year: No     Number of Places Lived in the Last Year: 1       Allergies:  Ace inhibitors    Medications:  Outpatient Encounter Medications as of 2024   Medication  "Sig Dispense Refill    aspirin (ECOTRIN) 81 MG EC tablet Take 1 tablet (81 mg total) by mouth once daily. 30 tablet 11    BD ULTRA-FINE SHORT PEN NEEDLE 31 gauge x 5/16" Ndle USE ONCE DAILY 100 each 3    calcium carbonate (OS-REDD) 600 mg calcium (1,500 mg) Tab Take 600 mg by mouth 2 (two) times daily with meals.      DEXCOM G7  Misc USE AS DIRECTED EVERY 3 MONTHS      flash glucose sensor (FREESTYLE SHANNON 2 SENSOR) Kit Pt to check BG as needed 1 kit 0    FREESTYLE SHANNON 2 READER Misc Inject 1 each into the skin every 14 (fourteen) days. 4 each 12    FREESTYLE SHANNON 2 SENSOR Kit USE AS DIRECTED EVERY 2 WEEKS 1 kit 0    hydroCHLOROthiazide (HYDRODIURIL) 12.5 MG Tab Take 1 tablet (12.5 mg total) by mouth once daily. 90 tablet 3    hydrOXYzine HCL (ATARAX) 25 MG tablet TAKE 1 TABLET(25 MG) BY MOUTH TWICE DAILY AS NEEDED FOR BLADDER PAIN 30 tablet 11    insulin glargine, TOUJEO, (TOUJEO SOLOSTAR U-300 INSULIN) 300 unit/mL (1.5 mL) InPn pen Inject 12 Units into the skin every evening. 6 mL 1    ipratropium (ATROVENT) 21 mcg (0.03 %) nasal spray 2 sprays by Each Nostril route 2 (two) times daily. 30 mL 0    irbesartan (AVAPRO) 300 MG tablet Take 1 tablet (300 mg total) by mouth every evening. 90 tablet 3    lancets (ONETOUCH DELICA LANCETS) 33 gauge Misc 1 lancet by Misc.(Non-Drug; Combo Route) route 3 (three) times daily. 300 each 3    LINZESS 72 mcg Cap capsule Take 72 mcg by mouth every morning. (Patient taking differently: Take 72 mcg by mouth every morning. PRN)      magnesium oxide (MAG-OX) 400 mg (241.3 mg magnesium) tablet Take 1 tablet (400 mg total) by mouth every evening. 30 tablet 11    metFORMIN (GLUCOPHAGE-XR) 500 MG ER 24hr tablet Take 2 tablets (1,000 mg total) by mouth 2 (two) times a day. 360 tablet 4    methen-m.blue-s.phos-phsal-hyo (URIBEL) 118-10-40.8-36 mg Cap Take 1 capsule by mouth 4 (four) times daily as needed (bladder pain). 20 capsule 11    mometasone (NASONEX) 50 mcg/actuation nasal " spray 2 sprays by Nasal route once daily. (Patient not taking: Reported on 5/1/2024) 17 g 0    omeprazole (PRILOSEC) 20 MG capsule Take 20 mg by mouth. (Patient not taking: Reported on 5/1/2024)      ONETOUCH VERIO TEST STRIPS Strp TEST THREE TIMES DAILY 200 strip 11    rosuvastatin (CRESTOR) 20 MG tablet Take 1 tablet (20 mg total) by mouth once daily. 90 tablet 1    vitamin D 1000 units Tab Take 1,000 Units by mouth once daily.       No facility-administered encounter medications on file as of 11/14/2024.         PHYSICAL EXAMINATION:    The patient generally appears in good health, is appropriately interactive, and is in no apparent distress.    Skin: No lesions.    Mental: Cooperative with normal affect.    Neuro: Grossly intact.    HEENT: Normal. No evidence of lymphadenopathy.    Chest:  normal inspiratory effort.    Extremities: No clubbing, cyanosis, or edema    PVR by bladder scan was 7 ml    LABS:    Lab Results   Component Value Date    BUN 15 08/27/2024    CREATININE 0.8 08/27/2024       UA 1.015, pH 5, tr protein, otherwise, negative.     IMPRESSION:    Interstitial cystitis    PLAN:    Consented for cystoscopy under sedation, with possible bladder biopsy and fulguration.  We discussed hydrodistension but she prefers not to have that done.    She states she has a flare about 3 times a year.  We discussed that she could come in for instillations when needed. May need to be seen by one of our midlevels.     I spent a total of 30 minutes on the day of the visit.  This includes face to face time and non-face to face time preparing to see the patient (eg, review of tests), obtaining and/or reviewing separately obtained history, documenting clinical information in the electronic or other health record, independently interpreting results and communicating results to the patient/family/caregiver, or care coordinator.

## 2024-11-25 ENCOUNTER — OFFICE VISIT (OUTPATIENT)
Dept: UROGYNECOLOGY | Facility: CLINIC | Age: 74
End: 2024-11-25
Payer: MEDICARE

## 2024-11-25 VITALS
DIASTOLIC BLOOD PRESSURE: 71 MMHG | SYSTOLIC BLOOD PRESSURE: 131 MMHG | BODY MASS INDEX: 24.52 KG/M2 | HEART RATE: 62 BPM | HEIGHT: 66 IN

## 2024-11-25 DIAGNOSIS — Z87.19 HX OF CONSTIPATION: ICD-10-CM

## 2024-11-25 DIAGNOSIS — N30.10 INTERSTITIAL CYSTITIS: ICD-10-CM

## 2024-11-25 DIAGNOSIS — R39.89 BLADDER PAIN: Primary | ICD-10-CM

## 2024-11-25 DIAGNOSIS — N89.8 VAGINAL DISCHARGE: ICD-10-CM

## 2024-11-25 PROCEDURE — 0352U VAGINOSIS SCREEN BY DNA PROBE: CPT | Performed by: NURSE PRACTITIONER

## 2024-11-25 PROCEDURE — 99214 OFFICE O/P EST MOD 30 MIN: CPT | Mod: PBBFAC | Performed by: NURSE PRACTITIONER

## 2024-11-25 PROCEDURE — 87086 URINE CULTURE/COLONY COUNT: CPT | Performed by: NURSE PRACTITIONER

## 2024-11-25 PROCEDURE — 99213 OFFICE O/P EST LOW 20 MIN: CPT | Mod: S$PBB,,, | Performed by: NURSE PRACTITIONER

## 2024-11-25 PROCEDURE — 99999 PR PBB SHADOW E&M-EST. PATIENT-LVL IV: CPT | Mod: PBBFAC,,, | Performed by: NURSE PRACTITIONER

## 2024-11-25 NOTE — PATIENT INSTRUCTIONS
"Well woman  --up to date     2. Constipation:  -- Controlling constipation may help bladder urgency/leakage and fiber may better control cholesterol and blood glucose.   -- Continue Linzess  -- May also use over the counter stool softener (ex Colace) 1-2 x/day.  **AVOID laxatives.  -- Can also try "Natural Vitality Calm" powder or magnesium oxide 400 mg per night (helps with sleep, anxiety, and constipation)  -- Drink plenty of water!  -- Get a SQUATTY POTTY     3 Vaginal atrophy (dryness):    Non-estrogen options for vaginal dryness:  --coconut oil, extra virgin olive oil, or vitamin E oil: dime-sized amount with finger (as far as can reach internally) and around the vaginal opening and inner lips up to nightly as needed  --Uberlube, [place], [place] liquibeads, Satin by Sliquid Natural Intimate Moisturizer     4.  OAB:  --avoid dietary irritants (see list)  -- Discontine Vesicare 5 mg daily due to potential memory issues as recommended by neuropsych. Myrbetriq was expensive.   --Empty bladder every 2-3 hours even if you don't have the urge.  Empty well: wait a minute, lean forward on toilet.    --Try not to go more frequently than once an hour. When you get the urge to urinate after you have just gone, distract yourself until the urge passes.   --Avoid dietary irritants (see sheet).  Keep diary x 3-5 days to determine your irritants.  --KEGELS: do 10 in AM and 10 in PM, holding each x 10 seconds.  When you feel urge to go, STOP, KEGEL, and when urge has passed, then go to bathroom.    -- continue Hydroxyzine at night as needed, may help with nocturia (waking up at night to pee)  --A1C 6.7      5) IC  --For flares: Take hydroxyzine up to 3 times a day and Uribel up to 4 times a day  --Bladder instillations previously   --scheduled for cysto with Pro in January  --Hydrodistention With Cystoscopy: allows physicians to take a much closer look at the bladder wall. While the AUA no longer suggests that it be used as a " diagnostic method (unless a diagnosis is in doubt), hydrodistention may provide modest relief in IC symptoms which can last from three to six months. It can, however, be a more difficult, painful procedure. As a result, it requires careful consideration and discussion.     6)vaginal discharge  --affirm today    7)RTC in 1 year

## 2024-11-25 NOTE — PROGRESS NOTES
"[facility] - UROGYNECOLOGY  [address] [id number]  2024     Gely LOTT Peter  [id number]  1950    UROGYN FOLLOW-UP  2024     74 y.o. female,   presents for urogyn follow up for IC   .     HPI from 3/2/2022:  Well known patient to [facility] and Dr. Alan presents for recurrence of bladder pain. Has not had symptoms for years, but in the last few months has been having flares. Did two bladder installations without improvement. Today patient c/o IC pain daily, feels relief when she empties her bladder. Taking flavoxate daily, but does not help. Has tried Uribel and it helps, but it is expensive. BMs have been "horrible," takes miralax most days. Denies fever. Blood sugars have been 120-150 but may go up to 200, then sometimes drops suddenly to 60     2022:  Patient is going well. No complaints. Has not had any more IC flares since started solifenacin 5 mg. Denies worsening constipation, though constiopation is still an issue. Uses flax seed in her food, but no fiber supplement or stool softener. Uses Miralax when she remembers. Blood sugars have been high again, A1C increased to 7.2. Stress/anxiety has been well controlled since started going to weekly group grief counseling sessions at [facility]. She is using Replens two nights per week. Denies UTI symptoms at this time, except still waking up 3x nightly to urinate. Does not want to increase solifenacin to higher dose.      2023:  Patient has been doing well until 1 week ago, she thinks flare brought on because she started drinking caffeinated hannah again. Still constipated. Her neuropsych doctor pulled her off of VesiCare and hydroxyzine due to risk of memory issues.  Blood sugars have been elevated. She is using Replens regularly.     02/15/2024  Patient is doing well. She has not had a flare since her last visit. Blood sugars have been more regular. She has hydroxyzine and Uribel for flares. She is " "still suffering with constipation. Taking Linzess, metamucil, and senna.     Changes since last visit:  +bladder pain today-- very mild.  Not usign replens regularly.   Voiding every 2 hours during the day and 3-4/ night--does not limit fluids prior to bedtime  + constipation--taking magnesium oxide and stool softener.  Not taking miralax daily  Taking hydroxyzine and uribel intermittently        Past Medical History:   Diagnosis Date    Arthritis     Chronic constipation     Depression     Diabetes mellitus     Diabetes mellitus     Diabetes mellitus, type 2     Hypertension     Osteopenia        Past Surgical History:   Procedure Laterality Date     SECTION      2x    COLONOSCOPY      COLONOSCOPY N/A 2019    Procedure: COLONOSCOPY;  Surgeon: Marshall Contreras MD;  Location: [facility] ([address] [place] [address]);  Service: Endoscopy;  Laterality: N/A;  pt states that she had to be resuscitated after receiving an epidural during childbirth "30 something" years ago.  Ok for 4th floor per Dr. Hernandez-MS    EYE SURGERY Bilateral     cataract removal    HYSTERECTOMY      full hyst     OOPHORECTOMY      TONSILLECTOMY         Family History   Problem Relation Name Age of Onset    Breast cancer Maternal Cousin 2     Heart attack Mother Darrell     Heart disease Mother Darrell     Alzheimer's disease Mother Darrell     Heart attack Father Father     Heart disease Father Father     Heart failure Father Father     Hypertension Father Father     Hyperlipidemia Sister Devi     Hypertension Sister Devi     Diabetes Sister Devi     Abnormal EKG Sister Devi     Dementia Sister Devi     Kidney disease Sister Devi     Alcohol abuse Brother Hemanth     Fibroids Daughter Yulisa     Brain cancer Son Darrell Green     Early death Son Darrell Green 33    Learning disabilities Son Darrell Green     Ovarian cancer Neg Hx      Cervical cancer Neg Hx      Endometrial cancer Neg Hx      Vaginal cancer Neg Hx   " "   Stroke Neg Hx      Colon cancer Neg Hx      Esophageal cancer Neg Hx      Vision loss Neg Hx         Social History     Socioeconomic History    Marital status:    Tobacco Use    Smoking status: Former     Current packs/day: 0.00     Average packs/day: 1 pack/day for 25.0 years (25.0 ttl pk-yrs)     Types: Cigarettes     Start date: 1978     Quit date: 1999     Years since quittin.9    Smokeless tobacco: Never   Substance and Sexual Activity    Alcohol use: No    Drug use: No    Sexual activity: Not Currently     Partners: Male     Birth control/protection: Coitus interruptus     Social Drivers of Health     Financial Resource Strain: Low Risk  (10/11/2024)    Overall Financial Resource Strain (CARDIA)     Difficulty of Paying Living Expenses: Not hard at all   Food Insecurity: No Food Insecurity (10/11/2024)    Hunger Vital Sign     Worried About Running Out of Food in the Last Year: Never true     Ran Out of Food in the Last Year: Never true   Transportation Needs: No Transportation Needs (10/11/2024)    PRAPARE - Transportation     Lack of Transportation (Medical): No     Lack of Transportation (Non-Medical): No   Physical Activity: Sufficiently Active (10/11/2024)    Exercise Vital Sign     Days of Exercise per Week: 2 days     Minutes of Exercise per Session: 120 min   Stress: Stress Concern Present (10/11/2024)    [employer] - Occupational Stress Questionnaire     Feeling of Stress : To some extent   Housing Stability: Unknown (2023)    Housing Stability Vital Sign     Unable to Pay for Housing in the Last Year: No     Number of Places Lived in the Last Year: 1       Current Outpatient Medications   Medication Sig Dispense Refill    aspirin (ECOTRIN) 81 MG EC tablet Take 1 tablet (81 mg total) by mouth once daily. 30 tablet 11    BD ULTRA-FINE SHORT PEN NEEDLE 31 gauge x 5/16" Ndle USE ONCE DAILY 100 each 3    calcium carbonate (OS-REDD) 600 mg calcium " (1,500 mg) Tab Take 600 mg by mouth 2 (two) times daily with meals.      DEXCOM G7  Misc USE AS DIRECTED EVERY 3 MONTHS      flash glucose sensor (FREESTYLE SHANNON 2 SENSOR) Kit Pt to check BG as needed 1 kit 0    FREESTYLE SHANNON 2 SENSOR Kit USE AS DIRECTED EVERY 2 WEEKS 1 kit 0    hydroCHLOROthiazide (HYDRODIURIL) 12.5 MG Tab Take 1 tablet (12.5 mg total) by mouth once daily. 90 tablet 3    hydrOXYzine HCL (ATARAX) 25 MG tablet TAKE 1 TABLET(25 MG) BY MOUTH TWICE DAILY AS NEEDED FOR BLADDER PAIN 30 tablet 11    insulin glargine, TOUJEO, (TOUJEO SOLOSTAR U-300 INSULIN) 300 unit/mL (1.5 mL) InPn pen Inject 12 Units into the skin every evening. 6 mL 1    ipratropium (ATROVENT) 21 mcg (0.03 %) nasal spray 2 sprays by Each Nostril route 2 (two) times daily. 30 mL 0    irbesartan (AVAPRO) 300 MG tablet Take 1 tablet (300 mg total) by mouth every evening. 90 tablet 3    lancets (ONETOUCH DELICA LANCETS) 33 gauge Misc 1 lancet by Misc.(Non-Drug; Combo Route) route 3 (three) times daily. 300 each 3    LINZESS 72 mcg Cap capsule Take 72 mcg by mouth every morning. (Patient taking differently: Take 72 mcg by mouth every morning. PRN)      magnesium oxide (MAG-OX) 400 mg (241.3 mg magnesium) tablet Take 1 tablet (400 mg total) by mouth every evening. 30 tablet 11    metFORMIN (GLUCOPHAGE-XR) 500 MG ER 24hr tablet Take 2 tablets (1,000 mg total) by mouth 2 (two) times a day. 360 tablet 4    methen-m.blue-s.phos-phsal-hyo (URIBEL) 118-10-40.8-36 mg Cap Take 1 capsule by mouth 4 (four) times daily as needed (bladder pain). 20 capsule 11    ONETOUCH VERIO TEST STRIPS Strp TEST THREE TIMES DAILY 200 strip 11    rosuvastatin (CRESTOR) 20 MG tablet Take 1 tablet (20 mg total) by mouth once daily. 90 tablet 1    vitamin D 1000 units Tab Take 1,000 Units by mouth once daily.      mometasone (NASONEX) 50 mcg/actuation nasal spray 2 sprays by Nasal route once daily. (Patient not taking: Reported on 11/25/2024) 17 g 0     No  "current facility-administered medications for this visit.       Review of patient's allergies indicates:   Allergen Reactions    Ace inhibitors Other (See Comments)       OB History          2    Para   2    Term   2            AB        Living   2         SAB        IAB        Ectopic        Multiple        Live Births                      ROS  As per HPI.      Physical Exam  /71 (BP Location: Right arm, Patient Position: Sitting)   Pulse 62   Ht 5' 6" (1.676 m)   LMP 1999   BMI 24.52 kg/m²   General: alert and oriented, no acute distress  Respiratory: normal respiratory effort  Abd: soft, non-tender, non-distended      PELVIC EXAM:   VULVA: normal appearing vulva with no masses, tenderness or lesions,   VAGINA: normal appearing vagina with normal color and discharge, no lesions, atrophic,   CERVIX: surgically absent,   UTERUS: absent,   ADNEXA: no masses,   RECTAL: deferred      Impression  No diagnosis found.    We reviewed the above issues and discussed options for short-term versus long-term management of her problems.     Plan:   Well woman  --up to date     2. Constipation:  -- Controlling constipation may help bladder urgency/leakage and fiber may better control cholesterol and blood glucose.   -- Continue Linzess  -- May also use over the counter stool softener (ex Colace) 1-2 x/day.  **AVOID laxatives.  -- Can also try "Natural Vitality Calm" powder or magnesium oxide 400 mg per night (helps with sleep, anxiety, and constipation)  -- Drink plenty of water!  -- Get a SQUATTY POTTY     3 Vaginal atrophy (dryness):    Non-estrogen options for vaginal dryness:  --coconut oil, extra virgin olive oil, or vitamin E oil: dime-sized amount with finger (as far as can reach internally) and around the vaginal opening and inner lips up to nightly as needed  --Uberlube, [place], [place] liquibeads, Satin by Sliquid Natural Intimate Moisturizer     4.  OAB:  --avoid dietary irritants (see " list)  -- Discontine Vesicare 5 mg daily due to potential memory issues as recommended by neuropsych. Myrbetriq was expensive.   --Empty bladder every 2-3 hours even if you don't have the urge.  Empty well: wait a minute, lean forward on toilet.    --Try not to go more frequently than once an hour. When you get the urge to urinate after you have just gone, distract yourself until the urge passes.   --Avoid dietary irritants (see sheet).  Keep diary x 3-5 days to determine your irritants.  --KEGELS: do 10 in AM and 10 in PM, holding each x 10 seconds.  When you feel urge to go, STOP, KEGEL, and when urge has passed, then go to bathroom.    -- continue Hydroxyzine at night as needed, may help with nocturia (waking up at night to pee)  --A1C 6.7      5) IC  --For flares: Take hydroxyzine up to 3 times a day and Uribel up to 4 times a day  --Bladder instillations previously   --scheduled for cysto with Pro in January  --Hydrodistention With Cystoscopy: allows physicians to take a much closer look at the bladder wall. While the AUA no longer suggests that it be used as a diagnostic method (unless a diagnosis is in doubt), hydrodistention may provide modest relief in IC symptoms which can last from three to six months. It can, however, be a more difficult, painful procedure. As a result, it requires careful consideration and discussion.     6)vaginal discharge  --affirm today    7)RTC in 1 year    18 minutes were spent in face to face time with this patient  100 % of this time was spent in counseling and/or coordination of care    AIDAN Dwyer-BC  Division of Female Pelvic Medicine and Reconstructive Surgery  Department of Obstetrics & Gynecology  [facility] [place], [place]

## 2024-11-26 LAB
BACTERIA UR CULT: NORMAL
BACTERIA UR CULT: NORMAL

## 2024-11-27 ENCOUNTER — PATIENT MESSAGE (OUTPATIENT)
Dept: UROGYNECOLOGY | Facility: CLINIC | Age: 74
End: 2024-11-27
Payer: MEDICARE

## 2024-11-27 DIAGNOSIS — N76.0 BV (BACTERIAL VAGINOSIS): Primary | ICD-10-CM

## 2024-11-27 DIAGNOSIS — B96.89 BV (BACTERIAL VAGINOSIS): Primary | ICD-10-CM

## 2024-11-27 LAB
BACTERIAL VAGINOSIS DNA: DETECTED
CANDIDA GLABRATA/KRUSEI: NOT DETECTED
CANDIDA RRNA VAG QL PROBE: NOT DETECTED
TRICHOMONAS VAGINALIS: NOT DETECTED

## 2024-11-27 RX ORDER — METRONIDAZOLE 500 MG/1
500 TABLET ORAL EVERY 12 HOURS
Qty: 14 TABLET | Refills: 0 | Status: SHIPPED | OUTPATIENT
Start: 2024-11-27 | End: 2024-12-04

## 2024-12-26 ENCOUNTER — OFFICE VISIT (OUTPATIENT)
Dept: UROGYNECOLOGY | Facility: CLINIC | Age: 74
End: 2024-12-26
Payer: MEDICARE

## 2024-12-26 VITALS
HEIGHT: 66 IN | WEIGHT: 153.44 LBS | SYSTOLIC BLOOD PRESSURE: 135 MMHG | HEART RATE: 67 BPM | BODY MASS INDEX: 24.66 KG/M2 | DIASTOLIC BLOOD PRESSURE: 73 MMHG

## 2024-12-26 DIAGNOSIS — N89.8 VAGINAL DRYNESS: ICD-10-CM

## 2024-12-26 DIAGNOSIS — K59.00 CONSTIPATION, UNSPECIFIED CONSTIPATION TYPE: ICD-10-CM

## 2024-12-26 DIAGNOSIS — N30.10 INTERSTITIAL CYSTITIS: ICD-10-CM

## 2024-12-26 DIAGNOSIS — Z01.419 WELL WOMAN EXAM: Primary | ICD-10-CM

## 2024-12-26 PROCEDURE — 99215 OFFICE O/P EST HI 40 MIN: CPT | Mod: PBBFAC | Performed by: NURSE PRACTITIONER

## 2024-12-26 PROCEDURE — G0101 CA SCREEN;PELVIC/BREAST EXAM: HCPCS | Mod: PBBFAC | Performed by: NURSE PRACTITIONER

## 2024-12-26 PROCEDURE — 99999 PR PBB SHADOW E&M-EST. PATIENT-LVL V: CPT | Mod: PBBFAC,,, | Performed by: NURSE PRACTITIONER

## 2024-12-26 NOTE — PATIENT INSTRUCTIONS
Well woman  --up to date     2. Constipation:  --DID NOT TOLERATE FIBER SUPPLEMENT  -- TAKE MIRALAX 1 CAPFUL DAILY  --MAGNESIUM DAILY  --STOOL SOFTENER TWICE DAILY    -- Drink plenty of water!  -- Get a SQUATTY POTTY     3 Vaginal atrophy (dryness):    Non-estrogen options for vaginal dryness:  --coconut oil, extra virgin olive oil, or vitamin E oil: dime-sized amount with finger (as far as can reach internally) and around the vaginal opening and inner lips up to nightly as needed  --Uberlube, [place], [place] liquibeads, Satin by Sliquid Natural Intimate Moisturizer     4.  OAB:  --avoid dietary irritants (see list)  -- Discontine Vesicare 5 mg daily due to potential memory issues as recommended by neuropsych. Myrbetriq was expensive.   --Empty bladder every 2-3 hours even if you don't have the urge.  Empty well: wait a minute, lean forward on toilet.    --Try not to go more frequently than once an hour. When you get the urge to urinate after you have just gone, distract yourself until the urge passes.   --Avoid dietary irritants (see sheet).  Keep diary x 3-5 days to determine your irritants.  --KEGELS: do 10 in AM and 10 in PM, holding each x 10 seconds.  When you feel urge to go, STOP, KEGEL, and when urge has passed, then go to bathroom.    -- continue Hydroxyzine at night as needed, may help with nocturia (waking up at night to pee)  --A1C 6.7      5) IC  --For flares: Take hydroxyzine up to 3 times a day and Uribel up to 4 times a day  --Bladder instillations previously   --scheduled for cysto with Pro in January  --Hydrodistention With Cystoscopy: allows physicians to take a much closer look at the bladder wall. While the AUA no longer suggests that it be used as a diagnostic method (unless a diagnosis is in doubt), hydrodistention may provide modest relief in IC symptoms which can last from three to six months. It can, however, be a more difficult, painful procedure. As a result, it requires careful  consideration and discussion.      6)RTC in 1 year

## 2024-12-26 NOTE — PROGRESS NOTES
"2024    SUBJECTIVE:   74 y.o. female for annual exam.    Past Medical History:   Diagnosis Date    Arthritis     Chronic constipation     Depression     Diabetes mellitus     Diabetes mellitus     Diabetes mellitus, type 2     Hypertension     Osteopenia        Past Surgical History:   Procedure Laterality Date     SECTION      2x    COLONOSCOPY      COLONOSCOPY N/A 2019    Procedure: COLONOSCOPY;  Surgeon: Marshall Contreras MD;  Location: [facility] ([address] [place] [address]);  Service: Endoscopy;  Laterality: N/A;  pt states that she had to be resuscitated after receiving an epidural during childbirth "30 something" years ago.  Ok for 4th floor per Dr. Hernandez-MS    EYE SURGERY Bilateral     cataract removal    HYSTERECTOMY      full hyst     OOPHORECTOMY      TONSILLECTOMY         Family History   Problem Relation Name Age of Onset    Breast cancer Maternal Cousin 2     Heart attack Mother Darrell     Heart disease Mother Darrell     Alzheimer's disease Mother Darrell     Heart attack Father Father     Heart disease Father Father     Heart failure Father Father     Hypertension Father Father     Hyperlipidemia Sister Devi     Hypertension Sister Devi     Diabetes Sister Devi     Abnormal EKG Sister Devi     Dementia Sister Devi     Kidney disease Sister Devi     Alcohol abuse Brother Hemanth     Fibroids Daughter Yulisa     Brain cancer Son Darrell Green     Early death Son Darrell Green 33    Learning disabilities Son Darrell Green     Ovarian cancer Neg Hx      Cervical cancer Neg Hx      Endometrial cancer Neg Hx      Vaginal cancer Neg Hx      Stroke Neg Hx      Colon cancer Neg Hx      Esophageal cancer Neg Hx      Vision loss Neg Hx         Social History     Socioeconomic History    Marital status:    Tobacco Use    Smoking status: Former     Current packs/day: 0.00     Average packs/day: 1 pack/day for 25.0 years (25.0 ttl pk-yrs)     Types: Cigarettes     " "Start date: 1978     Quit date: 1999     Years since quittin.0    Smokeless tobacco: Never   Substance and Sexual Activity    Alcohol use: No    Drug use: No    Sexual activity: Not Currently     Partners: Male     Birth control/protection: Coitus interruptus     Social Drivers of Health     Financial Resource Strain: Low Risk  (10/11/2024)    Overall Financial Resource Strain (CARDIA)     Difficulty of Paying Living Expenses: Not hard at all   Food Insecurity: No Food Insecurity (10/11/2024)    Hunger Vital Sign     Worried About Running Out of Food in the Last Year: Never true     Ran Out of Food in the Last Year: Never true   Transportation Needs: No Transportation Needs (10/11/2024)    PRAPARE - Transportation     Lack of Transportation (Medical): No     Lack of Transportation (Non-Medical): No   Physical Activity: Sufficiently Active (10/11/2024)    Exercise Vital Sign     Days of Exercise per Week: 2 days     Minutes of Exercise per Session: 120 min   Stress: Stress Concern Present (10/11/2024)    [employer] - Occupational Stress Questionnaire     Feeling of Stress : To some extent   Housing Stability: Unknown (2023)    Housing Stability Vital Sign     Unable to Pay for Housing in the Last Year: No     Number of Places Lived in the Last Year: 1       Current Outpatient Medications   Medication Sig Dispense Refill    aspirin (ECOTRIN) 81 MG EC tablet Take 1 tablet (81 mg total) by mouth once daily. 30 tablet 11    BD ULTRA-FINE SHORT PEN NEEDLE 31 gauge x 5/16" Ndle USE ONCE DAILY 100 each 3    calcium carbonate (OS-REDD) 600 mg calcium (1,500 mg) Tab Take 600 mg by mouth 2 (two) times daily with meals.      DEXCOM G7  Misc USE AS DIRECTED EVERY 3 MONTHS      flash glucose sensor (FREESTYLE SHANNON 2 SENSOR) Kit Pt to check BG as needed 1 kit 0    FREESTYLE SHANNON 2 SENSOR Kit USE AS DIRECTED EVERY 2 WEEKS 1 kit 0    hydroCHLOROthiazide (HYDRODIURIL) 12.5 MG " Tab Take 1 tablet (12.5 mg total) by mouth once daily. 90 tablet 3    hydrOXYzine HCL (ATARAX) 25 MG tablet TAKE 1 TABLET(25 MG) BY MOUTH TWICE DAILY AS NEEDED FOR BLADDER PAIN 30 tablet 11    insulin glargine, TOUJEO, (TOUJEO SOLOSTAR U-300 INSULIN) 300 unit/mL (1.5 mL) InPn pen Inject 12 Units into the skin every evening. 6 mL 1    ipratropium (ATROVENT) 21 mcg (0.03 %) nasal spray 2 sprays by Each Nostril route 2 (two) times daily. 30 mL 0    irbesartan (AVAPRO) 300 MG tablet Take 1 tablet (300 mg total) by mouth every evening. 90 tablet 3    lancets (ONETOUCH DELICA LANCETS) 33 gauge Misc 1 lancet by Misc.(Non-Drug; Combo Route) route 3 (three) times daily. 300 each 3    LINZESS 72 mcg Cap capsule Take 72 mcg by mouth every morning. (Patient taking differently: Take 72 mcg by mouth every morning. PRN)      magnesium oxide (MAG-OX) 400 mg (241.3 mg magnesium) tablet Take 1 tablet (400 mg total) by mouth every evening. 30 tablet 11    metFORMIN (GLUCOPHAGE-XR) 500 MG ER 24hr tablet Take 2 tablets (1,000 mg total) by mouth 2 (two) times a day. 360 tablet 4    methen-m.blue-s.phos-phsal-hyo (URIBEL) 118-10-40.8-36 mg Cap Take 1 capsule by mouth 4 (four) times daily as needed (bladder pain). 20 capsule 11    ONETOUCH VERIO TEST STRIPS Strp TEST THREE TIMES DAILY 200 strip 11    rosuvastatin (CRESTOR) 20 MG tablet Take 1 tablet (20 mg total) by mouth once daily. 90 tablet 1    vitamin D 1000 units Tab Take 1,000 Units by mouth once daily.      mometasone (NASONEX) 50 mcg/actuation nasal spray 2 sprays by Nasal route once daily. (Patient not taking: Reported on 5/1/2024) 17 g 0     No current facility-administered medications for this visit.       Review of patient's allergies indicates:   Allergen Reactions    Ace inhibitors Other (See Comments)       Patient's last menstrual period was 01/01/1999.    Well Woman:  Pap:posy hysterectomy  Mammo:09/2022 normal   Colonoscopy:03/2019 tortuous colon-normal-- repeat 10  "years  Dexa:2022 Osteopenia lumbar spine and both hips.  Decrease in bone mineral density lumbar spine by 4.7%.   Taking calcium and vitamin D    OB History          2    Para   2    Term   2            AB        Living   2         SAB        IAB        Ectopic        Multiple        Live Births                       ROS:  Feeling well.   No dyspnea or chest pain on exertion.    No abdominal pain, change in bowel habits, black or bloody stools.  + constipation-- has not been taking miralax  Denies UI.  Voiding every 2 hours during the day and 4/ night.  Voiding every 3-4 hours. Having intermittent IC flare. (Followed by Dr. Mast)  GYN ROS: no breast pain or new or enlarging lumps on self exam, no vaginal bleeding. Not using vaginal estrogen cream  No neurological complaints.    OBJECTIVE:   The patient appears well, alert, oriented x 3, in no distress.  /73 (BP Location: Right arm, Patient Position: Sitting)   Pulse 67   Ht 5' 6" (1.676 m)   Wt 69.6 kg (153 lb 7 oz)   LMP 1999   BMI 24.77 kg/m²   ENT normal.  Neck supple. No adenopathy or thyromegaly. MILVIA.   Normal respiratory effort.   Pulse with regular rate and rhythm.   Abdomen soft without tenderness, guarding, mass or organomegaly.   Extremities show no edema, normal peripheral pulses.   Neurological is normal, no focal findings.    BREAST EXAM: breasts appear normal, no suspicious masses, no skin or nipple changes or axillary nodes    PELVIC EXAM:   VULVA: normal appearing vulva with no masses, tenderness or lesions,   VAGINA: normal appearing vagina with normal color and discharge, no lesions, atrophic,  CERVIX: surgically absent,   UTERUS: absent,   ADNEXA: no masses,   RECTAL: normal rectal, no masses    ASSESSMENT:   1. Well woman exam        2. Interstitial cystitis        3. Constipation, unspecified constipation type        4. Vaginal dryness              PLAN:     Well woman  --up to date     2. " Constipation:  --DID NOT TOLERATE FIBER SUPPLEMENT  -- TAKE MIRALAX 1 CAPFUL DAILY  --MAGNESIUM DAILY  --STOOL SOFTENER TWICE DAILY    -- Drink plenty of water!  -- Get a SQUATTY POTTY     3 Vaginal atrophy (dryness):    Non-estrogen options for vaginal dryness:  --coconut oil, extra virgin olive oil, or vitamin E oil: dime-sized amount with finger (as far as can reach internally) and around the vaginal opening and inner lips up to nightly as needed  --Uberlube, [place], [place] liquibeads, Satin by Sliquid Natural Intimate Moisturizer     4.  OAB:  --avoid dietary irritants (see list)  -- Discontine Vesicare 5 mg daily due to potential memory issues as recommended by neuropsych. Myrbetriq was expensive.   --Empty bladder every 2-3 hours even if you don't have the urge.  Empty well: wait a minute, lean forward on toilet.    --Try not to go more frequently than once an hour. When you get the urge to urinate after you have just gone, distract yourself until the urge passes.   --Avoid dietary irritants (see sheet).  Keep diary x 3-5 days to determine your irritants.  --KEGELS: do 10 in AM and 10 in PM, holding each x 10 seconds.  When you feel urge to go, STOP, KEGEL, and when urge has passed, then go to bathroom.    -- continue Hydroxyzine at night as needed, may help with nocturia (waking up at night to pee)  --A1C 6.7      5) IC  --For flares: Take hydroxyzine up to 3 times a day and Uribel up to 4 times a day  --Bladder instillations previously   --scheduled for cysto with Pro in January  --Hydrodistention With Cystoscopy: allows physicians to take a much closer look at the bladder wall. While the AUA no longer suggests that it be used as a diagnostic method (unless a diagnosis is in doubt), hydrodistention may provide modest relief in IC symptoms which can last from three to six months. It can, however, be a more difficult, painful procedure. As a result, it requires careful consideration and discussion.       6)RTC in 1 year           30 minutes were spent in face to face time with this patient  100 % of this time was spent in counseling and/or coordination of care    Althea ROD [facility]  Division of Female Pelvic Medicine and Reconstructive Surgery  Department of Obstetrics & Gynecology

## 2025-01-07 NOTE — PRE-PROCEDURE INSTRUCTIONS
The following was discussed with pt via phone and sent to pt portal. Pt verbalized understanding. Pt to be accompanied by her daughter.    Dear Gely ,    You are scheduled for a procedure with Dr. Mast on 1/8/2025. Your scheduled arrival time is 7:15am.  This arrival time is roughly 1.5-2 hours before your anticipated procedure time to allow sufficient time for pre-op.  Please wear comfortable clothes.  This procedure will take place at the [facility] at the corner of [facility].  It is in [facility] [place] next to [facility].  The address is:    [address] [place] [address].  [place] [id number]    After entering the building, you will proceed to the second floor where you can check in with registration. You should take any medications that you routinely take for blood pressure (other than those listed below), heart medications, thyroid, cholesterol, etc.     If you wear contact lenses, please wear glasses to your procedure.    Your fasting instructions are as follow:  Nothing to eat after midnight the evening before your surgery. Anesthesia encourages you to drink clear liquids up until 2 hours prior to your arrival time to ensure that you are adequately hydrated. You MUST have a responsible adult to bring you home.      The evening before and morning of your procedure, please hold the following medications:  -Aspirin and Aspirin-containing products (Goody's powder, Excedrin)  -NSAIDs (Advil, Ibuprofen, Aleve, Diclofenac)  -Vitamins/Supplements  -Herbal remedies/Teas  -Stimulants (Adderall, Vyvanse, Adipex)  -Diabetic medication (Please bring with you day of procedure)  -Prescription creams/gels/lotions  -METFORMIN - DO NOT TAKE THE NIGHT BEFORE OR THE MORNING OF SURGERY  -TUJEO - TAKE 4 UNITS TONIGHT  -HYDROCHLOROTHIAZIDE  -ASPIRIN  -IRBESARTAN    -May take Tylenol      The evening before and morning of your procedure, take a shower using antibacterial soap (ex:  Hibiclens or Dial antibacterial soap). DO NOT apply deodorant, lotion, cologne, or anything else to the skin. Wear loose, comfortable fitting clothing. Do not wear jewelry or bring any valuables with you. If you wear dentures or contacts, please bring your case with you or leave them at home. Use and bring any inhalers that you may have.    If you have any procedure-specific questions, please call your surgeon's office. Any other questions, don't hesitate to call at [phone].    Thanks,  CYNTHIA Tinsley  Pre-Admit Testing  Anesthesia Dept [facility]

## 2025-01-08 ENCOUNTER — HOSPITAL ENCOUNTER (OUTPATIENT)
Facility: HOSPITAL | Age: 75
Discharge: HOME | End: 2025-01-08
Attending: UROLOGY | Admitting: UROLOGY
Payer: MEDICARE

## 2025-01-08 ENCOUNTER — ANESTHESIA (OUTPATIENT)
Dept: SURGERY | Facility: HOSPITAL | Age: 75
End: 2025-01-08
Payer: MEDICARE

## 2025-01-08 ENCOUNTER — ANESTHESIA EVENT (OUTPATIENT)
Dept: SURGERY | Facility: HOSPITAL | Age: 75
End: 2025-01-08
Payer: MEDICARE

## 2025-01-08 VITALS
OXYGEN SATURATION: 100 % | HEART RATE: 53 BPM | RESPIRATION RATE: 11 BRPM | WEIGHT: 147 LBS | SYSTOLIC BLOOD PRESSURE: 150 MMHG | BODY MASS INDEX: 23.63 KG/M2 | DIASTOLIC BLOOD PRESSURE: 82 MMHG | HEIGHT: 66 IN | TEMPERATURE: 98 F

## 2025-01-08 DIAGNOSIS — N30.10 INTERSTITIAL CYSTITIS: ICD-10-CM

## 2025-01-08 LAB
BILIRUBIN, POC UA: NEGATIVE
BLOOD, POC UA: NEGATIVE
CLARITY, UA: CLEAR
COLOR, UA: YELLOW
GLUCOSE, POC UA: NEGATIVE
KETONES, POC UA: ABNORMAL
LEUKOCYTE EST, POC UA: ABNORMAL
NITRITE, POC UA: NEGATIVE
PH UR STRIP: 5 [PH] (ref 5–8)
POCT GLUCOSE: 120 MG/DL (ref 70–110)
POCT GLUCOSE: 131 MG/DL (ref 70–110)
PROTEIN, POC UA: NEGATIVE
SPECIFIC GRAVITY, POC UA: 1.02 (ref 1–1.03)
UROBILINOGEN, POC UA: 0.2 E.U./DL

## 2025-01-08 PROCEDURE — 71000015 HC POSTOP RECOV 1ST HR: Performed by: UROLOGY

## 2025-01-08 PROCEDURE — 94761 N-INVAS EAR/PLS OXIMETRY MLT: CPT

## 2025-01-08 PROCEDURE — 37000009 HC ANESTHESIA EA ADD 15 MINS: Performed by: UROLOGY

## 2025-01-08 PROCEDURE — 36000706: Performed by: UROLOGY

## 2025-01-08 PROCEDURE — 63600175 PHARM REV CODE 636 W HCPCS: Performed by: NURSE ANESTHETIST, CERTIFIED REGISTERED

## 2025-01-08 PROCEDURE — 52000 CYSTOURETHROSCOPY: CPT | Mod: ,,, | Performed by: UROLOGY

## 2025-01-08 PROCEDURE — 71000033 HC RECOVERY, INTIAL HOUR: Performed by: UROLOGY

## 2025-01-08 PROCEDURE — 71000016 HC POSTOP RECOV ADDL HR: Performed by: UROLOGY

## 2025-01-08 PROCEDURE — 25000003 PHARM REV CODE 250: Performed by: NURSE ANESTHETIST, CERTIFIED REGISTERED

## 2025-01-08 PROCEDURE — 99900035 HC TECH TIME PER 15 MIN (STAT)

## 2025-01-08 PROCEDURE — 63600175 PHARM REV CODE 636 W HCPCS

## 2025-01-08 PROCEDURE — 25000003 PHARM REV CODE 250: Performed by: UROLOGY

## 2025-01-08 PROCEDURE — 82962 GLUCOSE BLOOD TEST: CPT | Performed by: UROLOGY

## 2025-01-08 PROCEDURE — 37000008 HC ANESTHESIA 1ST 15 MINUTES: Performed by: UROLOGY

## 2025-01-08 PROCEDURE — 36000707: Performed by: UROLOGY

## 2025-01-08 RX ORDER — PROCHLORPERAZINE EDISYLATE 5 MG/ML
5 INJECTION INTRAMUSCULAR; INTRAVENOUS EVERY 30 MIN PRN
Status: DISCONTINUED | OUTPATIENT
Start: 2025-01-08 | End: 2025-01-08 | Stop reason: HOSPADM

## 2025-01-08 RX ORDER — HYDROMORPHONE HYDROCHLORIDE 1 MG/ML
0.2 INJECTION, SOLUTION INTRAMUSCULAR; INTRAVENOUS; SUBCUTANEOUS EVERY 5 MIN PRN
Status: DISCONTINUED | OUTPATIENT
Start: 2025-01-08 | End: 2025-01-08 | Stop reason: HOSPADM

## 2025-01-08 RX ORDER — SODIUM CHLORIDE 9 MG/ML
INJECTION, SOLUTION INTRAVENOUS CONTINUOUS PRN
Status: DISCONTINUED | OUTPATIENT
Start: 2025-01-08 | End: 2025-01-08

## 2025-01-08 RX ORDER — PROPOFOL 10 MG/ML
VIAL (ML) INTRAVENOUS CONTINUOUS PRN
Status: DISCONTINUED | OUTPATIENT
Start: 2025-01-08 | End: 2025-01-08

## 2025-01-08 RX ORDER — PROPOFOL 10 MG/ML
VIAL (ML) INTRAVENOUS
Status: DISCONTINUED | OUTPATIENT
Start: 2025-01-08 | End: 2025-01-08

## 2025-01-08 RX ORDER — ONDANSETRON HYDROCHLORIDE 2 MG/ML
INJECTION, SOLUTION INTRAVENOUS
Status: DISCONTINUED | OUTPATIENT
Start: 2025-01-08 | End: 2025-01-08

## 2025-01-08 RX ORDER — OXYCODONE HYDROCHLORIDE 5 MG/1
5 TABLET ORAL
Status: DISCONTINUED | OUTPATIENT
Start: 2025-01-08 | End: 2025-01-08 | Stop reason: HOSPADM

## 2025-01-08 RX ORDER — MIDAZOLAM HYDROCHLORIDE 1 MG/ML
INJECTION INTRAMUSCULAR; INTRAVENOUS
Status: DISCONTINUED | OUTPATIENT
Start: 2025-01-08 | End: 2025-01-08

## 2025-01-08 RX ORDER — GLUCAGON 1 MG
1 KIT INJECTION
Status: DISCONTINUED | OUTPATIENT
Start: 2025-01-08 | End: 2025-01-08 | Stop reason: HOSPADM

## 2025-01-08 RX ORDER — FENTANYL CITRATE 50 UG/ML
INJECTION, SOLUTION INTRAMUSCULAR; INTRAVENOUS
Status: DISCONTINUED | OUTPATIENT
Start: 2025-01-08 | End: 2025-01-08

## 2025-01-08 RX ORDER — LIDOCAINE HYDROCHLORIDE 20 MG/ML
JELLY TOPICAL
Status: DISCONTINUED | OUTPATIENT
Start: 2025-01-08 | End: 2025-01-08 | Stop reason: HOSPADM

## 2025-01-08 RX ORDER — CEFAZOLIN 2 G/1
2 INJECTION, POWDER, FOR SOLUTION INTRAMUSCULAR; INTRAVENOUS
Status: COMPLETED | OUTPATIENT
Start: 2025-01-08 | End: 2025-01-08

## 2025-01-08 RX ORDER — SODIUM CHLORIDE 0.9 % (FLUSH) 0.9 %
10 SYRINGE (ML) INJECTION DAILY PRN
Status: DISCONTINUED | OUTPATIENT
Start: 2025-01-08 | End: 2025-01-08 | Stop reason: HOSPADM

## 2025-01-08 RX ORDER — LIDOCAINE HYDROCHLORIDE 20 MG/ML
INJECTION INTRAVENOUS
Status: DISCONTINUED | OUTPATIENT
Start: 2025-01-08 | End: 2025-01-08

## 2025-01-08 RX ADMIN — MIDAZOLAM HYDROCHLORIDE 2 MG: 2 INJECTION, SOLUTION INTRAMUSCULAR; INTRAVENOUS at 10:01

## 2025-01-08 RX ADMIN — ONDANSETRON 4 MG: 2 INJECTION INTRAMUSCULAR; INTRAVENOUS at 11:01

## 2025-01-08 RX ADMIN — FENTANYL CITRATE 50 MCG: 50 INJECTION, SOLUTION INTRAMUSCULAR; INTRAVENOUS at 10:01

## 2025-01-08 RX ADMIN — PROPOFOL 100 MCG/KG/MIN: 10 INJECTION, EMULSION INTRAVENOUS at 10:01

## 2025-01-08 RX ADMIN — SODIUM CHLORIDE: 0.9 INJECTION, SOLUTION INTRAVENOUS at 10:01

## 2025-01-08 RX ADMIN — CEFAZOLIN 2 G: 2 INJECTION, POWDER, FOR SOLUTION INTRAMUSCULAR; INTRAVENOUS at 11:01

## 2025-01-08 RX ADMIN — PROPOFOL 50 MG: 10 INJECTION, EMULSION INTRAVENOUS at 10:01

## 2025-01-08 RX ADMIN — LIDOCAINE HYDROCHLORIDE 60 MG: 20 INJECTION INTRAVENOUS at 10:01

## 2025-01-08 NOTE — DISCHARGE SUMMARY
[facility]  Discharge Note  Short Stay    Procedure(s) (LRB):  CYSTOSCOPY (N/A)      OUTCOME: Patient tolerated treatment/procedure well without complication and is now ready for discharge.    DISPOSITION: Home or Self Care    FINAL DIAGNOSIS:  Interstitial Cystitis     FOLLOWUP: In clinic    DISCHARGE INSTRUCTIONS:  patient can be discharged home.      TIME SPENT ON DISCHARGE: 10 minutes       ACTIVITY  There are no restrictions in activity. Start doing again the things you did before the procedure.  You may experience a slight burning sensation. You may notice a small amount of blood in your urine. This will clear up within a day. Call the clinic if this continues beyond 48 hours.     DIET  Continue your normal diet. You may eat the same foods you ate before your procedure.  Drink plenty of fluids during the first 24-48 hours following your procedure.     MEDICATIONS  Resume all other previous medications from your prescribing physician.  Continue any pre-procedure antibiotics until they are all gone.     SIGNS AND SYMPTOMS TO REPORT TO THE DOCTOR  Chills or fever greater than 101° F within 24 hours of procedure.  Changes in urination, such as increased bleeding, foul smell, cloudy urine, or painful urination.  Call your doctor with any questions or concerns.     For any emergency situation, call 911 immediately or go to your nearest emergency room.     ACTIVITY  There are no restrictions in activity. Start doing again the things you did before the procedure.  You may experience a slight burning sensation. You may notice a small amount of blood in your urine. This will clear up within a day. Call the clinic if this continues beyond 48 hours.     DIET  Continue your normal diet. You may eat the same foods you ate before your procedure.  Drink plenty of fluids during the first 24-48 hours following your procedure.     MEDICATIONS  Resume all other previous medications from your  prescribing physician.  Continue any pre-procedure antibiotics until they are all gone.     SIGNS AND SYMPTOMS TO REPORT TO THE DOCTOR  Chills or fever greater than 101° F within 24 hours of procedure.  Changes in urination, such as increased bleeding, foul smell, cloudy urine, or painful urination.  Call your doctor with any questions or concerns.     For any emergency situation, call 911 immediately or go to your nearest emergency room.

## 2025-01-08 NOTE — PLAN OF CARE
Pt in preop bay 41, VSS, meds given and IV inserted. Pt denies any open wounds on body or the use of any weight loss injections. Pt needs H&P, anesthesia consent. Procedural consents verified with pt.

## 2025-01-08 NOTE — ANESTHESIA POSTPROCEDURE EVALUATION
Anesthesia Post Evaluation    Patient: Gely Green    Procedure(s) Performed: Procedure(s) (LRB):  CYSTOSCOPY (N/A)    Final Anesthesia Type: general      Patient location during evaluation: PACU  Patient participation: Yes- Able to Participate  Level of consciousness: awake and alert, oriented and awake  Post-procedure vital signs: reviewed and stable  Pain management: adequate  Airway patency: patent  GERALD mitigation strategies: Multimodal analgesia  PONV status at discharge: No PONV  Anesthetic complications: no      Cardiovascular status: blood pressure returned to baseline and hemodynamically stable  Respiratory status: unassisted and spontaneous ventilation  Hydration status: euvolemic  Follow-up not needed.              Vitals Value Taken Time   /70 01/08/25 1147   Temp 36.5 °C (97.7 °F) 01/08/25 1132   Pulse 61 01/08/25 1151   Resp 18 01/08/25 1151   SpO2 100 % 01/08/25 1151   Vitals shown include unfiled device data.      No case tracking events are documented in the log.      Pain/Missael Score: Missael Score: 9 (1/8/2025 11:32 AM)

## 2025-01-08 NOTE — H&P
Preoperative History and Physical    Date:   2025    History of Present Illness:    74 y.o. female who presents for cystoscopy and possible bladder biopsy/fulguration.    There is no change in H&P since last office visit. Will proceed with planned procedure.    UA today negative for leukocytes and nitrites. Patient denies any dysuria, fevers, or chills. Consent obtained. Will proceed to OR.       Past Medical History:    has a past medical history of Arthritis, Chronic constipation, Depression, Diabetes mellitus, Diabetes mellitus, Diabetes mellitus, type 2, Hypertension, and Osteopenia.    Past Surgical History:    has a past surgical history that includes  section; Hysterectomy; Colonoscopy; Colonoscopy (N/A, 2019); Oophorectomy; Tonsillectomy; and Eye surgery (Bilateral).    Social History:  Social History     Tobacco Use    Smoking status: Former     Current packs/day: 0.00     Average packs/day: 1 pack/day for 25.0 years (25.0 ttl pk-yrs)     Types: Cigarettes     Start date: 1978     Quit date: 1999     Years since quittin.0    Smokeless tobacco: Never   Substance Use Topics    Alcohol use: No     Social History     Substance and Sexual Activity   Drug Use No       Family History:  Family History   Problem Relation Name Age of Onset    Breast cancer Maternal Cousin 2     Heart attack Mother Darrell     Heart disease Mother Darrell     Alzheimer's disease Mother Darrell     Heart attack Father Father     Heart disease Father Father     Heart failure Father Father     Hypertension Father Father     Hyperlipidemia Sister Devi     Hypertension Sister Devi     Diabetes Sister Devi     Abnormal EKG Sister Devi     Dementia Sister Devi     Kidney disease Sister Devi     Alcohol abuse Brother Hemanth     Fibroids Daughter Yulisa     Brain cancer Son Darrell Green     Early death Son Darrell Green 33    Learning disabilities Son Darrell Green   "    Allergies:  Review of patient's allergies indicates:   Allergen Reactions    Ace inhibitors Other (See Comments)       Home Medications:  Scheduled Meds:  Continuous Infusions:  PRN Meds:.  Current Facility-Administered Medications:     ceFAZolin (Ancef) IV (PEDS and ADULTS), 2 g, Intravenous, On Call Procedure      Review of Systems:  Negative except for what is noted in HPI    Physical Exam:  VITAL SIGNS:   Vitals:    25 0740 25 0823   BP: (!) 164/82    BP Location: Left arm    Patient Position: Sitting    Pulse: 60    Resp: 17    Temp: 98.1 °F (36.7 °C)    TempSrc: Skin    SpO2: 100% 98%   Weight: 66.7 kg (147 lb)    Height: 5' 6" (1.676 m)      TMAX: Temp (24hrs), Av.1 °F (36.7 °C), Min:98.1 °F (36.7 °C), Max:98.1 °F (36.7 °C)      General: Alert; No acute distress  Cardiovascular: Regular rate   Respiratory: Normal respiratory effort. Not in respiratory distress  Extremity: No obvious gross deformity of extremity  Neurologic: Normal speech  Psych: Normal behavior        Diagnostic Data:  No results found for this or any previous visit (from the past 2 weeks).  No results found for this or any previous visit (from the past 2 weeks).  Lab Results   Component Value Date    ALBUMIN 4.2 2024     Lab Results   Component Value Date    CRP 0.3 2024     Lab Results   Component Value Date    INR 1.0 2020     No results found for: "PTT"    Microbiology Results (last 7 days)       ** No results found for the last 168 hours. **        UA 1.020 pH 5, tr ketones, tr leuk, otherwise, negative    Assessment:  74 y.o.female here for cystoscopy, possible bladder biopsy and fulguration.    Plan:  Will proceed to OR  Consent obtained      Baron Tate MD - PGY2    Patient seen in holding.  No changes in clinical condition.  Proceed with planned procedure.        "

## 2025-01-08 NOTE — DISCHARGE INSTRUCTIONS
Post Cystoscopy Instructions    ACTIVITY  There are no restrictions in activity. Start doing again the things you did before the procedure.  You may experience a slight burning sensation. You may notice a small amount of blood in your urine. This will clear up within a day. Call the clinic if this continues beyond 48 hours.     DIET  Continue your normal diet. You may eat the same foods you ate before your procedure.  Drink plenty of fluids during the first 24-48 hours following your procedure.     MEDICATIONS  Resume all other previous medications from your prescribing physician.  Continue any pre-procedure antibiotics until they are all gone.     SIGNS AND SYMPTOMS TO REPORT TO THE DOCTOR  Chills or fever greater than 101° F within 24 hours of procedure.  Changes in urination, such as increased bleeding, foul smell, cloudy urine, or painful urination.  Call your doctor with any questions or concerns.     For any emergency situation, call 911 immediately or go to your nearest emergency room.

## 2025-01-08 NOTE — TRANSFER OF CARE
"Anesthesia Transfer of Care Note    Patient: Gely Green    Procedure(s) Performed: Procedure(s) (LRB):  CYSTOSCOPY (N/A)    Patient location: PACU    Anesthesia Type: general    Transport from OR: Transported from OR on room air with adequate spontaneous ventilation    Post pain: adequate analgesia    Post assessment: no apparent anesthetic complications and tolerated procedure well    Post vital signs: stable    Level of consciousness: awake, alert and oriented    Nausea/Vomiting: no nausea/vomiting    Complications: none    Transfer of care protocol was followed    Last vitals: Visit Vitals  BP (!) 164/82 (BP Location: Left arm, Patient Position: Sitting)   Pulse 60   Temp 36.7 °C (98.1 °F) (Skin)   Resp 17   Ht 5' 6" (1.676 m)   Wt 66.7 kg (147 lb)   LMP 01/01/1999   SpO2 98%   Breastfeeding No   BMI 23.73 kg/m²     "

## 2025-01-08 NOTE — OP NOTE
[facility]  Operative Note    Date: 01/08/2025    Pre-Op Diagnosis:  History of interstitial cystitis    Post-Op Diagnosis: same    Procedure(s) Performed:   1.  Cystourethroscopy    Specimen(s): None    Staff Surgeon: Kenzie Mast MD    Assistant Surgeon:  Baron Tate MD - PGY2     Anesthesia: General mask inhalational anesthesia    PreOp Antibiotics:  Ancef 2 g IV    IV Fluids:   250 ml crystalloid    Indications: Gely Green is a 74 y.o. female with history of interstitial cystitis.  The diagnosis was made at an outside institution.  She presents for cystoscopy under sedation to evaluate the bladder.      Findings:  Dome, anterior, posterior, lateral walls and bladder base free of urothelial abnormalities. Right and left ureteral orifices in the normal postion and configuration, both effluxed clear urine.  Bladder neck and urethra were normal.  She has a small cystocele.      Estimated Blood Loss: min    Drains: none    Procedure in detail: After risks, benefits and possible complications of cystoscopy possible bladder biopsy and fulguration were discussed with the patient informed consent was obtained. All questions were answered in the pre-operative area.  The patient was transferred to the cystoscopy suite and placed in the supine position.  SCDs were applied and working.  Anesthesia was administered.  After adequate anesthesia the patient was placed in the dorsal lithotomy position and prepped and draped in the usual sterile fashion. Time out was preformed and charissa-procedural antibiotics were confirmed.     A rigid cystoscope in a 22 Fr sheath was introduced into the bladder per urethra. This passed easily.  The entire urethra was visualized which was noremal.  The right and left ureteral orifices were identified in the normal anatomic position and were seen effluxing clear urine.  Formal cystoscopy was performed with both 30 and 70 degree lenses, which revealed pink  walls with no lesions.  There were no Hunner's ulcers or erythema.  She did have a small cystocele just behind the bladder neck.  The bladder was drained and the cystoscope was removed.  Two percent lidocaine jelly was placed in to the bladder (10 ml).      The patient was cleaned of the prep and dried.  The patient was removed from lithotomy and transferred to recovery in stable condition.    Disposition:  The patient will follow up with me as needed for IC flares.       Kenzie Mast MD

## 2025-01-08 NOTE — ANESTHESIA PREPROCEDURE EVALUATION
[facility]  Anesthesia Pre-Operative Evaluation         Patient Name: Gely Green  YOB: 1950  MRN: [id number]    SUBJECTIVE:     Pre-operative evaluation for Procedure(s) (LRB):  CYSTOSCOPY, WITH BLADDER BIOPSY, WITH FULGURATION IF INDICATED (N/A)     01/08/2025    Gely Green is a 74 y.o. female   Patient now presents for the above procedure(s).    Stress ECHo 22:   The test was stopped because the patient experienced fatigue.  There were no arrhythmias during stress.  Concentric hypertrophy and  The estimated ejection fraction is 65%.  Normal left ventricular diastolic function.  The stress echo portion of this study is negative for myocardial ischemia.  The ECG portion of this study is positive for myocardial ischemia most likely false positive    Patient Active Problem List   Diagnosis    Bladder pain    Atrophic vaginitis    Vaginal discharge    Dyspareunia    Anxiety    Interstitial cystitis    Type 2 diabetes mellitus, with long-term current use of insulin    Hypertension associated with type 2 diabetes mellitus    Osteopenia    Adjustment disorder with mixed anxiety and depressed mood    Grief    Diabetic peripheral neuropathy    Trigeminal neuritis    Chronic pain of left knee    Weakness of both lower extremities    Decreased range of motion of neck    Mild neurocognitive disorder    Pounding heartbeat    Hyperlipidemia associated with type 2 diabetes mellitus    Thyromegaly    PVC's (premature ventricular contractions)    Pulsatile tinnitus    Recurrent major depressive disorder    TIA (transient ischemic attack)    Pelvic floor weakness in female    Right hip pain    Aortic atherosclerosis    Centrilobular emphysema    Mild episode of recurrent major depressive disorder    Caregiver stress    Moderate episode of recurrent major depressive disorder       Review of patient's allergies indicates:   Allergen Reactions    Ace inhibitors Other (See Comments)  "      Current Inpatient Medications:      No current facility-administered medications on file prior to encounter.     Current Outpatient Medications on File Prior to Encounter   Medication Sig Dispense Refill    insulin glargine, TOUJEO, (TOUJEO SOLOSTAR U-300 INSULIN) 300 unit/mL (1.5 mL) InPn pen Inject 12 Units into the skin every evening. 6 mL 1    aspirin (ECOTRIN) 81 MG EC tablet Take 1 tablet (81 mg total) by mouth once daily. 30 tablet 11    BD ULTRA-FINE SHORT PEN NEEDLE 31 gauge x 5/16" Ndle USE ONCE DAILY 100 each 3    calcium carbonate (OS-REDD) 600 mg calcium (1,500 mg) Tab Take 600 mg by mouth 2 (two) times daily with meals.      DEXCOM G7  Misc USE AS DIRECTED EVERY 3 MONTHS      flash glucose sensor (FREESTYLE SHANNON 2 SENSOR) Kit Pt to check BG as needed 1 kit 0    FREESTYLE SHANNON 2 SENSOR Kit USE AS DIRECTED EVERY 2 WEEKS 1 kit 0    hydroCHLOROthiazide (HYDRODIURIL) 12.5 MG Tab Take 1 tablet (12.5 mg total) by mouth once daily. 90 tablet 3    hydrOXYzine HCL (ATARAX) 25 MG tablet TAKE 1 TABLET(25 MG) BY MOUTH TWICE DAILY AS NEEDED FOR BLADDER PAIN 30 tablet 11    ipratropium (ATROVENT) 21 mcg (0.03 %) nasal spray 2 sprays by Each Nostril route 2 (two) times daily. 30 mL 0    irbesartan (AVAPRO) 300 MG tablet Take 1 tablet (300 mg total) by mouth every evening. 90 tablet 3    lancets (ONETOUCH DELICA LANCETS) 33 gauge Misc 1 lancet by Misc.(Non-Drug; Combo Route) route 3 (three) times daily. 300 each 3    LINZESS 72 mcg Cap capsule Take 72 mcg by mouth every morning. (Patient taking differently: Take 72 mcg by mouth every morning. PRN)      magnesium oxide (MAG-OX) 400 mg (241.3 mg magnesium) tablet Take 1 tablet (400 mg total) by mouth every evening. 30 tablet 11    metFORMIN (GLUCOPHAGE-XR) 500 MG ER 24hr tablet Take 2 tablets (1,000 mg total) by mouth 2 (two) times a day. 360 tablet 4    methverna-m.blue-s.phos-phsal-hyo (URIBEL) 118-10-40.8-36 mg Cap Take 1 capsule by mouth 4 (four) times " "daily as needed (bladder pain). 20 capsule 11    mometasone (NASONEX) 50 mcg/actuation nasal spray 2 sprays by Nasal route once daily. (Patient not taking: Reported on 2024) 17 g 0    ONETOUCH VERIO TEST STRIPS Strp TEST THREE TIMES DAILY 200 strip 11    rosuvastatin (CRESTOR) 20 MG tablet Take 1 tablet (20 mg total) by mouth once daily. 90 tablet 1    vitamin D 1000 units Tab Take 1,000 Units by mouth once daily.         Past Surgical History:   Procedure Laterality Date     SECTION      2x    COLONOSCOPY      COLONOSCOPY N/A 2019    Procedure: COLONOSCOPY;  Surgeon: Marshall Contreras MD;  Location: [facility] ([address] [place] [address]);  Service: Endoscopy;  Laterality: N/A;  pt states that she had to be resuscitated after receiving an epidural during childbirth "30 something" years ago.  Ok for 4th floor per Dr. Hernandez-MS    EYE SURGERY Bilateral     cataract removal    HYSTERECTOMY      full hyst     OOPHORECTOMY      TONSILLECTOMY         OBJECTIVE:     Vital Signs Range (Last 24H):         Significant Labs:  Lab Results   Component Value Date    WBC 4.60 2024    HGB 12.1 2024    HCT 38.7 2024     2024    CHOL 138 2024    TRIG 50 2024    HDL 60 2024    ALT 20 2024    AST 24 2024     2024    K 4.4 2024     2024    CREATININE 0.8 2024    BUN 15 2024    CO2 25 2024    TSH 0.742 2024    INR 1.0 2020    HGBA1C 6.9 (H) 2024       Diagnostic Studies: No relevant studies.    EKG:   Results for orders placed or performed during the hospital encounter of 23   EKG 12-lead    Collection Time: 23  8:08 AM    Narrative    Test Reason : R07.89,    Vent. Rate : 060 BPM     Atrial Rate : 060 BPM     P-R Int : 196 ms          QRS Dur : 068 ms      QT Int : 434 ms       P-R-T Axes : 065 018 049 degrees     QTc Int : 434 ms    Sinus rhythm with occasional Premature ventricular " complexes  Nonspecific T wave abnormality  Abnormal ECG    Confirmed by Frank Serna MD ([id number]) on 3/12/2023 1:38:38 PM    Referred By: KAYCEE   SELF           Confirmed By:Frank Serna MD       ASSESSMENT/PLAN:           Pre-op Assessment    I have reviewed the Patient Summary Reports.     I have reviewed the Nursing Notes. I have reviewed the NPO Status.   I have reviewed the Medications.     Review of Systems  Anesthesia Hx:  No problems with previous Anesthesia               Denies Personal Hx of Anesthesia complications.                    Hematology/Oncology:  Hematology Normal   Oncology Normal                                   EENT/Dental:  EENT/Dental Normal           Cardiovascular:     Hypertension, well controlled    Dysrhythmias       hyperlipidemia    Pt reports having an irregular heart beat                           Pulmonary:   COPD                     Renal/:  Renal/ Normal                 Hepatic/GI:  Hepatic/GI Normal                    Musculoskeletal:  Musculoskeletal Normal                Neurological:  TIA,  Neuromuscular Disease,                                   Endocrine:  Diabetes, well controlled           Dermatological:  Skin Normal    Psych:  Psychiatric History anxiety                 Physical Exam  General: Cooperative, Alert and Oriented    Airway:  Mallampati: III   Mouth Opening: Normal  TM Distance: Normal  Tongue: Normal  Neck ROM: Normal ROM    Dental:  Intact        Anesthesia Plan  Type of Anesthesia, risks & benefits discussed:    Anesthesia Type: Gen Natural Airway  Intra-op Monitoring Plan: Standard ASA Monitors  Post Op Pain Control Plan: multimodal analgesia  Induction:  IV  Informed Consent: Informed consent signed with the Patient and all parties understand the risks and agree with anesthesia plan.  All questions answered.   ASA Score: 3  Day of Surgery Review of History & Physical: H&P Update referred to the surgeon/provider.    Ready For Surgery  From Anesthesia Perspective.     .

## 2025-01-13 ENCOUNTER — OFFICE VISIT (OUTPATIENT)
Dept: GASTROENTEROLOGY | Facility: CLINIC | Age: 75
End: 2025-01-13
Payer: MEDICARE

## 2025-01-13 VITALS
WEIGHT: 133.63 LBS | HEIGHT: 66 IN | BODY MASS INDEX: 21.47 KG/M2 | HEART RATE: 59 BPM | SYSTOLIC BLOOD PRESSURE: 134 MMHG | DIASTOLIC BLOOD PRESSURE: 75 MMHG

## 2025-01-13 DIAGNOSIS — R14.2 BELCHING: ICD-10-CM

## 2025-01-13 DIAGNOSIS — K59.00 CONSTIPATION, UNSPECIFIED CONSTIPATION TYPE: Primary | ICD-10-CM

## 2025-01-13 PROCEDURE — 99214 OFFICE O/P EST MOD 30 MIN: CPT | Mod: PBBFAC

## 2025-01-13 PROCEDURE — 99214 OFFICE O/P EST MOD 30 MIN: CPT | Mod: S$PBB,,,

## 2025-01-13 PROCEDURE — 99999 PR PBB SHADOW E&M-EST. PATIENT-LVL IV: CPT | Mod: PBBFAC,,,

## 2025-01-13 RX ORDER — IBUPROFEN 100 MG/5ML
1000 SUSPENSION, ORAL (FINAL DOSE FORM) ORAL DAILY
COMMUNITY

## 2025-01-13 RX ORDER — VIT C/E/ZN/COPPR/LUTEIN/ZEAXAN 250MG-90MG
500 CAPSULE ORAL DAILY
COMMUNITY

## 2025-01-13 NOTE — PROGRESS NOTES
"    [facility] Consultation Note    Reason for Consult:  The primary encounter diagnosis was Constipation, unspecified constipation type. A diagnosis of Belching was also pertinent to this visit.    PCP:   Yoseph Dallas   No address on file    Referring MD:  Self, Aaareferral  No address on file    HPI:  This is a 74 y.o. female here for evaluation of chronic constipation that has been worsening over the years. She was previously following with [facility] but decided to establish care with [facility]. Reports taking Colace daily, miralax several times a day, laxatives, and magnesium citrate without much improvement. She was prescribed Linzess 72 mcg back in 2023 which she took daily without relief and eventually discontinued. Reports going 1-2 weeks in between solid bowel movements with small lorenzo on days in between. LBM yesterday. Reports associated lower abdominal pain, belching, and bloating, that is resolved after BM. Endorses occasional small amount of blood on her stools when she strains. Also with infrequent reflux. Pt denies N/V/D, dysphagia, regurgitation, or melena. Denies known FHx of GI malignancies. Last colonoscopy was in 2019 and unremarkable.    Objective Findings:    Vital Signs:  /75   Pulse (!) 59   Ht 5' 6" (1.676 m)   Wt 60.6 kg (133 lb 9.6 oz)   LMP 01/01/1999   BMI 21.56 kg/m²   Body mass index is 21.56 kg/m².    Physical Exam:  General Appearance: Well appearing in no acute distress  Abdomen: Soft, non tender, non distended in all four quadrants. No hepatosplenomegaly, ascites, or mass.    Assessment:  1. Constipation, unspecified constipation type    2. Belching      This is a 74 y.o F who presents for evaluation of chronic constipation. She has been taking OTC products, including daily Miralax, without improvement. Previously failed Linzess 72 mcg. LBM yesterday following 9 days of stool softeners, miralax, and magnesium. Will trial Linzess 290 mcg. If no " improvement in 3 weeks, pt to let me know and we will switch to diff med.    - Start Linzess 290  - Start daily fiber supplement  - Maintain high fiber diet and adequate hydration  - Continue using stool and avoid spending >5 min on toilet    RTC 3 months     Order summary:  Orders Placed This Encounter    H. pylori antigen, stool    linaCLOtide (LINZESS) 290 mcg Cap capsule     Thank you so much for allowing me to participate in the care of Beulah S Anderson Sarah Abukhader, PA-C [facility]

## 2025-01-13 NOTE — PATIENT INSTRUCTIONS
Constipation Tips  - Eat more high fiber foods   - Take a Fiber supplement (such as Citrucel)  - Drink at least 8 cups of water a day  - Get regular physical activity  - Use a stool or Squatty Potty  - Avoid sitting on the toilet >5 minutes to prevent hemorrhoids

## 2025-01-27 ENCOUNTER — TELEPHONE (OUTPATIENT)
Dept: GASTROENTEROLOGY | Facility: CLINIC | Age: 75
End: 2025-01-27
Payer: MEDICARE

## 2025-01-27 NOTE — TELEPHONE ENCOUNTER
Spoke with patient over the phone and clarified that there is no antibiotic she needs to take prior to completing stool sample.

## 2025-01-27 NOTE — TELEPHONE ENCOUNTER
----- Message from Stewart Ivan sent at 1/27/2025 12:04 PM CST -----  Regarding: FW: patient call back  Please review/advise, thank you.  ----- Message -----  From: Tresa Schmidt  Sent: 1/27/2025  11:48 AM CST  To: [facility] Staff  Subject: patient call back                                Type: Patient Call Back    Who called: Self     What is the request in detail: asked for a call back to see if she needs to take an antibiotic before doing her stool sample     Can the clinic reply by [facility]? No     Would the patient rather a call back or a response via [facility]?   Call   Best call back number: [phone]

## 2025-01-28 ENCOUNTER — LAB VISIT (OUTPATIENT)
Dept: LAB | Facility: OTHER | Age: 75
End: 2025-01-28
Payer: MEDICARE

## 2025-01-28 DIAGNOSIS — R14.2 BELCHING: ICD-10-CM

## 2025-01-28 PROCEDURE — 87338 HPYLORI STOOL AG IA: CPT

## 2025-01-31 LAB — H PYLORI AG STL QL IA: NOT DETECTED

## 2025-02-18 ENCOUNTER — PATIENT OUTREACH (OUTPATIENT)
Dept: ADMINISTRATIVE | Facility: HOSPITAL | Age: 75
End: 2025-02-18
Payer: MEDICARE

## 2025-02-18 ENCOUNTER — PATIENT MESSAGE (OUTPATIENT)
Dept: ADMINISTRATIVE | Facility: HOSPITAL | Age: 75
End: 2025-02-18
Payer: MEDICARE

## 2025-02-18 DIAGNOSIS — E11.9 DIABETES MELLITUS WITHOUT COMPLICATION: Primary | ICD-10-CM

## 2025-02-24 ENCOUNTER — LAB VISIT (OUTPATIENT)
Dept: LAB | Facility: OTHER | Age: 75
End: 2025-02-24
Attending: INTERNAL MEDICINE
Payer: MEDICARE

## 2025-02-24 ENCOUNTER — OFFICE VISIT (OUTPATIENT)
Dept: INTERNAL MEDICINE | Facility: CLINIC | Age: 75
End: 2025-02-24
Payer: MEDICARE

## 2025-02-24 VITALS
WEIGHT: 143.5 LBS | TEMPERATURE: 99 F | SYSTOLIC BLOOD PRESSURE: 118 MMHG | HEIGHT: 66 IN | BODY MASS INDEX: 23.06 KG/M2 | OXYGEN SATURATION: 98 % | HEART RATE: 70 BPM | DIASTOLIC BLOOD PRESSURE: 62 MMHG

## 2025-02-24 DIAGNOSIS — E11.42 DIABETIC PERIPHERAL NEUROPATHY: ICD-10-CM

## 2025-02-24 DIAGNOSIS — F33.1 MODERATE EPISODE OF RECURRENT MAJOR DEPRESSIVE DISORDER: ICD-10-CM

## 2025-02-24 DIAGNOSIS — E78.5 HYPERLIPIDEMIA ASSOCIATED WITH TYPE 2 DIABETES MELLITUS: ICD-10-CM

## 2025-02-24 DIAGNOSIS — J43.2 CENTRILOBULAR EMPHYSEMA: ICD-10-CM

## 2025-02-24 DIAGNOSIS — I70.0 AORTIC ATHEROSCLEROSIS: ICD-10-CM

## 2025-02-24 DIAGNOSIS — R05.1 ACUTE COUGH: Primary | ICD-10-CM

## 2025-02-24 DIAGNOSIS — E11.9 DIABETES MELLITUS WITHOUT COMPLICATION: ICD-10-CM

## 2025-02-24 DIAGNOSIS — E11.69 HYPERLIPIDEMIA ASSOCIATED WITH TYPE 2 DIABETES MELLITUS: ICD-10-CM

## 2025-02-24 PROCEDURE — 82043 UR ALBUMIN QUANTITATIVE: CPT | Performed by: INTERNAL MEDICINE

## 2025-02-24 PROCEDURE — 99215 OFFICE O/P EST HI 40 MIN: CPT | Mod: PBBFAC | Performed by: COUNSELOR

## 2025-02-24 PROCEDURE — 99214 OFFICE O/P EST MOD 30 MIN: CPT | Mod: S$PBB,,, | Performed by: COUNSELOR

## 2025-02-24 PROCEDURE — 99999 PR PBB SHADOW E&M-EST. PATIENT-LVL V: CPT | Mod: PBBFAC,,, | Performed by: COUNSELOR

## 2025-02-24 RX ORDER — PROMETHAZINE HYDROCHLORIDE AND DEXTROMETHORPHAN HYDROBROMIDE 6.25; 15 MG/5ML; MG/5ML
5 SYRUP ORAL
Qty: 180 ML | Refills: 0 | Status: SHIPPED | OUTPATIENT
Start: 2025-02-24 | End: 2025-03-06

## 2025-02-24 RX ORDER — BENZONATATE 200 MG/1
200 CAPSULE ORAL 3 TIMES DAILY PRN
Qty: 30 CAPSULE | Refills: 0 | Status: SHIPPED | OUTPATIENT
Start: 2025-02-24 | End: 2025-03-06

## 2025-02-24 NOTE — PROGRESS NOTES
Subjective:       Patient ID: Gely Green is a 74 y.o. female with history T2DM with peripheral neuropathy, TIA, anxiety, depression, centrilobular emphysema, HTN, HLD, osteopenia    Chief Complaint: Acute cough [R05.1]    Patient is new to me, PCP is Dr. Dallas. Here today for the following:    History of Present Illness    CHIEF COMPLAINT:  Patient presents today with persistent cough and cold symptoms.    UPPER RESPIRATORY INFECTION:  She reports onset of cold symptoms after working at the [employer] on February 9th without wearing a mask. She experienced fever for three days during that week. She currently has a persistent cough, particularly severe at night causing sleep disruption, along with chest pressure and heaviness. She has tried NyQuil initially, followed by Coricidin, and most recently Mucinex for symptom management. She denies muscle aches and pains.    DEPRESSION:  She reports worsening depression since discontinuing talk therapy 6-7 months ago. Symptoms include crying without apparent reason and sleep disturbance, specifically waking at 3-4 AM. She experienced recent loss of her sister one week ago, though depression predated this loss. She has history of antidepressant use years ago during her son's illness. She reports suicidal thoughts but without plan. She feels that she has family to live for.     CONSTIPATION:  She reports chronic constipation managed with Dulcolax.    ROS:  General: +fever, -chills, -fatigue, -weight gain, -weight loss  Eyes: -vision changes, -redness, -discharge  ENT: -ear pain, +nasal congestion, -sore throat  Cardiovascular: -chest pain, -palpitations, -lower extremity edema, +chest pressure  Respiratory: +cough, -shortness of breath  Gastrointestinal: -abdominal pain, -nausea, -vomiting, -diarrhea, +constipation, -blood in stool  Genitourinary: -dysuria, -hematuria, -frequency  Musculoskeletal: -joint pain, -muscle pain  Skin: -rash, -lesion  Neurological:  "-headache, -dizziness, -numbness, -tingling  Psychiatric: -anxiety, -depression, +sleep difficulty          Current Outpatient Medications   Medication Instructions    ascorbic acid (vitamin C) (VITAMIN C) 1,000 mg, Daily    aspirin (ECOTRIN) 81 mg, Oral, Daily    BD ULTRA-FINE SHORT PEN NEEDLE 31 gauge x 5/16" Ndle USE ONCE DAILY    benzonatate (TESSALON) 200 mg, Oral, 3 times daily PRN    calcium carbonate (OS-REDD) 600 mg, 2 times daily with meals    cyanocobalamin 500 mcg, Daily    DEXCOM G7  Misc USE AS DIRECTED EVERY 3 MONTHS    flash glucose sensor (FREESTYLE SHANNON 2 SENSOR) Kit Pt to check BG as needed    FREESTYLE SHANNON 2 SENSOR Kit USE AS DIRECTED EVERY 2 WEEKS    hydroCHLOROthiazide 12.5 mg, Oral, Daily    hydrOXYzine HCL (ATARAX) 25 MG tablet TAKE 1 TABLET(25 MG) BY MOUTH TWICE DAILY AS NEEDED FOR BLADDER PAIN    ipratropium (ATROVENT) 21 mcg (0.03 %) nasal spray 2 sprays, Each Nostril, 2 times daily    irbesartan (AVAPRO) 300 mg, Oral, Nightly    lancets (ONETOUCH DELICA LANCETS) 33 gauge Misc 1 lancet , Misc.(Non-Drug; Combo Route), 3 times daily    linaCLOtide (LINZESS) 290 mcg, Oral, Before breakfast    magnesium oxide (MAG-OX) 400 mg, Oral, Nightly    metFORMIN (GLUCOPHAGE-XR) 1,000 mg, Oral, 2 times daily    methen-mGinetteblue-s.phos-phsal-hyo (URIBEL) 118-10-40.8-36 mg Cap 1 capsule, Oral, 4 times daily PRN    MULTIVITAMIN ORAL Take by mouth.    ONETOUCH VERIO TEST STRIPS Strp TEST THREE TIMES DAILY    promethazine-dextromethorphan (PROMETHAZINE-DM) 6.25-15 mg/5 mL Syrp 5 mLs, Oral, Every 4-6 hours PRN    rosuvastatin (CRESTOR) 20 mg, Oral, Daily    TOUJEO SOLOSTAR U-300 INSULIN 12 Units, Subcutaneous, Nightly    vitamin D (VITAMIN D3) 1,000 Units, Daily     Objective:      Vitals:    02/24/25 1319   BP: 118/62   Pulse: 70   Temp: 98.9 °F (37.2 °C)   SpO2: 98%   Weight: 65.1 kg (143 lb 8.3 oz)   Height: 5' 6" (1.676 m)   PainSc: 0-No pain     Body mass index is 23.16 kg/m².    Physical " Exam  Constitutional:       General: She is not in acute distress.     Appearance: Normal appearance. She is normal weight. She is not toxic-appearing or diaphoretic.      Comments: Patient was bent over with head in hands over their knees. They did not appear to have any respiratory distress, and they quickly assumed position appropriate for context afterwards.    HENT:      Head: Normocephalic and atraumatic.      Comments: No tenderness to palpation over sinuses.      Right Ear: Tympanic membrane, ear canal and external ear normal. There is no impacted cerumen.      Left Ear: Tympanic membrane, ear canal and external ear normal. There is no impacted cerumen.      Nose: Nose normal. No congestion or rhinorrhea.      Mouth/Throat:      Mouth: Mucous membranes are moist.      Pharynx: Oropharynx is clear. No oropharyngeal exudate or posterior oropharyngeal erythema.   Eyes:      General:         Right eye: No discharge.         Left eye: No discharge.      Extraocular Movements: Extraocular movements intact.      Conjunctiva/sclera: Conjunctivae normal.   Cardiovascular:      Rate and Rhythm: Normal rate and regular rhythm.      Pulses: Normal pulses.      Heart sounds: Normal heart sounds. No murmur heard.     No friction rub. No gallop.   Pulmonary:      Effort: Pulmonary effort is normal. No respiratory distress.      Breath sounds: Normal breath sounds. No stridor. No wheezing, rhonchi or rales.   Chest:      Chest wall: No tenderness.   Musculoskeletal:      Right lower leg: No edema.      Left lower leg: No edema.   Skin:     General: Skin is warm and dry.      Capillary Refill: Capillary refill takes less than 2 seconds.   Neurological:      General: No focal deficit present.      Mental Status: She is alert and oriented to person, place, and time. Mental status is at baseline.   Psychiatric:         Behavior: Behavior normal.         Thought Content: Thought content normal.         Judgment: Judgment normal.       Comments: Patient appears teary-eyed during interview.         Assessment:       1. Acute cough    2. Moderate episode of recurrent major depressive disorder        IMPRESSION:  - Assessed persistent cough and congestion following likely viral infection from 2 weeks ago  - Evaluated for post-viral cough, noting clear lung sounds and absence of concerning symptoms  - Considered depressive symptoms, noting duration and recent loss of sister  - Determined no immediate risk of self-harm, but acknowledged need for mental health support    Plan:   Acute cough  Centrilobular emphysema  - Patient reports persistent cough and chest congestion for about 2 weeks following a cold, with a feeling of chest pressure.  - Physical exam, including lung auscultation, revealed clear lungs.  - Assessed as possible post-viral cough or bronchitis.  - Prescribed Mucinex DM, Tesalon Pearls (Benzonatate), and Promethazine DM syrup for cough management.  - Advised follow-up if symptoms persist or worsen, with potential for chest XR if cough continues.  -     promethazine-dextromethorphan (PROMETHAZINE-DM) 6.25-15 mg/5 mL Syrp; Take 5 mLs by mouth every 4 to 6 hours as needed.  Dispense: 180 mL; Refill: 0  -     benzonatate (TESSALON) 200 MG capsule; Take 1 capsule (200 mg total) by mouth 3 (three) times daily as needed.  Dispense: 30 capsule; Refill: 0  -     CBC Auto Differential; Future; Expected date: 02/24/2025    Moderate episode of recurrent major depressive disorder  - Evaluated patient's mental state, noting ongoing and worsening depression for months.  - Patient reports feeling depressed, crying without reason, experiencing sleep disturbances, and having thoughts of self-harm without specific plans. Patient does not demonstrate acute concern for emergent psychiatric evaluation and is aware to reach out with any worsening symptoms.  - Discussed potential for antidepressant medication and referred patient to psychology for further  evaluation and treatment.  - Schedule follow-up visit to discuss medication options.  -     Ambulatory referral/consult to Psychology; Future; Expected date: 03/03/2025    Diabetic peripheral neuropathy  Patient completing follow up labs and aware to complete diabetic foot exam soon.  Continue with current management.     Hyperlipidemia associated with type 2 diabetes mellitus  Reviewed last lipid panel, well controlled.  Continue current regimen.    Aortic atherosclerosis  No acute concerns for cardiac etiology of symptoms.  Patient continuing statin therapy and blood pressure well controlled on current therapy.  Continue current regimen.         This note was generated with the assistance of ambient listening technology. Verbal consent was obtained by the patient and accompanying visitor(s) for the recording of patient appointment to facilitate this note. I attest to having reviewed and edited the generated note for accuracy, though some syntax or spelling errors may persist. Please contact the author of this note for any clarification.    Clifford Tracy PA-C    2/24/2025

## 2025-02-25 ENCOUNTER — RESULTS FOLLOW-UP (OUTPATIENT)
Dept: INTERNAL MEDICINE | Facility: CLINIC | Age: 75
End: 2025-02-25

## 2025-02-25 LAB
ALBUMIN/CREAT UR: 7.6 UG/MG (ref 0–30)
CREAT UR-MCNC: 275.7 MG/DL (ref 15–325)
MICROALBUMIN UR DL<=1MG/L-MCNC: 21 UG/ML

## 2025-03-02 NOTE — PROGRESS NOTES
"[facility] Cardiology 701     SUBJECTIVE:     History of Present Illness:  Patient is a 74 y.o. female presents with palpitations.   Primary Diagnosis:   1. DM  2. Hypertension  3. SVT  ROS  Since last visit :   A. Has done well  B. No change in rare chest pressure which lasts for days   C. No shortness of breath; no PND or orthopnea   D. Still has palpitations   E. Blood pressures normal; heart rates are in the 60's   F. No dizziness, no syncope  G. Still active doing all her physical work without symptoms : walks daily; swims and water aerobics; never with chest pains   Review of patient's allergies indicates:   Allergen Reactions    Ace inhibitors Other (See Comments)       Past Medical History:   Diagnosis Date    Arthritis     Chronic constipation     Depression     Diabetes mellitus     Diabetes mellitus     Diabetes mellitus, type 2     Hypertension     Osteopenia        Past Surgical History:   Procedure Laterality Date     SECTION      2x    COLONOSCOPY      COLONOSCOPY N/A 2019    Procedure: COLONOSCOPY;  Surgeon: Marshall Contreras MD;  Location: [address]);  Service: Endoscopy;  Laterality: N/A;  pt states that she had to be resuscitated after receiving an epidural during childbirth "30 something" years ago.  Ok for 4th floor per Dr. Hernandez-MS    CYSTOSCOPY N/A 2025    Procedure: CYSTOSCOPY;  Surgeon: Kenzie Mast MD;  Location: [facility];  Service: Urology;  Laterality: N/A;  30 minutes    EYE SURGERY Bilateral     cataract removal    HYSTERECTOMY      full hyst     OOPHORECTOMY      TONSILLECTOMY         Family History   Problem Relation Name Age of Onset    Breast cancer Maternal Cousin 2     Heart attack Mother Darrell     Heart disease Mother Darrell     Alzheimer's disease Mother Darrell     Heart attack Father Father     Heart disease Father Father     Heart failure Father Father     Hypertension Father Father     Hyperlipidemia Sister Devi     Hypertension " "Sister Devi     Diabetes Sister Devi     Abnormal EKG Sister Devi     Dementia Sister Devi     Kidney disease Sister Devi     Alcohol abuse Brother Hemanth     Fibroids Daughter Yulisa     Brain cancer Son Darrell Green     Early death Son Darrell Green 33    Learning disabilities Son Darrell Green     Ovarian cancer Neg Hx      Cervical cancer Neg Hx      Endometrial cancer Neg Hx      Vaginal cancer Neg Hx      Stroke Neg Hx      Colon cancer Neg Hx      Esophageal cancer Neg Hx      Vision loss Neg Hx         Social History     Tobacco Use    Smoking status: Former     Current packs/day: 0.00     Average packs/day: 1 pack/day for 25.0 years (25.0 ttl pk-yrs)     Types: Cigarettes     Start date: 1978     Quit date: 1999     Years since quittin.1    Smokeless tobacco: Never   Substance Use Topics    Alcohol use: No    Drug use: No        Past Hospitalization:     Home meds:  Current Outpatient Medications on File Prior to Visit   Medication Sig Dispense Refill    ascorbic acid, vitamin C, (VITAMIN C) 1000 MG tablet Take 1,000 mg by mouth once daily.      aspirin (ECOTRIN) 81 MG EC tablet Take 1 tablet (81 mg total) by mouth once daily. 30 tablet 11    BD ULTRA-FINE SHORT PEN NEEDLE 31 gauge x 5/16" Ndle USE ONCE DAILY 100 each 3    benzonatate (TESSALON) 200 MG capsule Take 1 capsule (200 mg total) by mouth 3 (three) times daily as needed. 30 capsule 0    calcium carbonate (OS-REDD) 600 mg calcium (1,500 mg) Tab Take 600 mg by mouth 2 (two) times daily with meals.      cyanocobalamin 500 MCG tablet Take 500 mcg by mouth once daily.      DEXCOM G7  Misc USE AS DIRECTED EVERY 3 MONTHS      flash glucose sensor (FREESTYLE SHANNON 2 SENSOR) Kit Pt to check BG as needed (Patient not taking: Reported on 2025) 1 kit 0    FREESTYLE SHANNON 2 SENSOR Kit USE AS DIRECTED EVERY 2 WEEKS (Patient not taking: Reported on 2025) 1 kit 0    hydroCHLOROthiazide (HYDRODIURIL) 12.5 " MG Tab Take 1 tablet (12.5 mg total) by mouth once daily. 90 tablet 3    hydrOXYzine HCL (ATARAX) 25 MG tablet TAKE 1 TABLET(25 MG) BY MOUTH TWICE DAILY AS NEEDED FOR BLADDER PAIN 30 tablet 11    insulin glargine, TOUJEO, (TOUJEO SOLOSTAR U-300 INSULIN) 300 unit/mL (1.5 mL) InPn pen Inject 12 Units into the skin every evening. 6 mL 1    ipratropium (ATROVENT) 21 mcg (0.03 %) nasal spray 2 sprays by Each Nostril route 2 (two) times daily. (Patient not taking: Reported on 2/24/2025) 30 mL 0    irbesartan (AVAPRO) 300 MG tablet Take 1 tablet (300 mg total) by mouth every evening. 90 tablet 3    lancets (ONETOUCH DELICA LANCETS) 33 gauge Misc 1 lancet by Misc.(Non-Drug; Combo Route) route 3 (three) times daily. 300 each 3    linaCLOtide (LINZESS) 290 mcg Cap capsule Take 1 capsule (290 mcg total) by mouth before breakfast. 90 capsule 2    magnesium oxide (MAG-OX) 400 mg (241.3 mg magnesium) tablet Take 1 tablet (400 mg total) by mouth every evening. 30 tablet 11    metFORMIN (GLUCOPHAGE-XR) 500 MG ER 24hr tablet Take 2 tablets (1,000 mg total) by mouth 2 (two) times a day. 360 tablet 4    methen-mGinetteblue-s.phos-phsal-hyo (URIBEL) 118-10-40.8-36 mg Cap Take 1 capsule by mouth 4 (four) times daily as needed (bladder pain). 20 capsule 11    MULTIVITAMIN ORAL Take by mouth.      ONETOUCH VERIO TEST STRIPS Strp TEST THREE TIMES DAILY 200 strip 11    promethazine-dextromethorphan (PROMETHAZINE-DM) 6.25-15 mg/5 mL Syrp Take 5 mLs by mouth every 4 to 6 hours as needed. 180 mL 0    rosuvastatin (CRESTOR) 20 MG tablet Take 1 tablet (20 mg total) by mouth once daily. 90 tablet 1    vitamin D 1000 units Tab Take 1,000 Units by mouth once daily.       No current facility-administered medications on file prior to visit.       Cardiac meds:  Irbesartan 300 mg  Metformin  ASA 81 mg  HCTZ 12.5 mg   Rosuvastatin 20 mg         OBJECTIVE:     Vital Signs (Most Recent)  Pulse: 64 (03/03/25 1045)  BP: 111/65 (03/03/25 1045)  SpO2: 100 %  (03/03/25 1045)      Physical Exam:    Neck: normal carotids, no bruits; normal JVP  Lungs :clear  Heart: RR, normal S1,S2, no murmurs, no gallops  Abd: no masses; no bruits;   Exts: normal DP and PT pulses bilaterally, no edema noted           LABS    3/22: A1c 7.2   1/23: 101  9/23: A1c 6.7;   12/23: A1c: 7.3;   2/24: A1c 6.9; LDL 76;   8/24: LDL 68,   2/25: A1c 7.4, CMP normal,CBC normal   Diagnostic Results:    EKG:3/23; nonspecific T wave changes; sinus   2.Echo: 12/20: normal EF; normal diastology   3.stress echo: 7/22: negative; normal EF;   4.30 day event monitor:2021 negative and benign   Chart review:  Holter 2018: PVC's, PAC's ; one 8 beat run of atrial tachycardia   Stress echo: 2017: negative     ASSESSMENT/PLAN:     1. Palpitations: no correlation with studies done such as a 30 day event montior  2. Hypertension: controlled  3. Diabetes control needed  4. Lipids great   5. Chest pains: not cardiac   Plan:continue the same medications   2. Monitor heart rate at home: instructed her on how to feel her pulse   4. Return 6 months     Geni Galvin MD

## 2025-03-03 ENCOUNTER — OFFICE VISIT (OUTPATIENT)
Dept: CARDIOLOGY | Facility: CLINIC | Age: 75
End: 2025-03-03
Payer: MEDICARE

## 2025-03-03 VITALS
DIASTOLIC BLOOD PRESSURE: 65 MMHG | BODY MASS INDEX: 23.08 KG/M2 | HEART RATE: 64 BPM | OXYGEN SATURATION: 100 % | SYSTOLIC BLOOD PRESSURE: 111 MMHG | WEIGHT: 143.63 LBS | HEIGHT: 66 IN

## 2025-03-03 DIAGNOSIS — I10 ESSENTIAL HYPERTENSION: ICD-10-CM

## 2025-03-03 DIAGNOSIS — R00.2 PALPITATIONS: Primary | ICD-10-CM

## 2025-03-03 DIAGNOSIS — I47.19 ATRIAL TACHYCARDIA: ICD-10-CM

## 2025-03-03 PROCEDURE — 99999 PR PBB SHADOW E&M-EST. PATIENT-LVL IV: CPT | Mod: PBBFAC,,, | Performed by: INTERNAL MEDICINE

## 2025-03-03 PROCEDURE — 99214 OFFICE O/P EST MOD 30 MIN: CPT | Mod: PBBFAC,PN | Performed by: INTERNAL MEDICINE

## 2025-03-03 PROCEDURE — 99214 OFFICE O/P EST MOD 30 MIN: CPT | Mod: S$PBB,,, | Performed by: INTERNAL MEDICINE

## 2025-03-05 ENCOUNTER — RESULTS FOLLOW-UP (OUTPATIENT)
Dept: INTERNAL MEDICINE | Facility: CLINIC | Age: 75
End: 2025-03-05

## 2025-03-24 RX ORDER — ROSUVASTATIN CALCIUM 20 MG/1
20 TABLET, COATED ORAL DAILY
Qty: 90 TABLET | Refills: 3
Start: 2025-03-24

## 2025-03-27 ENCOUNTER — PATIENT MESSAGE (OUTPATIENT)
Dept: CARDIOLOGY | Facility: CLINIC | Age: 75
End: 2025-03-27
Payer: MEDICARE

## 2025-03-31 RX ORDER — ROSUVASTATIN CALCIUM 20 MG/1
20 TABLET, COATED ORAL DAILY
Status: CANCELLED
Start: 2025-03-31

## 2025-03-31 NOTE — TELEPHONE ENCOUNTER
----- Message from Heavenly sent at 3/31/2025  9:31 AM CDT -----  Who Called:VAIBHAV JASMINE [[id number]] New Prescription or Refill : RefillRX Name and Strength:     rosuvastatin (CRESTOR) 20 MG tablet [Geni Galvin MD] Local or Mail Order : Local   Mail Order  Preferred Pharmacy:[facility] [place] #[id number] [address] [facility] rather a call back or a response via [facility]? [facility] Call Back Number: [phone] Additional Information:None

## 2025-03-31 NOTE — TELEPHONE ENCOUNTER
No care due was identified.  [facility] Embedded Care Due Messages. Reference number: [id number].   3/31/2025 9:57:15 AM CDT

## 2025-03-31 NOTE — TELEPHONE ENCOUNTER
----- Message from Heavenly sent at 3/31/2025  9:31 AM CDT -----  Who Called:VAIBHAV JASMINE [[id number]] New Prescription or Refill : RefillRX Name and Strength:     rosuvastatin (CRESTOR) 20 MG tablet [Geni Galvin MD] Local or Mail Order : Local   Mail Order  Preferred Pharmacy[id number] [address] [facility] a call back or a response via [facility]? [facility] Call Back Number: [phone] Additional Information:None

## 2025-04-01 RX ORDER — ROSUVASTATIN CALCIUM 20 MG/1
20 TABLET, COATED ORAL DAILY
Qty: 90 TABLET | Refills: 3
Start: 2025-04-01

## 2025-04-11 ENCOUNTER — TELEPHONE (OUTPATIENT)
Dept: INTERNAL MEDICINE | Facility: CLINIC | Age: 75
End: 2025-04-11
Payer: MEDICARE

## 2025-04-11 NOTE — TELEPHONE ENCOUNTER
Copied from CRM #[id number]. Topic: Escalation - Escalation to Leader  >> Apr 11, 2025  8:02 AM Joey wrote:  Patient has an appointment for 04/21/2025 @ 10:40, Patient would like to know if she can get a later time for that day or another day. The first availability was 05/2025

## 2025-04-17 ENCOUNTER — OFFICE VISIT (OUTPATIENT)
Dept: GASTROENTEROLOGY | Facility: CLINIC | Age: 75
End: 2025-04-17
Payer: MEDICARE

## 2025-04-17 VITALS
HEIGHT: 66 IN | HEART RATE: 59 BPM | BODY MASS INDEX: 23.77 KG/M2 | SYSTOLIC BLOOD PRESSURE: 154 MMHG | WEIGHT: 147.94 LBS | DIASTOLIC BLOOD PRESSURE: 78 MMHG

## 2025-04-17 DIAGNOSIS — R10.33 PERIUMBILICAL ABDOMINAL PAIN: ICD-10-CM

## 2025-04-17 DIAGNOSIS — K59.04 CHRONIC IDIOPATHIC CONSTIPATION: Primary | ICD-10-CM

## 2025-04-17 PROCEDURE — 99999 PR PBB SHADOW E&M-EST. PATIENT-LVL IV: CPT | Mod: PBBFAC,,,

## 2025-04-17 PROCEDURE — 99214 OFFICE O/P EST MOD 30 MIN: CPT | Mod: PBBFAC

## 2025-04-17 NOTE — PROGRESS NOTES
"    [facility] Follow-UP Note    Reason for Follow-Up:  The primary encounter diagnosis was Chronic idiopathic constipation. A diagnosis of Periumbilical abdominal pain was also pertinent to this visit.    PCP:   Yoseph Dallas         HPI:  This is a 74 y.o. female last seen in GI clinic on 1/13/2025 for evaluation of chronic constipation that has been worsening over the years. No improvement with Miralax several times a day, Colace, or Linzess 72 mcg daily. She was going 1-2 weeks in between formed BM with small, pebble-like stool in between. Reported associated lower abdominal pain, belching, and bloating, that is resolved after BM, and occasional small amount of blood on her stools when she strains. Last colonoscopy was in 2019 and unremarkable. H.pylori test was ordered and negative. She was prescribed Linzess 290 mcg with recommended daily fiber supplement.      Interval History:  Today, she presents for follow up. States her BM have improved. She has been taking the linzess every other day with two formed BM weekly. She initially took it daily without any loose stools, but doesn't like taking medications daily so she spread it out. Reports periumbilical "stomach ache" that is mild and associated with increased bowel noises. She is not taking fiber. She has increased her water intake but is still not getting at least 8 cups a day.     Objective Findings:    Vital Signs:  BP (!) 154/78   Pulse (!) 59   Ht 5' 6" (1.676 m)   Wt 67.1 kg (147 lb 14.9 oz)   LMP 01/01/1999   BMI 23.88 kg/m²   Body mass index is 23.88 kg/m².    Physical Exam:  General Appearance: Well appearing in no acute distress  Abdomen: Soft, non tender, non distended in all four quadrants. No hepatosplenomegaly, ascites, or mass    Assessment:  1. Chronic idiopathic constipation    2. Periumbilical abdominal pain      This is a 74 y.o F here for f/u of constipation. Initially was having a BM once every 1-2 weeks. " "Is taking linzess every 2 days with about 2 BM weekly.   - Encouraged daily use of linzess if tolerated  - Maintain high fiber diet and adequate hydration  - Advised Ibgard/Fdgard as needed for complaint of "stomach ache"    RTC 3 months    Thank you so much for allowing me to participate in the care of Gely S Anderson Sarah Abukhader, PA-C [facility]      "

## 2025-04-21 ENCOUNTER — OFFICE VISIT (OUTPATIENT)
Dept: INTERNAL MEDICINE | Facility: CLINIC | Age: 75
End: 2025-04-21
Attending: INTERNAL MEDICINE
Payer: MEDICARE

## 2025-04-21 VITALS
WEIGHT: 147.69 LBS | HEART RATE: 67 BPM | OXYGEN SATURATION: 97 % | BODY MASS INDEX: 23.74 KG/M2 | DIASTOLIC BLOOD PRESSURE: 70 MMHG | SYSTOLIC BLOOD PRESSURE: 130 MMHG | HEIGHT: 66 IN

## 2025-04-21 DIAGNOSIS — E11.9 TYPE 2 DIABETES MELLITUS WITHOUT COMPLICATION, WITH LONG-TERM CURRENT USE OF INSULIN: Primary | ICD-10-CM

## 2025-04-21 DIAGNOSIS — E11.59 HYPERTENSION ASSOCIATED WITH TYPE 2 DIABETES MELLITUS: ICD-10-CM

## 2025-04-21 DIAGNOSIS — I15.2 HYPERTENSION ASSOCIATED WITH TYPE 2 DIABETES MELLITUS: ICD-10-CM

## 2025-04-21 DIAGNOSIS — Z79.4 TYPE 2 DIABETES MELLITUS WITHOUT COMPLICATION, WITH LONG-TERM CURRENT USE OF INSULIN: Primary | ICD-10-CM

## 2025-04-21 PROCEDURE — 99214 OFFICE O/P EST MOD 30 MIN: CPT | Mod: PBBFAC | Performed by: INTERNAL MEDICINE

## 2025-04-21 PROCEDURE — 99214 OFFICE O/P EST MOD 30 MIN: CPT | Mod: S$PBB,,, | Performed by: INTERNAL MEDICINE

## 2025-04-21 PROCEDURE — G2211 COMPLEX E/M VISIT ADD ON: HCPCS | Mod: S$PBB,,, | Performed by: INTERNAL MEDICINE

## 2025-04-21 PROCEDURE — 99999 PR PBB SHADOW E&M-EST. PATIENT-LVL IV: CPT | Mod: PBBFAC,,, | Performed by: INTERNAL MEDICINE

## 2025-04-21 NOTE — PROGRESS NOTES
"Subjective:       Patient ID: Gely Green is a 74 y.o. female.    Chief Complaint: Lab follow up    Here for f/u    Lost her sister one month prior. Grieving is appropriate per pt. Sadness improving some.       A1c increased. toujeou was decreased by provider [facility] from 12 to 6 due to nocturnal low. Has dexcom in place. -160. Post prandial can get above 250. START Januvia    Nocturnal numbness of hands and forearm. No weakness. No neck pain. START wrist splints.       No breathing issues. Denies CP at rest or with exertion, dizziness with exertion, shortness of breath out of proportion to level of activity, frequent or sustained palpitations, orthopnea, PND, or LE edema.               History of Present Illness              Review of Systems   Constitutional:  Negative for chills, fatigue, fever and unexpected weight change.   HENT:  Negative for ear pain, hearing loss, postnasal drip, tinnitus, trouble swallowing and voice change.    Respiratory:  Negative for cough, chest tightness, shortness of breath and wheezing.    Cardiovascular:  Negative for chest pain, palpitations and leg swelling.   Gastrointestinal:  Negative for abdominal pain, blood in stool, diarrhea, nausea and vomiting.   Endocrine: Negative for polydipsia, polyphagia and polyuria.   Genitourinary:  Negative for difficulty urinating, dysuria, hematuria and vaginal bleeding.   Skin:  Negative for rash.   Allergic/Immunologic: Negative for food allergies.   Neurological:  Negative for dizziness, numbness and headaches.   Hematological:  Does not bruise/bleed easily.   Psychiatric/Behavioral:  The patient is not nervous/anxious.        Objective:      Vitals:    04/21/25 1040 04/21/25 1110   BP: (!) 140/70 130/70   BP Location: Right arm    Patient Position: Sitting    Pulse: 67    SpO2: 97%    Weight: 67 kg (147 lb 11.3 oz)    Height: 5' 6" (1.676 m)       Physical Exam  Vitals and nursing note reviewed.   Constitutional:       " General: She is not in acute distress.     Appearance: Normal appearance. She is well-developed.   HENT:      Head: Normocephalic and atraumatic.      Mouth/Throat:      Pharynx: No oropharyngeal exudate.   Eyes:      General: No scleral icterus.     Conjunctiva/sclera: Conjunctivae normal.      Pupils: Pupils are equal, round, and reactive to light.   Neck:      Thyroid: No thyromegaly.   Cardiovascular:      Rate and Rhythm: Normal rate and regular rhythm.      Heart sounds: Normal heart sounds. No murmur heard.  Pulmonary:      Effort: Pulmonary effort is normal.      Breath sounds: Normal breath sounds. No wheezing or rales.   Abdominal:      General: There is no distension.   Musculoskeletal:         General: No tenderness.   Lymphadenopathy:      Cervical: No cervical adenopathy.   Skin:     General: Skin is warm and dry.   Neurological:      Mental Status: She is alert and oriented to person, place, and time.   Psychiatric:         Behavior: Behavior normal.         Assessment:       1. Type 2 diabetes mellitus without complication, with long-term current use of insulin    2. Hypertension associated with type 2 diabetes mellitus        Plan:       Gely was seen today for lab follow up.    Diagnoses and all orders for this visit:    Type 2 diabetes mellitus without complication, with long-term current use of insulin  -     START SITagliptin phosphate (JANUVIA) 50 MG Tab; Take 1 tablet (50 mg total) by mouth once daily.  Hypertension associated with type 2 diabetes mellitus   Controlled and asymptomatic.  Continue current Rx regimen.        Visit today is associated with current or anticipated ongoing medical care related to this patient's single serious condition/complex condition of DM, HTN. The patient will return to see me as these issues will be followed longitudinally.           Assessment & Plan              This note was generated with the assistance of ambient listening technology. Verbal consent was  obtained by the patient and accompanying visitor(s) for the recording of patient appointment to facilitate this note. I attest to having reviewed and edited the generated note for accuracy, though some syntax or spelling errors may persist. Please contact the author of this note for any clarification.      Yoseph Mercado MD  Internal Medicine-[facility]        Side effects of medication(s) were discussed in detail and patient voiced understanding.  Patient will call back for any issues or complications.

## 2025-04-23 ENCOUNTER — PATIENT MESSAGE (OUTPATIENT)
Dept: GASTROENTEROLOGY | Facility: CLINIC | Age: 75
End: 2025-04-23
Payer: MEDICARE

## 2025-05-29 DIAGNOSIS — E11.9 TYPE 2 DIABETES MELLITUS WITHOUT COMPLICATION, WITH LONG-TERM CURRENT USE OF INSULIN: ICD-10-CM

## 2025-05-29 DIAGNOSIS — Z79.4 TYPE 2 DIABETES MELLITUS WITHOUT COMPLICATION, WITH LONG-TERM CURRENT USE OF INSULIN: ICD-10-CM

## 2025-05-29 NOTE — TELEPHONE ENCOUNTER
Care Due:                  Date            Visit Type   Department     Provider  --------------------------------------------------------------------------------                                EP -                              PRIMARY      [facility] INTERNAL  Last Visit: 04-      CARE ([facility])   MEDICINE       Yoseph Dallas                              EP -                              PRIMARY      [facility] INTERNAL  Next Visit: 06-      CARE ([facility])   MEDICINE       Yoseph Dallas                                                            Last  Test          Frequency    Reason                     Performed    Due Date  --------------------------------------------------------------------------------    CMP.........  12 months..  SITagliptin,               08- 08-                             hydroCHLOROthiazide,                             insulin, irbesartan......    HBA1C.......  6 months...  SITagliptin, insulin.....  02- 08-    [facility] Embedded Care Due Messages. Reference number: [id number].   5/29/2025 4:22:46 PM CDT

## 2025-05-29 NOTE — TELEPHONE ENCOUNTER
Refill Routing Note   Medication(s) are not appropriate for processing by [facility] for the following reason(s):        New or recently adjusted medication  No active prescription written by provider (Metformin, Toujeo)    [facility] action(s):  Defer     Requires labs : Yes             Appointments  past 12m or future 3m with PCP    Date Provider   Last Visit   4/21/2025 Yoseph Dallas MD   Next Visit   6/11/2025 Yoseph Dallas MD   ED visits in past 90 days: 0        Note composed:5:59 PM 05/29/2025

## 2025-05-30 RX ORDER — INSULIN GLARGINE 300 [IU]/ML
12 INJECTION, SOLUTION SUBCUTANEOUS NIGHTLY
Qty: 6 ML | Refills: 1 | Status: SHIPPED | OUTPATIENT
Start: 2025-05-30

## 2025-05-30 RX ORDER — METFORMIN HYDROCHLORIDE 500 MG/1
1000 TABLET, EXTENDED RELEASE ORAL 2 TIMES DAILY
Qty: 360 TABLET | Refills: 4 | Status: SHIPPED | OUTPATIENT
Start: 2025-05-30

## 2025-06-11 ENCOUNTER — OFFICE VISIT (OUTPATIENT)
Dept: INTERNAL MEDICINE | Facility: CLINIC | Age: 75
End: 2025-06-11
Attending: INTERNAL MEDICINE
Payer: MEDICARE

## 2025-06-11 VITALS
SYSTOLIC BLOOD PRESSURE: 132 MMHG | DIASTOLIC BLOOD PRESSURE: 70 MMHG | HEART RATE: 54 BPM | BODY MASS INDEX: 23.24 KG/M2 | HEIGHT: 66 IN | OXYGEN SATURATION: 99 % | WEIGHT: 144.63 LBS

## 2025-06-11 DIAGNOSIS — F43.23 ADJUSTMENT DISORDER WITH MIXED ANXIETY AND DEPRESSED MOOD: Primary | ICD-10-CM

## 2025-06-11 DIAGNOSIS — M54.12 CERVICAL RADICULOPATHY: ICD-10-CM

## 2025-06-11 DIAGNOSIS — M47.22 OSTEOARTHRITIS OF SPINE WITH RADICULOPATHY, CERVICAL REGION: ICD-10-CM

## 2025-06-11 DIAGNOSIS — M54.2 NECK PAIN: ICD-10-CM

## 2025-06-11 PROCEDURE — 99215 OFFICE O/P EST HI 40 MIN: CPT | Mod: PBBFAC | Performed by: INTERNAL MEDICINE

## 2025-06-11 PROCEDURE — 99214 OFFICE O/P EST MOD 30 MIN: CPT | Mod: S$PBB,,, | Performed by: INTERNAL MEDICINE

## 2025-06-11 PROCEDURE — 99999 PR PBB SHADOW E&M-EST. PATIENT-LVL V: CPT | Mod: PBBFAC,,, | Performed by: INTERNAL MEDICINE

## 2025-06-11 PROCEDURE — G2211 COMPLEX E/M VISIT ADD ON: HCPCS | Mod: ,,, | Performed by: INTERNAL MEDICINE

## 2025-06-11 RX ORDER — CYCLOBENZAPRINE HCL 5 MG
5 TABLET ORAL 3 TIMES DAILY PRN
Qty: 30 TABLET | Refills: 0 | Status: SHIPPED | OUTPATIENT
Start: 2025-06-11 | End: 2025-06-21

## 2025-06-11 NOTE — PROGRESS NOTES
"Subjective:       Patient ID: Gely Green is a 74 y.o. female.    Chief Complaint: Back Pain and Arm Pain    Here for urgent visit  Mood has not improved.    Right neck pain for the past 2 weeks. Pain is worse with turning of head. Some radiation to upper back and some down arm.      History of Present Illness              Review of Systems    Objective:      Vitals:    06/11/25 1115   BP: 132/70   BP Location: Left arm   Patient Position: Sitting   Pulse: (!) 54   SpO2: 99%   Weight: 65.6 kg (144 lb 10 oz)   Height: 5' 6" (1.676 m)      Physical Exam    Assessment:       1. Adjustment disorder with mixed anxiety and depressed mood    2. Cervical radiculopathy    3. Neck pain    4. Osteoarthritis of spine with radiculopathy, cervical region        Plan:       Gely was seen today for back pain and arm pain.    Diagnoses and all orders for this visit:    Adjustment disorder with mixed anxiety and depressed mood  -     Ambulatory referral/consult to Psychology; Future    Cervical radiculopathy    Neck pain   No red flags on chronic Hx or acute Hx. No s/s on exam to suggest emergent evaluation needed. Common pathologies of recurrent MSK discussed. Common triggers discussed. Common OTC and Rx modalities discussed. Discussed and/or given HEP.   -     cyclobenzaprine (FLEXERIL) 5 MG tablet; Take 1 tablet (5 mg total) by mouth 3 (three) times daily as needed for Muscle spasms. (Patient not taking: Reported on 6/20/2025)  Osteoarthritis of spine with radiculopathy, cervical region      Visit today is associated with current or anticipated ongoing medical care related to this patient's single serious condition/complex condition of DM. The patient will return to see me as these issues will be followed longitudinally.           Assessment & Plan              This note was generated with the assistance of ambient listening technology. Verbal consent was obtained by the patient and accompanying visitor(s) for the " recording of patient appointment to facilitate this note. I attest to having reviewed and edited the generated note for accuracy, though some syntax or spelling errors may persist. Please contact the author of this note for any clarification.      Yoseph Mercado MD  Internal Medicine-[facility]        Side effects of medication(s) were discussed in detail and patient voiced understanding.  Patient will call back for any issues or complications.

## 2025-06-16 ENCOUNTER — OFFICE VISIT (OUTPATIENT)
Dept: OTOLARYNGOLOGY | Facility: CLINIC | Age: 75
End: 2025-06-16
Payer: MEDICARE

## 2025-06-16 VITALS
DIASTOLIC BLOOD PRESSURE: 75 MMHG | SYSTOLIC BLOOD PRESSURE: 115 MMHG | BODY MASS INDEX: 23.03 KG/M2 | WEIGHT: 143.31 LBS | HEIGHT: 66 IN | HEART RATE: 62 BPM

## 2025-06-16 DIAGNOSIS — J31.0 CHRONIC RHINITIS: Chronic | ICD-10-CM

## 2025-06-16 DIAGNOSIS — J01.80 OTHER SUBACUTE SINUSITIS: Primary | ICD-10-CM

## 2025-06-16 DIAGNOSIS — E11.59 HYPERTENSION ASSOCIATED WITH TYPE 2 DIABETES MELLITUS: ICD-10-CM

## 2025-06-16 DIAGNOSIS — I15.2 HYPERTENSION ASSOCIATED WITH TYPE 2 DIABETES MELLITUS: ICD-10-CM

## 2025-06-16 DIAGNOSIS — J34.3 HYPERTROPHY OF INFERIOR NASAL TURBINATE: Chronic | ICD-10-CM

## 2025-06-16 PROCEDURE — 99999 PR PBB SHADOW E&M-EST. PATIENT-LVL III: CPT | Mod: PBBFAC,,, | Performed by: OTOLARYNGOLOGY

## 2025-06-16 PROCEDURE — 99213 OFFICE O/P EST LOW 20 MIN: CPT | Mod: PBBFAC,PN | Performed by: OTOLARYNGOLOGY

## 2025-06-16 PROCEDURE — 99214 OFFICE O/P EST MOD 30 MIN: CPT | Mod: 25,S$PBB,, | Performed by: OTOLARYNGOLOGY

## 2025-06-16 PROCEDURE — 31231 NASAL ENDOSCOPY DX: CPT | Mod: PBBFAC,PN | Performed by: OTOLARYNGOLOGY

## 2025-06-16 RX ORDER — LEVOCETIRIZINE DIHYDROCHLORIDE 5 MG/1
5 TABLET, FILM COATED ORAL NIGHTLY
Qty: 30 TABLET | Refills: 11 | Status: SHIPPED | OUTPATIENT
Start: 2025-06-16 | End: 2026-06-16

## 2025-06-16 RX ORDER — FLUTICASONE PROPIONATE 50 MCG
2 SPRAY, SUSPENSION (ML) NASAL DAILY
Qty: 9.9 ML | Refills: 11 | Status: SHIPPED | OUTPATIENT
Start: 2025-06-16

## 2025-06-16 RX ORDER — AZELASTINE 1 MG/ML
1 SPRAY, METERED NASAL 2 TIMES DAILY
Qty: 30 ML | Refills: 3 | Status: SHIPPED | OUTPATIENT
Start: 2025-06-16 | End: 2026-06-16

## 2025-06-16 RX ORDER — IRBESARTAN 300 MG/1
300 TABLET ORAL NIGHTLY
Qty: 90 TABLET | Refills: 3 | Status: SHIPPED | OUTPATIENT
Start: 2025-06-16 | End: 2026-06-16

## 2025-06-16 RX ORDER — AMOXICILLIN AND CLAVULANATE POTASSIUM 875; 125 MG/1; MG/1
1 TABLET, FILM COATED ORAL 2 TIMES DAILY
Qty: 20 TABLET | Refills: 0 | Status: SHIPPED | OUTPATIENT
Start: 2025-06-16 | End: 2025-06-26

## 2025-06-16 RX ORDER — METHYLPREDNISOLONE 4 MG/1
TABLET ORAL
Qty: 1 EACH | Refills: 0 | Status: SHIPPED | OUTPATIENT
Start: 2025-06-16

## 2025-06-16 NOTE — TELEPHONE ENCOUNTER
Refill Encounter    PCP Visits: Recent Visits  Date Type Provider Dept   06/11/25 Office Visit Yoseph Dallas MD [facility] Internal Medicine   04/21/25 Office Visit Yoseph Dallas MD [facility] Internal Medicine   02/24/25 Office Visit Rufus Tracy PA-C [facility] Internal Medicine   11/07/24 Office Visit Yoseph Dallas MD [facility] Internal Medicine   10/11/24 Office Visit Isaiah Jain NP [facility] Internal Medicine   08/27/24 Office Visit Yoseph Dallas MD [facility] Internal Medicine   Showing recent visits within past 360 days and meeting all other requirements  Future Appointments  Date Type Provider Dept   10/22/25 Appointment Yoseph Dallas MD [facility] Internal Medicine   Showing future appointments within next 720 days and meeting all other requirements      Last 3 Blood Pressure:   BP Readings from Last 3 Encounters:   06/16/25 115/75   06/11/25 132/70   04/21/25 130/70     Preferred Pharmacy:   [facility] #[id number] [address] AT [address] [address]  Phone: [phone] Fax: [phone]    [facility] - [place], [place] - [facility] [address]  [address]  [place] [address]  Phone: [phone] Fax: [phone]    Requested RX:  Requested Prescriptions     Pending Prescriptions Disp Refills    irbesartan (AVAPRO) 300 MG tablet 90 tablet 3     Sig: Take 1 tablet (300 mg total) by mouth every evening.      RX Route: Normal

## 2025-06-16 NOTE — PROCEDURES
Nasal/sinus endoscopy    Date/Time: 6/16/2025 10:00 AM    Performed by: Savannah Miller MD  Authorized by: Savannah Miller MD    Consent Done?:  Yes (Verbal)  Anesthesia:     Local anesthetic:  Lidocaine 2% and Cyril-Synephrine 1/2%  Nose:     Procedure Performed:  Nasal Endoscopy  External:      No external nasal deformity  Intranasal:      Mucosa no polyps     Mucosa ulcers not present     No mucosa lesions present     Turbinates not enlarged     No septum gross deformity  Nasopharynx:      Posterior choanae patent     Eustachian tube patent     Bilateral inferior turbinate hypertrophy; bilateral mild narrowing of inferior meatus; Bilateral middle turbinate edema with narrowing of the middle meatus; + purulence/crusting in right middle meatus; bilateral superior turbinates within normal limits: Bilateral see no ethmoidal recess are patent.

## 2025-06-16 NOTE — TELEPHONE ENCOUNTER
No care due was identified.  [facility] Embedded Care Due Messages. Reference number: [id number].   6/16/2025 4:22:51 PM CDT

## 2025-06-16 NOTE — PROGRESS NOTES
"History of Present Illness    CHIEF COMPLAINT:  - Patient presents with nasal congestion, drainage, and right-sided sinus pressure that started approximately two months ago.    HPI:  Patient reports nasal congestion and drainage that began approximately 2 months ago, more pronounced on the right side. She has right-sided facial pressure and an increased tendency to swallow, which also started within the last 2 months.She has not taken any prescribed or OTC medications for these symptoms over the last 2 months. When questioned about changes in sense of smell, she does not believe there have been any significant alterations. She denies any decrease in sense of smell.                 Past Medical History:   Diagnosis Date    Arthritis     Chronic constipation     Depression     Diabetes mellitus     Diabetes mellitus     Diabetes mellitus, type 2     Hypertension     Osteoarthritis     Osteopenia      Social History[1]  Past Surgical History:   Procedure Laterality Date     SECTION      2x    COLONOSCOPY      COLONOSCOPY N/A 2019    Procedure: COLONOSCOPY;  Surgeon: Marshall Contreras MD;  Location: [facility] ([address] [place] [address]);  Service: Endoscopy;  Laterality: N/A;  pt states that she had to be resuscitated after receiving an epidural during childbirth "30 something" years ago.  Ok for 4th floor per Dr. Hernandez-MS    CYSTOSCOPY N/A 2025    Procedure: CYSTOSCOPY;  Surgeon: Kenzie Mast MD;  Location: [facility];  Service: Urology;  Laterality: N/A;  30 minutes    EYE SURGERY Bilateral     cataract removal    HYSTERECTOMY      full hyst 1999    OOPHORECTOMY      TONSILLECTOMY       Family History   Problem Relation Name Age of Onset    Breast cancer Maternal Cousin 2     Heart attack Mother souarvjhonatan Acosta       Heart disease Mother sourav Willisjose j       Alzheimer's disease Mother sourav Willisbs       Heart attack Father Father     Heart disease Father Father     Heart failure Father Father     " Hypertension Father Father     Alcohol abuse Father Father     Hyperlipidemia Sister Devi     Hypertension Sister Devi     Diabetes Sister Devi     Abnormal EKG Sister Devi     Dementia Sister Devi     Kidney disease Sister Devi     Alcohol abuse Brother Hemanth     Heart failure Brother Hemanth     Fibroids Daughter Yulisa     Brain cancer Son Darrell Green     Early death Son Darrell Green 33    Learning disabilities Son Darrell Green     Heart disease Son Darrell Green     Ovarian cancer Neg Hx      Cervical cancer Neg Hx      Endometrial cancer Neg Hx      Vaginal cancer Neg Hx      Stroke Neg Hx      Colon cancer Neg Hx      Esophageal cancer Neg Hx      Vision loss Neg Hx             Review of Systems  ROS     General: negative for chills, fever or weight loss  Psychological: negative for mood changes or depression  Ophthalmic: negative for blurry vision, photophobia or eye pain  ENT: see HPI  Respiratory: no cough, shortness of breath, or wheezing  Cardiovascular: no chest pain or dyspnea on exertion  Gastrointestinal: no abdominal pain, change in bowel habits, or black/ bloody stools  Musculoskeletal: negative for gait disturbance or muscular weakness  Neurological: no syncope or seizures; no ataxia  Dermatological: negative for pruritis,  rash and jaundice  Hematologic/lymphatic: no easy bruising, no new adenopathy      Physical Exam:    Vitals:    06/16/25 1002   BP: 115/75   Pulse: 62         Constitutional:   She appears well-developed and well-nourished.     Head:  Normocephalic and atraumatic.     Ears:  Hearing normal to normal and whispered voice; external ear normal without scars, lesions, or masses; ear canal, tympanic membrane, and middle ear normal..     Nose:  Mucosal edema and rhinorrhea present. Turbinate hypertrophy.      Mouth/Throat  Oropharynx clear and moist without lesions or asymmetry and normal uvula midline.     Neck:  Neck normal without thyromegaly  masses, asymmetry, normal tracheal structure, crepitus, and tenderness and thyroid normal.               Assessment:    ICD-10-CM ICD-9-CM    1. Other subacute sinusitis  J01.80 461.8       2. Chronic rhinitis  J31.0 472.0       3. Hypertrophy of inferior nasal turbinate  J34.3 478.0         The primary encounter diagnosis was Other subacute sinusitis. Diagnoses of Chronic rhinitis and Hypertrophy of inferior nasal turbinate were also pertinent to this visit.      Plan:  Assessment & Plan    Recommend [facility] Sinus Rinse BID; distilled water only(maintenance).  Start Flonase 2 sprays per nostril daiy (spray laterally).  Start Astelin 1 spray per nostril BID  Start xyzal 5mg PO daily  Start Augmentin 875mg PO BID for 10 days   Recommend follow-up with primary care physician for posterior neck discomfort.        Follow up in 4-6 weeks     Savannah Rush MD    This note was generated with the assistance of ambient listening technology. Verbal consent was obtained by the patient and accompanying visitor(s) for the recording of patient appointment to facilitate this note. I attest to having reviewed and edited the generated note for accuracy, though some syntax or spelling errors may persist. Please contact the author of this note for any clarification.           [1]   Social History  Socioeconomic History    Marital status:    Tobacco Use    Smoking status: Former     Current packs/day: 0.00     Average packs/day: 1 pack/day for 25.0 years (25.0 ttl pk-yrs)     Types: Cigarettes     Start date: 1978     Quit date: 1999     Years since quittin.4    Smokeless tobacco: Never   Substance and Sexual Activity    Alcohol use: No    Drug use: No    Sexual activity: Not Currently     Partners: Male     Birth control/protection: Abstinence     Social Drivers of Health     Financial Resource Strain: Low Risk  (2025)    Overall Financial Resource Strain (CARDIA)     Difficulty of Paying Living  Expenses: Not hard at all   Food Insecurity: No Food Insecurity (4/8/2025)    Hunger Vital Sign     Worried About Running Out of Food in the Last Year: Never true     Ran Out of Food in the Last Year: Never true   Transportation Needs: No Transportation Needs (4/8/2025)    PRAPARE - Transportation     Lack of Transportation (Medical): No     Lack of Transportation (Non-Medical): No   Physical Activity: Sufficiently Active (4/8/2025)    Exercise Vital Sign     Days of Exercise per Week: 3 days     Minutes of Exercise per Session: 130 min   Stress: Stress Concern Present (4/8/2025)    [employer] [employer] - Occupational Stress Questionnaire     Feeling of Stress : Very much   Housing Stability: Low Risk  (4/8/2025)    Housing Stability Vital Sign     Unable to Pay for Housing in the Last Year: No     Number of Times Moved in the Last Year: 0     Homeless in the Last Year: No

## 2025-06-20 ENCOUNTER — OFFICE VISIT (OUTPATIENT)
Dept: INTERNAL MEDICINE | Facility: CLINIC | Age: 75
End: 2025-06-20
Payer: MEDICARE

## 2025-06-20 VITALS
BODY MASS INDEX: 22.99 KG/M2 | HEART RATE: 65 BPM | SYSTOLIC BLOOD PRESSURE: 125 MMHG | DIASTOLIC BLOOD PRESSURE: 59 MMHG | HEIGHT: 66 IN | OXYGEN SATURATION: 98 % | WEIGHT: 143.06 LBS

## 2025-06-20 DIAGNOSIS — G56.01 CARPAL TUNNEL SYNDROME OF RIGHT WRIST: ICD-10-CM

## 2025-06-20 DIAGNOSIS — M62.838 MUSCLE SPASM: ICD-10-CM

## 2025-06-20 DIAGNOSIS — M25.511 ACUTE PAIN OF RIGHT SHOULDER: ICD-10-CM

## 2025-06-20 DIAGNOSIS — M54.2 ACUTE NECK PAIN: Primary | ICD-10-CM

## 2025-06-20 PROCEDURE — 99215 OFFICE O/P EST HI 40 MIN: CPT | Mod: PBBFAC | Performed by: COUNSELOR

## 2025-06-20 PROCEDURE — 99999 PR PBB SHADOW E&M-EST. PATIENT-LVL V: CPT | Mod: PBBFAC,,, | Performed by: COUNSELOR

## 2025-06-20 RX ORDER — DICLOFENAC SODIUM 10 MG/G
2 GEL TOPICAL DAILY
Qty: 20 G | Refills: 0 | Status: SHIPPED | OUTPATIENT
Start: 2025-06-20

## 2025-06-24 ENCOUNTER — PATIENT MESSAGE (OUTPATIENT)
Dept: INTERNAL MEDICINE | Facility: CLINIC | Age: 75
End: 2025-06-24
Payer: MEDICARE

## 2025-06-27 ENCOUNTER — TELEPHONE (OUTPATIENT)
Dept: INTERNAL MEDICINE | Facility: CLINIC | Age: 75
End: 2025-06-27
Payer: MEDICARE

## 2025-06-27 ENCOUNTER — OFFICE VISIT (OUTPATIENT)
Dept: INTERNAL MEDICINE | Facility: CLINIC | Age: 75
End: 2025-06-27
Attending: INTERNAL MEDICINE
Payer: MEDICARE

## 2025-06-27 VITALS
HEART RATE: 63 BPM | HEIGHT: 66 IN | SYSTOLIC BLOOD PRESSURE: 128 MMHG | BODY MASS INDEX: 23.13 KG/M2 | OXYGEN SATURATION: 99 % | WEIGHT: 143.94 LBS | DIASTOLIC BLOOD PRESSURE: 78 MMHG

## 2025-06-27 DIAGNOSIS — M54.12 CERVICAL RADICULOPATHY: Primary | ICD-10-CM

## 2025-06-27 DIAGNOSIS — E11.9 TYPE 2 DIABETES MELLITUS WITHOUT COMPLICATION, WITH LONG-TERM CURRENT USE OF INSULIN: ICD-10-CM

## 2025-06-27 DIAGNOSIS — Z79.4 TYPE 2 DIABETES MELLITUS WITHOUT COMPLICATION, WITH LONG-TERM CURRENT USE OF INSULIN: ICD-10-CM

## 2025-06-27 PROCEDURE — 99999 PR PBB SHADOW E&M-EST. PATIENT-LVL V: CPT | Mod: PBBFAC,,, | Performed by: INTERNAL MEDICINE

## 2025-06-27 PROCEDURE — 99215 OFFICE O/P EST HI 40 MIN: CPT | Mod: PBBFAC | Performed by: INTERNAL MEDICINE

## 2025-06-27 RX ORDER — GABAPENTIN 100 MG/1
100 CAPSULE ORAL 3 TIMES DAILY
Qty: 90 CAPSULE | Refills: 0 | Status: SHIPPED | OUTPATIENT
Start: 2025-06-27 | End: 2026-06-27

## 2025-06-27 NOTE — PROGRESS NOTES
Subjective:       Patient ID: Gely Green is a 74 y.o. female.    Chief Complaint: No chief complaint on file.    HPI  History of Present Illness    CHIEF COMPLAINT:  71-year-old female presents today with neck and arm pain with associated sleep disturbance.    HISTORY OF PRESENT ILLNESS:  She reports new-onset neck and arm pain radiating from neck down to arm, extending under shoulder blade. The pain distribution includes entire arm reaching to elbow, with tingling sensation distal to elbow. Pain intensity is rated at 4/10. She emphasizes this specific pain pattern is entirely novel. Previous physician consultation suggested potential pinched nerve etiology.    SLEEP:  She reports significant sleep disturbances due to inability to find comfortable positioning, resulting in increased daytime sleepiness and need for naps.    MEDICAL HISTORY:  She has a history of cervical spine narrowing at C3-C4 levels identified on MRI in 2018. She reports longstanding neck complaints that have progressively worsened over time.    MEDICATIONS:  Past history of Gabapentin use many years ago. Currently taking OTC sinus medication. Previous trials of steroids and Flexeril did not provide adequate pain relief.      ROS:  General: -fever, -chills, -fatigue, -weight gain, -weight loss, +sleep disturbances, +difficulty falling asleep  Eyes: -vision changes, -redness, -discharge  ENT: -ear pain, -nasal congestion, -sore throat  Cardiovascular: -chest pain, -palpitations, -lower extremity edema  Respiratory: -cough, -shortness of breath  Gastrointestinal: -abdominal pain, -nausea, -vomiting, -diarrhea, -constipation, -blood in stool  Genitourinary: -dysuria, -hematuria, -frequency  Musculoskeletal: -joint pain, +muscle pain, +neck pain, +limb pain, +body aches, +pain with movement  Skin: -rash, -lesion  Neurological: -headache, -dizziness, -numbness, +tingling  Psychiatric: -anxiety, -depression, -sleep difficulty         Review of  "Systems    Objective:      Vitals:    06/27/25 1135   BP: 128/78   BP Location: Left arm   Patient Position: Sitting   Pulse: 63   SpO2: 99%   Weight: 65.3 kg (143 lb 15.4 oz)   Height: 5' 6" (1.676 m)      Physical Exam  Constitutional:       General: She is not in acute distress.     Appearance: Normal appearance. She is well-developed. She is not diaphoretic.   HENT:      Head: Normocephalic and atraumatic.   Eyes:      General: No scleral icterus.        Right eye: No discharge.         Left eye: No discharge.      Conjunctiva/sclera: Conjunctivae normal.   Pulmonary:      Effort: Pulmonary effort is normal. No respiratory distress.   Abdominal:      General: There is no distension.   Skin:     General: Skin is warm and dry.   Neurological:      Mental Status: She is alert and oriented to person, place, and time.   Psychiatric:         Speech: Speech normal.         Assessment:       1. Cervical radiculopathy    2. Type 2 diabetes mellitus without complication, with long-term current use of insulin        Plan:       Diagnoses and all orders for this visit:    Cervical radiculopathy  -     Ambulatory referral/consult to Pain Clinic; Future   START -     gabapentin (NEURONTIN) 100 MG capsule; Take 1 capsule (100 mg total) by mouth 3 (three) times daily.    Type 2 diabetes mellitus without complication, with long-term current use of insulin  Completed prior steroid course             Assessment & Plan    CERVICAL SPINAL STENOSIS:  - Assessed patient's neck and arm pain, suspecting a pinched nerve in the cervical spine.  - Previous MRI from 2018 showed narrowing at C3-C4, which has progressed from mild to moderate stenosis, with severe stenosis now present at C3-C4.  - Gely reports pain radiating up the neck, down the arm, and under the shoulder blade, with sleep disturbance due to pain.  - Prescribed Gabapentin 100 mg capsules for neuropathic pain with the following regimen: start with 100 mg at bedtime, " increase by 100 mg every 3-5 days as tolerated, targeting 100 mg 3 times daily (maximum 300 mg 3 times daily).  - Instructed to begin with nighttime dosing, adding morning dose if daytime pain persists, adjusting based on symptom relief and side effects.  - Discussed potential Gabapentin side effects ranging from mild drowsiness to significant cognitive impairment.  - Advised to seek emergency care for red flag symptoms including sudden hand weakness or widespread numbness/tingling.    FOLLOW-UP:  - Referred to pain management specialist for further evaluation and potential nerve injections.         This note was generated with the assistance of ambient listening technology. Verbal consent was obtained by the patient and accompanying visitor(s) for the recording of patient appointment to facilitate this note. I attest to having reviewed and edited the generated note for accuracy, though some syntax or spelling errors may persist. Please contact the author of this note for any clarification.      Yoseph Mercado MD  Internal Medicine-[facility]        Side effects of medication(s) were discussed in detail and patient voiced understanding.  Patient will call back for any issues or complications.

## 2025-06-27 NOTE — TELEPHONE ENCOUNTER
Copied from CRM #[id number]. Topic: General Inquiry - Patient Advice  >> Jun 27, 2025 11:01 AM Ward wrote:  Patient is calling to let the office know she may be 5/10 minutes late. Please contact pt

## 2025-07-04 ENCOUNTER — PATIENT MESSAGE (OUTPATIENT)
Dept: NEUROLOGY | Facility: CLINIC | Age: 75
End: 2025-07-04
Payer: MEDICARE

## 2025-07-08 ENCOUNTER — TELEPHONE (OUTPATIENT)
Dept: PAIN MEDICINE | Facility: CLINIC | Age: 75
End: 2025-07-08
Payer: MEDICARE

## 2025-07-08 NOTE — TELEPHONE ENCOUNTER
Spoke with patient. Confirmed appointment location & time on 07/09/25  with Dr. Arenas.   Patient expressed understanding & gratitude. Call ended.

## 2025-07-09 ENCOUNTER — OFFICE VISIT (OUTPATIENT)
Dept: PAIN MEDICINE | Facility: CLINIC | Age: 75
End: 2025-07-09
Attending: INTERNAL MEDICINE
Payer: MEDICARE

## 2025-07-09 VITALS
DIASTOLIC BLOOD PRESSURE: 77 MMHG | WEIGHT: 144.63 LBS | SYSTOLIC BLOOD PRESSURE: 133 MMHG | RESPIRATION RATE: 18 BRPM | BODY MASS INDEX: 23.24 KG/M2 | HEIGHT: 66 IN | HEART RATE: 62 BPM | TEMPERATURE: 97 F | OXYGEN SATURATION: 99 %

## 2025-07-09 DIAGNOSIS — M54.12 CERVICAL RADICULOPATHY: ICD-10-CM

## 2025-07-09 DIAGNOSIS — S46.811A STRAIN OF RIGHT TRAPEZIUS MUSCLE, INITIAL ENCOUNTER: Primary | ICD-10-CM

## 2025-07-09 PROCEDURE — 99999 PR PBB SHADOW E&M-EST. PATIENT-LVL V: CPT | Mod: PBBFAC,,, | Performed by: ANESTHESIOLOGY

## 2025-07-09 PROCEDURE — 99215 OFFICE O/P EST HI 40 MIN: CPT | Mod: PBBFAC | Performed by: ANESTHESIOLOGY

## 2025-07-09 PROCEDURE — 99204 OFFICE O/P NEW MOD 45 MIN: CPT | Mod: S$PBB,,, | Performed by: ANESTHESIOLOGY

## 2025-07-09 RX ORDER — METHOCARBAMOL 500 MG/1
500 TABLET, FILM COATED ORAL 2 TIMES DAILY
Qty: 60 TABLET | Refills: 1 | Status: SHIPPED | OUTPATIENT
Start: 2025-07-09 | End: 2025-09-07

## 2025-07-09 NOTE — PROGRESS NOTES
"  PCP: Yoseph Dallas MD    REFERRING PHYSICIAN: Yoseph Dallas MD    CHIEF COMPLAINT: Right neck/shoulder pain    Original HISTORY OF PRESENT ILLNESS: Gely Green presents to the clinic for the evaluation of the above pain. The pain started  after no particular event.     Original Pain Description:  The pain is located in the right neck and is radiating to the right arm. The pain is described as shooting. Exacerbating factors: moving right upper extremity. Mitigating factors massage and rest. Symptoms interfere with daily activity and sleeping. The patient feels like symptoms have been worsening. Patient denies night fever/night sweats, urinary incontinence, bowel incontinence, significant weight loss, significant motor weakness, and loss of sensations.    Original PAIN SCORES:  Best: Pain is 6  Worst: Pain is 10  Current: Pain is 6        2025     9:32 AM   Last 3 PDI Scores   Pain Disability Index (PDI) 49       INTERVAL HISTORY: (Newest visit at the bottom)   Interval History (Date):       6 weeks of Conservative therapy:  PT: none (Must include dates)  Chiro:  HEP:       Treatments / Medications: (Ice/Heat/NSAIDS/APAP/etc):  topicals      Interventional Pain Procedures: (Previous injections)  none    Past Medical History:   Diagnosis Date    Arthritis     Chronic constipation     Depression     Diabetes mellitus     Diabetes mellitus     Diabetes mellitus, type 2     Hypertension     Osteoarthritis     Osteopenia      Past Surgical History:   Procedure Laterality Date     SECTION      2x    COLONOSCOPY      COLONOSCOPY N/A 2019    Procedure: COLONOSCOPY;  Surgeon: Marshall Contreras MD;  [facility] [place] [address]);  Service: Endoscopy;  Laterality: N/A;  pt states that she had to be resuscitated after receiving an epidural during childbirth "30 something" years ago.  Ok for 4th floor per Dr. Hernandez-MS    CYSTOSCOPY N/A 2025    Procedure: CYSTOSCOPY;  Surgeon: " "Kenzie Mast MD;  Location: [facility];  Service: Urology;  Laterality: N/A;  30 minutes    EYE SURGERY Bilateral     cataract removal    HYSTERECTOMY      full hyst 1999    OOPHORECTOMY      TONSILLECTOMY       Social History[1]  Family History   Problem Relation Name Age of Onset    Breast cancer Maternal Cousin 2     Heart attack Mother sourav Scrubbs       Heart disease Mother sourav Scrubbs       Alzheimer's disease Mother sourav Scrubbs       Heart attack Father Father     Heart disease Father Father     Heart failure Father Father     Hypertension Father Father     Alcohol abuse Father Father     Hyperlipidemia Sister Devi     Hypertension Sister Devi     Diabetes Sister Devi     Abnormal EKG Sister Devi     Dementia Sister Devi     Kidney disease Sister Dvei     Alcohol abuse Brother Hemanth     Heart failure Brother Hemanth     Fibroids Daughter Yulisa     Brain cancer Derek Green     Early death Derek Green 33    Learning disabilities Derek Green     Heart disease Derek Green     Ovarian cancer Neg Hx      Cervical cancer Neg Hx      Endometrial cancer Neg Hx      Vaginal cancer Neg Hx      Stroke Neg Hx      Colon cancer Neg Hx      Esophageal cancer Neg Hx      Vision loss Neg Hx         Review of patient's allergies indicates:   Allergen Reactions    Ace inhibitors Other (See Comments)       Current Outpatient Medications   Medication Sig    ascorbic acid, vitamin C, (VITAMIN C) 1000 MG tablet Take 1,000 mg by mouth once daily.    azelastine (ASTELIN) 137 mcg (0.1 %) nasal spray 1 spray (137 mcg total) by Nasal route 2 (two) times daily.    BD ULTRA-FINE SHORT PEN NEEDLE 31 gauge x 5/16" Ndle USE ONCE DAILY    calcium carbonate (OS-REDD) 600 mg calcium (1,500 mg) Tab Take 600 mg by mouth 2 (two) times daily with meals.    cyanocobalamin 500 MCG tablet Take 500 mcg by mouth once daily.    DEXCOM G7  Misc USE AS DIRECTED " EVERY 3 MONTHS    diclofenac sodium (VOLTAREN) 1 % Gel Apply 2 g topically once daily.    fluticasone propionate (FLONASE) 50 mcg/actuation nasal spray 2 sprays (100 mcg total) by Each Nostril route once daily.    gabapentin (NEURONTIN) 100 MG capsule Take 1 capsule (100 mg total) by mouth 3 (three) times daily.    hydroCHLOROthiazide (HYDRODIURIL) 12.5 MG Tab Take 1 tablet (12.5 mg total) by mouth once daily.    hydrOXYzine HCL (ATARAX) 25 MG tablet TAKE 1 TABLET(25 MG) BY MOUTH TWICE DAILY AS NEEDED FOR BLADDER PAIN    insulin glargine, TOUJEO, (TOUJEO SOLOSTAR U-300 INSULIN) 300 unit/mL (1.5 mL) InPn pen Inject 12 Units into the skin every evening.    ipratropium (ATROVENT) 21 mcg (0.03 %) nasal spray 2 sprays by Each Nostril route 2 (two) times daily.    irbesartan (AVAPRO) 300 MG tablet Take 1 tablet (300 mg total) by mouth every evening.    lancets (ONETOUCH DELICA LANCETS) 33 gauge Misc 1 lancet by Misc.(Non-Drug; Combo Route) route 3 (three) times daily.    levocetirizine (XYZAL) 5 MG tablet Take 1 tablet (5 mg total) by mouth every evening.    linaCLOtide (LINZESS) 290 mcg Cap capsule Take 1 capsule (290 mcg total) by mouth before breakfast.    magnesium oxide (MAG-OX) 400 mg (241.3 mg magnesium) tablet Take 1 tablet (400 mg total) by mouth every evening.    metFORMIN (GLUCOPHAGE-XR) 500 MG ER 24hr tablet Take 2 tablets (1,000 mg total) by mouth 2 (two) times a day.    methen-m.blue-s.phos-phsal-hyo (URIBEL) 118-10-40.8-36 mg Cap Take 1 capsule by mouth 4 (four) times daily as needed (bladder pain).    methylPREDNISolone (MEDROL DOSEPACK) 4 mg tablet use as directed    MULTIVITAMIN ORAL Take by mouth.    ONETOUCH VERIO TEST STRIPS Strp TEST THREE TIMES DAILY    rosuvastatin (CRESTOR) 20 MG tablet Take 1 tablet (20 mg total) by mouth once daily.    SITagliptin phosphate (JANUVIA) 50 MG Tab Take 1 tablet (50 mg total) by mouth once daily.    vitamin D 1000 units Tab Take 1,000 Units by mouth once daily.     "aspirin (ECOTRIN) 81 MG EC tablet Take 1 tablet (81 mg total) by mouth once daily.     No current facility-administered medications for this visit.       ROS:  GENERAL: No fever. No chills. No fatigue. Denies weight loss. Denies weight gain.  HEENT: Denies headaches. Denies vision change. Denies eye pain. Denies double vision. Denies ear pain.   CV: Denies chest pain.   PULM: Denies of shortness of breath.  GI: Denies constipation. No diarrhea. No abdominal pain. Denies nausea. Denies vomiting. No blood in stool.  HEME: Denies bleeding problems.  : Denies urgency. No painful urination. No blood in urine.  MS: Denies joint stiffness. Denies joint swelling.  Denies back pain.  SKIN: Denies rash.   NEURO: Denies seizures. No weakness.  PSYCH:  Denies difficulty sleeping. No anxiety. Denies depression. No suicidal thoughts.       VITALS:   Vitals:    07/09/25 0931   BP: 133/77   Pulse: 62   Resp: 18   Temp: 97 °F (36.1 °C)   TempSrc: Oral   SpO2: 99%   Weight: 65.6 kg (144 lb 10 oz)   Height: 5' 6" (1.676 m)   PainSc: 10-Worst pain ever   PainLoc: Shoulder         PHYSICAL EXAM:   GENERAL: Well appearing, in no acute distress, alert and oriented x3.  PSYCH:  Mood and affect appropriate.  SKIN: Skin color, texture, turgor normal, no rashes or lesions.  HEENT:  Normocephalic, atraumatic. Cranial nerves grossly intact.  NECK: No pain to palpation over facets. Mild pain with rotation, positive spurling on right. Pain reproduced with palpation of the right trapezius.   PULM: No evidence of respiratory difficulty, symmetric chest rise.  GI:  Non-distended  BACK: Normal range of motion. No pain to palpation over the spinous processes. No pain to palpation over facet joints. There is no pain with palpation over the sacroiliac joints bilaterally.   EXTREMITIES: No deformities, edema, or skin discoloration.   MUSCULOSKELETAL: Shoulder provocative maneuvers are negative. No atrophy is noted. Pain to palpation of the superior " right trapezius muscle  NEURO: Sensation is equal and appropriate bilaterally. Bilateral upper and lower extremity strength is normal and symmetric. Bilateral upper extremity coordination and muscle stretch reflexes are physiologic and symmetric.  GAIT: normal.      LABS:      IMAGING:    MRI cervical spine without contrast     01/04/18 14:00:00     Accession# [id number]     CLINICAL INDICATION: 67 year old F with cervical radiculopathy, left     TECHNIQUE: Multiplanar multisequence MR imaging of the cervical spine was performed without the use of intravenous contrast.      COMPARISON:  Cervical spine radiograph 1/4/2018     FINDINGS:     The vertebral bodies demonstrate no evidence of fracture, osseous destructive process or aggressive bone marrow replacement process. Normal sagittal alignment is preserved.      Intervertebral disk space heights are fairly well-maintained.      C2-C3: No significant disc abnormality, spinal canal stenosis, or neuroforaminal narrowing.     C3-C4: Posterior disc osteophyte complex and uncovertebral spurring resulting in moderate bilateral neuroforaminal narrowing and mild spinal canal stenosis.     C4-C5: Posterior disc osteophyte complex resulting in effacement of the anterior thecal sac without significant spinal canal stenosis or neuroforaminal narrowing.     C5-C6: Posterior disc osteophyte complex and uncovertebral spurring resulting in effacement of the anterior thecal sac without significant canal stenosis or neuroforaminal narrowing.     C6-C7: Posterior disc osteophyte complex and uncovertebral spurring resulting in effacement of the anterior thecal sac without significant spinal canal stenosis or neuroforaminal narrowing.     C7-T1: No significant disc abnormality, spinal canal stenosis, or neuroforaminal narrowing.     The cervicomedullary junction is in good position. The cervical and visualized upper thoracic spinal cord is normal in caliber, morphology, and signal.       No spinal canal or paraspinous masses are identified. The visualized paraspinous soft tissue structures are within normal limits.     A subcentimeter right thyroid nodule is noted.  IMPRESSION:         Cervical spondylosis, most significant at C3-C4 resulting in moderate bilateral neuroforaminal narrowing and mild spinal canal stenosis at this level.     Remaining degenerative changes are detailed above.    ASSESSMENT: 74 y.o. year old female with pain, consistent with:    Encounter Diagnoses   Name Primary?    Cervical radiculopathy Yes    Strain of right trapezius muscle, initial encounter        DISCUSSION: Gely Green is a 74 year old female who comes to us from Dr. Dallas. She reports recent onset of right neck/shoulder pain which is worst with movement. Remote MRI from 2018 shows some canal effacement and some moderate C3-4 foraminal narrowing. Exam shows some pain with Spurling test but mainly with palpation of trapezius.         PLAN:  Trigger point injection performed today on right trapezius with immediate relief of pain  PT referral sent  Encouraged to take gabapentin from PCP  Start Robaxin 500 mg BID  Consider updated MRI if no improvement at next visit  RTC 4 weeks or PRN      I would like to thank Yoseph Dallas MD for the opportunity to assist in the care of this patient. We had a very nice visit and I look forward to continuing their care. Please let me know if I can be of further assistance.     Dar Núñez  07/09/2025    Patient Name: Gely Green  MRN: [id number]    INFORMED CONSENT: The procedure, risks, benefits and options were discussed with patient. There are no contraindications to the procedure. The patient expressed understanding and agreed to proceed. The personnel performing the procedure was discussed. I verify that I personally obtained Gely Green's consent prior to the start of the procedure and the signed consent can be found on the patient's  chart.    Procedure Date: 2025    Anesthesia: None    Pre Procedure diagnosis: M79.1 Myalgia    Post-Procedure diagnosis: same    Sedation: None    PROCEDURE: Right Trapezius TRIGGER POINT INJECTION  The patient was placed in a seated position and time out was perfomed. The patient's  trigger points were identified and marked within each muscle. The skin was prepped with chlorhexidine three times.  The muscle was grasped between the thumb and forefinger and a 27-gauge 1.5 inch  needle was advanced through the skin and subcutaneous tissues and into the muscle at each location. Aspiration for blood, air and CSF was negative.  A total of 5 ml of Bupivacaine 0.25% and 40mg Kenalog was divided among the trigger points. The needle was removed intact each time and bleeding was nil. No complications were evident.     Dar Núñez MD  2025         [1]   Social History  Socioeconomic History    Marital status:    Tobacco Use    Smoking status: Former     Current packs/day: 0.00     Average packs/day: 1 pack/day for 25.0 years (25.0 ttl pk-yrs)     Types: Cigarettes     Start date: 1978     Quit date: 1999     Years since quittin.5     Passive exposure: Past    Smokeless tobacco: Never   Substance and Sexual Activity    Alcohol use: No    Drug use: No    Sexual activity: Not Currently     Partners: Male     Birth control/protection: Abstinence     Social Drivers of Health     Financial Resource Strain: Low Risk  (2025)    Overall Financial Resource Strain (CARDIA)     Difficulty of Paying Living Expenses: Not hard at all   Food Insecurity: No Food Insecurity (2025)    Hunger Vital Sign     Worried About Running Out of Food in the Last Year: Never true     Ran Out of Food in the Last Year: Never true   Transportation Needs: No Transportation Needs (2025)    PRAPARE - Transportation     Lack of Transportation (Medical): No     Lack of Transportation (Non-Medical): No   Physical  Activity: Sufficiently Active (4/8/2025)    Exercise Vital Sign     Days of Exercise per Week: 3 days     Minutes of Exercise per Session: 130 min   Stress: Stress Concern Present (4/8/2025)    [employer] - Occupational Stress Questionnaire     Feeling of Stress : Very much   Housing Stability: Low Risk  (4/8/2025)    Housing Stability Vital Sign     Unable to Pay for Housing in the Last Year: No     Number of Times Moved in the Last Year: 0     Homeless in the Last Year: No

## 2025-07-14 ENCOUNTER — TELEPHONE (OUTPATIENT)
Dept: PAIN MEDICINE | Facility: CLINIC | Age: 75
End: 2025-07-14
Payer: MEDICARE

## 2025-07-14 ENCOUNTER — NURSE TRIAGE (OUTPATIENT)
Dept: ADMINISTRATIVE | Facility: CLINIC | Age: 75
End: 2025-07-14
Payer: MEDICARE

## 2025-07-14 ENCOUNTER — OFFICE VISIT (OUTPATIENT)
Dept: OTOLARYNGOLOGY | Facility: CLINIC | Age: 75
End: 2025-07-14
Payer: MEDICARE

## 2025-07-14 VITALS
HEART RATE: 61 BPM | DIASTOLIC BLOOD PRESSURE: 78 MMHG | BODY MASS INDEX: 23.38 KG/M2 | HEIGHT: 66 IN | WEIGHT: 145.5 LBS | SYSTOLIC BLOOD PRESSURE: 125 MMHG

## 2025-07-14 DIAGNOSIS — J34.3 HYPERTROPHY OF INFERIOR NASAL TURBINATE: Chronic | ICD-10-CM

## 2025-07-14 DIAGNOSIS — J31.0 CHRONIC RHINITIS: Chronic | ICD-10-CM

## 2025-07-14 DIAGNOSIS — J01.80 OTHER SUBACUTE SINUSITIS: Primary | ICD-10-CM

## 2025-07-14 PROCEDURE — 99214 OFFICE O/P EST MOD 30 MIN: CPT | Mod: S$PBB,,, | Performed by: OTOLARYNGOLOGY

## 2025-07-14 PROCEDURE — 99999 PR PBB SHADOW E&M-EST. PATIENT-LVL IV: CPT | Mod: PBBFAC,,, | Performed by: OTOLARYNGOLOGY

## 2025-07-14 PROCEDURE — 99214 OFFICE O/P EST MOD 30 MIN: CPT | Mod: PBBFAC,PN | Performed by: OTOLARYNGOLOGY

## 2025-07-14 RX ORDER — AZELASTINE 1 MG/ML
1 SPRAY, METERED NASAL 2 TIMES DAILY
Qty: 30 ML | Refills: 3 | Status: SHIPPED | OUTPATIENT
Start: 2025-07-14 | End: 2026-07-14

## 2025-07-14 NOTE — TELEPHONE ENCOUNTER
Called from board, C/O shoulder pain. Patient was calling to ask for an earlier appointment with Dr. Arenas. She is not sure if she should have it before her PT or after. Advised of PT appointment for tomorrow with Yg Moulton PT at [facility] at 10 am to arrive at 9:45. She was unaware and is pleased she has appointment. Will send message. No further questions or concerns.   Reason for Disposition   Caller requesting an appointment, triage offered and declined    Protocols used: PCP Call - No Triage-A-

## 2025-07-14 NOTE — PROGRESS NOTES
"History of Present Illness    CHIEF COMPLAINT:  - Patient presents with nasal congestion, drainage, and right-sided sinus pressure that started approximately two months ago.    HPI:  Patient reports nasal congestion and drainage that began approximately 2 months ago, more pronounced on the right side. She has right-sided facial pressure and an increased tendency to swallow, which also started within the last 2 months.She has not taken any prescribed or OTC medications for these symptoms over the last 2 months. When questioned about changes in sense of smell, she does not believe there have been any significant alterations. She denies any decrease in sense of smell.         History of Present Illness  2025:  CHIEF COMPLAINT:  - Patient presents for a follow-up visit regarding sinus problems     HPI:  Patient reports improvement in her sinus condition since the last visit. She completed the prescribed course of antibiotics and has been using Flonase and Azelastine nasal sprays daily as directed. She notes decreased pressure.     Patient reports a new problem with her right-side muscle, initially thought to be related to her back. She is currently undergoing therapy for this muscle issue and received cortisone injections last week.             Past Medical History:   Diagnosis Date    Arthritis     Chronic constipation     Depression     Diabetes mellitus     Diabetes mellitus     Diabetes mellitus, type 2     Hypertension     Osteoarthritis     Osteopenia      Social History[1]  Past Surgical History:   Procedure Laterality Date     SECTION      2x    COLONOSCOPY      COLONOSCOPY N/A 2019    Procedure: COLONOSCOPY;  Surgeon: Marshall Contreras MD;  Location: [facility] ([address] [place] [address]);  Service: Endoscopy;  Laterality: N/A;  pt states that she had to be resuscitated after receiving an epidural during childbirth "30 something" years ago.  Ok for 4th floor per Dr. Hernandez-MS    CYSTOSCOPY N/A 2025    " Procedure: CYSTOSCOPY;  Surgeon: Kenzie Mast MD;  Location: [facility];  Service: Urology;  Laterality: N/A;  30 minutes    EYE SURGERY Bilateral     cataract removal    HYSTERECTOMY      full hyst 1999    OOPHORECTOMY      TONSILLECTOMY       Family History   Problem Relation Name Age of Onset    Breast cancer Maternal Cousin 2     Heart attack Mother sourav Scrubbs       Heart disease Mother sourav Scrubbs       Alzheimer's disease Mother sourav Scrubbs       Heart attack Father Father     Heart disease Father Father     Heart failure Father Father     Hypertension Father Father     Alcohol abuse Father Father     Hyperlipidemia Sister Devi     Hypertension Sister Devi     Diabetes Sister Devi     Abnormal EKG Sister Devi     Dementia Sister Devi     Kidney disease Sister Devi     Alcohol abuse Brother Hemanth     Heart failure Brother Hemanth     Fibroids Daughter Yulisa     Brain cancer Son Darrell Green     Early death Son Darrell Green 33    Learning disabilities Son Darrell Green     Heart disease Son Darrell Green     Ovarian cancer Neg Hx      Cervical cancer Neg Hx      Endometrial cancer Neg Hx      Vaginal cancer Neg Hx      Stroke Neg Hx      Colon cancer Neg Hx      Esophageal cancer Neg Hx      Vision loss Neg Hx             Review of Systems  ROS     General: negative for chills, fever or weight loss  Psychological: negative for mood changes or depression  Ophthalmic: negative for blurry vision, photophobia or eye pain  ENT: see HPI  Respiratory: no cough, shortness of breath, or wheezing  Cardiovascular: no chest pain or dyspnea on exertion  Gastrointestinal: no abdominal pain, change in bowel habits, or black/ bloody stools  Musculoskeletal: negative for gait disturbance or muscular weakness  Neurological: no syncope or seizures; no ataxia  Dermatological: negative for pruritis,  rash and jaundice  Hematologic/lymphatic: no easy bruising, no new  adenopathy      Physical Exam:    Vitals:    07/14/25 0953   BP: 125/78   Pulse: 61         Constitutional:   She appears well-developed and well-nourished.     Head:  Normocephalic and atraumatic.     Ears:  Hearing normal to normal and whispered voice; external ear normal without scars, lesions, or masses; ear canal, tympanic membrane, and middle ear normal..     Nose:  Mucosal edema and rhinorrhea present. Turbinate hypertrophy.      Mouth/Throat  Oropharynx clear and moist without lesions or asymmetry and normal uvula midline.     Neck:  Neck normal without thyromegaly masses, asymmetry, normal tracheal structure, crepitus, and tenderness and thyroid normal.               Assessment:    ICD-10-CM ICD-9-CM    1. Other subacute sinusitis  J01.80 461.8     resolved      2. Chronic rhinitis  J31.0 472.0       3. Hypertrophy of inferior nasal turbinate  J34.3 478.0           The primary encounter diagnosis was Other subacute sinusitis. Diagnoses of Chronic rhinitis and Hypertrophy of inferior nasal turbinate were also pertinent to this visit.      Plan:  Assessment & Plan    Recommend Miguel Med Sinus Rinse BID; distilled water only(maintenance).  Continue  Flonase 2 sprays per nostril daiy (spray laterally).  Continue Astelin 1 spray per nostril BID  Continue  xyzal 5mg PO daily prn  Recommend keeping  follow-up with primary care physician for posterior neck discomfort.        Follow up in  1 year or sooner if needed    Savannah Rush MD    This note was generated with the assistance of ambient listening technology. Verbal consent was obtained by the patient and accompanying visitor(s) for the recording of patient appointment to facilitate this note. I attest to having reviewed and edited the generated note for accuracy, though some syntax or spelling errors may persist. Please contact the author of this note for any clarification.             [1]   Social History  Socioeconomic History    Marital status:     Tobacco Use    Smoking status: Former     Current packs/day: 0.00     Average packs/day: 1 pack/day for 25.0 years (25.0 ttl pk-yrs)     Types: Cigarettes     Start date: 1978     Quit date: 1999     Years since quittin.5     Passive exposure: Past    Smokeless tobacco: Never   Substance and Sexual Activity    Alcohol use: No    Drug use: No    Sexual activity: Not Currently     Partners: Male     Birth control/protection: Abstinence     Social Drivers of Health     Financial Resource Strain: Low Risk  (2025)    Overall Financial Resource Strain (CARDIA)     Difficulty of Paying Living Expenses: Not hard at all   Food Insecurity: No Food Insecurity (2025)    Hunger Vital Sign     Worried About Running Out of Food in the Last Year: Never true     Ran Out of Food in the Last Year: Never true   Transportation Needs: No Transportation Needs (2025)    PRAPARE - Transportation     Lack of Transportation (Medical): No     Lack of Transportation (Non-Medical): No   Physical Activity: Sufficiently Active (2025)    Exercise Vital Sign     Days of Exercise per Week: 3 days     Minutes of Exercise per Session: 130 min   Stress: Stress Concern Present (2025)    [employer] - Occupational Stress Questionnaire     Feeling of Stress : Very much   Housing Stability: Low Risk  (2025)    Housing Stability Vital Sign     Unable to Pay for Housing in the Last Year: No     Number of Times Moved in the Last Year: 0     Homeless in the Last Year: No

## 2025-07-15 ENCOUNTER — CLINICAL SUPPORT (OUTPATIENT)
Dept: REHABILITATION | Facility: HOSPITAL | Age: 75
End: 2025-07-15
Attending: ANESTHESIOLOGY
Payer: MEDICARE

## 2025-07-15 DIAGNOSIS — M25.511 ACUTE PAIN OF RIGHT SHOULDER: Primary | ICD-10-CM

## 2025-07-15 PROCEDURE — 97110 THERAPEUTIC EXERCISES: CPT | Mod: PN

## 2025-07-15 PROCEDURE — 97162 PT EVAL MOD COMPLEX 30 MIN: CPT | Mod: PN

## 2025-07-15 NOTE — PROGRESS NOTES
Outpatient Rehab    Physical Therapy Evaluation    Patient Name: Gely Green  MRN: [id number]  YOB: 1950  Encounter Date: 7/15/2025    Therapy Diagnosis:   Encounter Diagnosis   Name Primary?    Acute pain of right shoulder Yes     Physician: Rufus Tracy P*    Physician Orders: Eval and Treat  Medical Diagnosis: Acute neck pain  Acute pain of right shoulder  Muscle spasm  Surgical Diagnosis: Not applicable for this Episode   Surgical Date: Not applicable for this Episode  Days Since Last Surgery: Not applicable for this Episode    Visit # / Visits Authorized:  1 / 1  Insurance Authorization Period: 6/20/2025 to 6/20/2026  Date of Evaluation: 7/15/2025  Plan of Care Certification: 7/15/2025 to 10/7/25     Time In: 1000   Time Out: 1100  Total Time (in minutes): 60   Total Billable Time (in minutes): 60    Intake Outcome Measure for FOTO Survey    Therapist reviewed FOTO scores for Gely Green on 7/15/2025.   FOTO report - see Media section or FOTO account episode details.     Intake Score: Not applicable for this Episode%    Precautions:       Subjective   History of Present Illness  Gely is a 74 y.o. female who reports to physical therapy with a chief concern of Upper trap pain.     The patient reports a medical diagnosis of S46.811A (ICD-10-CM) - Strain of right trapezius muscle, initial encounter. The patient has experienced this issue since 06/15/25.           History of Present Condition/Illness: Patient reports in Feb experiencing right upper trapezius pain in which she was able to decrease with pain ointment, however reports the pain returned 6/2025 in which she is now experiencing a consistent pain. Patient reported to PCP in regards to the pain. Patient was referred to pain management with injections to reduce the pain. Patient reports guarded use of the right upper extremity with reaching. 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      Pain     Patient reports a current pain level of 8/10. Pain at best is reported as 8/10. Pain at worst is reported as 8/10.   Location: right UT  Clinical Progression (since onset): Stable  Pain-Relieving Factors: Other (Comment)  Other Pain-Relieving Factors: nothing helps  Pain-Aggravating Factors: Lifting, Other (Comment)  Other Pain-Aggravating Factors: RUE usage         Treatment History  Treatments  Previously Received Treatments: No    Living Arrangements  Living Situation  Housing: Home independently  Living Arrangements: Spouse/significant other  Support Systems: Spouse/significant other        Employment  Employment Status: Retired   Retired - [employer]       Past Medical History/Physical Systems Review:   Gely Green  has a past medical history of Arthritis, Chronic constipation, Depression, Diabetes mellitus, Diabetes mellitus, Diabetes mellitus, type 2, Hypertension, Osteoarthritis, and Osteopenia.    Gely Green  has a past surgical history that includes  section; Hysterectomy; Colonoscopy; Colonoscopy (N/A, 2019); Oophorectomy; Tonsillectomy; Eye surgery (Bilateral); and Cystoscopy (N/A, 2025).    Gely has a current medication list which includes the following prescription(s): ascorbic acid (vitamin c), aspirin, azelastine, bd ultra-fine short pen needle, calcium carbonate, cyanocobalamin, dexcom g7 , diclofenac sodium, fluticasone propionate, gabapentin, hydrochlorothiazide, hydroxyzine hcl, insulin glargine (toujeo), ipratropium, irbesartan, lancets, levocetirizine, linaclotide, magnesium oxide, metformin, uribel, methocarbamol, methylprednisolone, multivitamin, onetouch verio test strips, rosuvastatin, sitagliptin phosphate, and vitamin d.    Review of patient's allergies indicates:   Allergen Reactions    Ace inhibitors Other (See Comments)        Objective   Posture        Shoulders are Elevated and Rounded.              Subcranial Range of Motion   Active Restricted? Passive Restricted? Pain   Flexion         Protraction         Retraction           Cervical Range of Motion   Active (deg) Passive (deg) Pain   Flexion 60       Extension 30       Right Lateral Flexion 30   Yes   Right Rotation 60       Left Lateral Flexion 35       Left Rotation 60              Shoulder Range of Motion  Right Shoulder   Active (deg) Passive (deg) Pain   Flexion 155       Extension 160       Scaption 155       ABduction 160       ADduction 40   Yes   Horizontal ABduction         Horizontal ADduction         External Rotation (Shoulder ABducted 0 degrees)         External Rotation (Shoulder ABducted 45 degrees) 60   Yes   External Rotation (Shoulder ABducted 90 degrees)         Internal Rotation (Shoulder ABducted 0 degrees)         Internal Rotation (Shoulder ABducted 45 degrees) 90       Internal Rotation (Shoulder ABducted 90 degrees)           Left Shoulder   Active (deg) Passive (deg) Pain   Flexion 140       Extension 40       Scaption 170       ABduction 170       ADduction 60       Horizontal ABduction         Horizontal ADduction         External Rotation (Shoulder ABducted 0 degrees)         External Rotation (Shoulder ABducted 45 degrees) 60       External Rotation (Shoulder ABducted 90 degrees)         Internal Rotation (Shoulder ABducted 0 degrees)         Internal Rotation (Shoulder ABducted 45 degrees) 90       Internal Rotation (Shoulder ABducted 90 degrees)                       Shoulder Strength - Planes of Motion   Right Strength Right Pain Left Strength Left  Pain   Flexion 4   4     Extension 4   4     ABduction 4   4     ADduction 4   4     Horizontal ABduction 4   4     Horizontal ADduction 4   4     Internal Rotation 0° 4   4     Internal Rotation 90° 4   4     External Rotation 0° 4   4     External Rotation 90° 4   4         Shoulder Strength - Rotator Cuff Muscles   Right Strength Right Pain Left Strength Left  Pain  "  Supraspinatus 4         Infraspinatus 4-         Teres Minor 4-         Subscapularis 4             Right  Strength  Right Hand Dynamometer Position: 2  Elbow Position Forearm Position Trial 1 (lbs) Trial 2  (lbs) Trial 3  (lbs) Average  (lbs) Pain   Flexed Neutral 55               Left  Strength  Left Hand Dynamometer Position: 2  Elbow Position Forearm Position Trial 1 (lbs) Trial 2 (lbs) Trial 3 (lbs) Average (lbs) Pain   Flexed Neutral 50                      Shoulder Special Tests  Rotator Cuff Tests  Negative: Right Empty Can and Left Empty Can           Shoulder Joint Mobility  Right Shoulder Mobility  Hypomobile: Anterior Capsule Mobility, Posterior Capsule Mobility, and Inferior Capsule Mobility                        Treatment:  Therapeutic Exercise  TE 1: wall slides - 30 reps  TE 2: UT stretch - 3X30"  TE 3: farmer carry  TE 4: levator scap stretch - 3x40"  TE 5: 1st rib mob - 3'  TE 6: UBE 5/5      Time Entry(in minutes):  PT Evaluation (Moderate) Time Entry: 45  Therapeutic Exercise Time Entry: 15    Assessment & Plan   Assessment  Gely presents with a condition of Low complexity.   Presentation of Symptoms: Stable       Functional Limitations: Activity tolerance, Carrying objects, Completing work/school activities, Pain when reaching, Pain with ADLs/IADLs, Sitting tolerance, Reaching  Impairments: Activity intolerance, Abnormal or restricted range of motion, Impaired physical strength, Lack of appropriate home exercise program, Pain with functional activity    Patient Goal for Therapy (PT): Reduce neck pain / tightness  Prognosis: Fair  Assessment Details: Patient reports to therapy with report of increased right upper trapezius pain with muscle activation specifically when used in a eccentric capacity. Patient reports pain with reaching and overhead use. Patient demonstrates deficits with range of motion, strength, and function that limit ability to participate in school, work, and " recreational activities. They would benefit from skilled PT services to normalize kinetic chain mobility, strength, and function to safely return to their prior level of activity.    Plan  From a physical therapy perspective, the patient would benefit from: Skilled Rehab Services    Planned therapy interventions include: Therapeutic exercise, Therapeutic activities, Neuromuscular re-education, Manual therapy, ADLs/IADLs, Other (Comment), and Gait training. Dry Needling (prn)  Planned modalities to include: Biofeedback, Electrical stimulation - attended, Electrical stimulation - passive/unattended, Thermotherapy (hot pack), and Cryotherapy (cold pack).        Visit Frequency: 2 times Per Week for 8 Weeks.       This plan was discussed with Patient.   Discussion participants: Agreed Upon Plan of Care  Plan details: Frequency and duration of treatment to be adjusted as needed          The patient's spiritual, cultural, and educational needs were considered, and the patient is agreeable to the plan of care and goals.           Goals:   Active       LTG        Patient will perform yard work with reports of 2/10 pain        Start:  07/15/25               STG        Patient will improve shoulder flexion to 180 without reports of pain        Start:  07/15/25                Yg Moulton, PT

## 2025-07-17 ENCOUNTER — CLINICAL SUPPORT (OUTPATIENT)
Dept: REHABILITATION | Facility: HOSPITAL | Age: 75
End: 2025-07-17
Payer: MEDICARE

## 2025-07-17 DIAGNOSIS — M25.511 ACUTE PAIN OF RIGHT SHOULDER: Primary | ICD-10-CM

## 2025-07-17 PROCEDURE — 97110 THERAPEUTIC EXERCISES: CPT | Mod: PN,CQ

## 2025-07-17 NOTE — PROGRESS NOTES
"  Outpatient Rehab    Physical Therapy Visit    Patient Name: Gely Green  MRN: [id number]  YOB: 1950  Encounter Date: 7/17/2025    Therapy Diagnosis:   Encounter Diagnosis   Name Primary?    Acute pain of right shoulder Yes     Physician: Rufus Tracy P*    Physician Orders: Eval and Treat  Medical Diagnosis: Acute neck pain  Acute pain of right shoulder  Muscle spasm  Surgical Diagnosis: Not applicable for this Episode   Surgical Date: Not applicable for this Episode  Days Since Last Surgery: Not applicable for this Episode    Visit # / Visits Authorized:  1 / 20  Insurance Authorization Period: 7/15/2025 to 12/31/2025  Date of Evaluation: 7/15/2025  Plan of Care Certification: 7/15/2025 to 10/7/2025      PT/PTA: PTA   Number of PTA visits since last PT visit:1  Time In: 1400   Time Out: 1500  Total Time (in minutes): 60   Total Billable Time (in minutes): 30      Precautions:         Subjective   Patient reports she is having right shoulder pain.  Pain reported as 9/10.      Objective            Treatment:  Therapeutic Exercise  TE 1: wall slides - 30 reps  TE 2: UT stretch - 3X30"  TE 3: farmer carry #5 KB 3 laps  TE 4: levator scap stretch - 3x40"  TE 5: 1st rib mob - 3'  TE 6: UBE 5/5  TE 7: No monies RTB 3x10  TE 8: Cervical SNAGs 3x30"  TE 9: SHoulder ext YTB c/ handles  TE 10: Standing dowel flexion 3x10      Time Entry(in minutes):  Therapeutic Exercise Time Entry: 60    Assessment & Plan   Assessment: Today is Ms. Green's first visit after eval. Patient was able to complete exercises despite pain upon arrival. She had discomfort with LS stretch but was cued to relax her shoulders, she had relief and was able to complete LS stretch. Exercises were challenging she needed adequate rest in between sets.  Evaluation/Treatment Tolerance: Patient limited by fatigue    The patient will continue to benefit from skilled outpatient physical therapy in order to address the deficits listed " in the problem list on the initial evaluation, provide patient and family education, and maximize the patients level of independence in the home and community environments.     The patient's spiritual, cultural, and educational needs were considered, and the patient is agreeable to the plan of care and goals.           Plan: Continue with Physical Therapist plan of Care    Goals:   Active       LTG        Patient will perform yard work with reports of 2/10 pain  (Progressing)       Start:  07/15/25               STG        Patient will improve shoulder flexion to 180 without reports of pain  (Progressing)       Start:  07/15/25                Manan Lorenzo, BILL

## 2025-07-21 ENCOUNTER — CLINICAL SUPPORT (OUTPATIENT)
Dept: REHABILITATION | Facility: HOSPITAL | Age: 75
End: 2025-07-21
Payer: MEDICARE

## 2025-07-21 DIAGNOSIS — M25.511 ACUTE PAIN OF RIGHT SHOULDER: Primary | ICD-10-CM

## 2025-07-21 PROCEDURE — 97110 THERAPEUTIC EXERCISES: CPT | Mod: PN,CQ

## 2025-07-21 NOTE — PROGRESS NOTES
"  Outpatient Rehab    Physical Therapy Visit    Patient Name: Gely Green  MRN: [id number]  YOB: 1950  Encounter Date: 7/21/2025    Therapy Diagnosis:   Encounter Diagnosis   Name Primary?    Acute pain of right shoulder Yes     Physician: Rufus Tracy P*    Physician Orders: Eval and Treat  Medical Diagnosis: Acute neck pain  Acute pain of right shoulder  Muscle spasm  Surgical Diagnosis: Not applicable for this Episode   Surgical Date: Not applicable for this Episode  Days Since Last Surgery: Not applicable for this Episode    Visit # / Visits Authorized:  2 / 20  Insurance Authorization Period: 7/15/2025 to 12/31/2025  Date of Evaluation: 7/15/2025  Plan of Care Certification: 7/15/2025 to 10/7/2025      PT/PTA: PTA   Number of PTA visits since last PT visit:2  Time In: 0300   Time Out: 0400  Total Time (in minutes): 60   Total Billable Time (in minutes): 60    FOTO:  Intake Score (%): Not applicable for this Episode  Survey Score 2 (%): Not applicable for this Episode  Survey Score 3 (%): Not applicable for this Episode    Precautions:         Subjective   Pt is feeling better today.  Pain reported as 6/10.      Objective            Treatment:  Therapeutic Exercise  TE 1: wall slides - 30 reps  TE 2: UT stretch - 3X30"  TE 3: farmer carry #5 KB 3 laps  TE 4: levator scap stretch - 3x40"  TE 6: UBE 5/5  TE 7: No monies RTB 3x10  TE 8: Standing AROM flex/abd 3x10 ea  TE 9: SHoulder rows GTB c/ handles; SH ext RTB 3x10  TE 10: seated dowel flexion 3x10      Time Entry(in minutes):  Therapeutic Exercise Time Entry: 60    Assessment & Plan   Assessment: Vcs used to improve ther ex technique w/ Standing rows and UT stretch. Pt impressed w/ improved AAROM during seated dowel flex. Gely will continue to benefit from skilled therapy.   Evaluation/Treatment Tolerance: Patient tolerated treatment well    The patient will continue to benefit from skilled outpatient physical therapy in order to " address the deficits listed in the problem list on the initial evaluation, provide patient and family education, and maximize the patients level of independence in the home and community environments.     The patient's spiritual, cultural, and educational needs were considered, and the patient is agreeable to the plan of care and goals.           Plan: Continue with Physical Therapist plan of Care    Goals:   Active       LTG        Patient will perform yard work with reports of 2/10 pain  (Progressing)       Start:  07/15/25               STG        Patient will improve shoulder flexion to 180 without reports of pain  (Progressing)       Start:  07/15/25                Manan Lorenzo PTA

## 2025-07-23 ENCOUNTER — CLINICAL SUPPORT (OUTPATIENT)
Dept: REHABILITATION | Facility: HOSPITAL | Age: 75
End: 2025-07-23
Payer: MEDICARE

## 2025-07-23 DIAGNOSIS — M25.511 ACUTE PAIN OF RIGHT SHOULDER: Primary | ICD-10-CM

## 2025-07-23 PROCEDURE — 97110 THERAPEUTIC EXERCISES: CPT | Mod: PN,CQ

## 2025-07-24 NOTE — PROGRESS NOTES
"  Outpatient Rehab    Physical Therapy Visit    Patient Name: Gely Green  MRN: [id number]  YOB: 1950  Encounter Date: 7/23/2025    Therapy Diagnosis:   Encounter Diagnosis   Name Primary?    Acute pain of right shoulder Yes     Physician: Rufus Tracy P*    Physician Orders: Eval and Treat  Medical Diagnosis: Acute neck pain  Acute pain of right shoulder  Muscle spasm  Strain of right trapezius muscle, initial encounter  Surgical Diagnosis: Not applicable for this Episode   Surgical Date: Not applicable for this Episode  Days Since Last Surgery: Not applicable for this Episode    Visit # / Visits Authorized:  3 / 20  Insurance Authorization Period: 7/15/2025 to 12/31/2025  Date of Evaluation: 7/15/2025  Plan of Care Certification: 7/15/2025 to 10/7/2025      PT/PTA: PTA   Number of PTA visits since last PT visit:3  Time In: 0300   Time Out: 0400  Total Time (in minutes): 60   Total Billable Time (in minutes): 30    FOTO:  Intake Score (%): Not applicable for this Episode  Survey Score 2 (%): Not applicable for this Episode  Survey Score 3 (%): Not applicable for this Episode    Precautions:         Subjective   No complaints today.  Pain reported as 5/10.      Objective            Treatment:  Therapeutic Exercise  TE 1: wall slides flex/abd- 30 reps #1  TE 2: UT stretch - 3X30"  TE 4: levator scap stretch - 3x40"  TE 6: UBE 5/5  TE 7: No monies GTB 3x10  TE 8: Standing AROM flex/abd 3x10 ea  TE 9: SHoulder rows GTB c/ handles; SH ext RTB 3x10  TE 10: seated dowel flexion 3x10      Time Entry(in minutes):  Therapeutic Exercise Time Entry: 60    Assessment & Plan   Assessment: No adverse effects today. Pt continues to make progress w/ SH ROM and strength. Gely will continue to benefit from skilled therapy.   Evaluation/Treatment Tolerance: Patient tolerated treatment well    The patient will continue to benefit from skilled outpatient physical therapy in order to address the deficits " listed in the problem list on the initial evaluation, provide patient and family education, and maximize the patients level of independence in the home and community environments.     The patient's spiritual, cultural, and educational needs were considered, and the patient is agreeable to the plan of care and goals.           Plan: Continue with Physical Therapist plan of Care    Goals:   Active       LTG        Patient will perform yard work with reports of 2/10 pain  (Progressing)       Start:  07/15/25               STG        Patient will improve shoulder flexion to 180 without reports of pain  (Progressing)       Start:  07/15/25                Manan Lorenzo, BILL

## 2025-07-31 ENCOUNTER — TELEPHONE (OUTPATIENT)
Dept: INTERNAL MEDICINE | Facility: CLINIC | Age: 75
End: 2025-07-31
Payer: MEDICARE

## 2025-07-31 ENCOUNTER — CLINICAL SUPPORT (OUTPATIENT)
Dept: REHABILITATION | Facility: HOSPITAL | Age: 75
End: 2025-07-31
Payer: MEDICARE

## 2025-07-31 DIAGNOSIS — M25.511 ACUTE PAIN OF RIGHT SHOULDER: Primary | ICD-10-CM

## 2025-07-31 PROCEDURE — 97110 THERAPEUTIC EXERCISES: CPT | Mod: PN,CQ

## 2025-07-31 NOTE — PROGRESS NOTES
"  Outpatient Rehab    Physical Therapy Visit    Patient Name: Gely Green  MRN: [id number]  YOB: 1950  Encounter Date: 7/31/2025    Therapy Diagnosis:   Encounter Diagnosis   Name Primary?    Acute pain of right shoulder Yes     Physician: Rufus Tracy P*    Physician Orders: Eval and Treat  Medical Diagnosis: Acute neck pain  Acute pain of right shoulder  Muscle spasm  Surgical Diagnosis: Not applicable for this Episode   Surgical Date: Not applicable for this Episode  Days Since Last Surgery: Not applicable for this Episode    Visit # / Visits Authorized:  4 / 20  Insurance Authorization Period: 7/15/2025 to 12/31/2025  Date of Evaluation: 7/15/2025  Plan of Care Certification: 7/15/2025 to 10/7/2025      PT/PTA: PTA   Number of PTA visits since last PT visit:4  Time In: 0100   Time Out: 0200  Total Time (in minutes): 60   Total Billable Time (in minutes): 60    FOTO:  Intake Score (%): Not applicable for this Episode  Survey Score 2 (%): Not applicable for this Episode  Survey Score 3 (%): Not applicable for this Episode    Precautions:         Subjective   Discomfort in SH today.  Pain reported as 5/10.      Objective            Treatment:  Therapeutic Exercise  TE 1: wall slides flex/abd- 30 reps  TE 2: UT stretch - 3X30"  TE 3: SH Srugs 3x10  TE 4: levator scap stretch - 3x40"  TE 5: Swiss ball roll outs (attempted but painful)  TE 6: UBE 5/5  TE 7: No monies RTB 3x10  TE 10: seated dowel flexion 3x10      Time Entry(in minutes):  Therapeutic Exercise Time Entry: 60    Assessment & Plan   Assessment: [place] ball roll outs added to stretch SH and lats. Pt reported discomfort, exercises stopped. UT and Lev Scap added to reduce discomfort in neck. Resistance decreased w/ certain exercises due to high level of soreness after last session. Discomfort in SH may be caused by tight bicep  Evaluation/Treatment Tolerance: Patient tolerated treatment well    The patient will continue to benefit " from skilled outpatient physical therapy in order to address the deficits listed in the problem list on the initial evaluation, provide patient and family education, and maximize the patients level of independence in the home and community environments.     The patient's spiritual, cultural, and educational needs were considered, and the patient is agreeable to the plan of care and goals.           Plan: Continue with Physical Therapist plan of Care    Goals:   Active       LTG        Patient will perform yard work with reports of 2/10 pain  (Progressing)       Start:  07/15/25               STG        Patient will improve shoulder flexion to 180 without reports of pain  (Progressing)       Start:  07/15/25                Manan Lorenzo PTA

## 2025-07-31 NOTE — TELEPHONE ENCOUNTER
[facility], representive verbalized they are going to refax information

## 2025-08-06 ENCOUNTER — CLINICAL SUPPORT (OUTPATIENT)
Dept: REHABILITATION | Facility: HOSPITAL | Age: 75
End: 2025-08-06
Payer: MEDICARE

## 2025-08-06 DIAGNOSIS — M25.511 ACUTE PAIN OF RIGHT SHOULDER: Primary | ICD-10-CM

## 2025-08-06 PROCEDURE — 97110 THERAPEUTIC EXERCISES: CPT | Mod: PN,CQ

## 2025-08-06 NOTE — PROGRESS NOTES
Outpatient Rehab    Physical Therapy Visit    Patient Name: Gely Green  MRN: [id number]  YOB: 1950  Encounter Date: 8/6/2025    Therapy Diagnosis:   Encounter Diagnosis   Name Primary?    Acute pain of right shoulder Yes     Physician: Rufus Tracy P*    Physician Orders: Eval and Treat  Medical Diagnosis: Acute neck pain  Acute pain of right shoulder  Muscle spasm  Surgical Diagnosis: Not applicable for this Episode   Surgical Date: Not applicable for this Episode  Days Since Last Surgery: Not applicable for this Episode    Visit # / Visits Authorized:  5 / 20  Insurance Authorization Period: 7/15/2025 to 12/31/2025  Date of Evaluation: 7/15/2025  Plan of Care Certification: 7/15/2025 to 10/7/2025      PT/PTA: PTA   Number of PTA visits since last PT visit:5  Time In: 0200   Time Out: 0300  Total Time (in minutes): 60   Total Billable Time (in minutes): 60    FOTO:  Intake Score (%): Not applicable for this Episode  Survey Score 2 (%): Not applicable for this Episode  Survey Score 3 (%): Not applicable for this Episode    Precautions:         Subjective   Used SH more this weekend and it hurt alot w/ movement.  Pain reported as 5/10.      Objective            Treatment:  Therapeutic Exercise  TE 1: supine pect stretch w/ towel roll - 3'  TE 2: corner stretch low and [id number]  TE 4: Prone scap retractions (challenging)  TE 5: Standing scap retractions against wall 3x10  TE 6: UBE 5/5  TE 9: SHoulder rows GTB c/ handles      Time Entry(in minutes):  Therapeutic Exercise Time Entry: 60    Assessment & Plan   Assessment: Session focused on posture. Pt's pain in upper trap may be secondary to  forward shoulder posture. Prone scap retractions attempted but too challenging. Standing Scap retractions against wall performed instead. Vcs given to reduce SH ER compensation, pt demonstrated partial understanding.  Evaluation/Treatment Tolerance: Patient tolerated treatment well    The patient will  continue to benefit from skilled outpatient physical therapy in order to address the deficits listed in the problem list on the initial evaluation, provide patient and family education, and maximize the patients level of independence in the home and community environments.     The patient's spiritual, cultural, and educational needs were considered, and the patient is agreeable to the plan of care and goals.           Plan: Continue with Physical Therapist plan of Care    Goals:   Active       LTG        Patient will perform yard work with reports of 2/10 pain  (Progressing)       Start:  07/15/25               STG        Patient will improve shoulder flexion to 180 without reports of pain  (Progressing)       Start:  07/15/25                Manan Lorenzo PTA

## 2025-08-07 ENCOUNTER — OFFICE VISIT (OUTPATIENT)
Dept: GASTROENTEROLOGY | Facility: CLINIC | Age: 75
End: 2025-08-07
Payer: MEDICARE

## 2025-08-07 ENCOUNTER — OFFICE VISIT (OUTPATIENT)
Dept: PAIN MEDICINE | Facility: CLINIC | Age: 75
End: 2025-08-07
Payer: MEDICARE

## 2025-08-07 VITALS
DIASTOLIC BLOOD PRESSURE: 55 MMHG | SYSTOLIC BLOOD PRESSURE: 96 MMHG | BODY MASS INDEX: 23.11 KG/M2 | HEART RATE: 73 BPM | WEIGHT: 143.19 LBS

## 2025-08-07 VITALS
SYSTOLIC BLOOD PRESSURE: 105 MMHG | BODY MASS INDEX: 23.03 KG/M2 | OXYGEN SATURATION: 98 % | HEART RATE: 64 BPM | TEMPERATURE: 98 F | WEIGHT: 143.31 LBS | HEIGHT: 66 IN | DIASTOLIC BLOOD PRESSURE: 68 MMHG

## 2025-08-07 DIAGNOSIS — K59.04 CHRONIC IDIOPATHIC CONSTIPATION: Primary | ICD-10-CM

## 2025-08-07 DIAGNOSIS — M25.511 ACUTE PAIN OF RIGHT SHOULDER: Primary | ICD-10-CM

## 2025-08-07 DIAGNOSIS — S46.811S TRAPEZIUS MUSCLE STRAIN, RIGHT, SEQUELA: ICD-10-CM

## 2025-08-07 DIAGNOSIS — R19.8 BORBORYGMI: ICD-10-CM

## 2025-08-07 PROCEDURE — 99213 OFFICE O/P EST LOW 20 MIN: CPT | Mod: PBBFAC,27 | Performed by: ANESTHESIOLOGY

## 2025-08-07 PROCEDURE — 99213 OFFICE O/P EST LOW 20 MIN: CPT | Mod: S$PBB,,, | Performed by: ANESTHESIOLOGY

## 2025-08-07 PROCEDURE — 99999 PR PBB SHADOW E&M-EST. PATIENT-LVL III: CPT | Mod: PBBFAC,,,

## 2025-08-07 PROCEDURE — 99213 OFFICE O/P EST LOW 20 MIN: CPT | Mod: PBBFAC

## 2025-08-07 PROCEDURE — 99999 PR PBB SHADOW E&M-EST. PATIENT-LVL III: CPT | Mod: PBBFAC,,, | Performed by: ANESTHESIOLOGY

## 2025-08-07 NOTE — PROGRESS NOTES
"    [facility] Follow-UP Note    Reason for Follow-Up:  The primary encounter diagnosis was Chronic idiopathic constipation. A diagnosis of Borborygmi was also pertinent to this visit.    PCP:   Yoseph Dallas         HPI:  This is a 74 y.o. female last seen in GI clinic on 4/17/2025 for follow up of chronic constipation. She was taking Linzess 290 mcg every 2 days with 2 formed BM weekly. Reports mild periumbilical "stomach ache" that is associated with increased bowel noises. She was advised to try taking Linzess daily and Ibgard as needed for abd pain.     Interval History:  Today, pt presents for follow up. States she is only taking her  Linzess 290 about once every 2 weeks. She reports not taking it daily because she's "not a medicine person". She has a BM about once or twice a week. She still endorses increased bowel sounds lately. No other concerns/questions. She denies bloody stools, rectal bleeding, black stools, abdominal pain, N/V, or loss of appetite.     Objective Findings:    Vital Signs:  BP (!) 96/55   Pulse 73   Wt 64.9 kg (143 lb 3 oz)   LMP 01/01/1999   BMI 23.11 kg/m²   Body mass index is 23.11 kg/m².    Physical Exam:  General Appearance: Well appearing in no acute distress  Abdomen: Soft, non tender, non distended in all four quadrants. No hepatosplenomegaly, ascites, or mass      Assessment:  1. Chronic idiopathic constipation    2. Borborygmi      This is a 74 y.o F here for follow up of chronic constipation. She was initially prescribed Linzess 72 mcg which was later increased to 290 mcg. She has not been good at taking her meds consistently so it's hard to determine if or how well the Linzess was working. We discussed switching her to Amitiza today, but pt deferred stating she wants to see how she does off these medications. She is planning to finish off her current bottle of Linzess. Recommended she try to take Miralax 3x/day with her meals. Will f/u with her " in 3 months. If no improvement, can re discuss amitiza.    RTC 3 months.     Thank you so much for allowing me to participate in the care of Beulah S Anderson Sarah Abukhader, PA-C [facility]

## 2025-08-07 NOTE — PATIENT INSTRUCTIONS
Constipation Tips  - Eat more high fiber foods   - Take a Fiber supplement daily (Metamucil, Benefiber, Citrucell, etc)  - Take Miralax 1-4 times a day  - Drink at least 8 cups of water a day  - Get regular physical activity  - Use a stool or Squatty Potty  - Avoid sitting on the toilet >5 minutes to prevent hemorrhoids

## 2025-08-07 NOTE — PROGRESS NOTES
PCP: Yoseph Dallas MD    REFERRING PHYSICIAN: No ref. provider found    CHIEF COMPLAINT: Right neck/shoulder pain    Original HISTORY OF PRESENT ILLNESS: Gely Green presents to the clinic for the evaluation of the above pain. The pain started June 1st after no particular event.     Original Pain Description:  The pain is located in the right neck and is radiating to the right arm. The pain is described as shooting. Exacerbating factors: moving right upper extremity. Mitigating factors massage and rest. Symptoms interfere with daily activity and sleeping. The patient feels like symptoms have been worsening. Patient denies night fever/night sweats, urinary incontinence, bowel incontinence, significant weight loss, significant motor weakness, and loss of sensations.    Original PAIN SCORES:  Best: Pain is 6  Worst: Pain is 10  Current: Pain is 6        8/7/2025     3:24 PM   Last 3 PDI Scores   Pain Disability Index (PDI) 34       INTERVAL HISTORY: (Newest visit at the bottom)   Interval History 08/07/2025:  Patient presents today for follow-up of her right neck and shoulder pain. Patient states R trapezius TPI at last visit provided 70-80% relief of her pain and lasted for about 24 hours. She states pain has come back since then. She is not currently taking gabapentin due to previous issues she has had with the medication but takes robaxin which she says provides no relief for pain. She states pain occurs with movement after 1-2 hours of gardening. At rest it is not there. Pain is localized to right shoulder with radiating down R arm with accompanied numbness/tingling down right arm into right hand and fingers. She has started PT and has had four sessions, has not provided any relief. Patient rates pain as 10/10 at worst, 0/10 currently and best. Patient has had no recent falls, no saddle anesthesia, no urinary nor bowel incontinence, no new onset weakness.      6 weeks of Conservative therapy:  PT: none  "(Must include dates)  Chiro:  HEP:       Treatments / Medications: (Ice/Heat/NSAIDS/APAP/etc):  topicals      Interventional Pain Procedures: (Previous injections)  none    Past Medical History:   Diagnosis Date    Arthritis     Chronic constipation     Depression     Diabetes mellitus     Diabetes mellitus     Diabetes mellitus, type 2     Hypertension     Osteoarthritis     Osteopenia      Past Surgical History:   Procedure Laterality Date     SECTION      2x    COLONOSCOPY      COLONOSCOPY N/A 2019    Procedure: COLONOSCOPY;  Surgeon: Marshall Contreras MD;  Location: [facility] (4TH FLR);  Service: Endoscopy;  Laterality: N/A;  pt states that she had to be resuscitated after receiving an epidural during childbirth "30 something" years ago.  Ok for 4th floor per Dr. Hernandez-MS    CYSTOSCOPY N/A 2025    Procedure: CYSTOSCOPY;  Surgeon: Kenzie Mast MD;  Location: [facility];  Service: Urology;  Laterality: N/A;  30 minutes    EYE SURGERY Bilateral     cataract removal    HYSTERECTOMY      full hyst     OOPHORECTOMY      TONSILLECTOMY       Social History[1]  Family History   Problem Relation Name Age of Onset    Breast cancer Maternal Cousin 2     Heart attack Mother sourav Scrubbs       Heart disease Mother sourav Scrubbs       Alzheimer's disease Mother sourav Scrubbs       Heart attack Father Father     Heart disease Father Father     Heart failure Father Father     Hypertension Father Father     Alcohol abuse Father Father     Hyperlipidemia Sister Devi     Hypertension Sister Devi     Diabetes Sister Devi     Abnormal EKG Sister Devi     Dementia Sister Devi     Kidney disease Sister Devi     Alcohol abuse Brother Hemanth     Heart failure Brother Hemanth     Fibroids Daughter Yulisa     Brain cancer Son Darrell CHRISTIN Green     Early death Son Darrell CHRISTIN Green 33    Learning disabilities Son Darrell CHRISTIN Green     Heart disease Son Darrell CHRISTIN Green     " "Ovarian cancer Neg Hx      Cervical cancer Neg Hx      Endometrial cancer Neg Hx      Vaginal cancer Neg Hx      Stroke Neg Hx      Colon cancer Neg Hx      Esophageal cancer Neg Hx      Vision loss Neg Hx         Review of patient's allergies indicates:  No Known Allergies      Current Outpatient Medications   Medication Sig    ascorbic acid, vitamin C, (VITAMIN C) 1000 MG tablet Take 1,000 mg by mouth once daily.    aspirin (ECOTRIN) 81 MG EC tablet Take 1 tablet (81 mg total) by mouth once daily.    azelastine (ASTELIN) 137 mcg (0.1 %) nasal spray 1 spray (137 mcg total) by Nasal route 2 (two) times daily.    BD ULTRA-FINE SHORT PEN NEEDLE 31 gauge x 5/16" Ndle USE ONCE DAILY    calcium carbonate (OS-REDD) 600 mg calcium (1,500 mg) Tab Take 600 mg by mouth 2 (two) times daily with meals.    cyanocobalamin 500 MCG tablet Take 500 mcg by mouth once daily.    DEXCOM G7  Misc USE AS DIRECTED EVERY 3 MONTHS    gabapentin (NEURONTIN) 100 MG capsule Take 1 capsule (100 mg total) by mouth 3 (three) times daily.    hydroCHLOROthiazide (HYDRODIURIL) 12.5 MG Tab Take 1 tablet (12.5 mg total) by mouth once daily.    hydrOXYzine HCL (ATARAX) 25 MG tablet TAKE 1 TABLET(25 MG) BY MOUTH TWICE DAILY AS NEEDED FOR BLADDER PAIN    insulin glargine, TOUJEO, (TOUJEO SOLOSTAR U-300 INSULIN) 300 unit/mL (1.5 mL) InPn pen Inject 12 Units into the skin every evening.    ipratropium (ATROVENT) 21 mcg (0.03 %) nasal spray 2 sprays by Each Nostril route 2 (two) times daily.    irbesartan (AVAPRO) 300 MG tablet Take 1 tablet (300 mg total) by mouth every evening.    lancets (ONETOUCH DELICA LANCETS) 33 gauge Misc 1 lancet by Misc.(Non-Drug; Combo Route) route 3 (three) times daily.    linaCLOtide (LINZESS) 290 mcg Cap capsule Take 1 capsule (290 mcg total) by mouth before breakfast.    magnesium oxide (MAG-OX) 400 mg (241.3 mg magnesium) tablet Take 1 tablet (400 mg total) by mouth every evening.    metFORMIN (GLUCOPHAGE-XR) 500 MG ER " 24hr tablet Take 2 tablets (1,000 mg total) by mouth 2 (two) times a day.    methen-m.blue-s.phos-phsal-hyo (URIBEL) 118-10-40.8-36 mg Cap Take 1 capsule by mouth 4 (four) times daily as needed (bladder pain).    methocarbamoL (ROBAXIN) 500 MG Tab Take 1 tablet (500 mg total) by mouth 2 (two) times a day.    MULTIVITAMIN ORAL Take by mouth.    ONETOUCH VERIO TEST STRIPS Strp TEST THREE TIMES DAILY    rosuvastatin (CRESTOR) 20 MG tablet Take 1 tablet (20 mg total) by mouth once daily.    vitamin D 1000 units Tab Take 1,000 Units by mouth once daily.    diclofenac sodium (VOLTAREN) 1 % Gel Apply 2 g topically once daily. (Patient not taking: Reported on 8/7/2025)    fluticasone propionate (FLONASE) 50 mcg/actuation nasal spray 2 sprays (100 mcg total) by Each Nostril route once daily. (Patient not taking: Reported on 8/7/2025)    levocetirizine (XYZAL) 5 MG tablet Take 1 tablet (5 mg total) by mouth every evening. (Patient not taking: Reported on 8/7/2025)    methylPREDNISolone (MEDROL DOSEPACK) 4 mg tablet use as directed (Patient not taking: Reported on 8/7/2025)    SITagliptin phosphate (JANUVIA) 50 MG Tab Take 1 tablet (50 mg total) by mouth once daily. (Patient not taking: Reported on 8/7/2025)     No current facility-administered medications for this visit.       ROS:  GENERAL: No fever. No chills. No fatigue. Denies weight loss. Denies weight gain.  HEENT: Denies headaches. Denies vision change. Denies eye pain. Denies double vision. Denies ear pain.   CV: Denies chest pain.   PULM: Denies of shortness of breath.  GI: Denies constipation. No diarrhea. No abdominal pain. Denies nausea. Denies vomiting. No blood in stool.  HEME: Denies bleeding problems.  : Denies urgency. No painful urination. No blood in urine.  MS: Denies joint stiffness. Denies joint swelling.  Denies back pain.  SKIN: Denies rash.   NEURO: Denies seizures. No weakness.  PSYCH:  Denies difficulty sleeping. No anxiety. Denies depression. No  "suicidal thoughts.       VITALS:   Vitals:    08/07/25 1522   BP: 105/68   Pulse: 64   Temp: 97.9 °F (36.6 °C)   TempSrc: Oral   SpO2: 98%   Weight: 65 kg (143 lb 4.8 oz)   Height: 5' 6" (1.676 m)   PainSc:   5   PainLoc: Neck           PHYSICAL EXAM:   GENERAL: Well appearing, in no acute distress, alert and oriented x3.  PSYCH:  Mood and affect appropriate.  SKIN: Skin color, texture, turgor normal, no rashes or lesions.  HEENT:  Normocephalic, atraumatic. Cranial nerves grossly intact.  NECK: No pain to palpation over facets. Mild pain with rotation, negative Spurling bilaterally. Pain reproduced with palpation of the right trapezius.   PULM: No evidence of respiratory difficulty, symmetric chest rise.  GI:  Non-distended  BACK: Normal range of motion. No pain to palpation over the spinous processes. No pain to palpation over facet joints. There is no pain with palpation over the sacroiliac joints bilaterally.   EXTREMITIES: No deformities, edema, or skin discoloration.   MUSCULOSKELETAL: Negative Empty Can test bilaterally. Negative Nasreen test bilaterally.  No atrophy is noted. Tender to palpation superior right trapezius muscle.  NEURO: Sensation is equal and appropriate bilaterally. Bilateral upper and lower extremity strength is normal and symmetric. Bilateral upper extremity coordination and muscle stretch reflexes are physiologic and symmetric.  GAIT: normal.      LABS:      IMAGING:    MRI cervical spine without contrast     01/04/18 14:00:00     Accession# [id number]     CLINICAL INDICATION: 67 year old F with cervical radiculopathy, left     TECHNIQUE: Multiplanar multisequence MR imaging of the cervical spine was performed without the use of intravenous contrast.      COMPARISON:  Cervical spine radiograph 1/4/2018     FINDINGS:     The vertebral bodies demonstrate no evidence of fracture, osseous destructive process or aggressive bone marrow replacement process. Normal sagittal alignment is preserved.    "   Intervertebral disk space heights are fairly well-maintained.      C2-C3: No significant disc abnormality, spinal canal stenosis, or neuroforaminal narrowing.     C3-C4: Posterior disc osteophyte complex and uncovertebral spurring resulting in moderate bilateral neuroforaminal narrowing and mild spinal canal stenosis.     C4-C5: Posterior disc osteophyte complex resulting in effacement of the anterior thecal sac without significant spinal canal stenosis or neuroforaminal narrowing.     C5-C6: Posterior disc osteophyte complex and uncovertebral spurring resulting in effacement of the anterior thecal sac without significant canal stenosis or neuroforaminal narrowing.     C6-C7: Posterior disc osteophyte complex and uncovertebral spurring resulting in effacement of the anterior thecal sac without significant spinal canal stenosis or neuroforaminal narrowing.     C7-T1: No significant disc abnormality, spinal canal stenosis, or neuroforaminal narrowing.     The cervicomedullary junction is in good position. The cervical and visualized upper thoracic spinal cord is normal in caliber, morphology, and signal.      No spinal canal or paraspinous masses are identified. The visualized paraspinous soft tissue structures are within normal limits.     A subcentimeter right thyroid nodule is noted.  IMPRESSION:         Cervical spondylosis, most significant at C3-C4 resulting in moderate bilateral neuroforaminal narrowing and mild spinal canal stenosis at this level.     Remaining degenerative changes are detailed above.    ASSESSMENT: 74 y.o. year old female with pain, consistent with:    Encounter Diagnoses   Name Primary?    Acute pain of right shoulder Yes    Trapezius muscle strain, right, sequela          DISCUSSION: Gely Green is a nice lady who comes to us from Dr. Dallas. She presented with right neck/shoulder pain which is worst with movement. Remote MRI from 2018 shows some canal effacement and some moderate  C3-4 foraminal narrowing. Exam showed pain with palpation of trapezius. We performed a TPI which took her pain from constant to only after 1-2 hrs of gardening.     PLAN:  Encouraged her that she is improving  Patient advised to take robaxin PRN prior to activity involving use of BUE.  Patient encouraged to continue PT. Discussed that PT is the solution to her pain  Recommend ibuprofen for severe exacerbations  RTC in 1 month.      Shawn Lizama DO  2025         [1]   Social History  Socioeconomic History    Marital status:    Tobacco Use    Smoking status: Former     Current packs/day: 0.00     Average packs/day: 1 pack/day for 25.0 years (25.0 ttl pk-yrs)     Types: Cigarettes     Start date: 1978     Quit date: 1999     Years since quittin.6     Passive exposure: Past    Smokeless tobacco: Never   Substance and Sexual Activity    Alcohol use: No    Drug use: No    Sexual activity: Not Currently     Partners: Male     Birth control/protection: Abstinence     Social Drivers of Health     Financial Resource Strain: Low Risk  (2025)    Overall Financial Resource Strain (CARDIA)     Difficulty of Paying Living Expenses: Not hard at all   Food Insecurity: No Food Insecurity (2025)    Hunger Vital Sign     Worried About Running Out of Food in the Last Year: Never true     Ran Out of Food in the Last Year: Never true   Transportation Needs: No Transportation Needs (2025)    PRAPARE - Transportation     Lack of Transportation (Medical): No     Lack of Transportation (Non-Medical): No   Physical Activity: Sufficiently Active (2025)    Exercise Vital Sign     Days of Exercise per Week: 3 days     Minutes of Exercise per Session: 130 min   Stress: Stress Concern Present (2025)    [employer] - Occupational Stress Questionnaire     Feeling of Stress : Very much   Housing Stability: Low Risk  (2025)    Housing Stability Vital Sign     Unable to Pay  for Housing in the Last Year: No     Number of Times Moved in the Last Year: 0     Homeless in the Last Year: No

## 2025-08-12 ENCOUNTER — CLINICAL SUPPORT (OUTPATIENT)
Dept: REHABILITATION | Facility: HOSPITAL | Age: 75
End: 2025-08-12
Payer: MEDICARE

## 2025-08-12 DIAGNOSIS — M25.511 ACUTE PAIN OF RIGHT SHOULDER: Primary | ICD-10-CM

## 2025-08-12 PROCEDURE — 97110 THERAPEUTIC EXERCISES: CPT | Mod: PN,CQ

## 2025-08-14 ENCOUNTER — CLINICAL SUPPORT (OUTPATIENT)
Dept: REHABILITATION | Facility: HOSPITAL | Age: 75
End: 2025-08-14
Payer: MEDICARE

## 2025-08-14 ENCOUNTER — PATIENT MESSAGE (OUTPATIENT)
Dept: NEUROLOGY | Facility: CLINIC | Age: 75
End: 2025-08-14

## 2025-08-14 DIAGNOSIS — M25.511 ACUTE PAIN OF RIGHT SHOULDER: Primary | ICD-10-CM

## 2025-08-14 PROCEDURE — 97110 THERAPEUTIC EXERCISES: CPT | Mod: PN

## 2025-08-21 ENCOUNTER — OFFICE VISIT (OUTPATIENT)
Dept: UROGYNECOLOGY | Facility: CLINIC | Age: 75
End: 2025-08-21
Payer: MEDICARE

## 2025-08-21 VITALS
BODY MASS INDEX: 22.53 KG/M2 | SYSTOLIC BLOOD PRESSURE: 114 MMHG | HEART RATE: 59 BPM | HEIGHT: 66 IN | DIASTOLIC BLOOD PRESSURE: 60 MMHG | WEIGHT: 140.19 LBS

## 2025-08-21 DIAGNOSIS — N89.8 VAGINAL DRYNESS: Primary | ICD-10-CM

## 2025-08-21 DIAGNOSIS — R35.1 NOCTURIA: ICD-10-CM

## 2025-08-21 DIAGNOSIS — N89.8 VAGINAL DISCHARGE: ICD-10-CM

## 2025-08-21 DIAGNOSIS — N30.10 INTERSTITIAL CYSTITIS: ICD-10-CM

## 2025-08-21 PROCEDURE — 99213 OFFICE O/P EST LOW 20 MIN: CPT | Mod: PBBFAC | Performed by: NURSE PRACTITIONER

## 2025-08-21 PROCEDURE — 99213 OFFICE O/P EST LOW 20 MIN: CPT | Mod: S$PBB,,, | Performed by: NURSE PRACTITIONER

## 2025-08-21 PROCEDURE — 81515 NFCT DS BV&VAGINITIS DNA ALG: CPT | Performed by: NURSE PRACTITIONER

## 2025-08-21 PROCEDURE — 99999 PR PBB SHADOW E&M-EST. PATIENT-LVL III: CPT | Mod: PBBFAC,,, | Performed by: NURSE PRACTITIONER

## 2025-08-28 ENCOUNTER — OFFICE VISIT (OUTPATIENT)
Dept: NEUROLOGY | Facility: CLINIC | Age: 75
End: 2025-08-28
Payer: MEDICARE

## 2025-08-28 ENCOUNTER — PATIENT MESSAGE (OUTPATIENT)
Dept: NEUROLOGY | Facility: CLINIC | Age: 75
End: 2025-08-28

## 2025-08-28 DIAGNOSIS — F41.9 ANXIETY: ICD-10-CM

## 2025-08-28 DIAGNOSIS — Z63.6 CAREGIVER STRESS: ICD-10-CM

## 2025-08-28 DIAGNOSIS — F43.23 ADJUSTMENT DISORDER WITH MIXED ANXIETY AND DEPRESSED MOOD: ICD-10-CM

## 2025-08-28 DIAGNOSIS — G31.84 MILD NEUROCOGNITIVE DISORDER: Primary | ICD-10-CM

## 2025-08-28 LAB
BACTERIAL VAGINOSIS DNA (OHS): DETECTED
CANDIDA GLABRATA/KRUSEI DNA (OHS): NOT DETECTED
CANDIDA SPECIES DNA (OHS): NOT DETECTED
TRICHOMONAS VAGINALIS DNA (OHS): NOT DETECTED

## 2025-08-28 SDOH — SOCIAL DETERMINANTS OF HEALTH (SDOH): DEPENDENT RELATIVE NEEDING CARE AT HOME: Z63.6

## 2025-08-29 ENCOUNTER — TELEPHONE (OUTPATIENT)
Dept: UROGYNECOLOGY | Facility: CLINIC | Age: 75
End: 2025-08-29
Payer: MEDICARE

## 2025-08-29 DIAGNOSIS — B96.89 BV (BACTERIAL VAGINOSIS): Primary | ICD-10-CM

## 2025-08-29 DIAGNOSIS — N76.0 BV (BACTERIAL VAGINOSIS): Primary | ICD-10-CM

## 2025-08-29 RX ORDER — METRONIDAZOLE 500 MG/1
500 TABLET ORAL EVERY 12 HOURS
Qty: 14 TABLET | Refills: 0 | Status: SHIPPED | OUTPATIENT
Start: 2025-08-29 | End: 2025-09-05

## 2025-09-01 PROBLEM — M25.511 ACUTE PAIN OF RIGHT SHOULDER: Status: RESOLVED | Noted: 2025-07-15 | Resolved: 2025-09-01

## 2025-09-02 ENCOUNTER — OFFICE VISIT (OUTPATIENT)
Dept: CARDIOLOGY | Facility: CLINIC | Age: 75
End: 2025-09-02
Payer: MEDICARE

## 2025-09-02 VITALS
SYSTOLIC BLOOD PRESSURE: 112 MMHG | WEIGHT: 143.63 LBS | BODY MASS INDEX: 23.08 KG/M2 | HEART RATE: 63 BPM | OXYGEN SATURATION: 99 % | DIASTOLIC BLOOD PRESSURE: 70 MMHG | HEIGHT: 66 IN

## 2025-09-02 DIAGNOSIS — R00.2 PALPITATIONS: Primary | ICD-10-CM

## 2025-09-02 DIAGNOSIS — I10 ESSENTIAL HYPERTENSION: ICD-10-CM

## 2025-09-02 DIAGNOSIS — M25.561 PAIN IN BOTH KNEES, UNSPECIFIED CHRONICITY: Primary | ICD-10-CM

## 2025-09-02 DIAGNOSIS — M25.562 PAIN IN BOTH KNEES, UNSPECIFIED CHRONICITY: Primary | ICD-10-CM

## 2025-09-02 PROCEDURE — 99999 PR PBB SHADOW E&M-EST. PATIENT-LVL III: CPT | Mod: PBBFAC,,, | Performed by: INTERNAL MEDICINE

## 2025-09-02 PROCEDURE — 99213 OFFICE O/P EST LOW 20 MIN: CPT | Mod: PBBFAC,PN | Performed by: INTERNAL MEDICINE

## 2025-09-03 ENCOUNTER — OFFICE VISIT (OUTPATIENT)
Dept: ORTHOPEDICS | Facility: CLINIC | Age: 75
End: 2025-09-03
Payer: MEDICARE

## 2025-09-03 VITALS
WEIGHT: 143.94 LBS | DIASTOLIC BLOOD PRESSURE: 68 MMHG | HEART RATE: 66 BPM | BODY MASS INDEX: 23.13 KG/M2 | HEIGHT: 66 IN | SYSTOLIC BLOOD PRESSURE: 119 MMHG

## 2025-09-03 DIAGNOSIS — M25.561 ACUTE PAIN OF RIGHT KNEE: Primary | ICD-10-CM

## 2025-09-03 PROCEDURE — 99212 OFFICE O/P EST SF 10 MIN: CPT | Mod: PBBFAC,PN

## 2025-09-03 PROCEDURE — 99999 PR PBB SHADOW E&M-EST. PATIENT-LVL II: CPT | Mod: PBBFAC,,,

## (undated) DEVICE — ELECTRODE REM PLYHSV RETURN 9

## (undated) DEVICE — TUBING SUC UNIV W/CONN 12FT

## (undated) DEVICE — SOL IRR WATER STRL 3000 ML

## (undated) DEVICE — BAG URO DRAIN

## (undated) DEVICE — TRAY CYSTO BASIN OMC

## (undated) DEVICE — GLOVE BIOGEL PI MICRO INDIC 6

## (undated) DEVICE — CUP MEDICINE GRADUATED 1OZ

## (undated) DEVICE — GOWN SURGICAL X-LARGE

## (undated) DEVICE — PACK CYSTOSCOPY III SIRUS